# Patient Record
Sex: MALE | Race: WHITE | URBAN - METROPOLITAN AREA
[De-identification: names, ages, dates, MRNs, and addresses within clinical notes are randomized per-mention and may not be internally consistent; named-entity substitution may affect disease eponyms.]

---

## 2020-12-29 ENCOUNTER — EMERGENCY (EMERGENCY)
Facility: HOSPITAL | Age: 80
LOS: 0 days | Discharge: HOME | End: 2020-12-29
Attending: EMERGENCY MEDICINE
Payer: MEDICARE

## 2020-12-29 VITALS — HEIGHT: 71 IN | WEIGHT: 154.98 LBS

## 2020-12-29 VITALS
HEART RATE: 81 BPM | TEMPERATURE: 100 F | DIASTOLIC BLOOD PRESSURE: 66 MMHG | SYSTOLIC BLOOD PRESSURE: 162 MMHG | OXYGEN SATURATION: 100 % | RESPIRATION RATE: 17 BRPM

## 2020-12-29 DIAGNOSIS — Z88.0 ALLERGY STATUS TO PENICILLIN: ICD-10-CM

## 2020-12-29 DIAGNOSIS — Z85.118 PERSONAL HISTORY OF OTHER MALIGNANT NEOPLASM OF BRONCHUS AND LUNG: ICD-10-CM

## 2020-12-29 DIAGNOSIS — J44.9 CHRONIC OBSTRUCTIVE PULMONARY DISEASE, UNSPECIFIED: ICD-10-CM

## 2020-12-29 DIAGNOSIS — R53.1 WEAKNESS: ICD-10-CM

## 2020-12-29 DIAGNOSIS — U07.1 COVID-19: ICD-10-CM

## 2020-12-29 LAB
ALBUMIN SERPL ELPH-MCNC: 2 G/DL — LOW (ref 3.3–5)
ALP SERPL-CCNC: 115 U/L — SIGNIFICANT CHANGE UP (ref 40–120)
ALT FLD-CCNC: 23 U/L — SIGNIFICANT CHANGE UP (ref 12–78)
ANION GAP SERPL CALC-SCNC: 5 MMOL/L — SIGNIFICANT CHANGE UP (ref 5–17)
AST SERPL-CCNC: 39 U/L — HIGH (ref 15–37)
BASOPHILS # BLD AUTO: 0.03 K/UL — SIGNIFICANT CHANGE UP (ref 0–0.2)
BASOPHILS NFR BLD AUTO: 0.4 % — SIGNIFICANT CHANGE UP (ref 0–2)
BILIRUB SERPL-MCNC: 0.3 MG/DL — SIGNIFICANT CHANGE UP (ref 0.2–1.2)
BUN SERPL-MCNC: 25 MG/DL — HIGH (ref 7–23)
CALCIUM SERPL-MCNC: 9.1 MG/DL — SIGNIFICANT CHANGE UP (ref 8.5–10.1)
CHLORIDE SERPL-SCNC: 101 MMOL/L — SIGNIFICANT CHANGE UP (ref 96–108)
CO2 SERPL-SCNC: 28 MMOL/L — SIGNIFICANT CHANGE UP (ref 22–31)
CREAT SERPL-MCNC: 1.36 MG/DL — HIGH (ref 0.5–1.3)
EOSINOPHIL # BLD AUTO: 0.04 K/UL — SIGNIFICANT CHANGE UP (ref 0–0.5)
EOSINOPHIL NFR BLD AUTO: 0.5 % — SIGNIFICANT CHANGE UP (ref 0–6)
GLUCOSE SERPL-MCNC: 102 MG/DL — HIGH (ref 70–99)
HCT VFR BLD CALC: 28 % — LOW (ref 39–50)
HGB BLD-MCNC: 8.7 G/DL — LOW (ref 13–17)
IMM GRANULOCYTES NFR BLD AUTO: 1.5 % — SIGNIFICANT CHANGE UP (ref 0–1.5)
LYMPHOCYTES # BLD AUTO: 0.44 K/UL — LOW (ref 1–3.3)
LYMPHOCYTES # BLD AUTO: 5.9 % — LOW (ref 13–44)
MAGNESIUM SERPL-MCNC: 2 MG/DL — SIGNIFICANT CHANGE UP (ref 1.6–2.6)
MCHC RBC-ENTMCNC: 26.4 PG — LOW (ref 27–34)
MCHC RBC-ENTMCNC: 31.1 GM/DL — LOW (ref 32–36)
MCV RBC AUTO: 85.1 FL — SIGNIFICANT CHANGE UP (ref 80–100)
MONOCYTES # BLD AUTO: 0.99 K/UL — HIGH (ref 0–0.9)
MONOCYTES NFR BLD AUTO: 13.2 % — SIGNIFICANT CHANGE UP (ref 2–14)
NEUTROPHILS # BLD AUTO: 5.9 K/UL — SIGNIFICANT CHANGE UP (ref 1.8–7.4)
NEUTROPHILS NFR BLD AUTO: 78.5 % — HIGH (ref 43–77)
PLATELET # BLD AUTO: 273 K/UL — SIGNIFICANT CHANGE UP (ref 150–400)
POTASSIUM SERPL-MCNC: 4 MMOL/L — SIGNIFICANT CHANGE UP (ref 3.5–5.3)
POTASSIUM SERPL-SCNC: 4 MMOL/L — SIGNIFICANT CHANGE UP (ref 3.5–5.3)
PROT SERPL-MCNC: 7.9 GM/DL — SIGNIFICANT CHANGE UP (ref 6–8.3)
RBC # BLD: 3.29 M/UL — LOW (ref 4.2–5.8)
RBC # FLD: 18.6 % — HIGH (ref 10.3–14.5)
SARS-COV-2 RNA SPEC QL NAA+PROBE: DETECTED
SODIUM SERPL-SCNC: 134 MMOL/L — LOW (ref 135–145)
TROPONIN I SERPL-MCNC: <0.015 NG/ML — SIGNIFICANT CHANGE UP (ref 0.01–0.04)
WBC # BLD: 7.51 K/UL — SIGNIFICANT CHANGE UP (ref 3.8–10.5)
WBC # FLD AUTO: 7.51 K/UL — SIGNIFICANT CHANGE UP (ref 3.8–10.5)

## 2020-12-29 PROCEDURE — 93010 ELECTROCARDIOGRAM REPORT: CPT

## 2020-12-29 PROCEDURE — 36415 COLL VENOUS BLD VENIPUNCTURE: CPT

## 2020-12-29 PROCEDURE — 80053 COMPREHEN METABOLIC PANEL: CPT

## 2020-12-29 PROCEDURE — U0003: CPT

## 2020-12-29 PROCEDURE — 71045 X-RAY EXAM CHEST 1 VIEW: CPT | Mod: 26

## 2020-12-29 PROCEDURE — 99283 EMERGENCY DEPT VISIT LOW MDM: CPT | Mod: 25

## 2020-12-29 PROCEDURE — 99284 EMERGENCY DEPT VISIT MOD MDM: CPT | Mod: CS

## 2020-12-29 PROCEDURE — 83735 ASSAY OF MAGNESIUM: CPT

## 2020-12-29 PROCEDURE — 71045 X-RAY EXAM CHEST 1 VIEW: CPT

## 2020-12-29 PROCEDURE — 84484 ASSAY OF TROPONIN QUANT: CPT

## 2020-12-29 PROCEDURE — 93005 ELECTROCARDIOGRAM TRACING: CPT

## 2020-12-29 PROCEDURE — 85025 COMPLETE CBC W/AUTO DIFF WBC: CPT

## 2020-12-29 RX ORDER — SODIUM CHLORIDE 9 MG/ML
500 INJECTION INTRAMUSCULAR; INTRAVENOUS; SUBCUTANEOUS ONCE
Refills: 0 | Status: COMPLETED | OUTPATIENT
Start: 2020-12-29 | End: 2020-12-29

## 2020-12-29 RX ADMIN — SODIUM CHLORIDE 1000 MILLILITER(S): 9 INJECTION INTRAMUSCULAR; INTRAVENOUS; SUBCUTANEOUS at 15:59

## 2020-12-29 NOTE — ED ADULT NURSE NOTE - OBJECTIVE STATEMENT
pt presents to ED from MD [facility] office for eval of + COVID this AM. pt denies symptoms, states his breathing is no more labored than usual. hx of lobectomy per pt for pmhx of lung CA. upon assessment pt is mildly labored, maintaining SPO2 @ 100 on RA. 20g PIV placed to L AC. EKG completed. pt a&ox3.

## 2020-12-29 NOTE — ED PROVIDER NOTE - PATIENT PORTAL LINK FT
You can access the FollowMyHealth Patient Portal offered by [facility] by registering at the following website: [email or web address]. By joining [employer]’s FollowMyHealth portal, you will also be able to view your health information using other applications (apps) compatible with our system.

## 2020-12-29 NOTE — ED PROVIDER NOTE - OBJECTIVE STATEMENT
79 y/o male with a PMHx of COPD, lung cancer s/p lobectomy presents to the ED sent by Dr. Guzman. Pt c/o "weak legs over the last few days." Pt tested COVID + this morning. Denies SOB. No other complaints at this time.

## 2020-12-29 NOTE — ED PROVIDER NOTE - NSFOLLOWUPINSTRUCTIONS_ED_ALL_ED_FT
Follow up with Dr Rainey or with your doctors at [facility] this week.  You have new findings on your chest xray that are not related to COVID  your PCR COVID test is pending  plenty of rest, plenty of fluids

## 2020-12-29 NOTE — ED PROVIDER NOTE - CLINICAL SUMMARY MEDICAL DECISION MAKING FREE TEXT BOX
malaise/fatigue with low grade temps x a few days rapid COVID positive today as an outpt sent to ER for further eval.  labs and VSS in ER.  ok for DC home with followup this week with Dr Rainey for new findings on CXR

## 2020-12-29 NOTE — ED ADULT NURSE NOTE - NSIMPLEMENTINTERV_GEN_ALL_ED
Implemented All Fall Risk Interventions:  [place] to call system. Call bell, personal items and telephone within reach. Instruct patient to call for assistance. Room bathroom lighting operational. Non-slip footwear when patient is off stretcher. Physically safe environment: no spills, clutter or unnecessary equipment. Stretcher in lowest position, wheels locked, appropriate side rails in place. Provide visual cue, wrist band, yellow gown, etc. Monitor gait and stability. Monitor for mental status changes and reorient to person, place, and time. Review medications for side effects contributing to fall risk. Reinforce activity limits and safety measures with patient and family.

## 2020-12-29 NOTE — ED PROVIDER NOTE - PROGRESS NOTE DETAILS
Lakshmi Flores for attending Dr. Marroquin: Discussed case with Dr. Rainey, states right upper lobe finding on chest XR today is new, recommends CT chest. I Brett Flores attest that this documentation has been prepared under the direction and in the presence of Doctor Marroquin. Dr Guzman--H/H and Cr are at baseline for patient. not new.  can dc patient home, follow up with Dr Rainey tomorrow.

## 2020-12-29 NOTE — ED ADULT TRIAGE NOTE - CHIEF COMPLAINT QUOTE
pt sent in by MD for cough , body aches, fever, mild sob, hx of copd, lung ca, oxygen 88% ra, +tachypnea, pt tested positive for covid at dr. gonzalez's office.

## 2020-12-30 NOTE — ED POST DISCHARGE NOTE - DETAILS
Discussed with patients wife, they were made aware of lung mass, Ct was not done, will call Dr. cancino today. Return to ED immediately for any new or concerning symptoms or worsening symptoms-Lyle Marie PA-C

## 2022-05-13 PROBLEM — J44.9 CHRONIC OBSTRUCTIVE PULMONARY DISEASE, UNSPECIFIED: Chronic | Status: ACTIVE | Noted: 2020-12-29

## 2022-05-13 PROBLEM — C34.90 MALIGNANT NEOPLASM OF UNSPECIFIED PART OF UNSPECIFIED BRONCHUS OR LUNG: Chronic | Status: ACTIVE | Noted: 2020-12-29

## 2022-06-17 PROBLEM — Z00.00 ENCOUNTER FOR PREVENTIVE HEALTH EXAMINATION: Status: ACTIVE | Noted: 2022-06-17

## 2022-06-21 ENCOUNTER — APPOINTMENT (OUTPATIENT)
Dept: UROLOGY | Facility: CLINIC | Age: 82
End: 2022-06-21
Payer: MEDICARE

## 2022-06-21 VITALS
SYSTOLIC BLOOD PRESSURE: 127 MMHG | HEIGHT: 75 IN | HEART RATE: 77 BPM | DIASTOLIC BLOOD PRESSURE: 66 MMHG | BODY MASS INDEX: 19.02 KG/M2 | OXYGEN SATURATION: 96 % | WEIGHT: 153 LBS

## 2022-06-21 DIAGNOSIS — R89.6 ABNORMAL CYTOLOGICAL FINDINGS IN SPECIMENS FROM OTHER ORGANS, SYSTEMS AND TISSUES: ICD-10-CM

## 2022-06-21 PROCEDURE — 99213 OFFICE O/P EST LOW 20 MIN: CPT

## 2022-06-21 NOTE — ASSESSMENT
[FreeTextEntry1] : 80 yo M with abnormal urine cytology. WIll repeat. If negative, will observe if abnormal cells again will repeat again in 6 months. If positive, will perform cysto and CTU. \par \par With regards to renal calculi, we discussed several treatment options. We discussed medical expulsive therapy with alpha blocker to aid passage with pain medication and antiemetics for comfort. We also discussed the risks and benefits of ESWL. Benefits include noninvasive treatment and moderate anesthesia requirement, but risks include ineffective lithotripsy, requirement for subsequent procedures, hematoma, and steinstrasse. Finally, we discussed ureteroscopy with benefits including higher clearance rate of stones, direct visualization, concurrent stent placement, and possible stone extraction allowing for stone analysis. Risks include trauma to urethra, bladder, ureter, or kidney, hematoma, infection, ureteral stricture. \par Pt chooses to observe. Will obtain RBUS and KUB in 6 months.

## 2022-06-21 NOTE — PHYSICAL EXAM
[General Appearance - Well Developed] : well developed [General Appearance - Well Nourished] : well nourished [Normal Appearance] : normal appearance [Well Groomed] : well groomed [General Appearance - In No Acute Distress] : no acute distress [Edema] : no peripheral edema [Respiration, Rhythm And Depth] : normal respiratory rhythm and effort [Exaggerated Use Of Accessory Muscles For Inspiration] : no accessory muscle use [Abdomen Soft] : soft [Abdomen Tenderness] : non-tender [Costovertebral Angle Tenderness] : no ~M costovertebral angle tenderness [Urethral Meatus] : meatus normal [Urinary Bladder Findings] : the bladder was normal on palpation [Scrotum] : the scrotum was normal [Testes Mass (___cm)] : there were no testicular masses [Prostate Enlargement] : the prostate was not enlarged [Prostate Tenderness] : the prostate was not tender [No Prostate Nodules] : no prostate nodules [Normal Station and Gait] : the gait and station were normal for the patient's age [] : no rash [No Focal Deficits] : no focal deficits [Oriented To Time, Place, And Person] : oriented to person, place, and time [Affect] : the affect was normal [Mood] : the mood was normal [Not Anxious] : not anxious [No Palpable Adenopathy] : no palpable adenopathy

## 2022-06-21 NOTE — HISTORY OF PRESENT ILLNESS
[FreeTextEntry1] : 81 year old man seen 06/21/2022 with complaint of atypical cells on urine cytology and kidney stones. He was found to have proteinuria on routine screening UA and work up revealed findings above. No hematuria. No LUTS. RBUS showed 5 mm LEFT and 8 mm RIGHT renal stones. No hydronpephrosis or evidence of obstruction.  No hematuria, no dysuria, no frequency, no urgency, no hesitancy, no straining. No incontinence. \par No fevers, no chills, no nausea, no vomiting, no flank pain.

## 2022-06-24 LAB — URINE CYTOLOGY: NORMAL

## 2022-12-22 ENCOUNTER — RESULT REVIEW (OUTPATIENT)
Age: 82
End: 2022-12-22

## 2022-12-22 ENCOUNTER — APPOINTMENT (OUTPATIENT)
Dept: ULTRASOUND IMAGING | Facility: CLINIC | Age: 82
End: 2022-12-22

## 2022-12-22 ENCOUNTER — OUTPATIENT (OUTPATIENT)
Dept: OUTPATIENT SERVICES | Facility: HOSPITAL | Age: 82
LOS: 1 days | End: 2022-12-22
Payer: MEDICARE

## 2022-12-22 ENCOUNTER — APPOINTMENT (OUTPATIENT)
Dept: RADIOLOGY | Facility: CLINIC | Age: 82
End: 2022-12-22

## 2022-12-22 DIAGNOSIS — R89.6 ABNORMAL CYTOLOGICAL FINDINGS IN SPECIMENS FROM OTHER ORGANS, SYSTEMS AND TISSUES: ICD-10-CM

## 2022-12-22 PROCEDURE — 76770 US EXAM ABDO BACK WALL COMP: CPT | Mod: 26

## 2022-12-22 PROCEDURE — 76770 US EXAM ABDO BACK WALL COMP: CPT

## 2022-12-22 PROCEDURE — 74018 RADEX ABDOMEN 1 VIEW: CPT

## 2022-12-22 PROCEDURE — 74018 RADEX ABDOMEN 1 VIEW: CPT | Mod: 26

## 2023-01-05 ENCOUNTER — APPOINTMENT (OUTPATIENT)
Dept: UROLOGY | Facility: CLINIC | Age: 83
End: 2023-01-05
Payer: MEDICARE

## 2023-01-05 DIAGNOSIS — N20.0 CALCULUS OF KIDNEY: ICD-10-CM

## 2023-01-05 PROCEDURE — 99213 OFFICE O/P EST LOW 20 MIN: CPT

## 2023-01-05 NOTE — HISTORY OF PRESENT ILLNESS
[FreeTextEntry1] : 81 year old man seen 06/21/2022 with complaint of atypical cells on urine cytology and kidney stones. He was found to have proteinuria on routine screening UA and work up revealed findings above. No hematuria. No LUTS. RBUS showed 5 mm LEFT and 8 mm RIGHT renal stones. No hydronpephrosis or evidence of obstruction.  No hematuria, no dysuria, no frequency, no urgency, no hesitancy, no straining. No incontinence. \par No fevers, no chills, no nausea, no vomiting, no flank pain. \par \par 01/05/2023: Patient presents for follow up. RBUS and KUB showed no further stones. He denies new or acute LUTS. no new complaints.

## 2023-01-05 NOTE — ASSESSMENT
[FreeTextEntry1] : 82 yo M with abnormal urine cytology. Repeat WNL. \par For renal stones, appears to have passed them. Plan to RTO in 1 year. If he has imaging at that time for his lung cancer hopefully that will be sufficient for surveillance of stones. If not, will consider RBUS at that time.

## 2023-02-14 ENCOUNTER — APPOINTMENT (OUTPATIENT)
Dept: VASCULAR SURGERY | Facility: CLINIC | Age: 83
End: 2023-02-14
Payer: MEDICARE

## 2023-02-14 VITALS
DIASTOLIC BLOOD PRESSURE: 74 MMHG | SYSTOLIC BLOOD PRESSURE: 139 MMHG | WEIGHT: 153 LBS | HEART RATE: 68 BPM | BODY MASS INDEX: 19.02 KG/M2 | HEIGHT: 75 IN

## 2023-02-14 DIAGNOSIS — I72.4 ANEURYSM OF ARTERY OF LOWER EXTREMITY: ICD-10-CM

## 2023-02-14 PROCEDURE — 99212 OFFICE O/P EST SF 10 MIN: CPT

## 2023-02-14 NOTE — ASSESSMENT
[FreeTextEntry1] : 82-year-old status post open repair of an abdominal aortic aneurysm with no evidence of any significant problem related to the repair.  I discussed this with the patient and his wife and will obtain confirmatory aortic duplex imaging and lower extremity arterial duplex imaging as well.\par \par All questions were answered.

## 2023-02-14 NOTE — PHYSICAL EXAM
[FreeTextEntry1] : 2-2+ dorsalis pedis pulses bilaterally and both feet pink, warm, well-perfused without ulcers, cyanosis, or gangrenous changes [de-identified] : Well-healed midline xiphoid to pubis vertical abdominal incision with 1-2 areas of dehiscence but no tenderness or incarcerated intra-abdominal contents

## 2023-02-14 NOTE — HISTORY OF PRESENT ILLNESS
[FreeTextEntry1] : Patient presents with his wife for routine reevaluation.  They note that in October 2022 he was diagnosed with another lung cancer and has been treated with radiation and immunologic therapy.  He feels well and has not lost weight.

## 2023-03-01 ENCOUNTER — APPOINTMENT (OUTPATIENT)
Dept: VASCULAR SURGERY | Facility: CLINIC | Age: 83
End: 2023-03-01

## 2023-03-15 ENCOUNTER — APPOINTMENT (OUTPATIENT)
Dept: VASCULAR SURGERY | Facility: CLINIC | Age: 83
End: 2023-03-15
Payer: MEDICARE

## 2023-03-15 PROCEDURE — 93925 LOWER EXTREMITY STUDY: CPT

## 2023-03-15 PROCEDURE — 93978 VASCULAR STUDY: CPT

## 2023-03-27 ENCOUNTER — APPOINTMENT (OUTPATIENT)
Dept: VASCULAR SURGERY | Facility: CLINIC | Age: 83
End: 2023-03-27
Payer: MEDICARE

## 2023-03-27 VITALS — SYSTOLIC BLOOD PRESSURE: 162 MMHG | HEART RATE: 75 BPM | DIASTOLIC BLOOD PRESSURE: 72 MMHG

## 2023-03-27 DIAGNOSIS — I71.40 ABDOMINAL AORTIC ANEURYSM, WITHOUT RUPTURE, UNSPECIFIED: ICD-10-CM

## 2023-03-27 PROCEDURE — 99212 OFFICE O/P EST SF 10 MIN: CPT

## 2023-03-27 NOTE — HISTORY OF PRESENT ILLNESS
[FreeTextEntry1] : Patient and wife present for routine reevaluation.  Undergone aortic duplex imaging procedure as well as lower extremity arterial duplex study both on March 15, 2023.  The aortic duplex study demonstrated no evidence of an endoleak and a 2.5 mm diameter aortic sac.  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  There is no evidence of popliteal artery aneurysmal disease bilaterally.\par \par The wife notes her  will undergo radiation therapy tonight of his lung cancer.

## 2023-03-27 NOTE — ASSESSMENT
[FreeTextEntry1] : 82-year-old male status post endovascular aneurysm repair and no evidence of endoleak and small sac size.  I reviewed the results of the aortic duplex with the patient and his wife as well as the lower extremity arterial duplex and suggested continued surveillance in 1 year.\par \par All questions were answered.

## 2023-11-02 ENCOUNTER — INPATIENT (INPATIENT)
Facility: HOSPITAL | Age: 83
LOS: 27 days | Discharge: HOSPICE | DRG: 853 | End: 2023-11-30
Attending: FAMILY MEDICINE | Admitting: HOSPITALIST
Payer: MEDICARE

## 2023-11-02 VITALS — WEIGHT: 156.97 LBS | HEIGHT: 75 IN

## 2023-11-02 DIAGNOSIS — J44.1 CHRONIC OBSTRUCTIVE PULMONARY DISEASE WITH (ACUTE) EXACERBATION: ICD-10-CM
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
## 2023-11-02 LAB
ALBUMIN SERPL ELPH-MCNC: 2 G/DL — LOW (ref 3.3–5)
ALBUMIN SERPL ELPH-MCNC: 2 G/DL — LOW (ref 3.3–5)
ALP SERPL-CCNC: 130 U/L — HIGH (ref 40–120)
ALP SERPL-CCNC: 130 U/L — HIGH (ref 40–120)
ALT FLD-CCNC: 50 U/L — SIGNIFICANT CHANGE UP (ref 12–78)
ALT FLD-CCNC: 50 U/L — SIGNIFICANT CHANGE UP (ref 12–78)
ANION GAP SERPL CALC-SCNC: 7 MMOL/L — SIGNIFICANT CHANGE UP (ref 5–17)
ANION GAP SERPL CALC-SCNC: 7 MMOL/L — SIGNIFICANT CHANGE UP (ref 5–17)
ANISOCYTOSIS BLD QL: SLIGHT — SIGNIFICANT CHANGE UP
ANISOCYTOSIS BLD QL: SLIGHT — SIGNIFICANT CHANGE UP
APPEARANCE UR: CLEAR — SIGNIFICANT CHANGE UP
APPEARANCE UR: CLEAR — SIGNIFICANT CHANGE UP
APTT BLD: 28.7 SEC — SIGNIFICANT CHANGE UP (ref 24.5–35.6)
APTT BLD: 28.7 SEC — SIGNIFICANT CHANGE UP (ref 24.5–35.6)
AST SERPL-CCNC: 38 U/L — HIGH (ref 15–37)
AST SERPL-CCNC: 38 U/L — HIGH (ref 15–37)
BACTERIA # UR AUTO: NEGATIVE /HPF — SIGNIFICANT CHANGE UP
BACTERIA # UR AUTO: NEGATIVE /HPF — SIGNIFICANT CHANGE UP
BASE EXCESS BLDV CALC-SCNC: 0 MMOL/L — SIGNIFICANT CHANGE UP (ref -2–3)
BASE EXCESS BLDV CALC-SCNC: 0 MMOL/L — SIGNIFICANT CHANGE UP (ref -2–3)
BASOPHILS # BLD AUTO: 0 K/UL — SIGNIFICANT CHANGE UP (ref 0–0.2)
BASOPHILS # BLD AUTO: 0 K/UL — SIGNIFICANT CHANGE UP (ref 0–0.2)
BASOPHILS NFR BLD AUTO: 0 % — SIGNIFICANT CHANGE UP (ref 0–2)
BASOPHILS NFR BLD AUTO: 0 % — SIGNIFICANT CHANGE UP (ref 0–2)
BILIRUB SERPL-MCNC: 0.5 MG/DL — SIGNIFICANT CHANGE UP (ref 0.2–1.2)
BILIRUB SERPL-MCNC: 0.5 MG/DL — SIGNIFICANT CHANGE UP (ref 0.2–1.2)
BILIRUB UR-MCNC: NEGATIVE — SIGNIFICANT CHANGE UP
BILIRUB UR-MCNC: NEGATIVE — SIGNIFICANT CHANGE UP
BUN SERPL-MCNC: 33 MG/DL — HIGH (ref 7–23)
BUN SERPL-MCNC: 33 MG/DL — HIGH (ref 7–23)
CALCIUM SERPL-MCNC: 8.6 MG/DL — SIGNIFICANT CHANGE UP (ref 8.5–10.1)
CALCIUM SERPL-MCNC: 8.6 MG/DL — SIGNIFICANT CHANGE UP (ref 8.5–10.1)
CAST: 2 /LPF — SIGNIFICANT CHANGE UP (ref 0–4)
CAST: 2 /LPF — SIGNIFICANT CHANGE UP (ref 0–4)
CHLORIDE SERPL-SCNC: 99 MMOL/L — SIGNIFICANT CHANGE UP (ref 96–108)
CHLORIDE SERPL-SCNC: 99 MMOL/L — SIGNIFICANT CHANGE UP (ref 96–108)
CO2 SERPL-SCNC: 26 MMOL/L — SIGNIFICANT CHANGE UP (ref 22–31)
CO2 SERPL-SCNC: 26 MMOL/L — SIGNIFICANT CHANGE UP (ref 22–31)
COLOR SPEC: YELLOW — SIGNIFICANT CHANGE UP
COLOR SPEC: YELLOW — SIGNIFICANT CHANGE UP
CREAT SERPL-MCNC: 1.38 MG/DL — HIGH (ref 0.5–1.3)
CREAT SERPL-MCNC: 1.38 MG/DL — HIGH (ref 0.5–1.3)
DIFF PNL FLD: NEGATIVE — SIGNIFICANT CHANGE UP
DIFF PNL FLD: NEGATIVE — SIGNIFICANT CHANGE UP
EGFR: 51 ML/MIN/1.73M2 — LOW
EGFR: 51 ML/MIN/1.73M2 — LOW
EOSINOPHIL # BLD AUTO: 0 K/UL — SIGNIFICANT CHANGE UP (ref 0–0.5)
EOSINOPHIL # BLD AUTO: 0 K/UL — SIGNIFICANT CHANGE UP (ref 0–0.5)
EOSINOPHIL NFR BLD AUTO: 0 % — SIGNIFICANT CHANGE UP (ref 0–6)
EOSINOPHIL NFR BLD AUTO: 0 % — SIGNIFICANT CHANGE UP (ref 0–6)
GLUCOSE SERPL-MCNC: 167 MG/DL — HIGH (ref 70–99)
GLUCOSE SERPL-MCNC: 167 MG/DL — HIGH (ref 70–99)
GLUCOSE UR QL: NEGATIVE MG/DL — SIGNIFICANT CHANGE UP
GLUCOSE UR QL: NEGATIVE MG/DL — SIGNIFICANT CHANGE UP
GRAN CASTS # UR COMP ASSIST: PRESENT
GRAN CASTS # UR COMP ASSIST: PRESENT
HCO3 BLDV-SCNC: 26 MMOL/L — SIGNIFICANT CHANGE UP (ref 22–29)
HCO3 BLDV-SCNC: 26 MMOL/L — SIGNIFICANT CHANGE UP (ref 22–29)
HCT VFR BLD CALC: 27.8 % — LOW (ref 39–50)
HCT VFR BLD CALC: 27.8 % — LOW (ref 39–50)
HGB BLD-MCNC: 9.2 G/DL — LOW (ref 13–17)
HGB BLD-MCNC: 9.2 G/DL — LOW (ref 13–17)
HYPOCHROMIA BLD QL: SLIGHT — SIGNIFICANT CHANGE UP
HYPOCHROMIA BLD QL: SLIGHT — SIGNIFICANT CHANGE UP
INR BLD: 1.19 RATIO — HIGH (ref 0.85–1.18)
INR BLD: 1.19 RATIO — HIGH (ref 0.85–1.18)
KETONES UR-MCNC: NEGATIVE MG/DL — SIGNIFICANT CHANGE UP
KETONES UR-MCNC: NEGATIVE MG/DL — SIGNIFICANT CHANGE UP
LACTATE SERPL-SCNC: 1.5 MMOL/L — SIGNIFICANT CHANGE UP (ref 0.7–2)
LACTATE SERPL-SCNC: 1.5 MMOL/L — SIGNIFICANT CHANGE UP (ref 0.7–2)
LEUKOCYTE ESTERASE UR-ACNC: NEGATIVE — SIGNIFICANT CHANGE UP
LEUKOCYTE ESTERASE UR-ACNC: NEGATIVE — SIGNIFICANT CHANGE UP
LYMPHOCYTES # BLD AUTO: 0.15 K/UL — LOW (ref 1–3.3)
LYMPHOCYTES # BLD AUTO: 0.15 K/UL — LOW (ref 1–3.3)
LYMPHOCYTES # BLD AUTO: 1 % — LOW (ref 13–44)
LYMPHOCYTES # BLD AUTO: 1 % — LOW (ref 13–44)
MANUAL SMEAR VERIFICATION: SIGNIFICANT CHANGE UP
MANUAL SMEAR VERIFICATION: SIGNIFICANT CHANGE UP
MCHC RBC-ENTMCNC: 31.7 PG — SIGNIFICANT CHANGE UP (ref 27–34)
MCHC RBC-ENTMCNC: 31.7 PG — SIGNIFICANT CHANGE UP (ref 27–34)
MCHC RBC-ENTMCNC: 33.1 GM/DL — SIGNIFICANT CHANGE UP (ref 32–36)
MCHC RBC-ENTMCNC: 33.1 GM/DL — SIGNIFICANT CHANGE UP (ref 32–36)
MCV RBC AUTO: 95.9 FL — SIGNIFICANT CHANGE UP (ref 80–100)
MCV RBC AUTO: 95.9 FL — SIGNIFICANT CHANGE UP (ref 80–100)
METAMYELOCYTES # FLD: 1 % — HIGH (ref 0–0)
METAMYELOCYTES # FLD: 1 % — HIGH (ref 0–0)
MONOCYTES # BLD AUTO: 0.6 K/UL — SIGNIFICANT CHANGE UP (ref 0–0.9)
MONOCYTES # BLD AUTO: 0.6 K/UL — SIGNIFICANT CHANGE UP (ref 0–0.9)
MONOCYTES NFR BLD AUTO: 4 % — SIGNIFICANT CHANGE UP (ref 2–14)
MONOCYTES NFR BLD AUTO: 4 % — SIGNIFICANT CHANGE UP (ref 2–14)
NEUTROPHILS # BLD AUTO: 13.88 K/UL — HIGH (ref 1.8–7.4)
NEUTROPHILS # BLD AUTO: 13.88 K/UL — HIGH (ref 1.8–7.4)
NEUTROPHILS NFR BLD AUTO: 91 % — HIGH (ref 43–77)
NEUTROPHILS NFR BLD AUTO: 91 % — HIGH (ref 43–77)
NEUTS BAND # BLD: 2 % — SIGNIFICANT CHANGE UP (ref 0–8)
NEUTS BAND # BLD: 2 % — SIGNIFICANT CHANGE UP (ref 0–8)
NITRITE UR-MCNC: NEGATIVE — SIGNIFICANT CHANGE UP
NITRITE UR-MCNC: NEGATIVE — SIGNIFICANT CHANGE UP
NRBC # BLD: 0 /100 — SIGNIFICANT CHANGE UP (ref 0–0)
NRBC # BLD: 0 /100 — SIGNIFICANT CHANGE UP (ref 0–0)
NRBC # BLD: SIGNIFICANT CHANGE UP /100 WBCS (ref 0–0)
NRBC # BLD: SIGNIFICANT CHANGE UP /100 WBCS (ref 0–0)
NT-PROBNP SERPL-SCNC: 3027 PG/ML — HIGH (ref 0–450)
NT-PROBNP SERPL-SCNC: 3027 PG/ML — HIGH (ref 0–450)
OVALOCYTES BLD QL SMEAR: SLIGHT — SIGNIFICANT CHANGE UP
OVALOCYTES BLD QL SMEAR: SLIGHT — SIGNIFICANT CHANGE UP
PCO2 BLDV: 49 MMHG — SIGNIFICANT CHANGE UP (ref 42–55)
PCO2 BLDV: 49 MMHG — SIGNIFICANT CHANGE UP (ref 42–55)
PH BLDV: 7.34 — SIGNIFICANT CHANGE UP (ref 7.32–7.43)
PH BLDV: 7.34 — SIGNIFICANT CHANGE UP (ref 7.32–7.43)
PH UR: 5.5 — SIGNIFICANT CHANGE UP (ref 5–8)
PH UR: 5.5 — SIGNIFICANT CHANGE UP (ref 5–8)
PLAT MORPH BLD: NORMAL — SIGNIFICANT CHANGE UP
PLAT MORPH BLD: NORMAL — SIGNIFICANT CHANGE UP
PLATELET # BLD AUTO: 289 K/UL — SIGNIFICANT CHANGE UP (ref 150–400)
PLATELET # BLD AUTO: 289 K/UL — SIGNIFICANT CHANGE UP (ref 150–400)
PO2 BLDV: 50 MMHG — HIGH (ref 25–45)
PO2 BLDV: 50 MMHG — HIGH (ref 25–45)
POIKILOCYTOSIS BLD QL AUTO: SLIGHT — SIGNIFICANT CHANGE UP
POIKILOCYTOSIS BLD QL AUTO: SLIGHT — SIGNIFICANT CHANGE UP
POTASSIUM SERPL-MCNC: 4.2 MMOL/L — SIGNIFICANT CHANGE UP (ref 3.5–5.3)
POTASSIUM SERPL-MCNC: 4.2 MMOL/L — SIGNIFICANT CHANGE UP (ref 3.5–5.3)
POTASSIUM SERPL-SCNC: 4.2 MMOL/L — SIGNIFICANT CHANGE UP (ref 3.5–5.3)
POTASSIUM SERPL-SCNC: 4.2 MMOL/L — SIGNIFICANT CHANGE UP (ref 3.5–5.3)
PROMYELOCYTES # FLD: 1 % — HIGH (ref 0–0)
PROMYELOCYTES # FLD: 1 % — HIGH (ref 0–0)
PROT SERPL-MCNC: 6.9 GM/DL — SIGNIFICANT CHANGE UP (ref 6–8.3)
PROT SERPL-MCNC: 6.9 GM/DL — SIGNIFICANT CHANGE UP (ref 6–8.3)
PROT UR-MCNC: 100 MG/DL
PROT UR-MCNC: 100 MG/DL
PROTHROM AB SERPL-ACNC: 13.4 SEC — HIGH (ref 9.5–13)
PROTHROM AB SERPL-ACNC: 13.4 SEC — HIGH (ref 9.5–13)
RAPID RVP RESULT: SIGNIFICANT CHANGE UP
RAPID RVP RESULT: SIGNIFICANT CHANGE UP
RBC # BLD: 2.9 M/UL — LOW (ref 4.2–5.8)
RBC # BLD: 2.9 M/UL — LOW (ref 4.2–5.8)
RBC # FLD: 15.2 % — HIGH (ref 10.3–14.5)
RBC # FLD: 15.2 % — HIGH (ref 10.3–14.5)
RBC BLD AUTO: ABNORMAL
RBC BLD AUTO: ABNORMAL
RBC CASTS # UR COMP ASSIST: 0 /HPF — SIGNIFICANT CHANGE UP (ref 0–4)
RBC CASTS # UR COMP ASSIST: 0 /HPF — SIGNIFICANT CHANGE UP (ref 0–4)
SAO2 % BLDV: 83 % — SIGNIFICANT CHANGE UP (ref 67–88)
SAO2 % BLDV: 83 % — SIGNIFICANT CHANGE UP (ref 67–88)
SARS-COV-2 RNA SPEC QL NAA+PROBE: SIGNIFICANT CHANGE UP
SARS-COV-2 RNA SPEC QL NAA+PROBE: SIGNIFICANT CHANGE UP
SCHISTOCYTES BLD QL AUTO: SLIGHT — SIGNIFICANT CHANGE UP
SCHISTOCYTES BLD QL AUTO: SLIGHT — SIGNIFICANT CHANGE UP
SODIUM SERPL-SCNC: 132 MMOL/L — LOW (ref 135–145)
SODIUM SERPL-SCNC: 132 MMOL/L — LOW (ref 135–145)
SP GR SPEC: 1.02 — SIGNIFICANT CHANGE UP (ref 1–1.03)
SP GR SPEC: 1.02 — SIGNIFICANT CHANGE UP (ref 1–1.03)
SQUAMOUS # UR AUTO: 0 /HPF — SIGNIFICANT CHANGE UP (ref 0–5)
SQUAMOUS # UR AUTO: 0 /HPF — SIGNIFICANT CHANGE UP (ref 0–5)
TARGETS BLD QL SMEAR: SLIGHT — SIGNIFICANT CHANGE UP
TARGETS BLD QL SMEAR: SLIGHT — SIGNIFICANT CHANGE UP
TROPONIN I, HIGH SENSITIVITY RESULT: 15.2 NG/L — SIGNIFICANT CHANGE UP
TROPONIN I, HIGH SENSITIVITY RESULT: 15.2 NG/L — SIGNIFICANT CHANGE UP
UROBILINOGEN FLD QL: 1 MG/DL — SIGNIFICANT CHANGE UP (ref 0.2–1)
UROBILINOGEN FLD QL: 1 MG/DL — SIGNIFICANT CHANGE UP (ref 0.2–1)
WBC # BLD: 14.92 K/UL — HIGH (ref 3.8–10.5)
WBC # BLD: 14.92 K/UL — HIGH (ref 3.8–10.5)
WBC # FLD AUTO: 14.92 K/UL — HIGH (ref 3.8–10.5)
WBC # FLD AUTO: 14.92 K/UL — HIGH (ref 3.8–10.5)
WBC UR QL: 0 /HPF — SIGNIFICANT CHANGE UP (ref 0–5)
WBC UR QL: 0 /HPF — SIGNIFICANT CHANGE UP (ref 0–5)

## 2023-11-02 PROCEDURE — 85610 PROTHROMBIN TIME: CPT

## 2023-11-02 PROCEDURE — 85027 COMPLETE CBC AUTOMATED: CPT

## 2023-11-02 PROCEDURE — 80048 BASIC METABOLIC PNL TOTAL CA: CPT

## 2023-11-02 PROCEDURE — 97530 THERAPEUTIC ACTIVITIES: CPT | Mod: GP

## 2023-11-02 PROCEDURE — 99285 EMERGENCY DEPT VISIT HI MDM: CPT

## 2023-11-02 PROCEDURE — 94640 AIRWAY INHALATION TREATMENT: CPT

## 2023-11-02 PROCEDURE — 83880 ASSAY OF NATRIURETIC PEPTIDE: CPT

## 2023-11-02 PROCEDURE — 82570 ASSAY OF URINE CREATININE: CPT

## 2023-11-02 PROCEDURE — 82803 BLOOD GASES ANY COMBINATION: CPT

## 2023-11-02 PROCEDURE — 87070 CULTURE OTHR SPECIMN AEROBIC: CPT

## 2023-11-02 PROCEDURE — 87015 SPECIMEN INFECT AGNT CONCNTJ: CPT

## 2023-11-02 PROCEDURE — 71045 X-RAY EXAM CHEST 1 VIEW: CPT | Mod: 26

## 2023-11-02 PROCEDURE — 82140 ASSAY OF AMMONIA: CPT

## 2023-11-02 PROCEDURE — 71250 CT THORAX DX C-: CPT

## 2023-11-02 PROCEDURE — 87102 FUNGUS ISOLATION CULTURE: CPT

## 2023-11-02 PROCEDURE — 93306 TTE W/DOPPLER COMPLETE: CPT

## 2023-11-02 PROCEDURE — 83540 ASSAY OF IRON: CPT

## 2023-11-02 PROCEDURE — 84481 FREE ASSAY (FT-3): CPT

## 2023-11-02 PROCEDURE — 93010 ELECTROCARDIOGRAM REPORT: CPT

## 2023-11-02 PROCEDURE — 94667 MNPJ CHEST WALL 1ST: CPT

## 2023-11-02 PROCEDURE — 86923 COMPATIBILITY TEST ELECTRIC: CPT

## 2023-11-02 PROCEDURE — 82787 IGG 1 2 3 OR 4 EACH: CPT

## 2023-11-02 PROCEDURE — 88108 CYTOPATH CONCENTRATE TECH: CPT

## 2023-11-02 PROCEDURE — 88305 TISSUE EXAM BY PATHOLOGIST: CPT

## 2023-11-02 PROCEDURE — 74176 CT ABD & PELVIS W/O CONTRAST: CPT

## 2023-11-02 PROCEDURE — 87449 NOS EACH ORGANISM AG IA: CPT

## 2023-11-02 PROCEDURE — 82728 ASSAY OF FERRITIN: CPT

## 2023-11-02 PROCEDURE — 94668 MNPJ CHEST WALL SBSQ: CPT

## 2023-11-02 PROCEDURE — 84145 PROCALCITONIN (PCT): CPT

## 2023-11-02 PROCEDURE — 85018 HEMOGLOBIN: CPT

## 2023-11-02 PROCEDURE — 93970 EXTREMITY STUDY: CPT

## 2023-11-02 PROCEDURE — 84439 ASSAY OF FREE THYROXINE: CPT

## 2023-11-02 PROCEDURE — 86900 BLOOD TYPING SEROLOGIC ABO: CPT

## 2023-11-02 PROCEDURE — C1751: CPT

## 2023-11-02 PROCEDURE — 71045 X-RAY EXAM CHEST 1 VIEW: CPT

## 2023-11-02 PROCEDURE — 84484 ASSAY OF TROPONIN QUANT: CPT

## 2023-11-02 PROCEDURE — 94660 CPAP INITIATION&MGMT: CPT

## 2023-11-02 PROCEDURE — 97163 PT EVAL HIGH COMPLEX 45 MIN: CPT | Mod: GP

## 2023-11-02 PROCEDURE — 83036 HEMOGLOBIN GLYCOSYLATED A1C: CPT

## 2023-11-02 PROCEDURE — 82306 VITAMIN D 25 HYDROXY: CPT

## 2023-11-02 PROCEDURE — 80053 COMPREHEN METABOLIC PANEL: CPT

## 2023-11-02 PROCEDURE — 84300 ASSAY OF URINE SODIUM: CPT

## 2023-11-02 PROCEDURE — G1004: CPT

## 2023-11-02 PROCEDURE — 82550 ASSAY OF CK (CPK): CPT

## 2023-11-02 PROCEDURE — 84100 ASSAY OF PHOSPHORUS: CPT

## 2023-11-02 PROCEDURE — 83605 ASSAY OF LACTIC ACID: CPT

## 2023-11-02 PROCEDURE — P9040: CPT

## 2023-11-02 PROCEDURE — 87206 SMEAR FLUORESCENT/ACID STAI: CPT

## 2023-11-02 PROCEDURE — 82784 ASSAY IGA/IGD/IGG/IGM EACH: CPT

## 2023-11-02 PROCEDURE — 82533 TOTAL CORTISOL: CPT

## 2023-11-02 PROCEDURE — 86140 C-REACTIVE PROTEIN: CPT

## 2023-11-02 PROCEDURE — 88312 SPECIAL STAINS GROUP 1: CPT

## 2023-11-02 PROCEDURE — 86403 PARTICLE AGGLUT ANTBDY SCRN: CPT

## 2023-11-02 PROCEDURE — P9047: CPT

## 2023-11-02 PROCEDURE — 87040 BLOOD CULTURE FOR BACTERIA: CPT

## 2023-11-02 PROCEDURE — 84156 ASSAY OF PROTEIN URINE: CPT

## 2023-11-02 PROCEDURE — 82746 ASSAY OF FOLIC ACID SERUM: CPT

## 2023-11-02 PROCEDURE — 80061 LIPID PANEL: CPT

## 2023-11-02 PROCEDURE — 82962 GLUCOSE BLOOD TEST: CPT

## 2023-11-02 PROCEDURE — 36600 WITHDRAWAL OF ARTERIAL BLOOD: CPT

## 2023-11-02 PROCEDURE — 85730 THROMBOPLASTIN TIME PARTIAL: CPT

## 2023-11-02 PROCEDURE — 86901 BLOOD TYPING SEROLOGIC RH(D): CPT

## 2023-11-02 PROCEDURE — 36430 TRANSFUSION BLD/BLD COMPNT: CPT

## 2023-11-02 PROCEDURE — 85014 HEMATOCRIT: CPT

## 2023-11-02 PROCEDURE — 36415 COLL VENOUS BLD VENIPUNCTURE: CPT

## 2023-11-02 PROCEDURE — C9113: CPT

## 2023-11-02 PROCEDURE — 76770 US EXAM ABDO BACK WALL COMP: CPT

## 2023-11-02 PROCEDURE — 85025 COMPLETE CBC W/AUTO DIFF WBC: CPT

## 2023-11-02 PROCEDURE — 86850 RBC ANTIBODY SCREEN: CPT

## 2023-11-02 PROCEDURE — 82272 OCCULT BLD FECES 1-3 TESTS: CPT

## 2023-11-02 PROCEDURE — 87116 MYCOBACTERIA CULTURE: CPT

## 2023-11-02 PROCEDURE — 92610 EVALUATE SWALLOWING FUNCTION: CPT | Mod: GN

## 2023-11-02 PROCEDURE — 87899 AGENT NOS ASSAY W/OPTIC: CPT

## 2023-11-02 PROCEDURE — P9016: CPT

## 2023-11-02 PROCEDURE — 87305 ASPERGILLUS AG IA: CPT

## 2023-11-02 PROCEDURE — 71250 CT THORAX DX C-: CPT | Mod: 26,MG

## 2023-11-02 PROCEDURE — 84443 ASSAY THYROID STIM HORMONE: CPT

## 2023-11-02 PROCEDURE — 80202 ASSAY OF VANCOMYCIN: CPT

## 2023-11-02 PROCEDURE — 82607 VITAMIN B-12: CPT

## 2023-11-02 PROCEDURE — 83550 IRON BINDING TEST: CPT

## 2023-11-02 PROCEDURE — 80076 HEPATIC FUNCTION PANEL: CPT

## 2023-11-02 PROCEDURE — 83735 ASSAY OF MAGNESIUM: CPT

## 2023-11-02 PROCEDURE — 99223 1ST HOSP IP/OBS HIGH 75: CPT

## 2023-11-02 PROCEDURE — 97116 GAIT TRAINING THERAPY: CPT | Mod: GP

## 2023-11-02 RX ORDER — ACETAMINOPHEN 500 MG
650 TABLET ORAL EVERY 6 HOURS
Refills: 0 | Status: DISCONTINUED | OUTPATIENT
Start: 2023-11-02 | End: 2023-11-30

## 2023-11-02 RX ORDER — ACETAMINOPHEN 500 MG
650 TABLET ORAL ONCE
Refills: 0 | Status: COMPLETED | OUTPATIENT
Start: 2023-11-02 | End: 2023-11-03

## 2023-11-02 RX ORDER — FLUTICASONE FUROATE, UMECLIDINIUM BROMIDE AND VILANTEROL TRIFENATATE 200; 62.5; 25 UG/1; UG/1; UG/1
1 POWDER RESPIRATORY (INHALATION)
Refills: 0 | DISCHARGE

## 2023-11-02 RX ORDER — CEFTRIAXONE 500 MG/1
1000 INJECTION, POWDER, FOR SOLUTION INTRAMUSCULAR; INTRAVENOUS ONCE
Refills: 0 | Status: COMPLETED | OUTPATIENT
Start: 2023-11-02 | End: 2023-11-02

## 2023-11-02 RX ORDER — ATORVASTATIN CALCIUM 80 MG/1
1 TABLET, FILM COATED ORAL
Refills: 0 | DISCHARGE

## 2023-11-02 RX ORDER — ONDANSETRON 8 MG/1
4 TABLET, FILM COATED ORAL EVERY 8 HOURS
Refills: 0 | Status: DISCONTINUED | OUTPATIENT
Start: 2023-11-02 | End: 2023-11-30

## 2023-11-02 RX ORDER — LOSARTAN POTASSIUM 100 MG/1
50 TABLET, FILM COATED ORAL ONCE
Refills: 0 | Status: COMPLETED | OUTPATIENT
Start: 2023-11-02 | End: 2023-11-02

## 2023-11-02 RX ORDER — HEPARIN SODIUM 5000 [USP'U]/ML
5000 INJECTION INTRAVENOUS; SUBCUTANEOUS EVERY 8 HOURS
Refills: 0 | Status: DISCONTINUED | OUTPATIENT
Start: 2023-11-02 | End: 2023-11-04

## 2023-11-02 RX ORDER — TIOTROPIUM BROMIDE 18 UG/1
2 CAPSULE ORAL; RESPIRATORY (INHALATION) ONCE
Refills: 0 | Status: COMPLETED | OUTPATIENT
Start: 2023-11-02 | End: 2023-11-02

## 2023-11-02 RX ORDER — ALBUTEROL 90 UG/1
2 AEROSOL, METERED ORAL ONCE
Refills: 0 | Status: COMPLETED | OUTPATIENT
Start: 2023-11-02 | End: 2023-11-02

## 2023-11-02 RX ORDER — ALBUTEROL 90 UG/1
2 AEROSOL, METERED ORAL ONCE
Refills: 0 | Status: COMPLETED | OUTPATIENT
Start: 2023-11-02 | End: 2023-11-04

## 2023-11-02 RX ORDER — LANOLIN ALCOHOL/MO/W.PET/CERES
3 CREAM (GRAM) TOPICAL AT BEDTIME
Refills: 0 | Status: DISCONTINUED | OUTPATIENT
Start: 2023-11-02 | End: 2023-11-30

## 2023-11-02 RX ORDER — SODIUM CHLORIDE 9 MG/ML
1000 INJECTION INTRAMUSCULAR; INTRAVENOUS; SUBCUTANEOUS
Refills: 0 | Status: DISCONTINUED | OUTPATIENT
Start: 2023-11-02 | End: 2023-11-03

## 2023-11-02 RX ADMIN — Medication 125 MILLIGRAM(S): at 16:30

## 2023-11-02 RX ADMIN — ALBUTEROL 2 PUFF(S): 90 AEROSOL, METERED ORAL at 16:31

## 2023-11-02 RX ADMIN — LOSARTAN POTASSIUM 50 MILLIGRAM(S): 100 TABLET, FILM COATED ORAL at 23:40

## 2023-11-02 RX ADMIN — SODIUM CHLORIDE 100 MILLILITER(S): 9 INJECTION INTRAMUSCULAR; INTRAVENOUS; SUBCUTANEOUS at 23:55

## 2023-11-02 RX ADMIN — CEFTRIAXONE 1000 MILLIGRAM(S): 500 INJECTION, POWDER, FOR SOLUTION INTRAMUSCULAR; INTRAVENOUS at 19:00

## 2023-11-02 NOTE — H&P ADULT - NSHPLABSRESULTS_GEN_ALL_CORE
LABS:                        9.2    14.92 )-----------( 289      ( 02 Nov 2023 14:47 )             27.8     11-02    132<L>  |  99  |  33<H>  ----------------------------<  167<H>  4.2   |  26  |  1.38<H>    Ca    8.6      02 Nov 2023 14:47    TPro  6.9  /  Alb  2.0<L>  /  TBili  0.5  /  DBili  x   /  AST  38<H>  /  ALT  50  /  AlkPhos  130<H>  11-02    PT/INR - ( 02 Nov 2023 14:47 )   PT: 13.4 sec;   INR: 1.19 ratio         PTT - ( 02 Nov 2023 14:47 )  PTT:28.7 sec      < from: CT Chest No Cont (11.02.23 @ 16:04) >    IMPRESSION:    Right upper lobectomy.    Larger right lower lobe thick-walled cavity which may be infectious   and/or residua of reported treated tumor. 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    Consolidation within the right lower and middle lobe compatible with   radiation change.    Multiple indistinct nodules in the right lung which may be infectious or   radiation pneumonitis.    --- End of Report ---    < end of copied text >        < from: Xray Chest 1 View- PORTABLE-Urgent (11.02.23 @ 15:46) >    IMPRESSION:  1. The lobulated right upper lobe mass is not presently seen however   there is a thin-walled cavitary lesion in the right upper lobe and apex   with adjacent fibrosis and scarring.  2. Distortion of the right hilum with extensive increased attenuation,   not present previously with apparent surgical sutures embedded within   this region. This could reflect radiation therapy if applicable   clinically. There is also fibrotic stranding in the right lower lobe with   minimal right pleural thickening, as a change from the previous exam.  3. In light of the above, CT is recommended for further evaluation.    --- End of Report ---    < end of copied text >

## 2023-11-02 NOTE — H&P ADULT - NSICDXPASTMEDICALHX_GEN_ALL_CORE_FT
PAST MEDICAL HISTORY:  COPD (chronic obstructive pulmonary disease)     Lung cancer       AAA (abdominal aortic aneurysm)     HLD (hyperlipidemia)     HTN (hypertension)     Thrombocytopenia      PAST MEDICAL HISTORY:  Anemia of chronic disease     CKD (chronic kidney disease), stage III     COPD (chronic obstructive pulmonary disease)     Hypothyroidism     Lung cancer       AAA (abdominal aortic aneurysm)     HLD (hyperlipidemia)     HTN (hypertension)     Thrombocytopenia

## 2023-11-02 NOTE — ED ADULT NURSE NOTE - NSFALLRISKFACTORS_ED_ALL_ED
Bone Condition: Including osteoporosis, prolonged steroid use or metastatic bone disease/cancer/Other

## 2023-11-02 NOTE — ED PROVIDER NOTE - NEUROLOGICAL, MLM
A&Ox3. CN intact except + [place]. Normal speech, no motor nor sensory deficits. A&Ox3. CNs intact except + [place]. Normal speech, no motor nor sensory deficits.

## 2023-11-02 NOTE — ED PROVIDER NOTE - NSICDXPASTMEDICALHX_GEN_ALL_CORE_FT
PAST MEDICAL HISTORY:  COPD (chronic obstructive pulmonary disease)     Lung cancer       AAA (abdominal aortic aneurysm)     HLD (hyperlipidemia)     HTN (hypertension)     Thrombocytopenia

## 2023-11-02 NOTE — H&P ADULT - HISTORY OF PRESENT ILLNESS
Chief Complaint: shortness of breath.    The patient is a 83y Male with significant PMH of HTN, HPL, Chronic anemia, COPD, lung CA follows at [facility] (last chemotherapy and RT 05/2023, not currently receiving chemo treatment), s/p right upper lobectomy, AAA, Hypothyroidism, CKD stage IIIa and others has been in ED by his pulmonologist with c/o shortness of breath, dyspnea on minimal exertion, and cough. Reportedly, he had CT of the chest performed 10/3 which demonstrated consolidation, was started on augmentin/prednisone/azithro 10/6 x 1 week. Pt completed course of medications however wife noted worsening cough and pt with fever Tmax of 103.9 on 10/26. Wife then took pt to pulmonologist again who started pt on second round of the same medications, instructed wife to stop giving pt tylenol wife notes fevers self-resolved. Since then pt decreased energy with episode of being unsteady on feet yesterday. Today is 6th day of taking medications, had f/u scheduled with pulmonologist today who sent pt to ED for eval.  He feels tired and weak. Denies chest pain, palpitation, N/V, abdominal pain, diarrhea or dysuria.  His Pulmonologist: Dr. Olivera, and Oncology at [facility]: Dr. Pinto/Dr. Mayes.  Vitals stable,  Sig labs: WBC 14.92 and N 91%, Lactate 1.5, Hb 9.2 at baseline, BUN 33, Cr 1.38, . LFTs wnl.  Troponin 15.2, pro-BNP 3027,   UA negative.  RVP panel negative.    CT chest: Right upper lobectomy.  Larger right lower lobe thick-walled cavity which may be infectious and/or residua of reported treated tumor. Spongelike material within the cavity can be seen with aspergilloma (prior to fungus ball formation).   Consolidation within the right lower and middle lobe compatible with radiation change. Multiple indistinct nodules in the right lung which may be infectious or radiation pneumonitis.    Rocephin 1gm IV, Solumedrol 125 mg IV and Duoneb given in ED.

## 2023-11-02 NOTE — ED ADULT NURSE NOTE - NSFALLHARMRISKINTERV_ED_ALL_ED

## 2023-11-02 NOTE — ED ADULT NURSE NOTE - CCCP TRG CHIEF CMPLNT
Letter by Cora Ruby FNP at      Author: Cora Ruby FNP Service: -- Author Type: --    Filed:  Date of Service:  Status: (Other)         2020     Patient: Julianna Dickey   YOB: 1977   Date of Visit: 10/2/2020       To Whom it May Concern:    Julianna Dickey was seen in my clinic on 10/2/2020. She may return to school on 10/2/2020. Student recovering from concussions often exhibit cognitive symptoms that make attending school and learning difficult. They may not be able to attend school or only partial days. Some symptoms that could affect performance in the classroom include: sensitivity to light and noise, headache, trouble focusing, concentrating, or remembering, and difficulty looking at a screen. The accommodations below often help reduce the symptoms and allow them to return to school quicker. Compliance with these accommodations allows the brain to recover more quickly even if it appears the student is symptom free.     Attendance Restrictions  Full days as tolerated.  Academic Testin. Please allow extra time to complete tests  2. Administer test(s) in a quiet non-distracting environment alone if necessary.  Other Accommodations:  1. Allow pre-printed notes on colored paper  2. Limit exposure to computer screens  3. Allow breaks if symptoms worsen (refer to nurse or ATC). If symptoms continue or worsen please allow patient to return home.  4. Have assignment due dates and test dates staggered.   5. Allow student to wear sunglasses and hat to help patient's sensitivity to lights    6. Have access to lecture notes prior to class.   7. Take a 5- to 10-minute  break every hour due to headaches, light sensitivity.    If you have any questions or concerns, please don't hesitate to call.    Sincerely,         Electronically signed by LUPILLO Senior        shortness of breath

## 2023-11-02 NOTE — PATIENT PROFILE ADULT - FALL HARM RISK - HARM RISK INTERVENTIONS
Communicate Risk of Fall with Harm to all staff/Reinforce activity limits and safety measures with patient and family/Tailored Fall Risk Interventions/Visual Cue: Yellow wristband and red socks/Bed in lowest position, wheels locked, appropriate side rails in place/Call bell, personal items and telephone in reach/Instruct patient to call for assistance before getting out of bed or chair/Non-slip footwear when patient is out of bed/[place] to call system/Physically safe environment - no spills, clutter or unnecessary equipment/Purposeful Proactive Rounding/Room/bathroom lighting operational, light cord in reach Communicate Risk of Fall with Harm to all staff/Monitor for mental status changes/Reinforce activity limits and safety measures with patient and family/Tailored Fall Risk Interventions/Use of alarms - bed, chair and/or voice tab/Visual Cue: Yellow wristband and red socks/Bed in lowest position, wheels locked, appropriate side rails in place/Call bell, personal items and telephone in reach/Instruct patient to call for assistance before getting out of bed or chair/Non-slip footwear when patient is out of bed/[place] to call system/Physically safe environment - no spills, clutter or unnecessary equipment/Purposeful Proactive Rounding/Room/bathroom lighting operational, light cord in reach Assistance with ambulation/Assistance OOB with selected safe patient handling equipment/Communicate Risk of Fall with Harm to all staff/Reinforce activity limits and safety measures with patient and family/Tailored Fall Risk Interventions/Visual Cue: Yellow wristband and red socks/Bed in lowest position, wheels locked, appropriate side rails in place/Call bell, personal items and telephone in reach/Instruct patient to call for assistance before getting out of bed or chair/Non-slip footwear when patient is out of bed/[place] to call system/Physically safe environment - no spills, clutter or unnecessary equipment/Purposeful Proactive Rounding/Room/bathroom lighting operational, light cord in reach

## 2023-11-02 NOTE — ED ADULT TRIAGE NOTE - CHIEF COMPLAINT QUOTE
The right coronary artery was selectively engaged, injected and visualized. Pt presented to the ER with c/o SOB. Pt stated that he was sent here by his pulmonologist  Dr. Olivera. Pt was being treated for PNA outpatient but pt was been worsening. Pt was sent here to be evaluated.

## 2023-11-02 NOTE — H&P ADULT - ASSESSMENT
The patient is a 83y Male with significant PMH of HTN, HPL, Chronic anemia, COPD, lung CA [place] at [facility] [place] (last chemotherapy and RT 05/2023, not currently receiving chemo treatment), s/p right upper lobectomy, Hypothyroidism , AAA, CKD stage IIIa and others has been in ED by his pulmonologist with c/o shortness of breath, dyspnea on minimal exertion, and cough. Reportedly, he had CT of the chest performed 10/3 which demonstrated consolidation, was started on Augmentin / prednisone / Azithro 10/6 x 1 week. Pt completed course of medications however wife noted worsening cough and pt with fever Tmax of 103.9 on 10/26. Wife then took pt to pulmonologist again who started pt on second round of the same medications, instructed wife to stop giving pt tylenol wife notes fevers self-resolved. Since then pt decreased energy with episode of being unsteady on feet yesterday. Today is 6th day of taking medications, had f/u scheduled with pulmonologist today who sent pt to ED for eval.  He feels tired and weak. Denies chest pain, palpitation, N/V, abdominal pain, diarrhea or dysuria.  His Pulmonologist: Dr. Olivera, and Oncology at [facility] [place]: Dr. Pinto/Dr. Mayes.      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  CT chest: Larger right lower lobe thick-walled cavity which may be infectious and/or residua of reported treated tumor. Spongelike material within the cavity can be seen with aspergilloma (prior to fungus ball formation).   Consolidation within the right lower and middle lobe compatible with radiation change.  Has leukocytosis of 14.50k with left shift and fever.  Admit to medical floor.  Follow blood, urine and sputum cultures, Urinary strep and legionella antigen.  Levaquin (to cover atypical and pseudomonas; allergic to PCN) IV for now.  Continue his Trelegy Ellipta.  May need bronchoscopy.  May need Voriconazole, but I will defer decision to ID/Pulm.  Consult ID and Pulm in am.    # Acute hyperglycemia, possibly steroid-induced.  .  Will get A1c level to r/o new onset DM-2.    # Hypertension and HPL.  Continue his Losartan and Atorvastatin.    # Hypothyroidism.  On Levothyroxine.    # H/o Lung cancer s/p RU  lobectomy / chemo /radiation.  He follows in [facility].    # Chronic anemia likely due to chemo.  Hb 9.2, at baseline.    # CKD stage IIIa  Cr 1.38 (was 1.36 in 12/2020).  Continue to monitor.    # DVT prophylaxis: Heparin sq.

## 2023-11-02 NOTE — H&P ADULT - NSHPPHYSICALEXAM_GEN_ALL_CORE
PHYSICAL EXAM:  GENERAL: NAD, lying in bed comfortably  HEAD:  Atraumatic, Normocephalic  EYES: EOMI, PERRLA, conjunctiva and sclera clear  ENT: Moist mucous membranes  NECK: Supple, No JVD  CHEST/LUNG: Decreased breath sound right lung. Unlabored respirations  HEART: Regular rate and rhythm; No murmurs, rubs, or gallops  ABDOMEN: Bowel sounds present; Soft, Nontender, Nondistended. No hepatomegaly  EXTREMITIES:  2+ Peripheral Pulses, brisk capillary refill. No clubbing, cyanosis, or edema  NERVOUS SYSTEM:  Alert & Oriented X3, speech clear. No deficits   MSK: FROM all 4 extremities, full and equal strength  SKIN: No rashes or lesions

## 2023-11-02 NOTE — ED PROVIDER NOTE - CLINICAL SUMMARY MEDICAL DECISION MAKING FREE TEXT BOX
84 y/o male with PMHx of COPD, [place] [facility] [place] not currently receiving treatment), AAA, HTN, HLD, and thrombocytopenia BIB private car via wife from pulmonary office regarding recent increased cough/SOB/BLANCAS despite PO antibiotics/steroids. + Fever 6 days ago but not thereafter. Pt sent for r/o PNA, COPD exacerbation versus CHF. Pt coughing not hypoxic, mildly ill-appearing. Plan EKG, CXR, CT chest, labs including pan-culture, lactate, BNP, coags, RVP/COVID swab, serial troponin, VBG, IV steroid, albuterol/spiriva MDI, monitor, observe, reassess. 84 y/o male with PMHx of COPD, [place] [facility] [place], not currently receiving treatment), AAA, HTN, HLD, and thrombocytopenia BIB private car via wife from pulmonary office regarding recent increased cough/SOB/BLANCAS despite PO antibiotics/steroids. + Fever 6 days ago but not thereafter. Pt sent for r/o PNA, COPD exacerbation versus CHF. Pt coughing not hypoxic, mildly ill-appearing.   Plan: EKG, CXR, CT chest, labs including pan-culture, lactate, BNP, coags, RVP/COVID swab, serial troponin, VBG, IV steroid, albuterol/spiriva MDI, monitor, observe, reassess.    19:20, JOSEPH Inman MD:  labs notable for eelv. WBC of 15 w/ l shift, elev. BNP 3000 with normal Troponin & CXR/CT no obvious CHF.  CXR/CT chest suspected R lung infilt, chr. post-radiation changes.  IV Ceftriaxone administered for PNA, pt already took Zithromax po today.  Dr. Whittington aware of Med admission. 82 y/o male with PMHx of COPD, lung CA [place] [facility] [place] and RT 05/2023, not currently receiving treatment), AAA, HTN, HLD, and thrombocytopenia BIB private car via wife from pulmonary office regarding recent increased cough/SOB/BLANCAS despite PO antibiotics/steroids. + Fever 6 days ago but not thereafter. Pt sent for r/o PNA, COPD exacerbation versus CHF. Pt coughing, not hypoxic, mildly ill-appearing.   Plan: EKG, CXR, CT chest, labs including pan-culture, lactate, BNP, coags, RVP/COVID swab, serial troponin, VBG, IV steroid, albuterol/spiriva MDI, monitor, observe, reassess.    19:20, JOSEPH Inman MD:  labs notable for elev. WBC of 15 w/ L shift, elev. BNP 3000 with normal Troponin & CXR/CT no obvious CHF.  CXR/CT chest suspected R lung infilt, chr. post-radiation changes.  IV Ceftriaxone administered for PNA, pt already took Zithromax po today.  Dr. Whittington aware of Med admission.  + Outpt treatment failure for PNA, + lung CA.

## 2023-11-02 NOTE — ED PROVIDER NOTE - OBJECTIVE STATEMENT
82 y/o male with PMHx of COPD, lung CA [place] [facility] [place] (last chemotherapy and RT 05/2023, not currently receiving treatment), AAA, HTN, HLD, and thrombocytopenia SIB pulmonologist to the ED c/o shortness of breath, dyspnea on minimal exertion, and cough. Wife at bedside reports pt had CT of the chest performed 10/3 which demonstrated consolidation, was started on augmentin/prednisone/azithro 10/6 x 1 week. Pt completed course of medications however wife noted worsening cough and pt with fever Tmax of 103.9 on 10/26. Wife then took pt to pulmonologist again who started pt on second round of the same medications, instructed wife to stop giving pt tylenol wife notes fevers self-resolved. Since then pt decreased energy with episode of being unsteady on feet yesterday. Today is 6th day of taking medications, had f/u scheduled with pulmonologist today who sent pt to ED for eval. Former allergy to penicillin, passed test recently no longer allergic. Pt reports he feels "off," has been eating and drinking normally. Notes recent weight gain of about 12 pounds over the last 2 weeks. Denies chest pain.  Pulmonologist: Dr. Olivera  [facility] [place]: Dr. Pinto/Dr. Mayes 84 y/o male with PMHx of COPD, lung CA [place] [facility] [place]last chemotherapy and RT 05/2023, not currently receiving treatment), AAA, HTN, HLD, and thrombocytopenia BIB pvt car via wife to ED, SIB pulmonologist c/o shortness of breath, dyspnea on minimal exertion, and cough. Wife at bedside reports pt had outpt [facility] [place] chest performed 10/3 which demonstrated +consolidation, was started on augmentin/prednisone/azithro 10/6 x 1 week. Pt completed course of medications however wife noted worsening cough and pt with fever Tmax of 103.9 on 10/26. Wife then took pt to pulmonologist again who started pt on second round of the same medications, instructed wife to stop giving pt tylenol, wife notes fevers self-resolved. Since then pt decreased energy with episode of being unsteady on feet yesterday. Today is 6th day of taking 2nd round of medications, + pulmonologist f/u eval today: sent pt to ED for eval & management. Former allergy to penicillin, passed tolerance test recently, no longer allergic. Pt reports he feels "off," has been eating and drinking normally. Notes recent weight gain of about 12 pounds over the last 2 weeks. Denies chest pain.  Pulmonologist: Dr. Olivera  [facility] [place]: Dr. Pinto/Dr. Mayes

## 2023-11-02 NOTE — ED ADULT NURSE NOTE - OBJECTIVE STATEMENT
Alert, denies pain, present with productive cough, no phelgm noted  On abx 2/2 pna, hx of lung ca, follows with [facility] Alert, denies pain, presents with productive cough, no phlegm noted, BLANCAS. Referred to ED from plum.  On abx and prednisone 2/2 pna, hx of lung ca, follows with [facility] Alert, denies pain, presents with productive cough, no phlegm noted, BLANCAS. Abx course completed, prednisone in progress. Referred to ED from plum.  On abx and prednisone 2/2 pna, hx of lung ca, follows with AGAT[facility]

## 2023-11-03 DIAGNOSIS — Z90.2 ACQUIRED ABSENCE OF LUNG [PART OF]: Chronic | ICD-10-CM

## 2023-11-03 LAB
A1C WITH ESTIMATED AVERAGE GLUCOSE RESULT: 6.3 % — HIGH (ref 4–5.6)
A1C WITH ESTIMATED AVERAGE GLUCOSE RESULT: 6.3 % — HIGH (ref 4–5.6)
ANION GAP SERPL CALC-SCNC: 9 MMOL/L — SIGNIFICANT CHANGE UP (ref 5–17)
ANION GAP SERPL CALC-SCNC: 9 MMOL/L — SIGNIFICANT CHANGE UP (ref 5–17)
BUN SERPL-MCNC: 35 MG/DL — HIGH (ref 7–23)
BUN SERPL-MCNC: 35 MG/DL — HIGH (ref 7–23)
CALCIUM SERPL-MCNC: 8.3 MG/DL — LOW (ref 8.5–10.1)
CALCIUM SERPL-MCNC: 8.3 MG/DL — LOW (ref 8.5–10.1)
CHLORIDE SERPL-SCNC: 102 MMOL/L — SIGNIFICANT CHANGE UP (ref 96–108)
CHLORIDE SERPL-SCNC: 102 MMOL/L — SIGNIFICANT CHANGE UP (ref 96–108)
CO2 SERPL-SCNC: 22 MMOL/L — SIGNIFICANT CHANGE UP (ref 22–31)
CO2 SERPL-SCNC: 22 MMOL/L — SIGNIFICANT CHANGE UP (ref 22–31)
CREAT SERPL-MCNC: 1.2 MG/DL — SIGNIFICANT CHANGE UP (ref 0.5–1.3)
CREAT SERPL-MCNC: 1.2 MG/DL — SIGNIFICANT CHANGE UP (ref 0.5–1.3)
CULTURE RESULTS: NO GROWTH — SIGNIFICANT CHANGE UP
CULTURE RESULTS: NO GROWTH — SIGNIFICANT CHANGE UP
EGFR: 60 ML/MIN/1.73M2 — SIGNIFICANT CHANGE UP
EGFR: 60 ML/MIN/1.73M2 — SIGNIFICANT CHANGE UP
ESTIMATED AVERAGE GLUCOSE: 134 MG/DL — HIGH (ref 68–114)
ESTIMATED AVERAGE GLUCOSE: 134 MG/DL — HIGH (ref 68–114)
GLUCOSE SERPL-MCNC: 205 MG/DL — HIGH (ref 70–99)
GLUCOSE SERPL-MCNC: 205 MG/DL — HIGH (ref 70–99)
GRAM STN FLD: ABNORMAL
GRAM STN FLD: ABNORMAL
HCT VFR BLD CALC: 25.2 % — LOW (ref 39–50)
HCT VFR BLD CALC: 25.2 % — LOW (ref 39–50)
HGB BLD-MCNC: 8.2 G/DL — LOW (ref 13–17)
HGB BLD-MCNC: 8.2 G/DL — LOW (ref 13–17)
MCHC RBC-ENTMCNC: 30.9 PG — SIGNIFICANT CHANGE UP (ref 27–34)
MCHC RBC-ENTMCNC: 30.9 PG — SIGNIFICANT CHANGE UP (ref 27–34)
MCHC RBC-ENTMCNC: 32.5 GM/DL — SIGNIFICANT CHANGE UP (ref 32–36)
MCHC RBC-ENTMCNC: 32.5 GM/DL — SIGNIFICANT CHANGE UP (ref 32–36)
MCV RBC AUTO: 95.1 FL — SIGNIFICANT CHANGE UP (ref 80–100)
MCV RBC AUTO: 95.1 FL — SIGNIFICANT CHANGE UP (ref 80–100)
PLATELET # BLD AUTO: 259 K/UL — SIGNIFICANT CHANGE UP (ref 150–400)
PLATELET # BLD AUTO: 259 K/UL — SIGNIFICANT CHANGE UP (ref 150–400)
POTASSIUM SERPL-MCNC: 4.4 MMOL/L — SIGNIFICANT CHANGE UP (ref 3.5–5.3)
POTASSIUM SERPL-MCNC: 4.4 MMOL/L — SIGNIFICANT CHANGE UP (ref 3.5–5.3)
POTASSIUM SERPL-SCNC: 4.4 MMOL/L — SIGNIFICANT CHANGE UP (ref 3.5–5.3)
POTASSIUM SERPL-SCNC: 4.4 MMOL/L — SIGNIFICANT CHANGE UP (ref 3.5–5.3)
RBC # BLD: 2.65 M/UL — LOW (ref 4.2–5.8)
RBC # BLD: 2.65 M/UL — LOW (ref 4.2–5.8)
RBC # FLD: 15.1 % — HIGH (ref 10.3–14.5)
RBC # FLD: 15.1 % — HIGH (ref 10.3–14.5)
SODIUM SERPL-SCNC: 133 MMOL/L — LOW (ref 135–145)
SODIUM SERPL-SCNC: 133 MMOL/L — LOW (ref 135–145)
SPECIMEN SOURCE: SIGNIFICANT CHANGE UP
WBC # BLD: 11.74 K/UL — HIGH (ref 3.8–10.5)
WBC # BLD: 11.74 K/UL — HIGH (ref 3.8–10.5)
WBC # FLD AUTO: 11.74 K/UL — HIGH (ref 3.8–10.5)
WBC # FLD AUTO: 11.74 K/UL — HIGH (ref 3.8–10.5)

## 2023-11-03 PROCEDURE — 99232 SBSQ HOSP IP/OBS MODERATE 35: CPT

## 2023-11-03 RX ORDER — FOLIC ACID 0.8 MG
1 TABLET ORAL DAILY
Refills: 0 | Status: DISCONTINUED | OUTPATIENT
Start: 2023-11-03 | End: 2023-11-30

## 2023-11-03 RX ORDER — ATORVASTATIN CALCIUM 80 MG/1
40 TABLET, FILM COATED ORAL AT BEDTIME
Refills: 0 | Status: DISCONTINUED | OUTPATIENT
Start: 2023-11-03 | End: 2023-11-30

## 2023-11-03 RX ORDER — LEVOTHYROXINE SODIUM 125 MCG
25 TABLET ORAL DAILY
Refills: 0 | Status: DISCONTINUED | OUTPATIENT
Start: 2023-11-03 | End: 2023-11-09

## 2023-11-03 RX ORDER — FLUTICASONE FUROATE, UMECLIDINIUM BROMIDE AND VILANTEROL TRIFENATATE 200; 62.5; 25 UG/1; UG/1; UG/1
1 POWDER RESPIRATORY (INHALATION) DAILY
Refills: 0 | Status: DISCONTINUED | OUTPATIENT
Start: 2023-11-03 | End: 2023-11-30

## 2023-11-03 RX ORDER — LOSARTAN POTASSIUM 100 MG/1
50 TABLET, FILM COATED ORAL DAILY
Refills: 0 | Status: DISCONTINUED | OUTPATIENT
Start: 2023-11-03 | End: 2023-11-07

## 2023-11-03 RX ADMIN — HEPARIN SODIUM 5000 UNIT(S): 5000 INJECTION INTRAVENOUS; SUBCUTANEOUS at 06:21

## 2023-11-03 RX ADMIN — Medication 100 MILLIGRAM(S): at 22:57

## 2023-11-03 RX ADMIN — HEPARIN SODIUM 5000 UNIT(S): 5000 INJECTION INTRAVENOUS; SUBCUTANEOUS at 22:30

## 2023-11-03 RX ADMIN — SODIUM CHLORIDE 75 MILLILITER(S): 9 INJECTION INTRAMUSCULAR; INTRAVENOUS; SUBCUTANEOUS at 10:47

## 2023-11-03 RX ADMIN — Medication 650 MILLIGRAM(S): at 22:57

## 2023-11-03 RX ADMIN — Medication 25 MICROGRAM(S): at 06:21

## 2023-11-03 RX ADMIN — FLUTICASONE FUROATE, UMECLIDINIUM BROMIDE AND VILANTEROL TRIFENATATE 1 PUFF(S): 200; 62.5; 25 POWDER RESPIRATORY (INHALATION) at 09:47

## 2023-11-03 RX ADMIN — LOSARTAN POTASSIUM 50 MILLIGRAM(S): 100 TABLET, FILM COATED ORAL at 09:46

## 2023-11-03 RX ADMIN — ATORVASTATIN CALCIUM 40 MILLIGRAM(S): 80 TABLET, FILM COATED ORAL at 22:30

## 2023-11-03 RX ADMIN — HEPARIN SODIUM 5000 UNIT(S): 5000 INJECTION INTRAVENOUS; SUBCUTANEOUS at 14:06

## 2023-11-03 RX ADMIN — Medication 1 MILLIGRAM(S): at 09:47

## 2023-11-03 RX ADMIN — Medication 1 TABLET(S): at 09:46

## 2023-11-03 NOTE — CONSULT NOTE ADULT - ASSESSMENT
83y old Male with PMH HTN, HPL, Chronic anemia, COPD, lung [place] [facility] [place], not currently receiving chemo treatment), s/p right upper lobectomy, AAA, Hypothyroidism, CKD stage IIIa referred to me to ed for continued sob, cough despite abx treatment found to have large lower lobe thick walled cavity  1. 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 discussed with wife. no treatment at this time  -suggest making sure that it is aspergilloma, obtain sputum cultures  -obtain prior ct scans from [place] to compare as there is no mention of this even dating back to last month  2. pneumonia: continue levaquin  -supplemental o2  3. copd: continue trelegy 83y old Male with PMH HTN, HPL, Chronic anemia, COPD, lung CA [place] [facility] [place] and RT 05/2023, not currently receiving chemo treatment), s/p right upper lobectomy, AAA, Hypothyroidism, CKD stage IIIa referred to me to ed for continued sob, cough despite abx treatment found to have large lower lobe thick walled cavity  1. 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 discussed with wife. no treatment at this time  -suggest making sure that it is aspergilloma, obtain sputum cultures  -obtain prior ct scans from [place] to compare as there is no mention of this even dating back to last month  2. pneumonia: continue levaquin  -supplemental o2  3. copd: continue trelegy  4. ? radiation induced pneumonitis: continue methylprednisolone

## 2023-11-03 NOTE — CONSULT NOTE ADULT - ASSESSMENT
82 y/o [place] with h/o lung CA [place] [facility] [place], s/p right upper lobectomy, HTN, HPL, Chronic anemia, COPD, AAA, Hypothyroidism, CKD stage IIIa was admitted on 11/2 for increased shortness of breath, dyspnea on minimal exertion, and cough. Reportedly, he had CT of the chest performed 10/3 which demonstrated consolidation, was started on augmentin/prednisone/azithro 10/6 x 1 week. Pt completed course of medications however wife noted worsening cough and pt with fever Tmax of 103.9 on 10/26. Wife then took pt to pulmonologist again who started pt on second round of the same medications, instructed wife to stop giving pt tylenol wife notes fevers self-resolved. Since then pt decreased energy with episode of being unsteady on feet. Today is 6th day of taking medications, had f/u scheduled with pulmonologist who sent pt to ED for eval.  He feels tired and weak. In ER he received ceftriaxone.    1. Large RLL pulmonary cavity. 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 Possible postobstructive pneumonia. Lung Ca s/p right upper lobectomy. Radiation pneumonitis. Allergy to PCN.    -leukocytosis  -obtain sputum c/s, sputum for fungal c/s  -obtain fungitel  -agree with levofloxacin 500 mg IV qd   -reason for abx use and side effects reviewed with patient; monitor BMP   -pulmonary evaluation ?diagnostic bronchoscopy   -if this is a fungus ball, antifungal therapy will not be effective, he may need surgery to remove the fungal ball area  -old chart reviewed to assess prior cultures  -monitor temps  -f/u CBC  -supportive care  2. Other issues:   -care per medicine    d/w Dr. Olivera

## 2023-11-03 NOTE — CONSULT NOTE ADULT - SUBJECTIVE AND OBJECTIVE BOX
Patient is a 83y old  Male who presents with a chief complaint of Right lung pneumonia with thick-walled cavity.     HPI:  84 y/o Male with h/o lung CA [place] [facility] [place] and RT 05/[place]23, s/p right upper lobectomy, HTN, HPL, Chronic anemia, COPD, AAA, Hypothyroidism, CKD stage IIIa was admitted on 11/2 for increased shortness of breath, dyspnea on minimal exertion, and cough. Reportedly, he had CT of the chest performed 10/3 which demonstrated consolidation, was started on augmentin/prednisone/azithro 10/6 x 1 week. Pt completed course of medications however wife noted worsening cough and pt with fever Tmax of 103.9 on 10/26. Wife then took pt to pulmonologist again who started pt on second round of the same medications, instructed wife to stop giving pt tylenol wife notes fevers self-resolved. Since then pt decreased energy with episode of being unsteady on feet. Today is 6th day of taking medications, had f/u scheduled with pulmonologist who sent pt to ED for eval.  He feels tired and weak. In ER he received ceftriaxone.     His Pulmonologist: Dr. Olivera, and [place] [facility] [place]. Pinto/Dr. Mayes.    PMH: as above  PSH: as above  Meds: per reconciliation sheet, noted below  MEDICATIONS  (STANDING):  atorvastatin 40 milliGRAM(s) Oral at bedtime  fluticasone furoate/umeclidinium/vilanterol 100-62.5-25 MICROgram(s) Inhaler 1 Puff(s) Inhalation daily  folic acid 1 milliGRAM(s) Oral daily  heparin   Injectable 5000 Unit(s) SubCutaneous every 8 hours  levoFLOXacin IVPB      levoFLOXacin IVPB 500 milliGRAM(s) IV Intermittent every 24 hours  levothyroxine 25 MICROGram(s) Oral daily  losartan 50 milliGRAM(s) Oral daily  multivitamin 1 Tablet(s) Oral daily  sodium chloride 0.9%. 1000 milliLiter(s) (75 mL/Hr) IV Continuous <Continuous>    MEDICATIONS  (PRN):  acetaminophen     Tablet .. 650 milliGRAM(s) Oral every 6 hours PRN Temp greater or equal to 38C (100.4F), Mild Pain (1 - 3)  acetaminophen     Tablet .. 650 milliGRAM(s) Oral once PRN Temp greater or equal to 38C (100.4F), Mild Pain (1 - 3)  albuterol    90 MICROgram(s) HFA Inhaler 2 Puff(s) Inhalation once PRN Shortness of Breath and/or Wheezing  aluminum hydroxide/magnesium hydroxide/simethicone Suspension 30 milliLiter(s) Oral every 4 hours PRN Dyspepsia  melatonin 3 milliGRAM(s) Oral at bedtime PRN Insomnia  ondansetron Injectable 4 milliGRAM(s) IV Push every 8 hours PRN Nausea and/or Vomiting    Allergies    penicillin (Unknown)    Intolerances      Social: no smoking, no alcohol, no illegal drugs; no recent travel, no exposure to TB  FAMILY HISTORY:    no history of premature cardiovascular disease in first degree relatives    ROS: the patient denies fever, no chills, no HA, no seizures, no dizziness, no sore throat, no nasal congestion, no blurry vision, no CP, no palpitations, has SOB, has cough, no abdominal pain, no diarrhea, no N/V, no dysuria, no leg pain, no claudication, no rash, no joint aches, no rectal pain or bleeding, no night sweats  All other systems reviewed and are negative    Vital Signs Last 24 Hrs  T(C): 36.7 (03 Nov 2023 08:02), Max: 37.2 (02 Nov 2023 15:12)  T(F): 98.1 (03 Nov 2023 08:02), Max: 98.9 (02 Nov 2023 15:12)  HR: 81 (03 Nov 2023 10:12) (81 - 103)  BP: 165/69 (03 Nov 2023 08:02) (143/65 - 170/76)  BP(mean): 96 (02 Nov 2023 22:36) (87 - 103)  RR: 18 (03 Nov 2023 08:02) (18 - 28)  SpO2: 95% (03 Nov 2023 10:12) (95% - 99%)    Parameters below as of 03 Nov 2023 08:02  Patient On (Oxygen Delivery Method): room air      Daily Height in cm: 190.5 (02 Nov 2023 14:08)    Daily     PE:    Constitutional:  No acute distress  HEENT: NC/AT, EOMI, PERRLA, conjunctivae clear; ears and nose atraumatic; pharynx benign  Neck: supple; thyroid not palpable  Back: no tenderness  Respiratory: respiratory effort normal; crackles at bases  Cardiovascular: S1S2 regular, no murmurs  Abdomen: soft, not tender, not distended, positive BS; no liver or spleen organomegaly  Genitourinary: no suprapubic tenderness  Lymphatic: no LN palpable  Musculoskeletal: no muscle tenderness, no joint swelling or tenderness  Extremities: no pedal edema  Neurological/ Psychiatric: AxOx3, judgement and insight normal; moving all extremities  Skin: no rashes; no palpable lesions    Labs: all available labs reviewed                        8.2    11.74 )-----------( 259      ( 03 Nov 2023 07:15 )             25.2     11-03    133<L>  |  102  |  35<H>  ----------------------------<  205<H>  4.4   |  22  |  1.20    Ca    8.3<L>      03 Nov 2023 07:15    TPro  6.9  /  Alb  2.0<L>  /  TBili  0.5  /  DBili  x   /  AST  38<H>  /  ALT  50  /  AlkPhos  130<H>  11-02     LIVER FUNCTIONS - ( 02 Nov 2023 14:47 )  Alb: 2.0 g/dL / Pro: 6.9 gm/dL / ALK PHOS: 130 U/L / ALT: 50 U/L / AST: 38 U/L / GGT: x           Urinalysis (11-02 @ 16:40)  Urine Appearance: Clear  Protein, Urine: 100 mg/dL  Urine Microscopic-Add On (NC) (11-02 @ 16:40)  White Blood Cell - Urine: 0 /HPF  Red Blood Cell - Urine: 0 /HPF    (11-02 @ 14:47)  [facility]    Radiology: all available radiological tests reviewed    < from: CT Chest No Cont (11.02.23 @ 16:04) >  Right upper lobectomy.  Larger right lower lobe thick-walled cavity which may be infectious   and/or residua of reported treated tumor. 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  Consolidation within the right lower and middle lobe compatible with radiation change.  Multiple indistinct nodules in the right lungwhich may be infectious or radiation pneumonitis.  < end of copied text >    Advanced directives addressed: full resuscitation

## 2023-11-03 NOTE — PROGRESS NOTE ADULT - ATTENDING COMMENTS
Constitutional: NAD, awake and alert, well-developed, [place]   HEENT: PERR, EOMI, Normal Hearing, MMM  Neck: Soft and supple, No LAD, No JVD  Respiratory: DESIRE breath sounds clear, RUL breath sounds diminished but clear; BL lower lobes congested. No wheezing, rales, rhonchi.   Cardiovascular: S1 and S2, regular rate and rhythm, no Murmurs, gallops or rubs  Gastrointestinal: Bowel Sounds present, soft, nontender, nondistended, no guarding, no rebound  Extremities: No peripheral edema  Vascular: 2+ peripheral pulses  Neurological: A/O x 3, no focal deficits  Musculoskeletal: 5/5 strength b/l upper and lower extremities  Skin: Abrasion to R elbow; dressing in place. No rashes    83-year-old M with PMHx HTN, HLD, chronic anemia, COPD, lung CA s/p RUL [place] [facility] [place], not currently on treatment), AAA, hypothyroidism, CKD stage IIIa sent in to ED by pulmonologist MD Gill with c/o SOB, dyspnea on minimal exertion, and cough. 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    # RLL PNA with possible aspergilloma 2/2 immunocompromised status  - failed out pt abx.   - pt s/p right upper lobectomy, chemo and radiation therapy.   - CT scan with Larger right lower lobe thick-walled cavity which may be infectious and/or residua of reported treated tumor. Spongelike material within the cavity can be seen with aspergilloma (prior to fungus ball formation).   - sputum culture pending   - pulm and ID consult pending.    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 wife at bedside and updated on plan. pending cultures, ultimately may just do surveillance monitoring of the area as pt is high risk for interventions and removal of aspergilloma may worsen outcomes.

## 2023-11-03 NOTE — CONSULT NOTE ADULT - SUBJECTIVE AND OBJECTIVE BOX
HPI: JOHN FRIEDEL is a 83y old Male with PMH HTN, HPL, Chronic anemia, COPD, lung CA follows at [facility] [place] (last chemotherapy and RT 05/2023, not currently receiving chemo treatment), s/p right upper lobectomy, AAA, Hypothyroidism, CKD stage IIIa and others has been in ED by his pulmonologist with c/o shortness of breath, dyspnea on minimal exertion, and cough. Reportedly, he had CT of the chest performed 10/3 which demonstrated consolidation, was started on augmentin/prednisone/azithro 10/6 x 1 week. Pt completed course of medications however wife noted worsening cough and pt with fever Tmax of 103.9 on 10/26. Wife then took pt to pulmonologist again who started pt on second round of the same medications, instructed wife to stop giving pt tylenol wife notes fevers self-resolved. Since then pt decreased energy with episode of being unsteady on feet yesterday. Today is 6th day of taking medications, had f/u scheduled with pulmonologist today who sent pt to ED for eval.  He feels tired and weak. Denies chest pain, palpitation, N/V, abdominal pain, diarrhea or dysuria.    Past Medical History:   COPD (chronic obstructive pulmonary disease)    Lung cancer    Anemia of chronic disease    CKD (chronic kidney disease), stage III    Hypothyroidism      Past Surgical History:   S/P lobectomy of lung      Family History:       Social History:     Review of Systems:  All 10 systems reviewed in detailed and found to be negative with the exception of what has already been described above    Allergies:  penicillin (Unknown)    Meds  MEDICATIONS  (STANDING):  atorvastatin 40 milliGRAM(s) Oral at bedtime  fluticasone furoate/umeclidinium/vilanterol 100-62.5-25 MICROgram(s) Inhaler 1 Puff(s) Inhalation daily  folic acid 1 milliGRAM(s) Oral daily  heparin   Injectable 5000 Unit(s) SubCutaneous every 8 hours  levoFLOXacin IVPB      levoFLOXacin IVPB 500 milliGRAM(s) IV Intermittent every 24 hours  levothyroxine 25 MICROGram(s) Oral daily  losartan 50 milliGRAM(s) Oral daily  multivitamin 1 Tablet(s) Oral daily    MEDICATIONS  (PRN):  acetaminophen     Tablet .. 650 milliGRAM(s) Oral once PRN Temp greater or equal to 38C (100.4F), Mild Pain (1 - 3)  acetaminophen     Tablet .. 650 milliGRAM(s) Oral every 6 hours PRN Temp greater or equal to 38C (100.4F), Mild Pain (1 - 3)  albuterol    90 MICROgram(s) HFA Inhaler 2 Puff(s) Inhalation once PRN Shortness of Breath and/or Wheezing  aluminum hydroxide/magnesium hydroxide/simethicone Suspension 30 milliLiter(s) Oral every 4 hours PRN Dyspepsia  benzonatate 100 milliGRAM(s) Oral three times a day PRN Cough  melatonin 3 milliGRAM(s) Oral at bedtime PRN Insomnia  ondansetron Injectable 4 milliGRAM(s) IV Push every 8 hours PRN Nausea and/or Vomiting    Physical Exam  T(C): 36.7 (11-03-23 @ 08:02), Max: 36.7 (11-03-23 @ 08:02)  HR: 81 (11-03-23 @ 10:12) (81 - 103)  BP: 165/69 (11-03-23 @ 08:02) (153/74 - 170/76)  RR: 18 (11-03-23 @ 08:02) (18 - 28)  SpO2: 95% (11-03-23 @ 10:12) (95% - 99%)  Gen: Alert, oriented, no distress  HEENT: Anicteric sclera, moist mucous membranes, no JVD, no lymphadenopathy, good dentition  Cardio: Regular rhythm and rate, normal S1S2, no murmurs  Resp: Clear to auscultation bilaterally, no wheezing or rhonchi  GI: Nontender, nondistended, normoactive bowel sounds  Ext: No cyanosis, clubbing or edema  Neuro: Nonfocal    Labs:                        8.2    11.74 )-----------( 259      ( 03 Nov 2023 07:15 )             25.2     11-03    133<L>  |  102  |  35<H>  ----------------------------<  205<H>  4.4   |  22  |  1.20    Ca    8.3<L>      03 Nov 2023 07:15    TPro  6.9  /  Alb  2.0<L>  /  TBili  0.5  /  DBili  x   /  AST  38<H>  /  ALT  50  /  AlkPhos  130<H>  11-02    PT/INR - ( 02 Nov 2023 14:47 )   PT: 13.4 sec;   INR: 1.19 ratio         PTT - ( 02 Nov 2023 14:47 )  PTT:28.7 sec  Urinalysis Basic - ( 03 Nov 2023 07:15 )    Color: x / Appearance: x / SG: x / pH: x  Gluc: 205 mg/dL / Ketone: x  / Bili: x / Urobili: x   Blood: x / Protein: x / Nitrite: x   Leuk Esterase: x / RBC: x / WBC x   Sq Epi: x / Non Sq Epi: x / Bacteria: x    < from: CT Chest No Cont (11.02.23 @ 16:04) >  ACC: [id number] EXAM:  CT CHEST   ORDERED BY: SARAH YOO     PROCEDURE DATE:  11/02/2023          INTERPRETATION:  INDICATION: Shortness of breath, cough, lung cancer   history, last chemotherapy and radiation treatment in May 2023    TECHNIQUE: Helical acquisition images of the chest without intravenous   contrast. Maximum intensity projection images were generated.    COMPARISON: 12/29/2020 chest x-ray.    FINDINGS:    LUNGS/AIRWAYS/PLEURA: Right upper lobectomy. Right lower lobe   multiloculated thick-walled 11 cm cavity (best seen in its entirety on   601-59) containing fluid and spongelike material. Consolidation within   the middle lobe and inferior aspect of the right lower lobe with angular   borders suggesting prior radiation. Multiple indistinct nodules of   varying density in the right lower and middle lobes, mostly adjacent to   the cavity. Small branching opacities in the peripheral left upper and   lower lobes, compatible with distal airway impaction. Trace left pleural   effusion. Mild right apical pleural thickening.    LYMPH NODES/MEDIASTINUM: No lymphadenopathy.    HEART/VASCULATURE: Normal heart size. Unremarkable pericardium. Coronary   artery calcifications. Normal caliber aorta.    UPPER ABDOMEN: Unremarkable.    BONES/SOFT TISSUES: Old sternal fracture. Mild loss of height of the L1   vertebral body.      IMPRESSION:    Right upper lobectomy.    Larger right lower lobe thick-walled cavity which may be infectious   and/or residua of reported treated tumor. 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    Consolidation within the right lower and middle lobe compatible with   radiation change.    Multiple indistinct nodules in the right lungwhich may be infectious or   radiation pneumonitis.    < end of copied text >   HPI: JOHN FRIEDEL is a 83y old Male with PMH HTN, HPL, Chronic anemia, COPD, [place] [facility] [place]23, not currently receiving chemo treatment), s/p right upper lobectomy, AAA, Hypothyroidism, CKD stage IIIa referred by me to ED shortness of breath, dyspnea on minimal exertion, and cough. Reportedly, he had CT of the chest performed 10/3 which demonstrated consolidation, was started on augmentin/prednisone/azithro 10/6 x 1 week. Pt completed course of medications however wife noted worsening cough and pt with fever Tmax of 103.9 on 10/26. Wife then took pt to pulmonologist again who started pt on second round of the same medications, instructed wife to stop giving pt tylenol wife notes fevers self-resolved. Since then pt decreased energy with episode of being unsteady on feet yesterday. Today is 6th day of taking medications, had f/u scheduled with me, and I had recommended he come for eval.  He feels tired and weak. Denies chest pain, palpitation, N/V, abdominal pain, diarrhea or dysuria.  today wife is at bedside. we reviewed his ct scan results    Past Medical History:   COPD (chronic obstructive pulmonary disease)    Lung cancer    Anemia of chronic disease    CKD (chronic kidney disease), stage III    Hypothyroidism      Past Surgical History:   S/P lobectomy of lung      Family History:       Social History: lives at home    Review of Systems:  All 10 systems reviewed in detailed and found to be negative with the exception of what has already been described above    Allergies:  penicillin (Unknown)    Meds  MEDICATIONS  (STANDING):  atorvastatin 40 milliGRAM(s) Oral at bedtime  fluticasone furoate/umeclidinium/vilanterol 100-62.5-25 MICROgram(s) Inhaler 1 Puff(s) Inhalation daily  folic acid 1 milliGRAM(s) Oral daily  heparin   Injectable 5000 Unit(s) SubCutaneous every 8 hours  levoFLOXacin IVPB      levoFLOXacin IVPB 500 milliGRAM(s) IV Intermittent every 24 hours  levothyroxine 25 MICROGram(s) Oral daily  losartan 50 milliGRAM(s) Oral daily  multivitamin 1 Tablet(s) Oral daily    MEDICATIONS  (PRN):  acetaminophen     Tablet .. 650 milliGRAM(s) Oral once PRN Temp greater or equal to 38C (100.4F), Mild Pain (1 - 3)  acetaminophen     Tablet .. 650 milliGRAM(s) Oral every 6 hours PRN Temp greater or equal to 38C (100.4F), Mild Pain (1 - 3)  albuterol    90 MICROgram(s) HFA Inhaler 2 Puff(s) Inhalation once PRN Shortness of Breath and/or Wheezing  aluminum hydroxide/magnesium hydroxide/simethicone Suspension 30 milliLiter(s) Oral every 4 hours PRN Dyspepsia  benzonatate 100 milliGRAM(s) Oral three times a day PRN Cough  melatonin 3 milliGRAM(s) Oral at bedtime PRN Insomnia  ondansetron Injectable 4 milliGRAM(s) IV Push every 8 hours PRN Nausea and/or Vomiting    Physical Exam  T(C): 36.7 (11-03-23 @ 08:02), Max: 36.7 (11-03-23 @ 08:02)  HR: 81 (11-03-23 @ 10:12) (81 - 103)  BP: 165/69 (11-03-23 @ 08:02) (153/74 - 170/76)  RR: 18 (11-03-23 @ 08:02) (18 - 28)  SpO2: 95% (11-03-23 @ 10:12) (95% - 99%)  Gen: Alert, oriented, no distress  HEENT: Anicteric sclera, moist mucous membranes  Cardio: Regular rhythm and rate, normal S1S2, no murmurs  Resp: Crackles, congested  GI: Nontender, nondistended, normoactive bowel sounds  Ext: No cyanosis, clubbing or edema  Neuro: Nonfocal    Labs:                        8.2    11.74 )-----------( 259      ( 03 Nov 2023 07:15 )             25.2     11-03    133<L>  |  102  |  35<H>  ----------------------------<  205<H>  4.4   |  22  |  1.20    Ca    8.3<L>      03 Nov 2023 07:15    TPro  6.9  /  Alb  2.0<L>  /  TBili  0.5  /  DBili  x   /  AST  38<H>  /  ALT  50  /  AlkPhos  130<H>  11-02    PT/INR - ( 02 Nov 2023 14:47 )   PT: 13.4 sec;   INR: 1.19 ratio         PTT - ( 02 Nov 2023 14:47 )  PTT:28.7 sec  Urinalysis Basic - ( 03 Nov 2023 07:15 )    Color: x / Appearance: x / SG: x / pH: x  Gluc: 205 mg/dL / Ketone: x  / Bili: x / Urobili: x   Blood: x / Protein: x / Nitrite: x   Leuk Esterase: x / RBC: x / WBC x   Sq Epi: x / Non Sq Epi: x / Bacteria: x    < from: CT Chest No Cont (11.02.23 @ 16:04) >  ACC: [id number] EXAM:  CT CHEST   ORDERED BY: SARAH YOO     PROCEDURE DATE:  11/02/2023          INTERPRETATION:  INDICATION: Shortness of breath, cough, lung cancer   history, last chemotherapy and radiation treatment in May 2023    TECHNIQUE: Helical acquisition images of the chest without intravenous   contrast. Maximum intensity projection images were generated.    COMPARISON: 12/29/2020 chest x-ray.    FINDINGS:    LUNGS/AIRWAYS/PLEURA: Right upper lobectomy. Right lower lobe   multiloculated thick-walled 11 cm cavity (best seen in its entirety on   601-59) containing fluid and spongelike material. Consolidation within   the middle lobe and inferior aspect of the right lower lobe with angular   borders suggesting prior radiation. Multiple indistinct nodules of   varying density in the right lower and middle lobes, mostly adjacent to   the cavity. Small branching opacities in the peripheral left upper and   lower lobes, compatible with distal airway impaction. Trace left pleural   effusion. Mild right apical pleural thickening.    LYMPH NODES/MEDIASTINUM: No lymphadenopathy.    HEART/VASCULATURE: Normal heart size. Unremarkable pericardium. Coronary   artery calcifications. Normal caliber aorta.    UPPER ABDOMEN: Unremarkable.    BONES/SOFT TISSUES: Old sternal fracture. Mild loss of height of the L1   vertebral body.      IMPRESSION:    Right upper lobectomy.    Larger right lower lobe thick-walled cavity which may be infectious   and/or residua of reported treated tumor. 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    Consolidation within the right lower and middle lobe compatible with   radiation change.    Multiple indistinct nodules in the right lungwhich may be infectious or   radiation pneumonitis.    < end of copied text >

## 2023-11-03 NOTE — PROGRESS NOTE ADULT - ASSESSMENT
83-year-old M with PMHx HTN, HLD, chronic anemia, COPD, lung CA s/p RUL lobectomy (follows at [facility], last chemotherapy/RT 5/2023, not currently on treatment), AAA, hypothyroidism, CKD stage IIIa sent in to ED by pulmonologist MD Olivera with c/o SOB, dyspnea on minimal exertion, and cough. 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 Spongelike material within the cavity can be seen with aspergilloma (prior to fungus ball formation).   Consolidation within the right lower and middle lobe compatible with radiation change.  - leukocytosis improving (WBC 11.74 from 14.92)   - blood cultures pending result, sputum culture pending collection   - ID/pulm consult pending for poss bronchoscopy/voriconazole vs. conservative tx   - c/w trelegy ellipta, c/w levaquin to cover atypical/psuedomonas (allergy to PCN)     # Acute hyperglycemia, possible steroid-induced   -  on AM labs  - A1c results pending to r/o new onset DM II     # Chronic anemia, likely 2/2 chemotherapy  - decreased on AM labs from admission (Hgb 9.2-->8.2, Hct 27.8--> 25.2)   - no s/s bleeding, asymptomatic, continue to monitor/trend    # CKD stage IIIa  - Cr improving, 1.38--> 1.2   - continue to monitor/trend    # HTN, HLD, hypothyroidism  - c/w home losartan, atorvastatin, levothyroxine    # Hx lung CA s/p RUL lobectomy/chemo/RT  - f/u with [facility]     # DVT Ppx  - heparin SQ

## 2023-11-03 NOTE — PROGRESS NOTE ADULT - SUBJECTIVE AND OBJECTIVE BOX
Patient is a 83y old Male who presents with a chief complaint of Right lung pneumonia with thick-walled cavity. (02 Nov 2023 22:52)    SUBJECTIVE:   Chief Complaint: shortness of breath.  HPI: This is an 83-year-old M with PMHx HTN, HLD, chronic anemia, COPD, lung CA s/p RUL lobectomy ([place] [facility] [place], last chemotherapy/RT 5/2023, not currently on treatment), AAA, hypothyroidism, CKD stage IIIa sent in to ED by pulmonologist MD Olivera with c/o SOB, dyspnea on minimal exertion, and cough. Pt had outpatient CT of chest performed 10/3 which demonstrated consolidation- was started on Augmentin/Prednisone/Azithromycin 10/6 x 1 week. Pt completed full course of medication however wife noted worsening cough and pt with fever TMax 103.9 on 10/26. Wife then took pt to pulm office again who began 2nd round of same medications. Fevers self resolved. Since 2nd round pt with decreased energy and episode of being unsteady on feet yesterday 11/1. Went to pulm office 11/2 for f/u and was sent to ED for eval. Pt endorses lethargy and weakness on arrival. Denies chest pain, palpitations, N/V, abdominal pain, diarrhea, dysuria.     Pulm MD: Joselin  Oncology MD [place] [place] Ang/Sugey STAHLS in ED; labs significant for WBC 14.92, neutrophils 91%, lactate 1.5, hgb 9.2 (at baseline), BUN 33, Cr 1.38, . LFTs wnl. Troponin 15.2 wnl. pro-BNP 3027. UA and RVP negative. CT chest findings: RUL lobectomy, larger R lower lobe thick-walled cavity which may be infectious and/or residua of reported treated tumor. Spongelike material within the cavity can be seen with aspergilloma (prior to fungus ball formation). Consolidation within the right lower and middle lobe compatible with radiation change. Multiple indistinct nodules in the right lung which may be infectious or radiation pneumonitis.   - Ceftriaxone 1g IV, solu-medrol 125 mg IV, and duo neb given in ED     11/3: pt examined sitting up in bed. in no acute distress, infrequent non-productive cough appreciated during examination. denies complaints otherwise.       REVIEW OF SYSTEMS:    CONSTITUTIONAL: No weakness, fevers or chills  EYES/ENT: No visual changes;  No vertigo or throat pain   NECK: No pain or stiffness  RESPIRATORY: Endorses non-productive cough. No wheezing, hemoptysis; No shortness of breath  CARDIOVASCULAR: No chest pain or palpitations  GASTROINTESTINAL: No abdominal or epigastric pain. No nausea, vomiting, or hematemesis; No diarrhea or constipation. No melena or hematochezia.  GENITOURINARY: No dysuria, frequency or hematuria  NEUROLOGICAL: No numbness or weakness  SKIN: No itching, burning, rashes, or lesions   All other review of systems is negative unless indicated above    Height (cm): 190.5 (11-02 @ 14:08)  Weight (kg): 69.3 (11-02 @ 23:30)  BMI (kg/m2): 19.1 (11-02 @ 23:30)  BSA (m2): 1.96 (11-02 @ 23:30)    Vital Signs Last 24 Hrs  T(C): 36.7 (03 Nov 2023 08:02), Max: 37.2 (02 Nov 2023 15:12)  T(F): 98.1 (03 Nov 2023 08:02), Max: 98.9 (02 Nov 2023 15:12)  HR: 81 (03 Nov 2023 10:12) (81 - 103)  BP: 165/69 (03 Nov 2023 08:02) (143/65 - 170/76)  BP(mean): 96 (02 Nov 2023 22:36) (87 - 103)  RR: 18 (03 Nov 2023 08:02) (18 - 28)  SpO2: 95% (03 Nov 2023 10:12) (95% - 99%)    Parameters below as of 03 Nov 2023 08:02  Patient On (Oxygen Delivery Method): room air        I&O's Summary    02 Nov 2023 07:01  -  03 Nov 2023 07:00  --------------------------------------------------------  IN: 0 mL / OUT: 225 mL / NET: -225 mL        CAPILLARY BLOOD GLUCOSE      PHYSICAL EXAM:    Constitutional: NAD, awake and alert, well-developed, [place]   HEENT: PERR, EOMI, Normal Hearing, MMM  Neck: Soft and supple, No LAD, No JVD  Respiratory: DESIRE breath sounds clear, RUL breath sounds diminished but clear; BL lower lobes congested. No wheezing, rales, rhonchi.   Cardiovascular: S1 and S2, regular rate and rhythm, no Murmurs, gallops or rubs  Gastrointestinal: Bowel Sounds present, soft, nontender, nondistended, no guarding, no rebound  Extremities: No peripheral edema  Vascular: 2+ peripheral pulses  Neurological: A/O x 3, no focal deficits  Musculoskeletal: 5/5 strength b/l upper and lower extremities  Skin: Abrasion to R elbow; dressing in place. No rashes    MEDICATIONS:  MEDICATIONS  (STANDING):  atorvastatin 40 milliGRAM(s) Oral at bedtime  fluticasone furoate/umeclidinium/vilanterol 100-62.5-25 MICROgram(s) Inhaler 1 Puff(s) Inhalation daily  folic acid 1 milliGRAM(s) Oral daily  heparin   Injectable 5000 Unit(s) SubCutaneous every 8 hours  levoFLOXacin IVPB      levoFLOXacin IVPB 500 milliGRAM(s) IV Intermittent every 24 hours  levothyroxine 25 MICROGram(s) Oral daily  losartan 50 milliGRAM(s) Oral daily  multivitamin 1 Tablet(s) Oral daily  sodium chloride 0.9%. 1000 milliLiter(s) (75 mL/Hr) IV Continuous <Continuous>      LABS: All Labs Reviewed:                        8.2    11.74 )-----------( 259      ( 03 Nov 2023 07:15 )             25.2     11-03    133<L>  |  102  |  35<H>  ----------------------------<  205<H>  4.4   |  22  |  1.20    Ca    8.3<L>      03 Nov 2023 07:15    TPro  6.9  /  Alb  2.0<L>  /  TBili  0.5  /  DBili  x   /  AST  38<H>  /  ALT  50  /  AlkPhos  130<H>  11-02    PT/INR - ( 02 Nov 2023 14:47 )   PT: 13.4 sec;   INR: 1.19 ratio         PTT - ( 02 Nov 2023 14:47 )  PTT:28.7 sec          Blood Culture: pending     RADIOLOGY/EKG:  < from: CT Chest No Cont (11.02.23 @ 16:04) >  IMPRESSION:    Right upper lobectomy.    Larger right lower lobe thick-walled cavity which may be infectious   and/or residua of reported treated tumor. 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    Consolidation within the right lower and middle lobe compatible with   radiation change.    Multiple indistinct nodules in the right lungwhich may be infectious or   radiation pneumonitis.    --- End of Report ---            ALEAH BANEGAS M.D., ATTENDING RADIOLOGIST  This document has been electronically signed. Nov 2 2023  4:20PM    < end of copied text >

## 2023-11-04 LAB
ALBUMIN SERPL ELPH-MCNC: 1.8 G/DL — LOW (ref 3.3–5)
ALBUMIN SERPL ELPH-MCNC: 1.8 G/DL — LOW (ref 3.3–5)
ALP SERPL-CCNC: 135 U/L — HIGH (ref 40–120)
ALP SERPL-CCNC: 135 U/L — HIGH (ref 40–120)
ALT FLD-CCNC: 62 U/L — SIGNIFICANT CHANGE UP (ref 12–78)
ALT FLD-CCNC: 62 U/L — SIGNIFICANT CHANGE UP (ref 12–78)
ANION GAP SERPL CALC-SCNC: 7 MMOL/L — SIGNIFICANT CHANGE UP (ref 5–17)
ANION GAP SERPL CALC-SCNC: 7 MMOL/L — SIGNIFICANT CHANGE UP (ref 5–17)
ANION GAP SERPL CALC-SCNC: 9 MMOL/L — SIGNIFICANT CHANGE UP (ref 5–17)
ANION GAP SERPL CALC-SCNC: 9 MMOL/L — SIGNIFICANT CHANGE UP (ref 5–17)
AST SERPL-CCNC: 55 U/L — HIGH (ref 15–37)
AST SERPL-CCNC: 55 U/L — HIGH (ref 15–37)
BASE EXCESS BLDA CALC-SCNC: -1.9 MMOL/L — SIGNIFICANT CHANGE UP (ref -2–3)
BASE EXCESS BLDA CALC-SCNC: -1.9 MMOL/L — SIGNIFICANT CHANGE UP (ref -2–3)
BASOPHILS # BLD AUTO: 0.07 K/UL — SIGNIFICANT CHANGE UP (ref 0–0.2)
BASOPHILS # BLD AUTO: 0.07 K/UL — SIGNIFICANT CHANGE UP (ref 0–0.2)
BASOPHILS NFR BLD AUTO: 0.3 % — SIGNIFICANT CHANGE UP (ref 0–2)
BASOPHILS NFR BLD AUTO: 0.3 % — SIGNIFICANT CHANGE UP (ref 0–2)
BILIRUB SERPL-MCNC: 0.4 MG/DL — SIGNIFICANT CHANGE UP (ref 0.2–1.2)
BILIRUB SERPL-MCNC: 0.4 MG/DL — SIGNIFICANT CHANGE UP (ref 0.2–1.2)
BLOOD GAS COMMENTS ARTERIAL: SIGNIFICANT CHANGE UP
BLOOD GAS COMMENTS ARTERIAL: SIGNIFICANT CHANGE UP
BUN SERPL-MCNC: 44 MG/DL — HIGH (ref 7–23)
BUN SERPL-MCNC: 44 MG/DL — HIGH (ref 7–23)
BUN SERPL-MCNC: 47 MG/DL — HIGH (ref 7–23)
BUN SERPL-MCNC: 47 MG/DL — HIGH (ref 7–23)
CALCIUM SERPL-MCNC: 8.3 MG/DL — LOW (ref 8.5–10.1)
CALCIUM SERPL-MCNC: 8.3 MG/DL — LOW (ref 8.5–10.1)
CALCIUM SERPL-MCNC: 8.5 MG/DL — SIGNIFICANT CHANGE UP (ref 8.5–10.1)
CALCIUM SERPL-MCNC: 8.5 MG/DL — SIGNIFICANT CHANGE UP (ref 8.5–10.1)
CHLORIDE SERPL-SCNC: 101 MMOL/L — SIGNIFICANT CHANGE UP (ref 96–108)
CO2 SERPL-SCNC: 22 MMOL/L — SIGNIFICANT CHANGE UP (ref 22–31)
CREAT SERPL-MCNC: 1.44 MG/DL — HIGH (ref 0.5–1.3)
CRYPTOC AG FLD QL: NEGATIVE — SIGNIFICANT CHANGE UP
CRYPTOC AG FLD QL: NEGATIVE — SIGNIFICANT CHANGE UP
EGFR: 48 ML/MIN/1.73M2 — LOW
EOSINOPHIL # BLD AUTO: 0 K/UL — SIGNIFICANT CHANGE UP (ref 0–0.5)
EOSINOPHIL # BLD AUTO: 0 K/UL — SIGNIFICANT CHANGE UP (ref 0–0.5)
EOSINOPHIL NFR BLD AUTO: 0 % — SIGNIFICANT CHANGE UP (ref 0–6)
EOSINOPHIL NFR BLD AUTO: 0 % — SIGNIFICANT CHANGE UP (ref 0–6)
GLUCOSE BLDC GLUCOMTR-MCNC: 154 MG/DL — HIGH (ref 70–99)
GLUCOSE BLDC GLUCOMTR-MCNC: 154 MG/DL — HIGH (ref 70–99)
GLUCOSE BLDC GLUCOMTR-MCNC: 169 MG/DL — HIGH (ref 70–99)
GLUCOSE BLDC GLUCOMTR-MCNC: 169 MG/DL — HIGH (ref 70–99)
GLUCOSE BLDC GLUCOMTR-MCNC: 177 MG/DL — HIGH (ref 70–99)
GLUCOSE BLDC GLUCOMTR-MCNC: 177 MG/DL — HIGH (ref 70–99)
GLUCOSE BLDC GLUCOMTR-MCNC: 372 MG/DL — HIGH (ref 70–99)
GLUCOSE BLDC GLUCOMTR-MCNC: 372 MG/DL — HIGH (ref 70–99)
GLUCOSE BLDC GLUCOMTR-MCNC: 399 MG/DL — HIGH (ref 70–99)
GLUCOSE BLDC GLUCOMTR-MCNC: 399 MG/DL — HIGH (ref 70–99)
GLUCOSE BLDC GLUCOMTR-MCNC: 439 MG/DL — HIGH (ref 70–99)
GLUCOSE BLDC GLUCOMTR-MCNC: 439 MG/DL — HIGH (ref 70–99)
GLUCOSE SERPL-MCNC: 182 MG/DL — HIGH (ref 70–99)
GLUCOSE SERPL-MCNC: 182 MG/DL — HIGH (ref 70–99)
GLUCOSE SERPL-MCNC: 190 MG/DL — HIGH (ref 70–99)
GLUCOSE SERPL-MCNC: 190 MG/DL — HIGH (ref 70–99)
HCO3 BLDA-SCNC: 22 MMOL/L — SIGNIFICANT CHANGE UP (ref 21–28)
HCO3 BLDA-SCNC: 22 MMOL/L — SIGNIFICANT CHANGE UP (ref 21–28)
HCT VFR BLD CALC: 27.3 % — LOW (ref 39–50)
HCT VFR BLD CALC: 27.3 % — LOW (ref 39–50)
HCT VFR BLD CALC: 27.5 % — LOW (ref 39–50)
HCT VFR BLD CALC: 27.5 % — LOW (ref 39–50)
HGB BLD-MCNC: 9.1 G/DL — LOW (ref 13–17)
HGB BLD-MCNC: 9.1 G/DL — LOW (ref 13–17)
HGB BLD-MCNC: 9.2 G/DL — LOW (ref 13–17)
HGB BLD-MCNC: 9.2 G/DL — LOW (ref 13–17)
IMM GRANULOCYTES NFR BLD AUTO: 1.7 % — HIGH (ref 0–0.9)
IMM GRANULOCYTES NFR BLD AUTO: 1.7 % — HIGH (ref 0–0.9)
LACTATE SERPL-SCNC: 1.1 MMOL/L — SIGNIFICANT CHANGE UP (ref 0.7–2)
LACTATE SERPL-SCNC: 1.1 MMOL/L — SIGNIFICANT CHANGE UP (ref 0.7–2)
LEGIONELLA AG UR QL: NEGATIVE — SIGNIFICANT CHANGE UP
LEGIONELLA AG UR QL: NEGATIVE — SIGNIFICANT CHANGE UP
LYMPHOCYTES # BLD AUTO: 0.2 K/UL — LOW (ref 1–3.3)
LYMPHOCYTES # BLD AUTO: 0.2 K/UL — LOW (ref 1–3.3)
LYMPHOCYTES # BLD AUTO: 1 % — LOW (ref 13–44)
LYMPHOCYTES # BLD AUTO: 1 % — LOW (ref 13–44)
MAGNESIUM SERPL-MCNC: 1.9 MG/DL — SIGNIFICANT CHANGE UP (ref 1.6–2.6)
MAGNESIUM SERPL-MCNC: 1.9 MG/DL — SIGNIFICANT CHANGE UP (ref 1.6–2.6)
MCHC RBC-ENTMCNC: 31.5 PG — SIGNIFICANT CHANGE UP (ref 27–34)
MCHC RBC-ENTMCNC: 33.1 GM/DL — SIGNIFICANT CHANGE UP (ref 32–36)
MCHC RBC-ENTMCNC: 33.1 GM/DL — SIGNIFICANT CHANGE UP (ref 32–36)
MCHC RBC-ENTMCNC: 33.7 GM/DL — SIGNIFICANT CHANGE UP (ref 32–36)
MCHC RBC-ENTMCNC: 33.7 GM/DL — SIGNIFICANT CHANGE UP (ref 32–36)
MCV RBC AUTO: 93.5 FL — SIGNIFICANT CHANGE UP (ref 80–100)
MCV RBC AUTO: 93.5 FL — SIGNIFICANT CHANGE UP (ref 80–100)
MCV RBC AUTO: 95.2 FL — SIGNIFICANT CHANGE UP (ref 80–100)
MCV RBC AUTO: 95.2 FL — SIGNIFICANT CHANGE UP (ref 80–100)
MONOCYTES # BLD AUTO: 1.01 K/UL — HIGH (ref 0–0.9)
MONOCYTES # BLD AUTO: 1.01 K/UL — HIGH (ref 0–0.9)
MONOCYTES NFR BLD AUTO: 4.9 % — SIGNIFICANT CHANGE UP (ref 2–14)
MONOCYTES NFR BLD AUTO: 4.9 % — SIGNIFICANT CHANGE UP (ref 2–14)
NEUTROPHILS # BLD AUTO: 19.08 K/UL — HIGH (ref 1.8–7.4)
NEUTROPHILS # BLD AUTO: 19.08 K/UL — HIGH (ref 1.8–7.4)
NEUTROPHILS NFR BLD AUTO: 92.1 % — HIGH (ref 43–77)
NEUTROPHILS NFR BLD AUTO: 92.1 % — HIGH (ref 43–77)
NT-PROBNP SERPL-SCNC: 2625 PG/ML — HIGH (ref 0–450)
NT-PROBNP SERPL-SCNC: 2625 PG/ML — HIGH (ref 0–450)
PCO2 BLDA: 35 MMHG — SIGNIFICANT CHANGE UP (ref 35–48)
PCO2 BLDA: 35 MMHG — SIGNIFICANT CHANGE UP (ref 35–48)
PH BLDA: 7.41 — SIGNIFICANT CHANGE UP (ref 7.35–7.45)
PH BLDA: 7.41 — SIGNIFICANT CHANGE UP (ref 7.35–7.45)
PHOSPHATE SERPL-MCNC: 4.2 MG/DL — SIGNIFICANT CHANGE UP (ref 2.5–4.5)
PHOSPHATE SERPL-MCNC: 4.2 MG/DL — SIGNIFICANT CHANGE UP (ref 2.5–4.5)
PLATELET # BLD AUTO: 280 K/UL — SIGNIFICANT CHANGE UP (ref 150–400)
PLATELET # BLD AUTO: 280 K/UL — SIGNIFICANT CHANGE UP (ref 150–400)
PLATELET # BLD AUTO: 309 K/UL — SIGNIFICANT CHANGE UP (ref 150–400)
PLATELET # BLD AUTO: 309 K/UL — SIGNIFICANT CHANGE UP (ref 150–400)
PO2 BLDA: 194 MMHG — HIGH (ref 83–108)
PO2 BLDA: 194 MMHG — HIGH (ref 83–108)
POTASSIUM SERPL-MCNC: 4.6 MMOL/L — SIGNIFICANT CHANGE UP (ref 3.5–5.3)
POTASSIUM SERPL-SCNC: 4.6 MMOL/L — SIGNIFICANT CHANGE UP (ref 3.5–5.3)
PROT SERPL-MCNC: 6.5 GM/DL — SIGNIFICANT CHANGE UP (ref 6–8.3)
PROT SERPL-MCNC: 6.5 GM/DL — SIGNIFICANT CHANGE UP (ref 6–8.3)
RBC # BLD: 2.89 M/UL — LOW (ref 4.2–5.8)
RBC # BLD: 2.89 M/UL — LOW (ref 4.2–5.8)
RBC # BLD: 2.92 M/UL — LOW (ref 4.2–5.8)
RBC # BLD: 2.92 M/UL — LOW (ref 4.2–5.8)
RBC # FLD: 14.9 % — HIGH (ref 10.3–14.5)
RBC # FLD: 14.9 % — HIGH (ref 10.3–14.5)
RBC # FLD: 15 % — HIGH (ref 10.3–14.5)
RBC # FLD: 15 % — HIGH (ref 10.3–14.5)
S PNEUM AG UR QL: NEGATIVE — SIGNIFICANT CHANGE UP
S PNEUM AG UR QL: NEGATIVE — SIGNIFICANT CHANGE UP
SAO2 % BLDA: 99 % — HIGH (ref 94–98)
SAO2 % BLDA: 99 % — HIGH (ref 94–98)
SODIUM SERPL-SCNC: 130 MMOL/L — LOW (ref 135–145)
SODIUM SERPL-SCNC: 130 MMOL/L — LOW (ref 135–145)
SODIUM SERPL-SCNC: 132 MMOL/L — LOW (ref 135–145)
SODIUM SERPL-SCNC: 132 MMOL/L — LOW (ref 135–145)
TROPONIN I, HIGH SENSITIVITY RESULT: 62.62 NG/L — SIGNIFICANT CHANGE UP
TROPONIN I, HIGH SENSITIVITY RESULT: 62.62 NG/L — SIGNIFICANT CHANGE UP
WBC # BLD: 17.73 K/UL — HIGH (ref 3.8–10.5)
WBC # BLD: 17.73 K/UL — HIGH (ref 3.8–10.5)
WBC # BLD: 20.71 K/UL — HIGH (ref 3.8–10.5)
WBC # BLD: 20.71 K/UL — HIGH (ref 3.8–10.5)
WBC # FLD AUTO: 17.73 K/UL — HIGH (ref 3.8–10.5)
WBC # FLD AUTO: 17.73 K/UL — HIGH (ref 3.8–10.5)
WBC # FLD AUTO: 20.71 K/UL — HIGH (ref 3.8–10.5)
WBC # FLD AUTO: 20.71 K/UL — HIGH (ref 3.8–10.5)

## 2023-11-04 PROCEDURE — 99233 SBSQ HOSP IP/OBS HIGH 50: CPT

## 2023-11-04 PROCEDURE — 99232 SBSQ HOSP IP/OBS MODERATE 35: CPT

## 2023-11-04 PROCEDURE — 71045 X-RAY EXAM CHEST 1 VIEW: CPT | Mod: 26

## 2023-11-04 RX ORDER — GLUCAGON INJECTION, SOLUTION 0.5 MG/.1ML
1 INJECTION, SOLUTION SUBCUTANEOUS ONCE
Refills: 0 | Status: DISCONTINUED | OUTPATIENT
Start: 2023-11-04 | End: 2023-11-04

## 2023-11-04 RX ORDER — INSULIN LISPRO 100/ML
VIAL (ML) SUBCUTANEOUS
Refills: 0 | Status: DISCONTINUED | OUTPATIENT
Start: 2023-11-04 | End: 2023-11-10

## 2023-11-04 RX ORDER — DEXTROSE 50 % IN WATER 50 %
15 SYRINGE (ML) INTRAVENOUS ONCE
Refills: 0 | Status: DISCONTINUED | OUTPATIENT
Start: 2023-11-04 | End: 2023-11-28

## 2023-11-04 RX ORDER — FUROSEMIDE 40 MG
40 TABLET ORAL ONCE
Refills: 0 | Status: COMPLETED | OUTPATIENT
Start: 2023-11-04 | End: 2023-11-04

## 2023-11-04 RX ORDER — HEPARIN SODIUM 5000 [USP'U]/ML
5000 INJECTION INTRAVENOUS; SUBCUTANEOUS EVERY 8 HOURS
Refills: 0 | Status: DISCONTINUED | OUTPATIENT
Start: 2023-11-04 | End: 2023-11-08

## 2023-11-04 RX ORDER — SODIUM CHLORIDE 9 MG/ML
1000 INJECTION, SOLUTION INTRAVENOUS
Refills: 0 | Status: DISCONTINUED | OUTPATIENT
Start: 2023-11-04 | End: 2023-11-28

## 2023-11-04 RX ORDER — DEXTROSE 50 % IN WATER 50 %
25 SYRINGE (ML) INTRAVENOUS ONCE
Refills: 0 | Status: DISCONTINUED | OUTPATIENT
Start: 2023-11-04 | End: 2023-11-28

## 2023-11-04 RX ORDER — IPRATROPIUM/ALBUTEROL SULFATE 18-103MCG
3 AEROSOL WITH ADAPTER (GRAM) INHALATION EVERY 6 HOURS
Refills: 0 | Status: DISCONTINUED | OUTPATIENT
Start: 2023-11-04 | End: 2023-11-30

## 2023-11-04 RX ORDER — DEXTROSE 50 % IN WATER 50 %
12.5 SYRINGE (ML) INTRAVENOUS ONCE
Refills: 0 | Status: DISCONTINUED | OUTPATIENT
Start: 2023-11-04 | End: 2023-11-28

## 2023-11-04 RX ORDER — PANTOPRAZOLE SODIUM 20 MG/1
40 TABLET, DELAYED RELEASE ORAL ONCE
Refills: 0 | Status: COMPLETED | OUTPATIENT
Start: 2023-11-04 | End: 2023-11-04

## 2023-11-04 RX ORDER — GLUCAGON INJECTION, SOLUTION 0.5 MG/.1ML
1 INJECTION, SOLUTION SUBCUTANEOUS ONCE
Refills: 0 | Status: DISCONTINUED | OUTPATIENT
Start: 2023-11-04 | End: 2023-11-28

## 2023-11-04 RX ORDER — INSULIN LISPRO 100/ML
VIAL (ML) SUBCUTANEOUS EVERY 6 HOURS
Refills: 0 | Status: DISCONTINUED | OUTPATIENT
Start: 2023-11-04 | End: 2023-11-04

## 2023-11-04 RX ORDER — DEXTROSE 50 % IN WATER 50 %
25 SYRINGE (ML) INTRAVENOUS ONCE
Refills: 0 | Status: DISCONTINUED | OUTPATIENT
Start: 2023-11-04 | End: 2023-11-04

## 2023-11-04 RX ORDER — LABETALOL HCL 100 MG
10 TABLET ORAL ONCE
Refills: 0 | Status: COMPLETED | OUTPATIENT
Start: 2023-11-04 | End: 2023-11-04

## 2023-11-04 RX ORDER — DEXTROSE 50 % IN WATER 50 %
15 SYRINGE (ML) INTRAVENOUS ONCE
Refills: 0 | Status: DISCONTINUED | OUTPATIENT
Start: 2023-11-04 | End: 2023-11-04

## 2023-11-04 RX ORDER — VORICONAZOLE 10 MG/ML
200 INJECTION, POWDER, LYOPHILIZED, FOR SOLUTION INTRAVENOUS EVERY 12 HOURS
Refills: 0 | Status: DISCONTINUED | OUTPATIENT
Start: 2023-11-04 | End: 2023-11-13

## 2023-11-04 RX ORDER — DEXTROSE 50 % IN WATER 50 %
12.5 SYRINGE (ML) INTRAVENOUS ONCE
Refills: 0 | Status: DISCONTINUED | OUTPATIENT
Start: 2023-11-04 | End: 2023-11-04

## 2023-11-04 RX ADMIN — Medication 10 MILLIGRAM(S): at 01:52

## 2023-11-04 RX ADMIN — Medication 40 MILLIGRAM(S): at 01:58

## 2023-11-04 RX ADMIN — Medication 1 TABLET(S): at 11:05

## 2023-11-04 RX ADMIN — ALBUTEROL 2 PUFF(S): 90 AEROSOL, METERED ORAL at 00:53

## 2023-11-04 RX ADMIN — Medication 1 MILLIGRAM(S): at 11:06

## 2023-11-04 RX ADMIN — Medication 40 MILLIGRAM(S): at 21:51

## 2023-11-04 RX ADMIN — ATORVASTATIN CALCIUM 40 MILLIGRAM(S): 80 TABLET, FILM COATED ORAL at 21:52

## 2023-11-04 RX ADMIN — Medication 25 MICROGRAM(S): at 05:29

## 2023-11-04 RX ADMIN — Medication 100 MILLIGRAM(S): at 22:18

## 2023-11-04 RX ADMIN — Medication 40 MILLIGRAM(S): at 01:52

## 2023-11-04 RX ADMIN — PANTOPRAZOLE SODIUM 40 MILLIGRAM(S): 20 TABLET, DELAYED RELEASE ORAL at 03:32

## 2023-11-04 RX ADMIN — Medication 3 MILLILITER(S): at 01:50

## 2023-11-04 RX ADMIN — HEPARIN SODIUM 5000 UNIT(S): 5000 INJECTION INTRAVENOUS; SUBCUTANEOUS at 21:52

## 2023-11-04 RX ADMIN — VORICONAZOLE 200 MILLIGRAM(S): 10 INJECTION, POWDER, LYOPHILIZED, FOR SOLUTION INTRAVENOUS at 05:28

## 2023-11-04 RX ADMIN — Medication 3 MILLILITER(S): at 20:48

## 2023-11-04 RX ADMIN — VORICONAZOLE 200 MILLIGRAM(S): 10 INJECTION, POWDER, LYOPHILIZED, FOR SOLUTION INTRAVENOUS at 17:34

## 2023-11-04 RX ADMIN — Medication 40 MILLIGRAM(S): at 13:07

## 2023-11-04 RX ADMIN — FLUTICASONE FUROATE, UMECLIDINIUM BROMIDE AND VILANTEROL TRIFENATATE 1 PUFF(S): 200; 62.5; 25 POWDER RESPIRATORY (INHALATION) at 10:38

## 2023-11-04 RX ADMIN — Medication 10: at 21:52

## 2023-11-04 RX ADMIN — Medication 2: at 05:57

## 2023-11-04 RX ADMIN — LOSARTAN POTASSIUM 50 MILLIGRAM(S): 100 TABLET, FILM COATED ORAL at 11:06

## 2023-11-04 RX ADMIN — Medication 3 MILLILITER(S): at 10:45

## 2023-11-04 NOTE — DIETITIAN INITIAL EVALUATION ADULT - ENTER FROM (CAL/KG)
Problem: Falls - Risk of  Goal: *Absence of Falls  Description: Document Jose Luis Fall Risk and appropriate interventions in the flowsheet.   Outcome: Progressing Towards Goal  Note: Fall Risk Interventions:            Medication Interventions: Bed/chair exit alarm,Utilize gait belt for transfers/ambulation    Elimination Interventions: Bed/chair exit alarm,Call light in reach              Problem: Patient Education: Go to Patient Education Activity  Goal: Patient/Family Education  Outcome: Progressing Towards Goal     Problem: General Medical Care Plan  Goal: *Vital signs within specified parameters  Outcome: Progressing Towards Goal  Goal: *Labs within defined limits  Outcome: Progressing Towards Goal  Goal: *Absence of infection signs and symptoms  Outcome: Progressing Towards Goal  Goal: *Optimal pain control at patient's stated goal  Outcome: Progressing Towards Goal  Goal: *Skin integrity maintained  Outcome: Progressing Towards Goal  Goal: *Fluid volume balance  Outcome: Progressing Towards Goal  Goal: *Optimize nutritional status  Outcome: Progressing Towards Goal  Goal: *Anxiety reduced or absent  Outcome: Progressing Towards Goal  Goal: *Progressive mobility and function (eg: ADL's)  Outcome: Progressing Towards Goal     Problem: Patient Education: Go to Patient Education Activity  Goal: Patient/Family Education  Outcome: Progressing Towards Goal     Problem: Patient Education: Go to Patient Education Activity  Goal: Patient/Family Education  Outcome: Progressing Towards Goal     Problem: Small Bowel Obstruction: Day 1  Goal: Off Pathway (Use only if patient is Off Pathway)  Outcome: Progressing Towards Goal  Goal: Activity/Safety  Outcome: Progressing Towards Goal  Goal: Consults, if ordered  Outcome: Progressing Towards Goal  Goal: Diagnostic Test/Procedures  Outcome: Progressing Towards Goal  Goal: Nutrition/Diet  Outcome: Progressing Towards Goal  Goal: Medications  Outcome: Progressing Towards Goal  Goal: Respiratory  Outcome: Progressing Towards Goal  Goal: Treatments/Interventions/Procedures  Outcome: Progressing Towards Goal  Goal: Psychosocial  Outcome: Progressing Towards Goal  Goal: *Optimal pain control at patient's stated goal  Outcome: Progressing Towards Goal  Goal: *Adequate urinary output (equal to or greater than 30 milliliters/hour)  Outcome: Progressing Towards Goal  Goal: *Hemodynamically stable  Outcome: Progressing Towards Goal  Goal: *Demonstrates progressive activity  Outcome: Progressing Towards Goal  Goal: *Absence of nausea/vomiting  Outcome: Progressing Towards Goal     Problem: Small Bowel Obstruction: Day 2  Goal: Off Pathway (Use only if patient is Off Pathway)  Outcome: Progressing Towards Goal  Goal: Activity/Safety  Outcome: Progressing Towards Goal  Goal: Consults, if ordered  Outcome: Progressing Towards Goal  Goal: Diagnostic Test/Procedures  Outcome: Progressing Towards Goal  Goal: Nutrition/Diet  Outcome: Progressing Towards Goal  Goal: Discharge Planning  Outcome: Progressing Towards Goal  Goal: Medications  Outcome: Progressing Towards Goal  Goal: Respiratory  Outcome: Progressing Towards Goal  Goal: Treatments/Interventions/Procedures  Outcome: Progressing Towards Goal  Goal: Psychosocial  Outcome: Progressing Towards Goal  Goal: *Optimal pain control at patient's stated goal  Outcome: Progressing Towards Goal  Goal: *Adequate urinary output (equal to or greater than 30 milliliters/hour)  Outcome: Progressing Towards Goal  Goal: *Hemodynamically stable  Outcome: Progressing Towards Goal  Goal: *Demonstrates progressive activity  Outcome: Progressing Towards Goal  Goal: *Absence of nausea/vomiting  Outcome: Progressing Towards Goal  Goal: *Return of normal bowel function  Outcome: Progressing Towards Goal     Problem: Small Bowel Obstruction: Day 3  Goal: Off Pathway (Use only if patient is Off Pathway)  Outcome: Progressing Towards Goal  Goal: Activity/Safety  Outcome: Progressing Towards Goal  Goal: Consults, if ordered  Outcome: Progressing Towards Goal  Goal: Diagnostic Test/Procedures  Outcome: Progressing Towards Goal  Goal: Nutrition/Diet  Outcome: Progressing Towards Goal  Goal: Discharge Planning  Outcome: Progressing Towards Goal  Goal: Medications  Outcome: Progressing Towards Goal  Goal: Respiratory  Outcome: Progressing Towards Goal  Goal: Treatments/Interventions/Procedures  Outcome: Progressing Towards Goal  Goal: Psychosocial  Outcome: Progressing Towards Goal  Goal: *Optimal pain control at patient's stated goal  Outcome: Progressing Towards Goal  Goal: *Adequate urinary output (equal to or greater than 30 milliliters/hour)  Outcome: Progressing Towards Goal  Goal: *Hemodynamically stable  Outcome: Progressing Towards Goal  Goal: *Adequate nutrition  Outcome: Progressing Towards Goal  Goal: *Demonstrates progressive activity  Outcome: Progressing Towards Goal  Goal: *Participates in discharge planning  Outcome: Progressing Towards Goal     Problem: Small Bowel Obstruction: Day 4 to Discharge  Goal: Off Pathway (Use only if patient is Off Pathway)  Outcome: Progressing Towards Goal  Goal: Activity/Safety  Outcome: Progressing Towards Goal  Goal: Nutrition/Diet  Outcome: Progressing Towards Goal  Goal: Discharge Planning  Outcome: Progressing Towards Goal  Goal: Medications  Outcome: Progressing Towards Goal  Goal: Respiratory  Outcome: Progressing Towards Goal  Goal: Treatments/Interventions/Procedures  Outcome: Progressing Towards Goal  Goal: Psychosocial  Outcome: Progressing Towards Goal     Problem: Small Bowel Obstruction - Non Surgical: Discharge Outcomes  Goal: *Hemodynamically stable  Outcome: Progressing Towards Goal  Goal: *Demonstrates independent activity or return to baseline  Outcome: Progressing Towards Goal  Goal: *Optimal pain control at patient's stated goal  Outcome: Progressing Towards Goal  Goal: *Verbalizes understanding and describes prescribed diet  Outcome: Progressing Towards Goal  Goal: *Tolerating diet  Outcome: Progressing Towards Goal  Goal: *Verbalizes name, dosage, time, side effects, and number of days to continue medications  Outcome: Progressing Towards Goal  Goal: *Anxiety reduced or absent  Outcome: Progressing Towards Goal  Goal: *Understands and describes signs and symptoms to report to providers(Stroke Metric)  Outcome: Progressing Towards Goal  Goal: *Describes follow-up/return visits to physicians  Outcome: Progressing Towards Goal  Goal: *Describes available resources and support systems  Outcome: Progressing Towards Goal  Goal: *Active bowel function  Outcome: Progressing Towards Goal 30

## 2023-11-04 NOTE — DIETITIAN INITIAL EVALUATION ADULT - NSICDXPASTMEDICALHX_GEN_ALL_CORE_FT
PAST MEDICAL HISTORY:  Anemia of chronic disease     CKD (chronic kidney disease), stage III     COPD (chronic obstructive pulmonary disease)     Hypothyroidism     Lung cancer       AAA (abdominal aortic aneurysm)     HLD (hyperlipidemia)     HTN (hypertension)     Thrombocytopenia

## 2023-11-04 NOTE — PROVIDER CONTACT NOTE (EICU) - SITUATION
84 YO M w/pmhx of mild dementia, HTN, Chronic anemia, COPD, [place] [facility] [place], not currently receiving chemo treatment), s/p right upper lobectomy, AAA, Hypothyroidism, CKD stage IIIa, recently was being treated outpatient for pneumonia w/Augmentin, azithromycin, and Prednisone. Patient had CT chest done at that time. Patient was sent to ED by pulmonologist, Dr. Olivera. 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 Patient was placed on Levaquin and admitted to the hospital. Patient tonight was an RRT for AHRF requiring 100% NRB (previously on RA), cough, SOB, diaphoresis. Patient was upgraded to SICU for NIPPV.  Case discussed with the ICU PA.

## 2023-11-04 NOTE — DIETITIAN INITIAL EVALUATION ADULT - ADD RECOMMEND
1) Liberalize diet to regular to maximize caloric and nutrient intake, 2) Add ensure plus high protein BID to optimize PO intake (provides 350 kcal, 20g protein/ shake), 3) Encourage protein-rich foods, maximize food preferences, 4) maintain POCT between 140- 180 mg/dL - consider DM education when acuteness of illness declines, 5) MVI w/ minerals daily to ensure 100% RDA met, 6) Consider adding thiamine 100 mg daily 2/2 poor PO intake/ malnutrition, 7) Confirm goals of care regarding nutrition support - Nutrition support is not recommended due to overall declining medical status which evidenced based studies indicate EN is not effective in prolonging survival and improving quality of life. It can also increase risk of aspiration pneumonia as well as other related issues (infection, GI complications, and worsening/ non-healing PI's). However, will provide nutrition/ hydration within GOC. RD will continue to monitor PO intake, labs, hydration, and wt prn.

## 2023-11-04 NOTE — DIETITIAN INITIAL EVALUATION ADULT - PERTINENT MEDS FT
MEDICATIONS  (STANDING):  albuterol/ipratropium for Nebulization 3 milliLiter(s) Nebulizer every 6 hours  atorvastatin 40 milliGRAM(s) Oral at bedtime  dextrose 50% Injectable 25 Gram(s) IV Push once  dextrose 50% Injectable 12.5 Gram(s) IV Push once  dextrose 50% Injectable 25 Gram(s) IV Push once  dextrose Oral Gel 15 Gram(s) Oral once  fluticasone furoate/umeclidinium/vilanterol 100-62.5-25 MICROgram(s) Inhaler 1 Puff(s) Inhalation daily  folic acid 1 milliGRAM(s) Oral daily  glucagon  Injectable 1 milliGRAM(s) IntraMuscular once  insulin lispro (ADMELOG) corrective regimen sliding scale   SubCutaneous every 6 hours  levoFLOXacin IVPB      levoFLOXacin IVPB 500 milliGRAM(s) IV Intermittent every 24 hours  levothyroxine 25 MICROGram(s) Oral daily  losartan 50 milliGRAM(s) Oral daily  multivitamin 1 Tablet(s) Oral daily  voriconazole 200 milliGRAM(s) Oral every 12 hours    MEDICATIONS  (PRN):  acetaminophen     Tablet .. 650 milliGRAM(s) Oral every 6 hours PRN Temp greater or equal to 38C (100.4F), Mild Pain (1 - 3)  aluminum hydroxide/magnesium hydroxide/simethicone Suspension 30 milliLiter(s) Oral every 4 hours PRN Dyspepsia  benzonatate 100 milliGRAM(s) Oral three times a day PRN Cough  melatonin 3 milliGRAM(s) Oral at bedtime PRN Insomnia  ondansetron Injectable 4 milliGRAM(s) IV Push every 8 hours PRN Nausea and/or Vomiting

## 2023-11-04 NOTE — DIETITIAN INITIAL EVALUATION ADULT - ORAL INTAKE PTA/DIET HISTORY
wife at bedside to report diet/ wt hx - pt w/ dementia, poor historian, and Kaw  reports pt typically eats well but does drink ensure shakes occasionally especially after "being in the hospital" no previous RD notes to review in EMR.   Pt lives w/ wife who cooks/ shops for pt

## 2023-11-04 NOTE — PROGRESS NOTE ADULT - SUBJECTIVE AND OBJECTIVE BOX
Patient is a 83y old  Male who presents with a chief complaint of Right lung pneumonia with thick-walled cavity. (04 Nov 2023 14:21)    24 hour events:     Allergies    penicillin (Unknown)    Intolerances      REVIEW OF SYSTEMS: SEE BELOW       ICU Vital Signs Last 24 Hrs  T(C): 36.7 (04 Nov 2023 16:15), Max: 38 (03 Nov 2023 22:45)  T(F): 98 (04 Nov 2023 16:15), Max: 100.4 (03 Nov 2023 22:45)  HR: 108 (04 Nov 2023 16:00) (94 - 126)  BP: 135/63 (04 Nov 2023 16:00) (128/66 - 218/76)  BP(mean): 84 (04 Nov 2023 16:00) (84 - 117)  ABP: --  ABP(mean): --  RR: 32 (04 Nov 2023 16:00) (17 - 32)  SpO2: 97% (04 Nov 2023 16:00) (76% - 100%)    O2 Parameters below as of 04 Nov 2023 10:00  Patient On (Oxygen Delivery Method): nasal cannula, high flow  O2 Flow (L/min): 30  O2 Concentration (%): 30        CAPILLARY BLOOD GLUCOSE      POCT Blood Glucose.: 372 mg/dL (04 Nov 2023 16:54)  POCT Blood Glucose.: 169 mg/dL (04 Nov 2023 12:18)  POCT Blood Glucose.: 177 mg/dL (04 Nov 2023 05:49)  POCT Blood Glucose.: 154 mg/dL (04 Nov 2023 00:57)      I&O's Summary    03 Nov 2023 07:01  -  04 Nov 2023 07:00  --------------------------------------------------------  IN: 0 mL / OUT: 850 mL / NET: -850 mL    04 Nov 2023 08:01  -  04 Nov 2023 17:41  --------------------------------------------------------  IN: 0 mL / OUT: 1300 mL / NET: -1300 mL            MEDICATIONS  (STANDING):  albuterol/ipratropium for Nebulization 3 milliLiter(s) Nebulizer every 6 hours  atorvastatin 40 milliGRAM(s) Oral at bedtime  dextrose 5%. 1000 milliLiter(s) (50 mL/Hr) IV Continuous <Continuous>  dextrose 5%. 1000 milliLiter(s) (100 mL/Hr) IV Continuous <Continuous>  dextrose 50% Injectable 25 Gram(s) IV Push once  dextrose 50% Injectable 12.5 Gram(s) IV Push once  dextrose 50% Injectable 25 Gram(s) IV Push once  fluticasone furoate/umeclidinium/vilanterol 100-62.5-25 MICROgram(s) Inhaler 1 Puff(s) Inhalation daily  folic acid 1 milliGRAM(s) Oral daily  glucagon  Injectable 1 milliGRAM(s) IntraMuscular once  insulin lispro (ADMELOG) corrective regimen sliding scale   SubCutaneous Before meals and at bedtime  levoFLOXacin IVPB      levoFLOXacin IVPB 500 milliGRAM(s) IV Intermittent every 24 hours  levothyroxine 25 MICROGram(s) Oral daily  losartan 50 milliGRAM(s) Oral daily  methylPREDNISolone sodium succinate Injectable 40 milliGRAM(s) IV Push every 12 hours  multivitamin 1 Tablet(s) Oral daily  voriconazole 200 milliGRAM(s) Oral every 12 hours      MEDICATIONS  (PRN):  acetaminophen     Tablet .. 650 milliGRAM(s) Oral every 6 hours PRN Temp greater or equal to 38C (100.4F), Mild Pain (1 - 3)  aluminum hydroxide/magnesium hydroxide/simethicone Suspension 30 milliLiter(s) Oral every 4 hours PRN Dyspepsia  benzonatate 100 milliGRAM(s) Oral three times a day PRN Cough  dextrose Oral Gel 15 Gram(s) Oral once PRN Blood Glucose LESS THAN 70 milliGRAM(s)/deciliter  melatonin 3 milliGRAM(s) Oral at bedtime PRN Insomnia  ondansetron Injectable 4 milliGRAM(s) IV Push every 8 hours PRN Nausea and/or Vomiting      PHYSICAL EXAM: SEE BELOW                          9.1    17.73 )-----------( 280      ( 04 Nov 2023 04:21 )             27.5       11-04    130<L>  |  101  |  47<H>  ----------------------------<  182<H>  4.6   |  22  |  1.44<H>    Ca    8.5      04 Nov 2023 04:21  Phos  4.2     11-04  Mg     1.9     11-04    TPro  6.5  /  Alb  1.8<L>  /  TBili  0.4  /  DBili  x   /  AST  55<H>  /  ALT  62  /  AlkPhos  135<H>  11-04    Lactate 1.1           11-04 @ 02:36            Urinalysis Basic - ( 04 Nov 2023 04:21 )    Color: x / Appearance: x / SG: x / pH: x  Gluc: 182 mg/dL / Ketone: x  / Bili: x / Urobili: x   Blood: x / Protein: x / Nitrite: x   Leuk Esterase: x / RBC: x / WBC x   Sq Epi: x / Non Sq Epi: x / Bacteria: x      .Sputum Sputum   Normal Respiratory Marguerite present   Rare Squamous epithelial cells per low power field  No polymorphonuclear cells seen per low power field  Rare Gram positive cocci in pairs per oil power field  Rare Gram Negative Rods per oil power field 11-03 @ 14:16  Clean Catch Clean Catch (Midstream)   No growth -- 11-02 @ 16:40  .Blood Blood-Peripheral   No growth at 24 hours -- 11-02 @ 14:47      Rapid RVP Result: NotDetec (11-02 @ 14:47)

## 2023-11-04 NOTE — PROGRESS NOTE ADULT - SUBJECTIVE AND OBJECTIVE BOX
Subjective:    Review of Systems:  All 10 systems reviewed in detailed and found to be negative with the exception of what has already been described above    Allergies:  penicillin (Unknown)    Meds  MEDICATIONS  (STANDING):  albuterol/ipratropium for Nebulization 3 milliLiter(s) Nebulizer every 6 hours  atorvastatin 40 milliGRAM(s) Oral at bedtime  dextrose 50% Injectable 25 Gram(s) IV Push once  dextrose 50% Injectable 12.5 Gram(s) IV Push once  dextrose 50% Injectable 25 Gram(s) IV Push once  dextrose Oral Gel 15 Gram(s) Oral once  fluticasone furoate/umeclidinium/vilanterol 100-62.5-25 MICROgram(s) Inhaler 1 Puff(s) Inhalation daily  folic acid 1 milliGRAM(s) Oral daily  glucagon  Injectable 1 milliGRAM(s) IntraMuscular once  insulin lispro (ADMELOG) corrective regimen sliding scale   SubCutaneous every 6 hours  levoFLOXacin IVPB      levoFLOXacin IVPB 500 milliGRAM(s) IV Intermittent every 24 hours  levothyroxine 25 MICROGram(s) Oral daily  losartan 50 milliGRAM(s) Oral daily  multivitamin 1 Tablet(s) Oral daily  voriconazole 200 milliGRAM(s) Oral every 12 hours    MEDICATIONS  (PRN):  acetaminophen     Tablet .. 650 milliGRAM(s) Oral every 6 hours PRN Temp greater or equal to 38C (100.4F), Mild Pain (1 - 3)  aluminum hydroxide/magnesium hydroxide/simethicone Suspension 30 milliLiter(s) Oral every 4 hours PRN Dyspepsia  benzonatate 100 milliGRAM(s) Oral three times a day PRN Cough  melatonin 3 milliGRAM(s) Oral at bedtime PRN Insomnia  ondansetron Injectable 4 milliGRAM(s) IV Push every 8 hours PRN Nausea and/or Vomiting    Physical Exam  T(C): 36.6 (11-04-23 @ 06:00), Max: 38 (11-03-23 @ 22:45)  HR: 97 (11-04-23 @ 06:00) (81 - 126)  BP: 138/68 (11-04-23 @ 06:00) (138/68 - 218/76)  RR: 18 (11-04-23 @ 06:00) (17 - 27)  SpO2: 93% (11-04-23 @ 06:00) (76% - 100%)  Gen: Alert, oriented, no distress  HEENT: Anicteric sclera, moist mucous membranes  Cardio: Regular rhythm and rate, normal S1S2, no murmurs  Resp: Crackles, congested  GI: Nontender, nondistended, normoactive bowel sounds  Ext: No cyanosis, clubbing or edema  Neuro: Nonfocal    Labs:                        9.1    17.73 )-----------( 280      ( 04 Nov 2023 04:21 )             27.5     11-04    130<L>  |  101  |  47<H>  ----------------------------<  182<H>  4.6   |  22  |  1.44<H>    Ca    8.5      04 Nov 2023 04:21  Phos  4.2     11-04  Mg     1.9     11-04    TPro  6.5  /  Alb  1.8<L>  /  TBili  0.4  /  DBili  x   /  AST  55<H>  /  ALT  62  /  AlkPhos  135<H>  11-04    PT/INR - ( 02 Nov 2023 14:47 )   PT: 13.4 sec;   INR: 1.19 ratio         PTT - ( 02 Nov 2023 14:47 )  PTT:28.7 sec  Urinalysis Basic - ( 04 Nov 2023 04:21 )    Color: x / Appearance: x / SG: x / pH: x  Gluc: 182 mg/dL / Ketone: x  / Bili: x / Urobili: x   Blood: x / Protein: x / Nitrite: x   Leuk Esterase: x / RBC: x / WBC x   Sq Epi: x / Non Sq Epi: x / Bacteria: x    < from: CT Chest No Cont (11.02.23 @ 16:04) >  ACC: [id number] EXAM:  CT CHEST   ORDERED BY: SARAH YOO     PROCEDURE DATE:  11/02/2023          INTERPRETATION:  INDICATION: Shortness of breath, cough, lung cancer   history, last chemotherapy and radiation treatment in May 2023    TECHNIQUE: Helical acquisition images of the chest without intravenous   contrast. Maximum intensity projection images were generated.    COMPARISON: 12/29/2020 chest x-ray.    FINDINGS:    LUNGS/AIRWAYS/PLEURA: Right upper lobectomy. Right lower lobe   multiloculated thick-walled 11 cm cavity (best seen in its entirety on   601-59) containing fluid and spongelike material. Consolidation within   the middle lobe and inferior aspect of the right lower lobe with angular   borders suggesting prior radiation. Multiple indistinct nodules of   varying density in the right lower and middle lobes, mostly adjacent to   the cavity. Small branching opacities in the peripheral left upper and   lower lobes, compatible with distal airway impaction. Trace left pleural   effusion. Mild right apical pleural thickening.    LYMPH NODES/MEDIASTINUM: No lymphadenopathy.    HEART/VASCULATURE: Normal heart size. Unremarkable pericardium. Coronary   artery calcifications. Normal caliber aorta.    UPPER ABDOMEN: Unremarkable.    BONES/SOFT TISSUES: Old sternal fracture. Mild loss of height of the L1   vertebral body.      IMPRESSION:    Right upper lobectomy.    Larger right lower lobe thick-walled cavity which may be infectious   and/or residua of reported treated tumor. 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    Consolidation within the right lower and middle lobe compatible with   radiation change.    Multiple indistinct nodules in the right lungwhich may be infectious or   radiation pneumonitis.   Subjective:  acute episode of sob and hypoxia last night  transferred to sicu  this morning is on hf nc 30%, 40 L  wife at bedside  had fever  started on voriconazole    Review of Systems:  All 10 systems reviewed in detailed and found to be negative with the exception of what has already been described above    Allergies:  penicillin (Unknown)    Meds  MEDICATIONS  (STANDING):  albuterol/ipratropium for Nebulization 3 milliLiter(s) Nebulizer every 6 hours  atorvastatin 40 milliGRAM(s) Oral at bedtime  dextrose 50% Injectable 25 Gram(s) IV Push once  dextrose 50% Injectable 12.5 Gram(s) IV Push once  dextrose 50% Injectable 25 Gram(s) IV Push once  dextrose Oral Gel 15 Gram(s) Oral once  fluticasone furoate/umeclidinium/vilanterol 100-62.5-25 MICROgram(s) Inhaler 1 Puff(s) Inhalation daily  folic acid 1 milliGRAM(s) Oral daily  glucagon  Injectable 1 milliGRAM(s) IntraMuscular once  insulin lispro (ADMELOG) corrective regimen sliding scale   SubCutaneous every 6 hours  levoFLOXacin IVPB      levoFLOXacin IVPB 500 milliGRAM(s) IV Intermittent every 24 hours  levothyroxine 25 MICROGram(s) Oral daily  losartan 50 milliGRAM(s) Oral daily  multivitamin 1 Tablet(s) Oral daily  voriconazole 200 milliGRAM(s) Oral every 12 hours    MEDICATIONS  (PRN):  acetaminophen     Tablet .. 650 milliGRAM(s) Oral every 6 hours PRN Temp greater or equal to 38C (100.4F), Mild Pain (1 - 3)  aluminum hydroxide/magnesium hydroxide/simethicone Suspension 30 milliLiter(s) Oral every 4 hours PRN Dyspepsia  benzonatate 100 milliGRAM(s) Oral three times a day PRN Cough  melatonin 3 milliGRAM(s) Oral at bedtime PRN Insomnia  ondansetron Injectable 4 milliGRAM(s) IV Push every 8 hours PRN Nausea and/or Vomiting    Physical Exam  T(C): 36.6 (11-04-23 @ 06:00), Max: 38 (11-03-23 @ 22:45)  HR: 97 (11-04-23 @ 06:00) (81 - 126)  BP: 138/68 (11-04-23 @ 06:00) (138/68 - 218/76)  RR: 18 (11-04-23 @ 06:00) (17 - 27)  SpO2: 93% (11-04-23 @ 06:00) (76% - 100%)  Gen: Alert, oriented, no distress, on hf nc  HEENT: Anicteric sclera, moist mucous membranes  Cardio: Regular rhythm and rate, normal S1S2, no murmurs  Resp: Crackles, congested  GI: Nontender, nondistended, normoactive bowel sounds  Ext: No cyanosis, clubbing or edema  Neuro: Nonfocal    Labs:                        9.1    17.73 )-----------( 280      ( 04 Nov 2023 04:21 )             27.5     11-04    130<L>  |  101  |  47<H>  ----------------------------<  182<H>  4.6   |  22  |  1.44<H>    Ca    8.5      04 Nov 2023 04:21  Phos  4.2     11-04  Mg     1.9     11-04    TPro  6.5  /  Alb  1.8<L>  /  TBili  0.4  /  DBili  x   /  AST  55<H>  /  ALT  62  /  AlkPhos  135<H>  11-04    PT/INR - ( 02 Nov 2023 14:47 )   PT: 13.4 sec;   INR: 1.19 ratio         PTT - ( 02 Nov 2023 14:47 )  PTT:28.7 sec  Urinalysis Basic - ( 04 Nov 2023 04:21 )    Color: x / Appearance: x / SG: x / pH: x  Gluc: 182 mg/dL / Ketone: x  / Bili: x / Urobili: x   Blood: x / Protein: x / Nitrite: x   Leuk Esterase: x / RBC: x / WBC x   Sq Epi: x / Non Sq Epi: x / Bacteria: x    < from: CT Chest No Cont (11.02.23 @ 16:04) >  ACC: [id number] EXAM:  CT CHEST   ORDERED BY: SARAH YOO     PROCEDURE DATE:  11/02/2023          INTERPRETATION:  INDICATION: Shortness of breath, cough, lung cancer   history, last chemotherapy and radiation treatment in May 2023    TECHNIQUE: Helical acquisition images of the chest without intravenous   contrast. Maximum intensity projection images were generated.    COMPARISON: 12/29/2020 chest x-ray.    FINDINGS:    LUNGS/AIRWAYS/PLEURA: Right upper lobectomy. Right lower lobe   multiloculated thick-walled 11 cm cavity (best seen in its entirety on   601-59) containing fluid and spongelike material. Consolidation within   the middle lobe and inferior aspect of the right lower lobe with angular   borders suggesting prior radiation. Multiple indistinct nodules of   varying density in the right lower and middle lobes, mostly adjacent to   the cavity. Small branching opacities in the peripheral left upper and   lower lobes, compatible with distal airway impaction. Trace left pleural   effusion. Mild right apical pleural thickening.    LYMPH NODES/MEDIASTINUM: No lymphadenopathy.    HEART/VASCULATURE: Normal heart size. Unremarkable pericardium. Coronary   artery calcifications. Normal caliber aorta.    UPPER ABDOMEN: Unremarkable.    BONES/SOFT TISSUES: Old sternal fracture. Mild loss of height of the L1   vertebral body.      IMPRESSION:    Right upper lobectomy.    Larger right lower lobe thick-walled cavity which may be infectious   and/or residua of reported treated tumor. 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    Consolidation within the right lower and middle lobe compatible with   radiation change.    Multiple indistinct nodules in the right lungwhich may be infectious or   radiation pneumonitis.

## 2023-11-04 NOTE — DIETITIAN INITIAL EVALUATION ADULT - NSFNSGIIOFT_GEN_A_CORE
I&O's Detail    03 Nov 2023 07:01  -  04 Nov 2023 07:00  --------------------------------------------------------  IN:  Total IN: 0 mL    OUT:    Voided (mL): 850 mL  Total OUT: 850 mL    Total NET: -850 mL      04 Nov 2023 08:01  -  04 Nov 2023 12:25  --------------------------------------------------------  IN:  Total IN: 0 mL    OUT:    Voided (mL): 450 mL  Total OUT: 450 mL    Total NET: -450 mL

## 2023-11-04 NOTE — DIETITIAN INITIAL EVALUATION ADULT - OTHER INFO
83y Male with significant PMH of HTN, HPL, Chronic anemia, COPD, lung CA [place] [facility] [place]last chemotherapy and RT 05/2023, not currently receiving chemo treatment), s/p right upper lobectomy, AAA, Hypothyroidism, CKD stage IIIa and others has been in ED by his pulmonologist with c/o shortness of breath, dyspnea on minimal exertion, and cough. Reportedly, he had CT of the chest performed 10/3 which demonstrated consolidation, was started on augmentin/prednisone/azithro 10/6 x 1 week. Pt completed course of medications however wife noted worsening cough and pt with fever Tmax of 103.9 on 10/26. Wife then took pt to pulmonologist again who started pt on second round of the same medications, instructed wife to stop giving pt tylenol wife notes fevers self-resolved. Since then pt decreased energy with episode of being unsteady on feet yesterday. Today is 6th day of taking medications, had f/u scheduled with pulmonologist today who sent pt to ED for eval.  He feels tired and weak. 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 Acute hyperglycemia, on steroids, A1C 6.3%. Pt upgraded to SICU yesterday for NIPPV. Pt now DNR/ DNI    Pt w/ dementia, poor historian. Wife at bed side to provide diet/ wt hx. RD consulted for DM ed - however, inappropriate at this time 2/2 acuteness of illness and pt clearly meets criteria for PCM which should take priority at this time. POCT maintained between 140- 180 mg/dL. Can consider DM education with wife when acuteness of illness reduces. However, pt is 83 years old and A1C of 6.3% is OK for advanced age. Pt appears very thin, NFPE reveals severe muscle/ fat wasting. Very tall. Wife states he did not eat breakfast this AM, only drank coffee and juice. Asking for ensure shakes, will provide BID. RD encouraged protein-rich foods. Pt and wife receptive. Wife reports UBW ~153#, states pt was losing wt weighing 142# on 10/17 but then weighed on Thursday at 157# at pulmonologist office - concern for fluid retention but no noted edema in B/L LE. RD took current bed scale wt on 11/4 c/w wife's hx at 158#. Strongly rec'd to Confirm goals of care regarding nutrition support - Nutrition support is not recommended due to overall declining medical status/ advanced age which evidenced based studies indicate EN is not effective in prolonging survival and improving quality of life. It can also increase risk of aspiration pneumonia as well as other related issues (infection, GI complications, and worsening/ non-healing PI's). However, will provide nutrition/ hydration within GOC.  Will optimize nutrition via PO for now. See additional recs below.

## 2023-11-04 NOTE — PROGRESS NOTE ADULT - ASSESSMENT
84 y/o male PMH lung ca s/p RU lobectomy, chemo, radiation, COPD, CKD3, recently treated for pna as outpatient, admitted with R lung cavitary pneumonia with sepsis (POA)     Upgraded overnight to SICU for acute hypoxic resp failure requiring HFNC       Plan:   On HFNC 30L, 30% - wean to regular NC if tolerates   On levaquin per ID   On voriconazole, fungitell, Aspergillus pending   May need bronch   BP is stable   Trend WBC, temp curve   Add steroids, sliding scale insulin   DVT ppx with sc heparin       Wife updated at bedside    84 y/o male PMH lung ca s/p RU lobectomy, chemo, radiation, COPD, CKD3, dementia, recently treated for pna as outpatient, admitted with R lung cavitary pneumonia with sepsis (POA)     Upgraded overnight to SICU for acute hypoxic resp failure requiring HFNC       Plan:   On HFNC 30L, 30% - wean to regular NC if tolerates   On levaquin per ID   On voriconazole, fungitell, Aspergillus pending   May need bronch   BP is stable   Trend WBC, temp curve   Add steroids, sliding scale insulin   DVT ppx with sc heparin       Prognosis poor, patient DNR, DNI       Wife updated at bedside

## 2023-11-04 NOTE — PROGRESS NOTE ADULT - SUBJECTIVE AND OBJECTIVE BOX
Date of service: 11-04-23 @ 14:21    Lying in bed in NAD  Weak looking  Events noted - episode of acute SOB overnight  Has low grade fever  Has dry cough    ROS: denies dizziness, no HA, no abdominal pain, no diarrhea or constipation; no dysuria, no legs pain, no rashes    MEDICATIONS  (STANDING):  albuterol/ipratropium for Nebulization 3 milliLiter(s) Nebulizer every 6 hours  atorvastatin 40 milliGRAM(s) Oral at bedtime  fluticasone furoate/umeclidinium/vilanterol 100-62.5-25 MICROgram(s) Inhaler 1 Puff(s) Inhalation daily  folic acid 1 milliGRAM(s) Oral daily  levoFLOXacin IVPB      levoFLOXacin IVPB 500 milliGRAM(s) IV Intermittent every 24 hours  levothyroxine 25 MICROGram(s) Oral daily  losartan 50 milliGRAM(s) Oral daily  methylPREDNISolone sodium succinate Injectable 40 milliGRAM(s) IV Push every 12 hours  multivitamin 1 Tablet(s) Oral daily  voriconazole 200 milliGRAM(s) Oral every 12 hours    Vital Signs Last 24 Hrs  T(C): 36.7 (04 Nov 2023 09:33), Max: 38 (03 Nov 2023 22:45)  T(F): 98.1 (04 Nov 2023 09:33), Max: 100.4 (03 Nov 2023 22:45)  HR: 102 (04 Nov 2023 10:00) (92 - 126)  BP: 156/86 (04 Nov 2023 10:00) (135/91 - 218/76)  BP(mean): 102 (04 Nov 2023 10:00) (89 - 117)  RR: 25 (04 Nov 2023 10:00) (17 - 31)  SpO2: 96% (04 Nov 2023 10:00) (76% - 100%)    Parameters below as of 04 Nov 2023 10:00  Patient On (Oxygen Delivery Method): nasal cannula, high flow  O2 Flow (L/min): 30  O2 Concentration (%): 30     Physical exam:    Constitutional:  No acute distress  HEENT: NC/AT, EOMI, PERRLA, conjunctivae clear; ears and nose atraumatic  Neck: supple; thyroid not palpable  Back: no tenderness  Respiratory: respiratory effort normal; crackles at bases  Cardiovascular: S1S2 regular, no murmurs  Abdomen: soft, not tender, not distended, positive BS  Genitourinary: no suprapubic tenderness  Lymphatic: no LN palpable  Musculoskeletal: no muscle tenderness, no joint swelling or tenderness  Extremities: no pedal edema  Neurological/ Psychiatric: AxOx3, moving all extremities  Skin: no rashes; no palpable lesions    Labs: reviewed                        9.1    17.73 )-----------( 280      ( 04 Nov 2023 04:21 )             27.5     11-04    130<L>  |  101  |  47<H>  ----------------------------<  182<H>  4.6   |  22  |  1.44<H>    Ca    8.5      04 Nov 2023 04:21  Phos  4.2     11-04  Mg     1.9     11-04    TPro  6.5  /  Alb  1.8<L>  /  TBili  0.4  /  DBili  x   /  AST  55<H>  /  ALT  62  /  AlkPhos  135<H>  11-04                        8.2    11.74 )-----------( 259      ( 03 Nov 2023 07:15 )             25.2     11-03    133<L>  |  102  |  35<H>  ----------------------------<  205<H>  4.4   |  22  |  1.20    Ca    8.3<L>      03 Nov 2023 07:15    TPro  6.9  /  Alb  2.0<L>  /  TBili  0.5  /  DBili  x   /  AST  38<H>  /  ALT  50  /  AlkPhos  130<H>  11-02     LIVER FUNCTIONS - ( 02 Nov 2023 14:47 )  Alb: 2.0 g/dL / Pro: 6.9 gm/dL / ALK PHOS: 130 U/L / ALT: 50 U/L / AST: 38 U/L / GGT: x           Urinalysis (11-02 @ 16:40)  Urine Appearance: Clear  Protein, Urine: 100 mg/dL  Urine Microscopic-Add On (NC) (11-02 @ 16:40)  White Blood Cell - Urine: 0 /HPF  Red Blood Cell - Urine: 0 /HPF    (11-02 @ 14:47)  NotDetec    Culture - Sputum (collected 03 Nov 2023 14:16)  Source: .Sputum Sputum  Gram Stain (03 Nov 2023 23:52):    Rare Squamous epithelial cells per low power field    No polymorphonuclear cells seen per low power field    Rare Gram positive cocci in pairs per oil power field    Rare Gram Negative Rods per oil power field  Preliminary Report (04 Nov 2023 10:45):    Normal Respiratory Marguerite present    Culture - Urine (collected 02 Nov 2023 16:40)  Source: Clean Catch Clean Catch (Midstream)  Final Report (03 Nov 2023 20:33):    No growth    Culture - Blood (collected 02 Nov 2023 14:47)  Source: .Blood Blood-Peripheral  Preliminary Report (03 Nov 2023 22:02):    No growth at 24 hours    Culture - Blood (collected 02 Nov 2023 14:47)  Source: .Blood Blood-Peripheral  Preliminary Report (03 Nov 2023 22:02):    No growth at 24 hours    Radiology: all available radiological tests reviewed    < from: CT Chest No Cont (11.02.23 @ 16:04) >  Right upper lobectomy.  Larger right lower lobe thick-walled cavity which may be infectious   and/or residua of reported treated tumor. 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  Consolidation within the right lower and middle lobe compatible with radiation change.  Multiple indistinct nodules in the right lungwhich may be infectious or radiation pneumonitis.  < end of copied text >    Advanced directives addressed: full resuscitation

## 2023-11-04 NOTE — PROGRESS NOTE ADULT - ASSESSMENT
83y old Male with PMH HTN, HPL, Chronic anemia, COPD, lung CA [place] [facility] [place] chemotherapy and RT 05/2023, not currently receiving chemo treatment), s/p right upper lobectomy, AAA, Hypothyroidism, CKD stage IIIa referred to me to ed for continued sob, cough despite abx treatment found to have large lower lobe thick walled cavity  1. 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 discussed with wife. no treatment at this time  -suggest making sure that it is aspergilloma, obtain sputum cultures  -obtain prior ct scans from [place] to compare as there is no mention of this even dating back to last month  2. pneumonia: continue levaquin  -supplemental o2  3. copd: continue trelegy  4. ? radiation induced pneumonitis: continue methylprednisolone 83y old Male with PMH HTN, HPL, Chronic anemia, COPD, lung CA [place] at [facility] [place] (last chemotherapy and RT 05/2023, not currently receiving chemo treatment), s/p right upper lobectomy, AAA, Hypothyroidism, CKD stage IIIa referred to me to ed for continued sob, cough despite abx treatment found to have large lower lobe thick walled cavity  1. 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 discussed with wife.  -started on voriconazole. surgery is more defitive therapy however he is high surgical risk  -review imaging from [place] supplied by wife  2. pneumonia: continue levaquin  -supplemental o2  3. copd: continue trelegy  4. ? radiation induced pneumonitis: continue methylprednisolone    spoke to dr petit  over 35 min spent in care of patient

## 2023-11-04 NOTE — CONSULT NOTE ADULT - SUBJECTIVE AND OBJECTIVE BOX
Patient is a 83y old  Male who presents with a chief complaint of Right lung pneumonia with thick-walled cavity. (03 Nov 2023 16:19)    BRIEF HOSPITAL COURSE:   Mr. Friedel is a 84 YO M w/pmhx of mild dementia, HTN, Chronic anemia, COPD, [place] [facility] [place], not currently receiving chemo treatment), s/p right upper lobectomy, AAA, Hypothyroidism, CKD stage IIIa, recently was being treated outpatient for pneumonia w/Augmentin, azithromycin, and Prednisone. Patient had CT chest done at that time. Patient was sent to ED by pulmonologist, Dr. Olivera. 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 Patient was placed on Levaquin and admitted to the hospital. Patient tonight was an RRT for AHRF requiring 100% NRB (previously on RA), cough, SOB, diaphoresis. Patient was upgraded to SICU for NIPPV. Patient's chest xray on R side seems a little bit worse. Unsure how long this possible aspergilloma has been around given we do not have access to imaging      Events last 24 hours:   - Solu-medrol  - NIPPV  - Duonebs  - Labetalol for elevated BP   - Bloody secretions on suction      Review of Systems:  Negative besides mentioned in HPI     PAST MEDICAL & SURGICAL HISTORY:  COPD (chronic obstructive pulmonary disease)  Lung cancer  Anemia of chronic disease  CKD (chronic kidney disease), stage III  Hypothyroidism  S/P lobectomy of lung    Medications:  levoFLOXacin IVPB      levoFLOXacin IVPB 500 milliGRAM(s) IV Intermittent every 24 hours  losartan 50 milliGRAM(s) Oral daily  albuterol/ipratropium for Nebulization 3 milliLiter(s) Nebulizer every 6 hours  benzonatate 100 milliGRAM(s) Oral three times a day PRN  fluticasone furoate/umeclidinium/vilanterol 100-62.5-25 MICROgram(s) Inhaler 1 Puff(s) Inhalation daily  acetaminophen     Tablet .. 650 milliGRAM(s) Oral every 6 hours PRN  melatonin 3 milliGRAM(s) Oral at bedtime PRN  ondansetron Injectable 4 milliGRAM(s) IV Push every 8 hours PRN  heparin   Injectable 5000 Unit(s) SubCutaneous every 8 hours  aluminum hydroxide/magnesium hydroxide/simethicone Suspension 30 milliLiter(s) Oral every 4 hours PRN  atorvastatin 40 milliGRAM(s) Oral at bedtime  levothyroxine 25 MICROGram(s) Oral daily  folic acid 1 milliGRAM(s) Oral daily  multivitamin 1 Tablet(s) Oral daily    ICU Vital Signs Last 24 Hrs  T(C): 38 (03 Nov 2023 22:45), Max: 38 (03 Nov 2023 22:45)  T(F): 100.4 (03 Nov 2023 22:45), Max: 100.4 (03 Nov 2023 22:45)  HR: 108 (04 Nov 2023 02:01) (81 - 126)  BP: 160/85 (04 Nov 2023 02:01) (152/67 - 218/76)  BP(mean): 107 (04 Nov 2023 02:01) (107 - 117)  RR: 27 (04 Nov 2023 02:01) (17 - 27)  SpO2: 100% (04 Nov 2023 02:01) (76% - 100%)  O2 Parameters below as of 04 Nov 2023 02:01  Patient On (Oxygen Delivery Method): BiPAP/CPAP  O2 Concentration (%): 60    I&O's Detail    02 Nov 2023 07:01  -  03 Nov 2023 07:00  --------------------------------------------------------  IN:  Total IN: 0 mL    OUT:    Voided (mL): 225 mL  Total OUT: 225 mL    Total NET: -225 mL    03 Nov 2023 07:01  -  04 Nov 2023 02:25  --------------------------------------------------------  IN:  Total IN: 0 mL    OUT:    Voided (mL): 300 mL  Total OUT: 300 mL    Total NET: -300 mL    LABS:                        8.2    11.74 )-----------( 259      ( 03 Nov 2023 07:15 )             25.2     11-03    133<L>  |  102  |  35<H>  ----------------------------<  205<H>  4.4   |  22  |  1.20    Ca    8.3<L>      03 Nov 2023 07:15    TPro  6.9  /  Alb  2.0<L>  /  TBili  0.5  /  DBili  x   /  AST  38<H>  /  ALT  50  /  AlkPhos  130<H>  11-02    POCT Blood Glucose.: 154 mg/dL (04 Nov 2023 00:57)    PT/INR - ( 02 Nov 2023 14:47 )   PT: 13.4 sec;   INR: 1.19 ratio    PTT - ( 02 Nov 2023 14:47 )  PTT:28.7 sec    Urinalysis Basic - ( 03 Nov 2023 07:15 )  Color: x / Appearance: x / SG: x / pH: x  Gluc: 205 mg/dL / Ketone: x  / Bili: x / Urobili: x   Blood: x / Protein: x / Nitrite: x   Leuk Esterase: x / RBC: x / WBC x   Sq Epi: x / Non Sq Epi: x / Bacteria: x    CULTURES:  Culture Results:   No growth (11-02 @ 16:40)  Culture Results:   No growth at 24 hours (11-02 @ 14:47)  Culture Results:   No growth at 24 hours (11-02 @ 14:47)  Rapid RVP Result: NotDetec (11-02 @ 14:47)    Physical Examination:  General: Moderate respiratory distress, diaphoretic    HEENT: Pupils equal, reactive to light.  Symmetric.  PULM: Coarse breath sounds, R side   NECK: Supple, no lymphadenopathy, trachea midline  CVS: Regular rate and rhythm, no murmurs, rubs, or gallops  ABD: Soft, nondistended, nontender, normoactive bowel sounds, no masses  SKIN: Warm and well perfused  NEURO: Alert, oriented  RADIOLOGY:  < from: CT Chest No Cont (11.02.23 @ 16:04) >    ACC: [id number] EXAM:  CT CHEST   ORDERED BY: SARAH YOO     PROCEDURE DATE:  11/02/2023      INTERPRETATION:  INDICATION: Shortness of breath, cough, lung cancer   history, last chemotherapy and radiation treatment in May 2023    TECHNIQUE: Helical acquisition images of the chest without intravenous   contrast. Maximum intensity projection images were generated.    COMPARISON: 12/29/2020 chest x-ray.    FINDINGS:    LUNGS/AIRWAYS/PLEURA: Right upper lobectomy. Right lower lobe   multiloculated thick-walled 11 cm cavity (best seen in its entirety on   601-59) containing fluid and spongelike material. Consolidation within   the middle lobe and inferior aspect of the right lower lobe with angular   borders suggesting prior radiation. Multiple indistinct nodules of   varying density in the right lower and middle lobes, mostly adjacent to   the cavity. Small branching opacities in the peripheral left upper and   lower lobes, compatible with distal airway impaction. Trace left pleural   effusion. Mild right apical pleural thickening.    LYMPH NODES/MEDIASTINUM: No lymphadenopathy.    HEART/VASCULATURE: Normal heart size. Unremarkable pericardium. Coronary   artery calcifications. Normal caliber aorta.    UPPER ABDOMEN: Unremarkable.    BONES/SOFT TISSUES: Old sternal fracture. Mild loss of height of the L1   vertebral body.    IMPRESSION:    Right upper lobectomy.    Larger right lower lobe thick-walled cavity which may be infectious   and/or residua of reported treated tumor. 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    Consolidation within the right lower and middle lobe compatible with   radiation change.    Multiple indistinct nodules in the right lungwhich may be infectious or   radiation pneumonitis.    --- End of Report ---    ALEAH BANEGAS M.D., ATTENDING RADIOLOGIST  This document has been electronically signed. Nov 2 2023  4:20PM    < end of copied text >

## 2023-11-04 NOTE — DIETITIAN INITIAL EVALUATION ADULT - PERTINENT LABORATORY DATA
11-04    130<L>  |  101  |  47<H>  ----------------------------<  182<H>  4.6   |  22  |  1.44<H>    Ca    8.5      04 Nov 2023 04:21  Phos  4.2     11-04  Mg     1.9     11-04    TPro  6.5  /  Alb  1.8<L>  /  TBili  0.4  /  DBili  x   /  AST  55<H>  /  ALT  62  /  AlkPhos  135<H>  11-04  POCT Blood Glucose.: 177 mg/dL (11-04-23 @ 05:49)  A1C with Estimated Average Glucose Result: 6.3 % (11-03-23 @ 07:15)

## 2023-11-04 NOTE — PROVIDER CONTACT NOTE (EICU) - BACKGROUND
labs reviewed  < from: CT Chest No Cont (11.02.23 @ 16:04) >    Larger right lower lobe thick-walled cavity which may be infectious   and/or residua of reported treated tumor. 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    < end of copied text >

## 2023-11-04 NOTE — PROGRESS NOTE ADULT - ASSESSMENT
84 y/o [place] with h/o lung CA [place] [facility] [place], s/p right upper lobectomy, HTN, HPL, Chronic anemia, COPD, AAA, Hypothyroidism, CKD stage IIIa was admitted on 11/2 for increased shortness of breath, dyspnea on minimal exertion, and cough. Reportedly, he had CT of the chest performed 10/3 which demonstrated consolidation, was started on augmentin/prednisone/azithro 10/6 x 1 week. Pt completed course of medications however wife noted worsening cough and pt with fever Tmax of 103.9 on 10/26. Wife then took pt to pulmonologist again who started pt on second round of the same medications, instructed wife to stop giving pt tylenol wife notes fevers self-resolved. Since then pt decreased energy with episode of being unsteady on feet. Today is 6th day of taking medications, had f/u scheduled with pulmonologist who sent pt to ED for eval.  He feels tired and weak. In ER he received ceftriaxone.    1. Large RLL pulmonary cavity. 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 Possible postobstructive pneumonia. Lung Ca s/p right upper lobectomy. Radiation pneumonitis. Allergy to PCN.    -episode of acute SOB ?mucus plugging; resolved now  -leukocytosis  -sputum c/s noted  -f/u sputum for fungal c/s  -obtain fungitel  -on levofloxacin 500 mg IV qd # 2  -tolerating abx well so far; no side effects noted  -pulmonary evaluation ?diagnostic bronchoscopy   -if this is a fungus ball, antifungal therapy will not be effective, he may need surgery to remove the fungal ball area  -continue abx coverage   -monitor temps  -f/u CBC  -supportive care  2. Other issues:   -care per medicine    d/w Dr. Olivera

## 2023-11-04 NOTE — CONSULT NOTE ADULT - ASSESSMENT
Assessment:  Mr. Friedel is a 82 YO M w/pmhx of mild dementia, HTN, Chronic anemia, COPD, lung CA [place] [facility] [place] (last chemotherapy and RT 05/2023, not currently receiving chemo treatment), s/p right upper lobectomy, AAA, Hypothyroidism, CKD stage IIIa, recently was being treated outpatient for pneumonia w/Augmentin, azithromycin, and Prednisone. Patient had CT chest done at that time. Patient was sent to ED by pulmonologist, Dr. Olivera. 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 Patient was placed on Levaquin and admitted to the hospital. Patient tonight was an RRT for AHRF requiring 100% NRB (previously on RA), cough, SOB, diaphoresis. Patient was upgraded to SICU for NIPPV. Patient's chest xray on R side seems a little bit worse. Unsure how long this possible aspergilloma has been around given we do not have access to imaging      Problem List:  1. Acute hypoxic respiratory failure 2/2  2. Pneumonia vs  3. Aspergilloma (simple vs CCPA vs THIERRY??)   4. CKD  5. Hypothyroidism   6. Dementia   7. HTN  8. Anemia  9. DM  10. Hx of lung cancer s/p RUL lobectomy, chemotherapy, and radiation therapy     Plan:  Neuro:  Patient has history of mild dementia according to wife and is quite forgetful at times, especially in hospital and at night. Monitor mental status, avoid deliriogenic medications. Pain & fever control as needed    CV:  Hypertensive given sympathetic drive from AHRF, improved w/NIPPV and Labetalol. Monitor HR and BP. Is on Losartan    Pulm:  AHRF requiring NIPPV, 100% FiO2 Bi-level 14/6. Patient's WOB improved for now. Patient is at high risk for respiratory failure requiring endotracheal intubation and mechanical ventilation. Patient was being treated for pneumonia outpatient and then sent to ED for further w/u given persistency of symptoms. There is concern on CT lung of possible aspergilloma. There can be differential of mycobacterial disease and cavitary lung cancer. Unsure how to appropriately classify this if it is aspergilloma, we do not have access to imaging from prior CT chests. Patient has not had bronchoscopy. This can be a simple aspergilloma vs CCPA vs THIERRY (given fevers, but can be 2/2 superimposed bacterial pneumonia). Patient did NOT complain of hemoptysis, but noted to have secretions, so w/suction, patient noted to have bloody secretions. Patient was given Solu-medrol in ED, patient was on steroids at home, will give dose of Solu-medrol 40mg now. Debra. Trelegy. There may be component of radiation pneumonitis as well       Renal:   Renal function improved, has CKD. Strict I/Os, goal UOP >0.5cc/kg/hr. Trend renal function and electrolytes, replete as needed. Avoid nephrotoxic agents    GI:  NPO on NIPPV. PPI     ID:  Levaquin for abx. Start voriconazole for now, will defer to ID in AM for further management. Ordered Aspergillus serum studies, may be positive if THIERRY (rare)? Ordered funigtell and sputum cultures. F/u blood cultures. Trend WBC/fevers/procalcitonin     Heme:  D/C chemical DVT prophylaxis given bloody secretions, use SCDs    Endo:  Goal blood glucose <180, start ISS. Levothyroxine     CRITICAL CARE TIME SPENT: 50 minutes assessing presenting problems of acute illness, which pose high probability of life threatening deterioration or end organ damage/dysfunction, as well as medical decision making including initiating plan of care, reviewing data, reviewing radiologic exams, discussing with multidisciplinary team,  discussing goals of care with patient/family, and writing this note.  Non-inclusive of procedures performed   Assessment:  Mr. Friedel is a 82 YO M w/pmhx of mild dementia, HTN, Chronic anemia, COPD, lung CA [place] at [facility] [place] (last chemotherapy and RT 05/2023, not currently receiving chemo treatment), s/p right upper lobectomy, AAA, Hypothyroidism, CKD stage IIIa, recently was being treated outpatient for pneumonia w/Augmentin, azithromycin, and Prednisone. Patient had CT chest done at that time. Patient was sent to ED by pulmonologist, Dr. Olivera. 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 Patient was placed on Levaquin and admitted to the hospital. Patient tonight was an RRT for AHRF requiring 100% NRB (previously on RA), cough, SOB, diaphoresis. Patient was upgraded to SICU for NIPPV. Patient's chest xray on R side seems a little bit worse. Unsure how long this possible aspergilloma has been around given we do not have access to imaging      Problem List:  1. Acute hypoxic respiratory failure 2/2  2. Pneumonia vs  3. Aspergilloma (simple vs CCPA vs THIERRY??)   4. CKD  5. Hypothyroidism   6. Dementia   7. HTN  8. Anemia  9. DM  10. Hx of lung cancer s/p RUL lobectomy, chemotherapy, and radiation therapy     Plan:  Neuro:  Patient has history of mild dementia according to wife and is quite forgetful at times, especially in hospital and at night. Monitor mental status, avoid deliriogenic medications. Pain & fever control as needed    CV:  Hypertensive given sympathetic drive from AHRF, improved w/NIPPV and Labetalol. Monitor HR and BP. Is on Losartan    Pulm:  AHRF requiring NIPPV, 100% FiO2 Bi-level 14/6. Patient's WOB improved for now. Patient is at high risk for respiratory failure requiring endotracheal intubation and mechanical ventilation. Patient was being treated for pneumonia outpatient and then sent to ED for further w/u given persistency of symptoms. There is concern on CT lung of possible aspergilloma. There can be differential of mycobacterial disease and cavitary lung cancer. Unsure how to appropriately classify this if it is aspergilloma, we do not have access to imaging from prior CT chests. Patient has not had bronchoscopy. This can be a simple aspergilloma vs CCPA vs THIERRY (given fevers, but can be 2/2 superimposed bacterial pneumonia). Patient did NOT complain of hemoptysis, but noted to have secretions, so w/suction, patient noted to have bloody secretions. Patient was given Solu-medrol in ED, patient was on steroids at home, will give dose of Solu-medrol 40mg now. Debra. Trelegy. There may be component of radiation pneumonitis as well       Renal:   Renal function improved, has CKD. Strict I/Os, goal UOP >0.5cc/kg/hr. Trend renal function and electrolytes, replete as needed. Avoid nephrotoxic agents    GI:  NPO on NIPPV. PPI     ID:  Levaquin for abx. Start voriconazole for now, will defer to ID in AM for further management. Ordered Aspergillus serum studies, may be positive if THIERRY (rare)? Ordered funigtell and sputum cultures. F/u blood cultures. Trend WBC/fevers/procalcitonin     Heme:  D/C chemical DVT prophylaxis given bloody secretions, use SCDs    Endo:  Goal blood glucose <180, start ISS. Levothyroxine    Dispo:  SICU  GOC discussion w/wife and daughter. Explained how patient told me that he would not want to be intubated or have CPR but given that he cannot tell me the date, he does not have full capacity and it would be up the spouse to make the decision. I explained patient's condition and how we do not have definitive diagnosis currently. How he needs bronchoscopy. And if it is an aspergilloma, she asked how it is treated, and i explained it may require surgery which she said that is something the patient would NOT want. Family wants everything to be done, besides intubation and CPR at this time. [facility]         CRITICAL CARE TIME SPENT: 50 minutes assessing presenting problems of acute illness, which pose high probability of life threatening deterioration or end organ damage/dysfunction, as well as medical decision making including initiating plan of care, reviewing data, reviewing radiologic exams, discussing with multidisciplinary team,  discussing goals of care with patient/family, and writing this note.  Non-inclusive of procedures performed   Assessment:  Mr. Friedel is a 82 YO M w/pmhx of mild dementia, HTN, Chronic anemia, COPD, lung CA [place] at [facility] [place] (last chemotherapy and RT 05/2023, not currently receiving chemo treatment), s/p right upper lobectomy, AAA, Hypothyroidism, CKD stage IIIa, recently was being treated outpatient for pneumonia w/Augmentin, azithromycin, and Prednisone. Patient had CT chest done at that time. Patient was sent to ED by pulmonologist, Dr. Olivera. 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 Patient was placed on Levaquin and admitted to the hospital. Patient tonight was an RRT for AHRF requiring 100% NRB (previously on RA), cough, SOB, diaphoresis. Patient was upgraded to SICU for NIPPV. Patient's chest xray on R side seems a little bit worse. Unsure how long this possible aspergilloma has been around given we do not have access to imaging      Problem List:  1. Acute hypoxic respiratory failure 2/2  2. Pneumonia vs  3. Aspergilloma (simple vs CCPA vs THIERRY??)   4. CKD  5. Hypothyroidism   6. Dementia   7. HTN  8. Anemia  9. DM  10. Hx of lung cancer s/p RUL lobectomy, chemotherapy, and radiation therapy     Plan:  Neuro:  Patient has history of mild dementia according to wife and is quite forgetful at times, especially in hospital and at night. Monitor mental status, avoid deliriogenic medications. Pain & fever control as needed    CV:  Hypertensive given sympathetic drive from AHRF, improved w/NIPPV and Labetalol. Monitor HR and BP. Is on Losartan    Pulm:  AHRF requiring NIPPV, 100% FiO2 Bi-level 14/6. Patient's WOB improved for now. Patient is at high risk for respiratory failure requiring endotracheal intubation and mechanical ventilation. Patient was being treated for pneumonia outpatient and then sent to ED for further w/u given persistency of symptoms. There is concern on CT lung of possible aspergilloma. There can be differential of mycobacterial disease and cavitary lung cancer. Unsure how to appropriately classify this if it is aspergilloma, we do not have access to imaging from prior CT chests. Patient has not had bronchoscopy. This can be a simple aspergilloma vs CCPA vs THIERRY (given fevers, but can be 2/2 superimposed bacterial pneumonia). Patient did NOT complain of hemoptysis, but noted to have secretions, so w/suction, patient noted to have bloody secretions. Patient was given Solu-medrol in ED, patient was on steroids at home, will give dose of Solu-medrol 40mg now. Debra. Trelegy. There may be component of radiation pneumonitis as well       Renal:   Renal function improved, has CKD. Strict I/Os, goal UOP >0.5cc/kg/hr. Trend renal function and electrolytes, replete as needed. Avoid nephrotoxic agents    GI:  NPO on NIPPV. PPI     ID:  Levaquin for abx. Start voriconazole for now, will defer to ID in AM for further management. Ordered Aspergillus serum studies, may be positive if THIERRY (rare)? Ordered funigtell and sputum cultures. F/u blood cultures. Trend WBC/fevers/procalcitonin     Heme:  D/C chemical DVT prophylaxis given bloody secretions, use SCDs    Endo:  Goal blood glucose <180, start ISS. Levothyroxine    Dispo:  SICU  GOC discussion w/wife and daughter. Explained how patient told me that he would not want to be intubated or have CPR but given that he cannot tell me the date, he does not have full capacity and it would be up the spouse to make the decision. I explained patient's condition and how we do not have definitive diagnosis currently. How he needs bronchoscopy. And if it is an aspergilloma, she asked how it is treated, and i explained it may require surgery which she said that is something the patient would NOT want. Family wants everything to be done, besides intubation and CPR at this time. Update [place]       Cell phone for wife is , home number is listed in chart   CRITICAL CARE TIME SPENT: 50 minutes assessing presenting problems of acute illness, which pose high probability of life threatening deterioration or end organ damage/dysfunction, as well as medical decision making including initiating plan of care, reviewing data, reviewing radiologic exams, discussing with multidisciplinary team,  discussing goals of care with patient/family, and writing this note.  Non-inclusive of procedures performed

## 2023-11-04 NOTE — DIETITIAN INITIAL EVALUATION ADULT - LAB (SPECIFY)
Consider obtaining vitamin D 25OH level to assess nutriture  consider checking B6, B12, thiamine, folate, carnitine, and copper levels as malnutrition in cause these to be deficient

## 2023-11-05 LAB
ANION GAP SERPL CALC-SCNC: 5 MMOL/L — SIGNIFICANT CHANGE UP (ref 5–17)
ANION GAP SERPL CALC-SCNC: 5 MMOL/L — SIGNIFICANT CHANGE UP (ref 5–17)
BUN SERPL-MCNC: 49 MG/DL — HIGH (ref 7–23)
BUN SERPL-MCNC: 49 MG/DL — HIGH (ref 7–23)
CALCIUM SERPL-MCNC: 8.6 MG/DL — SIGNIFICANT CHANGE UP (ref 8.5–10.1)
CALCIUM SERPL-MCNC: 8.6 MG/DL — SIGNIFICANT CHANGE UP (ref 8.5–10.1)
CHLORIDE SERPL-SCNC: 100 MMOL/L — SIGNIFICANT CHANGE UP (ref 96–108)
CHLORIDE SERPL-SCNC: 100 MMOL/L — SIGNIFICANT CHANGE UP (ref 96–108)
CO2 SERPL-SCNC: 26 MMOL/L — SIGNIFICANT CHANGE UP (ref 22–31)
CO2 SERPL-SCNC: 26 MMOL/L — SIGNIFICANT CHANGE UP (ref 22–31)
CREAT SERPL-MCNC: 1.55 MG/DL — HIGH (ref 0.5–1.3)
CREAT SERPL-MCNC: 1.55 MG/DL — HIGH (ref 0.5–1.3)
CULTURE RESULTS: ABNORMAL
CULTURE RESULTS: ABNORMAL
EGFR: 44 ML/MIN/1.73M2 — LOW
EGFR: 44 ML/MIN/1.73M2 — LOW
GLUCOSE BLDC GLUCOMTR-MCNC: 223 MG/DL — HIGH (ref 70–99)
GLUCOSE BLDC GLUCOMTR-MCNC: 223 MG/DL — HIGH (ref 70–99)
GLUCOSE BLDC GLUCOMTR-MCNC: 262 MG/DL — HIGH (ref 70–99)
GLUCOSE BLDC GLUCOMTR-MCNC: 262 MG/DL — HIGH (ref 70–99)
GLUCOSE BLDC GLUCOMTR-MCNC: 315 MG/DL — HIGH (ref 70–99)
GLUCOSE BLDC GLUCOMTR-MCNC: 315 MG/DL — HIGH (ref 70–99)
GLUCOSE BLDC GLUCOMTR-MCNC: 347 MG/DL — HIGH (ref 70–99)
GLUCOSE BLDC GLUCOMTR-MCNC: 347 MG/DL — HIGH (ref 70–99)
GLUCOSE SERPL-MCNC: 219 MG/DL — HIGH (ref 70–99)
GLUCOSE SERPL-MCNC: 219 MG/DL — HIGH (ref 70–99)
HCT VFR BLD CALC: 26.2 % — LOW (ref 39–50)
HCT VFR BLD CALC: 26.2 % — LOW (ref 39–50)
HGB BLD-MCNC: 8.8 G/DL — LOW (ref 13–17)
HGB BLD-MCNC: 8.8 G/DL — LOW (ref 13–17)
MAGNESIUM SERPL-MCNC: 2.3 MG/DL — SIGNIFICANT CHANGE UP (ref 1.6–2.6)
MAGNESIUM SERPL-MCNC: 2.3 MG/DL — SIGNIFICANT CHANGE UP (ref 1.6–2.6)
MCHC RBC-ENTMCNC: 31.3 PG — SIGNIFICANT CHANGE UP (ref 27–34)
MCHC RBC-ENTMCNC: 31.3 PG — SIGNIFICANT CHANGE UP (ref 27–34)
MCHC RBC-ENTMCNC: 33.6 GM/DL — SIGNIFICANT CHANGE UP (ref 32–36)
MCHC RBC-ENTMCNC: 33.6 GM/DL — SIGNIFICANT CHANGE UP (ref 32–36)
MCV RBC AUTO: 93.2 FL — SIGNIFICANT CHANGE UP (ref 80–100)
MCV RBC AUTO: 93.2 FL — SIGNIFICANT CHANGE UP (ref 80–100)
PHOSPHATE SERPL-MCNC: 3.4 MG/DL — SIGNIFICANT CHANGE UP (ref 2.5–4.5)
PHOSPHATE SERPL-MCNC: 3.4 MG/DL — SIGNIFICANT CHANGE UP (ref 2.5–4.5)
PLATELET # BLD AUTO: 274 K/UL — SIGNIFICANT CHANGE UP (ref 150–400)
PLATELET # BLD AUTO: 274 K/UL — SIGNIFICANT CHANGE UP (ref 150–400)
POTASSIUM SERPL-MCNC: 4.4 MMOL/L — SIGNIFICANT CHANGE UP (ref 3.5–5.3)
POTASSIUM SERPL-MCNC: 4.4 MMOL/L — SIGNIFICANT CHANGE UP (ref 3.5–5.3)
POTASSIUM SERPL-SCNC: 4.4 MMOL/L — SIGNIFICANT CHANGE UP (ref 3.5–5.3)
POTASSIUM SERPL-SCNC: 4.4 MMOL/L — SIGNIFICANT CHANGE UP (ref 3.5–5.3)
RBC # BLD: 2.81 M/UL — LOW (ref 4.2–5.8)
RBC # BLD: 2.81 M/UL — LOW (ref 4.2–5.8)
RBC # FLD: 15 % — HIGH (ref 10.3–14.5)
RBC # FLD: 15 % — HIGH (ref 10.3–14.5)
SODIUM SERPL-SCNC: 131 MMOL/L — LOW (ref 135–145)
SODIUM SERPL-SCNC: 131 MMOL/L — LOW (ref 135–145)
SPECIMEN SOURCE: SIGNIFICANT CHANGE UP
SPECIMEN SOURCE: SIGNIFICANT CHANGE UP
WBC # BLD: 17.35 K/UL — HIGH (ref 3.8–10.5)
WBC # BLD: 17.35 K/UL — HIGH (ref 3.8–10.5)
WBC # FLD AUTO: 17.35 K/UL — HIGH (ref 3.8–10.5)
WBC # FLD AUTO: 17.35 K/UL — HIGH (ref 3.8–10.5)

## 2023-11-05 PROCEDURE — 99232 SBSQ HOSP IP/OBS MODERATE 35: CPT

## 2023-11-05 PROCEDURE — 99233 SBSQ HOSP IP/OBS HIGH 50: CPT

## 2023-11-05 RX ADMIN — Medication 8: at 17:16

## 2023-11-05 RX ADMIN — Medication 25 MICROGRAM(S): at 05:20

## 2023-11-05 RX ADMIN — Medication 3 MILLILITER(S): at 20:31

## 2023-11-05 RX ADMIN — Medication 3 MILLILITER(S): at 08:50

## 2023-11-05 RX ADMIN — HEPARIN SODIUM 5000 UNIT(S): 5000 INJECTION INTRAVENOUS; SUBCUTANEOUS at 13:37

## 2023-11-05 RX ADMIN — Medication 4: at 07:47

## 2023-11-05 RX ADMIN — FLUTICASONE FUROATE, UMECLIDINIUM BROMIDE AND VILANTEROL TRIFENATATE 1 PUFF(S): 200; 62.5; 25 POWDER RESPIRATORY (INHALATION) at 08:50

## 2023-11-05 RX ADMIN — Medication 8: at 22:19

## 2023-11-05 RX ADMIN — Medication 1 MILLIGRAM(S): at 09:45

## 2023-11-05 RX ADMIN — VORICONAZOLE 200 MILLIGRAM(S): 10 INJECTION, POWDER, LYOPHILIZED, FOR SOLUTION INTRAVENOUS at 05:20

## 2023-11-05 RX ADMIN — Medication 3 MILLILITER(S): at 03:12

## 2023-11-05 RX ADMIN — VORICONAZOLE 200 MILLIGRAM(S): 10 INJECTION, POWDER, LYOPHILIZED, FOR SOLUTION INTRAVENOUS at 17:38

## 2023-11-05 RX ADMIN — HEPARIN SODIUM 5000 UNIT(S): 5000 INJECTION INTRAVENOUS; SUBCUTANEOUS at 05:20

## 2023-11-05 RX ADMIN — HEPARIN SODIUM 5000 UNIT(S): 5000 INJECTION INTRAVENOUS; SUBCUTANEOUS at 21:57

## 2023-11-05 RX ADMIN — Medication 40 MILLIGRAM(S): at 09:45

## 2023-11-05 RX ADMIN — Medication 20 MILLIGRAM(S): at 21:57

## 2023-11-05 RX ADMIN — Medication 6: at 11:53

## 2023-11-05 RX ADMIN — LOSARTAN POTASSIUM 50 MILLIGRAM(S): 100 TABLET, FILM COATED ORAL at 09:45

## 2023-11-05 RX ADMIN — Medication 1 TABLET(S): at 09:45

## 2023-11-05 RX ADMIN — ATORVASTATIN CALCIUM 40 MILLIGRAM(S): 80 TABLET, FILM COATED ORAL at 21:57

## 2023-11-05 RX ADMIN — Medication 100 MILLIGRAM(S): at 15:14

## 2023-11-05 NOTE — PROGRESS NOTE ADULT - ASSESSMENT
82 y/o male PMH lung ca s/p RU lobectomy, chemo, radiation, COPD, CKD3, dementia, recently treated for pna as outpatient, admitted with R lung cavitary pneumonia with sepsis (POA)     Upgraded 11/3 to SICU for acute hypoxic resp failure requiring HFNC       Plan:   Off HFNC since 11/4, now on RA, sats 91-92, appears comfortable   Improving cough, no further hemoptysis   Decrease steroid dose   Continue levaquin, vori as d/w ID   Sputum cx shows normal jaime, BCx neg, Legionella neg   Consult thoracic sx for bronch per pulm   Continue other meds   Sliding scale insulin  DVT ppx with sc heparin       Prognosis poor, patient DNR, DNI       Stable for floor, sign out given to Dr Kitchen

## 2023-11-05 NOTE — PROGRESS NOTE ADULT - RESPIRATORY
no wheezes/no respiratory distress/no use of accessory muscles/respirations non-labored/rales
no wheezes/no respiratory distress/no use of accessory muscles/respirations non-labored/rales

## 2023-11-05 NOTE — PROGRESS NOTE ADULT - ASSESSMENT
82 y/o [place] with h/o lung CA [place] [facility] [place], s/p right upper lobectomy, HTN, HPL, Chronic anemia, COPD, AAA, Hypothyroidism, CKD stage IIIa was admitted on 11/2 for increased shortness of breath, dyspnea on minimal exertion, and cough. Reportedly, he had CT of the chest performed 10/3 which demonstrated consolidation, was started on augmentin/prednisone/azithro 10/6 x 1 week. Pt completed course of medications however wife noted worsening cough and pt with fever Tmax of 103.9 on 10/26. Wife then took pt to pulmonologist again who started pt on second round of the same medications, instructed wife to stop giving pt tylenol wife notes fevers self-resolved. Since then pt decreased energy with episode of being unsteady on feet. Today is 6th day of taking medications, had f/u scheduled with pulmonologist who sent pt to ED for eval.  He feels tired and weak. In ER he received ceftriaxone.    1. Large RLL pulmonary cavity. 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 Possible postobstructive pneumonia. Lung Ca s/p right upper lobectomy. Radiation pneumonitis. Allergy to PCN.    -episode of acute SOB ?mucus plugging; resolved now  -leukocytosis  -sputum c/s noted  -f/u sputum for fungal c/s  -obtain fungitel  -on levofloxacin 500 mg IV qd # 3and voriconazole 200 mg PO q12h # 1  -tolerating abx well so far; no side effects noted  -pulmonary evaluation ?diagnostic bronchoscopy   -if this is a fungus ball, antifungal therapy will not be effective, he may need surgery to remove the fungal ball area  -continue abx coverage   -monitor temps  -f/u CBC  -supportive care  2. Other issues:   -care per medicine    d/w medicine team

## 2023-11-05 NOTE — PROGRESS NOTE ADULT - SUBJECTIVE AND OBJECTIVE BOX
Subjective:  transitioned to nasal cannula   this morning off oxygen  feels better    Review of Systems:  All 10 systems reviewed in detailed and found to be negative with the exception of what has already been described above    Allergies:  penicillin (Unknown)    Meds  MEDICATIONS  (STANDING):  albuterol/ipratropium for Nebulization 3 milliLiter(s) Nebulizer every 6 hours  atorvastatin 40 milliGRAM(s) Oral at bedtime  dextrose 5%. 1000 milliLiter(s) (50 mL/Hr) IV Continuous <Continuous>  dextrose 5%. 1000 milliLiter(s) (100 mL/Hr) IV Continuous <Continuous>  dextrose 50% Injectable 25 Gram(s) IV Push once  dextrose 50% Injectable 12.5 Gram(s) IV Push once  dextrose 50% Injectable 25 Gram(s) IV Push once  fluticasone furoate/umeclidinium/vilanterol 100-62.5-25 MICROgram(s) Inhaler 1 Puff(s) Inhalation daily  folic acid 1 milliGRAM(s) Oral daily  glucagon  Injectable 1 milliGRAM(s) IntraMuscular once  heparin   Injectable 5000 Unit(s) SubCutaneous every 8 hours  insulin lispro (ADMELOG) corrective regimen sliding scale   SubCutaneous Before meals and at bedtime  levoFLOXacin IVPB      levoFLOXacin IVPB 500 milliGRAM(s) IV Intermittent every 24 hours  levothyroxine 25 MICROGram(s) Oral daily  losartan 50 milliGRAM(s) Oral daily  methylPREDNISolone sodium succinate Injectable 40 milliGRAM(s) IV Push every 12 hours  multivitamin 1 Tablet(s) Oral daily  voriconazole 200 milliGRAM(s) Oral every 12 hours    MEDICATIONS  (PRN):  acetaminophen     Tablet .. 650 milliGRAM(s) Oral every 6 hours PRN Temp greater or equal to 38C (100.4F), Mild Pain (1 - 3)  aluminum hydroxide/magnesium hydroxide/simethicone Suspension 30 milliLiter(s) Oral every 4 hours PRN Dyspepsia  benzonatate 100 milliGRAM(s) Oral three times a day PRN Cough  dextrose Oral Gel 15 Gram(s) Oral once PRN Blood Glucose LESS THAN 70 milliGRAM(s)/deciliter  melatonin 3 milliGRAM(s) Oral at bedtime PRN Insomnia  ondansetron Injectable 4 milliGRAM(s) IV Push every 8 hours PRN Nausea and/or Vomiting    Physical Exam  T(C): 36.9 (11-05-23 @ 09:02), Max: 36.9 (11-05-23 @ 09:02)  HR: 76 (11-05-23 @ 09:26) (76 - 116)  BP: 139/60 (11-05-23 @ 08:09) (120/62 - 163/83)  RR: 22 (11-05-23 @ 08:09) (18 - 32)  SpO2: 95% (11-05-23 @ 08:09) (93% - 99%)  Gen: Alert, oriented, no distress, on hf nc  HEENT: Anicteric sclera, moist mucous membranes  Cardio: Regular rhythm and rate, normal S1S2, no murmurs  Resp: Crackles, congested  GI: Nontender, nondistended, normoactive bowel sounds  Ext: No cyanosis, clubbing or edema  Neuro: Nonfocal    Labs:                        8.8    17.35 )-----------( 274      ( 05 Nov 2023 05:52 )             26.2     11-05    131<L>  |  100  |  49<H>  ----------------------------<  219<H>  4.4   |  26  |  1.55<H>    Ca    8.6      05 Nov 2023 05:52  Phos  3.4     11-05  Mg     2.3     11-05    TPro  6.5  /  Alb  1.8<L>  /  TBili  0.4  /  DBili  x   /  AST  55<H>  /  ALT  62  /  AlkPhos  135<H>  11-04      Urinalysis Basic - ( 05 Nov 2023 05:52 )    Color: x / Appearance: x / SG: x / pH: x  Gluc: 219 mg/dL / Ketone: x  / Bili: x / Urobili: x   Blood: x / Protein: x / Nitrite: x   Leuk Esterase: x / RBC: x / WBC x   Sq Epi: x / Non Sq Epi: x / Bacteria: x    < from: CT Chest No Cont (11.02.23 @ 16:04) >  ACC: [id number] EXAM:  CT CHEST   ORDERED BY: SARAH YOO     PROCEDURE DATE:  11/02/2023          INTERPRETATION:  INDICATION: Shortness of breath, cough, lung cancer   history, last chemotherapy and radiation treatment in May 2023    TECHNIQUE: Helical acquisition images of the chest without intravenous   contrast. Maximum intensity projection images were generated.    COMPARISON: 12/29/2020 chest x-ray.    FINDINGS:    LUNGS/AIRWAYS/PLEURA: Right upper lobectomy. Right lower lobe   multiloculated thick-walled 11 cm cavity (best seen in its entirety on   601-59) containing fluid and spongelike material. Consolidation within   the middle lobe and inferior aspect of the right lower lobe with angular   borders suggesting prior radiation. Multiple indistinct nodules of   varying density in the right lower and middle lobes, mostly adjacent to   the cavity. Small branching opacities in the peripheral left upper and   lower lobes, compatible with distal airway impaction. Trace left pleural   effusion. Mild right apical pleural thickening.    LYMPH NODES/MEDIASTINUM: No lymphadenopathy.    HEART/VASCULATURE: Normal heart size. Unremarkable pericardium. Coronary   artery calcifications. Normal caliber aorta.    UPPER ABDOMEN: Unremarkable.    BONES/SOFT TISSUES: Old sternal fracture. Mild loss of height of the L1   vertebral body.      IMPRESSION:    Right upper lobectomy.    Larger right lower lobe thick-walled cavity which may be infectious   and/or residua of reported treated tumor. 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    Consolidation within the right lower and middle lobe compatible with   radiation change.    Multiple indistinct nodules in the right lungwhich may be infectious or   radiation pneumonitis.

## 2023-11-05 NOTE — PROGRESS NOTE ADULT - ASSESSMENT
83y old Male with PMH HTN, HPL, Chronic anemia, COPD, lung CA [place] [facility] [place] chemotherapy and RT 05/2023, not currently receiving chemo treatment), s/p right upper lobectomy, AAA, Hypothyroidism, CKD stage IIIa referred to me to ed for continued sob, cough despite abx treatment found to have large lower lobe thick walled cavity  1. 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 discussed with wife.  -started on voriconazole. surgery is more defitive therapy however he is high surgical risk  -review imaging from [facility] supplied by wife  -discussed bronch with wife. consult thoracic  2. pneumonia: continue levaquin  -supplemental o2  3. copd: continue trelegy  4. ? radiation induced pneumonitis: continue methylprednisolone    spoke to dr petit and patients wife  over 35 min spent in care of patient

## 2023-11-05 NOTE — CONSULT NOTE ADULT - SUBJECTIVE AND OBJECTIVE BOX
HPI:  Chief Complaint: shortness of breath.    The patient is a 83y Male with significant PMH of HTN, HPL, Chronic anemia, COPD, lung [place] [facility] [place], not currently receiving chemo treatment), s/p right upper lobectomy, AAA, Hypothyroidism, CKD stage IIIa and others has been in ED by his pulmonologist with c/o shortness of breath, dyspnea on minimal exertion, and cough. Reportedly, he had CT of the chest performed 10/3 which demonstrated consolidation, was started on augmentin/prednisone/azithro 10/6 x 1 week. Pt completed course of medications however wife noted worsening cough and pt with fever Tmax of 103.9 on 10/26. Wife then took pt to pulmonologist again who started pt on second round of the same medications, instructed wife to stop giving pt tylenol wife notes fevers self-resolved. Since then pt decreased energy with episode of being unsteady on feet yesterday. Today is 6th day of taking medications, had f/u scheduled with pulmonologist today who sent pt to ED for eval.  He feels tired and weak. Denies chest pain, palpitation, N/V, abdominal pain, diarrhea or dysuria.  His Pulmonologist: Dr. Olivera, and [place] [facility] [place] Pinto/Dr. Mayes.  Vitals stable,  Sig labs: WBC 14.92 and N 91%, Lactate 1.5, Hb 9.2 at baseline, BUN 33, Cr 1.38, . LFTs wnl.  Troponin 15.2, pro-BNP 3027,   UA negative.  RVP panel negative.    CT chest: Right upper lobectomy.  Larger right lower lobe thick-walled cavity which may be infectious and/or residua of reported treated tumor. Spongelike material within the cavity can be seen with aspergilloma (prior to fungus ball formation).   Consolidation within the right lower and middle lobe compatible with radiation change. Multiple indistinct nodules in the right lung which may be infectious or radiation pneumonitis.    Rocephin 1gm IV, Solumedrol 125 mg IV and Duoneb given in ED.   (02 Nov 2023 22:52)      PAST MEDICAL & SURGICAL HISTORY:  COPD (chronic obstructive pulmonary disease)      Lung cancer      Anemia of chronic disease      CKD (chronic kidney disease), stage III      Hypothyroidism      AAA (abdominal aortic aneurysm)      HTN (hypertension)      HLD (hyperlipidemia)      Thrombocytopenia      S/P lobectomy of lung          REVIEW OF SYSTEMS      General:No Weight change/ Fatigue/ HA/Dizzy	    Skin/Breast: No Rashes/ Lesions/ Masses  	  Ophthalmologic: No Blurry vision/ Glaucoma/ Blindness  	  ENMT: No Hearing loss/ Drainage/ Lesions	    Respiratory and Thorax: No Cough/ Wheezing/ SOB/ Hemoptysis/ Sputum production  	  Cardiovascular: No Chest pain/ Palpitations/ Diaphoresis	    Gastrointestinal: No Nausea/ Vomiting/ Constipation/ Appetite Change	    Genitourinary: No Heamturia/ Dysuria/ Frequency change/ Impotence	    Musculoskeletal: No Pain/ Weakness/ Claudication	    Neurological: No Seizures/ TIA/CVA/ Parastesias	    Psychiatric: No Dementia/ Depression/ SI/HI	    Hematology/Lymphatics: No hx of bleeding/ Edema	    Endocrine:	No Hyperglycemia/ Hypoglycemia    Allergic/Immunologic:	 No Anaphylaxis/ Intolerance/ Recent illnesses    MEDICATIONS  (STANDING):  albuterol/ipratropium for Nebulization 3 milliLiter(s) Nebulizer every 6 hours  atorvastatin 40 milliGRAM(s) Oral at bedtime  dextrose 5%. 1000 milliLiter(s) (50 mL/Hr) IV Continuous <Continuous>  dextrose 5%. 1000 milliLiter(s) (100 mL/Hr) IV Continuous <Continuous>  dextrose 50% Injectable 25 Gram(s) IV Push once  dextrose 50% Injectable 12.5 Gram(s) IV Push once  dextrose 50% Injectable 25 Gram(s) IV Push once  fluticasone furoate/umeclidinium/vilanterol 100-62.5-25 MICROgram(s) Inhaler 1 Puff(s) Inhalation daily  folic acid 1 milliGRAM(s) Oral daily  glucagon  Injectable 1 milliGRAM(s) IntraMuscular once  heparin   Injectable 5000 Unit(s) SubCutaneous every 8 hours  insulin lispro (ADMELOG) corrective regimen sliding scale   SubCutaneous Before meals and at bedtime  levoFLOXacin IVPB      levoFLOXacin IVPB 500 milliGRAM(s) IV Intermittent every 24 hours  levothyroxine 25 MICROGram(s) Oral daily  losartan 50 milliGRAM(s) Oral daily  methylPREDNISolone sodium succinate Injectable 20 milliGRAM(s) IV Push every 12 hours  multivitamin 1 Tablet(s) Oral daily  voriconazole 200 milliGRAM(s) Oral every 12 hours    MEDICATIONS  (PRN):  acetaminophen     Tablet .. 650 milliGRAM(s) Oral every 6 hours PRN Temp greater or equal to 38C (100.4F), Mild Pain (1 - 3)  aluminum hydroxide/magnesium hydroxide/simethicone Suspension 30 milliLiter(s) Oral every 4 hours PRN Dyspepsia  benzonatate 100 milliGRAM(s) Oral three times a day PRN Cough  dextrose Oral Gel 15 Gram(s) Oral once PRN Blood Glucose LESS THAN 70 milliGRAM(s)/deciliter  melatonin 3 milliGRAM(s) Oral at bedtime PRN Insomnia  ondansetron Injectable 4 milliGRAM(s) IV Push every 8 hours PRN Nausea and/or Vomiting      Allergies    penicillin (Unknown)    Intolerances        SOCIAL HISTORY:    FAMILY HISTORY:      Vital Signs Last 24 Hrs  T(C): 36.9 (05 Nov 2023 09:02), Max: 36.9 (05 Nov 2023 09:02)  T(F): 98.5 (05 Nov 2023 09:02), Max: 98.5 (05 Nov 2023 09:02)  HR: 113 (05 Nov 2023 13:15) (76 - 116)  BP: 142/68 (05 Nov 2023 13:15) (120/62 - 155/72)  BP(mean): 88 (05 Nov 2023 13:15) (78 - 107)  RR: 29 (05 Nov 2023 13:15) (22 - 32)  SpO2: 94% (05 Nov 2023 13:15) (92% - 97%)    Parameters below as of 05 Nov 2023 13:15  Patient On (Oxygen Delivery Method): room air        General:  General: Awake and alert, comfortably breathing   HEENT: Pupils equal, reactive to light.  Symmetric.  PULM: Coarse breath sounds, R side   NECK: Supple, no lymphadenopathy, trachea midline  CVS: Regular rate and rhythm, no murmurs, rubs, or gallops  ABD: Soft, nondistended, nontender, normoactive bowel sounds, no masses  SKIN: Warm and well perfused  NEURO: Alert, oriented  Tubes:    LABS:                        8.8    17.35 )-----------( 274      ( 05 Nov 2023 05:52 )             26.2     11-05    131<L>  |  100  |  49<H>  ----------------------------<  219<H>  4.4   |  26  |  1.55<H>    Ca    8.6      05 Nov 2023 05:52  Phos  3.4     11-05  Mg     2.3     11-05    TPro  6.5  /  Alb  1.8<L>  /  TBili  0.4  /  DBili  x   /  AST  55<H>  /  ALT  62  /  AlkPhos  135<H>  11-04      Urinalysis Basic - ( 05 Nov 2023 05:52 )    Color: x / Appearance: x / SG: x / pH: x  Gluc: 219 mg/dL / Ketone: x  / Bili: x / Urobili: x   Blood: x / Protein: x / Nitrite: x   Leuk Esterase: x / RBC: x / WBC x   Sq Epi: x / Non Sq Epi: x / Bacteria: x        RADIOLOGY & ADDITIONAL STUDIES:    ASSESSMENT:   83yMale  PLAN:

## 2023-11-05 NOTE — CONSULT NOTE ADULT - ASSESSMENT
Mr. Friedel is a 82 YO M w/pmhx of mild dementia, HTN, Chronic anemia, COPD, lung CA [place] at [facility] [place] (last chemotherapy and RT 05/2023, not currently receiving chemo treatment), s/p right upper lobectomy, AAA, Hypothyroidism, CKD stage IIIa, recently was being treated outpatient for pneumonia w/Augmentin, azithromycin, and Prednisone. Patient had CT chest done at that time. Patient was sent to ED by pulmonologist, Dr. Olivera. 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 Patient was placed on Levaquin and admitted to the hospital. Patient tonight was an RRT for AHRF requiring 100% NRB (previously on RA), cough, SOB, diaphoresis. Patient was upgraded to SICU for NIPPV. Patient's chest xray on R side seems a little bit worse. Unsure how long this possible aspergilloma has been around given we do not have access to imaging      Imp/Plan:  1) Right sided Lung CA, Treated at [facility] [place]. Now S/P Lobectomy, Chemo and RT. Presents with Hypoxic Resp. failure, related to radiation pneumonitis and Thick cavitary lesion noted on CT scan. Concern for Aspergilloma. Started on Vorconazole and remains on steroids. Asked to evaluate with bronchoscopy and possible surgical intervention.     KARL Engle

## 2023-11-05 NOTE — PROGRESS NOTE ADULT - SUBJECTIVE AND OBJECTIVE BOX
Date of service: 11-05-23 @ 12:10    Sitting in bed in NAD  Started on PO voriconazole by ICU team  No SOB at present  Has dry cough    ROS: no fever or chills; denies dizziness, no HA, no abdominal pain, no diarrhea or constipation; no dysuria, no legs pain, no rashes    MEDICATIONS  (STANDING):  albuterol/ipratropium for Nebulization 3 milliLiter(s) Nebulizer every 6 hours  atorvastatin 40 milliGRAM(s) Oral at bedtime  dextrose 5%. 1000 milliLiter(s) (50 mL/Hr) IV Continuous <Continuous>  dextrose 5%. 1000 milliLiter(s) (100 mL/Hr) IV Continuous <Continuous>  dextrose 50% Injectable 25 Gram(s) IV Push once  dextrose 50% Injectable 12.5 Gram(s) IV Push once  dextrose 50% Injectable 25 Gram(s) IV Push once  fluticasone furoate/umeclidinium/vilanterol 100-62.5-25 MICROgram(s) Inhaler 1 Puff(s) Inhalation daily  folic acid 1 milliGRAM(s) Oral daily  glucagon  Injectable 1 milliGRAM(s) IntraMuscular once  heparin   Injectable 5000 Unit(s) SubCutaneous every 8 hours  insulin lispro (ADMELOG) corrective regimen sliding scale   SubCutaneous Before meals and at bedtime  levoFLOXacin IVPB      levoFLOXacin IVPB 500 milliGRAM(s) IV Intermittent every 24 hours  levothyroxine 25 MICROGram(s) Oral daily  losartan 50 milliGRAM(s) Oral daily  methylPREDNISolone sodium succinate Injectable 40 milliGRAM(s) IV Push every 12 hours  multivitamin 1 Tablet(s) Oral daily  voriconazole 200 milliGRAM(s) Oral every 12 hours    Vital Signs Last 24 Hrs  T(C): 36.9 (05 Nov 2023 09:02), Max: 36.9 (05 Nov 2023 09:02)  T(F): 98.5 (05 Nov 2023 09:02), Max: 98.5 (05 Nov 2023 09:02)  HR: 102 (05 Nov 2023 10:00) (76 - 116)  BP: 120/65 (05 Nov 2023 10:00) (120/62 - 155/72)  BP(mean): 81 (05 Nov 2023 10:00) (78 - 107)  RR: 27 (05 Nov 2023 10:00) (22 - 32)  SpO2: 92% (05 Nov 2023 10:00) (92% - 99%)    Parameters below as of 05 Nov 2023 10:00  Patient On (Oxygen Delivery Method): room air  O2 Flow (L/min): 80       Physical exam:    Constitutional:  No acute distress  HEENT: NC/AT, EOMI, PERRLA, conjunctivae clear; ears and nose atraumatic  Neck: supple; thyroid not palpable  Back: no tenderness  Respiratory: respiratory effort normal; crackles at bases  Cardiovascular: S1S2 regular, no murmurs  Abdomen: soft, not tender, not distended, positive BS  Genitourinary: no suprapubic tenderness  Lymphatic: no LN palpable  Musculoskeletal: no muscle tenderness, no joint swelling or tenderness  Extremities: no pedal edema  Neurological/ Psychiatric: AxOx3, moving all extremities  Skin: no rashes; no palpable lesions    Labs: reviewed                        8.8    17.35 )-----------( 274      ( 05 Nov 2023 05:52 )             26.2     11-05    131<L>  |  100  |  49<H>  ----------------------------<  219<H>  4.4   |  26  |  1.55<H>    Ca    8.6      05 Nov 2023 05:52  Phos  3.4     11-05  Mg     2.3     11-05    TPro  6.5  /  Alb  1.8<L>  /  TBili  0.4  /  DBili  x   /  AST  55<H>  /  ALT  62  /  AlkPhos  135<H>  11-04                        9.1    17.73 )-----------( 280      ( 04 Nov 2023 04:21 )             27.5     11-04    130<L>  |  101  |  47<H>  ----------------------------<  182<H>  4.6   |  22  |  1.44<H>    Ca    8.5      04 Nov 2023 04:21  Phos  4.2     11-04  Mg     1.9     11-04    TPro  6.5  /  Alb  1.8<L>  /  TBili  0.4  /  DBili  x   /  AST  55<H>  /  ALT  62  /  AlkPhos  135<H>  11-04                        8.2    11.74 )-----------( 259      ( 03 Nov 2023 07:15 )             25.2     11-03    133<L>  |  102  |  35<H>  ----------------------------<  205<H>  4.4   |  22  |  1.20    Ca    8.3<L>      03 Nov 2023 07:15    TPro  6.9  /  Alb  2.0<L>  /  TBili  0.5  /  DBili  x   /  AST  38<H>  /  ALT  50  /  AlkPhos  130<H>  11-02     LIVER FUNCTIONS - ( 02 Nov 2023 14:47 )  Alb: 2.0 g/dL / Pro: 6.9 gm/dL / ALK PHOS: 130 U/L / ALT: 50 U/L / AST: 38 U/L / GGT: x           Urinalysis (11-02 @ 16:40)  Urine Appearance: Clear  Protein, Urine: 100 mg/dL  Urine Microscopic-Add On (NC) (11-02 @ 16:40)  White Blood Cell - Urine: 0 /HPF  Red Blood Cell - Urine: 0 /HPF    (11-02 @ 14:47)  NotDetec    Culture - Sputum (collected 03 Nov 2023 14:16)  Source: .Sputum Sputum  Gram Stain (03 Nov 2023 23:52):    Rare Squamous epithelial cells per low power field    No polymorphonuclear cells seen per low power field    Rare Gram positive cocci in pairs per oil power field    Rare Gram Negative Rods per oil power field  Preliminary Report (04 Nov 2023 10:45):    Normal Respiratory Marguerite present    Culture - Urine (collected 02 Nov 2023 16:40)  Source: Clean Catch Clean Catch (Midstream)  Final Report (03 Nov 2023 20:33):    No growth    Culture - Blood (collected 02 Nov 2023 14:47)  Source: .Blood Blood-Peripheral  Preliminary Report (03 Nov 2023 22:02):    No growth at 24 hours    Culture - Blood (collected 02 Nov 2023 14:47)  Source: .Blood Blood-Peripheral  Preliminary Report (03 Nov 2023 22:02):    No growth at 24 hours    Radiology: all available radiological tests reviewed    < from: CT Chest No Cont (11.02.23 @ 16:04) >  Right upper lobectomy.  Larger right lower lobe thick-walled cavity which may be infectious   and/or residua of reported treated tumor. 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  Consolidation within the right lower and middle lobe compatible with radiation change.  Multiple indistinct nodules in the right lungwhich may be infectious or radiation pneumonitis.  < end of copied text >    Advanced directives addressed: full resuscitation

## 2023-11-05 NOTE — PROGRESS NOTE ADULT - SUBJECTIVE AND OBJECTIVE BOX
Patient is a 83y old  Male who presents with a chief complaint of Right lung pneumonia with thick-walled cavity. (05 Nov 2023 12:06)    24 hour events:     Allergies    penicillin (Unknown)    Intolerances      REVIEW OF SYSTEMS: SEE BELOW       ICU Vital Signs Last 24 Hrs  T(C): 36.9 (05 Nov 2023 09:02), Max: 36.9 (05 Nov 2023 09:02)  T(F): 98.5 (05 Nov 2023 09:02), Max: 98.5 (05 Nov 2023 09:02)  HR: 113 (05 Nov 2023 13:15) (76 - 116)  BP: 142/68 (05 Nov 2023 13:15) (120/62 - 155/72)  BP(mean): 88 (05 Nov 2023 13:15) (78 - 107)  ABP: --  ABP(mean): --  RR: 29 (05 Nov 2023 13:15) (22 - 32)  SpO2: 94% (05 Nov 2023 13:15) (92% - 99%)    O2 Parameters below as of 05 Nov 2023 13:15  Patient On (Oxygen Delivery Method): room air            CAPILLARY BLOOD GLUCOSE      POCT Blood Glucose.: 262 mg/dL (05 Nov 2023 11:49)  POCT Blood Glucose.: 223 mg/dL (05 Nov 2023 07:45)  POCT Blood Glucose.: 399 mg/dL (04 Nov 2023 21:28)  POCT Blood Glucose.: 439 mg/dL (04 Nov 2023 21:26)  POCT Blood Glucose.: 372 mg/dL (04 Nov 2023 16:54)      I&O's Summary    04 Nov 2023 08:01  -  05 Nov 2023 07:00  --------------------------------------------------------  IN: 103 mL / OUT: 2025 mL / NET: -1922 mL    05 Nov 2023 07:01  -  05 Nov 2023 13:23  --------------------------------------------------------  IN: 0 mL / OUT: 220 mL / NET: -220 mL            MEDICATIONS  (STANDING):  albuterol/ipratropium for Nebulization 3 milliLiter(s) Nebulizer every 6 hours  atorvastatin 40 milliGRAM(s) Oral at bedtime  dextrose 5%. 1000 milliLiter(s) (50 mL/Hr) IV Continuous <Continuous>  dextrose 5%. 1000 milliLiter(s) (100 mL/Hr) IV Continuous <Continuous>  dextrose 50% Injectable 25 Gram(s) IV Push once  dextrose 50% Injectable 12.5 Gram(s) IV Push once  dextrose 50% Injectable 25 Gram(s) IV Push once  fluticasone furoate/umeclidinium/vilanterol 100-62.5-25 MICROgram(s) Inhaler 1 Puff(s) Inhalation daily  folic acid 1 milliGRAM(s) Oral daily  glucagon  Injectable 1 milliGRAM(s) IntraMuscular once  heparin   Injectable 5000 Unit(s) SubCutaneous every 8 hours  insulin lispro (ADMELOG) corrective regimen sliding scale   SubCutaneous Before meals and at bedtime  levoFLOXacin IVPB      levoFLOXacin IVPB 500 milliGRAM(s) IV Intermittent every 24 hours  levothyroxine 25 MICROGram(s) Oral daily  losartan 50 milliGRAM(s) Oral daily  methylPREDNISolone sodium succinate Injectable 40 milliGRAM(s) IV Push every 12 hours  multivitamin 1 Tablet(s) Oral daily  voriconazole 200 milliGRAM(s) Oral every 12 hours      MEDICATIONS  (PRN):  acetaminophen     Tablet .. 650 milliGRAM(s) Oral every 6 hours PRN Temp greater or equal to 38C (100.4F), Mild Pain (1 - 3)  aluminum hydroxide/magnesium hydroxide/simethicone Suspension 30 milliLiter(s) Oral every 4 hours PRN Dyspepsia  benzonatate 100 milliGRAM(s) Oral three times a day PRN Cough  dextrose Oral Gel 15 Gram(s) Oral once PRN Blood Glucose LESS THAN 70 milliGRAM(s)/deciliter  melatonin 3 milliGRAM(s) Oral at bedtime PRN Insomnia  ondansetron Injectable 4 milliGRAM(s) IV Push every 8 hours PRN Nausea and/or Vomiting      PHYSICAL EXAM: SEE BELOW                          8.8    17.35 )-----------( 274      ( 05 Nov 2023 05:52 )             26.2       11-05    131<L>  |  100  |  49<H>  ----------------------------<  219<H>  4.4   |  26  |  1.55<H>    Ca    8.6      05 Nov 2023 05:52  Phos  3.4     11-05  Mg     2.3     11-05    TPro  6.5  /  Alb  1.8<L>  /  TBili  0.4  /  DBili  x   /  AST  55<H>  /  ALT  62  /  AlkPhos  135<H>  11-04            Urinalysis Basic - ( 05 Nov 2023 05:52 )    Color: x / Appearance: x / SG: x / pH: x  Gluc: 219 mg/dL / Ketone: x  / Bili: x / Urobili: x   Blood: x / Protein: x / Nitrite: x   Leuk Esterase: x / RBC: x / WBC x   Sq Epi: x / Non Sq Epi: x / Bacteria: x      .Blood None   No growth at 24 hours -- 11-04 @ 04:21  .Sputum Sputum   Normal Respiratory Marguerite present   Rare Squamous epithelial cells per low power field  No polymorphonuclear cells seen per low power field  Rare Gram positive cocci in pairs per oil power field  Rare Gram Negative Rods per oil power field 11-03 @ 14:16  Clean Catch Clean Catch (Midstream)   No growth -- 11-02 @ 16:40  .Blood Blood-Peripheral   No growth at 48 Hours -- 11-02 @ 14:47      Rapid RVP Result: NotDetec (11-02 @ 14:47)

## 2023-11-06 LAB
1,3 BETA GLUCAN SER QL: NEGATIVE — SIGNIFICANT CHANGE UP
1,3 BETA GLUCAN SER QL: NEGATIVE — SIGNIFICANT CHANGE UP
ANION GAP SERPL CALC-SCNC: 7 MMOL/L — SIGNIFICANT CHANGE UP (ref 5–17)
ANION GAP SERPL CALC-SCNC: 7 MMOL/L — SIGNIFICANT CHANGE UP (ref 5–17)
BUN SERPL-MCNC: 71 MG/DL — HIGH (ref 7–23)
BUN SERPL-MCNC: 71 MG/DL — HIGH (ref 7–23)
CALCIUM SERPL-MCNC: 8.5 MG/DL — SIGNIFICANT CHANGE UP (ref 8.5–10.1)
CALCIUM SERPL-MCNC: 8.5 MG/DL — SIGNIFICANT CHANGE UP (ref 8.5–10.1)
CHLORIDE SERPL-SCNC: 102 MMOL/L — SIGNIFICANT CHANGE UP (ref 96–108)
CHLORIDE SERPL-SCNC: 102 MMOL/L — SIGNIFICANT CHANGE UP (ref 96–108)
CO2 SERPL-SCNC: 25 MMOL/L — SIGNIFICANT CHANGE UP (ref 22–31)
CO2 SERPL-SCNC: 25 MMOL/L — SIGNIFICANT CHANGE UP (ref 22–31)
CREAT SERPL-MCNC: 1.75 MG/DL — HIGH (ref 0.5–1.3)
CREAT SERPL-MCNC: 1.75 MG/DL — HIGH (ref 0.5–1.3)
EGFR: 38 ML/MIN/1.73M2 — LOW
EGFR: 38 ML/MIN/1.73M2 — LOW
FUNGITELL: <31 PG/ML — SIGNIFICANT CHANGE UP
FUNGITELL: <31 PG/ML — SIGNIFICANT CHANGE UP
GLUCOSE BLDC GLUCOMTR-MCNC: 237 MG/DL — HIGH (ref 70–99)
GLUCOSE BLDC GLUCOMTR-MCNC: 237 MG/DL — HIGH (ref 70–99)
GLUCOSE BLDC GLUCOMTR-MCNC: 264 MG/DL — HIGH (ref 70–99)
GLUCOSE BLDC GLUCOMTR-MCNC: 264 MG/DL — HIGH (ref 70–99)
GLUCOSE BLDC GLUCOMTR-MCNC: 292 MG/DL — HIGH (ref 70–99)
GLUCOSE BLDC GLUCOMTR-MCNC: 292 MG/DL — HIGH (ref 70–99)
GLUCOSE BLDC GLUCOMTR-MCNC: 386 MG/DL — HIGH (ref 70–99)
GLUCOSE BLDC GLUCOMTR-MCNC: 386 MG/DL — HIGH (ref 70–99)
GLUCOSE SERPL-MCNC: 217 MG/DL — HIGH (ref 70–99)
GLUCOSE SERPL-MCNC: 217 MG/DL — HIGH (ref 70–99)
HCT VFR BLD CALC: 25.2 % — LOW (ref 39–50)
HCT VFR BLD CALC: 25.2 % — LOW (ref 39–50)
HGB BLD-MCNC: 8.4 G/DL — LOW (ref 13–17)
HGB BLD-MCNC: 8.4 G/DL — LOW (ref 13–17)
MAGNESIUM SERPL-MCNC: 2.5 MG/DL — SIGNIFICANT CHANGE UP (ref 1.6–2.6)
MAGNESIUM SERPL-MCNC: 2.5 MG/DL — SIGNIFICANT CHANGE UP (ref 1.6–2.6)
MCHC RBC-ENTMCNC: 31.2 PG — SIGNIFICANT CHANGE UP (ref 27–34)
MCHC RBC-ENTMCNC: 31.2 PG — SIGNIFICANT CHANGE UP (ref 27–34)
MCHC RBC-ENTMCNC: 33.3 GM/DL — SIGNIFICANT CHANGE UP (ref 32–36)
MCHC RBC-ENTMCNC: 33.3 GM/DL — SIGNIFICANT CHANGE UP (ref 32–36)
MCV RBC AUTO: 93.7 FL — SIGNIFICANT CHANGE UP (ref 80–100)
MCV RBC AUTO: 93.7 FL — SIGNIFICANT CHANGE UP (ref 80–100)
PHOSPHATE SERPL-MCNC: 3.5 MG/DL — SIGNIFICANT CHANGE UP (ref 2.5–4.5)
PHOSPHATE SERPL-MCNC: 3.5 MG/DL — SIGNIFICANT CHANGE UP (ref 2.5–4.5)
PLATELET # BLD AUTO: 247 K/UL — SIGNIFICANT CHANGE UP (ref 150–400)
PLATELET # BLD AUTO: 247 K/UL — SIGNIFICANT CHANGE UP (ref 150–400)
POTASSIUM SERPL-MCNC: 4.6 MMOL/L — SIGNIFICANT CHANGE UP (ref 3.5–5.3)
POTASSIUM SERPL-MCNC: 4.6 MMOL/L — SIGNIFICANT CHANGE UP (ref 3.5–5.3)
POTASSIUM SERPL-SCNC: 4.6 MMOL/L — SIGNIFICANT CHANGE UP (ref 3.5–5.3)
POTASSIUM SERPL-SCNC: 4.6 MMOL/L — SIGNIFICANT CHANGE UP (ref 3.5–5.3)
RBC # BLD: 2.69 M/UL — LOW (ref 4.2–5.8)
RBC # BLD: 2.69 M/UL — LOW (ref 4.2–5.8)
RBC # FLD: 15.7 % — HIGH (ref 10.3–14.5)
RBC # FLD: 15.7 % — HIGH (ref 10.3–14.5)
SODIUM SERPL-SCNC: 134 MMOL/L — LOW (ref 135–145)
SODIUM SERPL-SCNC: 134 MMOL/L — LOW (ref 135–145)
WBC # BLD: 17.7 K/UL — HIGH (ref 3.8–10.5)
WBC # BLD: 17.7 K/UL — HIGH (ref 3.8–10.5)
WBC # FLD AUTO: 17.7 K/UL — HIGH (ref 3.8–10.5)
WBC # FLD AUTO: 17.7 K/UL — HIGH (ref 3.8–10.5)

## 2023-11-06 PROCEDURE — 99221 1ST HOSP IP/OBS SF/LOW 40: CPT

## 2023-11-06 PROCEDURE — 99233 SBSQ HOSP IP/OBS HIGH 50: CPT

## 2023-11-06 RX ORDER — INSULIN GLARGINE 100 [IU]/ML
8 INJECTION, SOLUTION SUBCUTANEOUS AT BEDTIME
Refills: 0 | Status: DISCONTINUED | OUTPATIENT
Start: 2023-11-06 | End: 2023-11-10

## 2023-11-06 RX ADMIN — Medication 6: at 12:07

## 2023-11-06 RX ADMIN — Medication 3 MILLILITER(S): at 09:53

## 2023-11-06 RX ADMIN — Medication 20 MILLIGRAM(S): at 10:14

## 2023-11-06 RX ADMIN — Medication 4: at 08:32

## 2023-11-06 RX ADMIN — VORICONAZOLE 200 MILLIGRAM(S): 10 INJECTION, POWDER, LYOPHILIZED, FOR SOLUTION INTRAVENOUS at 10:14

## 2023-11-06 RX ADMIN — Medication 25 MICROGRAM(S): at 05:11

## 2023-11-06 RX ADMIN — Medication 3 MILLILITER(S): at 15:40

## 2023-11-06 RX ADMIN — Medication 10: at 17:10

## 2023-11-06 RX ADMIN — VORICONAZOLE 200 MILLIGRAM(S): 10 INJECTION, POWDER, LYOPHILIZED, FOR SOLUTION INTRAVENOUS at 21:06

## 2023-11-06 RX ADMIN — Medication 6: at 21:18

## 2023-11-06 RX ADMIN — Medication 1 MILLIGRAM(S): at 10:15

## 2023-11-06 RX ADMIN — ATORVASTATIN CALCIUM 40 MILLIGRAM(S): 80 TABLET, FILM COATED ORAL at 21:06

## 2023-11-06 RX ADMIN — HEPARIN SODIUM 5000 UNIT(S): 5000 INJECTION INTRAVENOUS; SUBCUTANEOUS at 21:07

## 2023-11-06 RX ADMIN — HEPARIN SODIUM 5000 UNIT(S): 5000 INJECTION INTRAVENOUS; SUBCUTANEOUS at 15:00

## 2023-11-06 RX ADMIN — Medication 1 TABLET(S): at 10:14

## 2023-11-06 RX ADMIN — LOSARTAN POTASSIUM 50 MILLIGRAM(S): 100 TABLET, FILM COATED ORAL at 10:14

## 2023-11-06 RX ADMIN — HEPARIN SODIUM 5000 UNIT(S): 5000 INJECTION INTRAVENOUS; SUBCUTANEOUS at 05:11

## 2023-11-06 RX ADMIN — Medication 650 MILLIGRAM(S): at 02:15

## 2023-11-06 RX ADMIN — FLUTICASONE FUROATE, UMECLIDINIUM BROMIDE AND VILANTEROL TRIFENATATE 1 PUFF(S): 200; 62.5; 25 POWDER RESPIRATORY (INHALATION) at 09:52

## 2023-11-06 RX ADMIN — Medication 20 MILLIGRAM(S): at 21:07

## 2023-11-06 RX ADMIN — Medication 650 MILLIGRAM(S): at 01:17

## 2023-11-06 RX ADMIN — INSULIN GLARGINE 8 UNIT(S): 100 INJECTION, SOLUTION SUBCUTANEOUS at 21:17

## 2023-11-06 RX ADMIN — Medication 3 MILLILITER(S): at 20:14

## 2023-11-06 NOTE — PROGRESS NOTE ADULT - ASSESSMENT
83y old Male with PMH HTN, HPL, Chronic anemia, COPD, [place] [facility] [place], not currently receiving chemo treatment), s/p right upper lobectomy, AAA, Hypothyroidism, CKD stage IIIa referred to me to ed for continued sob, cough despite abx treatment found to have large lower lobe thick walled cavity  1. 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 discussed with wife.  -started on voriconazole. surgery is more defitive therapy however he is high surgical risk  -reviewed imaging from [place] supplied by wife - it looks like he has had blebs in the past and these are what are infected - they look half filled with fluid with thicker borders  -discussed bronch with wife. consulted thoracic for bronch. will see my availability as well  2. pneumonia: continue levaquin  -supplemental o2  3. copd: continue trelegy  4. ? radiation induced pneumonitis: continue methylprednisolone

## 2023-11-06 NOTE — PHYSICAL THERAPY INITIAL EVALUATION ADULT - NSTOILETINGEQUIP_GEN_A_PT
Problem: Patient Care Overview  Goal: Plan of Care Review  Outcome: Ongoing (interventions implemented as appropriate)  HD treatment complete. Duration of treatment 3.5 hours and 1 L removed. Treatment was tolerated well and no complications with access to right chest wall catheter. Catheter flushed with NS and locked with heparin. Capped and taped.        raised toilet seat

## 2023-11-06 NOTE — PHYSICAL THERAPY INITIAL EVALUATION ADULT - LEVEL OF INDEPENDENCE: GAIT, REHAB EVAL
transfer/gait/ADL training/bed mob 15'/minimum assist (75% patients effort)/moderate assist (50% patients effort)

## 2023-11-06 NOTE — PROGRESS NOTE ADULT - SUBJECTIVE AND OBJECTIVE BOX
Subjective: Pt in bed NAD no issues. Wife at bedside     T(C): 36.7 (11-06-23 @ 09:11), Max: 36.9 (11-05-23 @ 20:15)  HR: 105 (11-06-23 @ 09:11) (103 - 117)  BP: 135/74 (11-06-23 @ 09:11) (115/50 - 164/56)  ABP: --  ABP(mean): --  RR: 17 (11-06-23 @ 09:11) (17 - 18)  SpO2: 93% (11-06-23 @ 09:11) (93% - 94%)  Wt(kg): --  CVP(mm Hg): --  CO: --  CI: --  PA: --                                              Tele: SR               11-06    134<L>  |  102  |  71<H>  ----------------------------<  217<H>  4.6   |  25  |  1.75<H>    Ca    8.5      06 Nov 2023 06:45  Phos  3.5     11-06  Mg     2.5     11-06                                 8.4    17.70 )-----------( 247      ( 06 Nov 2023 06:45 )             25.2                 CAPILLARY BLOOD GLUCOSE      POCT Blood Glucose.: 264 mg/dL (06 Nov 2023 12:02)  POCT Blood Glucose.: 237 mg/dL (06 Nov 2023 08:27)  POCT Blood Glucose.: 315 mg/dL (05 Nov 2023 22:12)  POCT Blood Glucose.: 347 mg/dL (05 Nov 2023 17:12)         < from: CT Chest No Cont (11.02.23 @ 16:04) >  IMPRESSION:    Right upper lobectomy.    Larger right lower lobe thick-walled cavity which may be infectious   and/or residua of reported treated tumor. 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    Consolidation within the right lower and middle lobe compatible with   radiation change.    Multiple indistinct nodules in the right lungwhich may be infectious or   radiation pneumonitis.    < end of copied text >            Exam   Neuro: Alert Awake, mild dementia   Pulm:  decreased b/l   CV: RRR   Abd:  Soft   Extremities:  warm         Assessment:  83yMale    with PAST MEDICAL & SURGICAL HISTORY:  COPD (chronic obstructive pulmonary disease)      Lung cancer      Anemia of chronic disease      CKD (chronic kidney disease), stage III      Hypothyroidism      AAA (abdominal aortic aneurysm)      HTN (hypertension)      HLD (hyperlipidemia)      Thrombocytopenia      S/P lobectomy of lung            Plan:

## 2023-11-06 NOTE — PROGRESS NOTE ADULT - SUBJECTIVE AND OBJECTIVE BOX
Subjective:  moved to 1n  breathing ok  discussed bronch  I brought msk disc to be uploaded by radiology    Review of Systems:  All 10 systems reviewed in detailed and found to be negative with the exception of what has already been described above    Allergies:  penicillin (Unknown)    Meds  MEDICATIONS  (STANDING):  albuterol/ipratropium for Nebulization 3 milliLiter(s) Nebulizer every 6 hours  atorvastatin 40 milliGRAM(s) Oral at bedtime  dextrose 5%. 1000 milliLiter(s) (100 mL/Hr) IV Continuous <Continuous>  dextrose 5%. 1000 milliLiter(s) (50 mL/Hr) IV Continuous <Continuous>  dextrose 50% Injectable 25 Gram(s) IV Push once  dextrose 50% Injectable 12.5 Gram(s) IV Push once  dextrose 50% Injectable 25 Gram(s) IV Push once  fluticasone furoate/umeclidinium/vilanterol 100-62.5-25 MICROgram(s) Inhaler 1 Puff(s) Inhalation daily  folic acid 1 milliGRAM(s) Oral daily  glucagon  Injectable 1 milliGRAM(s) IntraMuscular once  heparin   Injectable 5000 Unit(s) SubCutaneous every 8 hours  insulin glargine Injectable (LANTUS) 8 Unit(s) SubCutaneous at bedtime  insulin lispro (ADMELOG) corrective regimen sliding scale   SubCutaneous Before meals and at bedtime  levoFLOXacin IVPB      levoFLOXacin IVPB 500 milliGRAM(s) IV Intermittent every 24 hours  levothyroxine 25 MICROGram(s) Oral daily  losartan 50 milliGRAM(s) Oral daily  methylPREDNISolone sodium succinate Injectable 20 milliGRAM(s) IV Push every 12 hours  multivitamin 1 Tablet(s) Oral daily  voriconazole 200 milliGRAM(s) Oral every 12 hours    MEDICATIONS  (PRN):  acetaminophen     Tablet .. 650 milliGRAM(s) Oral every 6 hours PRN Temp greater or equal to 38C (100.4F), Mild Pain (1 - 3)  aluminum hydroxide/magnesium hydroxide/simethicone Suspension 30 milliLiter(s) Oral every 4 hours PRN Dyspepsia  benzonatate 100 milliGRAM(s) Oral three times a day PRN Cough  dextrose Oral Gel 15 Gram(s) Oral once PRN Blood Glucose LESS THAN 70 milliGRAM(s)/deciliter  melatonin 3 milliGRAM(s) Oral at bedtime PRN Insomnia  ondansetron Injectable 4 milliGRAM(s) IV Push every 8 hours PRN Nausea and/or Vomiting    Physical Exam  T(C): 37.2 (11-06-23 @ 16:05), Max: 37.2 (11-06-23 @ 16:05)  HR: 110 (11-06-23 @ 16:05) (105 - 110)  BP: 146/64 (11-06-23 @ 16:05) (115/50 - 146/64)  RR: 18 (11-06-23 @ 16:05) (17 - 18)  SpO2: 94% (11-06-23 @ 16:05) (93% - 94%)  Gen: Alert, oriented, no distress, on hf nc  HEENT: Anicteric sclera, moist mucous membranes  Cardio: Regular rhythm and rate, normal S1S2, no murmurs  Resp: Crackles, congested  GI: Nontender, nondistended, normoactive bowel sounds  Ext: No cyanosis, clubbing or edema  Neuro: Nonfocal    Labs:                        8.4    17.70 )-----------( 247      ( 06 Nov 2023 06:45 )             25.2     11-06    134<L>  |  102  |  71<H>  ----------------------------<  217<H>  4.6   |  25  |  1.75<H>    Ca    8.5      06 Nov 2023 06:45  Phos  3.5     11-06  Mg     2.5     11-06        Urinalysis Basic - ( 06 Nov 2023 06:45 )    Color: x / Appearance: x / SG: x / pH: x  Gluc: 217 mg/dL / Ketone: x  / Bili: x / Urobili: x   Blood: x / Protein: x / Nitrite: x   Leuk Esterase: x / RBC: x / WBC x   Sq Epi: x / Non Sq Epi: x / Bacteria: x    < from: CT Chest No Cont (11.02.23 @ 16:04) >  ACC: [id number] EXAM:  CT CHEST   ORDERED BY: SARAH YOO     PROCEDURE DATE:  11/02/2023          INTERPRETATION:  INDICATION: Shortness of breath, cough, lung cancer   history, last chemotherapy and radiation treatment in May 2023    TECHNIQUE: Helical acquisition images of the chest without intravenous   contrast. Maximum intensity projection images were generated.    COMPARISON: 12/29/2020 chest x-ray.    FINDINGS:    LUNGS/AIRWAYS/PLEURA: Right upper lobectomy. Right lower lobe   multiloculated thick-walled 11 cm cavity (best seen in its entirety on   601-59) containing fluid and spongelike material. Consolidation within   the middle lobe and inferior aspect of the right lower lobe with angular   borders suggesting prior radiation. Multiple indistinct nodules of   varying density in the right lower and middle lobes, mostly adjacent to   the cavity. Small branching opacities in the peripheral left upper and   lower lobes, compatible with distal airway impaction. Trace left pleural   effusion. Mild right apical pleural thickening.    LYMPH NODES/MEDIASTINUM: No lymphadenopathy.    HEART/VASCULATURE: Normal heart size. Unremarkable pericardium. Coronary   artery calcifications. Normal caliber aorta.    UPPER ABDOMEN: Unremarkable.    BONES/SOFT TISSUES: Old sternal fracture. Mild loss of height of the L1   vertebral body.      IMPRESSION:    Right upper lobectomy.    Larger right lower lobe thick-walled cavity which may be infectious   and/or residua of reported treated tumor. 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    Consolidation within the right lower and middle lobe compatible with   radiation change.    Multiple indistinct nodules in the right lungwhich may be infectious or   radiation pneumonitis.    ct naveed 10/3 uploaded and reviewed

## 2023-11-06 NOTE — PROGRESS NOTE ADULT - ASSESSMENT
Mr. Friedel is a 84 YO M w/pmhx of mild dementia, HTN, Chronic anemia, COPD, lung CA follows at [facility] [place] (last chemotherapy and RT 05/2023, not currently receiving chemo treatment), s/p right upper lobectomy, AAA, Hypothyroidism, CKD stage IIIa, recently was being treated outpatient for pneumonia w/Augmentin, azithromycin, and Prednisone. Patient had CT chest done at that time. Patient was sent to ED by pulmonologist, Dr. Olivera. 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 Patient was placed on Levaquin and admitted to the hospital. Patient tonight was an RRT for AHRF requiring 100% NRB (previously on RA), cough, SOB, diaphoresis. Patient was upgraded to SICU for NIPPV. Patient's chest xray on R side seems a little bit worse. Unsure how long this possible aspergilloma has been around given we do not have access to imaging      Plan  Awaiting imaging to be uploaded from [facility] [place] ( Dr Sheldon had)  Spoke to wife in length- would like to have Bronch done   Thoracic surgery not available til Thursday   Reached out to Dr Sheldon to see if he would be avail sooner   made NPO incase Dr Deleon can come in afternoon to preform Bronch tomorrow  Cont care as per primary team   Cont Vfend and Levaquin as ordered   [facility] Thoracic team in am rounds

## 2023-11-06 NOTE — PROGRESS NOTE ADULT - SUBJECTIVE AND OBJECTIVE BOX
83-year-old M with PMHx HTN, HLD, chronic anemia, COPD, lung CA s/p RUL lobectomy (follows at [facility], last chemotherapy/RT 5/2023, not currently on treatment), AAA, hypothyroidism, CKD stage IIIa sent in to ED by pulmonologist MD Olivera with c/o SOB, dyspnea on minimal exertion, and cough. Pt had outpatient CT of chest performed 10/3 which demonstrated consolidation- was started on Augmentin/Prednisone/Azithromycin 10/6 x 1 week. Pt completed full course of medication however wife noted worsening cough and pt with fever TMax 103.9 on 10/26. Wife then took pt to pulm office again who began 2nd round of same medications. Fevers self resolved. Since 2nd round pt with decreased energy and episode of being unsteady on feet yesterday 11/1. Went to pulm office 11/2 for f/u and was sent to ED for eval. Pt endorses lethargy and weakness on arrival. Denies chest pain, palpitations, N/V, abdominal pain, diarrhea, dysuria.     Upgraded 11/3 to SICU for acute hypoxic resp failure requiring HFNC.  Off HFNC since 11/4 11/6/23     83-year-old M with PMHx HTN, HLD, chronic anemia, COPD, lung CA s/p RUL lobectomy (follows at [facility] [place], last chemotherapy/RT 5/2023, not currently on treatment), AAA, hypothyroidism, CKD stage IIIa sent in to ED by pulmonologist MD Olivera with c/o SOB, dyspnea on minimal exertion, and cough. Pt had outpatient CT of chest performed 10/3 which demonstrated consolidation- was started on Augmentin/Prednisone/Azithromycin 10/6 x 1 week. Pt completed full course of medication however wife noted worsening cough and pt with fever TMax 103.9 on 10/26. Wife then took pt to pulm office again who began 2nd round of same medications. Fevers self resolved. Since 2nd round pt with decreased energy and episode of being unsteady on feet yesterday 11/1. Went to pulm office 11/2 for f/u and was sent to ED for eval. Pt endorses lethargy and weakness on arrival. Denies chest pain, palpitations, N/V, abdominal pain, diarrhea, dysuria.     Upgraded 11/3 to SICU for acute hypoxic resp failure requiring HFNC.  Off HFNC since 11/4 11/6/23  Patient resting in bed comfortably with wife at bedside. States he still feels short of breath with small amount of movement. No cp, no n/v.     T(C): 37.2 (11-06-23 @ 16:05), Max: 37.2 (11-06-23 @ 16:05)  HR: 110 (11-06-23 @ 16:05) (105 - 110)  BP: 146/64 (11-06-23 @ 16:05) (115/50 - 146/64)  RR: 18 (11-06-23 @ 16:05) (17 - 18)  SpO2: 94% (11-06-23 @ 16:05) (93% - 94%)    CONSTITUTIONAL: Well groomed, no apparent distress  EYES: No conjunctival or scleral injection, non-icteric  ENMT: Oral mucosa with moist membranes. Normal dentition; no pharyngeal injection or exudates             NECK: Supple, symmetric and without tracheal deviation   RESP: No respiratory distress, no use of accessory muscles; CTA b/l, no WRR  CV: RRR, +S1S2, no MRG; no JVD; no peripheral edema  GI: Soft, NT, ND, no rebound  LYMPH: No cervical LAD   PSYCH: Appropriate insight/judgment; A+O x 3, mood and affect appropriate, recent/remote memory intact                          8.4    17.70 )-----------( 247      ( 06 Nov 2023 06:45 )             25.2   11-06    134<L>  |  102  |  71<H>  ----------------------------<  217<H>  4.6   |  25  |  1.75<H>    Ca    8.5      06 Nov 2023 06:45  Phos  3.5     11-06  Mg     2.5     11-06    Culture - Blood (11.04.23 @ 04:21)   Specimen Source: .Blood None  Culture Results:   No growth at 48 Hours  Specimen Source: .Blood None (11.04.23 @ 04:21)   Culture Results:   No growth at 48 Hours (11.04.23 @ 04:21)   Specimen Source: .Blood None (11.04.23 @ 04:21)   Culture Results:   No growth at 48 Hours (11.04.23 @ 04:21)   Gram Stain:   Rare Squamous epithelial cells per low power field   No polymorphonuclear cells seen per low power field   Rare Gram positive cocci in pairs per oil power field   Rare Gram Negative Rods per oil power field (11.03.23 @ 14:16)   Specimen Source: .Sputum Sputum (11.03.23 @ 14:16)   Culture Results:   Normal Respiratory Marguerite present (11.03.23 @ 14:16)   Specimen Source: Clean Catch Clean Catch (Midstream) (11.02.23 @ 16:40)   Culture Results:   No growth (11.02.23 @ 16:40)   Specimen Source: .Blood Blood-Peripheral (11.02.23 @ 14:47)   Culture Results:   No growth at 72 Hours (11.02.23 @ 14:47)   Specimen Source: .Blood Blood-Peripheral (11.02.23 @ 14:47)   Culture Results:   No growth at 72 Hours (11.02.23 @ 14:47)

## 2023-11-06 NOTE — PROGRESS NOTE ADULT - ASSESSMENT
83-year-old M with PMHx HTN, HLD, chronic anemia, COPD, lung CA s/p RUL lobectomy (follows at [facility], last chemotherapy/RT 5/2023, not currently on treatment), AAA, hypothyroidism, CKD stage IIIa sent in to ED by pulmonologist MD Olivera with c/o SOB, dyspnea on minimal exertion, and cough. 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 Spongelike material within the cavity can be seen with aspergilloma (prior to fungus ball formation).   Consolidation within the right lower and middle lobe compatible with radiation change.  - leukocytosis improving (WBC 11.74 from 14.92)   - blood cultures pending result, sputum culture pending collection   - ID/pulm consult pending for poss bronchoscopy/voriconazole vs. conservative tx   - c/w trelegy ellipta, c/w levaquin to cover atypical/psuedomonas (allergy to PCN)     # Acute hyperglycemia, possible steroid-induced   -  on AM labs  - A1c results pending to r/o new onset DM II     # Chronic anemia, likely 2/2 chemotherapy  - decreased on AM labs from admission (Hgb 9.2-->8.2, Hct 27.8--> 25.2)   - no s/s bleeding, asymptomatic, continue to monitor/trend    # CKD stage IIIa  - Cr improving, 1.38--> 1.2   - continue to monitor/trend    # HTN, HLD, hypothyroidism  - c/w home losartan, atorvastatin, levothyroxine    # Hx lung CA s/p RUL lobectomy/chemo/RT  - f/u with [facility]     # DVT Ppx  - heparin SQ     83-year-old M with PMHx HTN, HLD, chronic anemia, COPD, lung CA s/p RUL lobectomy (follows at [facility], last chemotherapy/RT 5/2023, not currently on treatment), AAA, hypothyroidism, CKD stage IIIa sent in to ED by pulmonologist MD Olivera with c/o SOB, dyspnea on minimal exertion, and cough. 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 Spongelike material within the cavity can be seen with aspergilloma (prior to fungus ball formation).   Consolidation within the right lower and middle lobe compatible with radiation change.  - leukocytosis stable (WBC 17.70 from 17.35)   - blood cultures -no growth, sputum culture - normal respiratory  - ID/pulm consult pending for poss bronchoscopy/voriconazole vs. conservative tx   - c/w trelegy ellipta, c/w levaquin to cover atypical/psuedomonas (allergy to PCN)     # Acute hyperglycemia, possible steroid-induced   -  on AM labs  - A1c results 6.3 - consistent with Impaired Fasting Glucose, new onset DM II ruled out    # Chronic anemia, likely 2/2 chemotherapy  - decreased on AM labs from admission (Hgb 9.2-->8.2, Hct 27.8--> 25.2)   - no s/s bleeding, asymptomatic, continue to monitor/trend    # CKD stage IIIa  - Cr improving, 1.38--> 1.2 and through out hospital course worsened to 1.55 and then to 1.75  - continue to monitor/trend    # HTN, HLD, hypothyroidism  - c/w home losartan, atorvastatin, levothyroxine    # Hx lung CA s/p RUL lobectomy/chemo/RT  - f/u with [facility] [place]     # DVT Ppx  - heparin SQ

## 2023-11-06 NOTE — PHYSICAL THERAPY INITIAL EVALUATION ADULT - MODALITIES TREATMENT COMMENTS
pt left in bed supine post Eval @ pt request; bed alarm on; callbell in reach; pt instructed not to get up alone; call nursing for assist; supriya fair; denied pain

## 2023-11-07 LAB
ADD ON TEST-SPECIMEN IN LAB: SIGNIFICANT CHANGE UP
ALBUMIN SERPL ELPH-MCNC: 1.7 G/DL — LOW (ref 3.3–5)
ALBUMIN SERPL ELPH-MCNC: 1.7 G/DL — LOW (ref 3.3–5)
ALP SERPL-CCNC: 134 U/L — HIGH (ref 40–120)
ALP SERPL-CCNC: 134 U/L — HIGH (ref 40–120)
ALT FLD-CCNC: 39 U/L — SIGNIFICANT CHANGE UP (ref 12–78)
ALT FLD-CCNC: 39 U/L — SIGNIFICANT CHANGE UP (ref 12–78)
ANION GAP SERPL CALC-SCNC: 7 MMOL/L — SIGNIFICANT CHANGE UP (ref 5–17)
ANION GAP SERPL CALC-SCNC: 7 MMOL/L — SIGNIFICANT CHANGE UP (ref 5–17)
AST SERPL-CCNC: 43 U/L — HIGH (ref 15–37)
AST SERPL-CCNC: 43 U/L — HIGH (ref 15–37)
BILIRUB DIRECT SERPL-MCNC: 0.2 MG/DL — SIGNIFICANT CHANGE UP (ref 0–0.3)
BILIRUB DIRECT SERPL-MCNC: 0.2 MG/DL — SIGNIFICANT CHANGE UP (ref 0–0.3)
BILIRUB INDIRECT FLD-MCNC: 0.2 MG/DL — SIGNIFICANT CHANGE UP (ref 0.2–1)
BILIRUB INDIRECT FLD-MCNC: 0.2 MG/DL — SIGNIFICANT CHANGE UP (ref 0.2–1)
BILIRUB SERPL-MCNC: 0.4 MG/DL — SIGNIFICANT CHANGE UP (ref 0.2–1.2)
BILIRUB SERPL-MCNC: 0.4 MG/DL — SIGNIFICANT CHANGE UP (ref 0.2–1.2)
BUN SERPL-MCNC: 64 MG/DL — HIGH (ref 7–23)
BUN SERPL-MCNC: 64 MG/DL — HIGH (ref 7–23)
CALCIUM SERPL-MCNC: 9 MG/DL — SIGNIFICANT CHANGE UP (ref 8.5–10.1)
CALCIUM SERPL-MCNC: 9 MG/DL — SIGNIFICANT CHANGE UP (ref 8.5–10.1)
CHLORIDE SERPL-SCNC: 103 MMOL/L — SIGNIFICANT CHANGE UP (ref 96–108)
CHLORIDE SERPL-SCNC: 103 MMOL/L — SIGNIFICANT CHANGE UP (ref 96–108)
CK SERPL-CCNC: 182 U/L — SIGNIFICANT CHANGE UP (ref 26–308)
CK SERPL-CCNC: 182 U/L — SIGNIFICANT CHANGE UP (ref 26–308)
CO2 SERPL-SCNC: 26 MMOL/L — SIGNIFICANT CHANGE UP (ref 22–31)
CO2 SERPL-SCNC: 26 MMOL/L — SIGNIFICANT CHANGE UP (ref 22–31)
CREAT SERPL-MCNC: 1.79 MG/DL — HIGH (ref 0.5–1.3)
CREAT SERPL-MCNC: 1.79 MG/DL — HIGH (ref 0.5–1.3)
CRP SERPL-MCNC: 141 MG/L — HIGH
CRP SERPL-MCNC: 141 MG/L — HIGH
CULTURE RESULTS: SIGNIFICANT CHANGE UP
EGFR: 37 ML/MIN/1.73M2 — LOW
EGFR: 37 ML/MIN/1.73M2 — LOW
GLUCOSE BLDC GLUCOMTR-MCNC: 167 MG/DL — HIGH (ref 70–99)
GLUCOSE BLDC GLUCOMTR-MCNC: 167 MG/DL — HIGH (ref 70–99)
GLUCOSE BLDC GLUCOMTR-MCNC: 275 MG/DL — HIGH (ref 70–99)
GLUCOSE BLDC GLUCOMTR-MCNC: 275 MG/DL — HIGH (ref 70–99)
GLUCOSE BLDC GLUCOMTR-MCNC: 306 MG/DL — HIGH (ref 70–99)
GLUCOSE BLDC GLUCOMTR-MCNC: 306 MG/DL — HIGH (ref 70–99)
GLUCOSE BLDC GLUCOMTR-MCNC: 358 MG/DL — HIGH (ref 70–99)
GLUCOSE BLDC GLUCOMTR-MCNC: 358 MG/DL — HIGH (ref 70–99)
GLUCOSE SERPL-MCNC: 153 MG/DL — HIGH (ref 70–99)
GLUCOSE SERPL-MCNC: 153 MG/DL — HIGH (ref 70–99)
HCT VFR BLD CALC: 27.5 % — LOW (ref 39–50)
HCT VFR BLD CALC: 27.5 % — LOW (ref 39–50)
HGB BLD-MCNC: 9.1 G/DL — LOW (ref 13–17)
HGB BLD-MCNC: 9.1 G/DL — LOW (ref 13–17)
MCHC RBC-ENTMCNC: 31 PG — SIGNIFICANT CHANGE UP (ref 27–34)
MCHC RBC-ENTMCNC: 31 PG — SIGNIFICANT CHANGE UP (ref 27–34)
MCHC RBC-ENTMCNC: 33.1 GM/DL — SIGNIFICANT CHANGE UP (ref 32–36)
MCHC RBC-ENTMCNC: 33.1 GM/DL — SIGNIFICANT CHANGE UP (ref 32–36)
MCV RBC AUTO: 93.5 FL — SIGNIFICANT CHANGE UP (ref 80–100)
MCV RBC AUTO: 93.5 FL — SIGNIFICANT CHANGE UP (ref 80–100)
NT-PROBNP SERPL-SCNC: 4992 PG/ML — HIGH (ref 0–450)
NT-PROBNP SERPL-SCNC: 4992 PG/ML — HIGH (ref 0–450)
PLATELET # BLD AUTO: 284 K/UL — SIGNIFICANT CHANGE UP (ref 150–400)
PLATELET # BLD AUTO: 284 K/UL — SIGNIFICANT CHANGE UP (ref 150–400)
POTASSIUM SERPL-MCNC: 4.7 MMOL/L — SIGNIFICANT CHANGE UP (ref 3.5–5.3)
POTASSIUM SERPL-MCNC: 4.7 MMOL/L — SIGNIFICANT CHANGE UP (ref 3.5–5.3)
POTASSIUM SERPL-SCNC: 4.7 MMOL/L — SIGNIFICANT CHANGE UP (ref 3.5–5.3)
POTASSIUM SERPL-SCNC: 4.7 MMOL/L — SIGNIFICANT CHANGE UP (ref 3.5–5.3)
PROT SERPL-MCNC: 6.5 GM/DL — SIGNIFICANT CHANGE UP (ref 6–8.3)
PROT SERPL-MCNC: 6.5 GM/DL — SIGNIFICANT CHANGE UP (ref 6–8.3)
RBC # BLD: 2.94 M/UL — LOW (ref 4.2–5.8)
RBC # BLD: 2.94 M/UL — LOW (ref 4.2–5.8)
RBC # FLD: 15.6 % — HIGH (ref 10.3–14.5)
RBC # FLD: 15.6 % — HIGH (ref 10.3–14.5)
SODIUM SERPL-SCNC: 136 MMOL/L — SIGNIFICANT CHANGE UP (ref 135–145)
SODIUM SERPL-SCNC: 136 MMOL/L — SIGNIFICANT CHANGE UP (ref 135–145)
SPECIMEN SOURCE: SIGNIFICANT CHANGE UP
WBC # BLD: 17.76 K/UL — HIGH (ref 3.8–10.5)
WBC # BLD: 17.76 K/UL — HIGH (ref 3.8–10.5)
WBC # FLD AUTO: 17.76 K/UL — HIGH (ref 3.8–10.5)
WBC # FLD AUTO: 17.76 K/UL — HIGH (ref 3.8–10.5)

## 2023-11-07 PROCEDURE — 99231 SBSQ HOSP IP/OBS SF/LOW 25: CPT

## 2023-11-07 PROCEDURE — 99232 SBSQ HOSP IP/OBS MODERATE 35: CPT

## 2023-11-07 RX ORDER — INSULIN LISPRO 100/ML
VIAL (ML) SUBCUTANEOUS AT BEDTIME
Refills: 0 | Status: DISCONTINUED | OUTPATIENT
Start: 2023-11-07 | End: 2023-11-28

## 2023-11-07 RX ORDER — AMLODIPINE BESYLATE 2.5 MG/1
2.5 TABLET ORAL DAILY
Refills: 0 | Status: DISCONTINUED | OUTPATIENT
Start: 2023-11-07 | End: 2023-11-09

## 2023-11-07 RX ORDER — IPRATROPIUM/ALBUTEROL SULFATE 18-103MCG
3 AEROSOL WITH ADAPTER (GRAM) INHALATION ONCE
Refills: 0 | Status: COMPLETED | OUTPATIENT
Start: 2023-11-07 | End: 2023-11-07

## 2023-11-07 RX ORDER — INSULIN LISPRO 100/ML
2 VIAL (ML) SUBCUTANEOUS
Refills: 0 | Status: DISCONTINUED | OUTPATIENT
Start: 2023-11-07 | End: 2023-11-08

## 2023-11-07 RX ADMIN — Medication 20 MILLIGRAM(S): at 09:08

## 2023-11-07 RX ADMIN — FLUTICASONE FUROATE, UMECLIDINIUM BROMIDE AND VILANTEROL TRIFENATATE 1 PUFF(S): 200; 62.5; 25 POWDER RESPIRATORY (INHALATION) at 10:51

## 2023-11-07 RX ADMIN — HEPARIN SODIUM 5000 UNIT(S): 5000 INJECTION INTRAVENOUS; SUBCUTANEOUS at 21:48

## 2023-11-07 RX ADMIN — Medication 10: at 18:01

## 2023-11-07 RX ADMIN — AMLODIPINE BESYLATE 2.5 MILLIGRAM(S): 2.5 TABLET ORAL at 12:42

## 2023-11-07 RX ADMIN — Medication 3 MILLILITER(S): at 23:48

## 2023-11-07 RX ADMIN — INSULIN GLARGINE 8 UNIT(S): 100 INJECTION, SOLUTION SUBCUTANEOUS at 21:46

## 2023-11-07 RX ADMIN — Medication 1 MILLIGRAM(S): at 09:08

## 2023-11-07 RX ADMIN — Medication 2: at 21:57

## 2023-11-07 RX ADMIN — ATORVASTATIN CALCIUM 40 MILLIGRAM(S): 80 TABLET, FILM COATED ORAL at 21:46

## 2023-11-07 RX ADMIN — Medication 6: at 12:42

## 2023-11-07 RX ADMIN — Medication 3 MILLILITER(S): at 19:55

## 2023-11-07 RX ADMIN — VORICONAZOLE 200 MILLIGRAM(S): 10 INJECTION, POWDER, LYOPHILIZED, FOR SOLUTION INTRAVENOUS at 09:08

## 2023-11-07 RX ADMIN — HEPARIN SODIUM 5000 UNIT(S): 5000 INJECTION INTRAVENOUS; SUBCUTANEOUS at 14:37

## 2023-11-07 RX ADMIN — Medication 2: at 06:11

## 2023-11-07 RX ADMIN — Medication 20 MILLIGRAM(S): at 21:48

## 2023-11-07 RX ADMIN — Medication 1 TABLET(S): at 09:08

## 2023-11-07 RX ADMIN — VORICONAZOLE 200 MILLIGRAM(S): 10 INJECTION, POWDER, LYOPHILIZED, FOR SOLUTION INTRAVENOUS at 21:46

## 2023-11-07 RX ADMIN — HEPARIN SODIUM 5000 UNIT(S): 5000 INJECTION INTRAVENOUS; SUBCUTANEOUS at 06:06

## 2023-11-07 NOTE — PROGRESS NOTE ADULT - ASSESSMENT
83y old Male with PMH HTN, HPL, Chronic anemia, COPD, lung CA [place] [facility] [place] and [place] 05/2023, not currently receiving chemo treatment), s/p right upper lobectomy, AAA, Hypothyroidism, CKD stage IIIa referred to me to ed for continued sob, cough despite abx treatment found to have large lower lobe thick walled cavity  1. 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 discussed with wife.  -started on voriconazole. surgery is more defitive therapy however he is high surgical risk  -reviewed imaging from [place] supplied by wife - it looks like he has had blebs in the past and these are what are infected - they look half filled with fluid with thicker borders, which would argue against fungal infection/mrsa as there is no necrosis involved in its pathogenesis  -discussed bronch with wife. consulted thoracic for bronch. unfortunately I cannot perform the procedure tomorrow  2. pneumonia: continue levaquin  -supplemental o2  3. copd: continue trelegy  4. ? radiation induced pneumonitis: continue methylprednisolone

## 2023-11-07 NOTE — PROGRESS NOTE ADULT - SUBJECTIVE AND OBJECTIVE BOX
Subjective:  Pt seen, on RA, Pt unclear on history of cancer, unable to answer questions regarding treatment.    Vital Signs:  Vital Signs Last 24 Hrs  T(C): 36.4 (11-07-23 @ 09:07), Max: 37.2 (11-06-23 @ 16:05)  T(F): 97.5 (11-07-23 @ 09:07), Max: 99 (11-06-23 @ 16:05)  HR: 110 (11-07-23 @ 09:07) (104 - 110)  BP: 157/70 (11-07-23 @ 09:07) (129/59 - 157/70)  RR: 18 (11-07-23 @ 09:07) (18 - 18)  SpO2: 92% (11-07-23 @ 09:07) (92% - 94%) on (O2)    Telemetry/Alarms:    Relevant labs, radiology and Medications reviewed                        9.1    17.76 )-----------( 284      ( 07 Nov 2023 07:11 )             27.5     11-07    136  |  103  |  64<H>  ----------------------------<  153<H>  4.7   |  26  |  1.79<H>    Ca    9.0      07 Nov 2023 07:11  Phos  3.5     11-06  Mg     2.5     11-06    TPro  6.5  /  Alb  1.7<L>  /  TBili  0.4  /  DBili  0.2  /  AST  43<H>  /  ALT  39  /  AlkPhos  134<H>  11-07      MEDICATIONS  (STANDING):  albuterol/ipratropium for Nebulization 3 milliLiter(s) Nebulizer every 6 hours  amLODIPine   Tablet 2.5 milliGRAM(s) Oral daily  atorvastatin 40 milliGRAM(s) Oral at bedtime  dextrose 5%. 1000 milliLiter(s) (50 mL/Hr) IV Continuous <Continuous>  dextrose 5%. 1000 milliLiter(s) (100 mL/Hr) IV Continuous <Continuous>  dextrose 50% Injectable 25 Gram(s) IV Push once  dextrose 50% Injectable 12.5 Gram(s) IV Push once  dextrose 50% Injectable 25 Gram(s) IV Push once  fluticasone furoate/umeclidinium/vilanterol 100-62.5-25 MICROgram(s) Inhaler 1 Puff(s) Inhalation daily  folic acid 1 milliGRAM(s) Oral daily  glucagon  Injectable 1 milliGRAM(s) IntraMuscular once  heparin   Injectable 5000 Unit(s) SubCutaneous every 8 hours  insulin glargine Injectable (LANTUS) 8 Unit(s) SubCutaneous at bedtime  insulin lispro (ADMELOG) corrective regimen sliding scale   SubCutaneous Before meals and at bedtime  levoFLOXacin IVPB      levoFLOXacin IVPB 500 milliGRAM(s) IV Intermittent every 24 hours  levothyroxine 25 MICROGram(s) Oral daily  methylPREDNISolone sodium succinate Injectable 20 milliGRAM(s) IV Push every 12 hours  multivitamin 1 Tablet(s) Oral daily  voriconazole 200 milliGRAM(s) Oral every 12 hours    MEDICATIONS  (PRN):  acetaminophen     Tablet .. 650 milliGRAM(s) Oral every 6 hours PRN Temp greater or equal to 38C (100.4F), Mild Pain (1 - 3)  aluminum hydroxide/magnesium hydroxide/simethicone Suspension 30 milliLiter(s) Oral every 4 hours PRN Dyspepsia  benzonatate 100 milliGRAM(s) Oral three times a day PRN Cough  dextrose Oral Gel 15 Gram(s) Oral once PRN Blood Glucose LESS THAN 70 milliGRAM(s)/deciliter  melatonin 3 milliGRAM(s) Oral at bedtime PRN Insomnia  ondansetron Injectable 4 milliGRAM(s) IV Push every 8 hours PRN Nausea and/or Vomiting      Physical exam  Neuro: Alert Awake,   Pulm:  decreased b/l   CV: RRR   Abd:  Soft   Extremities:  warm     I&O's Summary    06 Nov 2023 07:01  -  07 Nov 2023 07:00  --------------------------------------------------------  IN: 240 mL / OUT: 475 mL / NET: -235 mL        Assessment  83y Male  w/ PAST MEDICAL & SURGICAL HISTORY:  COPD (chronic obstructive pulmonary disease)      Lung cancer      Anemia of chronic disease      CKD (chronic kidney disease), stage III      Hypothyroidism      AAA (abdominal aortic aneurysm)      HTN (hypertension)      HLD (hyperlipidemia)      Thrombocytopenia      S/P lobectomy of lung      admitted with complaints of Patient is a 83y old  Male who presents with a chief complaint of Right lung pneumonia with thick-walled cavity. (07 Nov 2023 07:44)  .   84 YO M w/pmhx of mild dementia, HTN, Chronic anemia, COPD, lung CA follows at [facility] [place] (last chemotherapy and RT 05/2023, not currently receiving chemo treatment), s/p right upper lobectomy, AAA, Hypothyroidism, CKD stage IIIa, recently was being treated outpatient for pneumonia w/Augmentin, azithromycin, and Prednisone. Patient had CT chest done at that time 10/3/23, now uploaded. Patient was sent to ED 11/2/23 by pulmonologist, Dr. Olivera. Patient had CT chest done here, which is concerning for possible aspergilloma and radiation pneumonitis, recurrent CA?. Called for bronch    Plan   imaging  uploaded from [facility] [place], Dr. Deleon to review   Thoracic surgery not available til Thursday, Dr. Olivera might be able to do bronch tomorrow, f/u Dr. Olivera  no bronch today, NPO cancelled  cont ABX as per ID  Cont care as per primary team         Discussed with Cardiothoracic Team at AM rounds.

## 2023-11-07 NOTE — PROGRESS NOTE ADULT - SUBJECTIVE AND OBJECTIVE BOX
Subjective:  no distress this morning  breathing well    Review of Systems:  All 10 systems reviewed in detailed and found to be negative with the exception of what has already been described above    Allergies:  penicillin (Unknown)    Meds  MEDICATIONS  (STANDING):  albuterol/ipratropium for Nebulization 3 milliLiter(s) Nebulizer every 6 hours  amLODIPine   Tablet 2.5 milliGRAM(s) Oral daily  atorvastatin 40 milliGRAM(s) Oral at bedtime  dextrose 5%. 1000 milliLiter(s) (50 mL/Hr) IV Continuous <Continuous>  dextrose 5%. 1000 milliLiter(s) (100 mL/Hr) IV Continuous <Continuous>  dextrose 50% Injectable 25 Gram(s) IV Push once  dextrose 50% Injectable 12.5 Gram(s) IV Push once  dextrose 50% Injectable 25 Gram(s) IV Push once  fluticasone furoate/umeclidinium/vilanterol 100-62.5-25 MICROgram(s) Inhaler 1 Puff(s) Inhalation daily  folic acid 1 milliGRAM(s) Oral daily  glucagon  Injectable 1 milliGRAM(s) IntraMuscular once  heparin   Injectable 5000 Unit(s) SubCutaneous every 8 hours  insulin glargine Injectable (LANTUS) 8 Unit(s) SubCutaneous at bedtime  insulin lispro (ADMELOG) corrective regimen sliding scale   SubCutaneous Before meals and at bedtime  levoFLOXacin IVPB      levoFLOXacin IVPB 500 milliGRAM(s) IV Intermittent every 24 hours  levothyroxine 25 MICROGram(s) Oral daily  methylPREDNISolone sodium succinate Injectable 20 milliGRAM(s) IV Push every 12 hours  multivitamin 1 Tablet(s) Oral daily  voriconazole 200 milliGRAM(s) Oral every 12 hours    MEDICATIONS  (PRN):  acetaminophen     Tablet .. 650 milliGRAM(s) Oral every 6 hours PRN Temp greater or equal to 38C (100.4F), Mild Pain (1 - 3)  aluminum hydroxide/magnesium hydroxide/simethicone Suspension 30 milliLiter(s) Oral every 4 hours PRN Dyspepsia  benzonatate 100 milliGRAM(s) Oral three times a day PRN Cough  dextrose Oral Gel 15 Gram(s) Oral once PRN Blood Glucose LESS THAN 70 milliGRAM(s)/deciliter  melatonin 3 milliGRAM(s) Oral at bedtime PRN Insomnia  ondansetron Injectable 4 milliGRAM(s) IV Push every 8 hours PRN Nausea and/or Vomiting    Physical Exam  T(C): 36.4 (11-07-23 @ 09:07), Max: 37.2 (11-06-23 @ 16:05)  HR: 110 (11-07-23 @ 09:07) (104 - 110)  BP: 157/70 (11-07-23 @ 09:07) (129/59 - 157/70)  RR: 18 (11-07-23 @ 09:07) (18 - 18)  SpO2: 92% (11-07-23 @ 09:07) (92% - 94%)  Gen: Alert, oriented, no distress, on hf nc  HEENT: Anicteric sclera, moist mucous membranes  Cardio: Regular rhythm and rate, normal S1S2, no murmurs  Resp: Crackles, congested  GI: Nontender, nondistended, normoactive bowel sounds  Ext: No cyanosis, clubbing or edema  Neuro: Nonfocal    Labs:                        9.1    17.76 )-----------( 284      ( 07 Nov 2023 07:11 )             27.5     11-07    136  |  103  |  64<H>  ----------------------------<  153<H>  4.7   |  26  |  1.79<H>    Ca    9.0      07 Nov 2023 07:11  Phos  3.5     11-06  Mg     2.5     11-06    TPro  6.5  /  Alb  1.7<L>  /  TBili  0.4  /  DBili  0.2  /  AST  43<H>  /  ALT  39  /  AlkPhos  134<H>  11-07      Urinalysis Basic - ( 07 Nov 2023 07:11 )    Color: x / Appearance: x / SG: x / pH: x  Gluc: 153 mg/dL / Ketone: x  / Bili: x / Urobili: x   Blood: x / Protein: x / Nitrite: x   Leuk Esterase: x / RBC: x / WBC x   Sq Epi: x / Non Sq Epi: x / Bacteria: x    < from: CT Chest No Cont (11.02.23 @ 16:04) >  ACC: [id number] EXAM:  CT CHEST   ORDERED BY: SARAH YOO     PROCEDURE DATE:  11/02/2023          INTERPRETATION:  INDICATION: Shortness of breath, cough, lung cancer   history, last chemotherapy and radiation treatment in May 2023    TECHNIQUE: Helical acquisition images of the chest without intravenous   contrast. Maximum intensity projection images were generated.    COMPARISON: 12/29/2020 chest x-ray.    FINDINGS:    LUNGS/AIRWAYS/PLEURA: Right upper lobectomy. Right lower lobe   multiloculated thick-walled 11 cm cavity (best seen in its entirety on   601-59) containing fluid and spongelike material. Consolidation within   the middle lobe and inferior aspect of the right lower lobe with angular   borders suggesting prior radiation. Multiple indistinct nodules of   varying density in the right lower and middle lobes, mostly adjacent to   the cavity. Small branching opacities in the peripheral left upper and   lower lobes, compatible with distal airway impaction. Trace left pleural   effusion. Mild right apical pleural thickening.    LYMPH NODES/MEDIASTINUM: No lymphadenopathy.    HEART/VASCULATURE: Normal heart size. Unremarkable pericardium. Coronary   artery calcifications. Normal caliber aorta.    UPPER ABDOMEN: Unremarkable.    BONES/SOFT TISSUES: Old sternal fracture. Mild loss of height of the L1   vertebral body.      IMPRESSION:    Right upper lobectomy.    Larger right lower lobe thick-walled cavity which may be infectious   and/or residua of reported treated tumor. 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    Consolidation within the right lower and middle lobe compatible with   radiation change.    Multiple indistinct nodules in the right lungwhich may be infectious or   radiation pneumonitis.    ct naveed 10/3 uploaded and reviewed

## 2023-11-07 NOTE — PROGRESS NOTE ADULT - ASSESSMENT
83-year-old M with PMHx HTN, HLD, chronic anemia, COPD, lung CA s/p RUL lobectomy (follows at [facility], last chemotherapy/RT 5/2023, not currently on treatment), AAA, hypothyroidism, CKD stage IIIa sent in to ED on 11/2/23  by pulmonologist MD Olivera with c/o SOB, dyspnea on minimal exertion, and cough. 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     #RLL PNA with possible radiation pneumonitis and /or aspergilloma 2/2 immunocompromised status,   h/o Lung cancer s/p RUL lobectomy   - CT chest: Larger right lower lobe thick-walled cavity which may be infectious and/or residua of reported treated tumor. Spongelike material within the cavity can be seen with aspergilloma (prior to fungus ball formation).   Consolidation within the right lower and middle lobe compatible with radiation change.  - leukocytosis stable (WBC 17.70 from 17.35)   - blood cultures -no growth, sputum culture - normal respiratory  - ID/pulm consult pending for poss bronchoscopy  - c/w voriconazole  - c/w trelegy ellipta, c/w levaquin to cover atypical/psuedomonas (allergy to PCN)   - repeat CXR in am    # Acute hyperglycemia, possible steroid-induced   -  on AM labs  - A1c results 6.3 - consistent with Impaired Fasting Glucose, new onset DM II ruled out    # Chronic anemia, likely 2/2 chemotherapy  - decreased on AM labs from admission (Hgb 9.2-->8.2, Hct 27.8--> 25.2)   - no s/s bleeding, asymptomatic, continue to monitor/trend    # CHRISTINA on CKD stage IIIa, worsening   - Cr improving, 1.38--> 1.2 and through out hospital course worsened to 1.55 and then to 1.75  - continue to monitor/trend  - Creatinine Trend: 1.79<--, 1.75<--, 1.55<--, 1.44<--, 1.44<--, 1.20  - stop losartan     # HTN  - stop losartan   - start amlodipine 2.5    #  HLD, hypothyroidism  - c/w  atorvastatin, levothyroxine    # Hx lung CA s/p RUL lobectomy/chemo/RT  - f/u with [facility]     # DVT Ppx  - heparin SQ    [facility] [phone] left the message

## 2023-11-07 NOTE — PROGRESS NOTE ADULT - ASSESSMENT
82 y/o Male with h/o lung CA [place] [facility] [place] and RT 05/2023, s/p right upper lobectomy, HTN, HPL, Chronic anemia, COPD, AAA, Hypothyroidism, CKD stage IIIa was admitted on 11/2 for increased shortness of breath, dyspnea on minimal exertion, and cough. Reportedly, he had CT of the chest performed 10/3 which demonstrated consolidation, was started on augmentin/prednisone/azithro 10/6 x 1 week. Pt completed course of medications however wife noted worsening cough and pt with fever Tmax of 103.9 on 10/26. Wife then took pt to pulmonologist again who started pt on second round of the same medications, instructed wife to stop giving pt tylenol wife notes fevers self-resolved. Since then pt decreased energy with episode of being unsteady on feet. Today is 6th day of taking medications, had f/u scheduled with pulmonologist who sent pt to ED for eval.  He feels tired and weak. In ER he received ceftriaxone.    1. Large RLL pulmonary cavity. 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 Possible postobstructive pneumonia. Lung Ca s/p right upper lobectomy. Radiation pneumonitis. Allergy to PCN.    -respiratory improved  -leukocytosis  -sputum c/s noted  -on levofloxacin 500 mg IV qd # 35 and voriconazole 200 mg PO q12h # 3  -tolerating abx well so far; no side effects noted  -pulmonary evaluation ?diagnostic bronchoscopy   -if this is a fungus ball, antifungal therapy will not be effective, he may need surgery to remove the fungal ball area  -thoracic evaluation appreciated  -continue abx coverage   -monitor temps  -f/u CBC  -supportive care  2. Other issues:   -care per medicine    d/w medicine team

## 2023-11-07 NOTE — PROGRESS NOTE ADULT - SUBJECTIVE AND OBJECTIVE BOX
Date of service: 11-07-23 @ 07:45    Lying in bed in NAD  Has dry cough  No SOB at rest  Has low grade fever    ROS: denies dizziness, no HA, no abdominal pain, no diarrhea or constipation; no dysuria, no legs pain, no rashes    MEDICATIONS  (STANDING):  albuterol/ipratropium for Nebulization 3 milliLiter(s) Nebulizer every 6 hours  atorvastatin 40 milliGRAM(s) Oral at bedtime  dextrose 5%. 1000 milliLiter(s) (50 mL/Hr) IV Continuous <Continuous>  dextrose 5%. 1000 milliLiter(s) (100 mL/Hr) IV Continuous <Continuous>  dextrose 50% Injectable 25 Gram(s) IV Push once  dextrose 50% Injectable 12.5 Gram(s) IV Push once  dextrose 50% Injectable 25 Gram(s) IV Push once  fluticasone furoate/umeclidinium/vilanterol 100-62.5-25 MICROgram(s) Inhaler 1 Puff(s) Inhalation daily  folic acid 1 milliGRAM(s) Oral daily  glucagon  Injectable 1 milliGRAM(s) IntraMuscular once  heparin   Injectable 5000 Unit(s) SubCutaneous every 8 hours  insulin glargine Injectable (LANTUS) 8 Unit(s) SubCutaneous at bedtime  insulin lispro (ADMELOG) corrective regimen sliding scale   SubCutaneous Before meals and at bedtime  levoFLOXacin IVPB      levoFLOXacin IVPB 500 milliGRAM(s) IV Intermittent every 24 hours  levothyroxine 25 MICROGram(s) Oral daily  losartan 50 milliGRAM(s) Oral daily  methylPREDNISolone sodium succinate Injectable 20 milliGRAM(s) IV Push every 12 hours  multivitamin 1 Tablet(s) Oral daily  voriconazole 200 milliGRAM(s) Oral every 12 hours    Vital Signs Last 24 Hrs  T(C): 37 (06 Nov 2023 21:37), Max: 37.2 (06 Nov 2023 16:05)  T(F): 98.6 (06 Nov 2023 21:37), Max: 99 (06 Nov 2023 16:05)  HR: 104 (06 Nov 2023 21:37) (104 - 110)  BP: 129/59 (06 Nov 2023 21:37) (129/59 - 146/64)  BP(mean): 81 (06 Nov 2023 16:05) (81 - 81)  RR: 18 (06 Nov 2023 21:37) (17 - 18)  SpO2: 93% (06 Nov 2023 21:37) (93% - 94%)    Parameters below as of 06 Nov 2023 21:37  Patient On (Oxygen Delivery Method): room air     Physical exam:    Constitutional:  No acute distress  HEENT: NC/AT, EOMI, PERRLA, conjunctivae clear; ears and nose atraumatic  Neck: supple; thyroid not palpable  Back: no tenderness  Respiratory: respiratory effort normal; crackles at bases  Cardiovascular: S1S2 regular, no murmurs  Abdomen: soft, not tender, not distended, positive BS  Genitourinary: no suprapubic tenderness  Lymphatic: no LN palpable  Musculoskeletal: no muscle tenderness, no joint swelling or tenderness  Extremities: no pedal edema  Neurological/ Psychiatric: AxOx3, moving all extremities  Skin: no rashes; no palpable lesions    Labs: reviewed                        8.4    17.70 )-----------( 247      ( 06 Nov 2023 06:45 )             25.2     11-06    134<L>  |  102  |  71<H>  ----------------------------<  217<H>  4.6   |  25  |  1.75<H>    Ca    8.5      06 Nov 2023 06:45  Phos  3.5     11-06  Mg     2.5     11-06                        8.8    17.35 )-----------( 274      ( 05 Nov 2023 05:52 )             26.2     11-05    131<L>  |  100  |  49<H>  ----------------------------<  219<H>  4.4   |  26  |  1.55<H>    Ca    8.6      05 Nov 2023 05:52  Phos  3.4     11-05  Mg     2.3     11-05    TPro  6.5  /  Alb  1.8<L>  /  TBili  0.4  /  DBili  x   /  AST  55<H>  /  ALT  62  /  AlkPhos  135<H>  11-04                        8.2    11.74 )-----------( 259      ( 03 Nov 2023 07:15 )             25.2     11-03    133<L>  |  102  |  35<H>  ----------------------------<  205<H>  4.4   |  22  |  1.20    Ca    8.3<L>      03 Nov 2023 07:15    TPro  6.9  /  Alb  2.0<L>  /  TBili  0.5  /  DBili  x   /  AST  38<H>  /  ALT  50  /  AlkPhos  130<H>  11-02     LIVER FUNCTIONS - ( 02 Nov 2023 14:47 )  Alb: 2.0 g/dL / Pro: 6.9 gm/dL / ALK PHOS: 130 U/L / ALT: 50 U/L / AST: 38 U/L / GGT: x           Urinalysis (11-02 @ 16:40)  Urine Appearance: Clear  Protein, Urine: 100 mg/dL  Urine Microscopic-Add On (NC) (11-02 @ 16:40)  White Blood Cell - Urine: 0 /HPF  Red Blood Cell - Urine: 0 /HPF    (11-02 @ 14:47)  NotDetec    Culture - Blood (collected 04 Nov 2023 04:21)  Source: .Blood None  Preliminary Report (06 Nov 2023 09:01):    No growth at 48 Hours    Culture - Blood (collected 04 Nov 2023 04:21)  Source: .Blood None  Preliminary Report (06 Nov 2023 09:01):    No growth at 48 Hours    Culture - Sputum (collected 03 Nov 2023 14:16)  Source: .Sputum Sputum  Gram Stain (03 Nov 2023 23:52):    Rare Squamous epithelial cells per low power field    No polymorphonuclear cells seen per low power field    Rare Gram positive cocci in pairs per oil power field    Rare Gram Negative Rods per oil power field  Final Report (05 Nov 2023 12:06):    Normal Respiratory Marguerite present    Culture - Urine (collected 02 Nov 2023 16:40)  Source: Clean Catch Clean Catch (Midstream)  Final Report (03 Nov 2023 20:33):    No growth    Culture - Blood (collected 02 Nov 2023 14:47)  Source: .Blood Blood-Peripheral  Preliminary Report (06 Nov 2023 22:01):    No growth at 4 days    Culture - Blood (collected 02 Nov 2023 14:47)  Source: .Blood Blood-Peripheral  Preliminary Report (06 Nov 2023 22:01):    No growth at 4 days    Radiology: all available radiological tests reviewed    < from: CT Chest No Cont (11.02.23 @ 16:04) >  Right upper lobectomy.  Larger right lower lobe thick-walled cavity which may be infectious   and/or residua of reported treated tumor. 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  Consolidation within the right lower and middle lobe compatible with radiation change.  Multiple indistinct nodules in the right lungwhich may be infectious or radiation pneumonitis.  < end of copied text >    Advanced directives addressed: full resuscitation

## 2023-11-07 NOTE — PROGRESS NOTE ADULT - SUBJECTIVE AND OBJECTIVE BOX
cc - cough, weakness        83-year-old M with PMHx HTN, HLD, chronic anemia, COPD, lung CA s/p RUL lobectomy (follows at [facility] [place], last chemotherapy/RT 5/2023, not currently on treatment), AAA, hypothyroidism, CKD stage IIIa sent in to ED on 11/2/23  by pulmonologist MD Olivera with c/o SOB, dyspnea on minimal exertion, and cough. Pt had outpatient CT of chest performed 10/3 which demonstrated consolidation- was started on Augmentin/Prednisone/Azithromycin 10/6 x 1 week. Pt completed full course of medication however wife noted worsening cough and pt with fever TMax 103.9 on 10/26. Wife then took pt to pulm office again who began 2nd round of same medications. Fevers self resolved. Since 2nd round pt with decreased energy and episode of being unsteady on feet yesterday 11/1. Went to pulm office 11/2 for f/u and was sent to ED for eval. Pt endorses lethargy and weakness on arrival. Denies chest pain, palpitations, N/V, abdominal pain, diarrhea, dysuria.     Upgraded 11/3 to SICU for acute hypoxic resp failure requiring HFNC.  Off HFNC since 11/4 11/6/23 Patient resting in bed comfortably with wife at bedside. States he still feels short of breath with small amount of movement. No cp, no n/v.   11/7 - no events, afebrile, + productive cough, denies cp, + dyspnea, + gen weakness, no abdominal pain, tolerating po intake    Vital sings reviewed for last 24 h  T(C): 37.2 (11-07-23 @ 15:37), Max: 37.2 (11-06-23 @ 16:05)  T(F): 98.9 (11-07-23 @ 15:37), Max: 99 (11-06-23 @ 16:05)  HR: 59 (11-07-23 @ 15:37) (59 - 110)  BP: 153/69 (11-07-23 @ 15:37) (129/59 - 157/70)  RR: 18 (11-07-23 @ 15:37) (18 - 18)  SpO2: 93% (11-07-23 @ 15:37) (92% - 94%)  CAPILLARY BLOOD GLUCOSE  POCT Blood Glucose.: 275 mg/dL (07 Nov 2023 12:35)  POCT Blood Glucose.: 167 mg/dL (07 Nov 2023 06:07)  POCT Blood Glucose.: 292 mg/dL (06 Nov 2023 21:12)  POCT Blood Glucose.: 386 mg/dL (06 Nov 2023 17:06)    PHYSICAL EXAM:    Constitutional: NAD, awake and alert   HEENT: PERR, EOMI, Normal Hearing, MMM  Neck: Soft and supple, No LAD, No JVD  Respiratory: Breath sounds decreased over right lung, normal on left side ,  No wheezing, rales, + rhonchi  Cardiovascular: S1 and S2, regular rate and rhythm, no Murmurs, gallops or rubs  Gastrointestinal: Bowel Sounds present, soft, nontender, nondistended, no guarding, no rebound  Extremities: trace LE  peripheral edema  Vascular: 2+ peripheral pulses  Neurological: A/O x 3, no focal deficits  Musculoskeletal: 5/5 strength b/l upper and lower extremities  Skin: No rashes  rectal : deferred, not indicated      All labs radiology and other studies reviewed and interpreted                         9.1    17.76 )-----------( 284      ( 07 Nov 2023 07:11 )             27.5     11-07    136  |  103  |  64<H>  ----------------------------<  153<H>  4.7   |  26  |  1.79<H>    Ca    9.0      07 Nov 2023 07:11  Phos  3.5     11-06  Mg     2.5     11-06    TPro  6.5  /  Alb  1.7<L>  /  TBili  0.4  /  DBili  0.2  /  AST  43<H>  /  ALT  39  /  AlkPhos  134<H>  11-07    CARDIAC MARKERS ( 07 Nov 2023 07:11 )  x     / x     / 182 U/L / x     / x          LIVER FUNCTIONS - ( 07 Nov 2023 07:11 )  Alb: 1.7 g/dL / Pro: 6.5 gm/dL / ALK PHOS: 134 U/L / ALT: 39 U/L / AST: 43 U/L / GGT: x             Culture - Blood (11.04.23 @ 04:21) Culture - Sputum (11.03.23 @ 14:16)    Gram Stain:   Rare Squamous epithelial cells per low power field  No polymorphonuclear cells seen per low power field  Rare Gram positive cocci in pairs per oil power field  Rare Gram Negative Rods per oil power field   Specimen Source: .Sputum Sputum   Culture Results:   Normal Respiratory Marguerite present    Culture - Blood (11.04.23 @ 04:21)    Specimen Source: .Blood None   Culture Results:   No growth at 72 Hours    Culture - Urine (11.02.23 @ 16:40)    Specimen Source: Clean Catch Clean Catch (Midstream)   Culture Results:   No growth     Xray Chest 1 View- PORTABLE-Urgent (Xray Chest 1 View- PORTABLE-Urgent .) (11.04.23 @ 01:35) >  Stable right midlung and upper lung opacities.Left lung remains clear.   No significant change from November 2    IMPRESSION: No significant change       CT Chest No Cont (11.02.23 @ 16:04) >    IMPRESSION:    Right upper lobectomy.    Larger right lower lobe thick-walled cavity which may be infectious   and/or residua of reported treated tumor. 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    Consolidation within the right lower and middle lobe compatible with   radiation change.    Multiple indistinct nodules in the right lungwhich may be infectious or   radiation pneumonitis.      MEDICATIONS  (STANDING):  albuterol/ipratropium for Nebulization 3 milliLiter(s) Nebulizer every 6 hours  amLODIPine   Tablet 2.5 milliGRAM(s) Oral daily  atorvastatin 40 milliGRAM(s) Oral at bedtime  dextrose 5%. 1000 milliLiter(s) (50 mL/Hr) IV Continuous <Continuous>  dextrose 5%. 1000 milliLiter(s) (100 mL/Hr) IV Continuous <Continuous>  dextrose 50% Injectable 25 Gram(s) IV Push once  dextrose 50% Injectable 12.5 Gram(s) IV Push once  dextrose 50% Injectable 25 Gram(s) IV Push once  fluticasone furoate/umeclidinium/vilanterol 100-62.5-25 MICROgram(s) Inhaler 1 Puff(s) Inhalation daily  folic acid 1 milliGRAM(s) Oral daily  glucagon  Injectable 1 milliGRAM(s) IntraMuscular once  heparin   Injectable 5000 Unit(s) SubCutaneous every 8 hours  insulin glargine Injectable (LANTUS) 8 Unit(s) SubCutaneous at bedtime  insulin lispro (ADMELOG) corrective regimen sliding scale   SubCutaneous Before meals and at bedtime  levoFLOXacin IVPB      levoFLOXacin IVPB 500 milliGRAM(s) IV Intermittent every 24 hours  levothyroxine 25 MICROGram(s) Oral daily  methylPREDNISolone sodium succinate Injectable 20 milliGRAM(s) IV Push every 12 hours  multivitamin 1 Tablet(s) Oral daily  voriconazole 200 milliGRAM(s) Oral every 12 hours    MEDICATIONS  (PRN):  acetaminophen     Tablet .. 650 milliGRAM(s) Oral every 6 hours PRN Temp greater or equal to 38C (100.4F), Mild Pain (1 - 3)  aluminum hydroxide/magnesium hydroxide/simethicone Suspension 30 milliLiter(s) Oral every 4 hours PRN Dyspepsia  benzonatate 100 milliGRAM(s) Oral three times a day PRN Cough  dextrose Oral Gel 15 Gram(s) Oral once PRN Blood Glucose LESS THAN 70 milliGRAM(s)/deciliter  melatonin 3 milliGRAM(s) Oral at bedtime PRN Insomnia  ondansetron Injectable 4 milliGRAM(s) IV Push every 8 hours PRN Nausea and/or Vomiting

## 2023-11-08 PROBLEM — I71.40 ABDOMINAL AORTIC ANEURYSM, WITHOUT RUPTURE, UNSPECIFIED: Chronic | Status: ACTIVE | Noted: 2023-11-04

## 2023-11-08 PROBLEM — N18.30 CHRONIC KIDNEY DISEASE, STAGE 3 UNSPECIFIED: Chronic | Status: ACTIVE | Noted: 2023-11-03

## 2023-11-08 PROBLEM — D69.6 THROMBOCYTOPENIA, UNSPECIFIED: Chronic | Status: ACTIVE | Noted: 2023-11-04

## 2023-11-08 PROBLEM — D63.8 ANEMIA IN OTHER CHRONIC DISEASES CLASSIFIED ELSEWHERE: Chronic | Status: ACTIVE | Noted: 2023-11-03

## 2023-11-08 PROBLEM — E78.5 HYPERLIPIDEMIA, UNSPECIFIED: Chronic | Status: ACTIVE | Noted: 2023-11-04

## 2023-11-08 PROBLEM — I10 ESSENTIAL (PRIMARY) HYPERTENSION: Chronic | Status: ACTIVE | Noted: 2023-11-04

## 2023-11-08 PROBLEM — E03.9 HYPOTHYROIDISM, UNSPECIFIED: Chronic | Status: ACTIVE | Noted: 2023-11-03

## 2023-11-08 LAB
24R-OH-CALCIDIOL SERPL-MCNC: 36.4 NG/ML — SIGNIFICANT CHANGE UP (ref 30–80)
24R-OH-CALCIDIOL SERPL-MCNC: 36.4 NG/ML — SIGNIFICANT CHANGE UP (ref 30–80)
ADD ON TEST-SPECIMEN IN LAB: SIGNIFICANT CHANGE UP
ADD ON TEST-SPECIMEN IN LAB: SIGNIFICANT CHANGE UP
ALBUMIN SERPL ELPH-MCNC: 1.7 G/DL — LOW (ref 3.3–5)
ALBUMIN SERPL ELPH-MCNC: 1.7 G/DL — LOW (ref 3.3–5)
ALP SERPL-CCNC: 131 U/L — HIGH (ref 40–120)
ALP SERPL-CCNC: 131 U/L — HIGH (ref 40–120)
ALT FLD-CCNC: 33 U/L — SIGNIFICANT CHANGE UP (ref 12–78)
ALT FLD-CCNC: 33 U/L — SIGNIFICANT CHANGE UP (ref 12–78)
ANION GAP SERPL CALC-SCNC: 6 MMOL/L — SIGNIFICANT CHANGE UP (ref 5–17)
ANION GAP SERPL CALC-SCNC: 6 MMOL/L — SIGNIFICANT CHANGE UP (ref 5–17)
APTT BLD: 25.4 SEC — SIGNIFICANT CHANGE UP (ref 24.5–35.6)
APTT BLD: 25.4 SEC — SIGNIFICANT CHANGE UP (ref 24.5–35.6)
AST SERPL-CCNC: 39 U/L — HIGH (ref 15–37)
AST SERPL-CCNC: 39 U/L — HIGH (ref 15–37)
BASOPHILS # BLD AUTO: 0.04 K/UL — SIGNIFICANT CHANGE UP (ref 0–0.2)
BASOPHILS # BLD AUTO: 0.04 K/UL — SIGNIFICANT CHANGE UP (ref 0–0.2)
BASOPHILS NFR BLD AUTO: 0.2 % — SIGNIFICANT CHANGE UP (ref 0–2)
BASOPHILS NFR BLD AUTO: 0.2 % — SIGNIFICANT CHANGE UP (ref 0–2)
BILIRUB SERPL-MCNC: 0.4 MG/DL — SIGNIFICANT CHANGE UP (ref 0.2–1.2)
BILIRUB SERPL-MCNC: 0.4 MG/DL — SIGNIFICANT CHANGE UP (ref 0.2–1.2)
BLD GP AB SCN SERPL QL: SIGNIFICANT CHANGE UP
BLD GP AB SCN SERPL QL: SIGNIFICANT CHANGE UP
BUN SERPL-MCNC: 81 MG/DL — HIGH (ref 7–23)
BUN SERPL-MCNC: 81 MG/DL — HIGH (ref 7–23)
CALCIUM SERPL-MCNC: 8.7 MG/DL — SIGNIFICANT CHANGE UP (ref 8.5–10.1)
CALCIUM SERPL-MCNC: 8.7 MG/DL — SIGNIFICANT CHANGE UP (ref 8.5–10.1)
CHLORIDE SERPL-SCNC: 106 MMOL/L — SIGNIFICANT CHANGE UP (ref 96–108)
CHLORIDE SERPL-SCNC: 106 MMOL/L — SIGNIFICANT CHANGE UP (ref 96–108)
CHOLEST SERPL-MCNC: 109 MG/DL — SIGNIFICANT CHANGE UP
CHOLEST SERPL-MCNC: 109 MG/DL — SIGNIFICANT CHANGE UP
CO2 SERPL-SCNC: 27 MMOL/L — SIGNIFICANT CHANGE UP (ref 22–31)
CO2 SERPL-SCNC: 27 MMOL/L — SIGNIFICANT CHANGE UP (ref 22–31)
CREAT SERPL-MCNC: 2.08 MG/DL — HIGH (ref 0.5–1.3)
CREAT SERPL-MCNC: 2.08 MG/DL — HIGH (ref 0.5–1.3)
CRP SERPL-MCNC: 163 MG/L — HIGH
CRP SERPL-MCNC: 163 MG/L — HIGH
EGFR: 31 ML/MIN/1.73M2 — LOW
EGFR: 31 ML/MIN/1.73M2 — LOW
EOSINOPHIL # BLD AUTO: 0.03 K/UL — SIGNIFICANT CHANGE UP (ref 0–0.5)
EOSINOPHIL # BLD AUTO: 0.03 K/UL — SIGNIFICANT CHANGE UP (ref 0–0.5)
EOSINOPHIL NFR BLD AUTO: 0.2 % — SIGNIFICANT CHANGE UP (ref 0–6)
EOSINOPHIL NFR BLD AUTO: 0.2 % — SIGNIFICANT CHANGE UP (ref 0–6)
FERRITIN SERPL-MCNC: 1082 NG/ML — HIGH (ref 30–400)
FERRITIN SERPL-MCNC: 1082 NG/ML — HIGH (ref 30–400)
FOLATE SERPL-MCNC: >20 NG/ML — SIGNIFICANT CHANGE UP
FOLATE SERPL-MCNC: >20 NG/ML — SIGNIFICANT CHANGE UP
GALACTOMANNAN AG SERPL-ACNC: 0.1 INDEX — SIGNIFICANT CHANGE UP (ref 0–0.49)
GALACTOMANNAN AG SERPL-ACNC: 0.1 INDEX — SIGNIFICANT CHANGE UP (ref 0–0.49)
GLUCOSE BLDC GLUCOMTR-MCNC: 208 MG/DL — HIGH (ref 70–99)
GLUCOSE BLDC GLUCOMTR-MCNC: 208 MG/DL — HIGH (ref 70–99)
GLUCOSE BLDC GLUCOMTR-MCNC: 273 MG/DL — HIGH (ref 70–99)
GLUCOSE BLDC GLUCOMTR-MCNC: 273 MG/DL — HIGH (ref 70–99)
GLUCOSE BLDC GLUCOMTR-MCNC: 289 MG/DL — HIGH (ref 70–99)
GLUCOSE BLDC GLUCOMTR-MCNC: 289 MG/DL — HIGH (ref 70–99)
GLUCOSE BLDC GLUCOMTR-MCNC: 384 MG/DL — HIGH (ref 70–99)
GLUCOSE BLDC GLUCOMTR-MCNC: 384 MG/DL — HIGH (ref 70–99)
GLUCOSE SERPL-MCNC: 222 MG/DL — HIGH (ref 70–99)
GLUCOSE SERPL-MCNC: 222 MG/DL — HIGH (ref 70–99)
HCT VFR BLD CALC: 22.7 % — LOW (ref 39–50)
HCT VFR BLD CALC: 22.7 % — LOW (ref 39–50)
HDLC SERPL-MCNC: 47 MG/DL — SIGNIFICANT CHANGE UP
HDLC SERPL-MCNC: 47 MG/DL — SIGNIFICANT CHANGE UP
HGB BLD-MCNC: 7.4 G/DL — LOW (ref 13–17)
HGB BLD-MCNC: 7.4 G/DL — LOW (ref 13–17)
IMM GRANULOCYTES NFR BLD AUTO: 1.3 % — HIGH (ref 0–0.9)
IMM GRANULOCYTES NFR BLD AUTO: 1.3 % — HIGH (ref 0–0.9)
INR BLD: 1.13 RATIO — SIGNIFICANT CHANGE UP (ref 0.85–1.18)
INR BLD: 1.13 RATIO — SIGNIFICANT CHANGE UP (ref 0.85–1.18)
IRON SATN MFR SERPL: 12 % — LOW (ref 16–55)
IRON SATN MFR SERPL: 12 % — LOW (ref 16–55)
IRON SATN MFR SERPL: 18 UG/DL — LOW (ref 45–165)
IRON SATN MFR SERPL: 18 UG/DL — LOW (ref 45–165)
LIPID PNL WITH DIRECT LDL SERPL: 51 MG/DL — SIGNIFICANT CHANGE UP
LIPID PNL WITH DIRECT LDL SERPL: 51 MG/DL — SIGNIFICANT CHANGE UP
LYMPHOCYTES # BLD AUTO: 0.16 K/UL — LOW (ref 1–3.3)
LYMPHOCYTES # BLD AUTO: 0.16 K/UL — LOW (ref 1–3.3)
LYMPHOCYTES # BLD AUTO: 1 % — LOW (ref 13–44)
LYMPHOCYTES # BLD AUTO: 1 % — LOW (ref 13–44)
MAGNESIUM SERPL-MCNC: 2.6 MG/DL — SIGNIFICANT CHANGE UP (ref 1.6–2.6)
MAGNESIUM SERPL-MCNC: 2.6 MG/DL — SIGNIFICANT CHANGE UP (ref 1.6–2.6)
MCHC RBC-ENTMCNC: 30.6 PG — SIGNIFICANT CHANGE UP (ref 27–34)
MCHC RBC-ENTMCNC: 30.6 PG — SIGNIFICANT CHANGE UP (ref 27–34)
MCHC RBC-ENTMCNC: 32.6 GM/DL — SIGNIFICANT CHANGE UP (ref 32–36)
MCHC RBC-ENTMCNC: 32.6 GM/DL — SIGNIFICANT CHANGE UP (ref 32–36)
MCV RBC AUTO: 93.8 FL — SIGNIFICANT CHANGE UP (ref 80–100)
MCV RBC AUTO: 93.8 FL — SIGNIFICANT CHANGE UP (ref 80–100)
MONOCYTES # BLD AUTO: 0.49 K/UL — SIGNIFICANT CHANGE UP (ref 0–0.9)
MONOCYTES # BLD AUTO: 0.49 K/UL — SIGNIFICANT CHANGE UP (ref 0–0.9)
MONOCYTES NFR BLD AUTO: 3 % — SIGNIFICANT CHANGE UP (ref 2–14)
MONOCYTES NFR BLD AUTO: 3 % — SIGNIFICANT CHANGE UP (ref 2–14)
NEUTROPHILS # BLD AUTO: 15.52 K/UL — HIGH (ref 1.8–7.4)
NEUTROPHILS # BLD AUTO: 15.52 K/UL — HIGH (ref 1.8–7.4)
NEUTROPHILS NFR BLD AUTO: 94.3 % — HIGH (ref 43–77)
NEUTROPHILS NFR BLD AUTO: 94.3 % — HIGH (ref 43–77)
NON HDL CHOLESTEROL: 62 MG/DL — SIGNIFICANT CHANGE UP
NON HDL CHOLESTEROL: 62 MG/DL — SIGNIFICANT CHANGE UP
NT-PROBNP SERPL-SCNC: 5501 PG/ML — HIGH (ref 0–450)
NT-PROBNP SERPL-SCNC: 5501 PG/ML — HIGH (ref 0–450)
PHOSPHATE SERPL-MCNC: 3.3 MG/DL — SIGNIFICANT CHANGE UP (ref 2.5–4.5)
PHOSPHATE SERPL-MCNC: 3.3 MG/DL — SIGNIFICANT CHANGE UP (ref 2.5–4.5)
PLATELET # BLD AUTO: 220 K/UL — SIGNIFICANT CHANGE UP (ref 150–400)
PLATELET # BLD AUTO: 220 K/UL — SIGNIFICANT CHANGE UP (ref 150–400)
POTASSIUM SERPL-MCNC: 5.3 MMOL/L — SIGNIFICANT CHANGE UP (ref 3.5–5.3)
POTASSIUM SERPL-MCNC: 5.3 MMOL/L — SIGNIFICANT CHANGE UP (ref 3.5–5.3)
POTASSIUM SERPL-SCNC: 5.3 MMOL/L — SIGNIFICANT CHANGE UP (ref 3.5–5.3)
POTASSIUM SERPL-SCNC: 5.3 MMOL/L — SIGNIFICANT CHANGE UP (ref 3.5–5.3)
PROCALCITONIN SERPL-MCNC: 3.23 NG/ML — HIGH (ref 0.02–0.1)
PROCALCITONIN SERPL-MCNC: 3.23 NG/ML — HIGH (ref 0.02–0.1)
PROT SERPL-MCNC: 6.2 GM/DL — SIGNIFICANT CHANGE UP (ref 6–8.3)
PROT SERPL-MCNC: 6.2 GM/DL — SIGNIFICANT CHANGE UP (ref 6–8.3)
PROTHROM AB SERPL-ACNC: 12.7 SEC — SIGNIFICANT CHANGE UP (ref 9.5–13)
PROTHROM AB SERPL-ACNC: 12.7 SEC — SIGNIFICANT CHANGE UP (ref 9.5–13)
RBC # BLD: 2.42 M/UL — LOW (ref 4.2–5.8)
RBC # BLD: 2.42 M/UL — LOW (ref 4.2–5.8)
RBC # FLD: 15.9 % — HIGH (ref 10.3–14.5)
RBC # FLD: 15.9 % — HIGH (ref 10.3–14.5)
SODIUM SERPL-SCNC: 139 MMOL/L — SIGNIFICANT CHANGE UP (ref 135–145)
SODIUM SERPL-SCNC: 139 MMOL/L — SIGNIFICANT CHANGE UP (ref 135–145)
TIBC SERPL-MCNC: 158 UG/DL — LOW (ref 220–430)
TIBC SERPL-MCNC: 158 UG/DL — LOW (ref 220–430)
TRIGL SERPL-MCNC: 43 MG/DL — SIGNIFICANT CHANGE UP
TRIGL SERPL-MCNC: 43 MG/DL — SIGNIFICANT CHANGE UP
TSH SERPL-MCNC: 0.83 UU/ML — SIGNIFICANT CHANGE UP (ref 0.34–4.82)
TSH SERPL-MCNC: 0.83 UU/ML — SIGNIFICANT CHANGE UP (ref 0.34–4.82)
UIBC SERPL-MCNC: 139 UG/DL — SIGNIFICANT CHANGE UP (ref 110–370)
UIBC SERPL-MCNC: 139 UG/DL — SIGNIFICANT CHANGE UP (ref 110–370)
VIT B12 SERPL-MCNC: 1662 PG/ML — HIGH (ref 232–1245)
VIT B12 SERPL-MCNC: 1662 PG/ML — HIGH (ref 232–1245)
WBC # BLD: 16.45 K/UL — HIGH (ref 3.8–10.5)
WBC # BLD: 16.45 K/UL — HIGH (ref 3.8–10.5)
WBC # FLD AUTO: 16.45 K/UL — HIGH (ref 3.8–10.5)
WBC # FLD AUTO: 16.45 K/UL — HIGH (ref 3.8–10.5)

## 2023-11-08 PROCEDURE — 99231 SBSQ HOSP IP/OBS SF/LOW 25: CPT

## 2023-11-08 PROCEDURE — 99232 SBSQ HOSP IP/OBS MODERATE 35: CPT

## 2023-11-08 PROCEDURE — 74176 CT ABD & PELVIS W/O CONTRAST: CPT | Mod: 26

## 2023-11-08 PROCEDURE — 71045 X-RAY EXAM CHEST 1 VIEW: CPT | Mod: 26

## 2023-11-08 PROCEDURE — 76770 US EXAM ABDO BACK WALL COMP: CPT | Mod: 26

## 2023-11-08 RX ORDER — INSULIN LISPRO 100/ML
4 VIAL (ML) SUBCUTANEOUS
Refills: 0 | Status: DISCONTINUED | OUTPATIENT
Start: 2023-11-08 | End: 2023-11-21

## 2023-11-08 RX ADMIN — Medication 1 TABLET(S): at 10:40

## 2023-11-08 RX ADMIN — HEPARIN SODIUM 5000 UNIT(S): 5000 INJECTION INTRAVENOUS; SUBCUTANEOUS at 06:05

## 2023-11-08 RX ADMIN — Medication 3 MILLILITER(S): at 20:26

## 2023-11-08 RX ADMIN — Medication 2 UNIT(S): at 13:05

## 2023-11-08 RX ADMIN — Medication 3 MILLIGRAM(S): at 23:34

## 2023-11-08 RX ADMIN — VORICONAZOLE 200 MILLIGRAM(S): 10 INJECTION, POWDER, LYOPHILIZED, FOR SOLUTION INTRAVENOUS at 21:22

## 2023-11-08 RX ADMIN — FLUTICASONE FUROATE, UMECLIDINIUM BROMIDE AND VILANTEROL TRIFENATATE 1 PUFF(S): 200; 62.5; 25 POWDER RESPIRATORY (INHALATION) at 12:00

## 2023-11-08 RX ADMIN — VORICONAZOLE 200 MILLIGRAM(S): 10 INJECTION, POWDER, LYOPHILIZED, FOR SOLUTION INTRAVENOUS at 10:39

## 2023-11-08 RX ADMIN — Medication 3 MILLILITER(S): at 15:45

## 2023-11-08 RX ADMIN — Medication 3 MILLILITER(S): at 08:23

## 2023-11-08 RX ADMIN — Medication 25 MICROGRAM(S): at 06:05

## 2023-11-08 RX ADMIN — Medication 20 MILLIGRAM(S): at 10:40

## 2023-11-08 RX ADMIN — Medication 1 MILLIGRAM(S): at 10:39

## 2023-11-08 RX ADMIN — AMLODIPINE BESYLATE 2.5 MILLIGRAM(S): 2.5 TABLET ORAL at 10:39

## 2023-11-08 RX ADMIN — INSULIN GLARGINE 8 UNIT(S): 100 INJECTION, SOLUTION SUBCUTANEOUS at 21:30

## 2023-11-08 RX ADMIN — Medication 100 MILLIGRAM(S): at 23:33

## 2023-11-08 RX ADMIN — ATORVASTATIN CALCIUM 40 MILLIGRAM(S): 80 TABLET, FILM COATED ORAL at 21:22

## 2023-11-08 RX ADMIN — Medication 6: at 17:53

## 2023-11-08 RX ADMIN — Medication 10: at 13:05

## 2023-11-08 RX ADMIN — Medication 1: at 21:30

## 2023-11-08 RX ADMIN — Medication 3 MILLILITER(S): at 01:50

## 2023-11-08 RX ADMIN — Medication 20 MILLIGRAM(S): at 21:22

## 2023-11-08 NOTE — PROGRESS NOTE ADULT - ASSESSMENT
83y old Male with PMH HTN, HPL, Chronic anemia, COPD, lung CA follows at [facility] [place] (last chemotherapy and RT 05/2023, not currently receiving chemo treatment), s/p right upper lobectomy, AAA, Hypothyroidism, CKD stage IIIa referred to me to ed for continued sob, cough despite abx treatment found to have large lower lobe thick walled cavity  1. 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 discussed with wife.  -started on voriconazole. surgery is more defitive therapy however he is high surgical risk  -reviewed imaging from [place] supplied by wife - it looks like he has had blebs in the past and these are what are infected - they look half filled with fluid with thicker borders, which would argue against fungal infection/mrsa as there is no necrosis involved in its pathogenesis  -discussed bronch with wife. consulted thoracic for bronch, plan for tomorrow  2. pneumonia: continue levaquin  -supplemental o2  3. copd: continue trelegy  4. ? radiation induced pneumonitis: continue methylprednisolone  5. anemia: receiving 1 unit prbc

## 2023-11-08 NOTE — PROGRESS NOTE ADULT - SUBJECTIVE AND OBJECTIVE BOX
Subjective:  Pt seen, no complaints. States coughed up blood but not currently. Noted H and H dropped this AM and rising BUN/creatinine    Vital Signs:  Vital Signs Last 24 Hrs  T(C): 36.7 (11-08-23 @ 09:25), Max: 37.2 (11-07-23 @ 15:37)  T(F): 98.1 (11-08-23 @ 09:25), Max: 98.9 (11-07-23 @ 15:37)  HR: 85 (11-08-23 @ 09:25) (59 - 120)  BP: 144/54 (11-08-23 @ 09:25) (131/68 - 153/69)  RR: 18 (11-08-23 @ 09:25) (18 - 18)  SpO2: 94% (11-08-23 @ 09:25) (88% - 98%) on (O2)    Telemetry/Alarms:    Relevant labs, radiology and Medications reviewed                        7.4    16.45 )-----------( 220      ( 08 Nov 2023 06:01 )             22.7     11-08    139  |  106  |  81<H>  ----------------------------<  222<H>  5.3   |  27  |  2.08<H>    Ca    8.7      08 Nov 2023 06:01  Phos  3.3     11-08  Mg     2.6     11-08    TPro  6.2  /  Alb  1.7<L>  /  TBili  0.4  /  DBili  x   /  AST  39<H>  /  ALT  33  /  AlkPhos  131<H>  11-08    PT/INR - ( 08 Nov 2023 06:01 )   PT: 12.7 sec;   INR: 1.13 ratio         PTT - ( 08 Nov 2023 06:01 )  PTT:25.4 sec  MEDICATIONS  (STANDING):  albuterol/ipratropium for Nebulization 3 milliLiter(s) Nebulizer every 6 hours  amLODIPine   Tablet 2.5 milliGRAM(s) Oral daily  atorvastatin 40 milliGRAM(s) Oral at bedtime  dextrose 5%. 1000 milliLiter(s) (100 mL/Hr) IV Continuous <Continuous>  dextrose 5%. 1000 milliLiter(s) (50 mL/Hr) IV Continuous <Continuous>  dextrose 50% Injectable 12.5 Gram(s) IV Push once  dextrose 50% Injectable 25 Gram(s) IV Push once  dextrose 50% Injectable 25 Gram(s) IV Push once  fluticasone furoate/umeclidinium/vilanterol 100-62.5-25 MICROgram(s) Inhaler 1 Puff(s) Inhalation daily  folic acid 1 milliGRAM(s) Oral daily  glucagon  Injectable 1 milliGRAM(s) IntraMuscular once  insulin glargine Injectable (LANTUS) 8 Unit(s) SubCutaneous at bedtime  insulin lispro (ADMELOG) corrective regimen sliding scale   SubCutaneous at bedtime  insulin lispro (ADMELOG) corrective regimen sliding scale   SubCutaneous Before meals and at bedtime  insulin lispro Injectable (ADMELOG) 2 Unit(s) SubCutaneous three times a day before meals  levoFLOXacin IVPB 500 milliGRAM(s) IV Intermittent every 24 hours  levoFLOXacin IVPB      levothyroxine 25 MICROGram(s) Oral daily  methylPREDNISolone sodium succinate Injectable 20 milliGRAM(s) IV Push every 12 hours  multivitamin 1 Tablet(s) Oral daily  voriconazole 200 milliGRAM(s) Oral every 12 hours    MEDICATIONS  (PRN):  acetaminophen     Tablet .. 650 milliGRAM(s) Oral every 6 hours PRN Temp greater or equal to 38C (100.4F), Mild Pain (1 - 3)  aluminum hydroxide/magnesium hydroxide/simethicone Suspension 30 milliLiter(s) Oral every 4 hours PRN Dyspepsia  benzonatate 100 milliGRAM(s) Oral three times a day PRN Cough  dextrose Oral Gel 15 Gram(s) Oral once PRN Blood Glucose LESS THAN 70 milliGRAM(s)/deciliter  melatonin 3 milliGRAM(s) Oral at bedtime PRN Insomnia  ondansetron Injectable 4 milliGRAM(s) IV Push every 8 hours PRN Nausea and/or Vomiting      Physical exam  Neuro: Alert Awake,   Pulm:  decreased b/l , cough  CV: RRR   Abd:  Soft   Extremities:  warm     I&O's Summary    07 Nov 2023 07:01  -  08 Nov 2023 07:00  --------------------------------------------------------  IN: 0 mL / OUT: 450 mL / NET: -450 mL    08 Nov 2023 07:01  -  08 Nov 2023 10:47  --------------------------------------------------------  IN: 0 mL / OUT: 100 mL / NET: -100 mL        Assessment  83y Male  w/ PAST MEDICAL & SURGICAL HISTORY:  COPD (chronic obstructive pulmonary disease)      Lung cancer      Anemia of chronic disease      CKD (chronic kidney disease), stage III      Hypothyroidism      AAA (abdominal aortic aneurysm)      HTN (hypertension)      HLD (hyperlipidemia)      Thrombocytopenia      S/P lobectomy of lung      admitted with complaints of Patient is a 83y old  Male who presents with a chief complaint of Right lung pneumonia with thick-walled cavity. (08 Nov 2023 09:02)  .  82 YO M w/pmhx of mild dementia, HTN, Chronic anemia, COPD, lung CA follows at [facility] [place] (last chemotherapy and RT 05/2023, not currently receiving chemo treatment), s/p right upper lobectomy, AAA, Hypothyroidism, CKD stage IIIa, recently was being treated outpatient for pneumonia w/Augmentin, azithromycin, and Prednisone. Patient had CT chest done at that time 10/3/23, now uploaded. Patient was sent to ED 11/2/23 by pulmonologist, Dr. Olievra. Patient had CT chest done here, which is concerning for possible aspergilloma and radiation pneumonitis, recurrent CA?. Called for bronch    Plan  bronch w BAL/possible brush, possible biopsy, plan to do Awake bronch (no general anesthesia)  moniter hemoptysis  1u PRBC as per medical team  medical optimization for OR bronch tomorrow  consent to be obtained from wife  cont ABX as per ID    Discussed with Cardiothoracic Team at AM rounds.

## 2023-11-08 NOTE — PROGRESS NOTE ADULT - SUBJECTIVE AND OBJECTIVE BOX
Subjective: no new events  awaiting bronchoscopy  family at bedside  receiving 1 unit prbc due to anemia    Review of Systems:  All 10 systems reviewed in detailed and found to be negative with the exception of what has already been described above    Allergies:  penicillin (Unknown)    Meds  MEDICATIONS  (STANDING):  albuterol/ipratropium for Nebulization 3 milliLiter(s) Nebulizer every 6 hours  amLODIPine   Tablet 2.5 milliGRAM(s) Oral daily  atorvastatin 40 milliGRAM(s) Oral at bedtime  dextrose 5%. 1000 milliLiter(s) (50 mL/Hr) IV Continuous <Continuous>  dextrose 5%. 1000 milliLiter(s) (100 mL/Hr) IV Continuous <Continuous>  dextrose 50% Injectable 12.5 Gram(s) IV Push once  dextrose 50% Injectable 25 Gram(s) IV Push once  dextrose 50% Injectable 25 Gram(s) IV Push once  fluticasone furoate/umeclidinium/vilanterol 100-62.5-25 MICROgram(s) Inhaler 1 Puff(s) Inhalation daily  folic acid 1 milliGRAM(s) Oral daily  glucagon  Injectable 1 milliGRAM(s) IntraMuscular once  insulin glargine Injectable (LANTUS) 8 Unit(s) SubCutaneous at bedtime  insulin lispro (ADMELOG) corrective regimen sliding scale   SubCutaneous at bedtime  insulin lispro (ADMELOG) corrective regimen sliding scale   SubCutaneous Before meals and at bedtime  insulin lispro Injectable (ADMELOG) 4 Unit(s) SubCutaneous three times a day before meals  levoFLOXacin IVPB      levoFLOXacin IVPB 500 milliGRAM(s) IV Intermittent every 24 hours  levothyroxine 25 MICROGram(s) Oral daily  methylPREDNISolone sodium succinate Injectable 20 milliGRAM(s) IV Push every 12 hours  multivitamin 1 Tablet(s) Oral daily  voriconazole 200 milliGRAM(s) Oral every 12 hours    MEDICATIONS  (PRN):  acetaminophen     Tablet .. 650 milliGRAM(s) Oral every 6 hours PRN Temp greater or equal to 38C (100.4F), Mild Pain (1 - 3)  aluminum hydroxide/magnesium hydroxide/simethicone Suspension 30 milliLiter(s) Oral every 4 hours PRN Dyspepsia  benzonatate 100 milliGRAM(s) Oral three times a day PRN Cough  dextrose Oral Gel 15 Gram(s) Oral once PRN Blood Glucose LESS THAN 70 milliGRAM(s)/deciliter  melatonin 3 milliGRAM(s) Oral at bedtime PRN Insomnia  ondansetron Injectable 4 milliGRAM(s) IV Push every 8 hours PRN Nausea and/or Vomiting    Physical Exam  T(C): 36.6 (11-08-23 @ 21:16), Max: 37 (11-07-23 @ 23:46)  HR: 120 (11-08-23 @ 21:16) (77 - 120)  BP: 127/70 (11-08-23 @ 21:16) (91/74 - 144/54)  RR: 30 (11-08-23 @ 21:16) (18 - 30)  SpO2: 94% (11-08-23 @ 21:16) (88% - 98%)  Gen: Alert, oriented, no distress, on hf nc  HEENT: Anicteric sclera, moist mucous membranes  Cardio: Regular rhythm and rate, normal S1S2, no murmurs  Resp: Crackles, congested  GI: Nontender, nondistended, normoactive bowel sounds  Ext: No cyanosis, clubbing or edema  Neuro: Nonfocal      Labs:                        7.4    16.45 )-----------( 220      ( 08 Nov 2023 06:01 )             22.7     11-08    139  |  106  |  81<H>  ----------------------------<  222<H>  5.3   |  27  |  2.08<H>    Ca    8.7      08 Nov 2023 06:01  Phos  3.3     11-08  Mg     2.6     11-08    TPro  6.2  /  Alb  1.7<L>  /  TBili  0.4  /  DBili  x   /  AST  39<H>  /  ALT  33  /  AlkPhos  131<H>  11-08    PT/INR - ( 08 Nov 2023 06:01 )   PT: 12.7 sec;   INR: 1.13 ratio         PTT - ( 08 Nov 2023 06:01 )  PTT:25.4 sec  Urinalysis Basic - ( 08 Nov 2023 06:01 )    Color: x / Appearance: x / SG: x / pH: x  Gluc: 222 mg/dL / Ketone: x  / Bili: x / Urobili: x   Blood: x / Protein: x / Nitrite: x   Leuk Esterase: x / RBC: x / WBC x   Sq Epi: x / Non Sq Epi: x / Bacteria: x    < from: CT Chest No Cont (11.02.23 @ 16:04) >  ACC: [id number] EXAM:  CT CHEST   ORDERED BY: SARAH YOO     PROCEDURE DATE:  11/02/2023          INTERPRETATION:  INDICATION: Shortness of breath, cough, lung cancer   history, last chemotherapy and radiation treatment in May 2023    TECHNIQUE: Helical acquisition images of the chest without intravenous   contrast. Maximum intensity projection images were generated.    COMPARISON: 12/29/2020 chest x-ray.    FINDINGS:    LUNGS/AIRWAYS/PLEURA: Right upper lobectomy. Right lower lobe   multiloculated thick-walled 11 cm cavity (best seen in its entirety on   601-59) containing fluid and spongelike material. Consolidation within   the middle lobe and inferior aspect of the right lower lobe with angular   borders suggesting prior radiation. Multiple indistinct nodules of   varying density in the right lower and middle lobes, mostly adjacent to   the cavity. Small branching opacities in the peripheral left upper and   lower lobes, compatible with distal airway impaction. Trace left pleural   effusion. Mild right apical pleural thickening.    LYMPH NODES/MEDIASTINUM: No lymphadenopathy.    HEART/VASCULATURE: Normal heart size. Unremarkable pericardium. Coronary   artery calcifications. Normal caliber aorta.    UPPER ABDOMEN: Unremarkable.    BONES/SOFT TISSUES: Old sternal fracture. Mild loss of height of the L1   vertebral body.      IMPRESSION:    Right upper lobectomy.    Larger right lower lobe thick-walled cavity which may be infectious   and/or residua of reported treated tumor. 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    Consolidation within the right lower and middle lobe compatible with   radiation change.    Multiple indistinct nodules in the right lungwhich may be infectious or   radiation pneumonitis.    ct naveed 10/3 uploaded and reviewed

## 2023-11-08 NOTE — PROGRESS NOTE ADULT - ASSESSMENT
83-year-old M with PMHx HTN, HLD, chronic anemia, COPD, lung CA s/p RUL lobectomy (follows at [facility] [place], last chemotherapy/RT 5/2023, not currently on treatment), AAA, hypothyroidism, CKD stage IIIa sent in to ED on 11/2/23  by pulmonologist MD Olivera with c/o SOB, dyspnea on minimal exertion, and cough. 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     RLL PNA with possible radiation pneumonitis and /or aspergilloma 2/2 immunocompromised status,   h/o Lung cancer s/p RUL lobectomy   Hemoptysis  - CT chest: Larger right lower lobe thick-walled cavity which may be infectious and/or residua of reported treated tumor. Spongelike material within the cavity can be seen with aspergilloma (prior to fungus ball formation).   Consolidation within the right lower and middle lobe compatible with radiation change.  - leukocytosis stable (WBC 17.70 from 17.35)   - blood cultures -no growth, sputum culture - normal respiratory  - ID/pulm consult pending for poss bronchoscopy  - c/w voriconazole  - c/w trelegy ellipta, c/w levaquin to cover atypical/psuedomonas (allergy to PCN)   - repeat CXR 11/8 - worsening of opacities over right lung , official reading pending   - 2 d ech0 11/8 - EF 60% , no significant valvular disease   - hemoptysis persist , stop heparin     # Acute hyperglycemia, possible steroid-induced , Prediabetes with A1C 6.3   -  on AM labs  - A1c results 6.3 - consistent with Impaired Fasting Glucose, new onset DM II ruled out    # Chronic anemia, likely 2/2 chemotherapy, worsening  - decreased on AM labs from admission (Hgb 9.2-->8.2, Hct 27.8--> 25.2)   - no s/s bleeding, asymptomatic, continue to monitor/trend  - 11/8 - 1 unit PRBC due to hemoptysis and worsening of anemia    # CHRISTINA on CKD stage IIIa, worsening   - Cr improving, 1.38--> 1.2 and through out hospital course worsened to 1.55 and then to 1.75  - continue to monitor/trend  - Creatinine Trend:  2.0 <--1.79<--, 1.75<--, 1.55<--, 1.44<--, 1.44<--, 1.20  - stop losartan   - CT abd and pelvis - to r/o acute pathology    # HTN  - stop losartan   - start amlodipine 2.5    #  HLD, hypothyroidism  - c/w  atorvastatin, levothyroxine    # Hx lung CA s/p RUL lobectomy/chemo/RT  - f/u with [facility]     # DVT Ppx  - heparin SQ    wife Anthony [phone] updated 11/8/23    Code status - DNR/DNI , will be revoke for bronchoscopy and reinstated afterwards d/w wife at length    Pre-Surgical Evaluation medical  Clearance for bronchoscopy under local anaesthesia    Clinical cardiac predictor(s):  pulmonary disease, COPD ,  CKD  Surgical risk level:  Intermediate  Functional Status:  POOR  Revised Cardiac Risk Index (RCRI) for Pre-Operative Risk: 1 points, with 6    % 30 days risk of death, MI, or cardiac risk    [email or web address]  Vitals and labs, imaging , EKG  reviewed  Patient may take all  meds on the day of the surgery with a sip of water prior surgery , NPO from midnight  Patient medically optimized for needed procedure  and there is no absolute contraindications for needed surgery

## 2023-11-08 NOTE — PROGRESS NOTE ADULT - ASSESSMENT
82 y/o Male with h/o lung CA follows at [facility] [place] s/p chemotherapy and RT 05/2023, s/p right upper lobectomy, HTN, HPL, Chronic anemia, COPD, AAA, Hypothyroidism, CKD stage IIIa was admitted on 11/2 for increased shortness of breath, dyspnea on minimal exertion, and cough. Reportedly, he had CT of the chest performed 10/3 which demonstrated consolidation, was started on augmentin/prednisone/azithro 10/6 x 1 week. Pt completed course of medications however wife noted worsening cough and pt with fever Tmax of 103.9 on 10/26. Wife then took pt to pulmonologist again who started pt on second round of the same medications, instructed wife to stop giving pt tylenol wife notes fevers self-resolved. Since then pt decreased energy with episode of being unsteady on feet. Today is 6th day of taking medications, had f/u scheduled with pulmonologist who sent pt to ED for eval.  He feels tired and weak. In ER he received ceftriaxone.    1. Large RLL pulmonary cavity. 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 Possible postobstructive pneumonia. Lung Ca s/p right upper lobectomy. Radiation pneumonitis. Allergy to PCN.    -respiratory improved  -leukocytosis  -sputum c/s noted  -on levofloxacin 500 mg IV qd # 5 and voriconazole 200 mg PO q12h # 3  -tolerating abx well so far; no side effects noted  -pulmonary evaluation ?diagnostic bronchoscopy   -if this is a fungus ball, antifungal therapy will not be effective, he may need surgery to remove the fungal ball area  -thoracic evaluation appreciated  -continue abx coverage   -monitor temps  -f/u CBC  -supportive care  2. Other issues:   -care per medicine    d/w medicine team     84 y/o Male with h/o lung CA follows at [facility] [place] s/p chemotherapy and RT 05/2023, s/p right upper lobectomy, HTN, HPL, Chronic anemia, COPD, AAA, Hypothyroidism, CKD stage IIIa was admitted on 11/2 for increased shortness of breath, dyspnea on minimal exertion, and cough. Reportedly, he had CT of the chest performed 10/3 which demonstrated consolidation, was started on augmentin/prednisone/azithro 10/6 x 1 week. Pt completed course of medications however wife noted worsening cough and pt with fever Tmax of 103.9 on 10/26. Wife then took pt to pulmonologist again who started pt on second round of the same medications, instructed wife to stop giving pt tylenol wife notes fevers self-resolved. Since then pt decreased energy with episode of being unsteady on feet. Today is 6th day of taking medications, had f/u scheduled with pulmonologist who sent pt to ED for eval.  He feels tired and weak. In ER he received ceftriaxone.    1. Large RLL pulmonary cavity. 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 Possible postobstructive pneumonia. Lung Ca s/p right upper lobectomy. Radiation pneumonitis. Allergy to PCN.    -respiratory improved  -leukocytosis  -sputum c/s noted  -on levofloxacin 500 mg IV qd # 7 and voriconazole 200 mg PO q12h # 4  -tolerating abx well so far; no side effects noted  -pulmonary evaluation ?diagnostic bronchoscopy   -if this is a fungus ball, antifungal therapy will not be effective, he may need surgery to remove the fungal ball area  -thoracic evaluation appreciated  -complete abx therapy with levofloxacin  -continue antifungal therapy   -monitor temps  -f/u CBC  -supportive care  2. Other issues:   -care per medicine    d/w medicine team

## 2023-11-08 NOTE — PROGRESS NOTE ADULT - SUBJECTIVE AND OBJECTIVE BOX
cc - cough, weakness        83-year-old M with PMHx HTN, HLD, chronic anemia, COPD, lung CA s/p RUL [place] ([place] [facility] [place]/2023, not currently on treatment), AAA, hypothyroidism, CKD stage IIIa sent in to ED on 11/2/23  by pulmonologist MD Olivera with c/o SOB, dyspnea on minimal exertion, and cough. Pt had outpatient CT of chest performed 10/3 which demonstrated consolidation- was started on Augmentin/Prednisone/Azithromycin 10/6 x 1 week. Pt completed full course of medication however wife noted worsening cough and pt with fever TMax 103.9 on 10/26. Wife then took pt to pulm office again who began 2nd round of same medications. Fevers self resolved. Since 2nd round pt with decreased energy and episode of being unsteady on feet yesterday 11/1. Went to pulm office 11/2 for f/u and was sent to ED for eval. Pt endorses lethargy and weakness on arrival. Denies chest pain, palpitations, N/V, abdominal pain, diarrhea, dysuria.     Upgraded 11/3 to SICU for acute hypoxic resp failure requiring HFNC.  Off HFNC since 11/4 11/6/23 Patient resting in bed comfortably with wife at bedside. States he still feels short of breath with small amount of movement. No cp, no n/v.   11/7 - no events, afebrile, + productive cough, denies cp, + dyspnea, + gen weakness, no abdominal pain, tolerating po intake  11/8 - afebrile, dyspnea overnight, denies cp, + cough with hemoptysis , off BIPAP in am, tolerating po intake    Review of system- Rest of the review of system are negative except mentioned in HPI, poor historian      Vital sings reviewed for last 24 h  T(C): 36.7 (11-08-23 @ 16:30), Max: 37 (11-07-23 @ 23:46)  T(F): 98.1 (11-08-23 @ 16:30), Max: 98.6 (11-07-23 @ 23:46)  HR: 114 (11-08-23 @ 16:30) (85 - 120)  BP: 122/80 (11-08-23 @ 16:30) (91/74 - 144/54)  RR: 18 (11-08-23 @ 16:30) (18 - 18)  SpO2: 94% (11-08-23 @ 16:30) (88% - 98%)  Wt(kg): --  Daily     Daily   CAPILLARY BLOOD GLUCOSE      POCT Blood Glucose.: 384 mg/dL (08 Nov 2023 13:02)  POCT Blood Glucose.: 208 mg/dL (08 Nov 2023 08:03)  POCT Blood Glucose.: 306 mg/dL (07 Nov 2023 21:44)  POCT Blood Glucose.: 358 mg/dL (07 Nov 2023 17:16)      PHYSICAL EXAM:    Constitutional: NAD, awake and alert   HEENT: PERR, EOMI, Normal Hearing, MMM  Neck: Soft and supple, No LAD, No JVD  Respiratory: Breath sounds decreased over right lung, normal on left side ,  No wheezing, rales, + rhonchi  Cardiovascular: S1 and S2, regular rate and rhythm, no Murmurs, gallops or rubs  Gastrointestinal: Bowel Sounds present, soft, nontender, nondistended, no guarding, no rebound  Extremities: trace LE  peripheral edema  Vascular: 2+ peripheral pulses  Neurological: A/O x 3, no focal deficits  Musculoskeletal: 5/5 strength b/l upper and lower extremities  Skin: No rashes  rectal : deferred, not indicated      All labs radiology and other studies reviewed and interpreted                         7.4    16.45 )-----------( 220      ( 08 Nov 2023 06:01 )             22.7     11-08    139  |  106  |  81<H>  ----------------------------<  222<H>  5.3   |  27  |  2.08<H>    Ca    8.7      08 Nov 2023 06:01  Phos  3.3     11-08  Mg     2.6     11-08    TPro  6.2  /  Alb  1.7<L>  /  TBili  0.4  /  DBili  x   /  AST  39<H>  /  ALT  33  /  AlkPhos  131<H>  11-08    CARDIAC MARKERS ( 07 Nov 2023 07:11 )  x     / x     / 182 U/L / x     / x          LIVER FUNCTIONS - ( 08 Nov 2023 06:01 )  Alb: 1.7 g/dL / Pro: 6.2 gm/dL / ALK PHOS: 131 U/L / ALT: 33 U/L / AST: 39 U/L / GGT: x               Culture - Blood (11.04.23 @ 04:21) Culture - Sputum (11.03.23 @ 14:16)    Gram Stain:   Rare Squamous epithelial cells per low power field  No polymorphonuclear cells seen per low power field  Rare Gram positive cocci in pairs per oil power field  Rare Gram Negative Rods per oil power field   Specimen Source: .Sputum Sputum   Culture Results:   Normal Respiratory Marguerite present    Culture - Blood (11.04.23 @ 04:21)    Specimen Source: .Blood None   Culture Results:   No growth at 72 Hours    Culture - Urine (11.02.23 @ 16:40)    Specimen Source: Clean Catch Clean Catch (Midstream)   Culture Results:   No growth     Xray Chest 1 View- PORTABLE-Urgent (Xray Chest 1 View- PORTABLE-Urgent .) (11.04.23 @ 01:35) >  Stable right midlung and upper lung opacities.Left lung remains clear.   No significant change from November 2    IMPRESSION: No significant change       CT Chest No Cont (11.02.23 @ 16:04) >    IMPRESSION:    Right upper lobectomy.    Larger right lower lobe thick-walled cavity which may be infectious   and/or residua of reported treated tumor. 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    Consolidation within the right lower and middle lobe compatible with   radiation change.    Multiple indistinct nodules in the right lungwhich may be infectious or   radiation pneumonitis.      MEDICATIONS  (STANDING):  albuterol/ipratropium for Nebulization 3 milliLiter(s) Nebulizer every 6 hours  amLODIPine   Tablet 2.5 milliGRAM(s) Oral daily  atorvastatin 40 milliGRAM(s) Oral at bedtime  dextrose 5%. 1000 milliLiter(s) (50 mL/Hr) IV Continuous <Continuous>  dextrose 5%. 1000 milliLiter(s) (100 mL/Hr) IV Continuous <Continuous>  dextrose 50% Injectable 25 Gram(s) IV Push once  dextrose 50% Injectable 12.5 Gram(s) IV Push once  dextrose 50% Injectable 25 Gram(s) IV Push once  fluticasone furoate/umeclidinium/vilanterol 100-62.5-25 MICROgram(s) Inhaler 1 Puff(s) Inhalation daily  folic acid 1 milliGRAM(s) Oral daily  glucagon  Injectable 1 milliGRAM(s) IntraMuscular once  heparin   Injectable 5000 Unit(s) SubCutaneous every 8 hours  insulin glargine Injectable (LANTUS) 8 Unit(s) SubCutaneous at bedtime  insulin lispro (ADMELOG) corrective regimen sliding scale   SubCutaneous Before meals and at bedtime  levoFLOXacin IVPB      levoFLOXacin IVPB 500 milliGRAM(s) IV Intermittent every 24 hours  levothyroxine 25 MICROGram(s) Oral daily  methylPREDNISolone sodium succinate Injectable 20 milliGRAM(s) IV Push every 12 hours  multivitamin 1 Tablet(s) Oral daily  voriconazole 200 milliGRAM(s) Oral every 12 hours    MEDICATIONS  (PRN):  acetaminophen     Tablet .. 650 milliGRAM(s) Oral every 6 hours PRN Temp greater or equal to 38C (100.4F), Mild Pain (1 - 3)  aluminum hydroxide/magnesium hydroxide/simethicone Suspension 30 milliLiter(s) Oral every 4 hours PRN Dyspepsia  benzonatate 100 milliGRAM(s) Oral three times a day PRN Cough  dextrose Oral Gel 15 Gram(s) Oral once PRN Blood Glucose LESS THAN 70 milliGRAM(s)/deciliter  melatonin 3 milliGRAM(s) Oral at bedtime PRN Insomnia  ondansetron Injectable 4 milliGRAM(s) IV Push every 8 hours PRN Nausea and/or Vomiting

## 2023-11-08 NOTE — PROGRESS NOTE ADULT - SUBJECTIVE AND OBJECTIVE BOX
Date of service: 11-08-23 @ 09:02    Lying in bed in NAD  Has dry cough  No SOB at rest  No fever    ROS: no fever or chills; denies dizziness, no HA, no abdominal pain, no diarrhea or constipation; no dysuria, no legs pain, no rashes    MEDICATIONS  (STANDING):  albuterol/ipratropium for Nebulization 3 milliLiter(s) Nebulizer every 6 hours  amLODIPine   Tablet 2.5 milliGRAM(s) Oral daily  atorvastatin 40 milliGRAM(s) Oral at bedtime  dextrose 5%. 1000 milliLiter(s) (100 mL/Hr) IV Continuous <Continuous>  dextrose 5%. 1000 milliLiter(s) (50 mL/Hr) IV Continuous <Continuous>  dextrose 50% Injectable 12.5 Gram(s) IV Push once  dextrose 50% Injectable 25 Gram(s) IV Push once  dextrose 50% Injectable 25 Gram(s) IV Push once  fluticasone furoate/umeclidinium/vilanterol 100-62.5-25 MICROgram(s) Inhaler 1 Puff(s) Inhalation daily  folic acid 1 milliGRAM(s) Oral daily  glucagon  Injectable 1 milliGRAM(s) IntraMuscular once  heparin   Injectable 5000 Unit(s) SubCutaneous every 8 hours  insulin glargine Injectable (LANTUS) 8 Unit(s) SubCutaneous at bedtime  insulin lispro (ADMELOG) corrective regimen sliding scale   SubCutaneous at bedtime  insulin lispro (ADMELOG) corrective regimen sliding scale   SubCutaneous Before meals and at bedtime  insulin lispro Injectable (ADMELOG) 2 Unit(s) SubCutaneous three times a day before meals  levoFLOXacin IVPB      levoFLOXacin IVPB 500 milliGRAM(s) IV Intermittent every 24 hours  levothyroxine 25 MICROGram(s) Oral daily  methylPREDNISolone sodium succinate Injectable 20 milliGRAM(s) IV Push every 12 hours  multivitamin 1 Tablet(s) Oral daily  voriconazole 200 milliGRAM(s) Oral every 12 hours    Vital Signs Last 24 Hrs  T(C): 37 (07 Nov 2023 23:46), Max: 37.2 (07 Nov 2023 15:37)  T(F): 98.6 (07 Nov 2023 23:46), Max: 98.9 (07 Nov 2023 15:37)  HR: 103 (08 Nov 2023 06:08) (59 - 120)  BP: 138/63 (07 Nov 2023 23:46) (131/68 - 157/70)  BP(mean): --  RR: 18 (08 Nov 2023 00:00) (18 - 18)  SpO2: 97% (08 Nov 2023 06:08) (88% - 98%)    Parameters below as of 08 Nov 2023 02:00  Patient On (Oxygen Delivery Method): mask, Venturi, 50%     Physical exam:    Constitutional:  No acute distress  HEENT: NC/AT, EOMI, PERRLA, conjunctivae clear; ears and nose atraumatic  Neck: supple; thyroid not palpable  Back: no tenderness  Respiratory: respiratory effort normal; crackles at bases  Cardiovascular: S1S2 regular, no murmurs  Abdomen: soft, not tender, not distended, positive BS  Genitourinary: no suprapubic tenderness  Lymphatic: no LN palpable  Musculoskeletal: no muscle tenderness, no joint swelling or tenderness  Extremities: no pedal edema  Neurological/ Psychiatric: AxOx3, moving all extremities  Skin: no rashes; no palpable lesions    Labs: reviewed                        7.4    16.45 )-----------( 220      ( 08 Nov 2023 06:01 )             22.7     11-08    139  |  106  |  81<H>  ----------------------------<  222<H>  5.3   |  27  |  2.08<H>    Ca    8.7      08 Nov 2023 06:01  Phos  3.3     11-08  Mg     2.6     11-08    TPro  6.2  /  Alb  1.7<L>  /  TBili  0.4  /  DBili  x   /  AST  39<H>  /  ALT  33  /  AlkPhos  131<H>  11-08    C-Reactive Protein, Serum: 141 mg/L (11-07-23 @ 07:11)                        8.4    17.70 )-----------( 247      ( 06 Nov 2023 06:45 )             25.2     11-06    134<L>  |  102  |  71<H>  ----------------------------<  217<H>  4.6   |  25  |  1.75<H>    Ca    8.5      06 Nov 2023 06:45  Phos  3.5     11-06  Mg     2.5     11-06                        8.8    17.35 )-----------( 274      ( 05 Nov 2023 05:52 )             26.2     11-05    131<L>  |  100  |  49<H>  ----------------------------<  219<H>  4.4   |  26  |  1.55<H>    Ca    8.6      05 Nov 2023 05:52  Phos  3.4     11-05  Mg     2.3     11-05    TPro  6.5  /  Alb  1.8<L>  /  TBili  0.4  /  DBili  x   /  AST  55<H>  /  ALT  62  /  AlkPhos  135<H>  11-04                        8.2    11.74 )-----------( 259      ( 03 Nov 2023 07:15 )             25.2     11-03    133<L>  |  102  |  35<H>  ----------------------------<  205<H>  4.4   |  22  |  1.20    Ca    8.3<L>      03 Nov 2023 07:15    TPro  6.9  /  Alb  2.0<L>  /  TBili  0.5  /  DBili  x   /  AST  38<H>  /  ALT  50  /  AlkPhos  130<H>  11-02     LIVER FUNCTIONS - ( 02 Nov 2023 14:47 )  Alb: 2.0 g/dL / Pro: 6.9 gm/dL / ALK PHOS: 130 U/L / ALT: 50 U/L / AST: 38 U/L / GGT: x           Urinalysis (11-02 @ 16:40)  Urine Appearance: Clear  Protein, Urine: 100 mg/dL  Urine Microscopic-Add On (NC) (11-02 @ 16:40)  White Blood Cell - Urine: 0 /HPF  Red Blood Cell - Urine: 0 /HPF    (11-02 @ 14:47)  NotDete    Culture - Blood (collected 04 Nov 2023 04:21)  Source: .Blood None  Preliminary Report (06 Nov 2023 09:01):    No growth at 48 Hours    Culture - Blood (collected 04 Nov 2023 04:21)  Source: .Blood None  Preliminary Report (06 Nov 2023 09:01):    No growth at 48 Hours    Culture - Sputum (collected 03 Nov 2023 14:16)  Source: .Sputum Sputum  Gram Stain (03 Nov 2023 23:52):    Rare Squamous epithelial cells per low power field    No polymorphonuclear cells seen per low power field    Rare Gram positive cocci in pairs per oil power field    Rare Gram Negative Rods per oil power field  Final Report (05 Nov 2023 12:06):    Normal Respiratory Marguerite present    Culture - Urine (collected 02 Nov 2023 16:40)  Source: Clean Catch Clean Catch (Midstream)  Final Report (03 Nov 2023 20:33):    No growth    Culture - Blood (collected 02 Nov 2023 14:47)  Source: .Blood Blood-Peripheral  Preliminary Report (06 Nov 2023 22:01):    No growth at 4 days    Culture - Blood (collected 02 Nov 2023 14:47)  Source: .Blood Blood-Peripheral  Preliminary Report (06 Nov 2023 22:01):    No growth at 4 days    Radiology: all available radiological tests reviewed    < from: CT Chest No Cont (11.02.23 @ 16:04) >  Right upper lobectomy.  Larger right lower lobe thick-walled cavity which may be infectious   and/or residua of reported treated tumor. 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  Consolidation within the right lower and middle lobe compatible with radiation change.  Multiple indistinct nodules in the right lungwhich may be infectious or radiation pneumonitis.  < end of copied text >    Advanced directives addressed: full resuscitation

## 2023-11-09 ENCOUNTER — RESULT REVIEW (OUTPATIENT)
Age: 83
End: 2023-11-09

## 2023-11-09 ENCOUNTER — APPOINTMENT (OUTPATIENT)
Dept: THORACIC SURGERY | Facility: HOSPITAL | Age: 83
End: 2023-11-09

## 2023-11-09 DIAGNOSIS — J96.21 ACUTE AND CHRONIC RESPIRATORY FAILURE WITH HYPOXIA: ICD-10-CM

## 2023-11-09 LAB
ADD ON TEST-SPECIMEN IN LAB: SIGNIFICANT CHANGE UP
ADD ON TEST-SPECIMEN IN LAB: SIGNIFICANT CHANGE UP
ALBUMIN SERPL ELPH-MCNC: 1.7 G/DL — LOW (ref 3.3–5)
ALBUMIN SERPL ELPH-MCNC: 1.7 G/DL — LOW (ref 3.3–5)
ALP SERPL-CCNC: 151 U/L — HIGH (ref 40–120)
ALP SERPL-CCNC: 151 U/L — HIGH (ref 40–120)
ALT FLD-CCNC: 35 U/L — SIGNIFICANT CHANGE UP (ref 12–78)
ALT FLD-CCNC: 35 U/L — SIGNIFICANT CHANGE UP (ref 12–78)
ANION GAP SERPL CALC-SCNC: 8 MMOL/L — SIGNIFICANT CHANGE UP (ref 5–17)
ANION GAP SERPL CALC-SCNC: 8 MMOL/L — SIGNIFICANT CHANGE UP (ref 5–17)
AST SERPL-CCNC: 46 U/L — HIGH (ref 15–37)
AST SERPL-CCNC: 46 U/L — HIGH (ref 15–37)
BASOPHILS # BLD AUTO: 0.03 K/UL — SIGNIFICANT CHANGE UP (ref 0–0.2)
BASOPHILS # BLD AUTO: 0.03 K/UL — SIGNIFICANT CHANGE UP (ref 0–0.2)
BASOPHILS NFR BLD AUTO: 0.3 % — SIGNIFICANT CHANGE UP (ref 0–2)
BASOPHILS NFR BLD AUTO: 0.3 % — SIGNIFICANT CHANGE UP (ref 0–2)
BILIRUB SERPL-MCNC: 0.3 MG/DL — SIGNIFICANT CHANGE UP (ref 0.2–1.2)
BILIRUB SERPL-MCNC: 0.3 MG/DL — SIGNIFICANT CHANGE UP (ref 0.2–1.2)
BUN SERPL-MCNC: 80 MG/DL — HIGH (ref 7–23)
BUN SERPL-MCNC: 80 MG/DL — HIGH (ref 7–23)
CALCIUM SERPL-MCNC: 8.6 MG/DL — SIGNIFICANT CHANGE UP (ref 8.5–10.1)
CALCIUM SERPL-MCNC: 8.6 MG/DL — SIGNIFICANT CHANGE UP (ref 8.5–10.1)
CHLORIDE SERPL-SCNC: 104 MMOL/L — SIGNIFICANT CHANGE UP (ref 96–108)
CHLORIDE SERPL-SCNC: 104 MMOL/L — SIGNIFICANT CHANGE UP (ref 96–108)
CO2 SERPL-SCNC: 25 MMOL/L — SIGNIFICANT CHANGE UP (ref 22–31)
CO2 SERPL-SCNC: 25 MMOL/L — SIGNIFICANT CHANGE UP (ref 22–31)
CREAT ?TM UR-MCNC: 68 MG/DL — SIGNIFICANT CHANGE UP
CREAT ?TM UR-MCNC: 68 MG/DL — SIGNIFICANT CHANGE UP
CREAT SERPL-MCNC: 1.99 MG/DL — HIGH (ref 0.5–1.3)
CREAT SERPL-MCNC: 1.99 MG/DL — HIGH (ref 0.5–1.3)
CULTURE RESULTS: SIGNIFICANT CHANGE UP
EGFR: 33 ML/MIN/1.73M2 — LOW
EGFR: 33 ML/MIN/1.73M2 — LOW
EOSINOPHIL # BLD AUTO: 0 K/UL — SIGNIFICANT CHANGE UP (ref 0–0.5)
EOSINOPHIL # BLD AUTO: 0 K/UL — SIGNIFICANT CHANGE UP (ref 0–0.5)
EOSINOPHIL NFR BLD AUTO: 0 % — SIGNIFICANT CHANGE UP (ref 0–6)
EOSINOPHIL NFR BLD AUTO: 0 % — SIGNIFICANT CHANGE UP (ref 0–6)
GLUCOSE BLDC GLUCOMTR-MCNC: 147 MG/DL — HIGH (ref 70–99)
GLUCOSE BLDC GLUCOMTR-MCNC: 147 MG/DL — HIGH (ref 70–99)
GLUCOSE BLDC GLUCOMTR-MCNC: 275 MG/DL — HIGH (ref 70–99)
GLUCOSE BLDC GLUCOMTR-MCNC: 275 MG/DL — HIGH (ref 70–99)
GLUCOSE BLDC GLUCOMTR-MCNC: 388 MG/DL — HIGH (ref 70–99)
GLUCOSE BLDC GLUCOMTR-MCNC: 388 MG/DL — HIGH (ref 70–99)
GLUCOSE SERPL-MCNC: 294 MG/DL — HIGH (ref 70–99)
GLUCOSE SERPL-MCNC: 294 MG/DL — HIGH (ref 70–99)
GRAM STN FLD: ABNORMAL
GRAM STN FLD: ABNORMAL
HCT VFR BLD CALC: 24.3 % — LOW (ref 39–50)
HCT VFR BLD CALC: 24.3 % — LOW (ref 39–50)
HGB BLD-MCNC: 8.2 G/DL — LOW (ref 13–17)
HGB BLD-MCNC: 8.2 G/DL — LOW (ref 13–17)
IMM GRANULOCYTES NFR BLD AUTO: 1.5 % — HIGH (ref 0–0.9)
IMM GRANULOCYTES NFR BLD AUTO: 1.5 % — HIGH (ref 0–0.9)
INR BLD: 1.07 RATIO — SIGNIFICANT CHANGE UP (ref 0.85–1.18)
INR BLD: 1.07 RATIO — SIGNIFICANT CHANGE UP (ref 0.85–1.18)
LYMPHOCYTES # BLD AUTO: 0.13 K/UL — LOW (ref 1–3.3)
LYMPHOCYTES # BLD AUTO: 0.13 K/UL — LOW (ref 1–3.3)
LYMPHOCYTES # BLD AUTO: 1.1 % — LOW (ref 13–44)
LYMPHOCYTES # BLD AUTO: 1.1 % — LOW (ref 13–44)
MAGNESIUM SERPL-MCNC: 2.7 MG/DL — HIGH (ref 1.6–2.6)
MAGNESIUM SERPL-MCNC: 2.7 MG/DL — HIGH (ref 1.6–2.6)
MCHC RBC-ENTMCNC: 31.1 PG — SIGNIFICANT CHANGE UP (ref 27–34)
MCHC RBC-ENTMCNC: 31.1 PG — SIGNIFICANT CHANGE UP (ref 27–34)
MCHC RBC-ENTMCNC: 33.7 GM/DL — SIGNIFICANT CHANGE UP (ref 32–36)
MCHC RBC-ENTMCNC: 33.7 GM/DL — SIGNIFICANT CHANGE UP (ref 32–36)
MCV RBC AUTO: 92 FL — SIGNIFICANT CHANGE UP (ref 80–100)
MCV RBC AUTO: 92 FL — SIGNIFICANT CHANGE UP (ref 80–100)
MONOCYTES # BLD AUTO: 0.26 K/UL — SIGNIFICANT CHANGE UP (ref 0–0.9)
MONOCYTES # BLD AUTO: 0.26 K/UL — SIGNIFICANT CHANGE UP (ref 0–0.9)
MONOCYTES NFR BLD AUTO: 2.2 % — SIGNIFICANT CHANGE UP (ref 2–14)
MONOCYTES NFR BLD AUTO: 2.2 % — SIGNIFICANT CHANGE UP (ref 2–14)
NEUTROPHILS # BLD AUTO: 11.14 K/UL — HIGH (ref 1.8–7.4)
NEUTROPHILS # BLD AUTO: 11.14 K/UL — HIGH (ref 1.8–7.4)
NEUTROPHILS NFR BLD AUTO: 94.9 % — HIGH (ref 43–77)
NEUTROPHILS NFR BLD AUTO: 94.9 % — HIGH (ref 43–77)
PHOSPHATE SERPL-MCNC: 4.1 MG/DL — SIGNIFICANT CHANGE UP (ref 2.5–4.5)
PHOSPHATE SERPL-MCNC: 4.1 MG/DL — SIGNIFICANT CHANGE UP (ref 2.5–4.5)
PLATELET # BLD AUTO: 175 K/UL — SIGNIFICANT CHANGE UP (ref 150–400)
PLATELET # BLD AUTO: 175 K/UL — SIGNIFICANT CHANGE UP (ref 150–400)
POTASSIUM SERPL-MCNC: 5.1 MMOL/L — SIGNIFICANT CHANGE UP (ref 3.5–5.3)
POTASSIUM SERPL-MCNC: 5.1 MMOL/L — SIGNIFICANT CHANGE UP (ref 3.5–5.3)
POTASSIUM SERPL-SCNC: 5.1 MMOL/L — SIGNIFICANT CHANGE UP (ref 3.5–5.3)
POTASSIUM SERPL-SCNC: 5.1 MMOL/L — SIGNIFICANT CHANGE UP (ref 3.5–5.3)
PROT ?TM UR-MCNC: 87 MG/DL — HIGH (ref 0–12)
PROT ?TM UR-MCNC: 87 MG/DL — HIGH (ref 0–12)
PROT SERPL-MCNC: 6 GM/DL — SIGNIFICANT CHANGE UP (ref 6–8.3)
PROT SERPL-MCNC: 6 GM/DL — SIGNIFICANT CHANGE UP (ref 6–8.3)
PROT/CREAT UR-RTO: 1.3 RATIO — HIGH (ref 0–0.2)
PROT/CREAT UR-RTO: 1.3 RATIO — HIGH (ref 0–0.2)
PROTHROM AB SERPL-ACNC: 12.1 SEC — SIGNIFICANT CHANGE UP (ref 9.5–13)
PROTHROM AB SERPL-ACNC: 12.1 SEC — SIGNIFICANT CHANGE UP (ref 9.5–13)
RBC # BLD: 2.64 M/UL — LOW (ref 4.2–5.8)
RBC # BLD: 2.64 M/UL — LOW (ref 4.2–5.8)
RBC # FLD: 16.1 % — HIGH (ref 10.3–14.5)
RBC # FLD: 16.1 % — HIGH (ref 10.3–14.5)
SODIUM SERPL-SCNC: 137 MMOL/L — SIGNIFICANT CHANGE UP (ref 135–145)
SODIUM SERPL-SCNC: 137 MMOL/L — SIGNIFICANT CHANGE UP (ref 135–145)
SPECIMEN SOURCE: SIGNIFICANT CHANGE UP
T4 FREE SERPL-MCNC: 0.99 NG/DL — SIGNIFICANT CHANGE UP (ref 0.76–1.46)
T4 FREE SERPL-MCNC: 0.99 NG/DL — SIGNIFICANT CHANGE UP (ref 0.76–1.46)
WBC # BLD: 11.74 K/UL — HIGH (ref 3.8–10.5)
WBC # BLD: 11.74 K/UL — HIGH (ref 3.8–10.5)
WBC # FLD AUTO: 11.74 K/UL — HIGH (ref 3.8–10.5)
WBC # FLD AUTO: 11.74 K/UL — HIGH (ref 3.8–10.5)

## 2023-11-09 PROCEDURE — 31624 DX BRONCHOSCOPE/LAVAGE: CPT

## 2023-11-09 PROCEDURE — 31625 BRONCHOSCOPY W/BIOPSY(S): CPT

## 2023-11-09 PROCEDURE — 88312 SPECIAL STAINS GROUP 1: CPT | Mod: 26

## 2023-11-09 PROCEDURE — 88305 TISSUE EXAM BY PATHOLOGIST: CPT | Mod: 26

## 2023-11-09 PROCEDURE — 31623 DX BRONCHOSCOPE/BRUSH: CPT

## 2023-11-09 PROCEDURE — 88108 CYTOPATH CONCENTRATE TECH: CPT | Mod: 26

## 2023-11-09 PROCEDURE — 99223 1ST HOSP IP/OBS HIGH 75: CPT

## 2023-11-09 PROCEDURE — 99232 SBSQ HOSP IP/OBS MODERATE 35: CPT

## 2023-11-09 RX ORDER — SODIUM CHLORIDE 9 MG/ML
1000 INJECTION INTRAMUSCULAR; INTRAVENOUS; SUBCUTANEOUS
Refills: 0 | Status: DISCONTINUED | OUTPATIENT
Start: 2023-11-09 | End: 2023-11-10

## 2023-11-09 RX ORDER — ONDANSETRON 8 MG/1
4 TABLET, FILM COATED ORAL ONCE
Refills: 0 | Status: DISCONTINUED | OUTPATIENT
Start: 2023-11-09 | End: 2023-11-09

## 2023-11-09 RX ORDER — OXYCODONE HYDROCHLORIDE 5 MG/1
5 TABLET ORAL ONCE
Refills: 0 | Status: DISCONTINUED | OUTPATIENT
Start: 2023-11-09 | End: 2023-11-09

## 2023-11-09 RX ORDER — FENTANYL CITRATE 50 UG/ML
50 INJECTION INTRAVENOUS
Refills: 0 | Status: DISCONTINUED | OUTPATIENT
Start: 2023-11-09 | End: 2023-11-09

## 2023-11-09 RX ORDER — AMLODIPINE BESYLATE 2.5 MG/1
2.5 TABLET ORAL DAILY
Refills: 0 | Status: DISCONTINUED | OUTPATIENT
Start: 2023-11-09 | End: 2023-11-12

## 2023-11-09 RX ORDER — FERROUS SULFATE 325(65) MG
325 TABLET ORAL DAILY
Refills: 0 | Status: DISCONTINUED | OUTPATIENT
Start: 2023-11-09 | End: 2023-11-30

## 2023-11-09 RX ORDER — IPRATROPIUM/ALBUTEROL SULFATE 18-103MCG
3 AEROSOL WITH ADAPTER (GRAM) INHALATION ONCE
Refills: 0 | Status: COMPLETED | OUTPATIENT
Start: 2023-11-09 | End: 2023-11-10

## 2023-11-09 RX ORDER — PETROLATUM,WHITE
1 JELLY (GRAM) TOPICAL
Refills: 0 | Status: DISCONTINUED | OUTPATIENT
Start: 2023-11-09 | End: 2023-11-30

## 2023-11-09 RX ORDER — ALBUMIN HUMAN 25 %
100 VIAL (ML) INTRAVENOUS EVERY 12 HOURS
Refills: 0 | Status: COMPLETED | OUTPATIENT
Start: 2023-11-09 | End: 2023-11-11

## 2023-11-09 RX ADMIN — VORICONAZOLE 200 MILLIGRAM(S): 10 INJECTION, POWDER, LYOPHILIZED, FOR SOLUTION INTRAVENOUS at 10:02

## 2023-11-09 RX ADMIN — Medication 20 MILLIGRAM(S): at 21:46

## 2023-11-09 RX ADMIN — ATORVASTATIN CALCIUM 40 MILLIGRAM(S): 80 TABLET, FILM COATED ORAL at 21:45

## 2023-11-09 RX ADMIN — Medication 325 MILLIGRAM(S): at 17:09

## 2023-11-09 RX ADMIN — INSULIN GLARGINE 8 UNIT(S): 100 INJECTION, SOLUTION SUBCUTANEOUS at 21:45

## 2023-11-09 RX ADMIN — SODIUM CHLORIDE 50 MILLILITER(S): 9 INJECTION INTRAMUSCULAR; INTRAVENOUS; SUBCUTANEOUS at 17:15

## 2023-11-09 RX ADMIN — Medication 20 MILLIGRAM(S): at 10:02

## 2023-11-09 RX ADMIN — Medication 6: at 09:12

## 2023-11-09 RX ADMIN — FLUTICASONE FUROATE, UMECLIDINIUM BROMIDE AND VILANTEROL TRIFENATATE 1 PUFF(S): 200; 62.5; 25 POWDER RESPIRATORY (INHALATION) at 08:58

## 2023-11-09 RX ADMIN — AMLODIPINE BESYLATE 2.5 MILLIGRAM(S): 2.5 TABLET ORAL at 17:09

## 2023-11-09 RX ADMIN — Medication 3 MILLIGRAM(S): at 21:47

## 2023-11-09 RX ADMIN — Medication 3 MILLILITER(S): at 19:58

## 2023-11-09 RX ADMIN — Medication 3: at 21:55

## 2023-11-09 RX ADMIN — Medication 4 UNIT(S): at 15:56

## 2023-11-09 RX ADMIN — Medication 50 MILLILITER(S): at 23:15

## 2023-11-09 RX ADMIN — VORICONAZOLE 200 MILLIGRAM(S): 10 INJECTION, POWDER, LYOPHILIZED, FOR SOLUTION INTRAVENOUS at 21:46

## 2023-11-09 RX ADMIN — Medication 3 MILLILITER(S): at 14:27

## 2023-11-09 RX ADMIN — Medication 50 MILLILITER(S): at 11:24

## 2023-11-09 RX ADMIN — Medication 3 MILLILITER(S): at 08:57

## 2023-11-09 RX ADMIN — Medication 3 MILLILITER(S): at 01:43

## 2023-11-09 RX ADMIN — Medication 100 MILLIGRAM(S): at 21:49

## 2023-11-09 NOTE — CONSULT NOTE ADULT - SUBJECTIVE AND OBJECTIVE BOX
84 yo M with PMH of HTN, HLD with PMHx HTN, HLD, chronic anemia, COPD, lung CA s/p RUL lobectomy (follows at [facility], last chemotherapy/RT 2023, not currently on treatment), AAA, hypothyroidism, CKD stage IIIa sent in to ED on 23 by pulmonologist MD underwent bronchoscopy and had left lower leg laceration post op seen in PACU by RN. Surgery consulted for laceration repair. Pt was seen at bedside, Awake not alert. Wife at bedside. NO other acute complaints.        PAST MEDICAL & SURGICAL HISTORY:  COPD (chronic obstructive pulmonary disease)      Lung cancer      Anemia of chronic disease      CKD (chronic kidney disease), stage III      Hypothyroidism      AAA (abdominal aortic aneurysm)      HTN (hypertension)      HLD (hyperlipidemia)      Thrombocytopenia      S/P lobectomy of lung        Allergies:  penicillin (Unknown)    Home Medications:  amoxicillin-clavulanate 875 mg-125 mg oral tablet: 1 tab(s) orally 2 times a day  atorvastatin 40 mg oral tablet: 1 tab(s) orally once a day  folic acid 1 mg oral tablet: 1 tab(s) orally  iron 37 mg  once a day:   levothyroxine 25 mcg (0.025 mg) oral tablet: 1 tab(s) orally once a day  losartan 50 mg oral tablet: 1 tab(s) orally once a day  Multiple Vitamins oral tablet: 1 tab(s) orally  predniSONE 10 mg oral tablet: orally *** Take 4 tablets once daily for 2 days then 3 tablets daily for 2 days then 2 tablets daily for 2 days then 1 tablet daily for 2 days***  Trelegy Ellipta 100 mcg-62.5 mcg-25 mcg/inh inhalation powder: 1 puff(s) inhaled once a day      Family History:  FAMILY HISTORY:      ROS:  Constitutional: Denies fever, fatigue or weight loss.  Skin: Denies rash.  Eyes: Denies recent vision problems or eye pain.  ENT: Denies congestion, ear pain, or sore throat.  Endocrine: Denies thyroid problems.  Cardiovascular: Denies chest pain or palpation.  Respiratory: Denies cough, shortness of breath, congestion, or wheezing.  Gastrointestinal: Denies abdominal pain, nausea, vomiting, or diarrhea.  Genitourinary: Denies dysuria.  Musculoskeletal: Denies joint swelling.  Neurologic: Denies headache.      Physical Examination:  AO x3, NAD  GENERAL: No acute distress, well-developed  HEAD:  Atraumatic, Normocephalic  CHEST/LUNG: CTAB; No wheezes, rales, or rhonchi  HEART: Regular rate and rhythm; No murmurs, rubs, or gallops  left le cm laceration down to muscle, no bleeding. DP/PT pulse intact  NEUROLOGY: responding appropriately, no focal deficits    Data:                        8.2    11.74 )-----------( 175      ( 2023 06:25 )             24.3         137  |  104  |  80<H>  ----------------------------<  294<H>  5.1   |  25  |  1.99<H>    Ca    8.6      2023 06:25  Phos  4.1       Mg     2.7         TPro  6.0  /  Alb  1.7<L>  /  TBili  0.3  /  DBili  x   /  AST  46<H>  /  ALT  35  /  AlkPhos  151<H>        LIVER FUNCTIONS - ( 2023 06:25 )  Alb: 1.7 g/dL / Pro: 6.0 gm/dL / ALK PHOS: 151 U/L / ALT: 35 U/L / AST: 46 U/L / GGT: x           Urinalysis Basic - ( 2023 06:25 )    Color: x / Appearance: x / SG: x / pH: x  Gluc: 294 mg/dL / Ketone: x  / Bili: x / Urobili: x   Blood: x / Protein: x / Nitrite: x   Leuk Esterase: x / RBC: x / WBC x   Sq Epi: x / Non Sq Epi: x / Bacteria: x

## 2023-11-09 NOTE — PROGRESS NOTE ADULT - SUBJECTIVE AND OBJECTIVE BOX
Subjective:  denies sob  comfortable  anxious about todays procedure    Review of Systems:  All 10 systems reviewed in detailed and found to be negative with the exception of what has already been described above    Allergies:  penicillin (Unknown)    Meds  MEDICATIONS  (STANDING):  albumin human 25% IVPB 100 milliLiter(s) IV Intermittent every 12 hours  albuterol/ipratropium for Nebulization 3 milliLiter(s) Nebulizer every 6 hours  atorvastatin 40 milliGRAM(s) Oral at bedtime  dextrose 5%. 1000 milliLiter(s) (100 mL/Hr) IV Continuous <Continuous>  dextrose 5%. 1000 milliLiter(s) (50 mL/Hr) IV Continuous <Continuous>  dextrose 50% Injectable 12.5 Gram(s) IV Push once  dextrose 50% Injectable 25 Gram(s) IV Push once  dextrose 50% Injectable 25 Gram(s) IV Push once  fluticasone furoate/umeclidinium/vilanterol 100-62.5-25 MICROgram(s) Inhaler 1 Puff(s) Inhalation daily  folic acid 1 milliGRAM(s) Oral daily  glucagon  Injectable 1 milliGRAM(s) IntraMuscular once  insulin glargine Injectable (LANTUS) 8 Unit(s) SubCutaneous at bedtime  insulin lispro (ADMELOG) corrective regimen sliding scale   SubCutaneous Before meals and at bedtime  insulin lispro (ADMELOG) corrective regimen sliding scale   SubCutaneous at bedtime  insulin lispro Injectable (ADMELOG) 4 Unit(s) SubCutaneous three times a day before meals  methylPREDNISolone sodium succinate Injectable 20 milliGRAM(s) IV Push every 12 hours  multivitamin 1 Tablet(s) Oral daily  voriconazole 200 milliGRAM(s) Oral every 12 hours    MEDICATIONS  (PRN):  acetaminophen     Tablet .. 650 milliGRAM(s) Oral every 6 hours PRN Temp greater or equal to 38C (100.4F), Mild Pain (1 - 3)  aluminum hydroxide/magnesium hydroxide/simethicone Suspension 30 milliLiter(s) Oral every 4 hours PRN Dyspepsia  benzonatate 100 milliGRAM(s) Oral three times a day PRN Cough  dextrose Oral Gel 15 Gram(s) Oral once PRN Blood Glucose LESS THAN 70 milliGRAM(s)/deciliter  melatonin 3 milliGRAM(s) Oral at bedtime PRN Insomnia  ondansetron Injectable 4 milliGRAM(s) IV Push every 8 hours PRN Nausea and/or Vomiting    Physical Exam  T(C): 36.6 (11-08-23 @ 21:16), Max: 37 (11-08-23 @ 15:22)  HR: 100 (11-09-23 @ 09:00) (77 - 120)  BP: 127/70 (11-08-23 @ 21:16) (91/74 - 144/54)  RR: 30 (11-08-23 @ 21:16) (18 - 30)  SpO2: 97% (11-09-23 @ 09:00) (93% - 97%)  Gen: Alert, oriented, no distress, on hf nc  HEENT: Anicteric sclera, moist mucous membranes  Cardio: Regular rhythm and rate, normal S1S2, no murmurs  Resp: Crackles, congested  GI: Nontender, nondistended, normoactive bowel sounds  Ext: No cyanosis, clubbing or edema  Neuro: Nonfocal    Labs:                        8.2    11.74 )-----------( 175      ( 09 Nov 2023 06:25 )             24.3     11-09    137  |  104  |  80<H>  ----------------------------<  294<H>  5.1   |  25  |  1.99<H>    Ca    8.6      09 Nov 2023 06:25  Phos  4.1     11-09  Mg     2.7     11-09    TPro  6.0  /  Alb  1.7<L>  /  TBili  0.3  /  DBili  x   /  AST  46<H>  /  ALT  35  /  AlkPhos  151<H>  11-09    PT/INR - ( 09 Nov 2023 06:25 )   PT: 12.1 sec;   INR: 1.07 ratio         PTT - ( 08 Nov 2023 06:01 )  PTT:25.4 sec  Urinalysis Basic - ( 09 Nov 2023 06:25 )    Color: x / Appearance: x / SG: x / pH: x  Gluc: 294 mg/dL / Ketone: x  / Bili: x / Urobili: x   Blood: x / Protein: x / Nitrite: x   Leuk Esterase: x / RBC: x / WBC x   Sq Epi: x / Non Sq Epi: x / Bacteria: x    < from: CT Chest No Cont (11.02.23 @ 16:04) >  ACC: [id number] EXAM:  CT CHEST   ORDERED BY: SARAH YOO     PROCEDURE DATE:  11/02/2023          INTERPRETATION:  INDICATION: Shortness of breath, cough, lung cancer   history, last chemotherapy and radiation treatment in May 2023    TECHNIQUE: Helical acquisition images of the chest without intravenous   contrast. Maximum intensity projection images were generated.    COMPARISON: 12/29/2020 chest x-ray.    FINDINGS:    LUNGS/AIRWAYS/PLEURA: Right upper lobectomy. Right lower lobe   multiloculated thick-walled 11 cm cavity (best seen in its entirety on   601-59) containing fluid and spongelike material. Consolidation within   the middle lobe and inferior aspect of the right lower lobe with angular   borders suggesting prior radiation. Multiple indistinct nodules of   varying density in the right lower and middle lobes, mostly adjacent to   the cavity. Small branching opacities in the peripheral left upper and   lower lobes, compatible with distal airway impaction. Trace left pleural   effusion. Mild right apical pleural thickening.    LYMPH NODES/MEDIASTINUM: No lymphadenopathy.    HEART/VASCULATURE: Normal heart size. Unremarkable pericardium. Coronary   artery calcifications. Normal caliber aorta.    UPPER ABDOMEN: Unremarkable.    BONES/SOFT TISSUES: Old sternal fracture. Mild loss of height of the L1   vertebral body.      IMPRESSION:    Right upper lobectomy.    Larger right lower lobe thick-walled cavity which may be infectious   and/or residua of reported treated tumor. 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    Consolidation within the right lower and middle lobe compatible with   radiation change.    Multiple indistinct nodules in the right lungwhich may be infectious or   radiation pneumonitis.    ct naveed 10/3 uploaded and reviewed   Subjective:  denies sob  comfortable  anxious about todays procedure    Review of Systems:  All 10 systems reviewed in detailed and found to be negative with the exception of what has already been described above    Allergies:  penicillin (Unknown)    Meds  MEDICATIONS  (STANDING):  albumin human 25% IVPB 100 milliLiter(s) IV Intermittent every 12 hours  albuterol/ipratropium for Nebulization 3 milliLiter(s) Nebulizer every 6 hours  atorvastatin 40 milliGRAM(s) Oral at bedtime  dextrose 5%. 1000 milliLiter(s) (100 mL/Hr) IV Continuous <Continuous>  dextrose 5%. 1000 milliLiter(s) (50 mL/Hr) IV Continuous <Continuous>  dextrose 50% Injectable 12.5 Gram(s) IV Push once  dextrose 50% Injectable 25 Gram(s) IV Push once  dextrose 50% Injectable 25 Gram(s) IV Push once  fluticasone furoate/umeclidinium/vilanterol 100-62.5-25 MICROgram(s) Inhaler 1 Puff(s) Inhalation daily  folic acid 1 milliGRAM(s) Oral daily  glucagon  Injectable 1 milliGRAM(s) IntraMuscular once  insulin glargine Injectable (LANTUS) 8 Unit(s) SubCutaneous at bedtime  insulin lispro (ADMELOG) corrective regimen sliding scale   SubCutaneous Before meals and at bedtime  insulin lispro (ADMELOG) corrective regimen sliding scale   SubCutaneous at bedtime  insulin lispro Injectable (ADMELOG) 4 Unit(s) SubCutaneous three times a day before meals  methylPREDNISolone sodium succinate Injectable 20 milliGRAM(s) IV Push every 12 hours  multivitamin 1 Tablet(s) Oral daily  voriconazole 200 milliGRAM(s) Oral every 12 hours    MEDICATIONS  (PRN):  acetaminophen     Tablet .. 650 milliGRAM(s) Oral every 6 hours PRN Temp greater or equal to 38C (100.4F), Mild Pain (1 - 3)  aluminum hydroxide/magnesium hydroxide/simethicone Suspension 30 milliLiter(s) Oral every 4 hours PRN Dyspepsia  benzonatate 100 milliGRAM(s) Oral three times a day PRN Cough  dextrose Oral Gel 15 Gram(s) Oral once PRN Blood Glucose LESS THAN 70 milliGRAM(s)/deciliter  melatonin 3 milliGRAM(s) Oral at bedtime PRN Insomnia  ondansetron Injectable 4 milliGRAM(s) IV Push every 8 hours PRN Nausea and/or Vomiting    Physical Exam  T(C): 36.6 (11-08-23 @ 21:16), Max: 37 (11-08-23 @ 15:22)  HR: 100 (11-09-23 @ 09:00) (77 - 120)  BP: 127/70 (11-08-23 @ 21:16) (91/74 - 144/54)  RR: 30 (11-08-23 @ 21:16) (18 - 30)  SpO2: 97% (11-09-23 @ 09:00) (93% - 97%)  Gen: Alert, oriented, no distress, on hf nc  HEENT: Anicteric sclera, moist mucous membranes  Cardio: Regular rhythm and rate, normal S1S2, no murmurs  Resp: Crackles, congested  GI: Nontender, nondistended, normoactive bowel sounds  Ext: No cyanosis, clubbing or edema  skin: + ecchymosis  Neuro: Nonfocal    Labs:                        8.2    11.74 )-----------( 175      ( 09 Nov 2023 06:25 )             24.3     11-09    137  |  104  |  80<H>  ----------------------------<  294<H>  5.1   |  25  |  1.99<H>    Ca    8.6      09 Nov 2023 06:25  Phos  4.1     11-09  Mg     2.7     11-09    TPro  6.0  /  Alb  1.7<L>  /  TBili  0.3  /  DBili  x   /  AST  46<H>  /  ALT  35  /  AlkPhos  151<H>  11-09    PT/INR - ( 09 Nov 2023 06:25 )   PT: 12.1 sec;   INR: 1.07 ratio         PTT - ( 08 Nov 2023 06:01 )  PTT:25.4 sec  Urinalysis Basic - ( 09 Nov 2023 06:25 )    Color: x / Appearance: x / SG: x / pH: x  Gluc: 294 mg/dL / Ketone: x  / Bili: x / Urobili: x   Blood: x / Protein: x / Nitrite: x   Leuk Esterase: x / RBC: x / WBC x   Sq Epi: x / Non Sq Epi: x / Bacteria: x    < from: CT Chest No Cont (11.02.23 @ 16:04) >  ACC: [id number] EXAM:  CT CHEST   ORDERED BY: SARAH YOO     PROCEDURE DATE:  11/02/2023          INTERPRETATION:  INDICATION: Shortness of breath, cough, lung cancer   history, last chemotherapy and radiation treatment in May 2023    TECHNIQUE: Helical acquisition images of the chest without intravenous   contrast. Maximum intensity projection images were generated.    COMPARISON: 12/29/2020 chest x-ray.    FINDINGS:    LUNGS/AIRWAYS/PLEURA: Right upper lobectomy. Right lower lobe   multiloculated thick-walled 11 cm cavity (best seen in its entirety on   601-59) containing fluid and spongelike material. Consolidation within   the middle lobe and inferior aspect of the right lower lobe with angular   borders suggesting prior radiation. Multiple indistinct nodules of   varying density in the right lower and middle lobes, mostly adjacent to   the cavity. Small branching opacities in the peripheral left upper and   lower lobes, compatible with distal airway impaction. Trace left pleural   effusion. Mild right apical pleural thickening.    LYMPH NODES/MEDIASTINUM: No lymphadenopathy.    HEART/VASCULATURE: Normal heart size. Unremarkable pericardium. Coronary   artery calcifications. Normal caliber aorta.    UPPER ABDOMEN: Unremarkable.    BONES/SOFT TISSUES: Old sternal fracture. Mild loss of height of the L1   vertebral body.      IMPRESSION:    Right upper lobectomy.    Larger right lower lobe thick-walled cavity which may be infectious   and/or residua of reported treated tumor. 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    Consolidation within the right lower and middle lobe compatible with   radiation change.    Multiple indistinct nodules in the right lungwhich may be infectious or   radiation pneumonitis.    ct naveed 10/3 uploaded and reviewed

## 2023-11-09 NOTE — CONSULT NOTE ADULT - ASSESSMENT
A/P:  82 yo M with left leg laceration post bronchoscopy    P:  laceration repaired with proline suture  daily dressing change  f/u Wound care or surgery clinic for suture removal    Plan d/w Dr. Shay

## 2023-11-09 NOTE — CONSULT NOTE ADULT - ASSESSMENT
HPI: Pt is a 83y old Male with a PMH of HTN, HPL, Chronic anemia, COPD, lung CA follows at [facility] [place] (last chemotherapy and RT 05/2023, not currently receiving chemo treatment), s/p right upper lobectomy, AAA, Hypothyroidism, CKD stage IIIa and others has been in ED by his pulmonologist with c/o shortness of breath, dyspnea on minimal exertion, and cough. Reportedly, he had CT of the chest performed 10/3 which demonstrated consolidation, was started on augmentin/prednisone/azithro 10/6 x 1 week with worsening symptoms. Hospital course includes RLL PNA with possible radiation pneumonitis and /or aspergilloma 2/2 immunocompromised status. Pt is to undergo a bronchoscopy today. Palliative medicine Consult to further establish GOC  11/9/23 Seen and examined at bedside with no family present. Pt denies any complaints. Is able to provide a limited hx however does state he is scheduled for bronchoscopy today.     Assessment and Plan:    1) Resp failure  -CT chest=  Right upper lobectomy.  Larger right lower lobe thick-walled cavity which may be infectious   and/or residual of reported treated tumor. 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  Consolidation within the right lower and middle lobe compatible with   radiation change.  Multiple indistinct nodules in the right lungwhich may be infectious or   radiation pneumonitis.  -Bronch today  -Pulm eval noted  -H/O Lung cancer  -S/P lobectomy/chemo  -Cont abx/nebs as per medicine    2) Debility  -Weakness  -Cancer  -S/P recent  chemo  -Poor PO intake  -Severe protein geo malnutrition  -Nutrition eval  -PT eval    3) CKD  -Creat>1.9  -Hydration as tolerated  -Supportive care    4) Advanced Directives  -Pt without capacity  -Wife Anthony named HCP  -MOLST DNR/DNI/Trial non invasive  -Message left for Klarissa  -Will follow    Time Spent: 75 minutes including the care, coordination and counseling of this patient, 50% of which was spent coordinating and counseling.

## 2023-11-09 NOTE — PROGRESS NOTE ADULT - SUBJECTIVE AND OBJECTIVE BOX
cc - cough, weakness        83-year-old M with PMHx HTN, HLD, chronic anemia, COPD, lung CA s/p [place] [facility] [place], not currently on treatment), AAA, hypothyroidism, CKD stage IIIa sent in to ED on 11/2/23  by pulmonologist MD Olivera with c/o SOB, dyspnea on minimal exertion, and cough. Pt had outpatient CT of chest performed 10/3 which demonstrated consolidation- was started on Augmentin/Prednisone/Azithromycin 10/6 x 1 week. Pt completed full course of medication however wife noted worsening cough and pt with fever TMax 103.9 on 10/26. Wife then took pt to pulm office again who began 2nd round of same medications. Fevers self resolved. Since 2nd round pt with decreased energy and episode of being unsteady on feet yesterday 11/1. Went to pulm office 11/2 for f/u and was sent to ED for eval. Pt endorses lethargy and weakness on arrival. Denies chest pain, palpitations, N/V, abdominal pain, diarrhea, dysuria.     Upgraded 11/3 to SICU for acute hypoxic resp failure requiring HFNC.  Off HFNC since 11/4 11/6/23 Patient resting in bed comfortably with wife at bedside. States he still feels short of breath with small amount of movement. No cp, no n/v.   11/7 - no events, afebrile, + productive cough, denies cp, + dyspnea, + gen weakness, no abdominal pain, tolerating po intake  11/8 - afebrile, dyspnea overnight, denies cp, + cough with hemoptysis , off BIPAP in am, tolerating po intake  11/9 - dyspnea, cough, no hemoptysis, afebrile, tolerating some po intake, plan discussed with wife at bedside    Review of system- Rest of the review of system are negative except mentioned in HPI, poor historian    Vital sings reviewed for last 24 h  T(C): 36.2 (11-09-23 @ 14:45), Max: 36.7 (11-08-23 @ 16:30)  T(F): 97.1 (11-09-23 @ 14:45), Max: 98.1 (11-08-23 @ 16:30)  HR: 113 (11-09-23 @ 14:45) (77 - 120)  BP: 141/- (11-09-23 @ 14:45) (95/54 - 176/150)  RR: 24 (11-09-23 @ 14:45) (18 - 30)  SpO2: 99% (11-09-23 @ 14:45) (89% - 100%)  Wt(kg): --  Daily     Daily   CAPILLARY BLOOD GLUCOSE      POCT Blood Glucose.: 147 mg/dL (09 Nov 2023 15:38)  POCT Blood Glucose.: 275 mg/dL (09 Nov 2023 09:07)  POCT Blood Glucose.: 273 mg/dL (08 Nov 2023 21:27)  POCT Blood Glucose.: 289 mg/dL (08 Nov 2023 17:49)      PHYSICAL EXAM:    Constitutional: NAD, awake and alert   HEENT: PERR, EOMI, Normal Hearing, MMM  Neck: Soft and supple, No LAD, No JVD  Respiratory: Breath sounds decreased over right lung, normal on left side ,  No wheezing, rales, + rhonchi  Cardiovascular: S1 and S2, regular rate and rhythm, no Murmurs, gallops or rubs  Gastrointestinal: Bowel Sounds present, soft, nontender, nondistended, no guarding, no rebound  Extremities: trace LE  peripheral edema  Vascular: 2+ peripheral pulses  Neurological: A/O x 3, no focal deficits  Musculoskeletal: 5/5 strength b/l upper and lower extremities  Skin: No rashes, + dry skin, bruising + large about 20 cm left calf skin laceration   rectal : deferred, not indicated      All labs radiology and other studies reviewed and interpreted                         8.2    11.74 )-----------( 175      ( 09 Nov 2023 06:25 )             24.3     11-09    137  |  104  |  80<H>  ----------------------------<  294<H>  5.1   |  25  |  1.99<H>    Ca    8.6      09 Nov 2023 06:25  Phos  4.1     11-09  Mg     2.7     11-09    TPro  6.0  /  Alb  1.7<L>  /  TBili  0.3  /  DBili  x   /  AST  46<H>  /  ALT  35  /  AlkPhos  151<H>  11-09        LIVER FUNCTIONS - ( 09 Nov 2023 06:25 )  Alb: 1.7 g/dL / Pro: 6.0 gm/dL / ALK PHOS: 151 U/L / ALT: 35 U/L / AST: 46 U/L / GGT: x             < from: CT Abdomen and Pelvis No Cont (11.08.23 @ 19:01) >  IMPRESSION:  No hydronephrosis.    New patchy opacities in the right lung base. Small right pleural effusion.    < end of copied text >    < from: TTE Echo Complete w/ Contrast w/ Doppler (11.08.23 @ 15:09) >   Left ventricle size and structure are within normal limitations. Mild   concentric left ventricular hypertrophy. Estimated left ventricular   ejection fraction is 60-65 %.   Mild diastolic dysfunction (stage I).   Mild tricuspid valve regurgitation.    < end of copied text >                      Culture - Blood (11.04.23 @ 04:21) Culture - Sputum (11.03.23 @ 14:16)    Gram Stain:   Rare Squamous epithelial cells per low power field  No polymorphonuclear cells seen per low power field  Rare Gram positive cocci in pairs per oil power field  Rare Gram Negative Rods per oil power field   Specimen Source: .Sputum Sputum   Culture Results:   Normal Respiratory Marguerite present    Culture - Blood (11.04.23 @ 04:21)    Specimen Source: .Blood None   Culture Results:   No growth at 72 Hours    Culture - Urine (11.02.23 @ 16:40)    Specimen Source: Clean Catch Clean Catch (Midstream)   Culture Results:   No growth     Xray Chest 1 View- PORTABLE-Urgent (Xray Chest 1 View- PORTABLE-Urgent .) (11.04.23 @ 01:35) >  Stable right midlung and upper lung opacities.Left lung remains clear.   No significant change from November 2    IMPRESSION: No significant change       CT Chest No Cont (11.02.23 @ 16:04) >    IMPRESSION:    Right upper lobectomy.    Larger right lower lobe thick-walled cavity which may be infectious   and/or residua of reported treated tumor. 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    Consolidation within the right lower and middle lobe compatible with   radiation change.    Multiple indistinct nodules in the right lungwhich may be infectious or   radiation pneumonitis.      MEDICATIONS  (STANDING):  albuterol/ipratropium for Nebulization 3 milliLiter(s) Nebulizer every 6 hours  amLODIPine   Tablet 2.5 milliGRAM(s) Oral daily  atorvastatin 40 milliGRAM(s) Oral at bedtime  dextrose 5%. 1000 milliLiter(s) (50 mL/Hr) IV Continuous <Continuous>  dextrose 5%. 1000 milliLiter(s) (100 mL/Hr) IV Continuous <Continuous>  dextrose 50% Injectable 25 Gram(s) IV Push once  dextrose 50% Injectable 12.5 Gram(s) IV Push once  dextrose 50% Injectable 25 Gram(s) IV Push once  fluticasone furoate/umeclidinium/vilanterol 100-62.5-25 MICROgram(s) Inhaler 1 Puff(s) Inhalation daily  folic acid 1 milliGRAM(s) Oral daily  glucagon  Injectable 1 milliGRAM(s) IntraMuscular once  heparin   Injectable 5000 Unit(s) SubCutaneous every 8 hours  insulin glargine Injectable (LANTUS) 8 Unit(s) SubCutaneous at bedtime  insulin lispro (ADMELOG) corrective regimen sliding scale   SubCutaneous Before meals and at bedtime  levoFLOXacin IVPB      levoFLOXacin IVPB 500 milliGRAM(s) IV Intermittent every 24 hours  levothyroxine 25 MICROGram(s) Oral daily  methylPREDNISolone sodium succinate Injectable 20 milliGRAM(s) IV Push every 12 hours  multivitamin 1 Tablet(s) Oral daily  voriconazole 200 milliGRAM(s) Oral every 12 hours    MEDICATIONS  (PRN):  acetaminophen     Tablet .. 650 milliGRAM(s) Oral every 6 hours PRN Temp greater or equal to 38C (100.4F), Mild Pain (1 - 3)  aluminum hydroxide/magnesium hydroxide/simethicone Suspension 30 milliLiter(s) Oral every 4 hours PRN Dyspepsia  benzonatate 100 milliGRAM(s) Oral three times a day PRN Cough  dextrose Oral Gel 15 Gram(s) Oral once PRN Blood Glucose LESS THAN 70 milliGRAM(s)/deciliter  melatonin 3 milliGRAM(s) Oral at bedtime PRN Insomnia  ondansetron Injectable 4 milliGRAM(s) IV Push every 8 hours PRN Nausea and/or Vomiting

## 2023-11-09 NOTE — PROGRESS NOTE ADULT - ASSESSMENT
83y old Male with PMH HTN, HPL, Chronic anemia, COPD, lung CA follows at [facility] [place] (last chemotherapy and RT 05/2023, not currently receiving chemo treatment), s/p right upper lobectomy, AAA, Hypothyroidism, CKD stage IIIa referred by me to ed for continued sob, cough despite abx treatment found to have large lower lobe thick walled cavity  1. 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 discussed with wife.  -started on voriconazole. surgery is more defitive therapy however he is high surgical risk  -reviewed [facility] from [facility] supplied by wife - it looks like he has had blebs in the past and these are what are infected - they look half filled with fluid with thicker borders, which would argue against fungal infection/mrsa as there is no necrosis involved in its pathogenesis  -discussed bronch with wife. consulted thoracic for bronch, plan for later today  2. pneumonia: continue levaquin  -supplemental o2  3. copd: continue trelegy  4. ? radiation induced pneumonitis: continue methylprednisolone  5. anemia: received 1 unit prbc, hgb 8.2 today

## 2023-11-09 NOTE — CONSULT NOTE ADULT - SUBJECTIVE AND OBJECTIVE BOX
HPI: Pt is a 83y old Male with a PMH of HTN, HPL, Chronic anemia, COPD, lung CA follows at [facility] [place] (last chemotherapy and RT 05/2023, not currently receiving chemo treatment), s/p right upper lobectomy, AAA, Hypothyroidism, CKD stage IIIa and others has been in ED by his pulmonologist with c/o shortness of breath, dyspnea on minimal exertion, and cough. Reportedly, he had CT of the chest performed 10/3 which demonstrated consolidation, was started on augmentin/prednisone/azithro 10/6 x 1 week with worsening symptoms. Hospital course includes RLL PNA with possible radiation pneumonitis and /or aspergilloma 2/2 immunocompromised status. Pt is to undergo a bronchoscopy today. Palliative medicine Consult to further establish GOC  11/9/23 Seen and examined at bedside with no family present. Pt denies any complaints. Is able to provide a limited hx however does state he is scheduled for bronchoscopy today.     PAIN: ( )Yes   (X )No    DYSPNEA: (X ) Yes  ( ) No  Increased on exertion  PAST MEDICAL & SURGICAL HISTORY:  COPD (chronic obstructive pulmonary disease)  Lung cancer  Anemia of chronic disease  CKD (chronic kidney disease), stage III  Hypothyroidism  AAA (abdominal aortic aneurysm)  HTN (hypertension)  HLD (hyperlipidemia)  Thrombocytopenia  S/P lobectomy of lung    SOCIAL HX:  Lives with wife  Hx opiate tolerance ( )YES  ( X)NO    Baseline ADLs  (Prior to Admission)  (X ) Independent   ( )Dependent    FAMILY HISTORY:  Unable to provide    Review of Systems:  Physical Discomfort-denies  Dyspnea-on exertion  Anorexia-mod  Secretions-increased  Fatigue-mod-severe  Weakness-severe    All other systems reviewed and negative    PHYSICAL EXAM:    Vital Signs Last 24 Hrs  T(C): 36.4 (09 Nov 2023 09:11), Max: 37 (08 Nov 2023 15:22)  T(F): 97.5 (09 Nov 2023 09:11), Max: 98.6 (08 Nov 2023 15:22)  HR: 108 (09 Nov 2023 09:11) (77 - 120)  BP: 153/80 (09 Nov 2023 09:11) (91/74 - 153/80)  BP(mean): 81 (08 Nov 2023 21:16) (74 - 90)  RR: 18 (09 Nov 2023 09:11) (18 - 30)  SpO2: 95% (09 Nov 2023 09:11) (93% - 97%)  Parameters below as of 09 Nov 2023 09:11  Patient On (Oxygen Delivery Method): nasal cannula  O2 Flow (L/min): 3  PPSV2:  30 %    General: Frail elderly male in bed in NAD  Mental Status: awake and alert X3/lethargic  HEENT: oral mucosa dry  Lungs: clear rere diminished at bases  Cardiac: S1S2+  GI: abd soft NT ND + BS  : voids  Ext: moves on bed  Neuro: no focal def      LABS:                        8.2    11.74 )-----------( 175      ( 09 Nov 2023 06:25 )             24.3     11-09    137  |  104  |  80<H>  ----------------------------<  294<H>  5.1   |  25  |  1.99<H>    Ca    8.6      09 Nov 2023 06:25  Phos  4.1     11-09  Mg     2.7     11-09    TPro  6.0  /  Alb  1.7<L>  /  TBili  0.3  /  DBili  x   /  AST  46<H>  /  ALT  35  /  AlkPhos  151<H>  11-09    PT/INR - ( 09 Nov 2023 06:25 )   PT: 12.1 sec;   INR: 1.07 ratio       Allergies    penicillin (Unknown)    Intolerances      MEDICATIONS  (STANDING):  albumin human 25% IVPB 100 milliLiter(s) IV Intermittent every 12 hours  albuterol/ipratropium for Nebulization 3 milliLiter(s) Nebulizer every 6 hours  atorvastatin 40 milliGRAM(s) Oral at bedtime  dextrose 5%. 1000 milliLiter(s) (100 mL/Hr) IV Continuous <Continuous>  dextrose 5%. 1000 milliLiter(s) (50 mL/Hr) IV Continuous <Continuous>  dextrose 50% Injectable 12.5 Gram(s) IV Push once  dextrose 50% Injectable 25 Gram(s) IV Push once  dextrose 50% Injectable 25 Gram(s) IV Push once  fluticasone furoate/umeclidinium/vilanterol 100-62.5-25 MICROgram(s) Inhaler 1 Puff(s) Inhalation daily  folic acid 1 milliGRAM(s) Oral daily  glucagon  Injectable 1 milliGRAM(s) IntraMuscular once  insulin glargine Injectable (LANTUS) 8 Unit(s) SubCutaneous at bedtime  insulin lispro (ADMELOG) corrective regimen sliding scale   SubCutaneous Before meals and at bedtime  insulin lispro (ADMELOG) corrective regimen sliding scale   SubCutaneous at bedtime  insulin lispro Injectable (ADMELOG) 4 Unit(s) SubCutaneous three times a day before meals  methylPREDNISolone sodium succinate Injectable 20 milliGRAM(s) IV Push every 12 hours  multivitamin 1 Tablet(s) Oral daily  voriconazole 200 milliGRAM(s) Oral every 12 hours    MEDICATIONS  (PRN):  acetaminophen     Tablet .. 650 milliGRAM(s) Oral every 6 hours PRN Temp greater or equal to 38C (100.4F), Mild Pain (1 - 3)  aluminum hydroxide/magnesium hydroxide/simethicone Suspension 30 milliLiter(s) Oral every 4 hours PRN Dyspepsia  benzonatate 100 milliGRAM(s) Oral three times a day PRN Cough  dextrose Oral Gel 15 Gram(s) Oral once PRN Blood Glucose LESS THAN 70 milliGRAM(s)/deciliter  melatonin 3 milliGRAM(s) Oral at bedtime PRN Insomnia  ondansetron Injectable 4 milliGRAM(s) IV Push every 8 hours PRN Nausea and/or Vomiting      RADIOLOGY/ADDITIONAL STUDIES:    < from: CT Abdomen and Pelvis No Cont (11.08.23 @ 19:01) >    ACC: [id number] EXAM:  CT ABDOMEN AND PELVIS   ORDERED BY: RADHA UNDERWOOD     PROCEDURE DATE:  11/08/2023          INTERPRETATION:  CLINICAL INFORMATION: Acute kidney injury. Evaluate   obstruction.    COMPARISON: CT chest 11/20/2023.    CONTRAST/COMPLICATIONS:  IV Contrast: NONE  Oral Contrast: NONE  Complications: None reported at time of study completion    PROCEDURE:  CT of the Abdomen and Pelvis was performed.  Sagittal and coronal reformats were performed.    FINDINGS:  LOWER CHEST: Small right pleural effusion. New patchy opacities in the   right lung base.    LIVER: Within normal limits.  BILE DUCTS: Normal caliber.  GALLBLADDER: Within normal limits.  SPLEEN: Within normal limits.  PANCREAS: Within normal limits.  ADRENALS: Within normal limits.  KIDNEYS/URETERS: No hydronephrosis. Vascular calcifications.    BLADDER: Within normal limits.  REPRODUCTIVE ORGANS: Prostate within normal limits.    BOWEL: No bowel obstruction. Appendix is normal. Moderate to large stool.  PERITONEUM: No ascites.  VESSELS: Atherosclerotic changes.  RETROPERITONEUM/LYMPH NODES: No lymphadenopathy.  ABDOMINAL WALL: Within normal limits.  BONES: Degenerative changes. Redemonstration of L1 compression deformity.   Right femoral ORIF.    IMPRESSION:  No hydronephrosis.    New patchy opacities in the right lung base. Small right pleural effusion.      < end of copied text >  < from: US Kidney and Bladder (11.08.23 @ 16:59) >    ACC: [id number] EXAM:  US KIDNEYS AND BLADDER   ORDERED BY: RADHA UNDERWOOD     PROCEDURE DATE:  11/08/2023          INTERPRETATION:  CLINICAL INFORMATION: Acute kidney injury    COMPARISON: 12/22/2022    TECHNIQUE: Sonography of the kidneys and bladder.    FINDINGS:  Right kidney: 10.1 cm. No renal mass, or hydronephrosis. Nonobstructive 6   mm calculus in the right kidney.    Left kidney: 10.1 cm. No hydronephrosis or calculi. New 0.9 x 1.3 x 1.1   cm complex cyst with a few thin internal septations in the midpole of the   left kidney.    Urinary bladder: Underdistended.    Prostate: 21 cc which is within normal limits in size.    IMPRESSION:  No hydronephrosis.    6 mm nonobstructive calculus in the right kidney.    New 0.9 x 1.3 x 1.1cm complex cyst with a few thin internal septations   in the midpole of the left kidney.    < end of copied text >  < from: Xray Chest 1 View- PORTABLE-Urgent (Xray Chest 1 View- PORTABLE-Urgent .) (11.04.23 @ 01:35) >    ACC: [id number] EXAM:  XR CHEST PORTABLE URGENT 1V   ORDERED BY: FARHAD THOMAS     PROCEDURE DATE:  11/04/2023          INTERPRETATION:  Exam:XR CHEST URGENT    clinical history:Shortness of breath    Stable right midlung and upper lung opacities.Left lung remains clear.   No significant change from November 2    IMPRESSION: No significant change    < end of copied text >  < from: CT Chest No Cont (11.02.23 @ 16:04) >    ACC: [id number] EXAM:  CT CHEST   ORDERED BY: SARAH YOO     PROCEDURE DATE:  11/02/2023          INTERPRETATION:  INDICATION: Shortness of breath, cough, lung cancer   history, last chemotherapy and radiation treatment in May 2023    TECHNIQUE: Helical acquisition images of the chest without intravenous   contrast. Maximum intensity projection images were generated.    COMPARISON: 12/29/2020 chest x-ray.    FINDINGS:    LUNGS/AIRWAYS/PLEURA: Right upper lobectomy. Right lower lobe   multiloculated thick-walled 11 cm cavity (best seen in its entirety on   601-59) containing fluid and spongelike material. Consolidation within   the middle lobe and inferior aspect of the right lower lobe with angular   borders suggesting prior radiation. Multiple indistinct nodules of   varying density in the right lower and middle lobes, mostly adjacent to   the cavity. Small branching opacities in the peripheral left upper and   lower lobes, compatible with distal airway impaction. Trace left pleural   effusion. Mild right apical pleural thickening.    LYMPH NODES/MEDIASTINUM: No lymphadenopathy.    HEART/VASCULATURE: Normal heart size. Unremarkable pericardium. Coronary   artery calcifications. Normal caliber aorta.    UPPER ABDOMEN: Unremarkable.    BONES/SOFT TISSUES: Old sternal fracture. Mild loss of height of the L1   vertebral body.      IMPRESSION:    Right upper lobectomy.    Larger right lower lobe thick-walled cavity which may be infectious   and/or residua of reported treated tumor. 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    Consolidation within the right lower and middle lobe compatible with   radiation change.    Multiple indistinct nodules in the right lungwhich may be infectious or   radiation pneumonitis.      < end of copied text >

## 2023-11-09 NOTE — CONSULT NOTE ADULT - SUBJECTIVE AND OBJECTIVE BOX
83-year-old M with PMHx HTN, HLD, chronic anemia, COPD, lung CA s/p RUL lobectomy (follows at [facility], last chemotherapy/RT 5/2023, not currently on treatment), AAA, hypothyroidism, CKD stage IIIa Cr 1.4 w 500 mg proteinuria on Losartan   followed by Dr Garcia outpatient    sent in to ED on 11/2/23  by pulmonologist MD Olivera with c/o SOB, dyspnea on minimal exertion, and cough. Pt had outpatient CT of chest performed 10/3 which demonstrated consolidation- was started on Augmentin/Prednisone/Azithromycin 10/6 x 1 week. Pt completed full course of medication however wife noted worsening cough and pt with fever TMax 103.9 on 10/26. Wife then took pt to pulm office again who began 2nd round of same medications. Fevers self resolved. Since 2nd round pt with decreased energy and episode of being unsteady on feet yesterday 11/1. Went to pulm office 11/2 for f/u and was sent to ED for eval. Pt endorses lethargy and weakness on arrival. Denies chest pain, palpitations, N/V, abdominal pain, diarrhea, dysuria.  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 1000 milliLiter(s) (50 mL/Hr) IV Continuous <Continuous>  dextrose 5%. 1000 milliLiter(s) (100 mL/Hr) IV Continuous <Continuous>  dextrose 50% Injectable 25 Gram(s) IV Push once  dextrose 50% Injectable 25 Gram(s) IV Push once  dextrose 50% Injectable 12.5 Gram(s) IV Push once  ferrous    sulfate 325 milliGRAM(s) Oral daily  fluticasone furoate/umeclidinium/vilanterol 100-62.5-25 MICROgram(s) Inhaler 1 Puff(s) Inhalation daily  folic acid 1 milliGRAM(s) Oral daily  glucagon  Injectable 1 milliGRAM(s) IntraMuscular once  insulin glargine Injectable (LANTUS) 8 Unit(s) SubCutaneous at bedtime  insulin lispro (ADMELOG) corrective regimen sliding scale   SubCutaneous at bedtime  insulin lispro (ADMELOG) corrective regimen sliding scale   SubCutaneous Before meals and at bedtime  insulin lispro Injectable (ADMELOG) 4 Unit(s) SubCutaneous three times a day before meals  methylPREDNISolone sodium succinate Injectable 20 milliGRAM(s) IV Push every 12 hours  multivitamin 1 Tablet(s) Oral daily  sodium chloride 0.9%. 1000 milliLiter(s) (50 mL/Hr) IV Continuous <Continuous>  voriconazole 200 milliGRAM(s) Oral every 12 hours      Allergies    penicillin (Unknown)    Intolerances        SOCIAL HISTORY:  Denies ETOh,Smoking,     FAMILY HISTORY:      REVIEW OF SYSTEMS:    limited due to [facility]        VITAL:  T(C): 36.8 (11-09-23), Max: 36.8 (11-09-23)  T(F): 98.2 (11-09-23), Max: 98.2 (11-09-23)  HR: 109 (11-09-23) (55 - 113)  BP: 123/64 (11-09-23) (95/54 - 176/150)  RR: 18 (11-09-23)  SpO2: 94% (11-09-23) (89% - 100%)      I&O's Detail    08 Nov 2023 07:01  -  09 Nov 2023 07:00  --------------------------------------------------------  IN:    PRBCs (Packed Red Blood Cells): 280 mL  Total IN: 280 mL    OUT:    Voided (mL): 790 mL  Total OUT: 790 mL    Total NET: -510 mL      09 Nov 2023 07:01  -  09 Nov 2023 22:15  --------------------------------------------------------  IN:    Other (mL): 500 mL  Total IN: 500 mL    OUT:    Voided (mL): 450 mL  Total OUT: 450 mL    Total NET: 50 m          PHYSICAL EXAM:    Constitutional: frail  HEENT:  EOMI,  MM   Neck: No LAD, No JVD  Respiratory: CTAB  Cardiovascular: S1 and S2  Gastrointestinal: BS+, soft, NT/ND  Extremities: No peripheral edema  Neurological: A/O x 3, no focal deficits  : No Parham  Skin: No rashes  Access: Not applicable    LABS:                          8.2    11.74 )-----------( 175      ( 09 Nov 2023 06:25 )             24.3                         7.4    16.45 )-----------( 220      ( 08 Nov 2023 06:01 )             22.7       137    |  104    |  80     ----------------------------<  294       09 Nov 2023 06:25  5.1     |  25     |  1.99     139    |  106    |  81     ----------------------------<  222       08 Nov 2023 06:01  5.3     |  27     |  2.08     136    |  103    |  64     ----------------------------<  153       07 Nov 2023 07:11  4.7     |  26     |  1.79     Ca    8.6        09 Nov 2023 06:25  Ca    8.7        08 Nov 2023 06:01    Phos  4.1       09 Nov 2023 06:25  Phos  3.3       08 Nov 2023 06:01    Mg     2.7       09 Nov 2023 06:25  Mg     2.6       08 Nov 2023 06:01    TPro  6.0    /  Alb  1.7    /  TBili  0.3    /        09 Nov 2023 06:25  DBili  x      /  AST  46     /  ALT  35     /  AlkPhos  151      TPro  6.2    /  Alb  1.7    /  TBili  0.4    /        08 Nov 2023 06:01  DBili  x      /  AST  39     /  ALT  33     /  AlkPhos  131            Urine Studies:  Urinalysis Basic - ( 09 Nov 2023 06:25 )    Color: x / Appearance: x / SG: x / pH: x  Gluc: 294 mg/dL / Ketone: x  / Bili: x / Urobili: x   Blood: x / Protein: x / Nitrite: x   Leuk Esterase: x / RBC: x / WBC x   Sq Epi: x / Non Sq Epi: x / Bacteria: x      Creatinine, Random Urine: 68 mg/dL (11-09 @ 20:43)  Protein/Creatinine Ratio Calculation: 1.3 Ratio (11-09 @ 20:43)        RADIOLOGY & ADDITIONAL STUDIES:      ACC: [id number] EXAM:  US KIDNEYS AND BLADDER   ORDERED BY: RADHA UNDERWOOD     PROCEDURE DATE:  11/08/2023          INTERPRETATION:  CLINICAL INFORMATION: Acute kidney injury    COMPARISON: 12/22/2022    TECHNIQUE: Sonography of the kidneys and bladder.    FINDINGS:  Right kidney: 10.1 cm. No renal mass, or hydronephrosis. Nonobstructive 6   mm calculus in the right kidney.    Left kidney: 10.1 cm. No hydronephrosis or calculi. New 0.9 x 1.3 x 1.1   cm complex cyst with a few thin internal septations in the midpole of the   left kidney.    Urinary bladder: Underdistended.    Prostate: 21 cc which is within normal limits in size.    IMPRESSION:  No hydronephrosis.    6 mm nonobstructive calculus in the right kidney.    New 0.9 x 1.3 x 1.1cm complex cyst with a few thin internal septations   in the midpole of the left kidney.    --- End of Report ---

## 2023-11-09 NOTE — BRIEF OPERATIVE NOTE - NSICDXBRIEFPROCEDURE_GEN_ALL_CORE_FT
PROCEDURES:  Bronchoscopy, with BAL 09-Nov-2023 13:05:19  Michael Sands  Bronchoscopy, with bronchial biopsy 09-Nov-2023 13:07:20  Michael Sands

## 2023-11-09 NOTE — CONSULT NOTE ADULT - ASSESSMENT
84 yo male with hx of Lung cancer s/p lobectomy., chemotherapy  now presenting with sob and recurrent fevers treated with abx of PNA , now possible concern for fungal ( aspergillosis)    await CT chest finding    CHRISTINA on CKD   Cr rising - now improving   Prerenal    ATN ? sec to hypotension and ARB use   stop all ACE/ARB    no IV contrast   urine lytes   continue IVF    d/w pt family at length at bedside    * pt seen earlier, note now     Thank you for the courtesy of this consult. We will follow this patient with you.   Management is subject to change if new information becomes available or patient condition changes.

## 2023-11-09 NOTE — PROGRESS NOTE ADULT - ASSESSMENT
83-year-old M with PMHx HTN, HLD, chronic anemia, COPD, lung CA s/p RUL lobectomy (follows at [facility], last chemotherapy/RT 5/2023, not currently on treatment), AAA, hypothyroidism, CKD stage IIIa sent in to ED on 11/2/23  by pulmonologist MD Olivera with c/o SOB, dyspnea on minimal exertion, and cough. 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     RLL PNA with possible radiation pneumonitis and /or aspergilloma 2/2 immunocompromised status,   h/o Lung cancer s/p RUL lobectomy   Hemoptysis  - CT chest: Larger right lower lobe thick-walled cavity which may be infectious and/or residua of reported treated tumor. Spongelike material within the cavity can be seen with aspergilloma (prior to fungus ball formation).   Consolidation within the right lower and middle lobe compatible with radiation change.  - leukocytosis stable (WBC 17.70 from 17.35)   - blood cultures -no growth, sputum culture - normal respiratory  - ID/pulm consult noted plan for  bronchoscopy  - c/w voriconazole  - c/w trelegy ellipta  - s/p 7 days of levaquin to cover atypical/psuedomonas (allergy to PCN)   - repeat CXR 11/8 - worsening of opacities over right lung , official reading pending   - 2 d ech0 11/8 - EF 60% , no significant valvular disease   - hemoptysis persist , stop heparin   - 11/9 - s/p bronchoscopy - studies pending    11/9/23 Left calf laceration from veno dines while in PACU for bronchoscopy   - surgery consult - will need suturing   - wound care     Acute hyperglycemia, possible steroid-induced , Prediabetes with A1C 6.3   -  on AM labs  - A1c results 6.3 - consistent with Impaired Fasting Glucose, new onset DM II ruled out     Chronic anemia, likely due to  chemotherapy, worsening and iron deficiency   - decreased on AM labs from admission (Hgb 9.2-->8.2, Hct 27.8--> 25.2)   - no s/s bleeding, asymptomatic, continue to monitor/trend  - 11/8 - 1 unit PRBC due to hemoptysis and worsening of anemia  - Hb improved 8.2   - restart daily iron    # CHRISTINA on CKD stage IIIa, worsening   - Cr improving, 1.38--> 1.2 and through out hospital course worsened to 1.55 and then to 1.75  - continue to monitor/trend  - Creatinine Trend: 1.9 <-- 2.0 <--1.79<--, 1.75<--, 1.55<--, 1.44<--, 1.44<--, 1.20  - stop losartan   - CT abd and pelvis - new right lung opacitiies  - 11/9 - start low dose IV fluids , poor po liquids intake    # HTN - stop losartan due to CHRISTINA,  c/w amlodipine 2.5    #  HLD  - c/w  atorvastatin    # Hypothyroidism - TSH low , free T4 wnl, hold  levothyroxine 25 ( minimal dose ) due to tachycardia    # Hx lung CA s/p RUL lobectomy/chemo/RT - f/u with [facility]     # DVT Ppx d/c heparin SQ due to anemia and hemoptysis    wife Anthony [phone] updated 11/8/23, 11/9/23    Code status - DNR/DNI , will be revoke for bronchoscopy and reinstated afterwards d/w wife at length

## 2023-11-10 LAB
ALBUMIN SERPL ELPH-MCNC: 2 G/DL — LOW (ref 3.3–5)
ALBUMIN SERPL ELPH-MCNC: 2 G/DL — LOW (ref 3.3–5)
ALP SERPL-CCNC: 111 U/L — SIGNIFICANT CHANGE UP (ref 40–120)
ALP SERPL-CCNC: 111 U/L — SIGNIFICANT CHANGE UP (ref 40–120)
ALT FLD-CCNC: 30 U/L — SIGNIFICANT CHANGE UP (ref 12–78)
ALT FLD-CCNC: 30 U/L — SIGNIFICANT CHANGE UP (ref 12–78)
ANION GAP SERPL CALC-SCNC: 3 MMOL/L — LOW (ref 5–17)
ANION GAP SERPL CALC-SCNC: 3 MMOL/L — LOW (ref 5–17)
AST SERPL-CCNC: 39 U/L — HIGH (ref 15–37)
AST SERPL-CCNC: 39 U/L — HIGH (ref 15–37)
BASOPHILS # BLD AUTO: 0.03 K/UL — SIGNIFICANT CHANGE UP (ref 0–0.2)
BASOPHILS # BLD AUTO: 0.03 K/UL — SIGNIFICANT CHANGE UP (ref 0–0.2)
BASOPHILS NFR BLD AUTO: 0.3 % — SIGNIFICANT CHANGE UP (ref 0–2)
BASOPHILS NFR BLD AUTO: 0.3 % — SIGNIFICANT CHANGE UP (ref 0–2)
BILIRUB SERPL-MCNC: 0.3 MG/DL — SIGNIFICANT CHANGE UP (ref 0.2–1.2)
BILIRUB SERPL-MCNC: 0.3 MG/DL — SIGNIFICANT CHANGE UP (ref 0.2–1.2)
BUN SERPL-MCNC: 76 MG/DL — HIGH (ref 7–23)
BUN SERPL-MCNC: 76 MG/DL — HIGH (ref 7–23)
CALCIUM SERPL-MCNC: 8.4 MG/DL — LOW (ref 8.5–10.1)
CALCIUM SERPL-MCNC: 8.4 MG/DL — LOW (ref 8.5–10.1)
CHLORIDE SERPL-SCNC: 108 MMOL/L — SIGNIFICANT CHANGE UP (ref 96–108)
CHLORIDE SERPL-SCNC: 108 MMOL/L — SIGNIFICANT CHANGE UP (ref 96–108)
CO2 SERPL-SCNC: 29 MMOL/L — SIGNIFICANT CHANGE UP (ref 22–31)
CO2 SERPL-SCNC: 29 MMOL/L — SIGNIFICANT CHANGE UP (ref 22–31)
CREAT SERPL-MCNC: 1.71 MG/DL — HIGH (ref 0.5–1.3)
CREAT SERPL-MCNC: 1.71 MG/DL — HIGH (ref 0.5–1.3)
CRP SERPL-MCNC: 106 MG/L — HIGH
CRP SERPL-MCNC: 106 MG/L — HIGH
EGFR: 39 ML/MIN/1.73M2 — LOW
EGFR: 39 ML/MIN/1.73M2 — LOW
EOSINOPHIL # BLD AUTO: 0 K/UL — SIGNIFICANT CHANGE UP (ref 0–0.5)
EOSINOPHIL # BLD AUTO: 0 K/UL — SIGNIFICANT CHANGE UP (ref 0–0.5)
EOSINOPHIL NFR BLD AUTO: 0 % — SIGNIFICANT CHANGE UP (ref 0–6)
EOSINOPHIL NFR BLD AUTO: 0 % — SIGNIFICANT CHANGE UP (ref 0–6)
GLUCOSE BLDC GLUCOMTR-MCNC: 193 MG/DL — HIGH (ref 70–99)
GLUCOSE BLDC GLUCOMTR-MCNC: 193 MG/DL — HIGH (ref 70–99)
GLUCOSE BLDC GLUCOMTR-MCNC: 211 MG/DL — HIGH (ref 70–99)
GLUCOSE BLDC GLUCOMTR-MCNC: 211 MG/DL — HIGH (ref 70–99)
GLUCOSE BLDC GLUCOMTR-MCNC: 215 MG/DL — HIGH (ref 70–99)
GLUCOSE BLDC GLUCOMTR-MCNC: 215 MG/DL — HIGH (ref 70–99)
GLUCOSE BLDC GLUCOMTR-MCNC: 229 MG/DL — HIGH (ref 70–99)
GLUCOSE BLDC GLUCOMTR-MCNC: 229 MG/DL — HIGH (ref 70–99)
GLUCOSE SERPL-MCNC: 229 MG/DL — HIGH (ref 70–99)
GLUCOSE SERPL-MCNC: 229 MG/DL — HIGH (ref 70–99)
HCT VFR BLD CALC: 23.7 % — LOW (ref 39–50)
HCT VFR BLD CALC: 23.7 % — LOW (ref 39–50)
HGB BLD-MCNC: 7.8 G/DL — LOW (ref 13–17)
HGB BLD-MCNC: 7.8 G/DL — LOW (ref 13–17)
IMM GRANULOCYTES NFR BLD AUTO: 1.1 % — HIGH (ref 0–0.9)
IMM GRANULOCYTES NFR BLD AUTO: 1.1 % — HIGH (ref 0–0.9)
LYMPHOCYTES # BLD AUTO: 0.1 K/UL — LOW (ref 1–3.3)
LYMPHOCYTES # BLD AUTO: 0.1 K/UL — LOW (ref 1–3.3)
LYMPHOCYTES # BLD AUTO: 1.1 % — LOW (ref 13–44)
LYMPHOCYTES # BLD AUTO: 1.1 % — LOW (ref 13–44)
MAGNESIUM SERPL-MCNC: 2.7 MG/DL — HIGH (ref 1.6–2.6)
MAGNESIUM SERPL-MCNC: 2.7 MG/DL — HIGH (ref 1.6–2.6)
MCHC RBC-ENTMCNC: 31.2 PG — SIGNIFICANT CHANGE UP (ref 27–34)
MCHC RBC-ENTMCNC: 31.2 PG — SIGNIFICANT CHANGE UP (ref 27–34)
MCHC RBC-ENTMCNC: 32.9 GM/DL — SIGNIFICANT CHANGE UP (ref 32–36)
MCHC RBC-ENTMCNC: 32.9 GM/DL — SIGNIFICANT CHANGE UP (ref 32–36)
MCV RBC AUTO: 94.8 FL — SIGNIFICANT CHANGE UP (ref 80–100)
MCV RBC AUTO: 94.8 FL — SIGNIFICANT CHANGE UP (ref 80–100)
MONOCYTES # BLD AUTO: 0.24 K/UL — SIGNIFICANT CHANGE UP (ref 0–0.9)
MONOCYTES # BLD AUTO: 0.24 K/UL — SIGNIFICANT CHANGE UP (ref 0–0.9)
MONOCYTES NFR BLD AUTO: 2.7 % — SIGNIFICANT CHANGE UP (ref 2–14)
MONOCYTES NFR BLD AUTO: 2.7 % — SIGNIFICANT CHANGE UP (ref 2–14)
NEUTROPHILS # BLD AUTO: 8.47 K/UL — HIGH (ref 1.8–7.4)
NEUTROPHILS # BLD AUTO: 8.47 K/UL — HIGH (ref 1.8–7.4)
NEUTROPHILS NFR BLD AUTO: 94.8 % — HIGH (ref 43–77)
NEUTROPHILS NFR BLD AUTO: 94.8 % — HIGH (ref 43–77)
NIGHT BLUE STAIN TISS: SIGNIFICANT CHANGE UP
NIGHT BLUE STAIN TISS: SIGNIFICANT CHANGE UP
PHOSPHATE SERPL-MCNC: 4.4 MG/DL — SIGNIFICANT CHANGE UP (ref 2.5–4.5)
PHOSPHATE SERPL-MCNC: 4.4 MG/DL — SIGNIFICANT CHANGE UP (ref 2.5–4.5)
PLATELET # BLD AUTO: 154 K/UL — SIGNIFICANT CHANGE UP (ref 150–400)
PLATELET # BLD AUTO: 154 K/UL — SIGNIFICANT CHANGE UP (ref 150–400)
POTASSIUM SERPL-MCNC: 5.6 MMOL/L — HIGH (ref 3.5–5.3)
POTASSIUM SERPL-MCNC: 5.6 MMOL/L — HIGH (ref 3.5–5.3)
POTASSIUM SERPL-SCNC: 5.6 MMOL/L — HIGH (ref 3.5–5.3)
POTASSIUM SERPL-SCNC: 5.6 MMOL/L — HIGH (ref 3.5–5.3)
PROT SERPL-MCNC: 5.9 GM/DL — LOW (ref 6–8.3)
PROT SERPL-MCNC: 5.9 GM/DL — LOW (ref 6–8.3)
RBC # BLD: 2.5 M/UL — LOW (ref 4.2–5.8)
RBC # BLD: 2.5 M/UL — LOW (ref 4.2–5.8)
RBC # FLD: 16.2 % — HIGH (ref 10.3–14.5)
RBC # FLD: 16.2 % — HIGH (ref 10.3–14.5)
SODIUM SERPL-SCNC: 140 MMOL/L — SIGNIFICANT CHANGE UP (ref 135–145)
SODIUM SERPL-SCNC: 140 MMOL/L — SIGNIFICANT CHANGE UP (ref 135–145)
SODIUM UR-SCNC: 33 MMOL/L — SIGNIFICANT CHANGE UP
SODIUM UR-SCNC: 33 MMOL/L — SIGNIFICANT CHANGE UP
SPECIMEN SOURCE: SIGNIFICANT CHANGE UP
SPECIMEN SOURCE: SIGNIFICANT CHANGE UP
T3FREE SERPL-MCNC: 1.05 PG/ML — LOW (ref 2–4.4)
T3FREE SERPL-MCNC: 1.05 PG/ML — LOW (ref 2–4.4)
TSH SERPL-MCNC: 0.74 UU/ML — SIGNIFICANT CHANGE UP (ref 0.34–4.82)
TSH SERPL-MCNC: 0.74 UU/ML — SIGNIFICANT CHANGE UP (ref 0.34–4.82)
WBC # BLD: 8.94 K/UL — SIGNIFICANT CHANGE UP (ref 3.8–10.5)
WBC # BLD: 8.94 K/UL — SIGNIFICANT CHANGE UP (ref 3.8–10.5)
WBC # FLD AUTO: 8.94 K/UL — SIGNIFICANT CHANGE UP (ref 3.8–10.5)
WBC # FLD AUTO: 8.94 K/UL — SIGNIFICANT CHANGE UP (ref 3.8–10.5)

## 2023-11-10 PROCEDURE — 99231 SBSQ HOSP IP/OBS SF/LOW 25: CPT

## 2023-11-10 PROCEDURE — 99232 SBSQ HOSP IP/OBS MODERATE 35: CPT

## 2023-11-10 RX ORDER — INSULIN GLARGINE 100 [IU]/ML
10 INJECTION, SOLUTION SUBCUTANEOUS AT BEDTIME
Refills: 0 | Status: ACTIVE | OUTPATIENT
Start: 2023-11-10 | End: 2024-10-08

## 2023-11-10 RX ORDER — LEVOTHYROXINE SODIUM 125 MCG
25 TABLET ORAL DAILY
Refills: 0 | Status: DISCONTINUED | OUTPATIENT
Start: 2023-11-11 | End: 2023-11-12

## 2023-11-10 RX ORDER — SODIUM ZIRCONIUM CYCLOSILICATE 10 G/10G
5 POWDER, FOR SUSPENSION ORAL ONCE
Refills: 0 | Status: COMPLETED | OUTPATIENT
Start: 2023-11-10 | End: 2023-11-10

## 2023-11-10 RX ORDER — SODIUM ZIRCONIUM CYCLOSILICATE 10 G/10G
10 POWDER, FOR SUSPENSION ORAL ONCE
Refills: 0 | Status: DISCONTINUED | OUTPATIENT
Start: 2023-11-10 | End: 2023-11-10

## 2023-11-10 RX ORDER — HEPARIN SODIUM 5000 [USP'U]/ML
5000 INJECTION INTRAVENOUS; SUBCUTANEOUS EVERY 8 HOURS
Refills: 0 | Status: DISCONTINUED | OUTPATIENT
Start: 2023-11-10 | End: 2023-11-10

## 2023-11-10 RX ORDER — SODIUM CHLORIDE 9 MG/ML
1000 INJECTION INTRAMUSCULAR; INTRAVENOUS; SUBCUTANEOUS
Refills: 0 | Status: DISCONTINUED | OUTPATIENT
Start: 2023-11-10 | End: 2023-11-11

## 2023-11-10 RX ADMIN — INSULIN GLARGINE 10 UNIT(S): 100 INJECTION, SOLUTION SUBCUTANEOUS at 21:30

## 2023-11-10 RX ADMIN — Medication 325 MILLIGRAM(S): at 12:25

## 2023-11-10 RX ADMIN — Medication 3 MILLILITER(S): at 20:45

## 2023-11-10 RX ADMIN — Medication 50 MILLILITER(S): at 21:08

## 2023-11-10 RX ADMIN — Medication 1 APPLICATION(S): at 21:10

## 2023-11-10 RX ADMIN — FLUTICASONE FUROATE, UMECLIDINIUM BROMIDE AND VILANTEROL TRIFENATATE 1 PUFF(S): 200; 62.5; 25 POWDER RESPIRATORY (INHALATION) at 08:56

## 2023-11-10 RX ADMIN — Medication 3 MILLILITER(S): at 02:02

## 2023-11-10 RX ADMIN — Medication 3 MILLILITER(S): at 08:54

## 2023-11-10 RX ADMIN — VORICONAZOLE 200 MILLIGRAM(S): 10 INJECTION, POWDER, LYOPHILIZED, FOR SOLUTION INTRAVENOUS at 12:28

## 2023-11-10 RX ADMIN — Medication 4: at 17:50

## 2023-11-10 RX ADMIN — SODIUM CHLORIDE 50 MILLILITER(S): 9 INJECTION INTRAMUSCULAR; INTRAVENOUS; SUBCUTANEOUS at 12:27

## 2023-11-10 RX ADMIN — Medication 3 MILLILITER(S): at 13:51

## 2023-11-10 RX ADMIN — Medication 50 MILLILITER(S): at 12:26

## 2023-11-10 RX ADMIN — Medication 4 UNIT(S): at 14:03

## 2023-11-10 RX ADMIN — ATORVASTATIN CALCIUM 40 MILLIGRAM(S): 80 TABLET, FILM COATED ORAL at 21:10

## 2023-11-10 RX ADMIN — SODIUM ZIRCONIUM CYCLOSILICATE 5 GRAM(S): 10 POWDER, FOR SUSPENSION ORAL at 09:56

## 2023-11-10 RX ADMIN — Medication 3 MILLILITER(S): at 09:08

## 2023-11-10 RX ADMIN — Medication 1 APPLICATION(S): at 12:44

## 2023-11-10 RX ADMIN — Medication 20 MILLIGRAM(S): at 21:10

## 2023-11-10 RX ADMIN — Medication 20 MILLIGRAM(S): at 12:25

## 2023-11-10 RX ADMIN — Medication 2: at 14:03

## 2023-11-10 RX ADMIN — Medication 4 UNIT(S): at 17:50

## 2023-11-10 RX ADMIN — AMLODIPINE BESYLATE 2.5 MILLIGRAM(S): 2.5 TABLET ORAL at 14:02

## 2023-11-10 RX ADMIN — Medication 1 MILLIGRAM(S): at 12:25

## 2023-11-10 RX ADMIN — VORICONAZOLE 200 MILLIGRAM(S): 10 INJECTION, POWDER, LYOPHILIZED, FOR SOLUTION INTRAVENOUS at 21:17

## 2023-11-10 RX ADMIN — Medication 4: at 09:07

## 2023-11-10 RX ADMIN — Medication 4 UNIT(S): at 09:07

## 2023-11-10 NOTE — PROGRESS NOTE ADULT - ASSESSMENT
83y old Male with PMH HTN, HPL, Chronic anemia, COPD, [place] [facility] [place] and [place] not currently receiving chemo treatment), s/p right upper lobectomy, AAA, Hypothyroidism, CKD stage IIIa referred by me to ed for continued sob, cough despite abx treatment found to have large lower lobe thick walled cavity  1. 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 discussed with wife.  -started on voriconazole. surgery is more defitive therapy however he is high surgical risk  -reviewed imaging from [facility] supplied by wife - it looks like he has had blebs in the past and these are what are infected - they look half filled with fluid with thicker borders, which would argue against fungal infection/mrsa as there is no necrosis involved in its pathogenesis  -discussed bronch with wife.   -s/p bronch. follow up results  2. pneumonia: levaquin stopped due to 7 day treatment, but now found to have aspiration pneumonia  -spoke to id, restart levaquin  -supplemental o2  3. copd: continue trelegy  4. ? radiation induced pneumonitis: continue methylprednisolone  5. anemia: received 1 unit prbc  6. ? aspiration pneumonia: restarted levaquin. f/u speech swallow  -aspiration precautions    total time > 35 min  spoke to dr george and dr bryant 83y old Male with PMH HTN, HPL, Chronic anemia, COPD, [place] CA [place] [facility] [place] and [place]23, not currently receiving chemo treatment), s/p right upper lobectomy, AAA, Hypothyroidism, CKD stage IIIa referred by me to ed for continued sob, cough despite abx treatment found to have large lower lobe thick walled cavity  1. 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 discussed with wife.  -started on voriconazole. surgery is more defitive therapy however he is high surgical risk  -reviewed imaging from [place] supplied by wife - it looks like he has had blebs in the past and these are what are infected - they look half filled with fluid with thicker borders, which would argue against fungal infection/mrsa as there is no necrosis involved in its pathogenesis  -discussed bronch with wife.   -s/p bronch. follow up results  2. pneumonia: levaquin stopped due to 7 day treatment, but now found to have aspiration pneumonia  -spoke to id, restart levaquin  -supplemental o2  3. copd: continue trelegy  4. ? radiation induced pneumonitis: continue methylprednisolone  5. anemia: received 1 unit prbc  6. ? aspiration pneumonia: restarted levaquin. f/u speech swallow  -aspiration precautions  7. left leg laceration: sutured and bandaged. continue wound care    total time > 35 min  spoke to dr george and dr bryant

## 2023-11-10 NOTE — PROGRESS NOTE ADULT - SUBJECTIVE AND OBJECTIVE BOX
cc - cough, weakness        83-year-old M with PMHx HTN, HLD, chronic anemia, COPD, lung CA s/p RUL lobectomy (follows at [facility] [place], last chemotherapy/RT 5/2023, not currently on treatment), AAA, hypothyroidism, CKD stage IIIa sent in to ED on 11/2/23  by pulmonologist MD Olivera with c/o SOB, dyspnea on minimal exertion, and cough. Pt had outpatient CT of chest performed 10/3 which demonstrated consolidation- was started on Augmentin/Prednisone/Azithromycin 10/6 x 1 week. Pt completed full course of medication however wife noted worsening cough and pt with fever TMax 103.9 on 10/26. Wife then took pt to pulm office again who began 2nd round of same medications. Fevers self resolved. Since 2nd round pt with decreased energy and episode of being unsteady on feet yesterday 11/1. Went to pulm office 11/2 for f/u and was sent to ED for eval. Pt endorses lethargy and weakness on arrival. Denies chest pain, palpitations, N/V, abdominal pain, diarrhea, dysuria.     Upgraded 11/3 to SICU for acute hypoxic resp failure requiring HFNC.  Off HFNC since 11/4 11/6/23 Patient resting in bed comfortably with wife at bedside. States he still feels short of breath with small amount of movement. No cp, no n/v.   11/7 - no events, afebrile, + productive cough, denies cp, + dyspnea, + gen weakness, no abdominal pain, tolerating po intake  11/8 - afebrile, dyspnea overnight, denies cp, + cough with hemoptysis , off BIPAP in am, tolerating po intake  11/9 - dyspnea, cough, no hemoptysis, afebrile, tolerating some po intake, plan discussed with wife at bedside  11/10 - more awake and alert in am, feels better, denies pain, + dyspnea, + cough persist, no hemoptysis, denies abdominal pain, plan discussed in length    Review of system- Rest of the review of system are negative except mentioned in HPI, poor historian    Vital sings reviewed for last 24 h  T(C): 36.6 (11-10-23 @ 09:29), Max: 36.8 (11-09-23 @ 22:12)  T(F): 97.9 (11-10-23 @ 09:29), Max: 98.2 (11-09-23 @ 22:12)  HR: 108 (11-10-23 @ 14:00) (55 - 110)  BP: 135/85 (11-10-23 @ 12:23) (123/64 - 137/64)  RR: 16 (11-10-23 @ 09:29) (16 - 18)  SpO2: 93% (11-10-23 @ 14:00) (93% - 98%)  POCT Blood Glucose.: 193 mg/dL (10 Nov 2023 14:00)  POCT Blood Glucose.: 215 mg/dL (10 Nov 2023 08:59)  POCT Blood Glucose.: 388 mg/dL (09 Nov 2023 21:40)  POCT Blood Glucose.: 147 mg/dL (09 Nov 2023 15:38)      PHYSICAL EXAM:    Constitutional: NAD, awake and alert   HEENT: PERR, EOMI, Normal Hearing, MMM  Neck: Soft and supple, No LAD, No JVD  Respiratory: Breath sounds decreased over right lung, normal on left side ,  No wheezing, rales, + rhonchi  Cardiovascular: S1 and S2, regular rate and rhythm, no Murmurs, gallops or rubs  Gastrointestinal: Bowel Sounds present, soft, nontender, nondistended, no guarding, no rebound  Extremities: trace LE  peripheral edema  Vascular: 2+ peripheral pulses  Neurological: A/O x 3, no focal deficits  Musculoskeletal: 5/5 strength b/l upper and lower extremities  Skin: No rashes, + dry skin, bruising + large about 20 cm left calf skin laceration   rectal : deferred, not indicated      All labs radiology and other studies reviewed and interpreted :                         7.8    8.94  )-----------( 154      ( 10 Nov 2023 06:19 )             23.7     11-10    140  |  108  |  76<H>  ----------------------------<  229<H>  5.6<H>   |  29  |  1.71<H>    Ca    8.4<L>      10 Nov 2023 06:19  Phos  4.4     11-10  Mg     2.7     11-10    TPro  5.9<L>  /  Alb  2.0<L>  /  TBili  0.3  /  DBili  x   /  AST  39<H>  /  ALT  30  /  AlkPhos  111  11-10        LIVER FUNCTIONS - ( 10 Nov 2023 06:19 )  Alb: 2.0 g/dL / Pro: 5.9 gm/dL / ALK PHOS: 111 U/L / ALT: 30 U/L / AST: 39 U/L / GGT: x           C-Reactive Protein, Serum: 106 mg/L (11-10-23 @ 06:19)  C-Reactive Protein, Serum: 163 mg/L (11-08-23 @ 06:01)  C-Reactive Protein, Serum: 141 mg/L (11-07-23 @ 07:11)         CT Abdomen and Pelvis No Cont (11.08.23 @ 19:01) >  IMPRESSION:  No hydronephrosis.    New patchy opacities in the right lung base. Small right pleural effusion.     TTE Echo Complete w/ Contrast w/ Doppler (11.08.23 @ 15:09) >   Left ventricle size and structure are within normal limitations. Mild   concentric left ventricular hypertrophy. Estimated left ventricular   ejection fraction is 60-65 %.   Mild diastolic dysfunction (stage I).   Mild tricuspid valve regurgitation.    Culture - Bronchial (11.09.23 @ 12:30)    Gram Stain:   Rare polymorphonuclear leukocytes per low power field  Few Squamous epithelial cells per low power field  Few Gram positive cocci in pairs per oil power field   Specimen Source: .Bronchial RIGHT LOWER LOBE BAL   Culture Results:   Normal Respiratory Magruerite present                          Culture - Blood (11.04.23 @ 04:21) Culture - Sputum (11.03.23 @ 14:16)    Gram Stain:   Rare Squamous epithelial cells per low power field  No polymorphonuclear cells seen per low power field  Rare Gram positive cocci in pairs per oil power field  Rare Gram Negative Rods per oil power field   Specimen Source: .Sputum Sputum   Culture Results:   Normal Respiratory Marguerite present    Culture - Blood (11.04.23 @ 04:21)    Specimen Source: .Blood None   Culture Results:   No growth at 72 Hours    Culture - Urine (11.02.23 @ 16:40)    Specimen Source: Clean Catch Clean Catch (Midstream)   Culture Results:   No growth     Xray Chest 1 View- PORTABLE-Urgent (Xray Chest 1 View- PORTABLE-Urgent .) (11.04.23 @ 01:35) >  Stable right midlung and upper lung opacities.Left lung remains clear.   No significant change from November 2    IMPRESSION: No significant change       CT Chest No Cont (11.02.23 @ 16:04) >    IMPRESSION:    Right upper lobectomy.    Larger right lower lobe thick-walled cavity which may be infectious   and/or residua of reported treated tumor. 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    Consolidation within the right lower and middle lobe compatible with   radiation change.    Multiple indistinct nodules in the right lungwhich may be infectious or   radiation pneumonitis.      MEDICATIONS  (STANDING):  albuterol/ipratropium for Nebulization 3 milliLiter(s) Nebulizer every 6 hours  amLODIPine   Tablet 2.5 milliGRAM(s) Oral daily  atorvastatin 40 milliGRAM(s) Oral at bedtime  dextrose 5%. 1000 milliLiter(s) (50 mL/Hr) IV Continuous <Continuous>  dextrose 5%. 1000 milliLiter(s) (100 mL/Hr) IV Continuous <Continuous>  dextrose 50% Injectable 25 Gram(s) IV Push once  dextrose 50% Injectable 12.5 Gram(s) IV Push once  dextrose 50% Injectable 25 Gram(s) IV Push once  fluticasone furoate/umeclidinium/vilanterol 100-62.5-25 MICROgram(s) Inhaler 1 Puff(s) Inhalation daily  folic acid 1 milliGRAM(s) Oral daily  glucagon  Injectable 1 milliGRAM(s) IntraMuscular once  heparin   Injectable 5000 Unit(s) SubCutaneous every 8 hours  insulin glargine Injectable (LANTUS) 8 Unit(s) SubCutaneous at bedtime  insulin lispro (ADMELOG) corrective regimen sliding scale   SubCutaneous Before meals and at bedtime  levoFLOXacin IVPB      levoFLOXacin IVPB 500 milliGRAM(s) IV Intermittent every 24 hours  levothyroxine 25 MICROGram(s) Oral daily  methylPREDNISolone sodium succinate Injectable 20 milliGRAM(s) IV Push every 12 hours  multivitamin 1 Tablet(s) Oral daily  voriconazole 200 milliGRAM(s) Oral every 12 hours    MEDICATIONS  (PRN):  acetaminophen     Tablet .. 650 milliGRAM(s) Oral every 6 hours PRN Temp greater or equal to 38C (100.4F), Mild Pain (1 - 3)  aluminum hydroxide/magnesium hydroxide/simethicone Suspension 30 milliLiter(s) Oral every 4 hours PRN Dyspepsia  benzonatate 100 milliGRAM(s) Oral three times a day PRN Cough  dextrose Oral Gel 15 Gram(s) Oral once PRN Blood Glucose LESS THAN 70 milliGRAM(s)/deciliter  melatonin 3 milliGRAM(s) Oral at bedtime PRN Insomnia  ondansetron Injectable 4 milliGRAM(s) IV Push every 8 hours PRN Nausea and/or Vomiting

## 2023-11-10 NOTE — PROGRESS NOTE ADULT - SUBJECTIVE AND OBJECTIVE BOX
Subjective: Pt in bed NAD no issues overnight     T(C): 36.6 (11-10-23 @ 09:29), Max: 36.8 (11-09-23 @ 22:12)  HR: 57 (11-10-23 @ 09:29) (55 - 113)  BP: 137/64 (11-10-23 @ 09:47) (95/54 - 176/150)  ABP: --  ABP(mean): --  RR: 16 (11-10-23 @ 09:29) (16 - 24)  SpO2: 93% (11-10-23 @ 09:29) (89% - 100%) 3 L NC   Wt(kg): --  CVP(mm Hg): --  CO: --  CI: --  PA: --                                              Tele: SR       11-10    140  |  108  |  76<H>  ----------------------------<  229<H>  5.6<H>   |  29  |  1.71<H>    Ca    8.4<L>      10 Nov 2023 06:19  Phos  4.4     11-10  Mg     2.7     11-10    TPro  5.9<L>  /  Alb  2.0<L>  /  TBili  0.3  /  DBili  x   /  AST  39<H>  /  ALT  30  /  AlkPhos  111  11-10                               7.8    8.94  )-----------( 154      ( 10 Nov 2023 06:19 )             23.7        PT/INR - ( 09 Nov 2023 06:25 )   PT: 12.1 sec;   INR: 1.07 ratio                  CAPILLARY BLOOD GLUCOSE      POCT Blood Glucose.: 215 mg/dL (10 Nov 2023 08:59)  POCT Blood Glucose.: 388 mg/dL (09 Nov 2023 21:40)  POCT Blood Glucose.: 147 mg/dL (09 Nov 2023 15:38)           CXR:  < from: Xray Chest 1 View- PORTABLE-Urgent (Xray Chest 1 View- PORTABLE-Urgent .) (11.04.23 @ 01:35) >    Stable right midlung and upper lung opacities.Left lung remains clear.   No significant change from November 2    IMPRESSION: No significant change    < end of copied text >          Exam   Neuro:  Alert Awake mild confusion cachetic   Pulm: decreased at bases   CV: RRR   Abd: soft   Extremities: warm          Assessment:  83yMale    with PAST MEDICAL & SURGICAL HISTORY:  COPD (chronic obstructive pulmonary disease)      Lung cancer      Anemia of chronic disease      CKD (chronic kidney disease), stage III      Hypothyroidism      AAA (abdominal aortic aneurysm)      HTN (hypertension)      HLD (hyperlipidemia)      Thrombocytopenia      S/P lobectomy of lung            Plan:

## 2023-11-10 NOTE — PROGRESS NOTE ADULT - ASSESSMENT
83-year-old M with PMHx HTN, HLD, chronic anemia, COPD, lung CA s/p RUL lobectomy (follows at [facility], last chemotherapy/RT 5/2023, not currently on treatment), AAA, hypothyroidism, CKD stage IIIa sent in to ED on 11/2/23  by pulmonologist MD Olivera with c/o SOB, dyspnea on minimal exertion, and cough. 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     RLL PNA with possible radiation pneumonitis and /or aspergilloma 2/2 immunocompromised status,   h/o Lung cancer s/p RUL lobectomy   Hemoptysis  - CT chest: Larger right lower lobe thick-walled cavity which may be infectious and/or residua of reported treated tumor. Spongelike material within the cavity can be seen with aspergilloma (prior to fungus ball formation). Consolidation within the right lower and middle lobe compatible with radiation change.  - leukocytosis stable (WBC 17.70 from 17.35) ---> trend down to WBC  8   - blood cultures -no growth, sputum culture - normal respiratory  - ID/pulm consult noted   - c/w voriconazole  - c/w trelegy ellipta  - s/p 7 days of levaquin to cover atypical/psuedomonas (allergy to PCN)   - repeat CXR 11/8 - worsening of opacities over right lung   - 2 d ech0 11/8 - EF 60% , no significant valvular disease   - hemoptysis persist , stop heparin   - 11/9 - s/p bronchoscopy - studies pending  - 11/10 - c/w Levaquin as per ID day 8   - speech and swallow evaluation to r/o aspiration    11/9/23 Left calf laceration from veno dines while in PACU for bronchoscopy   - surgery consult - s/p suturing  on 11/09/23 , plan to remove sutures in 7-10 days  - wound care     Acute hyperglycemia, possible steroid-induced , Prediabetes with A1C 6.3   -  on AM labs  - A1c results 6.3 - consistent with Impaired Fasting Glucose, new onset DM II ruled out     Chronic anemia, likely due to  chemotherapy, worsening and iron deficiency   - decreased on AM labs from admission (Hgb 9.2-->8.2, Hct 27.8--> 25.2)   - no s/s bleeding, asymptomatic, continue to monitor/trend  - 11/8 - 1 unit PRBC due to hemoptysis and worsening of anemia  - Hb improved 8.2 --> 7.8   - restart daily iron    CHRISTINA on CKD stage IIIa, improving  - Cr improving, 1.38--> 1.2 and through out hospital course worsened to 1.55 and then to 1.75  - continue to monitor/trend  - Creatinine Trend: 1.7 <--1.9 <-- 2.0 <--1.79<--, 1.75<--, 1.55<--, 1.44<--, 1.44<--, 1.20  - stop losartan   - CT abd and pelvis - new right lung opacitiies  - 11/9 - start low dose IV fluids , poor po liquids intake    # HTN - stop losartan due to CHRISTINA,  c/w amlodipine 2.5    #  HLD  - c/w  atorvastatin    # Hypothyroidism - TSH low , free T4 wnl, low free T3 , restart  levothyroxine 25 ( minimal dose )     # Hx lung CA s/p RUL lobectomy/chemo/RT - f/u with [facility]     # DVT Ppx d/c heparin SQ due to anemia and hemoptysis    wife Anthony [phone] updated 11/8/23, 11/9/23, 11/10     Code status - DNR/DNI , will be revoke for bronchoscopy and reinstated afterwards d/w wife at length     83-year-old M with PMHx HTN, HLD, chronic anemia, COPD, lung CA s/p RUL lobectomy (follows at [facility], last chemotherapy/RT 5/2023, not currently on treatment), AAA, hypothyroidism, CKD stage IIIa sent in to ED on 11/2/23  by pulmonologist MD Olivera with c/o SOB, dyspnea on minimal exertion, and cough. 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     RLL PNA with possible radiation pneumonitis and /or aspergilloma 2/2 immunocompromised status,   h/o Lung cancer s/p RUL lobectomy   Hemoptysis  - CT chest: Larger right lower lobe thick-walled cavity which may be infectious and/or residua of reported treated tumor. Spongelike material within the cavity can be seen with aspergilloma (prior to fungus ball formation). Consolidation within the right lower and middle lobe compatible with radiation change.  - leukocytosis stable (WBC 17.70 from 17.35) ---> trend down to WBC  8   - blood cultures -no growth, sputum culture - normal respiratory  - ID/pulm consult noted   - c/w voriconazole  - c/w trelegy ellipta  - s/p 7 days of levaquin to cover atypical/psuedomonas (allergy to PCN)   - repeat CXR 11/8 - worsening of opacities over right lung   - 2 d ech0 11/8 - EF 60% , no significant valvular disease   - hemoptysis persist , stop heparin   - 11/9 - s/p bronchoscopy - studies pending  - 11/10 - c/w Levaquin as per ID day 8   - speech and swallow evaluation to r/o aspiration    11/9/23 Left calf laceration from veno dines while in PACU for bronchoscopy   - surgery consult - s/p suturing  on 11/09/23 , plan to remove sutures in 7-10 days  - wound care     Acute hyperglycemia, possible steroid-induced , Prediabetes with A1C 6.3   -  on AM labs  - A1c results 6.3 - consistent with Impaired Fasting Glucose, new onset DM II ruled out     Chronic anemia, likely due to  chemotherapy, worsening and iron deficiency   - decreased on AM labs from admission (Hgb 9.2-->8.2, Hct 27.8--> 25.2)   - no s/s bleeding, asymptomatic, continue to monitor/trend  - 11/8 - 1 unit PRBC due to hemoptysis and worsening of anemia  - Hb improved 8.2 --> 7.8   - restart daily iron    CHRISTINA on CKD stage IIIa, improving  Hyperkalemia 11/10  - Cr improving, 1.38--> 1.2 and through out hospital course worsened to 1.55 and then to 1.75  - continue to monitor/trend  - Creatinine Trend: 1.7 <--1.9 <-- 2.0 <--1.79<--, 1.75<--, 1.55<--, 1.44<--, 1.44<--, 1.20  - stop losartan   - CT abd and pelvis - new right lung opacitiies  - 11/9 - start low dose IV fluids , poor po liquids intake  - 11/10 c/w IV fluids, lokelma 5    # HTN - stop losartan due to CHRISTINA,  c/w amlodipine 2.5    #  HLD  - c/w  atorvastatin    # Hypothyroidism - TSH low , free T4 wnl, low free T3 , restart  levothyroxine 25 ( minimal dose )     # Hx lung CA s/p RUL lobectomy/chemo/RT - f/u with [facility]     # DVT Ppx d/c heparin SQ due to anemia and hemoptysis    wife Anthony [phone] updated 11/8/23, 11/9/23, 11/10     Code status - DNR/DNI , will be revoke for bronchoscopy and reinstated afterwards d/w wife at length

## 2023-11-10 NOTE — PROGRESS NOTE ADULT - ASSESSMENT
82 y/o [place] with h/o lung CA [place] [facility] [place], s/p right upper lobectomy, HTN, HPL, Chronic anemia, COPD, AAA, Hypothyroidism, CKD stage IIIa was admitted on 11/2 for increased shortness of breath, dyspnea on minimal exertion, and cough. Reportedly, he had CT of the chest performed 10/3 which demonstrated consolidation, was started on augmentin/prednisone/azithro 10/6 x 1 week. Pt completed course of medications however wife noted worsening cough and pt with fever Tmax of 103.9 on 10/26. Wife then took pt to pulmonologist again who started pt on second round of the same medications, instructed wife to stop giving pt tylenol wife notes fevers self-resolved. Since then pt decreased energy with episode of being unsteady on feet. Today is 6th day of taking medications, had f/u scheduled with pulmonologist who sent pt to ED for eval.  He feels tired and weak. In ER he received ceftriaxone.    1. Large RLL pulmonary cavity. 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 Possible postobstructive pneumonia. Lung Ca s/p right upper lobectomy. Radiation pneumonitis. Allergy to PCN.    -respiratory improved s/p bronchoscopy  -leukocytosis  -sputum c/s noted  -s/p levofloxacin 500 mg IV qd # 7   -on voriconazole 200 mg PO q12h # 5  -tolerating abx well so far; no side effects noted  -pulmonary evaluation ?diagnostic bronchoscopy   -if this is a fungus ball, antifungal therapy will not be effective, he may need surgery to remove the fungal ball area  -thoracic evaluation appreciated  -f/u bronchoscopy results  -continue antifungal therapy   -monitor temps  -f/u CBC  -supportive care  2. Other issues:   -care per medicine    d/w medicine team

## 2023-11-10 NOTE — PROGRESS NOTE ADULT - SUBJECTIVE AND OBJECTIVE BOX
Date of service: 11-10-23 @ 07:41    Lying in bed in NAD  Events noted  s/p bronchoscopy  Has a left lower leg laceration s/p surgical repair  Has dry cough    ROS: no fever or chills; no HA, no abdominal pain, no diarrhea or constipation; no dysuria, no legs pain, no rashes    MEDICATIONS  (STANDING):  albumin human 25% IVPB 100 milliLiter(s) IV Intermittent every 12 hours  albuterol/ipratropium for Nebulization 3 milliLiter(s) Nebulizer every 6 hours  amLODIPine   Tablet 2.5 milliGRAM(s) Oral daily  AQUAPHOR (petrolatum Ointment) 1 Application(s) Topical two times a day  atorvastatin 40 milliGRAM(s) Oral at bedtime  dextrose 5%. 1000 milliLiter(s) (100 mL/Hr) IV Continuous <Continuous>  dextrose 5%. 1000 milliLiter(s) (50 mL/Hr) IV Continuous <Continuous>  dextrose 50% Injectable 12.5 Gram(s) IV Push once  dextrose 50% Injectable 25 Gram(s) IV Push once  dextrose 50% Injectable 25 Gram(s) IV Push once  ferrous    sulfate 325 milliGRAM(s) Oral daily  fluticasone furoate/umeclidinium/vilanterol 100-62.5-25 MICROgram(s) Inhaler 1 Puff(s) Inhalation daily  folic acid 1 milliGRAM(s) Oral daily  glucagon  Injectable 1 milliGRAM(s) IntraMuscular once  insulin glargine Injectable (LANTUS) 8 Unit(s) SubCutaneous at bedtime  insulin lispro (ADMELOG) corrective regimen sliding scale   SubCutaneous at bedtime  insulin lispro (ADMELOG) corrective regimen sliding scale   SubCutaneous Before meals and at bedtime  insulin lispro Injectable (ADMELOG) 4 Unit(s) SubCutaneous three times a day before meals  methylPREDNISolone sodium succinate Injectable 20 milliGRAM(s) IV Push every 12 hours  multivitamin 1 Tablet(s) Oral daily  sodium chloride 0.9%. 1000 milliLiter(s) (50 mL/Hr) IV Continuous <Continuous>  voriconazole 200 milliGRAM(s) Oral every 12 hours    Vital Signs Last 24 Hrs  T(C): 36.8 (09 Nov 2023 22:12), Max: 36.8 (09 Nov 2023 22:12)  T(F): 98.2 (09 Nov 2023 22:12), Max: 98.2 (09 Nov 2023 22:12)  HR: 71 (10 Nov 2023 02:27) (55 - 113)  BP: 123/64 (09 Nov 2023 22:12) (95/54 - 176/150)  BP(mean): 97 (09 Nov 2023 14:45) (97 - 97)  RR: 18 (09 Nov 2023 22:12) (18 - 24)  SpO2: 98% (10 Nov 2023 02:27) (89% - 100%)    Parameters below as of 10 Nov 2023 02:26  Patient On (Oxygen Delivery Method): BiPAP/CPAP         Physical exam:    Constitutional:  No acute distress  HEENT: NC/AT, EOMI, PERRLA, conjunctivae clear; ears and nose atraumatic  Neck: supple; thyroid not palpable  Back: no tenderness  Respiratory: respiratory effort normal; few crackles at bases  Cardiovascular: S1S2 regular, no murmurs  Abdomen: soft, not tender, not distended, positive BS  Genitourinary: no suprapubic tenderness  Lymphatic: no LN palpable  Musculoskeletal: no muscle tenderness, no joint swelling or tenderness  Extremities: no pedal edema  Left lower leg laceration dressed  Neurological/ Psychiatric: AxOx3, moving all extremities  Skin: no rashes; no palpable lesions    Labs: reviewed                        7.8    8.94  )-----------( 154      ( 10 Nov 2023 06:19 )             23.7     11-10    140  |  108  |  76<H>  ----------------------------<  229<H>  5.6<H>   |  29  |  1.71<H>    Ca    8.4<L>      10 Nov 2023 06:19  Phos  4.4     11-10  Mg     2.7     11-10    TPro  5.9<L>  /  Alb  2.0<L>  /  TBili  0.3  /  DBili  x   /  AST  39<H>  /  ALT  30  /  AlkPhos  111  11-10    C-Reactive Protein, Serum: 163 mg/L (11-08-23 @ 06:01)  Ferritin: 1082 ng/mL (11-08-23 @ 06:01)  C-Reactive Protein, Serum: 141 mg/L (11-07-23 @ 07:11)                        7.4    16.45 )-----------( 220      ( 08 Nov 2023 06:01 )             22.7     11-08    139  |  106  |  81<H>  ----------------------------<  222<H>  5.3   |  27  |  2.08<H>    Ca    8.7      08 Nov 2023 06:01  Phos  3.3     11-08  Mg     2.6     11-08    TPro  6.2  /  Alb  1.7<L>  /  TBili  0.4  /  DBili  x   /  AST  39<H>  /  ALT  33  /  AlkPhos  131<H>  11-08    C-Reactive Protein, Serum: 141 mg/L (11-07-23 @ 07:11)                        8.4    17.70 )-----------( 247      ( 06 Nov 2023 06:45 )             25.2     11-06    134<L>  |  102  |  71<H>  ----------------------------<  217<H>  4.6   |  25  |  1.75<H>    Ca    8.5      06 Nov 2023 06:45  Phos  3.5     11-06  Mg     2.5     11-06                        8.2    11.74 )-----------( 259      ( 03 Nov 2023 07:15 )             25.2     11-03    133<L>  |  102  |  35<H>  ----------------------------<  205<H>  4.4   |  22  |  1.20    Ca    8.3<L>      03 Nov 2023 07:15    TPro  6.9  /  Alb  2.0<L>  /  TBili  0.5  /  DBili  x   /  AST  38<H>  /  ALT  50  /  AlkPhos  130<H>  11-02     LIVER FUNCTIONS - ( 02 Nov 2023 14:47 )  Alb: 2.0 g/dL / Pro: 6.9 gm/dL / ALK PHOS: 130 U/L / ALT: 50 U/L / AST: 38 U/L / GGT: x           Urinalysis (11-02 @ 16:40)  Urine Appearance: Clear  Protein, Urine: 100 mg/dL  Urine Microscopic-Add On (NC) (11-02 @ 16:40)  White Blood Cell - Urine: 0 /HPF  Red Blood Cell - Urine: 0 /HPF    (11-02 @ 14:47)  NotDetec    Culture - Blood (collected 04 Nov 2023 04:21)  Source: .Blood None  Preliminary Report (06 Nov 2023 09:01):    No growth at 48 Hours    Culture - Blood (collected 04 Nov 2023 04:21)  Source: .Blood None  Preliminary Report (06 Nov 2023 09:01):    No growth at 48 Hours    Culture - Sputum (collected 03 Nov 2023 14:16)  Source: .Sputum Sputum  Gram Stain (03 Nov 2023 23:52):    Rare Squamous epithelial cells per low power field    No polymorphonuclear cells seen per low power field    Rare Gram positive cocci in pairs per oil power field    Rare Gram Negative Rods per oil power field  Final Report (05 Nov 2023 12:06):    Normal Respiratory Marguerite present    Culture - Urine (collected 02 Nov 2023 16:40)  Source: Clean Catch Clean Catch (Midstream)  Final Report (03 Nov 2023 20:33):    No growth    Culture - Blood (collected 02 Nov 2023 14:47)  Source: .Blood Blood-Peripheral  Preliminary Report (06 Nov 2023 22:01):    No growth at 4 days    Culture - Blood (collected 02 Nov 2023 14:47)  Source: .Blood Blood-Peripheral  Preliminary Report (06 Nov 2023 22:01):    No growth at 4 days    Culture - Bronchial (collected 09 Nov 2023 12:30)  Source: .Bronchial RIGHT LOWER LOBE BAL  Gram Stain (09 Nov 2023 23:14):    Rare polymorphonuclear leukocytes per low power field    Few Squamous epithelial cells per low power field    Few Gram positive cocci in pairs per oil power field    Radiology: all available radiological tests reviewed    < from: CT Chest No Cont (11.02.23 @ 16:04) >  Right upper lobectomy.  Larger right lower lobe thick-walled cavity which may be infectious   and/or residua of reported treated tumor. 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  Consolidation within the right lower and middle lobe compatible with radiation change.  Multiple indistinct nodules in the right lungwhich may be infectious or radiation pneumonitis.  < end of copied text >    Advanced directives addressed: full resuscitation

## 2023-11-10 NOTE — PROGRESS NOTE ADULT - SUBJECTIVE AND OBJECTIVE BOX
Subjective:  awaiting bronch results  wife at bedside, had numerous questions regarding results  reviewed ct scan    Review of Systems:  All 10 systems reviewed in detailed and found to be negative with the exception of what has already been described above    Allergies:  penicillin (Unknown)    Meds  MEDICATIONS  (STANDING):  albumin human 25% IVPB 100 milliLiter(s) IV Intermittent every 12 hours  albuterol/ipratropium for Nebulization 3 milliLiter(s) Nebulizer every 6 hours  amLODIPine   Tablet 2.5 milliGRAM(s) Oral daily  AQUAPHOR (petrolatum Ointment) 1 Application(s) Topical two times a day  atorvastatin 40 milliGRAM(s) Oral at bedtime  dextrose 5%. 1000 milliLiter(s) (50 mL/Hr) IV Continuous <Continuous>  dextrose 5%. 1000 milliLiter(s) (100 mL/Hr) IV Continuous <Continuous>  dextrose 50% Injectable 25 Gram(s) IV Push once  dextrose 50% Injectable 12.5 Gram(s) IV Push once  dextrose 50% Injectable 25 Gram(s) IV Push once  ferrous    sulfate 325 milliGRAM(s) Oral daily  fluticasone furoate/umeclidinium/vilanterol 100-62.5-25 MICROgram(s) Inhaler 1 Puff(s) Inhalation daily  folic acid 1 milliGRAM(s) Oral daily  glucagon  Injectable 1 milliGRAM(s) IntraMuscular once  insulin glargine Injectable (LANTUS) 8 Unit(s) SubCutaneous at bedtime  insulin lispro (ADMELOG) corrective regimen sliding scale   SubCutaneous at bedtime  insulin lispro (ADMELOG) corrective regimen sliding scale   SubCutaneous Before meals and at bedtime  insulin lispro Injectable (ADMELOG) 4 Unit(s) SubCutaneous three times a day before meals  levoFLOXacin IVPB 750 milliGRAM(s) IV Intermittent every 48 hours  methylPREDNISolone sodium succinate Injectable 20 milliGRAM(s) IV Push every 12 hours  sodium chloride 0.9%. 1000 milliLiter(s) (50 mL/Hr) IV Continuous <Continuous>  voriconazole 200 milliGRAM(s) Oral every 12 hours    MEDICATIONS  (PRN):  acetaminophen     Tablet .. 650 milliGRAM(s) Oral every 6 hours PRN Temp greater or equal to 38C (100.4F), Mild Pain (1 - 3)  aluminum hydroxide/magnesium hydroxide/simethicone Suspension 30 milliLiter(s) Oral every 4 hours PRN Dyspepsia  benzonatate 100 milliGRAM(s) Oral three times a day PRN Cough  dextrose Oral Gel 15 Gram(s) Oral once PRN Blood Glucose LESS THAN 70 milliGRAM(s)/deciliter  melatonin 3 milliGRAM(s) Oral at bedtime PRN Insomnia  ondansetron Injectable 4 milliGRAM(s) IV Push every 8 hours PRN Nausea and/or Vomiting    Physical Exam  T(C): 36.3 (11-10-23 @ 15:32), Max: 36.8 (11-09-23 @ 22:12)  HR: 98 (11-10-23 @ 15:32) (55 - 110)  BP: 131/65 (11-10-23 @ 15:32) (123/64 - 137/64)  RR: 18 (11-10-23 @ 15:32) (16 - 18)  SpO2: 95% (11-10-23 @ 15:32) (93% - 98%)  Gen: Alert, oriented, no distress  HEENT: Anicteric sclera, moist mucous membranes  Cardio: Regular rhythm and rate, normal S1S2, no murmurs  Resp: Crackles, congested  GI: Nontender, nondistended, normoactive bowel sounds  Ext: No cyanosis, clubbing or edema  skin: + ecchymosis  Neuro: Nonfocal    Labs:                        7.8    8.94  )-----------( 154      ( 10 Nov 2023 06:19 )             23.7     11-10    140  |  108  |  76<H>  ----------------------------<  229<H>  5.6<H>   |  29  |  1.71<H>    Ca    8.4<L>      10 Nov 2023 06:19  Phos  4.4     11-10  Mg     2.7     11-10    TPro  5.9<L>  /  Alb  2.0<L>  /  TBili  0.3  /  DBili  x   /  AST  39<H>  /  ALT  30  /  AlkPhos  111  11-10    PT/INR - ( 09 Nov 2023 06:25 )   PT: 12.1 sec;   INR: 1.07 ratio           Urinalysis Basic - ( 10 Nov 2023 06:19 )    Color: x / Appearance: x / SG: x / pH: x  Gluc: 229 mg/dL / Ketone: x  / Bili: x / Urobili: x   Blood: x / Protein: x / Nitrite: x   Leuk Esterase: x / RBC: x / WBC x   Sq Epi: x / Non Sq Epi: x / Bacteria: x      < from: CT Chest No Cont (11.02.23 @ 16:04) >  ACC: [id number] EXAM:  CT CHEST   ORDERED BY: SARAH YOO     PROCEDURE DATE:  11/02/2023          INTERPRETATION:  INDICATION: Shortness of breath, cough, lung cancer   history, last chemotherapy and radiation treatment in May 2023    TECHNIQUE: Helical acquisition images of the chest without intravenous   contrast. Maximum intensity projection images were generated.    COMPARISON: 12/29/2020 chest x-ray.    FINDINGS:    LUNGS/AIRWAYS/PLEURA: Right upper lobectomy. Right lower lobe   multiloculated thick-walled 11 cm cavity (best seen in its entirety on   601-59) containing fluid and spongelike material. Consolidation within   the middle lobe and inferior aspect of the right lower lobe with angular   borders suggesting prior radiation. Multiple indistinct nodules of   varying density in the right lower and middle lobes, mostly adjacent to   the cavity. Small branching opacities in the peripheral left upper and   lower lobes, compatible with distal airway impaction. Trace left pleural   effusion. Mild right apical pleural thickening.    LYMPH NODES/MEDIASTINUM: No lymphadenopathy.    HEART/VASCULATURE: Normal heart size. Unremarkable pericardium. Coronary   artery calcifications. Normal caliber aorta.    UPPER ABDOMEN: Unremarkable.    BONES/SOFT TISSUES: Old sternal fracture. Mild loss of height of the L1   vertebral body.      IMPRESSION:    Right upper lobectomy.    Larger right lower lobe thick-walled cavity which may be infectious   and/or residua of reported treated tumor. 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    Consolidation within the right lower and middle lobe compatible with   radiation change.    Multiple indistinct nodules in the right lungwhich may be infectious or   radiation pneumonitis.    ct lucio 10/3 uploaded and reviewed    < from: CT Abdomen and Pelvis No Cont (11.08.23 @ 19:01) >  PROCEDURE DATE:  11/08/2023          INTERPRETATION:  CLINICAL INFORMATION: Acute kidney injury. Evaluate   obstruction.    COMPARISON: CT chest 11/20/2023.    CONTRAST/COMPLICATIONS:  IV Contrast: NONE  Oral Contrast: NONE  Complications: None reported at time of study completion    PROCEDURE:  CT of the Abdomen and Pelvis was performed.  Sagittal and coronal reformats were performed.    FINDINGS:  LOWER CHEST: Small right pleural effusion. New patchy opacities in the   right lung base.    LIVER: Within normal limits.  BILE DUCTS: Normal caliber.  GALLBLADDER: Within normal limits.  SPLEEN: Within normal limits.  PANCREAS: Within normal limits.  ADRENALS: Within normal limits.  KIDNEYS/URETERS: No hydronephrosis. Vascular calcifications.    BLADDER: Within normal limits.  REPRODUCTIVE ORGANS: Prostate within normal limits.    BOWEL: No bowel obstruction. Appendix is normal. Moderate to large stool.  PERITONEUM: No ascites.  VESSELS: Atherosclerotic changes.  RETROPERITONEUM/LYMPH NODES: No lymphadenopathy.  ABDOMINAL WALL: Within normal limits.  BONES: Degenerative changes. Redemonstration of L1 compression deformity.   Right femoral ORIF.    IMPRESSION:  No hydronephrosis.    New patchy opacities in the right lung base. Small right pleural effusion.    < end of copied text >   Subjective:  awaiting bronch results  wife at bedside, had numerous questions regarding results  reviewed ct scan    Review of Systems:  All 10 systems reviewed in detailed and found to be negative with the exception of what has already been described above    Allergies:  penicillin (Unknown)    Meds  MEDICATIONS  (STANDING):  albumin human 25% IVPB 100 milliLiter(s) IV Intermittent every 12 hours  albuterol/ipratropium for Nebulization 3 milliLiter(s) Nebulizer every 6 hours  amLODIPine   Tablet 2.5 milliGRAM(s) Oral daily  AQUAPHOR (petrolatum Ointment) 1 Application(s) Topical two times a day  atorvastatin 40 milliGRAM(s) Oral at bedtime  dextrose 5%. 1000 milliLiter(s) (50 mL/Hr) IV Continuous <Continuous>  dextrose 5%. 1000 milliLiter(s) (100 mL/Hr) IV Continuous <Continuous>  dextrose 50% Injectable 25 Gram(s) IV Push once  dextrose 50% Injectable 12.5 Gram(s) IV Push once  dextrose 50% Injectable 25 Gram(s) IV Push once  ferrous    sulfate 325 milliGRAM(s) Oral daily  fluticasone furoate/umeclidinium/vilanterol 100-62.5-25 MICROgram(s) Inhaler 1 Puff(s) Inhalation daily  folic acid 1 milliGRAM(s) Oral daily  glucagon  Injectable 1 milliGRAM(s) IntraMuscular once  insulin glargine Injectable (LANTUS) 8 Unit(s) SubCutaneous at bedtime  insulin lispro (ADMELOG) corrective regimen sliding scale   SubCutaneous at bedtime  insulin lispro (ADMELOG) corrective regimen sliding scale   SubCutaneous Before meals and at bedtime  insulin lispro Injectable (ADMELOG) 4 Unit(s) SubCutaneous three times a day before meals  levoFLOXacin IVPB 750 milliGRAM(s) IV Intermittent every 48 hours  methylPREDNISolone sodium succinate Injectable 20 milliGRAM(s) IV Push every 12 hours  sodium chloride 0.9%. 1000 milliLiter(s) (50 mL/Hr) IV Continuous <Continuous>  voriconazole 200 milliGRAM(s) Oral every 12 hours    MEDICATIONS  (PRN):  acetaminophen     Tablet .. 650 milliGRAM(s) Oral every 6 hours PRN Temp greater or equal to 38C (100.4F), Mild Pain (1 - 3)  aluminum hydroxide/magnesium hydroxide/simethicone Suspension 30 milliLiter(s) Oral every 4 hours PRN Dyspepsia  benzonatate 100 milliGRAM(s) Oral three times a day PRN Cough  dextrose Oral Gel 15 Gram(s) Oral once PRN Blood Glucose LESS THAN 70 milliGRAM(s)/deciliter  melatonin 3 milliGRAM(s) Oral at bedtime PRN Insomnia  ondansetron Injectable 4 milliGRAM(s) IV Push every 8 hours PRN Nausea and/or Vomiting    Physical Exam  T(C): 36.3 (11-10-23 @ 15:32), Max: 36.8 (11-09-23 @ 22:12)  HR: 98 (11-10-23 @ 15:32) (55 - 110)  BP: 131/65 (11-10-23 @ 15:32) (123/64 - 137/64)  RR: 18 (11-10-23 @ 15:32) (16 - 18)  SpO2: 95% (11-10-23 @ 15:32) (93% - 98%)  Gen: Alert, oriented, no distress  HEENT: Anicteric sclera, moist mucous membranes  Cardio: Regular rhythm and rate, normal S1S2, no murmurs  Resp: Crackles, congested  GI: Nontender, nondistended, normoactive bowel sounds  Ext: No cyanosis, clubbing or edema  skin: + ecchymosis, left leg laceration sutured and bandaged  Neuro: Nonfocal    Labs:                        7.8    8.94  )-----------( 154      ( 10 Nov 2023 06:19 )             23.7     11-10    140  |  108  |  76<H>  ----------------------------<  229<H>  5.6<H>   |  29  |  1.71<H>    Ca    8.4<L>      10 Nov 2023 06:19  Phos  4.4     11-10  Mg     2.7     11-10    TPro  5.9<L>  /  Alb  2.0<L>  /  TBili  0.3  /  DBili  x   /  AST  39<H>  /  ALT  30  /  AlkPhos  111  11-10    PT/INR - ( 09 Nov 2023 06:25 )   PT: 12.1 sec;   INR: 1.07 ratio           Urinalysis Basic - ( 10 Nov 2023 06:19 )    Color: x / Appearance: x / SG: x / pH: x  Gluc: 229 mg/dL / Ketone: x  / Bili: x / Urobili: x   Blood: x / Protein: x / Nitrite: x   Leuk Esterase: x / RBC: x / WBC x   Sq Epi: x / Non Sq Epi: x / Bacteria: x      < from: CT Chest No Cont (11.02.23 @ 16:04) >  ACC: [id number] EXAM:  CT CHEST   ORDERED BY: SARAH YOO     PROCEDURE DATE:  11/02/2023          INTERPRETATION:  INDICATION: Shortness of breath, cough, lung cancer   history, last chemotherapy and radiation treatment in May 2023    TECHNIQUE: Helical acquisition images of the chest without intravenous   contrast. Maximum intensity projection images were generated.    COMPARISON: 12/29/2020 chest x-ray.    FINDINGS:    LUNGS/AIRWAYS/PLEURA: Right upper lobectomy. Right lower lobe   multiloculated thick-walled 11 cm cavity (best seen in its entirety on   601-59) containing fluid and spongelike material. Consolidation within   the middle lobe and inferior aspect of the right lower lobe with angular   borders suggesting prior radiation. Multiple indistinct nodules of   varying density in the right lower and middle lobes, mostly adjacent to   the cavity. Small branching opacities in the peripheral left upper and   lower lobes, compatible with distal airway impaction. Trace left pleural   effusion. Mild right apical pleural thickening.    LYMPH NODES/MEDIASTINUM: No lymphadenopathy.    HEART/VASCULATURE: Normal heart size. Unremarkable pericardium. Coronary   artery calcifications. Normal caliber aorta.    UPPER ABDOMEN: Unremarkable.    BONES/SOFT TISSUES: Old sternal fracture. Mild loss of height of the L1   vertebral body.      IMPRESSION:    Right upper lobectomy.    Larger right lower lobe thick-walled cavity which may be infectious   and/or residua of reported treated tumor. 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    Consolidation within the right lower and middle lobe compatible with   radiation change.    Multiple indistinct nodules in the right lungwhich may be infectious or   radiation pneumonitis.    ct lucio 10/3 uploaded and reviewed    < from: CT Abdomen and Pelvis No Cont (11.08.23 @ 19:01) >  PROCEDURE DATE:  11/08/2023          INTERPRETATION:  CLINICAL INFORMATION: Acute kidney injury. Evaluate   obstruction.    COMPARISON: CT chest 11/20/2023.    CONTRAST/COMPLICATIONS:  IV Contrast: NONE  Oral Contrast: NONE  Complications: None reported at time of study completion    PROCEDURE:  CT of the Abdomen and Pelvis was performed.  Sagittal and coronal reformats were performed.    FINDINGS:  LOWER CHEST: Small right pleural effusion. New patchy opacities in the   right lung base.    LIVER: Within normal limits.  BILE DUCTS: Normal caliber.  GALLBLADDER: Within normal limits.  SPLEEN: Within normal limits.  PANCREAS: Within normal limits.  ADRENALS: Within normal limits.  KIDNEYS/URETERS: No hydronephrosis. Vascular calcifications.    BLADDER: Within normal limits.  REPRODUCTIVE ORGANS: Prostate within normal limits.    BOWEL: No bowel obstruction. Appendix is normal. Moderate to large stool.  PERITONEUM: No ascites.  VESSELS: Atherosclerotic changes.  RETROPERITONEUM/LYMPH NODES: No lymphadenopathy.  ABDOMINAL WALL: Within normal limits.  BONES: Degenerative changes. Redemonstration of L1 compression deformity.   Right femoral ORIF.    IMPRESSION:  No hydronephrosis.    New patchy opacities in the right lung base. Small right pleural effusion.    < end of copied text >

## 2023-11-10 NOTE — PROGRESS NOTE ADULT - ASSESSMENT
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 sec to hypotension and ARB use   stop all ACE/ARB    no IV contrast   continue IVF     Hyperkalemia  - lokelma     * pt seen earlier, note now     Thank you for the courtesy of this consult. We will follow this patient with you.   Management is subject to change if new information becomes available or patient condition changes.

## 2023-11-10 NOTE — PROGRESS NOTE ADULT - ASSESSMENT
84 YO M w/pmhx of mild dementia, HTN, Chronic anemia, COPD, lung CA follows at [facility] (last chemotherapy and RT 05/2023, not currently receiving chemo treatment), s/p right upper lobectomy, AAA, Hypothyroidism, CKD stage IIIa, recently was being treated outpatient for pneumonia w/Augmentin, azithromycin, and Prednisone. Patient had CT chest done at that time 10/3/23, now uploaded. Patient was sent to ED 11/2/23 by pulmonologist, Dr. Olivera. Patient had CT chest done here, which is concerning for possible aspergilloma and radiation pneumonitis, recurrent CA?. Called for bronch 11/9 s/p FB BAL bronchial brushing     Plan  OR path P   cont Vfend/ ABX as per ID  cont care as per primary team   Pt deconditioned   Pt  can follow up with Pulmonologist as op  Will sign off - Please reconsult if needed   Discussed with Cardiothoracic Team at AM rounds.

## 2023-11-10 NOTE — PROGRESS NOTE ADULT - SUBJECTIVE AND OBJECTIVE BOX
83-year-old M with PMHx HTN, HLD, chronic anemia, COPD, lung CA s/p RUL lobectomy (follows [facility], last chemotherapy/RT 5/2023, not currently on treatment), AAA, hypothyroidism, CKD stage IIIa Cr 1.4 w 500 mg proteinuria on Losartan   followed by Dr Garcia outpatient    sent in to ED on 11/2/23  by pulmonologist MD Olivera with c/o SOB, dyspnea on minimal exertion, and cough. Pt had outpatient CT of chest performed 10/3 which demonstrated consolidation- was started on Augmentin/Prednisone/Azithromycin 10/6 x 1 week. Pt completed full course of medication however wife noted worsening cough and pt with fever TMax 103.9 on 10/26. Wife then took pt to pulm office again who began 2nd round of same medications. Fevers self resolved. Since 2nd round pt with decreased energy and episode of being unsteady on feet yesterday 11/1. Went to pulm office 11/2 for f/u and was sent to ED for eval. Pt endorses lethargy and weakness on arrival. Denies chest pain, palpitations, N/V, abdominal pain, diarrhea, dysuria.  Now concern for aspergillosis - s/p bronchosopy  today and placed on Voriconazole     Renal eval for rising Cr while in hospital   Cr 2 from 1.79 from 1.75 from 1.55 from 1.44 from 1.2 ( 11/3)    today    confused      MEDICATIONS  (STANDING):  albumin human 25% IVPB 100 milliLiter(s) IV Intermittent every 12 hours  albuterol/ipratropium for Nebulization 3 milliLiter(s) Nebulizer every 6 hours  amLODIPine   Tablet 2.5 milliGRAM(s) Oral daily  AQUAPHOR (petrolatum Ointment) 1 Application(s) Topical two times a day  atorvastatin 40 milliGRAM(s) Oral at bedtime  dextrose 5%. 1000 milliLiter(s) (50 mL/Hr) IV Continuous <Continuous>  dextrose 5%. 1000 milliLiter(s) (100 mL/Hr) IV Continuous <Continuous>  dextrose 50% Injectable 25 Gram(s) IV Push once  dextrose 50% Injectable 25 Gram(s) IV Push once  dextrose 50% Injectable 12.5 Gram(s) IV Push once  ferrous    sulfate 325 milliGRAM(s) Oral daily  fluticasone furoate/umeclidinium/vilanterol 100-62.5-25 MICROgram(s) Inhaler 1 Puff(s) Inhalation daily  folic acid 1 milliGRAM(s) Oral daily  glucagon  Injectable 1 milliGRAM(s) IntraMuscular once  insulin glargine Injectable (LANTUS) 8 Unit(s) SubCutaneous at bedtime  insulin lispro (ADMELOG) corrective regimen sliding scale   SubCutaneous at bedtime  insulin lispro (ADMELOG) corrective regimen sliding scale   SubCutaneous Before meals and at bedtime  insulin lispro Injectable (ADMELOG) 4 Unit(s) SubCutaneous three times a day before meals  methylPREDNISolone sodium succinate Injectable 20 milliGRAM(s) IV Push every 12 hours  multivitamin 1 Tablet(s) Oral daily  sodium chloride 0.9%. 1000 milliLiter(s) (50 mL/Hr) IV Continuous <Continuous>  voriconazole 200 milliGRAM(s) Oral every 12 hours      Allergies    penicillin (Unknown)    Intolerances        SOCIAL HISTORY:  Denies ETOh,Smoking,     FAMILY HISTORY:      REVIEW OF SYSTEMS:    limited due to [facility]        VITAL:  T(C): 36.8 (11-09-23), Max: 36.8 (11-09-23)  T(F): 98.2 (11-09-23), Max: 98.2 (11-09-23)  HR: 109 (11-09-23) (55 - 113)  BP: 123/64 (11-09-23) (95/54 - 176/150)  RR: 18 (11-09-23)  SpO2: 94% (11-09-23) (89% - 100%)      I&O's Detail    08 Nov 2023 07:01  -  09 Nov 2023 07:00  --------------------------------------------------------  IN:    PRBCs (Packed Red Blood Cells): 280 mL  Total IN: 280 mL    OUT:    Voided (mL): 790 mL  Total OUT: 790 mL    Total NET: -510 mL      09 Nov 2023 07:01  -  09 Nov 2023 22:15  --------------------------------------------------------  IN:    Other (mL): 500 mL  Total IN: 500 mL    OUT:    Voided (mL): 450 mL  Total OUT: 450 mL    Total NET: 50 m          PHYSICAL EXAM:    Constitutional: frail  HEENT:  EOMI,  MM   Neck: No LAD, No JVD  Respiratory: CTAB  Cardiovascular: S1 and S2  Gastrointestinal: BS+, soft, NT/ND  Extremities: No peripheral edema  Neurological: A/O x 3, no focal deficits  : No Parham  Skin: No rashes  Access: Not applicable    LABS:                            7.8    8.94  )-----------( 154      ( 10 Nov 2023 06:19 )             23.7       140    |  108    |  76     ----------------------------<  229       10 Nov 2023 06:19  5.6     |  29     |  1.71     137    |  104    |  80     ----------------------------<  294       09 Nov 2023 06:25  5.1     |  25     |  1.99     Ca    8.4        10 Nov 2023 06:19    Phos  4.4       10 Nov 2023 06:19    Mg     2.7       10 Nov 2023 06:19    TPro  5.9    /  Alb  2.0    /  TBili  0.3    /        10 Nov 2023 06:19  DBili  x      /  AST  39     /  ALT  30     /  AlkPhos  111      TPro  6.0    /  Alb  1.7    /  TBili  0.3    /        09 Nov 2023 06:25  DBili  x      /  AST  46     /  ALT  35     /  AlkPhos  151              Urine Studies:  Urinalysis Basic - ( 09 Nov 2023 06:25 )    Color: x / Appearance: x / SG: x / pH: x  Gluc: 294 mg/dL / Ketone: x  / Bili: x / Urobili: x   Blood: x / Protein: x / Nitrite: x   Leuk Esterase: x / RBC: x / WBC x   Sq Epi: x / Non Sq Epi: x / Bacteria: x      Creatinine, Random Urine: 68 mg/dL (11-09 @ 20:43)  Protein/Creatinine Ratio Calculation: 1.3 Ratio (11-09 @ 20:43)        RADIOLOGY & ADDITIONAL STUDIES:      ACC: [id number] EXAM:  US KIDNEYS AND BLADDER   ORDERED BY: RADHA UNDERWOOD     PROCEDURE DATE:  11/08/2023          INTERPRETATION:  CLINICAL INFORMATION: Acute kidney injury    COMPARISON: 12/22/2022    TECHNIQUE: Sonography of the kidneys and bladder.    FINDINGS:  Right kidney: 10.1 cm. No renal mass, or hydronephrosis. Nonobstructive 6   mm calculus in the right kidney.    Left kidney: 10.1 cm. No hydronephrosis or calculi. New 0.9 x 1.3 x 1.1   cm complex cyst with a few thin internal septations in the midpole of the   left kidney.    Urinary bladder: Underdistended.    Prostate: 21 cc which is within normal limits in size.    IMPRESSION:  No hydronephrosis.    6 mm nonobstructive calculus in the right kidney.    New 0.9 x 1.3 x 1.1cm complex cyst with a few thin internal septations   in the midpole of the left kidney.    --- End of Report ---

## 2023-11-11 LAB
ANION GAP SERPL CALC-SCNC: 4 MMOL/L — LOW (ref 5–17)
ANION GAP SERPL CALC-SCNC: 4 MMOL/L — LOW (ref 5–17)
BUN SERPL-MCNC: 73 MG/DL — HIGH (ref 7–23)
BUN SERPL-MCNC: 73 MG/DL — HIGH (ref 7–23)
CALCIUM SERPL-MCNC: 9.1 MG/DL — SIGNIFICANT CHANGE UP (ref 8.5–10.1)
CALCIUM SERPL-MCNC: 9.1 MG/DL — SIGNIFICANT CHANGE UP (ref 8.5–10.1)
CHLORIDE SERPL-SCNC: 106 MMOL/L — SIGNIFICANT CHANGE UP (ref 96–108)
CHLORIDE SERPL-SCNC: 106 MMOL/L — SIGNIFICANT CHANGE UP (ref 96–108)
CO2 SERPL-SCNC: 29 MMOL/L — SIGNIFICANT CHANGE UP (ref 22–31)
CO2 SERPL-SCNC: 29 MMOL/L — SIGNIFICANT CHANGE UP (ref 22–31)
CREAT SERPL-MCNC: 1.48 MG/DL — HIGH (ref 0.5–1.3)
CREAT SERPL-MCNC: 1.48 MG/DL — HIGH (ref 0.5–1.3)
CRP SERPL-MCNC: 70 MG/L — HIGH
CRP SERPL-MCNC: 70 MG/L — HIGH
CULTURE RESULTS: ABNORMAL
CULTURE RESULTS: ABNORMAL
EGFR: 47 ML/MIN/1.73M2 — LOW
EGFR: 47 ML/MIN/1.73M2 — LOW
GLUCOSE BLDC GLUCOMTR-MCNC: 199 MG/DL — HIGH (ref 70–99)
GLUCOSE BLDC GLUCOMTR-MCNC: 206 MG/DL — HIGH (ref 70–99)
GLUCOSE BLDC GLUCOMTR-MCNC: 206 MG/DL — HIGH (ref 70–99)
GLUCOSE BLDC GLUCOMTR-MCNC: 211 MG/DL — HIGH (ref 70–99)
GLUCOSE BLDC GLUCOMTR-MCNC: 211 MG/DL — HIGH (ref 70–99)
GLUCOSE SERPL-MCNC: 211 MG/DL — HIGH (ref 70–99)
GLUCOSE SERPL-MCNC: 211 MG/DL — HIGH (ref 70–99)
HCT VFR BLD CALC: 24.4 % — LOW (ref 39–50)
HCT VFR BLD CALC: 24.4 % — LOW (ref 39–50)
HGB BLD-MCNC: 7.8 G/DL — LOW (ref 13–17)
HGB BLD-MCNC: 7.8 G/DL — LOW (ref 13–17)
MAGNESIUM SERPL-MCNC: 2.7 MG/DL — HIGH (ref 1.6–2.6)
MAGNESIUM SERPL-MCNC: 2.7 MG/DL — HIGH (ref 1.6–2.6)
MCHC RBC-ENTMCNC: 30.1 PG — SIGNIFICANT CHANGE UP (ref 27–34)
MCHC RBC-ENTMCNC: 30.1 PG — SIGNIFICANT CHANGE UP (ref 27–34)
MCHC RBC-ENTMCNC: 32 GM/DL — SIGNIFICANT CHANGE UP (ref 32–36)
MCHC RBC-ENTMCNC: 32 GM/DL — SIGNIFICANT CHANGE UP (ref 32–36)
MCV RBC AUTO: 94.2 FL — SIGNIFICANT CHANGE UP (ref 80–100)
MCV RBC AUTO: 94.2 FL — SIGNIFICANT CHANGE UP (ref 80–100)
PHOSPHATE SERPL-MCNC: 2.9 MG/DL — SIGNIFICANT CHANGE UP (ref 2.5–4.5)
PHOSPHATE SERPL-MCNC: 2.9 MG/DL — SIGNIFICANT CHANGE UP (ref 2.5–4.5)
PLATELET # BLD AUTO: 161 K/UL — SIGNIFICANT CHANGE UP (ref 150–400)
PLATELET # BLD AUTO: 161 K/UL — SIGNIFICANT CHANGE UP (ref 150–400)
POTASSIUM SERPL-MCNC: 5.1 MMOL/L — SIGNIFICANT CHANGE UP (ref 3.5–5.3)
POTASSIUM SERPL-MCNC: 5.1 MMOL/L — SIGNIFICANT CHANGE UP (ref 3.5–5.3)
POTASSIUM SERPL-SCNC: 5.1 MMOL/L — SIGNIFICANT CHANGE UP (ref 3.5–5.3)
POTASSIUM SERPL-SCNC: 5.1 MMOL/L — SIGNIFICANT CHANGE UP (ref 3.5–5.3)
RBC # BLD: 2.59 M/UL — LOW (ref 4.2–5.8)
RBC # BLD: 2.59 M/UL — LOW (ref 4.2–5.8)
RBC # FLD: 16.3 % — HIGH (ref 10.3–14.5)
RBC # FLD: 16.3 % — HIGH (ref 10.3–14.5)
SODIUM SERPL-SCNC: 139 MMOL/L — SIGNIFICANT CHANGE UP (ref 135–145)
SODIUM SERPL-SCNC: 139 MMOL/L — SIGNIFICANT CHANGE UP (ref 135–145)
SPECIMEN SOURCE: SIGNIFICANT CHANGE UP
SPECIMEN SOURCE: SIGNIFICANT CHANGE UP
WBC # BLD: 10.7 K/UL — HIGH (ref 3.8–10.5)
WBC # BLD: 10.7 K/UL — HIGH (ref 3.8–10.5)
WBC # FLD AUTO: 10.7 K/UL — HIGH (ref 3.8–10.5)
WBC # FLD AUTO: 10.7 K/UL — HIGH (ref 3.8–10.5)

## 2023-11-11 PROCEDURE — 71045 X-RAY EXAM CHEST 1 VIEW: CPT | Mod: 26

## 2023-11-11 PROCEDURE — 99232 SBSQ HOSP IP/OBS MODERATE 35: CPT

## 2023-11-11 RX ORDER — INSULIN LISPRO 100/ML
VIAL (ML) SUBCUTANEOUS
Refills: 0 | Status: DISCONTINUED | OUTPATIENT
Start: 2023-11-11 | End: 2023-11-28

## 2023-11-11 RX ADMIN — Medication 20 MILLIGRAM(S): at 21:27

## 2023-11-11 RX ADMIN — Medication 4 UNIT(S): at 13:00

## 2023-11-11 RX ADMIN — Medication 4: at 16:27

## 2023-11-11 RX ADMIN — Medication 50 MILLILITER(S): at 14:24

## 2023-11-11 RX ADMIN — Medication 1 MILLIGRAM(S): at 09:26

## 2023-11-11 RX ADMIN — FLUTICASONE FUROATE, UMECLIDINIUM BROMIDE AND VILANTEROL TRIFENATATE 1 PUFF(S): 200; 62.5; 25 POWDER RESPIRATORY (INHALATION) at 08:29

## 2023-11-11 RX ADMIN — Medication 25 MICROGRAM(S): at 05:14

## 2023-11-11 RX ADMIN — Medication 1 APPLICATION(S): at 09:28

## 2023-11-11 RX ADMIN — Medication 3 MILLILITER(S): at 08:29

## 2023-11-11 RX ADMIN — INSULIN GLARGINE 10 UNIT(S): 100 INJECTION, SOLUTION SUBCUTANEOUS at 21:42

## 2023-11-11 RX ADMIN — Medication 100 MILLIGRAM(S): at 14:24

## 2023-11-11 RX ADMIN — Medication 50 MILLILITER(S): at 21:26

## 2023-11-11 RX ADMIN — Medication 4 UNIT(S): at 08:47

## 2023-11-11 RX ADMIN — Medication 3 MILLILITER(S): at 02:15

## 2023-11-11 RX ADMIN — ATORVASTATIN CALCIUM 40 MILLIGRAM(S): 80 TABLET, FILM COATED ORAL at 21:28

## 2023-11-11 RX ADMIN — VORICONAZOLE 200 MILLIGRAM(S): 10 INJECTION, POWDER, LYOPHILIZED, FOR SOLUTION INTRAVENOUS at 21:28

## 2023-11-11 RX ADMIN — AMLODIPINE BESYLATE 2.5 MILLIGRAM(S): 2.5 TABLET ORAL at 09:25

## 2023-11-11 RX ADMIN — Medication 3 MILLILITER(S): at 14:12

## 2023-11-11 RX ADMIN — Medication 4 UNIT(S): at 16:27

## 2023-11-11 RX ADMIN — Medication 20 MILLIGRAM(S): at 09:27

## 2023-11-11 RX ADMIN — VORICONAZOLE 200 MILLIGRAM(S): 10 INJECTION, POWDER, LYOPHILIZED, FOR SOLUTION INTRAVENOUS at 09:27

## 2023-11-11 RX ADMIN — Medication 1 APPLICATION(S): at 21:51

## 2023-11-11 RX ADMIN — Medication 325 MILLIGRAM(S): at 09:28

## 2023-11-11 NOTE — SWALLOW BEDSIDE ASSESSMENT ADULT - SWALLOW EVAL: RECOMMENDED FEEDING/EATING TECHNIQUES
care with straws: do not rush eating; one bite at a time./allow for swallow between intakes/alternate food with liquid/check mouth frequently for oral residue/pocketing/hard swallow w/ each bite or sip/maintain upright posture during/after eating for 30 mins/position upright (90 degrees)/small sips/bites/tuck chin

## 2023-11-11 NOTE — PROGRESS NOTE ADULT - SUBJECTIVE AND OBJECTIVE BOX
Date of service: 11-11-23 @ 07:56    s/p recent bronchoscopy  Reported with GPC on gram stain and right basilar infiltrate  Concern for persistent pneumonia was raised  Restarted on levofloxacin yesterday  Has dry cough    ROS: no fever or chills; denies dizziness, no HA, no abdominal pain, no diarrhea or constipation; no dysuria, no legs pain, no rashes    MEDICATIONS  (STANDING):  albumin human 25% IVPB 100 milliLiter(s) IV Intermittent every 12 hours  albuterol/ipratropium for Nebulization 3 milliLiter(s) Nebulizer every 6 hours  amLODIPine   Tablet 2.5 milliGRAM(s) Oral daily  AQUAPHOR (petrolatum Ointment) 1 Application(s) Topical two times a day  atorvastatin 40 milliGRAM(s) Oral at bedtime  dextrose 5%. 1000 milliLiter(s) (50 mL/Hr) IV Continuous <Continuous>  dextrose 5%. 1000 milliLiter(s) (100 mL/Hr) IV Continuous <Continuous>  dextrose 50% Injectable 12.5 Gram(s) IV Push once  dextrose 50% Injectable 25 Gram(s) IV Push once  dextrose 50% Injectable 25 Gram(s) IV Push once  ferrous    sulfate 325 milliGRAM(s) Oral daily  fluticasone furoate/umeclidinium/vilanterol 100-62.5-25 MICROgram(s) Inhaler 1 Puff(s) Inhalation daily  folic acid 1 milliGRAM(s) Oral daily  glucagon  Injectable 1 milliGRAM(s) IntraMuscular once  insulin glargine Injectable (LANTUS) 10 Unit(s) SubCutaneous at bedtime  insulin lispro (ADMELOG) corrective regimen sliding scale   SubCutaneous at bedtime  insulin lispro Injectable (ADMELOG) 4 Unit(s) SubCutaneous three times a day before meals  levoFLOXacin IVPB 750 milliGRAM(s) IV Intermittent every 48 hours  levothyroxine 25 MICROGram(s) Oral daily  methylPREDNISolone sodium succinate Injectable 20 milliGRAM(s) IV Push every 12 hours  sodium chloride 0.9%. 1000 milliLiter(s) (50 mL/Hr) IV Continuous <Continuous>  voriconazole 200 milliGRAM(s) Oral every 12 hours    Vital Signs Last 24 Hrs  T(C): 36.5 (11 Nov 2023 05:00), Max: 36.6 (10 Nov 2023 09:29)  T(F): 97.7 (11 Nov 2023 05:00), Max: 97.9 (10 Nov 2023 09:29)  HR: 101 (11 Nov 2023 05:00) (57 - 110)  BP: 153/71 (11 Nov 2023 05:00) (131/65 - 153/71)  BP(mean): --  RR: 18 (11 Nov 2023 05:00) (16 - 18)  SpO2: 99% (11 Nov 2023 05:00) (93% - 99%)    Parameters below as of 11 Nov 2023 05:00  Patient On (Oxygen Delivery Method): BiPAP/CPAP     Physical exam:    Constitutional:  No acute distress  HEENT: NC/AT, EOMI, PERRLA, conjunctivae clear; ears and nose atraumatic  Neck: supple; thyroid not palpable  Back: no tenderness  Respiratory: respiratory effort normal; few crackles at bases  Cardiovascular: S1S2 regular, no murmurs  Abdomen: soft, not tender, not distended, positive BS  Genitourinary: no suprapubic tenderness  Lymphatic: no LN palpable  Musculoskeletal: no muscle tenderness, no joint swelling or tenderness  Extremities: no pedal edema  Left lower leg laceration dressed  Neurological/ Psychiatric: AxOx3, moving all extremities  Skin: no rashes; no palpable lesions    Labs: reviewed                        7.8    10.70 )-----------( 161      ( 11 Nov 2023 06:31 )             24.4     11-11    139  |  106  |  73<H>  ----------------------------<  211<H>  5.1   |  29  |  1.48<H>    Ca    9.1      11 Nov 2023 06:31  Phos  2.9     11-11  Mg     2.7     11-11    TPro  5.9<L>  /  Alb  2.0<L>  /  TBili  0.3  /  DBili  x   /  AST  39<H>  /  ALT  30  /  AlkPhos  111  11-10    C-Reactive Protein, Serum: 106 mg/L (11-10-23 @ 06:19)  C-Reactive Protein, Serum: 163 mg/L (11-08-23 @ 06:01)  Ferritin: 1082 ng/mL (11-08-23 @ 06:01)  C-Reactive Protein, Serum: 141 mg/L (11-07-23 @ 07:11)                        7.8    8.94  )-----------( 154      ( 10 Nov 2023 06:19 )             23.7     11-10    140  |  108  |  76<H>  ----------------------------<  229<H>  5.6<H>   |  29  |  1.71<H>    Ca    8.4<L>      10 Nov 2023 06:19  Phos  4.4     11-10  Mg     2.7     11-10    TPro  5.9<L>  /  Alb  2.0<L>  /  TBili  0.3  /  DBili  x   /  AST  39<H>  /  ALT  30  /  AlkPhos  111  11-10    C-Reactive Protein, Serum: 163 mg/L (11-08-23 @ 06:01)  Ferritin: 1082 ng/mL (11-08-23 @ 06:01)  C-Reactive Protein, Serum: 141 mg/L (11-07-23 @ 07:11)                        8.2    11.74 )-----------( 259      ( 03 Nov 2023 07:15 )             25.2     11-03    133<L>  |  102  |  35<H>  ----------------------------<  205<H>  4.4   |  22  |  1.20    Ca    8.3<L>      03 Nov 2023 07:15    TPro  6.9  /  Alb  2.0<L>  /  TBili  0.5  /  DBili  x   /  AST  38<H>  /  ALT  50  /  AlkPhos  130<H>  11-02     LIVER FUNCTIONS - ( 02 Nov 2023 14:47 )  Alb: 2.0 g/dL / Pro: 6.9 gm/dL / ALK PHOS: 130 U/L / ALT: 50 U/L / AST: 38 U/L / GGT: x           Urinalysis (11-02 @ 16:40)  Urine Appearance: Clear  Protein, Urine: 100 mg/dL  Urine Microscopic-Add On (NC) (11-02 @ 16:40)  White Blood Cell - Urine: 0 /HPF  Red Blood Cell - Urine: 0 /HPF    (11-02 @ 14:47)  NotDetec    Culture - Blood (collected 04 Nov 2023 04:21)  Source: .Blood None  Preliminary Report (06 Nov 2023 09:01):    No growth at 48 Hours    Culture - Blood (collected 04 Nov 2023 04:21)  Source: .Blood None  Preliminary Report (06 Nov 2023 09:01):    No growth at 48 Hours    Culture - Sputum (collected 03 Nov 2023 14:16)  Source: .Sputum Sputum  Gram Stain (03 Nov 2023 23:52):    Rare Squamous epithelial cells per low power field    No polymorphonuclear cells seen per low power field    Rare Gram positive cocci in pairs per oil power field    Rare Gram Negative Rods per oil power field  Final Report (05 Nov 2023 12:06):    Normal Respiratory Marguerite present    Culture - Urine (collected 02 Nov 2023 16:40)  Source: Clean Catch Clean Catch (Midstream)  Final Report (03 Nov 2023 20:33):    No growth    Culture - Blood (collected 02 Nov 2023 14:47)  Source: .Blood Blood-Peripheral  Preliminary Report (06 Nov 2023 22:01):    No growth at 4 days    Culture - Blood (collected 02 Nov 2023 14:47)  Source: .Blood Blood-Peripheral  Preliminary Report (06 Nov 2023 22:01):    No growth at 4 days    Culture - Acid Fast - Bronchial w/Smear (collected 09 Nov 2023 12:30)  Source: .Bronchial RIGHT LOWER LOBE BAL    Culture - Fungal, Bronchial (collected 09 Nov 2023 12:30)  Source: .Bronchial RIGHT LOWER LOBE BAL  Preliminary Report (10 Nov 2023 08:01):    Testing in progress    Culture - Bronchial (collected 09 Nov 2023 12:30)  Source: .Bronchial RIGHT LOWER LOBE BAL  Gram Stain (09 Nov 2023 23:14):    Rare polymorphonuclear leukocytes per low power field    Few Squamous epithelial cells per low power field    Few Gram positive cocci in pairs per oil power field  Preliminary Report (10 Nov 2023 15:14):    Normal Respiratory Marguerite present    Radiology: all available radiological tests reviewed    < from: CT Chest No Cont (11.02.23 @ 16:04) >  Right upper lobectomy.  Larger right lower lobe thick-walled cavity which may be infectious   and/or residua of reported treated tumor. 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  Consolidation within the right lower and middle lobe compatible with radiation change.  Multiple indistinct nodules in the right lungwhich may be infectious or radiation pneumonitis.  < end of copied text >    < from: CT Abdomen and Pelvis No Cont (11.08.23 @ 19:01) >  No hydronephrosis.  New patchy opacities in the right lung base. Small right pleural effusion.  < end of copied text >      Advanced directives addressed: full resuscitation

## 2023-11-11 NOTE — PROGRESS NOTE ADULT - ASSESSMENT
82 yo male with hx of Lung cancer s/p lobectomy., chemotherapy  now presenting with sob and recurrent fevers treated with abx of PNA , now possible concern for fungal ( aspergillosis)    await CT chest finding    CHRISTINA on CKD   Cr  improving   Prerenal - now off IVF - monitor w po intake    component of ATN ? sec to hypotension and ARB use     avoid all ACE/ARB for now - resume as outpatient if Cr stable    no IV contrast   stop IVF    Hyperkalemia  - lokelma PRN       * pt seen earlier, note now     d./w pt wife

## 2023-11-11 NOTE — SWALLOW BEDSIDE ASSESSMENT ADULT - NS SPL SWALLOW CLINIC TRIAL FT
Micro aspiration and silent aspiration cannot be ruled out. As above, pt does have increased risk for aspiration 2/2 lung CA and  lobectomy as well as COPD and present pna (which in itself may be aspiration related). However, pt presents at bedside evaluation with no contraindication for maintaining REGULAR texture and thin liquids.  Strategies for reducing risk were demonstrated and discussed with the Pt and the family who are very supportive of the Pt.  Disc with NSg.  Will follow peripherally.

## 2023-11-11 NOTE — SWALLOW BEDSIDE ASSESSMENT ADULT - COMMENTS
83y Male with significant PMH of HTN, HPL, Chronic anemia, COPD, lung CA follows at [facility] (last chemotherapy and RT 05/2023, not currently receiving chemo treatment), s/p right upper lobectomy, AAA, Hypothyroidism, CKD stage IIIa and others has been in ED by his pulmonologist with c/o shortness of breath, dyspnea on minimal exertion, and cough. Reportedly, he had CT of the chest performed 10/3 which demonstrated consolidation, was started on augmentin/prednisone/azithro 10/6 x 1 week. Pt completed course of medications however wife noted worsening cough and pt with fever Tmax of 103.9 on 10/26. Wife then took pt to pulmonologist again who started pt on second round of the same medications, instructed wife to stop giving pt tylenol wife notes fevers self-resolved. Since then pt decreased energy with episode of being unsteady on feet yesterday. Today is 6th day of taking medications, had f/u scheduled with pulmonologist today who sent pt to ED for eval.  He feels tired and weak. Denies chest pain, palpitation, N/V, abdominal pain, diarrhea or dysuria.   His Pulmonologist: Dr. Olivera, and Oncology at [facility]: Dr. Pinto/Dr. Mayes.    There appears to be concern that pt is aspirating.  Service is consulted for po intake, and to determine the texture of diet.  Note pt has underlying COPD: lung Ca and a right upper lobectomy.  He is therefore at increased risk than normal for aspiration or SOB on exertion (which includes eating), and also pna.  .  As per family, he does cough occasionally, but in general eats well without difficulty. Wife and dtrs present at evaluation

## 2023-11-11 NOTE — PROGRESS NOTE ADULT - ASSESSMENT
83-year-old M with PMHx HTN, HLD, chronic anemia, COPD, lung CA s/p RUL lobectomy (follows at [facility], last chemotherapy/RT 5/2023, not currently on treatment), AAA, hypothyroidism, CKD stage IIIa sent in to ED on 11/2/23  by pulmonologist MD Olivera with c/o SOB, dyspnea on minimal exertion, and cough. 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     RLL PNA with possible radiation pneumonitis and /or aspergilloma 2/2 immunocompromised status,   h/o Lung cancer s/p RUL lobectomy   Hemoptysis  - CT chest: Larger right lower lobe thick-walled cavity which may be infectious and/or residua of reported treated tumor. Spongelike material within the cavity can be seen with aspergilloma (prior to fungus ball formation). Consolidation within the right lower and middle lobe compatible with radiation change.  - leukocytosis stable (WBC 17.70 from 17.35) ---> trend down to WBC  8   - blood cultures -no growth, sputum culture - normal respiratory  - ID/pulm consult noted   - c/w voriconazole  - c/w trelegy ellipta  - s/p 7 days of levaquin to cover atypical/psuedomonas (allergy to PCN)   - repeat CXR 11/8 - worsening of opacities over right lung   - 2 d ech0 11/8 - EF 60% , no significant valvular disease   - hemoptysis persist , stop heparin   - 11/9 - s/p bronchoscopy - studies pending  - 11/10 - c/w Levaquin as per ID day 8   - speech and swallow evaluation  - no aspirations  - CXR 11/11 - stable consolidations    11/9/23 Left calf laceration from veno dines while in PACU for bronchoscopy   - surgery consult - s/p suturing  on 11/09/23 , plan to remove sutures in 7-10 days  - wound care     Acute hyperglycemia, possible steroid-induced , Prediabetes with A1C 6.3   -  on AM labs  - A1c results 6.3 - consistent with Impaired Fasting Glucose, new onset DM II ruled out     Chronic anemia, likely due to  chemotherapy, worsening and iron deficiency   - decreased on AM labs from admission (Hgb 9.2-->8.2, Hct 27.8--> 25.2)   - no s/s bleeding, asymptomatic, continue to monitor/trend  - 11/8 - 1 unit PRBC due to hemoptysis and worsening of anemia  - Hb improved 8.2 --> 7.8   - restart daily iron    CHRISTINA on CKD stage IIIa, improving  Hyperkalemia 11/10  - Cr improving, 1.38--> 1.2 and through out hospital course worsened to 1.55 and then to 1.75  - continue to monitor/trend  - Creatinine Trend: 1.4 <--1.7 <--1.9 <-- 2.0 <--1.79<--, 1.75<--, 1.55<--, 1.44<--, 1.44<--, 1.20  - stop losartan   - CT abd and pelvis - new right lung opacitiies  - 11/9 - start low dose IV fluids , poor po liquids intake  - 11/10 c/w IV fluids, lokelma 5  - 11/11 stop IV fluids, monitor    # HTN - stop losartan due to CHRISTINA,  c/w amlodipine 2.5    #  HLD  - c/w  atorvastatin    # Hypothyroidism - TSH low , free T4 wnl, low free T3 , restart  levothyroxine 25 ( minimal dose )     # Hx lung CA s/p RUL lobectomy/chemo/RT - f/u with [facility]     # DVT Ppx d/c heparin SQ due to anemia and hemoptysis    wife Anthony [phone] updated 11/8/23, 11/9/23, 11/10 , 11/11    Code status - DNR/DNI , will be revoke for bronchoscopy and reinstated afterwards d/w wife at length

## 2023-11-11 NOTE — PROGRESS NOTE ADULT - SUBJECTIVE AND OBJECTIVE BOX
cc - cough, weakness        83-year-old M with PMHx HTN, HLD, chronic anemia, COPD, lung CA s/p RUL lobectomy (follows at [facility], last chemotherapy/RT 5/2023, not currently on treatment), AAA, hypothyroidism, CKD stage IIIa sent in to ED on 11/2/23  by pulmonologist MD Olivera with c/o SOB, dyspnea on minimal exertion, and cough. Pt had outpatient CT of chest performed 10/3 which demonstrated consolidation- was started on Augmentin/Prednisone/Azithromycin 10/6 x 1 week. Pt completed full course of medication however wife noted worsening cough and pt with fever TMax 103.9 on 10/26. Wife then took pt to pulm office again who began 2nd round of same medications. Fevers self resolved. Since 2nd round pt with decreased energy and episode of being unsteady on feet yesterday 11/1. Went to pulm office 11/2 for f/u and was sent to ED for eval. Pt endorses lethargy and weakness on arrival. Denies chest pain, palpitations, N/V, abdominal pain, diarrhea, dysuria.     Upgraded 11/3 to SICU for acute hypoxic resp failure requiring HFNC.  Off HFNC since 11/4 11/6/23 Patient resting in bed comfortably with wife at bedside. States he still feels short of breath with small amount of movement. No cp, no n/v.   11/7 - no events, afebrile, + productive cough, denies cp, + dyspnea, + gen weakness, no abdominal pain, tolerating po intake  11/8 - afebrile, dyspnea overnight, denies cp, + cough with hemoptysis , off BIPAP in am, tolerating po intake  11/9 - dyspnea, cough, no hemoptysis, afebrile, tolerating some po intake, plan discussed with wife at bedside  11/10 - more awake and alert in am, feels better, denies pain, + dyspnea, + cough persist, no hemoptysis, denies abdominal pain, plan discussed in length  11/11 - cough and dyspnea persist, denies cp, abdominal pain, leg pain, tolerating po intake, OOB in the chair, plan discussed with the family    Review of system- Rest of the review of system are negative except mentioned in HPI, poor historian    Vital sings reviewed for last 24 h  T(C): 36.2 (11-11-23 @ 15:37), Max: 37.1 (11-11-23 @ 09:10)  T(F): 97.2 (11-11-23 @ 15:37), Max: 98.7 (11-11-23 @ 09:10)  HR: 109 (11-11-23 @ 15:37) (89 - 112)  BP: 144/62 (11-11-23 @ 15:37) (144/62 - 158/73)  RR: 18 (11-11-23 @ 15:37) (18 - 18)  SpO2: 95% (11-11-23 @ 15:37) (93% - 99%)  Wt(kg): --  Daily     Daily   CAPILLARY BLOOD GLUCOSE      POCT Blood Glucose.: 206 mg/dL (11 Nov 2023 16:24)  POCT Blood Glucose.: 199 mg/dL (11 Nov 2023 12:42)  POCT Blood Glucose.: 211 mg/dL (11 Nov 2023 08:15)  POCT Blood Glucose.: 211 mg/dL (10 Nov 2023 20:54)        PHYSICAL EXAM:    Constitutional: NAD, awake and alert   HEENT: PERR, EOMI, Normal Hearing, MMM  Neck: Soft and supple, No LAD, No JVD  Respiratory: Breath sounds decreased over right lung, normal on left side ,  No wheezing, rales, + rhonchi  Cardiovascular: S1 and S2, regular rate and rhythm, no Murmurs, gallops or rubs  Gastrointestinal: Bowel Sounds present, soft, nontender, nondistended, no guarding, no rebound  Extremities: trace LE  peripheral edema  Vascular: 2+ peripheral pulses  Neurological: A/O x 3, no focal deficits  Musculoskeletal: 5/5 strength b/l upper and lower extremities  Skin: No rashes, + dry skin, bruising + large about 20 cm left calf skin laceration   rectal : deferred, not indicated      All labs radiology and other studies reviewed and interpreted :                         7.8    10.70 )-----------( 161      ( 11 Nov 2023 06:31 )             24.4     11-11    139  |  106  |  73<H>  ----------------------------<  211<H>  5.1   |  29  |  1.48<H>    Ca    9.1      11 Nov 2023 06:31  Phos  2.9     11-11  Mg     2.7     11-11    TPro  5.9<L>  /  Alb  2.0<L>  /  TBili  0.3  /  DBili  x   /  AST  39<H>  /  ALT  30  /  AlkPhos  111  11-10        LIVER FUNCTIONS - ( 10 Nov 2023 06:19 )  Alb: 2.0 g/dL / Pro: 5.9 gm/dL / ALK PHOS: 111 U/L / ALT: 30 U/L / AST: 39 U/L / GGT: x             C-Reactive Protein, Serum: 70 mg/L (11.11.23 @ 06:31)  C-Reactive Protein, Serum: 106 mg/L (11-10-23 @ 06:19)  C-Reactive Protein, Serum: 163 mg/L (11-08-23 @ 06:01)  C-Reactive Protein, Serum: 141 mg/L (11-07-23 @ 07:11)         CT Abdomen and Pelvis No Cont (11.08.23 @ 19:01) >  IMPRESSION:  No hydronephrosis.    New patchy opacities in the right lung base. Small right pleural effusion.     TTE Echo Complete w/ Contrast w/ Doppler (11.08.23 @ 15:09) >   Left ventricle size and structure are within normal limitations. Mild   concentric left ventricular hypertrophy. Estimated left ventricular   ejection fraction is 60-65 %.   Mild diastolic dysfunction (stage I).   Mild tricuspid valve regurgitation.    Culture - Bronchial (11.09.23 @ 12:30)    Gram Stain:   Rare polymorphonuclear leukocytes per low power field  Few Squamous epithelial cells per low power field  Few Gram positive cocci in pairs per oil power field   Specimen Source: .Bronchial RIGHT LOWER LOBE BAL   Culture Results:   Normal Respiratory Marguerite present                   Culture - Blood (11.04.23 @ 04:21) Culture - Sputum (11.03.23 @ 14:16)    Gram Stain:   Rare Squamous epithelial cells per low power field  No polymorphonuclear cells seen per low power field  Rare Gram positive cocci in pairs per oil power field  Rare Gram Negative Rods per oil power field   Specimen Source: .Sputum Sputum   Culture Results:   Normal Respiratory Marguerite present    Culture - Blood (11.04.23 @ 04:21)    Specimen Source: .Blood None   Culture Results:   No growth at 72 Hours    Culture - Urine (11.02.23 @ 16:40)    Specimen Source: Clean Catch Clean Catch (Midstream)   Culture Results:   No growth      < from: Xray Chest 1 View-PORTABLE IMMEDIATE (Xray Chest 1 View-PORTABLE IMMEDIATE .) (11.11.23 @ 10:00) >  HEART: normal in size.  LUNGS: Patient is S/P RUL lobectomy with loss of volume in upper right   lung.  Stable upper right lung mass/cavity - which was described on chests CT   scan.  Stable consolidation in mid and and lower right lung.  No large pleural effusion. No pneumothorax . Overall no change.  BONES: degenerative changes    < end of copied text >     Xray Chest 1 View- PORTABLE-Urgent (Xray Chest 1 View- PORTABLE-Urgent .) (11.04.23 @ 01:35) >  Stable right midlung and upper lung opacities.Left lung remains clear.   No significant change from November 2    IMPRESSION: No significant change       CT Chest No Cont (11.02.23 @ 16:04) >    IMPRESSION:    Right upper lobectomy.    Larger right lower lobe thick-walled cavity which may be infectious   and/or residua of reported treated tumor. 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    Consolidation within the right lower and middle lobe compatible with   radiation change.    Multiple indistinct nodules in the right lungwhich may be infectious or   radiation pneumonitis.      MEDICATIONS  (STANDING):  albuterol/ipratropium for Nebulization 3 milliLiter(s) Nebulizer every 6 hours  amLODIPine   Tablet 2.5 milliGRAM(s) Oral daily  atorvastatin 40 milliGRAM(s) Oral at bedtime  dextrose 5%. 1000 milliLiter(s) (50 mL/Hr) IV Continuous <Continuous>  dextrose 5%. 1000 milliLiter(s) (100 mL/Hr) IV Continuous <Continuous>  dextrose 50% Injectable 25 Gram(s) IV Push once  dextrose 50% Injectable 12.5 Gram(s) IV Push once  dextrose 50% Injectable 25 Gram(s) IV Push once  fluticasone furoate/umeclidinium/vilanterol 100-62.5-25 MICROgram(s) Inhaler 1 Puff(s) Inhalation daily  folic acid 1 milliGRAM(s) Oral daily  glucagon  Injectable 1 milliGRAM(s) IntraMuscular once  heparin   Injectable 5000 Unit(s) SubCutaneous every 8 hours  insulin glargine Injectable (LANTUS) 8 Unit(s) SubCutaneous at bedtime  insulin lispro (ADMELOG) corrective regimen sliding scale   SubCutaneous Before meals and at bedtime  levoFLOXacin IVPB      levoFLOXacin IVPB 500 milliGRAM(s) IV Intermittent every 24 hours  levothyroxine 25 MICROGram(s) Oral daily  methylPREDNISolone sodium succinate Injectable 20 milliGRAM(s) IV Push every 12 hours  multivitamin 1 Tablet(s) Oral daily  voriconazole 200 milliGRAM(s) Oral every 12 hours    MEDICATIONS  (PRN):  acetaminophen     Tablet .. 650 milliGRAM(s) Oral every 6 hours PRN Temp greater or equal to 38C (100.4F), Mild Pain (1 - 3)  aluminum hydroxide/magnesium hydroxide/simethicone Suspension 30 milliLiter(s) Oral every 4 hours PRN Dyspepsia  benzonatate 100 milliGRAM(s) Oral three times a day PRN Cough  dextrose Oral Gel 15 Gram(s) Oral once PRN Blood Glucose LESS THAN 70 milliGRAM(s)/deciliter  melatonin 3 milliGRAM(s) Oral at bedtime PRN Insomnia  ondansetron Injectable 4 milliGRAM(s) IV Push every 8 hours PRN Nausea and/or Vomiting

## 2023-11-11 NOTE — SWALLOW BEDSIDE ASSESSMENT ADULT - ORAL PREPARATORY PHASE
orientation eating routines: able to feed himself with focus: as per family, pt eats rapidly: did not do so at bedside evaluation Lip seal on utensil : oral containment../Within functional limits

## 2023-11-11 NOTE — SWALLOW BEDSIDE ASSESSMENT ADULT - PHARYNGEAL PHASE
Onset of pharyngeal swallow is with age-appropriate time limits. No overt s/s aspiration at bedside./Within functional limits

## 2023-11-11 NOTE — PROGRESS NOTE ADULT - ASSESSMENT
83y old Male with PMH HTN, HPL, Chronic anemia, COPD, lung CA follows at [facility] [place] (last chemotherapy and RT 05/2023, not currently receiving chemo treatment), s/p right upper lobectomy, AAA, Hypothyroidism, CKD stage IIIa referred by me to ed for continued sob, cough despite abx treatment found to have large lower lobe thick walled cavity  1. 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 discussed with wife.  -started on voriconazole. surgery is more defitive therapy however he is high surgical risk  -reviewed imaging from [place] supplied by wife - it looks like he has had blebs in the past and these are what are infected - they look half filled with fluid with thicker borders, which would argue against fungal infection/mrsa as there is no necrosis involved in its pathogenesis  -discussed bronch with wife.   -s/p bronch. follow up results  2. pneumonia: levaquin stopped due to 7 day treatment, but now found to have aspiration pneumonia  -spoke to id, restart levaquin  -supplemental o2  3. copd: continue trelegy  4. ? radiation induced pneumonitis: continue methylprednisolone  5. anemia: received 1 unit prbc  6. ? aspiration pneumonia: restarted levaquin. f/u speech swallow  -aspiration precautions 83y old Male with PMH HTN, HPL, Chronic anemia, COPD, lung CA follows at [facility] [place] (last chemotherapy and RT 05/2023, not currently receiving chemo treatment), s/p right upper lobectomy, AAA, Hypothyroidism, CKD stage IIIa referred by me to ed for continued sob, cough despite abx treatment found to have large lower lobe thick walled cavity  1. 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 discussed with wife.  -started on voriconazole. surgery is more defitive therapy however he is high surgical risk  -reviewed imaging from [place] supplied by wife - it looks like he has had blebs in the past and these are what are infected - they look half filled with fluid with thicker borders, which would argue against fungal infection/mrsa as there is no necrosis involved in its pathogenesis  -s/p bronch. follow up results  2. pneumonia: levaquin stopped due to 7 day treatment, but now found to have aspiration pneumonia  -spoke to id, restart levaquin  -supplemental o2  -speech/swallow eval  3. copd: continue trelegy  4. ? radiation induced pneumonitis: continue methylprednisolone  5. anemia: received 1 unit prbc  6. ? aspiration pneumonia: restarted levaquin. f/u speech swallow  -aspiration precautions  7. left leg laceration: s/p suture, now bandaged. continue wound care

## 2023-11-11 NOTE — PROGRESS NOTE ADULT - SUBJECTIVE AND OBJECTIVE BOX
83-year-old M with PMHx HTN, HLD, chronic anemia, COPD, lung CA s/p RUL lobectomy (follows at [facility], last chemotherapy/RT 5/2023, not currently on treatment), AAA, hypothyroidism, CKD stage IIIa Cr 1.4 w 500 mg proteinuria on Losartan   followed by Dr Garcia outpatient    sent in to ED on 11/2/23  by pulmonologist MD Olivera with c/o SOB, dyspnea on minimal exertion, and cough. Pt had outpatient CT of chest performed 10/3 which demonstrated consolidation- was started on Augmentin/Prednisone/Azithromycin 10/6 x 1 week. Pt completed full course of medication however wife noted worsening cough and pt with fever TMax 103.9 on 10/26. Wife then took pt to pulm office again who began 2nd round of same medications. Fevers self resolved. Since 2nd round pt with decreased energy and episode of being unsteady on feet yesterday 11/1. Went to pulm office 11/2 for f/u and was sent to ED for eval. Pt endorses lethargy and weakness on arrival. Denies chest pain, palpitations, N/V, abdominal pain, diarrhea, dysuria.  Now concern for aspergillosis - s/p bronchosopy  today and placed on Voriconazole     Renal eval for rising Cr while in hospital   Cr 2 from 1.79 from 1.75 from 1.55 from 1.44 from 1.2 ( 11/3)    today    alert and awake    wife at bedside      eating and drinking   good UOP +     MEDICATIONS  (STANDING):  albuterol/ipratropium for Nebulization 3 milliLiter(s) Nebulizer every 6 hours  amLODIPine   Tablet 2.5 milliGRAM(s) Oral daily  AQUAPHOR (petrolatum Ointment) 1 Application(s) Topical two times a day  atorvastatin 40 milliGRAM(s) Oral at bedtime  dextrose 5%. 1000 milliLiter(s) (100 mL/Hr) IV Continuous <Continuous>  dextrose 5%. 1000 milliLiter(s) (50 mL/Hr) IV Continuous <Continuous>  dextrose 50% Injectable 12.5 Gram(s) IV Push once  dextrose 50% Injectable 25 Gram(s) IV Push once  dextrose 50% Injectable 25 Gram(s) IV Push once  ferrous    sulfate 325 milliGRAM(s) Oral daily  fluticasone furoate/umeclidinium/vilanterol 100-62.5-25 MICROgram(s) Inhaler 1 Puff(s) Inhalation daily  folic acid 1 milliGRAM(s) Oral daily  glucagon  Injectable 1 milliGRAM(s) IntraMuscular once  insulin glargine Injectable (LANTUS) 10 Unit(s) SubCutaneous at bedtime  insulin lispro (ADMELOG) corrective regimen sliding scale   SubCutaneous at bedtime  insulin lispro (ADMELOG) corrective regimen sliding scale   SubCutaneous three times a day before meals  insulin lispro Injectable (ADMELOG) 4 Unit(s) SubCutaneous three times a day before meals  levoFLOXacin IVPB 750 milliGRAM(s) IV Intermittent every 48 hours  levothyroxine 25 MICROGram(s) Oral daily  methylPREDNISolone sodium succinate Injectable 20 milliGRAM(s) IV Push every 12 hours  voriconazole 200 milliGRAM(s) Oral every 12 hours        Allergies    penicillin (Unknown)    Intolerances        REVIEW OF SYSTEMS:  limited due to [place]        Vital Signs Last 24 Hrs  T(C): 37.1 (11 Nov 2023 21:07), Max: 37.1 (11 Nov 2023 09:10)  T(F): 98.7 (11 Nov 2023 21:07), Max: 98.7 (11 Nov 2023 09:10)  HR: 114 (11 Nov 2023 21:07) (89 - 114)  BP: 157/71 (11 Nov 2023 21:07) (144/62 - 158/73)  BP(mean): 84 (11 Nov 2023 15:37) (84 - 84)  RR: 18 (11 Nov 2023 21:07) (18 - 18)  SpO2: 92% (11 Nov 2023 21:07) (92% - 99%)    Parameters below as of 11 Nov 2023 21:07  Patient On (Oxygen Delivery Method): nasal cannula  O2 Flow (L/min): 3        I&O's Detail    10 Nov 2023 07:01  -  11 Nov 2023 07:00  --------------------------------------------------------  IN:  Total IN: 0 mL    OUT:    Voided (mL): 1200 mL  Total OUT: 1200 mL    Total NET: -1200 mL      11 Nov 2023 07:01  -  11 Nov 2023 23:23  --------------------------------------------------------  IN:  Total IN: 0 mL    OUT:    Voided (mL): 1100 mL  Total OUT: 1100 mL    Total NET: -1100 mL        PHYSICAL EXAM:    Constitutional: frail  HEENT:  EOMI,  MM   Neck: No LAD, No JVD  Respiratory: CTAB  Cardiovascular: S1 and S2  Gastrointestinal: BS+, soft, NT/ND  Extremities: No peripheral edema  Neurological: A/O x 3, no focal deficits  : No [place]  Skin: No rashes  Access: Not applicable    LABS:                                       7.8    10.70 )-----------( 161      ( 11 Nov 2023 06:31 )             24.4                         7.8    8.94  )-----------( 154      ( 10 Nov 2023 06:19 )             23.7       139    |  106    |  73     ----------------------------<  211       11 Nov 2023 06:31  5.1     |  29     |  1.48     140    |  108    |  76     ----------------------------<  229       10 Nov 2023 06:19  5.6     |  29     |  1.71     137    |  104    |  80     ----------------------------<  294       09 Nov 2023 06:25  5.1     |  25     |  1.99     Ca    9.1        11 Nov 2023 06:31  Ca    8.4        10 Nov 2023 06:19    Phos  2.9       11 Nov 2023 06:31  Phos  4.4       10 Nov 2023 06:19    Mg     2.7       11 Nov 2023 06:31  Mg     2.7       10 Nov 2023 06:19    TPro  5.9    /  Alb  2.0    /  TBili  0.3    /        10 Nov 2023 06:19  DBili  x      /  AST  39     /  ALT  30     /  AlkPhos  111      TPro  6.0    /  Alb  1.7    /  TBili  0.3    /        09 Nov 2023 06:25  DBili  x      /  AST  46     /  ALT  35     /  AlkPhos  151              Urine Studies:  Urinalysis Basic - ( 09 Nov 2023 06:25 )    Color: x / Appearance: x / SG: x / pH: x  Gluc: 294 mg/dL / Ketone: x  / Bili: x / Urobili: x   Blood: x / Protein: x / Nitrite: x   Leuk Esterase: x / RBC: x / WBC x   Sq Epi: x / Non Sq Epi: x / Bacteria: x      Creatinine, Random Urine: 68 mg/dL (11-09 @ 20:43)  Protein/Creatinine Ratio Calculation: 1.3 Ratio (11-09 @ 20:43)        RADIOLOGY & ADDITIONAL STUDIES:      ACC: [id number] EXAM:  US KIDNEYS AND BLADDER   ORDERED BY: RADHA UNDERWOOD     PROCEDURE DATE:  11/08/2023          INTERPRETATION:  CLINICAL INFORMATION: Acute kidney injury    COMPARISON: 12/22/2022    TECHNIQUE: Sonography of the kidneys and bladder.    FINDINGS:  Right kidney: 10.1 cm. No renal mass, or hydronephrosis. Nonobstructive 6   mm calculus in the right kidney.    Left kidney: 10.1 cm. No hydronephrosis or calculi. New 0.9 x 1.3 x 1.1   cm complex cyst with a few thin internal septations in the midpole of the   left kidney.    Urinary bladder: Underdistended.    Prostate: 21 cc which is within normal limits in size.    IMPRESSION:  No hydronephrosis.    6 mm nonobstructive calculus in the right kidney.    New 0.9 x 1.3 x 1.1cm complex cyst with a few thin internal septations   in the midpole of the left kidney.    --- End of Report ---

## 2023-11-11 NOTE — SWALLOW BEDSIDE ASSESSMENT ADULT - SWALLOW EVAL: DIAGNOSIS
Underlying pulmonary issues put pt at increased risk for aspiration, but he shows at bedside evaluation no overt s/s aspiration and no indication that an adjustment of diet texture is necessary, an adjustment downward would not necessarily decrease aspiration risk . He does not need thickened liquids.

## 2023-11-11 NOTE — SWALLOW BEDSIDE ASSESSMENT ADULT - ORAL PHASE
immediate bolus formation: Ap transfer for liquid WFL. Mastication normally rotary-lateral with lingual sweep for collection and clearance.  l/Within functional limits

## 2023-11-11 NOTE — PROGRESS NOTE ADULT - ASSESSMENT
82 y/o Male with h/o lung CA follows at [facility] s/p chemotherapy and RT 05/2023, s/p right upper lobectomy, HTN, HPL, Chronic anemia, COPD, AAA, Hypothyroidism, CKD stage IIIa was admitted on 11/2 for increased shortness of breath, dyspnea on minimal exertion, and cough. Reportedly, he had CT of the chest performed 10/3 which demonstrated consolidation, was started on augmentin/prednisone/azithro 10/6 x 1 week. Pt completed course of medications however wife noted worsening cough and pt with fever Tmax of 103.9 on 10/26. Wife then took pt to pulmonologist again who started pt on second round of the same medications, instructed wife to stop giving pt tylenol wife notes fevers self-resolved. Since then pt decreased energy with episode of being unsteady on feet. Today is 6th day of taking medications, had f/u scheduled with pulmonologist who sent pt to ED for eval.  He feels tired and weak. In ER he received ceftriaxone.    1. Large RLL pulmonary cavity. 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 Possible postobstructive pneumonia. Lung Ca s/p right upper lobectomy. Radiation pneumonitis. Allergy to PCN.    -respiratory improved s/p bronchoscopy  -leukocytosis  -bronch c/s noted with normal respiratory jaime  -s/p levofloxacin 500 mg IV qd # 7   -on voriconazole 200 mg PO q12h # 6  -on levofloxacin 750 mg IV q48h # 1  -tolerating abx well so far; no side effects noted  -pulmonary evaluation ?diagnostic bronchoscopy   -if this is a fungus ball, antifungal therapy will not be effective, he may need surgery to remove the fungal ball area  -thoracic evaluation appreciated  -f/u bronchoscopy results  -continue antimicrobial therapy   -monitor temps  -f/u CBC  -supportive care  2. Other issues:   -care per medicine    d/w medicine team

## 2023-11-11 NOTE — SWALLOW BEDSIDE ASSESSMENT ADULT - SLP GENERAL OBSERVATIONS
pt seated in bed; alert and verbally interactive; [place], but able to participate in procedures fully.  Family in attendance and invested in his care.

## 2023-11-12 LAB
ALBUMIN SERPL ELPH-MCNC: 3 G/DL — LOW (ref 3.3–5)
ALBUMIN SERPL ELPH-MCNC: 3 G/DL — LOW (ref 3.3–5)
ALP SERPL-CCNC: 115 U/L — SIGNIFICANT CHANGE UP (ref 40–120)
ALP SERPL-CCNC: 115 U/L — SIGNIFICANT CHANGE UP (ref 40–120)
ALT FLD-CCNC: 26 U/L — SIGNIFICANT CHANGE UP (ref 12–78)
ALT FLD-CCNC: 26 U/L — SIGNIFICANT CHANGE UP (ref 12–78)
ANION GAP SERPL CALC-SCNC: 5 MMOL/L — SIGNIFICANT CHANGE UP (ref 5–17)
ANION GAP SERPL CALC-SCNC: 5 MMOL/L — SIGNIFICANT CHANGE UP (ref 5–17)
AST SERPL-CCNC: 40 U/L — HIGH (ref 15–37)
AST SERPL-CCNC: 40 U/L — HIGH (ref 15–37)
BASOPHILS # BLD AUTO: 0.02 K/UL — SIGNIFICANT CHANGE UP (ref 0–0.2)
BASOPHILS # BLD AUTO: 0.02 K/UL — SIGNIFICANT CHANGE UP (ref 0–0.2)
BASOPHILS NFR BLD AUTO: 0.1 % — SIGNIFICANT CHANGE UP (ref 0–2)
BASOPHILS NFR BLD AUTO: 0.1 % — SIGNIFICANT CHANGE UP (ref 0–2)
BILIRUB SERPL-MCNC: 0.7 MG/DL — SIGNIFICANT CHANGE UP (ref 0.2–1.2)
BILIRUB SERPL-MCNC: 0.7 MG/DL — SIGNIFICANT CHANGE UP (ref 0.2–1.2)
BUN SERPL-MCNC: 59 MG/DL — HIGH (ref 7–23)
BUN SERPL-MCNC: 59 MG/DL — HIGH (ref 7–23)
CALCIUM SERPL-MCNC: 9.2 MG/DL — SIGNIFICANT CHANGE UP (ref 8.5–10.1)
CALCIUM SERPL-MCNC: 9.2 MG/DL — SIGNIFICANT CHANGE UP (ref 8.5–10.1)
CHLORIDE SERPL-SCNC: 106 MMOL/L — SIGNIFICANT CHANGE UP (ref 96–108)
CHLORIDE SERPL-SCNC: 106 MMOL/L — SIGNIFICANT CHANGE UP (ref 96–108)
CO2 SERPL-SCNC: 29 MMOL/L — SIGNIFICANT CHANGE UP (ref 22–31)
CO2 SERPL-SCNC: 29 MMOL/L — SIGNIFICANT CHANGE UP (ref 22–31)
CREAT SERPL-MCNC: 1.41 MG/DL — HIGH (ref 0.5–1.3)
CREAT SERPL-MCNC: 1.41 MG/DL — HIGH (ref 0.5–1.3)
CRP SERPL-MCNC: 46 MG/L — HIGH
CRP SERPL-MCNC: 46 MG/L — HIGH
EGFR: 49 ML/MIN/1.73M2 — LOW
EGFR: 49 ML/MIN/1.73M2 — LOW
EOSINOPHIL # BLD AUTO: 0 K/UL — SIGNIFICANT CHANGE UP (ref 0–0.5)
EOSINOPHIL # BLD AUTO: 0 K/UL — SIGNIFICANT CHANGE UP (ref 0–0.5)
EOSINOPHIL NFR BLD AUTO: 0 % — SIGNIFICANT CHANGE UP (ref 0–6)
EOSINOPHIL NFR BLD AUTO: 0 % — SIGNIFICANT CHANGE UP (ref 0–6)
GLUCOSE BLDC GLUCOMTR-MCNC: 150 MG/DL — HIGH (ref 70–99)
GLUCOSE BLDC GLUCOMTR-MCNC: 150 MG/DL — HIGH (ref 70–99)
GLUCOSE BLDC GLUCOMTR-MCNC: 185 MG/DL — HIGH (ref 70–99)
GLUCOSE BLDC GLUCOMTR-MCNC: 185 MG/DL — HIGH (ref 70–99)
GLUCOSE BLDC GLUCOMTR-MCNC: 211 MG/DL — HIGH (ref 70–99)
GLUCOSE BLDC GLUCOMTR-MCNC: 211 MG/DL — HIGH (ref 70–99)
GLUCOSE BLDC GLUCOMTR-MCNC: 231 MG/DL — HIGH (ref 70–99)
GLUCOSE BLDC GLUCOMTR-MCNC: 231 MG/DL — HIGH (ref 70–99)
GLUCOSE SERPL-MCNC: 156 MG/DL — HIGH (ref 70–99)
GLUCOSE SERPL-MCNC: 156 MG/DL — HIGH (ref 70–99)
HCT VFR BLD CALC: 25.3 % — LOW (ref 39–50)
HCT VFR BLD CALC: 25.3 % — LOW (ref 39–50)
HGB BLD-MCNC: 8.4 G/DL — LOW (ref 13–17)
HGB BLD-MCNC: 8.4 G/DL — LOW (ref 13–17)
IMM GRANULOCYTES NFR BLD AUTO: 1.3 % — HIGH (ref 0–0.9)
IMM GRANULOCYTES NFR BLD AUTO: 1.3 % — HIGH (ref 0–0.9)
LYMPHOCYTES # BLD AUTO: 0.23 K/UL — LOW (ref 1–3.3)
LYMPHOCYTES # BLD AUTO: 0.23 K/UL — LOW (ref 1–3.3)
LYMPHOCYTES # BLD AUTO: 1.7 % — LOW (ref 13–44)
LYMPHOCYTES # BLD AUTO: 1.7 % — LOW (ref 13–44)
MAGNESIUM SERPL-MCNC: 2.6 MG/DL — SIGNIFICANT CHANGE UP (ref 1.6–2.6)
MAGNESIUM SERPL-MCNC: 2.6 MG/DL — SIGNIFICANT CHANGE UP (ref 1.6–2.6)
MCHC RBC-ENTMCNC: 31.1 PG — SIGNIFICANT CHANGE UP (ref 27–34)
MCHC RBC-ENTMCNC: 31.1 PG — SIGNIFICANT CHANGE UP (ref 27–34)
MCHC RBC-ENTMCNC: 33.2 GM/DL — SIGNIFICANT CHANGE UP (ref 32–36)
MCHC RBC-ENTMCNC: 33.2 GM/DL — SIGNIFICANT CHANGE UP (ref 32–36)
MCV RBC AUTO: 93.7 FL — SIGNIFICANT CHANGE UP (ref 80–100)
MCV RBC AUTO: 93.7 FL — SIGNIFICANT CHANGE UP (ref 80–100)
MONOCYTES # BLD AUTO: 0.34 K/UL — SIGNIFICANT CHANGE UP (ref 0–0.9)
MONOCYTES # BLD AUTO: 0.34 K/UL — SIGNIFICANT CHANGE UP (ref 0–0.9)
MONOCYTES NFR BLD AUTO: 2.5 % — SIGNIFICANT CHANGE UP (ref 2–14)
MONOCYTES NFR BLD AUTO: 2.5 % — SIGNIFICANT CHANGE UP (ref 2–14)
NEUTROPHILS # BLD AUTO: 12.76 K/UL — HIGH (ref 1.8–7.4)
NEUTROPHILS # BLD AUTO: 12.76 K/UL — HIGH (ref 1.8–7.4)
NEUTROPHILS NFR BLD AUTO: 94.4 % — HIGH (ref 43–77)
NEUTROPHILS NFR BLD AUTO: 94.4 % — HIGH (ref 43–77)
NT-PROBNP SERPL-SCNC: HIGH PG/ML (ref 0–450)
NT-PROBNP SERPL-SCNC: HIGH PG/ML (ref 0–450)
PHOSPHATE SERPL-MCNC: 2.7 MG/DL — SIGNIFICANT CHANGE UP (ref 2.5–4.5)
PHOSPHATE SERPL-MCNC: 2.7 MG/DL — SIGNIFICANT CHANGE UP (ref 2.5–4.5)
PLATELET # BLD AUTO: 189 K/UL — SIGNIFICANT CHANGE UP (ref 150–400)
PLATELET # BLD AUTO: 189 K/UL — SIGNIFICANT CHANGE UP (ref 150–400)
POTASSIUM SERPL-MCNC: 5.4 MMOL/L — HIGH (ref 3.5–5.3)
POTASSIUM SERPL-MCNC: 5.4 MMOL/L — HIGH (ref 3.5–5.3)
POTASSIUM SERPL-SCNC: 5.4 MMOL/L — HIGH (ref 3.5–5.3)
POTASSIUM SERPL-SCNC: 5.4 MMOL/L — HIGH (ref 3.5–5.3)
PROT SERPL-MCNC: 6.7 GM/DL — SIGNIFICANT CHANGE UP (ref 6–8.3)
PROT SERPL-MCNC: 6.7 GM/DL — SIGNIFICANT CHANGE UP (ref 6–8.3)
RBC # BLD: 2.7 M/UL — LOW (ref 4.2–5.8)
RBC # BLD: 2.7 M/UL — LOW (ref 4.2–5.8)
RBC # FLD: 15.9 % — HIGH (ref 10.3–14.5)
RBC # FLD: 15.9 % — HIGH (ref 10.3–14.5)
SODIUM SERPL-SCNC: 140 MMOL/L — SIGNIFICANT CHANGE UP (ref 135–145)
SODIUM SERPL-SCNC: 140 MMOL/L — SIGNIFICANT CHANGE UP (ref 135–145)
WBC # BLD: 13.53 K/UL — HIGH (ref 3.8–10.5)
WBC # BLD: 13.53 K/UL — HIGH (ref 3.8–10.5)
WBC # FLD AUTO: 13.53 K/UL — HIGH (ref 3.8–10.5)
WBC # FLD AUTO: 13.53 K/UL — HIGH (ref 3.8–10.5)

## 2023-11-12 PROCEDURE — 99232 SBSQ HOSP IP/OBS MODERATE 35: CPT

## 2023-11-12 RX ORDER — LEVOTHYROXINE SODIUM 125 MCG
12.5 TABLET ORAL DAILY
Refills: 0 | Status: DISCONTINUED | OUTPATIENT
Start: 2023-11-13 | End: 2023-11-30

## 2023-11-12 RX ORDER — SODIUM ZIRCONIUM CYCLOSILICATE 10 G/10G
5 POWDER, FOR SUSPENSION ORAL ONCE
Refills: 0 | Status: COMPLETED | OUTPATIENT
Start: 2023-11-12 | End: 2023-11-12

## 2023-11-12 RX ORDER — HYDRALAZINE HCL 50 MG
10 TABLET ORAL
Refills: 0 | Status: DISCONTINUED | OUTPATIENT
Start: 2023-11-12 | End: 2023-11-26

## 2023-11-12 RX ORDER — FUROSEMIDE 40 MG
10 TABLET ORAL ONCE
Refills: 0 | Status: COMPLETED | OUTPATIENT
Start: 2023-11-12 | End: 2023-11-12

## 2023-11-12 RX ADMIN — Medication 10 MILLIGRAM(S): at 11:43

## 2023-11-12 RX ADMIN — SODIUM ZIRCONIUM CYCLOSILICATE 5 GRAM(S): 10 POWDER, FOR SUSPENSION ORAL at 11:43

## 2023-11-12 RX ADMIN — Medication 1 APPLICATION(S): at 21:52

## 2023-11-12 RX ADMIN — VORICONAZOLE 200 MILLIGRAM(S): 10 INJECTION, POWDER, LYOPHILIZED, FOR SOLUTION INTRAVENOUS at 09:30

## 2023-11-12 RX ADMIN — Medication 20 MILLIGRAM(S): at 09:31

## 2023-11-12 RX ADMIN — Medication 4 UNIT(S): at 09:20

## 2023-11-12 RX ADMIN — Medication 1 MILLIGRAM(S): at 09:31

## 2023-11-12 RX ADMIN — Medication 3 MILLIGRAM(S): at 21:47

## 2023-11-12 RX ADMIN — Medication 25 MICROGRAM(S): at 05:47

## 2023-11-12 RX ADMIN — Medication 10 MILLIGRAM(S): at 21:48

## 2023-11-12 RX ADMIN — Medication 3 MILLILITER(S): at 01:14

## 2023-11-12 RX ADMIN — VORICONAZOLE 200 MILLIGRAM(S): 10 INJECTION, POWDER, LYOPHILIZED, FOR SOLUTION INTRAVENOUS at 21:44

## 2023-11-12 RX ADMIN — Medication 4 UNIT(S): at 17:30

## 2023-11-12 RX ADMIN — Medication 3 MILLILITER(S): at 08:51

## 2023-11-12 RX ADMIN — Medication 325 MILLIGRAM(S): at 09:30

## 2023-11-12 RX ADMIN — Medication 3 MILLILITER(S): at 14:30

## 2023-11-12 RX ADMIN — Medication 4: at 11:44

## 2023-11-12 RX ADMIN — Medication 1 APPLICATION(S): at 09:35

## 2023-11-12 RX ADMIN — Medication 4: at 17:30

## 2023-11-12 RX ADMIN — INSULIN GLARGINE 10 UNIT(S): 100 INJECTION, SOLUTION SUBCUTANEOUS at 21:43

## 2023-11-12 RX ADMIN — Medication 4 UNIT(S): at 11:44

## 2023-11-12 RX ADMIN — ATORVASTATIN CALCIUM 40 MILLIGRAM(S): 80 TABLET, FILM COATED ORAL at 21:47

## 2023-11-12 RX ADMIN — FLUTICASONE FUROATE, UMECLIDINIUM BROMIDE AND VILANTEROL TRIFENATATE 1 PUFF(S): 200; 62.5; 25 POWDER RESPIRATORY (INHALATION) at 08:52

## 2023-11-12 RX ADMIN — Medication 20 MILLIGRAM(S): at 21:48

## 2023-11-12 RX ADMIN — AMLODIPINE BESYLATE 2.5 MILLIGRAM(S): 2.5 TABLET ORAL at 09:30

## 2023-11-12 RX ADMIN — Medication 3 MILLILITER(S): at 20:39

## 2023-11-12 NOTE — PROGRESS NOTE ADULT - ASSESSMENT
84 y/o Male with h/o lung CA follows at [facility] [place] s/p chemotherapy and RT 05/2023, s/p right upper lobectomy, HTN, HPL, Chronic anemia, COPD, AAA, Hypothyroidism, CKD stage IIIa was admitted on 11/2 for increased shortness of breath, dyspnea on minimal exertion, and cough. Reportedly, he had CT of the chest performed 10/3 which demonstrated consolidation, was started on augmentin/prednisone/azithro 10/6 x 1 week. Pt completed course of medications however wife noted worsening cough and pt with fever Tmax of 103.9 on 10/26. Wife then took pt to pulmonologist again who started pt on second round of the same medications, instructed wife to stop giving pt tylenol wife notes fevers self-resolved. Since then pt decreased energy with episode of being unsteady on feet. Today is 6th day of taking medications, had f/u scheduled with pulmonologist who sent pt to ED for eval.  He feels tired and weak. In ER he received ceftriaxone.    1. Large RLL pulmonary cavity. 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 Possible postobstructive pneumonia. Lung Ca s/p right upper lobectomy. Radiation pneumonitis. Allergy to PCN.    -respiratory improved s/p bronchoscopy  -leukocytosis  -bronch c/s noted with normal respiratory jaime  -Aspergillus galactomanan and fungitel are negative   -fungal bronch c/s is pending and will take several weeks to finalize  -s/p levofloxacin 500 mg IV qd # 7   -on voriconazole 200 mg PO q12h # 7  -on levofloxacin 750 mg IV q48h # 2  -tolerating abx well so far; no side effects noted  -pulmonary evaluation appreciated   -there is no culture or Ag evidence at this time that the CT chest changes represent aspergillosis  -I would hold off further voriconazole therapy at this time and reconsider treatment if fungal bronch cultures will show aspergillus   -will d/w with pulmonary  -thoracic evaluation appreciated  -f/u bronchoscopy results  -may complete antibiotic therapy in 1-2 days  -monitor temps  -f/u CBC  -supportive care  2. Other issues:   -care per medicine    d/w medicine team

## 2023-11-12 NOTE — PROGRESS NOTE ADULT - ASSESSMENT
83y old Male with PMH HTN, HPL, Chronic anemia, COPD, lung [place] [facility] [place], not currently receiving chemo treatment), s/p right upper lobectomy, AAA, Hypothyroidism, CKD stage IIIa referred by me to ed for continued sob, cough despite abx treatment found to have large lower lobe thick walled cavity  1. 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 discussed with wife.  -started on voriconazole. surgery is more defitive therapy however he is high surgical risk  -reviewed imaging from [facility] supplied by wife - it looks like he has had blebs in the past and these are what are infected - they look half filled with fluid with thicker borders, which would argue against fungal infection/mrsa as there is no necrosis involved in its pathogenesis  -s/p bronch. follow up results  -repeat ct wo contrast  2. pneumonia: levaquin stopped due to 7 day treatment, but now found to have aspiration pneumonia  -spoke to id, restart levaquin  -supplemental o2  -speech/swallow eval noted  3. copd: continue trelegy  4. ? radiation induced pneumonitis: continue methylprednisolone  5. anemia: received 1 unit prbc  6. ? aspiration pneumonia: restarted levaquin. f/u speech swallow  -aspiration precautions  7. left leg laceration: s/p suture, now bandaged. continue wound care

## 2023-11-12 NOTE — PROGRESS NOTE ADULT - SUBJECTIVE AND OBJECTIVE BOX
cc - cough, weakness        83-year-old M with PMHx HTN, HLD, chronic anemia, COPD, lung CA s/p [place]ectomy [place] [facility] [place]23, not currently on treatment), AAA, hypothyroidism, CKD stage IIIa sent in to ED on 11/2/23  by pulmonologist MD Olivera with c/o SOB, dyspnea on minimal exertion, and cough. Pt had outpatient CT of chest performed 10/3 which demonstrated consolidation- was started on Augmentin/Prednisone/Azithromycin 10/6 x 1 week. Pt completed full course of medication however wife noted worsening cough and pt with fever TMax 103.9 on 10/26. Wife then took pt to pulm office again who began 2nd round of same medications. Fevers self resolved. Since 2nd round pt with decreased energy and episode of being unsteady on feet yesterday 11/1. Went to pulm office 11/2 for f/u and was sent to ED for eval. Pt endorses lethargy and weakness on arrival. Denies chest pain, palpitations, N/V, abdominal pain, diarrhea, dysuria.     Upgraded 11/3 to SICU for acute hypoxic resp failure requiring HFNC.  Off HFNC since 11/4 11/6/23 Patient resting in bed comfortably with wife at bedside. States he still feels short of breath with small amount of movement. No cp, no n/v.   11/7 - no events, afebrile, + productive cough, denies cp, + dyspnea, + gen weakness, no abdominal pain, tolerating po intake  11/8 - afebrile, dyspnea overnight, denies cp, + cough with hemoptysis , off BIPAP in am, tolerating po intake  11/9 - dyspnea, cough, no hemoptysis, afebrile, tolerating some po intake, plan discussed with wife at bedside  11/10 - more awake and alert in am, feels better, denies pain, + dyspnea, + cough persist, no hemoptysis, denies abdominal pain, plan discussed in length  11/11 - cough and dyspnea persist, denies cp, abdominal pain, leg pain, tolerating po intake, OOB in the chair, plan discussed with the family  11/12 - pt seen and examined , family at bedside, feels better, + cough and dyspnea persist, + gen weakness, afebrile, plan discussed    Review of system- Rest of the review of system are negative except mentioned in HPI, poor historian    Vital sings reviewed for last 24 h  T(C): 36.7 (11-12-23 @ 15:24), Max: 37.1 (11-11-23 @ 21:07)  T(F): 98.1 (11-12-23 @ 15:24), Max: 98.7 (11-11-23 @ 21:07)  HR: 104 (11-12-23 @ 15:24) (100 - 114)  BP: 150/62 (11-12-23 @ 15:24) (150/62 - 167/96)  RR: 18 (11-12-23 @ 15:24) (18 - 18)  SpO2: 97% (11-12-23 @ 15:24) (92% - 100%)  Wt(kg): --  Daily     Daily   CAPILLARY BLOOD GLUCOSE      POCT Blood Glucose.: 231 mg/dL (12 Nov 2023 17:24)  POCT Blood Glucose.: 211 mg/dL (12 Nov 2023 11:37)  POCT Blood Glucose.: 150 mg/dL (12 Nov 2023 08:20)  POCT Blood Glucose.: 199 mg/dL (11 Nov 2023 21:41)        PHYSICAL EXAM:    Constitutional: NAD, awake and alert   HEENT: PERR, EOMI, Normal Hearing, MMM  Neck: Soft and supple, No LAD, No JVD  Respiratory: Breath sounds decreased over right lung, normal on left side ,  No wheezing, rales, + rhonchi  Cardiovascular: S1 and S2, regular rate and rhythm, no Murmurs, gallops or rubs  Gastrointestinal: Bowel Sounds present, soft, nontender, nondistended, no guarding, no rebound  Extremities: trace LE  peripheral edema  Vascular: 2+ peripheral pulses  Neurological: A/O x 3, no focal deficits  Musculoskeletal: 5/5 strength b/l upper and lower extremities  Skin: No rashes, + dry skin, bruising + large about 20 cm left calf skin laceration   rectal : deferred, not indicated      All labs radiology and other studies reviewed and interpreted :                         8.4    13.53 )-----------( 189      ( 12 Nov 2023 06:55 )             25.3     11-12    140  |  106  |  59<H>  ----------------------------<  156<H>  5.4<H>   |  29  |  1.41<H>    Ca    9.2      12 Nov 2023 06:55  Phos  2.7     11-12  Mg     2.6     11-12    TPro  6.7  /  Alb  3.0<L>  /  TBili  0.7  /  DBili  x   /  AST  40<H>  /  ALT  26  /  AlkPhos  115  11-12        LIVER FUNCTIONS - ( 12 Nov 2023 06:55 )  Alb: 3.0 g/dL / Pro: 6.7 gm/dL / ALK PHOS: 115 U/L / ALT: 26 U/L / AST: 40 U/L / GGT: x                                           7.8    10.70 )-----------( 161      ( 11 Nov 2023 06:31 )             24.4     11-11    139  |  106  |  73<H>  ----------------------------<  211<H>  5.1   |  29  |  1.48<H>    Ca    9.1      11 Nov 2023 06:31  Phos  2.9     11-11  Mg     2.7     11-11    TPro  5.9<L>  /  Alb  2.0<L>  /  TBili  0.3  /  DBili  x   /  AST  39<H>  /  ALT  30  /  AlkPhos  111  11-10        LIVER FUNCTIONS - ( 10 Nov 2023 06:19 )  Alb: 2.0 g/dL / Pro: 5.9 gm/dL / ALK PHOS: 111 U/L / ALT: 30 U/L / AST: 39 U/L / GGT: x           C-Reactive Protein, Serum: 46 mg/L (11.12.23 @ 06:55)  C-Reactive Protein, Serum: 70 mg/L (11.11.23 @ 06:31)  C-Reactive Protein, Serum: 106 mg/L (11-10-23 @ 06:19)  C-Reactive Protein, Serum: 163 mg/L (11-08-23 @ 06:01)  C-Reactive Protein, Serum: 141 mg/L (11-07-23 @ 07:11)         CT Abdomen and Pelvis No Cont (11.08.23 @ 19:01) >  IMPRESSION:  No hydronephrosis.    New patchy opacities in the right lung base. Small right pleural effusion.     TTE Echo Complete w/ Contrast w/ Doppler (11.08.23 @ 15:09) >   Left ventricle size and structure are within normal limitations. Mild   concentric left ventricular hypertrophy. Estimated left ventricular   ejection fraction is 60-65 %.   Mild diastolic dysfunction (stage I).   Mild tricuspid valve regurgitation.    Culture - Bronchial (11.09.23 @ 12:30)    Gram Stain:   Rare polymorphonuclear leukocytes per low power field  Few Squamous epithelial cells per low power field  Few Gram positive cocci in pairs per oil power field   Specimen Source: .Bronchial RIGHT LOWER LOBE BAL   Culture Results:   Normal Respiratory Marguerite present                   Culture - Blood (11.04.23 @ 04:21) Culture - Sputum (11.03.23 @ 14:16)    Gram Stain:   Rare Squamous epithelial cells per low power field  No polymorphonuclear cells seen per low power field  Rare Gram positive cocci in pairs per oil power field  Rare Gram Negative Rods per oil power field   Specimen Source: .Sputum Sputum   Culture Results:   Normal Respiratory Marguerite present    Culture - Blood (11.04.23 @ 04:21)    Specimen Source: .Blood None   Culture Results:   No growth at 72 Hours    Culture - Urine (11.02.23 @ 16:40)    Specimen Source: Clean Catch Clean Catch (Midstream)   Culture Results:   No growth        < from: Xray Chest 1 View-PORTABLE IMMEDIATE (Xray Chest 1 View-PORTABLE IMMEDIATE .) (11.11.23 @ 10:00) >  HEART: normal in size.  LUNGS: Patient is S/P RUL lobectomy with loss of volume in upper right   lung.  Stable upper right lung mass/cavity - which was described on chests CT   scan.  Stable consolidation in mid and and lower right lung.  No large pleural effusion. No pneumothorax . Overall no change.  BONES: degenerative changes    < end of copied text >     Xray Chest 1 View- PORTABLE-Urgent (Xray Chest 1 View- PORTABLE-Urgent .) (11.04.23 @ 01:35) >  Stable right midlung and upper lung opacities.Left lung remains clear.   No significant change from November 2    IMPRESSION: No significant change       CT Chest No Cont (11.02.23 @ 16:04) >    IMPRESSION:    Right upper lobectomy.    Larger right lower lobe thick-walled cavity which may be infectious   and/or residua of reported treated tumor. 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    Consolidation within the right lower and middle lobe compatible with   radiation change.    Multiple indistinct nodules in the right lungwhich may be infectious or   radiation pneumonitis.      MEDICATIONS  (STANDING):  albuterol/ipratropium for Nebulization 3 milliLiter(s) Nebulizer every 6 hours  amLODIPine   Tablet 2.5 milliGRAM(s) Oral daily  atorvastatin 40 milliGRAM(s) Oral at bedtime  dextrose 5%. 1000 milliLiter(s) (50 mL/Hr) IV Continuous <Continuous>  dextrose 5%. 1000 milliLiter(s) (100 mL/Hr) IV Continuous <Continuous>  dextrose 50% Injectable 25 Gram(s) IV Push once  dextrose 50% Injectable 12.5 Gram(s) IV Push once  dextrose 50% Injectable 25 Gram(s) IV Push once  fluticasone furoate/umeclidinium/vilanterol 100-62.5-25 MICROgram(s) Inhaler 1 Puff(s) Inhalation daily  folic acid 1 milliGRAM(s) Oral daily  glucagon  Injectable 1 milliGRAM(s) IntraMuscular once  heparin   Injectable 5000 Unit(s) SubCutaneous every 8 hours  insulin glargine Injectable (LANTUS) 8 Unit(s) SubCutaneous at bedtime  insulin lispro (ADMELOG) corrective regimen sliding scale   SubCutaneous Before meals and at bedtime  levoFLOXacin IVPB      levoFLOXacin IVPB 500 milliGRAM(s) IV Intermittent every 24 hours  levothyroxine 25 MICROGram(s) Oral daily  methylPREDNISolone sodium succinate Injectable 20 milliGRAM(s) IV Push every 12 hours  multivitamin 1 Tablet(s) Oral daily  voriconazole 200 milliGRAM(s) Oral every 12 hours    MEDICATIONS  (PRN):  acetaminophen     Tablet .. 650 milliGRAM(s) Oral every 6 hours PRN Temp greater or equal to 38C (100.4F), Mild Pain (1 - 3)  aluminum hydroxide/magnesium hydroxide/simethicone Suspension 30 milliLiter(s) Oral every 4 hours PRN Dyspepsia  benzonatate 100 milliGRAM(s) Oral three times a day PRN Cough  dextrose Oral Gel 15 Gram(s) Oral once PRN Blood Glucose LESS THAN 70 milliGRAM(s)/deciliter  melatonin 3 milliGRAM(s) Oral at bedtime PRN Insomnia  ondansetron Injectable 4 milliGRAM(s) IV Push every 8 hours PRN Nausea and/or Vomiting

## 2023-11-12 NOTE — PROGRESS NOTE ADULT - SUBJECTIVE AND OBJECTIVE BOX
Subjective:  coughing still  sob and given lasix  brought up brown sputum   discussed ct with wife at bedside    Review of Systems:  All 10 systems reviewed in detailed and found to be negative with the exception of what has already been described above    Allergies:  penicillin (Unknown)    Meds  MEDICATIONS  (STANDING):  albuterol/ipratropium for Nebulization 3 milliLiter(s) Nebulizer every 6 hours  AQUAPHOR (petrolatum Ointment) 1 Application(s) Topical two times a day  atorvastatin 40 milliGRAM(s) Oral at bedtime  dextrose 5%. 1000 milliLiter(s) (50 mL/Hr) IV Continuous <Continuous>  dextrose 5%. 1000 milliLiter(s) (100 mL/Hr) IV Continuous <Continuous>  dextrose 50% Injectable 25 Gram(s) IV Push once  dextrose 50% Injectable 25 Gram(s) IV Push once  dextrose 50% Injectable 12.5 Gram(s) IV Push once  ferrous    sulfate 325 milliGRAM(s) Oral daily  fluticasone furoate/umeclidinium/vilanterol 100-62.5-25 MICROgram(s) Inhaler 1 Puff(s) Inhalation daily  folic acid 1 milliGRAM(s) Oral daily  glucagon  Injectable 1 milliGRAM(s) IntraMuscular once  hydrALAZINE 10 milliGRAM(s) Oral two times a day  insulin glargine Injectable (LANTUS) 10 Unit(s) SubCutaneous at bedtime  insulin lispro (ADMELOG) corrective regimen sliding scale   SubCutaneous at bedtime  insulin lispro (ADMELOG) corrective regimen sliding scale   SubCutaneous three times a day before meals  insulin lispro Injectable (ADMELOG) 4 Unit(s) SubCutaneous three times a day before meals  levoFLOXacin IVPB 750 milliGRAM(s) IV Intermittent every 48 hours  levothyroxine 12.5 MICROGram(s) Oral daily  methylPREDNISolone sodium succinate Injectable 20 milliGRAM(s) IV Push every 12 hours  sodium zirconium cyclosilicate 5 Gram(s) Oral once  voriconazole 200 milliGRAM(s) Oral every 12 hours    MEDICATIONS  (PRN):  acetaminophen     Tablet .. 650 milliGRAM(s) Oral every 6 hours PRN Temp greater or equal to 38C (100.4F), Mild Pain (1 - 3)  aluminum hydroxide/magnesium hydroxide/simethicone Suspension 30 milliLiter(s) Oral every 4 hours PRN Dyspepsia  benzonatate 100 milliGRAM(s) Oral three times a day PRN Cough  dextrose Oral Gel 15 Gram(s) Oral once PRN Blood Glucose LESS THAN 70 milliGRAM(s)/deciliter  melatonin 3 milliGRAM(s) Oral at bedtime PRN Insomnia  ondansetron Injectable 4 milliGRAM(s) IV Push every 8 hours PRN Nausea and/or Vomiting    Physical Exam  T(C): 36.6 (11-13-23 @ 09:22), Max: 36.7 (11-12-23 @ 15:24)  HR: 78 (11-13-23 @ 09:22) (70 - 108)  BP: 137/68 (11-13-23 @ 09:22) (137/68 - 150/62)  RR: 18 (11-13-23 @ 09:22) (18 - 18)  SpO2: 96% (11-13-23 @ 09:22) (96% - 98%)  Gen: Alert, oriented, no distress  HEENT: Anicteric sclera, moist mucous membranes  Cardio: Regular rhythm and rate, normal S1S2, no murmurs  Resp: Crackles, congested  GI: Nontender, nondistended, normoactive bowel sounds  Ext: No cyanosis, clubbing or edema  skin: + ecchymosis, left leg laceration sutured and bandaged  Neuro: Nonfocal    Labs:                        7.7    10.37 )-----------( 150      ( 13 Nov 2023 06:30 )             23.7     11-13    137  |  103  |  63<H>  ----------------------------<  186<H>  5.5<H>   |  32<H>  |  1.36<H>    Ca    8.6      13 Nov 2023 06:30  Phos  3.1     11-13  Mg     2.4     11-13    TPro  6.7  /  Alb  3.0<L>  /  TBili  0.7  /  DBili  x   /  AST  40<H>  /  ALT  26  /  AlkPhos  115  11-12      Urinalysis Basic - ( 13 Nov 2023 06:30 )    Color: x / Appearance: x / SG: x / pH: x  Gluc: 186 mg/dL / Ketone: x  / Bili: x / Urobili: x   Blood: x / Protein: x / Nitrite: x   Leuk Esterase: x / RBC: x / WBC x   Sq Epi: x / Non Sq Epi: x / Bacteria: x    < from: CT Chest No Cont (11.02.23 @ 16:04) >  ACC: [id number] EXAM:  CT CHEST   ORDERED BY: SARAH YOO     PROCEDURE DATE:  11/02/2023          INTERPRETATION:  INDICATION: Shortness of breath, cough, lung cancer   history, last chemotherapy and radiation treatment in May 2023    TECHNIQUE: Helical acquisition images of the chest without intravenous   contrast. Maximum intensity projection images were generated.    COMPARISON: 12/29/2020 chest x-ray.    FINDINGS:    LUNGS/AIRWAYS/PLEURA: Right upper lobectomy. Right lower lobe   multiloculated thick-walled 11 cm cavity (best seen in its entirety on   601-59) containing fluid and spongelike material. Consolidation within   the middle lobe and inferior aspect of the right lower lobe with angular   borders suggesting prior radiation. Multiple indistinct nodules of   varying density in the right lower and middle lobes, mostly adjacent to   the cavity. Small branching opacities in the peripheral left upper and   lower lobes, compatible with distal airway impaction. Trace left pleural   effusion. Mild right apical pleural thickening.    LYMPH NODES/MEDIASTINUM: No lymphadenopathy.    HEART/VASCULATURE: Normal heart size. Unremarkable pericardium. Coronary   artery calcifications. Normal caliber aorta.    UPPER ABDOMEN: Unremarkable.    BONES/SOFT TISSUES: Old sternal fracture. Mild loss of height of the L1   vertebral body.      IMPRESSION:    Right upper lobectomy.    Larger right lower lobe thick-walled cavity which may be infectious   and/or residua of reported treated tumor. 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    Consolidation within the right lower and middle lobe compatible with   radiation change.    Multiple indistinct nodules in the right lungwhich may be infectious or   radiation pneumonitis.    ct lucio 10/3 uploaded and reviewed    < from: CT Abdomen and Pelvis No Cont (11.08.23 @ 19:01) >  PROCEDURE DATE:  11/08/2023          INTERPRETATION:  CLINICAL INFORMATION: Acute kidney injury. Evaluate   obstruction.    COMPARISON: CT chest 11/20/2023.    CONTRAST/COMPLICATIONS:  IV Contrast: NONE  Oral Contrast: NONE  Complications: None reported at time of study completion    PROCEDURE:  CT of the Abdomen and Pelvis was performed.  Sagittal and coronal reformats were performed.    FINDINGS:  LOWER CHEST: Small right pleural effusion. New patchy opacities in the   right lung base.    LIVER: Within normal limits.  BILE DUCTS: Normal caliber.  GALLBLADDER: Within normal limits.  SPLEEN: Within normal limits.  PANCREAS: Within normal limits.  ADRENALS: Within normal limits.  KIDNEYS/URETERS: No hydronephrosis. Vascular calcifications.    BLADDER: Within normal limits.  REPRODUCTIVE ORGANS: Prostate within normal limits.    BOWEL: No bowel obstruction. Appendix is normal. Moderate to large stool.  PERITONEUM: No ascites.  VESSELS: Atherosclerotic changes.  RETROPERITONEUM/LYMPH NODES: No lymphadenopathy.  ABDOMINAL WALL: Within normal limits.  BONES: Degenerative changes. Redemonstration of L1 compression deformity.   Right femoral ORIF.    IMPRESSION:  No hydronephrosis.    New patchy opacities in the right lung base. Small right pleural effusion.    Bedside Swallow: Trial 1  · Consistencies administered	thin liquid; pureed; regular solid  · Mode of Presentation	cup; spoon; self fed  · Positioning	upright (90 degrees)  · Oral Preparatory Phase	Within functional limits; orientation eating routines: able to feed himself with focus: as per family, pt eats rapidly: did not do so at bedside evaluation Lip seal on utensil : oral containment..  · Oral Phase	Within functional limits; immediate bolus formation: Ap transfer for liquid WFL. Mastication normally rotary-lateral with lingual sweep for collection and clearance.  l  · Pharyngeal Phase	Within functional limits; Onset of pharyngeal swallow is with age-appropriate time limits. No overt s/s aspiration at bedside.  · Comments	Micro aspiration and silent aspiration cannot be ruled out. As above, pt does have increased risk for aspiration 2/2 lung CA and  lobectomy as well as COPD and present pna (which in itself may be aspiration related). However, pt presents at bedside evaluation with no contraindication for maintaining REGULAR texture and thin liquids.  Strategies for reducing risk were demonstrated and discussed with the Pt and the family who are very supportive of the Pt.  Disc with NSg.  Will follow peripherally.

## 2023-11-12 NOTE — PROGRESS NOTE ADULT - ASSESSMENT
83-year-old M with PMHx HTN, HLD, chronic anemia, COPD, lung CA s/p RUL lobectomy (follows at [facility], last chemotherapy/RT 5/2023, not currently on treatment), AAA, hypothyroidism, CKD stage IIIa sent in to ED on 11/2/23  by pulmonologist MD Olivera with c/o SOB, dyspnea on minimal exertion, and cough. 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     RLL PNA with possible radiation pneumonitis and /or aspergilloma 2/2 immunocompromised status,   h/o Lung cancer s/p RUL lobectomy   Hemoptysis  - CT chest: Larger right lower lobe thick-walled cavity which may be infectious and/or residua of reported treated tumor. Spongelike material within the cavity can be seen with aspergilloma (prior to fungus ball formation). Consolidation within the right lower and middle lobe compatible with radiation change.  - leukocytosis stable (WBC 17.70 from 17.35) ---> trend down to WBC  8   - blood cultures -no growth, sputum culture - normal respiratory  - ID/pulm consult noted   - c/w voriconazole PO   - c/w trelegy ellipta  - s/p 7 days of levaquin to cover atypical/psuedomonas (allergy to PCN)   - repeat CXR 11/8 - worsening of opacities over right lung   - 2 d ech0 11/8 - EF 60% , no significant valvular disease   - hemoptysis persist , stop heparin   - 11/9 - s/p bronchoscopy - studies pending  - 11/10 - c/w Levaquin IV  as per ID   - speech and swallow evaluation  - no aspirations  - CXR 11/11 - stable consolidations     11/9/23 Left calf laceration from veno dines while in PACU for bronchoscopy   - surgery consult - s/p suturing  on 11/09/23 , plan to remove sutures in 7-10 days  - wound care as per surgery team    Acute hyperglycemia, possible steroid-induced , Prediabetes with A1C 6.3   -  on AM labs  - A1c results 6.3 - consistent with Impaired Fasting Glucose, new onset DM II ruled out     Chronic anemia, likely due to  chemotherapy, worsening and iron deficiency   - decreased on AM labs from admission (Hgb 9.2-->8.2, Hct 27.8--> 25.2)   - no s/s bleeding, asymptomatic, continue to monitor/trend  - 11/8 - 1 unit PRBC due to hemoptysis and worsening of anemia  - Hb improved 8.2 --> 7.8 --> 8.4  - restart daily iron    CHRISTINA on CKD stage IIIa, improving  Hyperkalemia 11/10  - Cr improving, 1.38--> 1.2 and through out hospital course worsened to 1.55 and then to 1.75  - continue to monitor/trend  - Creatinine Trend: 1.4 <--1.7 <--1.9 <-- 2.0 <--1.79<--, 1.75<--, 1.55<--, 1.44<--, 1.44<--, 1.20  - stop losartan   - CT abd and pelvis - new right lung opacitiies  - 11/9 - start low dose IV fluids , poor po liquids intake  - 11/10 c/w IV fluids, lokelma 5  - 11/11 stop IV fluids, monitor s/p lasix x1 dose 10     # HTN - stop losartan due to CHRISTINA,  stop amlodipine 2.5, start hydralazine 10 bid as per nephrology team    #  HLD  - c/w  atorvastatin    # Hypothyroidism - TSH low , free T4 wnl, low free T3 , restart  levothyroxine 25 lower dose --> 12.5 mg due to tachycardia    # Hx lung CA s/p RUL lobectomy/chemo/RT - f/u with [facility]     # DVT Ppx d/c heparin SQ due to anemia and hemoptysis    wife Anthony [phone] updated 11/8/23, 11/9/23, 11/10 , 11/11, 11/12    Code status - DNR/DNI , will be revoke for bronchoscopy and reinstated afterwards d/w wife at length    Dispo - [facility] vs home once stable

## 2023-11-12 NOTE — PROGRESS NOTE ADULT - SUBJECTIVE AND OBJECTIVE BOX
Date of service: 11-12-23 @ 08:06    Lying in bed in NAD  Weak looking  No respiratory complaints    ROS: no fever or chills; denies dizziness, no HA, no abdominal pain, no diarrhea or constipation; no dysuria, no legs pain, no rashes    MEDICATIONS  (STANDING):  albuterol/ipratropium for Nebulization 3 milliLiter(s) Nebulizer every 6 hours  amLODIPine   Tablet 2.5 milliGRAM(s) Oral daily  AQUAPHOR (petrolatum Ointment) 1 Application(s) Topical two times a day  atorvastatin 40 milliGRAM(s) Oral at bedtime  dextrose 5%. 1000 milliLiter(s) (50 mL/Hr) IV Continuous <Continuous>  dextrose 5%. 1000 milliLiter(s) (100 mL/Hr) IV Continuous <Continuous>  dextrose 50% Injectable 25 Gram(s) IV Push once  dextrose 50% Injectable 12.5 Gram(s) IV Push once  dextrose 50% Injectable 25 Gram(s) IV Push once  ferrous    sulfate 325 milliGRAM(s) Oral daily  fluticasone furoate/umeclidinium/vilanterol 100-62.5-25 MICROgram(s) Inhaler 1 Puff(s) Inhalation daily  folic acid 1 milliGRAM(s) Oral daily  glucagon  Injectable 1 milliGRAM(s) IntraMuscular once  insulin glargine Injectable (LANTUS) 10 Unit(s) SubCutaneous at bedtime  insulin lispro (ADMELOG) corrective regimen sliding scale   SubCutaneous at bedtime  insulin lispro (ADMELOG) corrective regimen sliding scale   SubCutaneous three times a day before meals  insulin lispro Injectable (ADMELOG) 4 Unit(s) SubCutaneous three times a day before meals  levoFLOXacin IVPB 750 milliGRAM(s) IV Intermittent every 48 hours  levothyroxine 25 MICROGram(s) Oral daily  methylPREDNISolone sodium succinate Injectable 20 milliGRAM(s) IV Push every 12 hours  voriconazole 200 milliGRAM(s) Oral every 12 hours    Vital Signs Last 24 Hrs  T(C): 37.1 (11 Nov 2023 21:07), Max: 37.1 (11 Nov 2023 09:10)  T(F): 98.7 (11 Nov 2023 21:07), Max: 98.7 (11 Nov 2023 09:10)  HR: 100 (12 Nov 2023 02:09) (100 - 114)  BP: 157/71 (11 Nov 2023 21:07) (144/62 - 158/73)  BP(mean): 84 (11 Nov 2023 15:37) (84 - 84)  RR: 18 (11 Nov 2023 21:07) (18 - 18)  SpO2: 100% (12 Nov 2023 02:11) (92% - 100%)    Parameters below as of 12 Nov 2023 02:09  Patient On (Oxygen Delivery Method): nasal cannula     Physical exam:    Constitutional:  No acute distress  HEENT: NC/AT, EOMI, PERRLA, conjunctivae clear; ears and nose atraumatic  Neck: supple; thyroid not palpable  Back: no tenderness  Respiratory: respiratory effort normal; few crackles at bases  Cardiovascular: S1S2 regular, no murmurs  Abdomen: soft, not tender, not distended, positive BS  Genitourinary: no suprapubic tenderness  Lymphatic: no LN palpable  Musculoskeletal: no muscle tenderness, no joint swelling or tenderness  Extremities: no pedal edema  Left lower leg laceration dressed  Neurological/ Psychiatric: AxOx3, moving all extremities  Skin: no rashes; no palpable lesions    Labs: reviewed                        8.4    13.53 )-----------( 189      ( 12 Nov 2023 06:55 )             25.3     11-12    140  |  106  |  59<H>  ----------------------------<  156<H>  5.4<H>   |  29  |  1.41<H>    Ca    9.2      12 Nov 2023 06:55  Phos  2.7     11-12  Mg     2.6     11-12    TPro  6.7  /  Alb  3.0<L>  /  TBili  0.7  /  DBili  x   /  AST  40<H>  /  ALT  26  /  AlkPhos  115  11-12    C-Reactive Protein, Serum: 70 mg/L (11-11-23 @ 06:31)  C-Reactive Protein, Serum: 106 mg/L (11-10-23 @ 06:19)  C-Reactive Protein, Serum: 163 mg/L (11-08-23 @ 06:01)  Ferritin: 1082 ng/mL (11-08-23 @ 06:01)  C-Reactive Protein, Serum: 141 mg/L (11-07-23 @ 07:11)                        7.8    10.70 )-----------( 161      ( 11 Nov 2023 06:31 )             24.4     11-11    139  |  106  |  73<H>  ----------------------------<  211<H>  5.1   |  29  |  1.48<H>    Ca    9.1      11 Nov 2023 06:31  Phos  2.9     11-11  Mg     2.7     11-11    TPro  5.9<L>  /  Alb  2.0<L>  /  TBili  0.3  /  DBili  x   /  AST  39<H>  /  ALT  30  /  AlkPhos  111  11-10    C-Reactive Protein, Serum: 106 mg/L (11-10-23 @ 06:19)  C-Reactive Protein, Serum: 163 mg/L (11-08-23 @ 06:01)  Ferritin: 1082 ng/mL (11-08-23 @ 06:01)  C-Reactive Protein, Serum: 141 mg/L (11-07-23 @ 07:11)                        7.8    8.94  )-----------( 154      ( 10 Nov 2023 06:19 )             23.7     11-10    140  |  108  |  76<H>  ----------------------------<  229<H>  5.6<H>   |  29  |  1.71<H>    Ca    8.4<L>      10 Nov 2023 06:19  Phos  4.4     11-10  Mg     2.7     11-10    TPro  5.9<L>  /  Alb  2.0<L>  /  TBili  0.3  /  DBili  x   /  AST  39<H>  /  ALT  30  /  AlkPhos  111  11-10    C-Reactive Protein, Serum: 163 mg/L (11-08-23 @ 06:01)  Ferritin: 1082 ng/mL (11-08-23 @ 06:01)  C-Reactive Protein, Serum: 141 mg/L (11-07-23 @ 07:11)                        8.2    11.74 )-----------( 259      ( 03 Nov 2023 07:15 )             25.2     11-03    133<L>  |  102  |  35<H>  ----------------------------<  205<H>  4.4   |  22  |  1.20    Ca    8.3<L>      03 Nov 2023 07:15    TPro  6.9  /  Alb  2.0<L>  /  TBili  0.5  /  DBili  x   /  AST  38<H>  /  ALT  50  /  AlkPhos  130<H>  11-02     LIVER FUNCTIONS - ( 02 Nov 2023 14:47 )  Alb: 2.0 g/dL / Pro: 6.9 gm/dL / ALK PHOS: 130 U/L / ALT: 50 U/L / AST: 38 U/L / GGT: x           Urinalysis (11-02 @ 16:40)  Urine Appearance: Clear  Protein, Urine: 100 mg/dL  Urine Microscopic-Add On (NC) (11-02 @ 16:40)  White Blood Cell - Urine: 0 /HPF  Red Blood Cell - Urine: 0 /HPF    (11-02 @ 14:47)  NotDetec    Culture - Blood (collected 04 Nov 2023 04:21)  Source: .Blood None  Preliminary Report (06 Nov 2023 09:01):    No growth at 48 Hours    Culture - Blood (collected 04 Nov 2023 04:21)  Source: .Blood None  Preliminary Report (06 Nov 2023 09:01):    No growth at 48 Hours    Culture - Sputum (collected 03 Nov 2023 14:16)  Source: .Sputum Sputum  Gram Stain (03 Nov 2023 23:52):    Rare Squamous epithelial cells per low power field    No polymorphonuclear cells seen per low power field    Rare Gram positive cocci in pairs per oil power field    Rare Gram Negative Rods per oil power field  Final Report (05 Nov 2023 12:06):    Normal Respiratory Marguerite present    Culture - Urine (collected 02 Nov 2023 16:40)  Source: Clean Catch Clean Catch (Midstream)  Final Report (03 Nov 2023 20:33):    No growth    Culture - Blood (collected 02 Nov 2023 14:47)  Source: .Blood Blood-Peripheral  Preliminary Report (06 Nov 2023 22:01):    No growth at 4 days    Culture - Blood (collected 02 Nov 2023 14:47)  Source: .Blood Blood-Peripheral  Preliminary Report (06 Nov 2023 22:01):    No growth at 4 days    Culture - Acid Fast - Bronchial w/Smear (collected 09 Nov 2023 12:30)  Source: .Bronchial RIGHT LOWER LOBE BAL    Culture - Fungal, Bronchial (collected 09 Nov 2023 12:30)  Source: .Bronchial RIGHT LOWER LOBE BAL  Preliminary Report (10 Nov 2023 08:01):    Testing in progress    Culture - Bronchial (collected 09 Nov 2023 12:30)  Source: .Bronchial RIGHT LOWER LOBE BAL  Gram Stain (09 Nov 2023 23:14):    Rare polymorphonuclear leukocytes per low power field    Few Squamous epithelial cells per low power field    Few Gram positive cocci in pairs per oil power field  Preliminary Report (10 Nov 2023 15:14):    Normal Respiratory Marguerite present    Radiology: all available radiological tests reviewed    < from: CT Chest No Cont (11.02.23 @ 16:04) >  Right upper lobectomy.  Larger right lower lobe thick-walled cavity which may be infectious   and/or residua of reported treated tumor. 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  Consolidation within the right lower and middle lobe compatible with radiation change.  Multiple indistinct nodules in the right lungwhich may be infectious or radiation pneumonitis.  < end of copied text >    < from: CT Abdomen and Pelvis No Cont (11.08.23 @ 19:01) >  No hydronephrosis.  New patchy opacities in the right lung base. Small right pleural effusion.  < end of copied text >      Advanced directives addressed: full resuscitation

## 2023-11-12 NOTE — PROGRESS NOTE ADULT - ASSESSMENT
84 yo male with hx of Lung cancer s/p lobectomy., chemotherapy  now presenting with sob and recurrent fevers treated with abx of PNA , now possible concern for fungal ( aspergillosis)    await CT chest finding  CKD 3 w proteinuria on ARB - known to Dr Garcia in office     CHRISTINA on CKD Cr 2 --> 1.4    Prerenal +ATN w hypotension while on ARB     Cr  stable      Prerenal - improved w IVF, stoppped     increased Fio2 requirements overnight - required IV lasix x 1     avoid all ACE/ARB for now - resume when stable     no IV contrast     Hyperkalemia  - lokelma PRN     low K diet       HTN w le edema   hx LE edema  in past with CCB as per wife   will DC this and change to hydralazine 10 mg BID for now      d./w  wife and daughter at length    Dr Hughes to resume care Monday          84 yo male with hx of Lung cancer s/p lobectomy., chemotherapy  now presenting with sob and recurrent fevers treated with abx of PNA , now possible concern for fungal ( aspergillosis)    await CT chest finding  CKD 3 w proteinuria on ARB - known to Dr Garcia in office     CHRISTINA on CKD Cr 2 --> 1.4    Prerenal +ATN w hypotension while on ARB     Cr  stable      Prerenal - improved w IVF, stoppped     increased Fio2 requirements overnight - required IV lasix x 1     avoid all ACE/ARB for now - resume when stable     no IV contrast     Hyperkalemia  - lokelma PRN     low K diet     bladder scan was negative prior       HTN w le edema   hx LE edema  in past with CCB as per wife   will DC this and change to hydralazine 10 mg BID for now      d./w  wife and daughter at length    Dr Hughes to resume care Monday

## 2023-11-12 NOTE — PROGRESS NOTE ADULT - SUBJECTIVE AND OBJECTIVE BOX
83-year-old M with PMHx HTN, HLD, chronic anemia, COPD, lung CA s/p RUL lobectomy (follows at [facility], last chemotherapy/RT 5/2023, not currently on treatment), AAA, hypothyroidism, CKD stage IIIa Cr 1.4 w 500 mg proteinuria on Losartan  followed by Dr Garcia outpatient    sent in to ED on 11/2/23  by pulmonologist MD Olivera with c/o SOB, dyspnea on minimal exertion, and cough. Pt had outpatient CT of chest performed 10/3 which demonstrated consolidation- was started on Augmentin/Prednisone/Azithromycin 10/6 x 1 week. Pt completed full course of medication however wife noted worsening cough and pt with fever TMax 103.9 on 10/26. Wife then took pt to pulm office again who began 2nd round of same medications. Fevers self resolved. Since 2nd round pt with decreased energy and episode of being unsteady on feet yesterday 11/1. Went to pulm office 11/2 for f/u and was sent to ED for eval. Pt endorses lethargy and weakness on arrival. Denies chest pain, palpitations, N/V, abdominal pain, diarrhea, dysuria.  Now concern for aspergillosis - s/p bronchosopy  today and placed on Voriconazole     Renal eval for rising Cr while in hospital   Cr 2 from 1.79 from 1.75 from 1.55 from 1.44 from 1.2 ( 11/3)    today     lethargic today    increased FIO2 requirements overnight and recieved IV lasix 10 mg as per Dr Azucena armstrong/ wife at bedside - many concerns about swelling in legs     MEDICATIONS  (STANDING):  albuterol/ipratropium for Nebulization 3 milliLiter(s) Nebulizer every 6 hours  AQUAPHOR (petrolatum Ointment) 1 Application(s) Topical two times a day  atorvastatin 40 milliGRAM(s) Oral at bedtime  dextrose 5%. 1000 milliLiter(s) (50 mL/Hr) IV Continuous <Continuous>  dextrose 5%. 1000 milliLiter(s) (100 mL/Hr) IV Continuous <Continuous>  dextrose 50% Injectable 25 Gram(s) IV Push once  dextrose 50% Injectable 25 Gram(s) IV Push once  dextrose 50% Injectable 12.5 Gram(s) IV Push once  ferrous    sulfate 325 milliGRAM(s) Oral daily  fluticasone furoate/umeclidinium/vilanterol 100-62.5-25 MICROgram(s) Inhaler 1 Puff(s) Inhalation daily  folic acid 1 milliGRAM(s) Oral daily  glucagon  Injectable 1 milliGRAM(s) IntraMuscular once  hydrALAZINE 10 milliGRAM(s) Oral two times a day  insulin glargine Injectable (LANTUS) 10 Unit(s) SubCutaneous at bedtime  insulin lispro (ADMELOG) corrective regimen sliding scale   SubCutaneous at bedtime  insulin lispro (ADMELOG) corrective regimen sliding scale   SubCutaneous three times a day before meals  insulin lispro Injectable (ADMELOG) 4 Unit(s) SubCutaneous three times a day before meals  levoFLOXacin IVPB 750 milliGRAM(s) IV Intermittent every 48 hours  methylPREDNISolone sodium succinate Injectable 20 milliGRAM(s) IV Push every 12 hours  voriconazole 200 milliGRAM(s) Oral every 12 hours      Allergies    penicillin (Unknown)    Intolerances        REVIEW OF SYSTEMS:  limited due to MS    a  O2 Flow (L/min): 3    Vital Signs Last 24 Hrs  T(C): 36.7 (12 Nov 2023 15:24), Max: 37.1 (11 Nov 2023 21:07)  T(F): 98.1 (12 Nov 2023 15:24), Max: 98.7 (11 Nov 2023 21:07)  HR: 104 (12 Nov 2023 15:24) (100 - 114)  BP: 150/62 (12 Nov 2023 15:24) (150/62 - 167/96)  BP(mean): --  RR: 18 (12 Nov 2023 15:24) (18 - 18)  SpO2: 97% (12 Nov 2023 15:24) (92% - 100%)    Parameters below as of 12 Nov 2023 15:24  Patient On (Oxygen Delivery Method): nasal cannula  O2 Flow (L/min): 4      I&O's Detail    11 Nov 2023 07:01  -  12 Nov 2023 07:00  --------------------------------------------------------  IN:  Total IN: 0 mL    OUT:    Voided (mL): 1400 mL  Total OUT: 1400 mL    Total NET: -1400 mL        HYSICAL EXAM:    Constitutional: frail  HEENT:  EOMI,  MM   Neck: No LAD, No JVD  Respiratory: dim  Cardiovascular: S1 and S2  Gastrointestinal: BS+, soft, NT/ND  Extremities: trace peripheral edema  Neurological: lethargics  : No Parham   Skin: No rashes  Access: Not applicable    LABS:                          8.4    13.53 )-----------( 189      ( 12 Nov 2023 06:55 )             25.3                         7.8    10.70 )-----------( 161      ( 11 Nov 2023 06:31 )             24.4         140    |  106    |  59     ----------------------------<  156       12 Nov 2023 06:55  5.4     |  29     |  1.41     139    |  106    |  73     ----------------------------<  211       11 Nov 2023 06:31  5.1     |  29     |  1.48     140    |  108    |  76     ----------------------------<  229       10 Nov 2023 06:19  5.6     |  29     |  1.71     Ca    9.2        12 Nov 2023 06:55  Ca    9.1        11 Nov 2023 06:31    Phos  2.7       12 Nov 2023 06:55  Phos  2.9       11 Nov 2023 06:31    Mg     2.6       12 Nov 2023 06:55  Mg     2.7       11 Nov 2023 06:31    TPro  6.7    /  Alb  3.0    /  TBili  0.7    /        12 Nov 2023 06:55  DBili  x      /  AST  40     /  ALT  26     /  AlkPhos  115      TPro  5.9    /  Alb  2.0    /  TBili  0.3    /        10 Nov 2023 06:19  DBili  x      /  AST  39     /  ALT  30     /  AlkPhos  111            Urine Studies:  Urinalysis Basic - ( 09 Nov 2023 06:25 )    Color: x / Appearance: x / SG: x / pH: x  Gluc: 294 mg/dL / Ketone: x  / Bili: x / Urobili: x   Blood: x / Protein: x / Nitrite: x   Leuk Esterase: x / RBC: x / WBC x   Sq Epi: x / Non Sq Epi: x / Bacteria: x      Creatinine, Random Urine: 68 mg/dL (11-09 @ 20:43)  Protein/Creatinine Ratio Calculation: 1.3 Ratio (11-09 @ 20:43)        RADIOLOGY & ADDITIONAL STUDIES:      ACC: [id number] EXAM:  US KIDNEYS AND BLADDER   ORDERED BY: RADHA UNDERWOOD     PROCEDURE DATE:  11/08/2023          INTERPRETATION:  CLINICAL INFORMATION: Acute kidney injury    COMPARISON: 12/22/2022    TECHNIQUE: Sonography of the kidneys and bladder.    FINDINGS:  Right kidney: 10.1 cm. No renal mass, or hydronephrosis. Nonobstructive 6   mm calculus in the right kidney.    Left kidney: 10.1 cm. No hydronephrosis or calculi. New 0.9 x 1.3 x 1.1   cm complex cyst with a few thin internal septations in the midpole of the   left kidney.    Urinary bladder: Underdistended.    Prostate: 21 cc which is within normal limits in size.    IMPRESSION:  No hydronephrosis.    6 mm nonobstructive calculus in the right kidney.    New 0.9 x 1.3 x 1.1cm complex cyst with a few thin internal septations   in the midpole of the left kidney.    --- End of Report ---

## 2023-11-13 LAB
ANION GAP SERPL CALC-SCNC: 2 MMOL/L — LOW (ref 5–17)
ANION GAP SERPL CALC-SCNC: 2 MMOL/L — LOW (ref 5–17)
BASOPHILS # BLD AUTO: 0.02 K/UL — SIGNIFICANT CHANGE UP (ref 0–0.2)
BASOPHILS # BLD AUTO: 0.02 K/UL — SIGNIFICANT CHANGE UP (ref 0–0.2)
BASOPHILS NFR BLD AUTO: 0.2 % — SIGNIFICANT CHANGE UP (ref 0–2)
BASOPHILS NFR BLD AUTO: 0.2 % — SIGNIFICANT CHANGE UP (ref 0–2)
BUN SERPL-MCNC: 63 MG/DL — HIGH (ref 7–23)
BUN SERPL-MCNC: 63 MG/DL — HIGH (ref 7–23)
CALCIUM SERPL-MCNC: 8.6 MG/DL — SIGNIFICANT CHANGE UP (ref 8.5–10.1)
CALCIUM SERPL-MCNC: 8.6 MG/DL — SIGNIFICANT CHANGE UP (ref 8.5–10.1)
CHLORIDE SERPL-SCNC: 103 MMOL/L — SIGNIFICANT CHANGE UP (ref 96–108)
CHLORIDE SERPL-SCNC: 103 MMOL/L — SIGNIFICANT CHANGE UP (ref 96–108)
CO2 SERPL-SCNC: 32 MMOL/L — HIGH (ref 22–31)
CO2 SERPL-SCNC: 32 MMOL/L — HIGH (ref 22–31)
CREAT SERPL-MCNC: 1.36 MG/DL — HIGH (ref 0.5–1.3)
CREAT SERPL-MCNC: 1.36 MG/DL — HIGH (ref 0.5–1.3)
CRP SERPL-MCNC: 34 MG/L — HIGH
CRP SERPL-MCNC: 34 MG/L — HIGH
EGFR: 52 ML/MIN/1.73M2 — LOW
EGFR: 52 ML/MIN/1.73M2 — LOW
EOSINOPHIL # BLD AUTO: 0 K/UL — SIGNIFICANT CHANGE UP (ref 0–0.5)
EOSINOPHIL # BLD AUTO: 0 K/UL — SIGNIFICANT CHANGE UP (ref 0–0.5)
EOSINOPHIL NFR BLD AUTO: 0 % — SIGNIFICANT CHANGE UP (ref 0–6)
EOSINOPHIL NFR BLD AUTO: 0 % — SIGNIFICANT CHANGE UP (ref 0–6)
GLUCOSE BLDC GLUCOMTR-MCNC: 190 MG/DL — HIGH (ref 70–99)
GLUCOSE BLDC GLUCOMTR-MCNC: 190 MG/DL — HIGH (ref 70–99)
GLUCOSE BLDC GLUCOMTR-MCNC: 197 MG/DL — HIGH (ref 70–99)
GLUCOSE BLDC GLUCOMTR-MCNC: 197 MG/DL — HIGH (ref 70–99)
GLUCOSE BLDC GLUCOMTR-MCNC: 227 MG/DL — HIGH (ref 70–99)
GLUCOSE BLDC GLUCOMTR-MCNC: 227 MG/DL — HIGH (ref 70–99)
GLUCOSE BLDC GLUCOMTR-MCNC: 261 MG/DL — HIGH (ref 70–99)
GLUCOSE BLDC GLUCOMTR-MCNC: 261 MG/DL — HIGH (ref 70–99)
GLUCOSE SERPL-MCNC: 186 MG/DL — HIGH (ref 70–99)
GLUCOSE SERPL-MCNC: 186 MG/DL — HIGH (ref 70–99)
HCT VFR BLD CALC: 23.7 % — LOW (ref 39–50)
HCT VFR BLD CALC: 23.7 % — LOW (ref 39–50)
HGB BLD-MCNC: 7.7 G/DL — LOW (ref 13–17)
HGB BLD-MCNC: 7.7 G/DL — LOW (ref 13–17)
IGA FLD-MCNC: 321 MG/DL — SIGNIFICANT CHANGE UP (ref 84–499)
IGA FLD-MCNC: 321 MG/DL — SIGNIFICANT CHANGE UP (ref 84–499)
IGG FLD-MCNC: 787 MG/DL — SIGNIFICANT CHANGE UP (ref 610–1660)
IGG FLD-MCNC: 787 MG/DL — SIGNIFICANT CHANGE UP (ref 610–1660)
IGM SERPL-MCNC: 162 MG/DL — SIGNIFICANT CHANGE UP (ref 35–242)
IGM SERPL-MCNC: 162 MG/DL — SIGNIFICANT CHANGE UP (ref 35–242)
IMM GRANULOCYTES NFR BLD AUTO: 1.2 % — HIGH (ref 0–0.9)
IMM GRANULOCYTES NFR BLD AUTO: 1.2 % — HIGH (ref 0–0.9)
KAPPA LC SER QL IFE: 6.28 MG/DL — HIGH (ref 0.33–1.94)
KAPPA LC SER QL IFE: 6.28 MG/DL — HIGH (ref 0.33–1.94)
KAPPA/LAMBDA FREE LIGHT CHAIN RATIO, SERUM: 1.54 RATIO — SIGNIFICANT CHANGE UP (ref 0.26–1.65)
KAPPA/LAMBDA FREE LIGHT CHAIN RATIO, SERUM: 1.54 RATIO — SIGNIFICANT CHANGE UP (ref 0.26–1.65)
LAMBDA LC SER QL IFE: 4.07 MG/DL — HIGH (ref 0.57–2.63)
LAMBDA LC SER QL IFE: 4.07 MG/DL — HIGH (ref 0.57–2.63)
LYMPHOCYTES # BLD AUTO: 0.1 K/UL — LOW (ref 1–3.3)
LYMPHOCYTES # BLD AUTO: 0.1 K/UL — LOW (ref 1–3.3)
LYMPHOCYTES # BLD AUTO: 1 % — LOW (ref 13–44)
LYMPHOCYTES # BLD AUTO: 1 % — LOW (ref 13–44)
MAGNESIUM SERPL-MCNC: 2.4 MG/DL — SIGNIFICANT CHANGE UP (ref 1.6–2.6)
MAGNESIUM SERPL-MCNC: 2.4 MG/DL — SIGNIFICANT CHANGE UP (ref 1.6–2.6)
MCHC RBC-ENTMCNC: 30.7 PG — SIGNIFICANT CHANGE UP (ref 27–34)
MCHC RBC-ENTMCNC: 30.7 PG — SIGNIFICANT CHANGE UP (ref 27–34)
MCHC RBC-ENTMCNC: 32.5 GM/DL — SIGNIFICANT CHANGE UP (ref 32–36)
MCHC RBC-ENTMCNC: 32.5 GM/DL — SIGNIFICANT CHANGE UP (ref 32–36)
MCV RBC AUTO: 94.4 FL — SIGNIFICANT CHANGE UP (ref 80–100)
MCV RBC AUTO: 94.4 FL — SIGNIFICANT CHANGE UP (ref 80–100)
MONOCYTES # BLD AUTO: 0.13 K/UL — SIGNIFICANT CHANGE UP (ref 0–0.9)
MONOCYTES # BLD AUTO: 0.13 K/UL — SIGNIFICANT CHANGE UP (ref 0–0.9)
MONOCYTES NFR BLD AUTO: 1.3 % — LOW (ref 2–14)
MONOCYTES NFR BLD AUTO: 1.3 % — LOW (ref 2–14)
NEUTROPHILS # BLD AUTO: 10 K/UL — HIGH (ref 1.8–7.4)
NEUTROPHILS # BLD AUTO: 10 K/UL — HIGH (ref 1.8–7.4)
NEUTROPHILS NFR BLD AUTO: 96.3 % — HIGH (ref 43–77)
NEUTROPHILS NFR BLD AUTO: 96.3 % — HIGH (ref 43–77)
NT-PROBNP SERPL-SCNC: HIGH PG/ML (ref 0–450)
NT-PROBNP SERPL-SCNC: HIGH PG/ML (ref 0–450)
PHOSPHATE SERPL-MCNC: 3.1 MG/DL — SIGNIFICANT CHANGE UP (ref 2.5–4.5)
PHOSPHATE SERPL-MCNC: 3.1 MG/DL — SIGNIFICANT CHANGE UP (ref 2.5–4.5)
PLATELET # BLD AUTO: 150 K/UL — SIGNIFICANT CHANGE UP (ref 150–400)
PLATELET # BLD AUTO: 150 K/UL — SIGNIFICANT CHANGE UP (ref 150–400)
POTASSIUM SERPL-MCNC: 5.5 MMOL/L — HIGH (ref 3.5–5.3)
POTASSIUM SERPL-MCNC: 5.5 MMOL/L — HIGH (ref 3.5–5.3)
POTASSIUM SERPL-SCNC: 5.5 MMOL/L — HIGH (ref 3.5–5.3)
POTASSIUM SERPL-SCNC: 5.5 MMOL/L — HIGH (ref 3.5–5.3)
RBC # BLD: 2.51 M/UL — LOW (ref 4.2–5.8)
RBC # BLD: 2.51 M/UL — LOW (ref 4.2–5.8)
RBC # FLD: 15.9 % — HIGH (ref 10.3–14.5)
RBC # FLD: 15.9 % — HIGH (ref 10.3–14.5)
SODIUM SERPL-SCNC: 137 MMOL/L — SIGNIFICANT CHANGE UP (ref 135–145)
SODIUM SERPL-SCNC: 137 MMOL/L — SIGNIFICANT CHANGE UP (ref 135–145)
WBC # BLD: 10.37 K/UL — SIGNIFICANT CHANGE UP (ref 3.8–10.5)
WBC # BLD: 10.37 K/UL — SIGNIFICANT CHANGE UP (ref 3.8–10.5)
WBC # FLD AUTO: 10.37 K/UL — SIGNIFICANT CHANGE UP (ref 3.8–10.5)
WBC # FLD AUTO: 10.37 K/UL — SIGNIFICANT CHANGE UP (ref 3.8–10.5)

## 2023-11-13 PROCEDURE — 99232 SBSQ HOSP IP/OBS MODERATE 35: CPT

## 2023-11-13 PROCEDURE — 71250 CT THORAX DX C-: CPT | Mod: 26

## 2023-11-13 RX ORDER — FLUCONAZOLE 150 MG/1
100 TABLET ORAL EVERY 24 HOURS
Refills: 0 | Status: DISCONTINUED | OUTPATIENT
Start: 2023-11-13 | End: 2023-11-20

## 2023-11-13 RX ORDER — MEROPENEM 1 G/30ML
1000 INJECTION INTRAVENOUS EVERY 12 HOURS
Refills: 0 | Status: DISCONTINUED | OUTPATIENT
Start: 2023-11-13 | End: 2023-11-13

## 2023-11-13 RX ORDER — SODIUM ZIRCONIUM CYCLOSILICATE 10 G/10G
5 POWDER, FOR SUSPENSION ORAL ONCE
Refills: 0 | Status: COMPLETED | OUTPATIENT
Start: 2023-11-13 | End: 2023-11-13

## 2023-11-13 RX ORDER — MEROPENEM 1 G/30ML
1000 INJECTION INTRAVENOUS EVERY 12 HOURS
Refills: 0 | Status: DISCONTINUED | OUTPATIENT
Start: 2023-11-13 | End: 2023-11-20

## 2023-11-13 RX ADMIN — MEROPENEM 1000 MILLIGRAM(S): 1 INJECTION INTRAVENOUS at 22:23

## 2023-11-13 RX ADMIN — Medication 3 MILLILITER(S): at 20:38

## 2023-11-13 RX ADMIN — Medication 1 APPLICATION(S): at 22:16

## 2023-11-13 RX ADMIN — Medication 3 MILLILITER(S): at 14:26

## 2023-11-13 RX ADMIN — Medication 10 MILLIGRAM(S): at 09:39

## 2023-11-13 RX ADMIN — Medication 10 MILLIGRAM(S): at 22:21

## 2023-11-13 RX ADMIN — Medication 110 MILLIGRAM(S): at 22:22

## 2023-11-13 RX ADMIN — Medication 4: at 12:25

## 2023-11-13 RX ADMIN — INSULIN GLARGINE 10 UNIT(S): 100 INJECTION, SOLUTION SUBCUTANEOUS at 22:23

## 2023-11-13 RX ADMIN — Medication 2: at 08:28

## 2023-11-13 RX ADMIN — Medication 3 MILLILITER(S): at 02:46

## 2023-11-13 RX ADMIN — Medication 325 MILLIGRAM(S): at 09:39

## 2023-11-13 RX ADMIN — Medication 12.5 MICROGRAM(S): at 06:05

## 2023-11-13 RX ADMIN — FLUCONAZOLE 50 MILLIGRAM(S): 150 TABLET ORAL at 18:04

## 2023-11-13 RX ADMIN — Medication 1 APPLICATION(S): at 09:44

## 2023-11-13 RX ADMIN — SODIUM ZIRCONIUM CYCLOSILICATE 5 GRAM(S): 10 POWDER, FOR SUSPENSION ORAL at 09:41

## 2023-11-13 RX ADMIN — Medication 6: at 18:03

## 2023-11-13 RX ADMIN — FLUTICASONE FUROATE, UMECLIDINIUM BROMIDE AND VILANTEROL TRIFENATATE 1 PUFF(S): 200; 62.5; 25 POWDER RESPIRATORY (INHALATION) at 08:33

## 2023-11-13 RX ADMIN — Medication 4 UNIT(S): at 08:27

## 2023-11-13 RX ADMIN — VORICONAZOLE 200 MILLIGRAM(S): 10 INJECTION, POWDER, LYOPHILIZED, FOR SOLUTION INTRAVENOUS at 09:54

## 2023-11-13 RX ADMIN — ATORVASTATIN CALCIUM 40 MILLIGRAM(S): 80 TABLET, FILM COATED ORAL at 22:24

## 2023-11-13 RX ADMIN — Medication 1 MILLIGRAM(S): at 09:39

## 2023-11-13 RX ADMIN — Medication 4 UNIT(S): at 18:03

## 2023-11-13 RX ADMIN — Medication 4 UNIT(S): at 12:24

## 2023-11-13 RX ADMIN — Medication 20 MILLIGRAM(S): at 09:38

## 2023-11-13 RX ADMIN — Medication 3 MILLILITER(S): at 08:32

## 2023-11-13 NOTE — PROGRESS NOTE ADULT - ASSESSMENT
83-year-old M with PMHx HTN, HLD, chronic anemia, COPD, lung CA s/p RUL lobectomy (follows at [facility] [place], last chemotherapy/RT 5/2023, not currently on treatment), AAA, hypothyroidism, CKD stage IIIa sent in to ED on 11/2/23  by pulmonologist MD Olivera with c/o SOB, dyspnea on minimal exertion, and cough. 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     Acute hypoxic respiratory failure, multifactorial   New RLL PNA on CT 11/13 , RLL cavity s/p bronchoscopy 11/9  possible yeast  infection, less likely radiation pneumonitis , r/o  aspergilloma    Underlying  Lung cancer s/p RUL lobectomy , Atelectasis , COPD   Episode of Hemoptysis, resolved   - CT chest: Larger right lower lobe thick-walled cavity which may be infectious and/or residua of reported treated tumor. Spongelike material within the cavity can be seen with aspergilloma (prior to fungus ball formation). Consolidation within the right lower and middle lobe compatible with radiation change.  - leukocytosis stable (WBC 17.70 from 17.35) ---> trend down to WBC  8   - repeat CXR 11/8 - worsening of opacities over right lung   - 2 d echo 11/8 - EF 60% , no significant valvular disease   - immunoglobulin panel - all Ig wnl  - 11/9 - s/p bronchoscopy   - speech and swallow evaluation  - no aspirations  - CXR 11/11 - stable consolidations  - repeat CT chest 11/13 - new right lung consolidation  - blood cultures -no growth, sputum culture - normal respiratory  - c/w trelegy ellipta, chest pt, IS, acapella ,BIPAP as needed  - ID/pulm consult noted   - s/p IV steroids --> po prednisone taper 20 x 3 days than stop , trending down ----> 34  - s/p  voriconazole PO ,  s/p 7 days of levaquin to cover atypical/psuedomonas (allergy to PCN)   - hemoptysis persist , stop heparin   - 11/13 - IV meropenem, doxy and fluconasole started as per ID   - oncology consulted [facility] group       Chronic anemia, likely due to  chemotherapy, worsening and iron deficiency   - decreased on AM labs from admission (Hgb 9.2-->8.2, Hct 27.8--> 25.2)   - no s/s bleeding, asymptomatic, continue to monitor/trend  - 11/8 - 1 unit PRBC due to hemoptysis and worsening of anemia  - Hb improved 8.2 --> 7.8 --> 8.4--> 7.7   - restart daily iron  - hold heparin for now       CHRISTINA on CKD stage IIIa, improving  Hyperkalemia 11/10  - Cr improving, 1.38--> 1.2 and through out hospital course worsened to 1.55 and then to 1.75  - continue to monitor/trend  - Creatinine Trend: 1.3 <--1.4 <--1.7 <--1.9 <-- 2.0 <--1.79<--, 1.75<--, 1.55<--, 1.44<--, 1.44<--, 1.20  - stop losartan   - CT abd and pelvis - new right lung opacitiies  - 11/9 - start low dose IV fluids , poor po liquids intake  - 11/10 c/w IV fluids, lokelma 5  - 11/11 stop IV fluids, monitor s/p lasix x1 dose 10       11/9/23 Left calf laceration from veno dines while in PACU for bronchoscopy   - surgery consult - s/p suturing  on 11/09/23 , plan to remove sutures in 7-10 days  - wound care as per surgery team    Acute hyperglycemia, possible steroid-induced , Prediabetes with A1C 6.3   -  on AM labs,  A1c results 6.3 - consistent with Impaired Fasting Glucose, new onset DM II ruled out    # HTN - stop losartan due to CHRISTINA,  stop amlodipine 2.5, start hydralazine 10 bid as per nephrology team    #  HLD  - c/w  atorvastatin    # Hypothyroidism - TSH low , free T4 wnl, low free T3 , restart  levothyroxine 25 lower dose --> 12.5 mg due to tachycardia    # Hx lung CA s/p RUL lobectomy/chemo/RT - f/u with [facility]     # DVT Ppx d/c heparin SQ due to anemia and hemoptysis    randee Cruz [phone] updated 11/8/23, 11/9/23, 11/10 , 11/11, 11/12, 11/13     Code status - DNR/DNI , will be revoke for bronchoscopy and reinstated afterwards d/w wife at length    Dispo - [facility] vs home once stable

## 2023-11-13 NOTE — PROGRESS NOTE ADULT - SUBJECTIVE AND OBJECTIVE BOX
Subjective:  ct chest reviewed  discussed with patients wife and daughter at bedside  discussed worsening nature of findings  he continues to have cough but does not seem in distress  case reviewed with dr george and dr bryant  discussed switching to meropenem  bal growing yeast. changed to fluconazole  discussed goals of care. family discussed with me they are comfortable for dnr/dni code status  called dr higgins for consult    Review of Systems:  All 10 systems reviewed in detailed and found to be negative with the exception of what has already been described above    Allergies:  penicillin (Unknown)    Meds  MEDICATIONS  (STANDING):  albuterol/ipratropium for Nebulization 3 milliLiter(s) Nebulizer every 6 hours  AQUAPHOR (petrolatum Ointment) 1 Application(s) Topical two times a day  atorvastatin 40 milliGRAM(s) Oral at bedtime  dextrose 5%. 1000 milliLiter(s) (100 mL/Hr) IV Continuous <Continuous>  dextrose 5%. 1000 milliLiter(s) (50 mL/Hr) IV Continuous <Continuous>  dextrose 50% Injectable 12.5 Gram(s) IV Push once  dextrose 50% Injectable 25 Gram(s) IV Push once  dextrose 50% Injectable 25 Gram(s) IV Push once  doxycycline IVPB 100 milliGRAM(s) IV Intermittent every 12 hours  ferrous    sulfate 325 milliGRAM(s) Oral daily  fluconAZOLE IVPB 100 milliGRAM(s) IV Intermittent every 24 hours  fluticasone furoate/umeclidinium/vilanterol 100-62.5-25 MICROgram(s) Inhaler 1 Puff(s) Inhalation daily  folic acid 1 milliGRAM(s) Oral daily  glucagon  Injectable 1 milliGRAM(s) IntraMuscular once  hydrALAZINE 10 milliGRAM(s) Oral two times a day  insulin glargine Injectable (LANTUS) 10 Unit(s) SubCutaneous at bedtime  insulin lispro (ADMELOG) corrective regimen sliding scale   SubCutaneous at bedtime  insulin lispro (ADMELOG) corrective regimen sliding scale   SubCutaneous three times a day before meals  insulin lispro Injectable (ADMELOG) 4 Unit(s) SubCutaneous three times a day before meals  levothyroxine 12.5 MICROGram(s) Oral daily  meropenem Injectable 1000 milliGRAM(s) IV Push every 12 hours  predniSONE   Tablet 20 milliGRAM(s) Oral daily    MEDICATIONS  (PRN):  acetaminophen     Tablet .. 650 milliGRAM(s) Oral every 6 hours PRN Temp greater or equal to 38C (100.4F), Mild Pain (1 - 3)  aluminum hydroxide/magnesium hydroxide/simethicone Suspension 30 milliLiter(s) Oral every 4 hours PRN Dyspepsia  benzonatate 100 milliGRAM(s) Oral three times a day PRN Cough  dextrose Oral Gel 15 Gram(s) Oral once PRN Blood Glucose LESS THAN 70 milliGRAM(s)/deciliter  melatonin 3 milliGRAM(s) Oral at bedtime PRN Insomnia  ondansetron Injectable 4 milliGRAM(s) IV Push every 8 hours PRN Nausea and/or Vomiting    Physical Exam  T(C): 36.2 (11-14-23 @ 09:07), Max: 36.7 (11-13-23 @ 21:18)  HR: 101 (11-14-23 @ 09:07) (79 - 112)  BP: 145/77 (11-14-23 @ 09:07) (116/53 - 145/77)  RR: 18 (11-14-23 @ 09:07) (18 - 20)  SpO2: 98% (11-14-23 @ 09:07) (94% - 99%)  Gen: Alert, oriented, no distress  HEENT: Anicteric sclera, moist mucous membranes  Cardio: Regular rhythm and rate, normal S1S2, no murmurs  Resp: Crackles, congested  GI: Nontender, nondistended, normoactive bowel sounds  Ext: No cyanosis, clubbing or edema  skin: + ecchymosis, left leg laceration sutured and bandaged  Neuro: Nonfocal    Labs:                        8.2    10.25 )-----------( 154      ( 14 Nov 2023 07:03 )             25.7     11-14    139  |  102  |  68<H>  ----------------------------<  93  5.4<H>   |  31  |  1.41<H>    Ca    8.5      14 Nov 2023 07:03  Phos  3.0     11-14  Mg     2.4     11-14    TPro  5.9<L>  /  Alb  2.5<L>  /  TBili  0.6  /  DBili  x   /  AST  37  /  ALT  27  /  AlkPhos  122<H>  11-14      Urinalysis Basic - ( 14 Nov 2023 07:03 )    Color: x / Appearance: x / SG: x / pH: x  Gluc: 93 mg/dL / Ketone: x  / Bili: x / Urobili: x   Blood: x / Protein: x / Nitrite: x   Leuk Esterase: x / RBC: x / WBC x   Sq Epi: x / Non Sq Epi: x / Bacteria: x    < from: CT Chest No Cont (11.02.23 @ 16:04) >  ACC: [id number] EXAM:  CT CHEST   ORDERED BY: SARAH YOO     PROCEDURE DATE:  11/02/2023          INTERPRETATION:  INDICATION: Shortness of breath, cough, lung cancer   history, last chemotherapy and radiation treatment in May 2023    TECHNIQUE: Helical acquisition images of the chest without intravenous   contrast. Maximum intensity projection images were generated.    COMPARISON: 12/29/2020 chest x-ray.    FINDINGS:    LUNGS/AIRWAYS/PLEURA: Right upper lobectomy. Right lower lobe   multiloculated thick-walled 11 cm cavity (best seen in its entirety on   601-59) containing fluid and spongelike material. Consolidation within   the middle lobe and inferior aspect of the right lower lobe with angular   borders suggesting prior radiation. Multiple indistinct nodules of   varying density in the right lower and middle lobes, mostly adjacent to   the cavity. Small branching opacities in the peripheral left upper and   lower lobes, compatible with distal airway impaction. Trace left pleural   effusion. Mild right apical pleural thickening.    LYMPH NODES/MEDIASTINUM: No lymphadenopathy.    HEART/VASCULATURE: Normal heart size. Unremarkable pericardium. Coronary   artery calcifications. Normal caliber aorta.    UPPER ABDOMEN: Unremarkable.    BONES/SOFT TISSUES: Old sternal fracture. Mild loss of height of the L1   vertebral body.      IMPRESSION:    Right upper lobectomy.    Larger right lower lobe thick-walled cavity which may be infectious   and/or residua of reported treated tumor. 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    Consolidation within the right lower and middle lobe compatible with   radiation change.    Multiple indistinct nodules in the right lungwhich may be infectious or   radiation pneumonitis.    ct lucio 10/3 uploaded and reviewed    < from: CT Abdomen and Pelvis No Cont (11.08.23 @ 19:01) >  PROCEDURE DATE:  11/08/2023          INTERPRETATION:  CLINICAL INFORMATION: Acute kidney injury. Evaluate   obstruction.    COMPARISON: CT chest 11/20/2023.    CONTRAST/COMPLICATIONS:  IV Contrast: NONE  Oral Contrast: NONE  Complications: None reported at time of study completion    PROCEDURE:  CT of the Abdomen and Pelvis was performed.  Sagittal and coronal reformats were performed.    FINDINGS:  LOWER CHEST: Small right pleural effusion. New patchy opacities in the   right lung base.    LIVER: Within normal limits.  BILE DUCTS: Normal caliber.  GALLBLADDER: Within normal limits.  SPLEEN: Within normal limits.  PANCREAS: Within normal limits.  ADRENALS: Within normal limits.  KIDNEYS/URETERS: No hydronephrosis. Vascular calcifications.    BLADDER: Within normal limits.  REPRODUCTIVE ORGANS: Prostate within normal limits.    BOWEL: No bowel obstruction. Appendix is normal. Moderate to large stool.  PERITONEUM: No ascites.  VESSELS: Atherosclerotic changes.  RETROPERITONEUM/LYMPH NODES: No lymphadenopathy.  ABDOMINAL WALL: Within normal limits.  BONES: Degenerative changes. Redemonstration of L1 compression deformity.   Right femoral ORIF.    IMPRESSION:  No hydronephrosis.    New patchy opacities in the right lung base. Small right pleural effusion.    Bedside Swallow: Trial 1  · Consistencies administered	thin liquid; pureed; regular solid  · Mode of Presentation	cup; spoon; self fed  · Positioning	upright (90 degrees)  · Oral Preparatory Phase	Within functional limits; orientation eating routines: able to feed himself with focus: as per family, pt eats rapidly: did not do so at bedside evaluation Lip seal on utensil : oral containment..  · Oral Phase	Within functional limits; immediate bolus formation: Ap transfer for liquid WFL. Mastication normally rotary-lateral with lingual sweep for collection and clearance.  l  · Pharyngeal Phase	Within functional limits; Onset of pharyngeal swallow is with age-appropriate time limits. No overt s/s aspiration at bedside.  · Comments	Micro aspiration and silent aspiration cannot be ruled out. As above, pt does have increased risk for aspiration 2/2 lung CA and  lobectomy as well as COPD and present pna (which in itself may be aspiration related). However, pt presents at bedside evaluation with no contraindication for maintaining REGULAR texture and thin liquids.  Strategies for reducing risk were demonstrated and discussed with the Pt and the family who are very supportive of the Pt.  Disc with NSg.  Will follow peripherally.    < from: CT Chest No Cont (11.13.23 @ 10:04) >    PROCEDURE DATE:  11/13/2023          INTERPRETATION:  Clinical indications: Pneumonia.    Axial CT images of the chest are obtained without intravenous   administration of contrast.    Comparison is made with the prior chest CT of November 2, 2023.    No enlarged axillary or mediastinal lymph nodes.    No pericardial effusion. Heart size is normal. Mitral annular   calcifications. Vascular calcifications with involvement of the aorta and   the coronary arteries. Aortic root calcifications.    Evaluation of the upper abdomen demonstrate partially imaged aortic stent   graft. Subcutaneous edema. Minimal perihepatic ascites.    Small left pleural effusion new since the abdominalCT of November 8, 2023. Trace right pleural effusion as on November 8, 2023.    Evaluation of the lungs demonstrate left lung base linear subsegmental   atelectasis. A few ill-defined left lung patchy nonspecific groundglass   opacities largest within the dependent portion of the left upper lobe are   new since November 2, 2023.    Status post right upper lobectomy with postoperative changes. Persistent   right apical large large cyst, part of which measures about 6.6 cm in   transverse dimension without significant interval change in size   containing air and increasing internal opacification as compared with   November 2, 2023, some of which appear slightly dense and may be due to   internal hemorrhagic components.    Extensive right mid to lower lung consolidations and ill-defined   groundglass opacities, with the largest confluent consolidation within   the mid to lower portions of the right lower lobe, majority of which   appear new since November 2, 2023 superimposed on previouslydescribed   linear opacities.    Degenerative changes of the spine. Old healed sternal fracture.    IMPRESSION: Right lung large areas of consolidation new since November 2, 2023 likely pneumonia.    Right apical previously described large cyst unchanged in size since   November 2, 2023 containing internal air and increasing internal   opacification since November 2, 2023 as described above. Continued   follow-up is recommended.    A few left lung ill-defined nonspecific groundglass opacities.   Differential diagnosis include but is not limited to   infectious/inflammatory etiologies and or pulmonary edema.    Small left pleural effusion new since November 8, 2023.      Culture - Acid Fast - Bronchial w/Smear (11.09.23 @ 12:30)   Specimen Source: .Bronchial RIGHT LOWER LOBE BAL  Acid Fast Bacilli Smear:   No acid-fast bacilli seen by fluorochrome stain  Culture Results:   Culture is being performed.  Culture - Fungal, Bronchial (11.09.23 @ 12:30)   Specimen Source: .Bronchial RIGHT LOWER LOBE BAL  Culture Results:   Rare Yeast

## 2023-11-13 NOTE — PROGRESS NOTE ADULT - SUBJECTIVE AND OBJECTIVE BOX
cc - cough, weakness        83-year-old M with PMHx HTN, HLD, chronic anemia, COPD, lung CA s/p [place] [facility] [place], not currently on treatment), AAA, hypothyroidism, CKD stage IIIa sent in to ED on 11/2/23  by pulmonologist MD Olivera with c/o SOB, dyspnea on minimal exertion, and cough. Pt had outpatient CT of chest performed 10/3 which demonstrated consolidation- was started on Augmentin/Prednisone/Azithromycin 10/6 x 1 week. Pt completed full course of medication however wife noted worsening cough and pt with fever TMax 103.9 on 10/26. Wife then took pt to pulm office again who began 2nd round of same medications. Fevers self resolved. Since 2nd round pt with decreased energy and episode of being unsteady on feet yesterday 11/1. Went to pulm office 11/2 for f/u and was sent to ED for eval. Pt endorses lethargy and weakness on arrival. Denies chest pain, palpitations, N/V, abdominal pain, diarrhea, dysuria.     Upgraded 11/3 to SICU for acute hypoxic resp failure requiring HFNC.  Off HFNC since 11/4 11/6/23 Patient resting in bed comfortably with wife at bedside. States he still feels short of breath with small amount of movement. No cp, no n/v.   11/7 - no events, afebrile, + productive cough, denies cp, + dyspnea, + gen weakness, no abdominal pain, tolerating po intake  11/8 - afebrile, dyspnea overnight, denies cp, + cough with hemoptysis , off BIPAP in am, tolerating po intake  11/9 - dyspnea, cough, no hemoptysis, afebrile, tolerating some po intake, plan discussed with wife at bedside  11/10 - more awake and alert in am, feels better, denies pain, + dyspnea, + cough persist, no hemoptysis, denies abdominal pain, plan discussed in length  11/11 - cough and dyspnea persist, denies cp, abdominal pain, leg pain, tolerating po intake, OOB in the chair, plan discussed with the family  11/12 - pt seen and examined , family at bedside, feels better, + cough and dyspnea persist, + gen weakness, afebrile, plan discussed  11/13 - pt seen and examined , family at bedside, cough and dyspnea persist, + gen weakness, tolerating po intake, afebrile, CT chest was done which showed worsening of pneumonia    Review of system- Rest of the review of system are negative except mentioned in HPI, poor historian    Vital sings reviewed for last 24 h  T(C): 36.4 (11-13-23 @ 15:20), Max: 36.6 (11-13-23 @ 09:22)  T(F): 97.5 (11-13-23 @ 15:20), Max: 97.9 (11-13-23 @ 09:22)  HR: 112 (11-13-23 @ 15:20) (70 - 112)  BP: 116/53 (11-13-23 @ 15:20) (116/53 - 142/55)  RR: 19 (11-13-23 @ 15:20) (18 - 19)  SpO2: 96% (11-13-23 @ 15:20) (94% - 98%)  CAPILLARY BLOOD GLUCOSE  POCT Blood Glucose.: 261 mg/dL (13 Nov 2023 17:34)  POCT Blood Glucose.: 227 mg/dL (13 Nov 2023 12:13)  POCT Blood Glucose.: 190 mg/dL (13 Nov 2023 08:15)  POCT Blood Glucose.: 185 mg/dL (12 Nov 2023 21:42)        PHYSICAL EXAM:    Constitutional: NAD, awake and alert   HEENT: PERR, EOMI, Normal Hearing, MMM  Neck: Soft and supple, No LAD, No JVD  Respiratory: Breath sounds decreased over right lung, normal on left side ,  No wheezing, rales, + rhonchi  Cardiovascular: S1 and S2, regular rate and rhythm, no Murmurs, gallops or rubs  Gastrointestinal: Bowel Sounds present, soft, nontender, nondistended, no guarding, no rebound  Extremities: trace LE  peripheral edema  Vascular: 2+ peripheral pulses  Neurological: A/O x 3, no focal deficits  Musculoskeletal: 5/5 strength b/l upper and lower extremities  Skin: No rashes, + dry skin, bruising + large about 20 cm left calf skin laceration repaired with sutures   rectal : deferred, not indicated      All labs radiology and other studies reviewed and interpreted :                         7.7    10.37 )-----------( 150      ( 13 Nov 2023 06:30 )             23.7     11-13    137  |  103  |  63<H>  ----------------------------<  186<H>  5.5<H>   |  32<H>  |  1.36<H>    Ca    8.6      13 Nov 2023 06:30  Phos  3.1     11-13  Mg     2.4     11-13    TPro  6.7  /  Alb  3.0<L>  /  TBili  0.7  /  DBili  x   /  AST  40<H>  /  ALT  26  /  AlkPhos  115  11-12        LIVER FUNCTIONS - ( 12 Nov 2023 06:55 )  Alb: 3.0 g/dL / Pro: 6.7 gm/dL / ALK PHOS: 115 U/L / ALT: 26 U/L / AST: 40 U/L / GGT: x             C-Reactive Protein, Serum: 34 mg/L (11.13.23 @ 06:30)  C-Reactive Protein, Serum: 46 mg/L (11.12.23 @ 06:55)  C-Reactive Protein, Serum: 70 mg/L (11.11.23 @ 06:31)  C-Reactive Protein, Serum: 106 mg/L (11-10-23 @ 06:19)  C-Reactive Protein, Serum: 163 mg/L (11-08-23 @ 06:01)  C-Reactive Protein, Serum: 141 mg/L (11-07-23 @ 07:11)         CT Abdomen and Pelvis No Cont (11.08.23 @ 19:01) >  IMPRESSION:  No hydronephrosis.    New patchy opacities in the right lung base. Small right pleural effusion.     TTE Echo Complete w/ Contrast w/ Doppler (11.08.23 @ 15:09) >   Left ventricle size and structure are within normal limitations. Mild   concentric left ventricular hypertrophy. Estimated left ventricular   ejection fraction is 60-65 %.   Mild diastolic dysfunction (stage I).   Mild tricuspid valve regurgitation.    Culture - Bronchial (11.09.23 @ 12:30)    Gram Stain:   Rare polymorphonuclear leukocytes per low power field  Few Squamous epithelial cells per low power field  Few Gram positive cocci in pairs per oil power field   Specimen Source: .Bronchial RIGHT LOWER LOBE BAL   Culture Results:   Normal Respiratory Marguerite present                   Culture - Blood (11.04.23 @ 04:21) Culture - Sputum (11.03.23 @ 14:16)    Gram Stain:   Rare Squamous epithelial cells per low power field  No polymorphonuclear cells seen per low power field  Rare Gram positive cocci in pairs per oil power field  Rare Gram Negative Rods per oil power field   Specimen Source: .Sputum Sputum   Culture Results:   Normal Respiratory Marguerite present    Culture - Blood (11.04.23 @ 04:21)    Specimen Source: .Blood None   Culture Results:   No growth at 72 Hours    Culture - Urine (11.02.23 @ 16:40)    Specimen Source: Clean Catch Clean Catch (Midstream)   Culture Results:   No growth      CT Chest No Cont (11.13.23 @ 10:04) >  IMPRESSION: Right lung large areas of consolidation new since November 2, 2023 likely pneumonia.    Right apical previously described large cyst unchanged in size since   November 2, 2023 containing internal air and increasing internal   opacification since November 2, 2023 as described above. Continued   follow-up is recommended.    A few left lung ill-defined nonspecific groundglass opacities.   Differential diagnosis include but is not limited to   infectious/inflammatory etiologies and or pulmonary edema.    Small left pleural effusion new since November 8, 2023.          < from: Xray Chest 1 View-PORTABLE IMMEDIATE (Xray Chest 1 View-PORTABLE IMMEDIATE .) (11.11.23 @ 10:00) >  HEART: normal in size.  LUNGS: Patient is S/P RUL lobectomy with loss of volume in upper right   lung.  Stable upper right lung mass/cavity - which was described on chests CT   scan.  Stable consolidation in mid and and lower right lung.  No large pleural effusion. No pneumothorax . Overall no change.  BONES: degenerative changes    < end of copied text >     Xray Chest 1 View- PORTABLE-Urgent (Xray Chest 1 View- PORTABLE-Urgent .) (11.04.23 @ 01:35) >  Stable right midlung and upper lung opacities.Left lung remains clear.   No significant change from November 2    IMPRESSION: No significant change       CT Chest No Cont (11.02.23 @ 16:04) >    IMPRESSION:    Right upper lobectomy.    Larger right lower lobe thick-walled cavity which may be infectious   and/or residua of reported treated tumor. 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    Consolidation within the right lower and middle lobe compatible with   radiation change.    Multiple indistinct nodules in the right lungwhich may be infectious or   radiation pneumonitis.      MEDICATIONS  (STANDING):  albuterol/ipratropium for Nebulization 3 milliLiter(s) Nebulizer every 6 hours  amLODIPine   Tablet 2.5 milliGRAM(s) Oral daily  atorvastatin 40 milliGRAM(s) Oral at bedtime  dextrose 5%. 1000 milliLiter(s) (50 mL/Hr) IV Continuous <Continuous>  dextrose 5%. 1000 milliLiter(s) (100 mL/Hr) IV Continuous <Continuous>  dextrose 50% Injectable 25 Gram(s) IV Push once  dextrose 50% Injectable 12.5 Gram(s) IV Push once  dextrose 50% Injectable 25 Gram(s) IV Push once  fluticasone furoate/umeclidinium/vilanterol 100-62.5-25 MICROgram(s) Inhaler 1 Puff(s) Inhalation daily  folic acid 1 milliGRAM(s) Oral daily  glucagon  Injectable 1 milliGRAM(s) IntraMuscular once  heparin   Injectable 5000 Unit(s) SubCutaneous every 8 hours  insulin glargine Injectable (LANTUS) 8 Unit(s) SubCutaneous at bedtime  insulin lispro (ADMELOG) corrective regimen sliding scale   SubCutaneous Before meals and at bedtime  levoFLOXacin IVPB      levoFLOXacin IVPB 500 milliGRAM(s) IV Intermittent every 24 hours  levothyroxine 25 MICROGram(s) Oral daily  methylPREDNISolone sodium succinate Injectable 20 milliGRAM(s) IV Push every 12 hours  multivitamin 1 Tablet(s) Oral daily  voriconazole 200 milliGRAM(s) Oral every 12 hours    MEDICATIONS  (PRN):  acetaminophen     Tablet .. 650 milliGRAM(s) Oral every 6 hours PRN Temp greater or equal to 38C (100.4F), Mild Pain (1 - 3)  aluminum hydroxide/magnesium hydroxide/simethicone Suspension 30 milliLiter(s) Oral every 4 hours PRN Dyspepsia  benzonatate 100 milliGRAM(s) Oral three times a day PRN Cough  dextrose Oral Gel 15 Gram(s) Oral once PRN Blood Glucose LESS THAN 70 milliGRAM(s)/deciliter  melatonin 3 milliGRAM(s) Oral at bedtime PRN Insomnia  ondansetron Injectable 4 milliGRAM(s) IV Push every 8 hours PRN Nausea and/or Vomiting

## 2023-11-13 NOTE — PROGRESS NOTE ADULT - ASSESSMENT
84 y/o Male with h/o lung CA follows at [facility] [place] s/p chemotherapy and RT 05/2023, s/p right upper lobectomy, HTN, HPL, Chronic anemia, COPD, AAA, Hypothyroidism, CKD stage IIIa was admitted on 11/2 for increased shortness of breath, dyspnea on minimal exertion, and cough. Reportedly, he had CT of the chest performed 10/3 which demonstrated consolidation, was started on augmentin/prednisone/azithro 10/6 x 1 week. Pt completed course of medications however wife noted worsening cough and pt with fever Tmax of 103.9 on 10/26. Wife then took pt to pulmonologist again who started pt on second round of the same medications, instructed wife to stop giving pt tylenol wife notes fevers self-resolved. Since then pt decreased energy with episode of being unsteady on feet. Today is 6th day of taking medications, had f/u scheduled with pulmonologist who sent pt to ED for eval.  He feels tired and weak. In ER he received ceftriaxone.    1. Large RLL pulmonary cavity. New right lung consolidation likely pneumonia. Possible recurrent lung Ca. Lung Ca s/p right upper lobectomy. Radiation pneumonitis. CRF stage 3. Allergy to PCN.    -respiratory improved s/p bronchoscopy  -bronchoscopy cultures show normal respiratory jaime and yeast  -leukocytosis  -bronch c/s noted with normal respiratory jaime  -Aspergillus galactomanan and fungitel are negative   -fungal bronch c/s is pending and will take several weeks to finalize  -s/p levofloxacin 500 mg IV qd # 7   -on voriconazole 200 mg PO q12h # 7  -on levofloxacin 750 mg IV q48h # 3  -tolerating abx well so far; no side effects noted  -pulmonary evaluation appreciated   -case reviewed with pulmonary  -will broaden antimicrobial coverage to meropenem 1 gm IV q12h, doxycycline 100 mg IV q12h and fluconazole 100 mg IV qd  -reason for abx use and side effects reviewed with patient; monitor BMP   -thoracic evaluation appreciated  -f/u bronchoscopy results  -may complete antibiotic therapy in 1-2 days  -monitor temps  -f/u CBC  -supportive care  2. Other issues:   -care per medicine    d/w medicine team

## 2023-11-13 NOTE — PROGRESS NOTE ADULT - ASSESSMENT
83y old Male with PMH HTN, HPL, Chronic anemia, COPD, lung CA follows at [facility] [place] (last chemotherapy and RT 05/2023, not currently receiving chemo treatment), s/p right upper lobectomy, AAA, Hypothyroidism, CKD stage IIIa referred by me to ed for continued sob, cough despite abx treatment found to have large lower lobe thick walled cavity with worsening findings on ct despite treatment with levaquin  1. 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 discussed with wife.  -started on voriconazole. surgery is more defitive therapy however he is high surgical risk  -reviewed imaging from [place] supplied by wife - it looks like he has had blebs in the past and these are what are infected - they look half filled with fluid with thicker borders, which would argue against fungal infection/mrsa as there is no necrosis involved in its pathogenesis  -s/p bronch. follow up results. growing few yeast today  -repeat ct wo contrast reveals worsening consolidations and filling in of bleb. this may suggest inadqueate antibiotics, continued aspiration or rapidly progressive cancer  -spoke to id and hospitalist, switch to doxy, meropenem  -change to fluconazole  2. pneumonia: as above  -supplemental o2  -speech/swallow eval noted  -aspiration precautions  3. copd: continue trelegy  4. ? radiation induced pneumonitis: titrate down methylpredniosoleon  5. anemia: received 1 unit prbc  6. left leg laceration: s/p suture, now bandaged. continue wound care    spoke to dr george, jatinder bryant, dr higgins, patient and daughter at bedside  over 55 min spent in care of patient, 16 min of which were spent discussing goals of care 83y old Male with PMH HTN, HPL, Chronic anemia, COPD, lung CA [place] [facility] [place]23, not currently receiving chemo treatment), s/p right upper lobectomy, AAA, Hypothyroidism, CKD stage IIIa referred by me to ed for continued sob, cough despite abx treatment found to have large lower lobe thick walled cavity with worsening findings on ct despite treatment with levaquin  1. 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 discussed with wife.  -started on voriconazole. surgery is more defitive therapy however he is high surgical risk  -reviewed imaging from [place] supplied by wife - it looks like he has had blebs in the past and these are what are infected - they look half filled with fluid with thicker borders, which would argue against fungal infection/mrsa as there is no necrosis involved in its pathogenesis  -s/p bronch. follow up results. growing few yeast today  -repeat ct wo contrast reveals worsening consolidations and filling in of bleb. this may suggest inadqueate antibiotics, continued aspiration or rapidly progressive cancer  -spoke to id and hospitalist, switch to doxy, meropenem  -change to fluconazole  2. pneumonia: as above  -supplemental o2  -speech/swallow eval noted  -aspiration precautions  3. copd: continue trelegy  4. ? radiation induced pneumonitis: titrate down  steroids as he has worsening infection and progressive nature of findings argues against pneumonitis  5. anemia: received 1 unit prbc  6. left leg laceration: s/p suture, now bandaged. continue wound care    spoke to jatinder velázquez, dr higgins, patient and daughter at bedside  over 55 min spent in care of patient, 16 min of which were spent discussing goals of care

## 2023-11-13 NOTE — PROGRESS NOTE ADULT - SUBJECTIVE AND OBJECTIVE BOX
Date of service: 11-13-23 @ 14:30    Events noted  More SOB  Has dry cough  CT chest reported with increased opacification  Reported with new yeast in bronch c/s    ROS: no fever or chills; denies dizziness, no HA, no abdominal pain, no diarrhea or constipation; no dysuria, no legs pain, no rashes    MEDICATIONS  (STANDING):  albuterol/ipratropium for Nebulization 3 milliLiter(s) Nebulizer every 6 hours  AQUAPHOR (petrolatum Ointment) 1 Application(s) Topical two times a day  atorvastatin 40 milliGRAM(s) Oral at bedtime  dextrose 5%. 1000 milliLiter(s) (100 mL/Hr) IV Continuous <Continuous>  dextrose 5%. 1000 milliLiter(s) (50 mL/Hr) IV Continuous <Continuous>  dextrose 50% Injectable 12.5 Gram(s) IV Push once  dextrose 50% Injectable 25 Gram(s) IV Push once  dextrose 50% Injectable 25 Gram(s) IV Push once  doxycycline IVPB 100 milliGRAM(s) IV Intermittent every 12 hours  ferrous    sulfate 325 milliGRAM(s) Oral daily  fluconAZOLE IVPB 100 milliGRAM(s) IV Intermittent every 24 hours  fluticasone furoate/umeclidinium/vilanterol 100-62.5-25 MICROgram(s) Inhaler 1 Puff(s) Inhalation daily  folic acid 1 milliGRAM(s) Oral daily  glucagon  Injectable 1 milliGRAM(s) IntraMuscular once  hydrALAZINE 10 milliGRAM(s) Oral two times a day  insulin glargine Injectable (LANTUS) 10 Unit(s) SubCutaneous at bedtime  insulin lispro (ADMELOG) corrective regimen sliding scale   SubCutaneous at bedtime  insulin lispro (ADMELOG) corrective regimen sliding scale   SubCutaneous three times a day before meals  insulin lispro Injectable (ADMELOG) 4 Unit(s) SubCutaneous three times a day before meals  levothyroxine 12.5 MICROGram(s) Oral daily  meropenem  IVPB 1000 milliGRAM(s) IV Intermittent every 12 hours  methylPREDNISolone sodium succinate Injectable 20 milliGRAM(s) IV Push every 12 hours    Vital Signs Last 24 Hrs  T(C): 36.6 (13 Nov 2023 09:22), Max: 36.7 (12 Nov 2023 15:24)  T(F): 97.9 (13 Nov 2023 09:22), Max: 98.1 (12 Nov 2023 15:24)  HR: 78 (13 Nov 2023 09:22) (70 - 108)  BP: 137/68 (13 Nov 2023 09:22) (137/68 - 150/62)  BP(mean): --  RR: 18 (13 Nov 2023 09:22) (18 - 18)  SpO2: 96% (13 Nov 2023 09:22) (96% - 98%)    Parameters below as of 13 Nov 2023 09:22  Patient On (Oxygen Delivery Method): nasal cannula  O2 Flow (L/min): 4       Physical exam:    Constitutional:  No acute distress  HEENT: NC/AT, EOMI, PERRLA, conjunctivae clear; ears and nose atraumatic  Neck: supple; thyroid not palpable  Back: no tenderness  Respiratory: respiratory effort normal; few crackles at bases  Cardiovascular: S1S2 regular, no murmurs  Abdomen: soft, not tender, not distended, positive BS  Genitourinary: no suprapubic tenderness  Lymphatic: no LN palpable  Musculoskeletal: no muscle tenderness, no joint swelling or tenderness  Extremities: no pedal edema  Left lower leg laceration dressed  Neurological/ Psychiatric: AxOx3, moving all extremities  Skin: no rashes; no palpable lesions    Labs: reviewed                        7.7    10.37 )-----------( 150      ( 13 Nov 2023 06:30 )             23.7     11-13    137  |  103  |  63<H>  ----------------------------<  186<H>  5.5<H>   |  32<H>  |  1.36<H>    Ca    8.6      13 Nov 2023 06:30  Phos  3.1     11-13  Mg     2.4     11-13    TPro  6.7  /  Alb  3.0<L>  /  TBili  0.7  /  DBili  x   /  AST  40<H>  /  ALT  26  /  AlkPhos  115  11-12    C-Reactive Protein, Serum: 34 mg/L (11-13-23 @ 06:30)  C-Reactive Protein, Serum: 46 mg/L (11-12-23 @ 06:55)  C-Reactive Protein, Serum: 70 mg/L (11-11-23 @ 06:31)  C-Reactive Protein, Serum: 106 mg/L (11-10-23 @ 06:19)  C-Reactive Protein, Serum: 163 mg/L (11-08-23 @ 06:01)  Ferritin: 1082 ng/mL (11-08-23 @ 06:01)  C-Reactive Protein, Serum: 141 mg/L (11-07-23 @ 07:11)                        8.4    13.53 )-----------( 189      ( 12 Nov 2023 06:55 )             25.3     11-12    140  |  106  |  59<H>  ----------------------------<  156<H>  5.4<H>   |  29  |  1.41<H>    Ca    9.2      12 Nov 2023 06:55  Phos  2.7     11-12  Mg     2.6     11-12    TPro  6.7  /  Alb  3.0<L>  /  TBili  0.7  /  DBili  x   /  AST  40<H>  /  ALT  26  /  AlkPhos  115  11-12    C-Reactive Protein, Serum: 70 mg/L (11-11-23 @ 06:31)  C-Reactive Protein, Serum: 106 mg/L (11-10-23 @ 06:19)  C-Reactive Protein, Serum: 163 mg/L (11-08-23 @ 06:01)  Ferritin: 1082 ng/mL (11-08-23 @ 06:01)  C-Reactive Protein, Serum: 141 mg/L (11-07-23 @ 07:11)                        7.8    10.70 )-----------( 161      ( 11 Nov 2023 06:31 )             24.4     11-11    139  |  106  |  73<H>  ----------------------------<  211<H>  5.1   |  29  |  1.48<H>    Ca    9.1      11 Nov 2023 06:31  Phos  2.9     11-11  Mg     2.7     11-11    TPro  5.9<L>  /  Alb  2.0<L>  /  TBili  0.3  /  DBili  x   /  AST  39<H>  /  ALT  30  /  AlkPhos  111  11-10    C-Reactive Protein, Serum: 106 mg/L (11-10-23 @ 06:19)  C-Reactive Protein, Serum: 163 mg/L (11-08-23 @ 06:01)  Ferritin: 1082 ng/mL (11-08-23 @ 06:01)  C-Reactive Protein, Serum: 141 mg/L (11-07-23 @ 07:11)                        8.2    11.74 )-----------( 259      ( 03 Nov 2023 07:15 )             25.2     11-03    133<L>  |  102  |  35<H>  ----------------------------<  205<H>  4.4   |  22  |  1.20    Ca    8.3<L>      03 Nov 2023 07:15    TPro  6.9  /  Alb  2.0<L>  /  TBili  0.5  /  DBili  x   /  AST  38<H>  /  ALT  50  /  AlkPhos  130<H>  11-02     LIVER FUNCTIONS - ( 02 Nov 2023 14:47 )  Alb: 2.0 g/dL / Pro: 6.9 gm/dL / ALK PHOS: 130 U/L / ALT: 50 U/L / AST: 38 U/L / GGT: x           Urinalysis (11-02 @ 16:40)  Urine Appearance: Clear  Protein, Urine: 100 mg/dL  Urine Microscopic-Add On (NC) (11-02 @ 16:40)  White Blood Cell - Urine: 0 /HPF  Red Blood Cell - Urine: 0 /HPF    (11-02 @ 14:47)  NotDete    Culture - Acid Fast - Bronchial w/Smear (collected 09 Nov 2023 12:30)  Source: .Bronchial RIGHT LOWER LOBE BAL  Preliminary Report (11 Nov 2023 15:08):    Culture is being performed.    Culture - Fungal, Bronchial (collected 09 Nov 2023 12:30)  Source: .Bronchial RIGHT LOWER LOBE BAL  Preliminary Report (13 Nov 2023 12:07):    Rare Yeast    Culture - Bronchial (collected 09 Nov 2023 12:30)  Source: .Bronchial RIGHT LOWER LOBE BAL  Gram Stain (09 Nov 2023 23:14):    Rare polymorphonuclear leukocytes per low power field    Few Squamous epithelial cells per low power field    Few Gram positive cocci in pairs per oil power field  Final Report (11 Nov 2023 16:38):    Normal Respiratory Marguerite present    Culture - Blood (collected 04 Nov 2023 04:21)  Source: .Blood None  Final Report (09 Nov 2023 09:00):    No growth at 5 days    Culture - Blood (collected 04 Nov 2023 04:21)  Source: .Blood None  Final Report (09 Nov 2023 09:00):    No growth at 5 days      Radiology: all available radiological tests reviewed    < from: CT Chest No Cont (11.02.23 @ 16:04) >  Right upper lobectomy.  Larger right lower lobe thick-walled cavity which may be infectious   and/or residua of reported treated tumor. 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  Consolidation within the right lower and middle lobe compatible with radiation change.  Multiple indistinct nodules in the right lungwhich may be infectious or radiation pneumonitis.  < end of copied text >    < from: CT Abdomen and Pelvis No Cont (11.08.23 @ 19:01) >  No hydronephrosis.  New patchy opacities in the right lung base. Small right pleural effusion.  < end of copied text >    < from: CT Chest No Cont (11.13.23 @ 10:04) >  IMPRESSION: Right lung large areas of consolidation new since November 2, 2023 likely pneumonia.    Right apical previously described large cyst unchanged in size since   November 2, 2023 containing internal air and increasing internal opacification since November 2, 2023 as described above.     A few left lung ill-defined nonspecific groundglass opacities.   Differential diagnosis include but is not limited to   infectious/inflammatory etiologies and or pulmonary edema.  Small left pleural effusion new since November 8, 2023.  < end of copied text >      Advanced directives addressed: full resuscitation

## 2023-11-14 LAB
ALBUMIN SERPL ELPH-MCNC: 2.5 G/DL — LOW (ref 3.3–5)
ALBUMIN SERPL ELPH-MCNC: 2.5 G/DL — LOW (ref 3.3–5)
ALP SERPL-CCNC: 122 U/L — HIGH (ref 40–120)
ALP SERPL-CCNC: 122 U/L — HIGH (ref 40–120)
ALT FLD-CCNC: 27 U/L — SIGNIFICANT CHANGE UP (ref 12–78)
ALT FLD-CCNC: 27 U/L — SIGNIFICANT CHANGE UP (ref 12–78)
ANION GAP SERPL CALC-SCNC: 6 MMOL/L — SIGNIFICANT CHANGE UP (ref 5–17)
ANION GAP SERPL CALC-SCNC: 6 MMOL/L — SIGNIFICANT CHANGE UP (ref 5–17)
AST SERPL-CCNC: 37 U/L — SIGNIFICANT CHANGE UP (ref 15–37)
AST SERPL-CCNC: 37 U/L — SIGNIFICANT CHANGE UP (ref 15–37)
BASOPHILS # BLD AUTO: 0.02 K/UL — SIGNIFICANT CHANGE UP (ref 0–0.2)
BASOPHILS # BLD AUTO: 0.02 K/UL — SIGNIFICANT CHANGE UP (ref 0–0.2)
BASOPHILS NFR BLD AUTO: 0.2 % — SIGNIFICANT CHANGE UP (ref 0–2)
BASOPHILS NFR BLD AUTO: 0.2 % — SIGNIFICANT CHANGE UP (ref 0–2)
BILIRUB SERPL-MCNC: 0.6 MG/DL — SIGNIFICANT CHANGE UP (ref 0.2–1.2)
BILIRUB SERPL-MCNC: 0.6 MG/DL — SIGNIFICANT CHANGE UP (ref 0.2–1.2)
BUN SERPL-MCNC: 68 MG/DL — HIGH (ref 7–23)
BUN SERPL-MCNC: 68 MG/DL — HIGH (ref 7–23)
CALCIUM SERPL-MCNC: 8.5 MG/DL — SIGNIFICANT CHANGE UP (ref 8.5–10.1)
CALCIUM SERPL-MCNC: 8.5 MG/DL — SIGNIFICANT CHANGE UP (ref 8.5–10.1)
CHLORIDE SERPL-SCNC: 102 MMOL/L — SIGNIFICANT CHANGE UP (ref 96–108)
CHLORIDE SERPL-SCNC: 102 MMOL/L — SIGNIFICANT CHANGE UP (ref 96–108)
CO2 SERPL-SCNC: 31 MMOL/L — SIGNIFICANT CHANGE UP (ref 22–31)
CO2 SERPL-SCNC: 31 MMOL/L — SIGNIFICANT CHANGE UP (ref 22–31)
CREAT SERPL-MCNC: 1.41 MG/DL — HIGH (ref 0.5–1.3)
CREAT SERPL-MCNC: 1.41 MG/DL — HIGH (ref 0.5–1.3)
CRP SERPL-MCNC: 24 MG/L — HIGH
CRP SERPL-MCNC: 24 MG/L — HIGH
EGFR: 49 ML/MIN/1.73M2 — LOW
EGFR: 49 ML/MIN/1.73M2 — LOW
EOSINOPHIL # BLD AUTO: 0 K/UL — SIGNIFICANT CHANGE UP (ref 0–0.5)
EOSINOPHIL # BLD AUTO: 0 K/UL — SIGNIFICANT CHANGE UP (ref 0–0.5)
EOSINOPHIL NFR BLD AUTO: 0 % — SIGNIFICANT CHANGE UP (ref 0–6)
EOSINOPHIL NFR BLD AUTO: 0 % — SIGNIFICANT CHANGE UP (ref 0–6)
GLUCOSE BLDC GLUCOMTR-MCNC: 140 MG/DL — HIGH (ref 70–99)
GLUCOSE BLDC GLUCOMTR-MCNC: 140 MG/DL — HIGH (ref 70–99)
GLUCOSE BLDC GLUCOMTR-MCNC: 200 MG/DL — HIGH (ref 70–99)
GLUCOSE BLDC GLUCOMTR-MCNC: 200 MG/DL — HIGH (ref 70–99)
GLUCOSE BLDC GLUCOMTR-MCNC: 205 MG/DL — HIGH (ref 70–99)
GLUCOSE BLDC GLUCOMTR-MCNC: 205 MG/DL — HIGH (ref 70–99)
GLUCOSE BLDC GLUCOMTR-MCNC: 215 MG/DL — HIGH (ref 70–99)
GLUCOSE BLDC GLUCOMTR-MCNC: 215 MG/DL — HIGH (ref 70–99)
GLUCOSE BLDC GLUCOMTR-MCNC: 232 MG/DL — HIGH (ref 70–99)
GLUCOSE BLDC GLUCOMTR-MCNC: 232 MG/DL — HIGH (ref 70–99)
GLUCOSE SERPL-MCNC: 93 MG/DL — SIGNIFICANT CHANGE UP (ref 70–99)
GLUCOSE SERPL-MCNC: 93 MG/DL — SIGNIFICANT CHANGE UP (ref 70–99)
HCT VFR BLD CALC: 25.7 % — LOW (ref 39–50)
HCT VFR BLD CALC: 25.7 % — LOW (ref 39–50)
HGB BLD-MCNC: 8.2 G/DL — LOW (ref 13–17)
HGB BLD-MCNC: 8.2 G/DL — LOW (ref 13–17)
IMM GRANULOCYTES NFR BLD AUTO: 1.4 % — HIGH (ref 0–0.9)
IMM GRANULOCYTES NFR BLD AUTO: 1.4 % — HIGH (ref 0–0.9)
LYMPHOCYTES # BLD AUTO: 0.26 K/UL — LOW (ref 1–3.3)
LYMPHOCYTES # BLD AUTO: 0.26 K/UL — LOW (ref 1–3.3)
LYMPHOCYTES # BLD AUTO: 2.5 % — LOW (ref 13–44)
LYMPHOCYTES # BLD AUTO: 2.5 % — LOW (ref 13–44)
MAGNESIUM SERPL-MCNC: 2.4 MG/DL — SIGNIFICANT CHANGE UP (ref 1.6–2.6)
MAGNESIUM SERPL-MCNC: 2.4 MG/DL — SIGNIFICANT CHANGE UP (ref 1.6–2.6)
MCHC RBC-ENTMCNC: 30.5 PG — SIGNIFICANT CHANGE UP (ref 27–34)
MCHC RBC-ENTMCNC: 30.5 PG — SIGNIFICANT CHANGE UP (ref 27–34)
MCHC RBC-ENTMCNC: 31.9 GM/DL — LOW (ref 32–36)
MCHC RBC-ENTMCNC: 31.9 GM/DL — LOW (ref 32–36)
MCV RBC AUTO: 95.5 FL — SIGNIFICANT CHANGE UP (ref 80–100)
MCV RBC AUTO: 95.5 FL — SIGNIFICANT CHANGE UP (ref 80–100)
MONOCYTES # BLD AUTO: 0.51 K/UL — SIGNIFICANT CHANGE UP (ref 0–0.9)
MONOCYTES # BLD AUTO: 0.51 K/UL — SIGNIFICANT CHANGE UP (ref 0–0.9)
MONOCYTES NFR BLD AUTO: 5 % — SIGNIFICANT CHANGE UP (ref 2–14)
MONOCYTES NFR BLD AUTO: 5 % — SIGNIFICANT CHANGE UP (ref 2–14)
NEUTROPHILS # BLD AUTO: 9.32 K/UL — HIGH (ref 1.8–7.4)
NEUTROPHILS # BLD AUTO: 9.32 K/UL — HIGH (ref 1.8–7.4)
NEUTROPHILS NFR BLD AUTO: 90.9 % — HIGH (ref 43–77)
NEUTROPHILS NFR BLD AUTO: 90.9 % — HIGH (ref 43–77)
NT-PROBNP SERPL-SCNC: 6733 PG/ML — HIGH (ref 0–450)
NT-PROBNP SERPL-SCNC: 6733 PG/ML — HIGH (ref 0–450)
PHOSPHATE SERPL-MCNC: 3 MG/DL — SIGNIFICANT CHANGE UP (ref 2.5–4.5)
PHOSPHATE SERPL-MCNC: 3 MG/DL — SIGNIFICANT CHANGE UP (ref 2.5–4.5)
PLATELET # BLD AUTO: 154 K/UL — SIGNIFICANT CHANGE UP (ref 150–400)
PLATELET # BLD AUTO: 154 K/UL — SIGNIFICANT CHANGE UP (ref 150–400)
POTASSIUM SERPL-MCNC: 5.4 MMOL/L — HIGH (ref 3.5–5.3)
POTASSIUM SERPL-MCNC: 5.4 MMOL/L — HIGH (ref 3.5–5.3)
POTASSIUM SERPL-SCNC: 5.4 MMOL/L — HIGH (ref 3.5–5.3)
POTASSIUM SERPL-SCNC: 5.4 MMOL/L — HIGH (ref 3.5–5.3)
PROT SERPL-MCNC: 5.9 GM/DL — LOW (ref 6–8.3)
PROT SERPL-MCNC: 5.9 GM/DL — LOW (ref 6–8.3)
RBC # BLD: 2.69 M/UL — LOW (ref 4.2–5.8)
RBC # BLD: 2.69 M/UL — LOW (ref 4.2–5.8)
RBC # FLD: 15.9 % — HIGH (ref 10.3–14.5)
RBC # FLD: 15.9 % — HIGH (ref 10.3–14.5)
SODIUM SERPL-SCNC: 139 MMOL/L — SIGNIFICANT CHANGE UP (ref 135–145)
SODIUM SERPL-SCNC: 139 MMOL/L — SIGNIFICANT CHANGE UP (ref 135–145)
WBC # BLD: 10.25 K/UL — SIGNIFICANT CHANGE UP (ref 3.8–10.5)
WBC # BLD: 10.25 K/UL — SIGNIFICANT CHANGE UP (ref 3.8–10.5)
WBC # FLD AUTO: 10.25 K/UL — SIGNIFICANT CHANGE UP (ref 3.8–10.5)
WBC # FLD AUTO: 10.25 K/UL — SIGNIFICANT CHANGE UP (ref 3.8–10.5)

## 2023-11-14 PROCEDURE — 99232 SBSQ HOSP IP/OBS MODERATE 35: CPT

## 2023-11-14 RX ORDER — MECLIZINE HCL 12.5 MG
12.5 TABLET ORAL EVERY 8 HOURS
Refills: 0 | Status: DISCONTINUED | OUTPATIENT
Start: 2023-11-14 | End: 2023-11-22

## 2023-11-14 RX ADMIN — MEROPENEM 1000 MILLIGRAM(S): 1 INJECTION INTRAVENOUS at 10:02

## 2023-11-14 RX ADMIN — Medication 20 MILLIGRAM(S): at 10:02

## 2023-11-14 RX ADMIN — Medication 4 UNIT(S): at 09:45

## 2023-11-14 RX ADMIN — Medication 4 UNIT(S): at 13:38

## 2023-11-14 RX ADMIN — INSULIN GLARGINE 10 UNIT(S): 100 INJECTION, SOLUTION SUBCUTANEOUS at 22:22

## 2023-11-14 RX ADMIN — Medication 12.5 MICROGRAM(S): at 05:04

## 2023-11-14 RX ADMIN — Medication 10 MILLIGRAM(S): at 10:02

## 2023-11-14 RX ADMIN — Medication 3 MILLILITER(S): at 01:31

## 2023-11-14 RX ADMIN — Medication 10 MILLIGRAM(S): at 22:22

## 2023-11-14 RX ADMIN — Medication 3 MILLILITER(S): at 14:27

## 2023-11-14 RX ADMIN — Medication 3 MILLILITER(S): at 08:29

## 2023-11-14 RX ADMIN — Medication 110 MILLIGRAM(S): at 10:02

## 2023-11-14 RX ADMIN — Medication 1 APPLICATION(S): at 22:21

## 2023-11-14 RX ADMIN — Medication 4: at 18:10

## 2023-11-14 RX ADMIN — FLUTICASONE FUROATE, UMECLIDINIUM BROMIDE AND VILANTEROL TRIFENATATE 1 PUFF(S): 200; 62.5; 25 POWDER RESPIRATORY (INHALATION) at 08:30

## 2023-11-14 RX ADMIN — FLUCONAZOLE 50 MILLIGRAM(S): 150 TABLET ORAL at 18:11

## 2023-11-14 RX ADMIN — Medication 4 UNIT(S): at 18:10

## 2023-11-14 RX ADMIN — Medication 3 MILLILITER(S): at 21:23

## 2023-11-14 RX ADMIN — Medication 4: at 13:39

## 2023-11-14 RX ADMIN — ATORVASTATIN CALCIUM 40 MILLIGRAM(S): 80 TABLET, FILM COATED ORAL at 22:22

## 2023-11-14 RX ADMIN — MEROPENEM 1000 MILLIGRAM(S): 1 INJECTION INTRAVENOUS at 22:22

## 2023-11-14 RX ADMIN — Medication 1 MILLIGRAM(S): at 10:02

## 2023-11-14 RX ADMIN — Medication 110 MILLIGRAM(S): at 23:17

## 2023-11-14 RX ADMIN — Medication 325 MILLIGRAM(S): at 10:02

## 2023-11-14 RX ADMIN — Medication 1 APPLICATION(S): at 10:05

## 2023-11-14 NOTE — CONSULT NOTE ADULT - REASON FOR ADMISSION
Right lung pneumonia with thick-walled cavity.

## 2023-11-14 NOTE — CONSULT NOTE ADULT - PROVIDER SPECIALTY LIST ADULT
Surgery
Heme/Onc
Pulmonology
Critical Care
Nephrology
Infectious Disease
Palliative Care
Thoracic Surgery

## 2023-11-14 NOTE — PROGRESS NOTE ADULT - ASSESSMENT
82 y/o Male with h/o lung CA [place] [facility] and RT [place]23, s/p right upper lobectomy, HTN, HPL, Chronic anemia, COPD, AAA, Hypothyroidism, CKD stage IIIa was admitted on 11/2 for increased shortness of breath, dyspnea on minimal exertion, and cough. Reportedly, he had CT of the chest performed 10/3 which demonstrated consolidation, was started on augmentin/prednisone/azithro 10/6 x 1 week. Pt completed course of medications however wife noted worsening cough and pt with fever Tmax of 103.9 on 10/26. Wife then took pt to pulmonologist again who started pt on second round of the same medications, instructed wife to stop giving pt tylenol wife notes fevers self-resolved. Since then pt decreased energy with episode of being unsteady on feet. Today is 6th day of taking medications, had f/u scheduled with pulmonologist who sent pt to ED for eval.  He feels tired and weak. In ER he received ceftriaxone.    1. Large RLL pulmonary cavity with worsening opacification, likely pneumonia. Possible recurrent lung Ca. Lung Ca s/p right upper lobectomy. Radiation pneumonitis. CRF stage 3. Allergy to PCN.   -worsening large pulmonary cavity opacification   -respiratory improved s/p bronchoscopy  -bronchoscopy cultures show normal respiratory jaime and rare yeast  -leukocytosis  -bronch c/s noted with normal respiratory jaime  -Aspergillus galactomanan and fungitel are negative   -fungal bronch c/s is pending and will take several weeks to finalize  -s/p levofloxacin # 10  -s/p voriconazole 200 mg PO q12h # 7  -pulmonary evaluation appreciated   -on meropenem 1 gm IV q12h, doxycycline 100 mg IV q12h and fluconazole 100 mg IV qd # 2  -tolerating abx well so far; no side effects noted  -thoracic evaluation appreciated  -f/u bronchoscopy results  -continue abx coverage   -monitor temps  -f/u CBC  -supportive care  2. Other issues:   -care per medicine    d/w medicine team

## 2023-11-14 NOTE — CONSULT NOTE ADULT - CONSULT REQUESTED DATE/TIME
09-Nov-2023 10:10
09-Nov-2023 16:53
03-Nov-2023 16:19
05-Nov-2023 14:24
09-Nov-2023 22:08
03-Nov-2023 12:31
04-Nov-2023 02:25
14-Nov-2023 08:28

## 2023-11-14 NOTE — CONSULT NOTE ADULT - ASSESSMENT
82 y/o Male with h/o lung CA [place] [facility] [place] and RT 05/2023, s/p right upper lobectomy, HTN, HPL, Chronic anemia, COPD, AAA, Hypothyroidism, CKD stage IIIa was admitted on 11/2 for increased shortness of breath, dyspnea on minimal exertion, and cough.    # h/o lung cancer   [place] [facility] [place]   - 11/2 CT chest: Right upper lobectomy. Larger right lower lobe thick-walled cavity which may be infectious and/or residua of reported treated tumor. Spongelike material within the cavity can be seen with aspergilloma (prior to fungus ball formation). Consolidation within the right lower and middle lobe compatible with radiation change. Multiple indistinct nodules in the right lung which may be infectious or radiation pneumonitis.  - 11/8 CT a/p No hydronephrosis. New patchy opacities in the right lung base. Small right pleural effusion.  - CT surgery consulted and bronch performed 11/9- pending pathology results  - 11/13 repeat CT chest- : Right lung large areas of consolidation new since November 2, 2023 likely pneumonia. Right apical previously described large cyst unchanged in size since November 2, 2023 containing internal air and increasing internal opacification since November 2, 2023 as described above. A few left lung ill-defined nonspecific groundglass opacities. Differential diagnosis include but is not limited to infectious/inflammatory etiologies and or pulmonary edema. Small left pleural effusion new since November 8, 2023.    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    # h/o lung cancer   [place] [facility] [place] be [place]   - 11/2 CT chest: Right upper lobectomy. Larger right lower lobe thick-walled cavity which may be infectious and/or residua of reported treated tumor. Spongelike material within the cavity can be seen with aspergilloma (prior to fungus ball formation). Consolidation within the right lower and middle lobe compatible with radiation change. Multiple indistinct nodules in the right lung which may be infectious or radiation pneumonitis.  - 11/8 CT a/p No hydronephrosis. New patchy opacities in the right lung base. Small right pleural effusion.  - CT surgery consulted and bronch performed 11/9- pending pathology results  - 11/13 repeat CT chest- : Right lung large areas of consolidation new since November 2, 2023 likely pneumonia. Right apical previously described large cyst unchanged in size since November 2, 2023 containing internal air and increasing internal opacification since November 2, 2023 as described above. A few left lung ill-defined nonspecific groundglass opacities. Differential diagnosis include but is not limited to infectious/inflammatory etiologies and or pulmonary edema. Small left pleural effusion new since November 8, 2023.    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    # h/o lung cancer   - followed at [facility] [place] but states that he hasn't been seen there in a while- requested documents to be sent over   - 11/2 CT chest: Right upper lobectomy. Larger right lower lobe thick-walled cavity which may be infectious and/or residua of reported treated tumor. Spongelike material within the cavity can be seen with aspergilloma (prior to fungus ball formation). Consolidation within the right lower and middle lobe compatible with radiation change. Multiple indistinct nodules in the right lung which may be infectious or radiation pneumonitis.  - 11/8 CT a/p No hydronephrosis. New patchy opacities in the right lung base. Small right pleural effusion.  - CT surgery consulted and bronch performed 11/9- pending pathology results  - 11/13 repeat CT chest- : Right lung large areas of consolidation new since November 2, 2023 likely pneumonia. Right apical previously described large cyst unchanged in size since November 2, 2023 containing internal air and increasing internal opacification since November 2, 2023 as described above. A few left lung ill-defined nonspecific groundglass opacities. Differential diagnosis include but is not limited to infectious/inflammatory etiologies and or pulmonary edema. Small left pleural effusion new since November 8, 2023.    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

## 2023-11-14 NOTE — PROGRESS NOTE ADULT - SUBJECTIVE AND OBJECTIVE BOX
HPI: Pt is a 83y old Male with a PMH of HTN, HPL, Chronic anemia, COPD, lung CA follows at [facility] (last chemotherapy and RT 05/2023, not currently receiving chemo treatment), s/p right upper lobectomy, AAA, Hypothyroidism, CKD stage IIIa and others has been in ED by his pulmonologist with c/o shortness of breath, dyspnea on minimal exertion, and cough. Reportedly, he had CT of the chest performed 10/3 which demonstrated consolidation, was started on augmentin/prednisone/azithro x 1 week with worsening symptoms. Hospital course includes RLL PNA with possible radiation pneumonitis and /or aspergilloma 2/2 immunocompromised status. Pt is to undergo a bronchoscopy today. Palliative medicine Consult to further establish GOC  11/9/23 Seen and examined at bedside with no family present. Pt denies any complaints. Is able to provide a limited hx however does state he is scheduled for bronchoscopy today.   11/13/23 Seen and examined at bedside. OOB/chair with no complaints offered.  PAIN: ( )Yes   (X )No    DYSPNEA: (X ) Yes  ( ) No  Increased on exertion  PAST MEDICAL & SURGICAL HISTORY:  COPD (chronic obstructive pulmonary disease)  Lung cancer  Anemia of chronic disease  CKD (chronic kidney disease), stage III  Hypothyroidism  AAA (abdominal aortic aneurysm)  HTN (hypertension)  HLD (hyperlipidemia)  Thrombocytopenia  S/P lobectomy of lung    SOCIAL HX:  Lives with wife  Hx opiate tolerance ( )YES  ( X)NO    Baseline ADLs  (Prior to Admission)  (X ) Independent   ( )Dependent    FAMILY HISTORY:  Unable to provide    Review of Systems:  Physical Discomfort-denies  Dyspnea-on exertion  Anorexia-mod  Secretions-increased  Fatigue-mod-severe  Weakness-severe    All other systems reviewed and negative    PHYSICAL EXAM:  ICU Vital Signs Last 24 Hrs  T(C): 36.2 (14 Nov 2023 09:07), Max: 36.7 (13 Nov 2023 21:18)  T(F): 97.2 (14 Nov 2023 09:07), Max: 98 (13 Nov 2023 21:18)  HR: 100 (14 Nov 2023 11:16) (79 - 112)  BP: 112/62 (14 Nov 2023 11:16) (112/62 - 145/77)  RR: 18 (14 Nov 2023 11:16) (18 - 20)  SpO2: 100% (14 Nov 2023 11:16) (94% - 100%)    O2 Parameters below as of 14 Nov 2023 11:16  Patient On (Oxygen Delivery Method): nasal cannula  O2 Flow (L/min): 4  PPSV2:  30 %    General: Frail elderly male in bed in NAD  Mental Status: awake and alert X3/lethargic  HEENT: oral mucosa dry  Lungs: clear rere diminished at bases  Cardiac: S1S2+  GI: abd soft NT ND + BS  : voids  Ext: moves on bed  Neuro: no focal def      LABS:                            8.2    10.25 )-----------( 154      ( 14 Nov 2023 07:03 )             25.7               11-14    139  |  102  |  68<H>  ----------------------------<  93  5.4<H>   |  31  |  1.41<H>    Ca    8.5      14 Nov 2023 07:03  Phos  3.0     11-14  Mg     2.4     11-14    TPro  5.9<L>  /  Alb  2.5<L>  /  TBili  0.6  /  DBili  x   /  AST  37  /  ALT  27  /  AlkPhos  122<H>  11-14    Allergies    penicillin (Unknown)    Intolerances  MEDICATIONS  (STANDING):  albuterol/ipratropium for Nebulization 3 milliLiter(s) Nebulizer every 6 hours  AQUAPHOR (petrolatum Ointment) 1 Application(s) Topical two times a day  atorvastatin 40 milliGRAM(s) Oral at bedtime  dextrose 5%. 1000 milliLiter(s) (50 mL/Hr) IV Continuous <Continuous>  dextrose 5%. 1000 milliLiter(s) (100 mL/Hr) IV Continuous <Continuous>  dextrose 50% Injectable 25 Gram(s) IV Push once  dextrose 50% Injectable 12.5 Gram(s) IV Push once  dextrose 50% Injectable 25 Gram(s) IV Push once  doxycycline IVPB 100 milliGRAM(s) IV Intermittent every 12 hours  ferrous    sulfate 325 milliGRAM(s) Oral daily  fluconAZOLE IVPB 100 milliGRAM(s) IV Intermittent every 24 hours  fluticasone furoate/umeclidinium/vilanterol 100-62.5-25 MICROgram(s) Inhaler 1 Puff(s) Inhalation daily  folic acid 1 milliGRAM(s) Oral daily  glucagon  Injectable 1 milliGRAM(s) IntraMuscular once  hydrALAZINE 10 milliGRAM(s) Oral two times a day  insulin glargine Injectable (LANTUS) 10 Unit(s) SubCutaneous at bedtime  insulin lispro (ADMELOG) corrective regimen sliding scale   SubCutaneous at bedtime  insulin lispro (ADMELOG) corrective regimen sliding scale   SubCutaneous three times a day before meals  insulin lispro Injectable (ADMELOG) 4 Unit(s) SubCutaneous three times a day before meals  levothyroxine 12.5 MICROGram(s) Oral daily  meropenem Injectable 1000 milliGRAM(s) IV Push every 12 hours  predniSONE   Tablet 20 milliGRAM(s) Oral daily    MEDICATIONS  (PRN):  acetaminophen     Tablet .. 650 milliGRAM(s) Oral every 6 hours PRN Temp greater or equal to 38C (100.4F), Mild Pain (1 - 3)  aluminum hydroxide/magnesium hydroxide/simethicone Suspension 30 milliLiter(s) Oral every 4 hours PRN Dyspepsia  benzonatate 100 milliGRAM(s) Oral three times a day PRN Cough  dextrose Oral Gel 15 Gram(s) Oral once PRN Blood Glucose LESS THAN 70 milliGRAM(s)/deciliter  melatonin 3 milliGRAM(s) Oral at bedtime PRN Insomnia  ondansetron Injectable 4 milliGRAM(s) IV Push every 8 hours PRN Nausea and/or Vomiting    RADIOLOGY/ADDITIONAL STUDIES:    < from: CT Chest No Cont (11.13.23 @ 10:04) >    ACC: [id number] EXAM:  CT CHEST   ORDERED BY: AHSAN CHAKRABORTY     PROCEDURE DATE:  11/13/2023          INTERPRETATION:  Clinical indications: Pneumonia.    Axial CT images of the chest are obtained without intravenous   administration of contrast.    Comparison is made with the prior chest CT of November 2, 2023.    No enlarged axillary or mediastinal lymph nodes.    No pericardial effusion. Heart size is normal. Mitral annular   calcifications. Vascular calcifications with involvement of the aorta and   the coronary arteries. Aortic root calcifications.    Evaluation of the upper abdomen demonstrate partially imaged aortic stent   graft. Subcutaneous edema. Minimal perihepatic ascites.    Small left pleural effusion new since the abdominalCT of November 8, 2023. Trace right pleural effusion as on November 8, 2023.    Evaluation of the lungs demonstrate left lung base linear subsegmental   atelectasis. A few ill-defined left lung patchy nonspecific groundglass   opacities largest within the dependent portion of the left upper lobe are   new since November 2, 2023.    Status post right upper lobectomy with postoperative changes. Persistent   right apical large large cyst, part of which measures about 6.6 cm in   transverse dimension without significant interval change in size   containing air and increasing internal opacification as compared with   November 2, 2023, some of which appear slightly dense and may be due to   internal hemorrhagic components.    Extensive right mid to lower lung consolidations and ill-defined   groundglass opacities, with the largest confluent consolidation within   the mid to lower portions of the right lower lobe, majority of which   appear new since November 2, 2023 superimposed on previouslydescribed   linear opacities.    Degenerative changes of the spine. Old healed sternal fracture.    IMPRESSION: Right lung large areas of consolidation new since November 2, 2023 likely pneumonia.    Right apical previously described large cyst unchanged in size since   November 2, 2023 containing internal air and increasing internal   opacification since November 2, 2023 as described above. Continued   follow-up is recommended.    A few left lung ill-defined nonspecific groundglass opacities.   Differential diagnosis include but is not limited to   infectious/inflammatory etiologies and or pulmonary edema.    Small left pleural effusion new since November 8, 2023.

## 2023-11-14 NOTE — PROGRESS NOTE ADULT - SUBJECTIVE AND OBJECTIVE BOX
Subjective:    Review of Systems:  All 10 systems reviewed in detailed and found to be negative with the exception of what has already been described above    Allergies:  penicillin (Unknown)    Meds  MEDICATIONS  (STANDING):  albuterol/ipratropium for Nebulization 3 milliLiter(s) Nebulizer every 6 hours  AQUAPHOR (petrolatum Ointment) 1 Application(s) Topical two times a day  atorvastatin 40 milliGRAM(s) Oral at bedtime  dextrose 5%. 1000 milliLiter(s) (100 mL/Hr) IV Continuous <Continuous>  dextrose 5%. 1000 milliLiter(s) (50 mL/Hr) IV Continuous <Continuous>  dextrose 50% Injectable 12.5 Gram(s) IV Push once  dextrose 50% Injectable 25 Gram(s) IV Push once  dextrose 50% Injectable 25 Gram(s) IV Push once  doxycycline IVPB 100 milliGRAM(s) IV Intermittent every 12 hours  ferrous    sulfate 325 milliGRAM(s) Oral daily  fluconAZOLE IVPB 100 milliGRAM(s) IV Intermittent every 24 hours  fluticasone furoate/umeclidinium/vilanterol 100-62.5-25 MICROgram(s) Inhaler 1 Puff(s) Inhalation daily  folic acid 1 milliGRAM(s) Oral daily  glucagon  Injectable 1 milliGRAM(s) IntraMuscular once  hydrALAZINE 10 milliGRAM(s) Oral two times a day  insulin glargine Injectable (LANTUS) 10 Unit(s) SubCutaneous at bedtime  insulin lispro (ADMELOG) corrective regimen sliding scale   SubCutaneous at bedtime  insulin lispro (ADMELOG) corrective regimen sliding scale   SubCutaneous three times a day before meals  insulin lispro Injectable (ADMELOG) 4 Unit(s) SubCutaneous three times a day before meals  levothyroxine 12.5 MICROGram(s) Oral daily  meropenem Injectable 1000 milliGRAM(s) IV Push every 12 hours  predniSONE   Tablet 20 milliGRAM(s) Oral daily    MEDICATIONS  (PRN):  acetaminophen     Tablet .. 650 milliGRAM(s) Oral every 6 hours PRN Temp greater or equal to 38C (100.4F), Mild Pain (1 - 3)  aluminum hydroxide/magnesium hydroxide/simethicone Suspension 30 milliLiter(s) Oral every 4 hours PRN Dyspepsia  benzonatate 100 milliGRAM(s) Oral three times a day PRN Cough  dextrose Oral Gel 15 Gram(s) Oral once PRN Blood Glucose LESS THAN 70 milliGRAM(s)/deciliter  melatonin 3 milliGRAM(s) Oral at bedtime PRN Insomnia  ondansetron Injectable 4 milliGRAM(s) IV Push every 8 hours PRN Nausea and/or Vomiting    Physical Exam  T(C): 36.2 (11-14-23 @ 09:07), Max: 36.7 (11-13-23 @ 21:18)  HR: 101 (11-14-23 @ 09:07) (79 - 112)  BP: 145/77 (11-14-23 @ 09:07) (116/53 - 145/77)  RR: 18 (11-14-23 @ 09:07) (18 - 20)  SpO2: 98% (11-14-23 @ 09:07) (94% - 99%)  Gen: Alert, oriented, no distress  HEENT: Anicteric sclera, moist mucous membranes  Cardio: Regular rhythm and rate, normal S1S2, no murmurs  Resp: Crackles, congested  GI: Nontender, nondistended, normoactive bowel sounds  Ext: No cyanosis, clubbing or edema  skin: + ecchymosis, left leg laceration sutured and bandaged  Neuro: Nonfocal    Labs:                        8.2    10.25 )-----------( 154      ( 14 Nov 2023 07:03 )             25.7     11-14    139  |  102  |  68<H>  ----------------------------<  93  5.4<H>   |  31  |  1.41<H>    Ca    8.5      14 Nov 2023 07:03  Phos  3.0     11-14  Mg     2.4     11-14    TPro  5.9<L>  /  Alb  2.5<L>  /  TBili  0.6  /  DBili  x   /  AST  37  /  ALT  27  /  AlkPhos  122<H>  11-14      Urinalysis Basic - ( 14 Nov 2023 07:03 )    Color: x / Appearance: x / SG: x / pH: x  Gluc: 93 mg/dL / Ketone: x  / Bili: x / Urobili: x   Blood: x / Protein: x / Nitrite: x   Leuk Esterase: x / RBC: x / WBC x   Sq Epi: x / Non Sq Epi: x / Bacteria: x    < from: CT Chest No Cont (11.02.23 @ 16:04) >  ACC: [id number] EXAM:  CT CHEST   ORDERED BY: SARAH YOO     PROCEDURE DATE:  11/02/2023          INTERPRETATION:  INDICATION: Shortness of breath, cough, lung cancer   history, last chemotherapy and radiation treatment in May 2023    TECHNIQUE: Helical acquisition images of the chest without intravenous   contrast. Maximum intensity projection images were generated.    COMPARISON: 12/29/2020 chest x-ray.    FINDINGS:    LUNGS/AIRWAYS/PLEURA: Right upper lobectomy. Right lower lobe   multiloculated thick-walled 11 cm cavity (best seen in its entirety on   601-59) containing fluid and spongelike material. Consolidation within   the middle lobe and inferior aspect of the right lower lobe with angular   borders suggesting prior radiation. Multiple indistinct nodules of   varying density in the right lower and middle lobes, mostly adjacent to   the cavity. Small branching opacities in the peripheral left upper and   lower lobes, compatible with distal airway impaction. Trace left pleural   effusion. Mild right apical pleural thickening.    LYMPH NODES/MEDIASTINUM: No lymphadenopathy.    HEART/VASCULATURE: Normal heart size. Unremarkable pericardium. Coronary   artery calcifications. Normal caliber aorta.    UPPER ABDOMEN: Unremarkable.    BONES/SOFT TISSUES: Old sternal fracture. Mild loss of height of the L1   vertebral body.      IMPRESSION:    Right upper lobectomy.    Larger right lower lobe thick-walled cavity which may be infectious   and/or residua of reported treated tumor. 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    Consolidation within the right lower and middle lobe compatible with   radiation change.    Multiple indistinct nodules in the right lungwhich may be infectious or   radiation pneumonitis.    ct lucio 10/3 uploaded and reviewed    < from: CT Abdomen and Pelvis No Cont (11.08.23 @ 19:01) >  PROCEDURE DATE:  11/08/2023          INTERPRETATION:  CLINICAL INFORMATION: Acute kidney injury. Evaluate   obstruction.    COMPARISON: CT chest 11/20/2023.    CONTRAST/COMPLICATIONS:  IV Contrast: NONE  Oral Contrast: NONE  Complications: None reported at time of study completion    PROCEDURE:  CT of the Abdomen and Pelvis was performed.  Sagittal and coronal reformats were performed.    FINDINGS:  LOWER CHEST: Small right pleural effusion. New patchy opacities in the   right lung base.    LIVER: Within normal limits.  BILE DUCTS: Normal caliber.  GALLBLADDER: Within normal limits.  SPLEEN: Within normal limits.  PANCREAS: Within normal limits.  ADRENALS: Within normal limits.  KIDNEYS/URETERS: No hydronephrosis. Vascular calcifications.    BLADDER: Within normal limits.  REPRODUCTIVE ORGANS: Prostate within normal limits.    BOWEL: No bowel obstruction. Appendix is normal. Moderate to large stool.  PERITONEUM: No ascites.  VESSELS: Atherosclerotic changes.  RETROPERITONEUM/LYMPH NODES: No lymphadenopathy.  ABDOMINAL WALL: Within normal limits.  BONES: Degenerative changes. Redemonstration of L1 compression deformity.   Right femoral ORIF.    IMPRESSION:  No hydronephrosis.    New patchy opacities in the right lung base. Small right pleural effusion.    Bedside Swallow: Trial 1  · Consistencies administered	thin liquid; pureed; regular solid  · Mode of Presentation	cup; spoon; self fed  · Positioning	upright (90 degrees)  · Oral Preparatory Phase	Within functional limits; orientation eating routines: able to feed himself with focus: as per family, pt eats rapidly: did not do so at bedside evaluation Lip seal on utensil : oral containment..  · Oral Phase	Within functional limits; immediate bolus formation: Ap transfer for liquid WFL. Mastication normally rotary-lateral with lingual sweep for collection and clearance.  l  · Pharyngeal Phase	Within functional limits; Onset of pharyngeal swallow is with age-appropriate time limits. No overt s/s aspiration at bedside.  · Comments	Micro aspiration and silent aspiration cannot be ruled out. As above, pt does have increased risk for aspiration 2/2 lung CA and  lobectomy as well as COPD and present pna (which in itself may be aspiration related). However, pt presents at bedside evaluation with no contraindication for maintaining REGULAR texture and thin liquids.  Strategies for reducing risk were demonstrated and discussed with the Pt and the family who are very supportive of the Pt.  Disc with NSg.  Will follow peripherally.    < from: CT Chest No Cont (11.13.23 @ 10:04) >    PROCEDURE DATE:  11/13/2023          INTERPRETATION:  Clinical indications: Pneumonia.    Axial CT images of the chest are obtained without intravenous   administration of contrast.    Comparison is made with the prior chest CT of November 2, 2023.    No enlarged axillary or mediastinal lymph nodes.    No pericardial effusion. Heart size is normal. Mitral annular   calcifications. Vascular calcifications with involvement of the aorta and   the coronary arteries. Aortic root calcifications.    Evaluation of the upper abdomen demonstrate partially imaged aortic stent   graft. Subcutaneous edema. Minimal perihepatic ascites.    Small left pleural effusion new since the abdominalCT of November 8, 2023. Trace right pleural effusion as on November 8, 2023.    Evaluation of the lungs demonstrate left lung base linear subsegmental   atelectasis. A few ill-defined left lung patchy nonspecific groundglass   opacities largest within the dependent portion of the left upper lobe are   new since November 2, 2023.    Status post right upper lobectomy with postoperative changes. Persistent   right apical large large cyst, part of which measures about 6.6 cm in   transverse dimension without significant interval change in size   containing air and increasing internal opacification as compared with   November 2, 2023, some of which appear slightly dense and may be due to   internal hemorrhagic components.    Extensive right mid to lower lung consolidations and ill-defined   groundglass opacities, with the largest confluent consolidation within   the mid to lower portions of the right lower lobe, majority of which   appear new since November 2, 2023 superimposed on previouslydescribed   linear opacities.    Degenerative changes of the spine. Old healed sternal fracture.    IMPRESSION: Right lung large areas of consolidation new since November 2, 2023 likely pneumonia.    Right apical previously described large cyst unchanged in size since   November 2, 2023 containing internal air and increasing internal   opacification since November 2, 2023 as described above. Continued   follow-up is recommended.    A few left lung ill-defined nonspecific groundglass opacities.   Differential diagnosis include but is not limited to   infectious/inflammatory etiologies and or pulmonary edema.    Small left pleural effusion new since November 8, 2023.      Culture - Acid Fast - Bronchial w/Smear (11.09.23 @ 12:30)   Specimen Source: .Bronchial RIGHT LOWER LOBE BAL  Acid Fast Bacilli Smear:   No acid-fast bacilli seen by fluorochrome stain  Culture Results:   Culture is being performed.  Culture - Fungal, Bronchial (11.09.23 @ 12:30)   Specimen Source: .Bronchial RIGHT LOWER LOBE BAL  Culture Results:   Rare Yeast Subjective:  sitting in bed eating  family at bedside    Review of Systems:  All 10 systems reviewed in detailed and found to be negative with the exception of what has already been described above    Allergies:  penicillin (Unknown)    Meds  MEDICATIONS  (STANDING):  albuterol/ipratropium for Nebulization 3 milliLiter(s) Nebulizer every 6 hours  AQUAPHOR (petrolatum Ointment) 1 Application(s) Topical two times a day  atorvastatin 40 milliGRAM(s) Oral at bedtime  dextrose 5%. 1000 milliLiter(s) (100 mL/Hr) IV Continuous <Continuous>  dextrose 5%. 1000 milliLiter(s) (50 mL/Hr) IV Continuous <Continuous>  dextrose 50% Injectable 12.5 Gram(s) IV Push once  dextrose 50% Injectable 25 Gram(s) IV Push once  dextrose 50% Injectable 25 Gram(s) IV Push once  doxycycline IVPB 100 milliGRAM(s) IV Intermittent every 12 hours  ferrous    sulfate 325 milliGRAM(s) Oral daily  fluconAZOLE IVPB 100 milliGRAM(s) IV Intermittent every 24 hours  fluticasone furoate/umeclidinium/vilanterol 100-62.5-25 MICROgram(s) Inhaler 1 Puff(s) Inhalation daily  folic acid 1 milliGRAM(s) Oral daily  glucagon  Injectable 1 milliGRAM(s) IntraMuscular once  hydrALAZINE 10 milliGRAM(s) Oral two times a day  insulin glargine Injectable (LANTUS) 10 Unit(s) SubCutaneous at bedtime  insulin lispro (ADMELOG) corrective regimen sliding scale   SubCutaneous at bedtime  insulin lispro (ADMELOG) corrective regimen sliding scale   SubCutaneous three times a day before meals  insulin lispro Injectable (ADMELOG) 4 Unit(s) SubCutaneous three times a day before meals  levothyroxine 12.5 MICROGram(s) Oral daily  meropenem Injectable 1000 milliGRAM(s) IV Push every 12 hours  predniSONE   Tablet 20 milliGRAM(s) Oral daily    MEDICATIONS  (PRN):  acetaminophen     Tablet .. 650 milliGRAM(s) Oral every 6 hours PRN Temp greater or equal to 38C (100.4F), Mild Pain (1 - 3)  aluminum hydroxide/magnesium hydroxide/simethicone Suspension 30 milliLiter(s) Oral every 4 hours PRN Dyspepsia  benzonatate 100 milliGRAM(s) Oral three times a day PRN Cough  dextrose Oral Gel 15 Gram(s) Oral once PRN Blood Glucose LESS THAN 70 milliGRAM(s)/deciliter  melatonin 3 milliGRAM(s) Oral at bedtime PRN Insomnia  ondansetron Injectable 4 milliGRAM(s) IV Push every 8 hours PRN Nausea and/or Vomiting    Physical Exam  T(C): 36.2 (11-14-23 @ 09:07), Max: 36.7 (11-13-23 @ 21:18)  HR: 101 (11-14-23 @ 09:07) (79 - 112)  BP: 145/77 (11-14-23 @ 09:07) (116/53 - 145/77)  RR: 18 (11-14-23 @ 09:07) (18 - 20)  SpO2: 98% (11-14-23 @ 09:07) (94% - 99%)  Gen: Alert, oriented, no distress  HEENT: Anicteric sclera, moist mucous membranes  Cardio: Regular rhythm and rate, normal S1S2, no murmurs  Resp: Crackles, congested  GI: Nontender, nondistended, normoactive bowel sounds  Ext: No cyanosis, clubbing or edema  skin: + ecchymosis, left leg laceration sutured and bandaged  Neuro: Nonfocal    Labs:                        8.2    10.25 )-----------( 154      ( 14 Nov 2023 07:03 )             25.7     11-14    139  |  102  |  68<H>  ----------------------------<  93  5.4<H>   |  31  |  1.41<H>    Ca    8.5      14 Nov 2023 07:03  Phos  3.0     11-14  Mg     2.4     11-14    TPro  5.9<L>  /  Alb  2.5<L>  /  TBili  0.6  /  DBili  x   /  AST  37  /  ALT  27  /  AlkPhos  122<H>  11-14      Urinalysis Basic - ( 14 Nov 2023 07:03 )    Color: x / Appearance: x / SG: x / pH: x  Gluc: 93 mg/dL / Ketone: x  / Bili: x / Urobili: x   Blood: x / Protein: x / Nitrite: x   Leuk Esterase: x / RBC: x / WBC x   Sq Epi: x / Non Sq Epi: x / Bacteria: x    < from: CT Chest No Cont (11.02.23 @ 16:04) >  ACC: [id number] EXAM:  CT CHEST   ORDERED BY: SARAH YOO     PROCEDURE DATE:  11/02/2023          INTERPRETATION:  INDICATION: Shortness of breath, cough, lung cancer   history, last chemotherapy and radiation treatment in May 2023    TECHNIQUE: Helical acquisition images of the chest without intravenous   contrast. Maximum intensity projection images were generated.    COMPARISON: 12/29/2020 chest x-ray.    FINDINGS:    LUNGS/AIRWAYS/PLEURA: Right upper lobectomy. Right lower lobe   multiloculated thick-walled 11 cm cavity (best seen in its entirety on   601-59) containing fluid and spongelike material. Consolidation within   the middle lobe and inferior aspect of the right lower lobe with angular   borders suggesting prior radiation. Multiple indistinct nodules of   varying density in the right lower and middle lobes, mostly adjacent to   the cavity. Small branching opacities in the peripheral left upper and   lower lobes, compatible with distal airway impaction. Trace left pleural   effusion. Mild right apical pleural thickening.    LYMPH NODES/MEDIASTINUM: No lymphadenopathy.    HEART/VASCULATURE: Normal heart size. Unremarkable pericardium. Coronary   artery calcifications. Normal caliber aorta.    UPPER ABDOMEN: Unremarkable.    BONES/SOFT TISSUES: Old sternal fracture. Mild loss of height of the L1   vertebral body.      IMPRESSION:    Right upper lobectomy.    Larger right lower lobe thick-walled cavity which may be infectious   and/or residua of reported treated tumor. 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    Consolidation within the right lower and middle lobe compatible with   radiation change.    Multiple indistinct nodules in the right lungwhich may be infectious or   radiation pneumonitis.    ct lucio 10/3 uploaded and reviewed    < from: CT Abdomen and Pelvis No Cont (11.08.23 @ 19:01) >  PROCEDURE DATE:  11/08/2023          INTERPRETATION:  CLINICAL INFORMATION: Acute kidney injury. Evaluate   obstruction.    COMPARISON: CT chest 11/20/2023.    CONTRAST/COMPLICATIONS:  IV Contrast: NONE  Oral Contrast: NONE  Complications: None reported at time of study completion    PROCEDURE:  CT of the Abdomen and Pelvis was performed.  Sagittal and coronal reformats were performed.    FINDINGS:  LOWER CHEST: Small right pleural effusion. New patchy opacities in the   right lung base.    LIVER: Within normal limits.  BILE DUCTS: Normal caliber.  GALLBLADDER: Within normal limits.  SPLEEN: Within normal limits.  PANCREAS: Within normal limits.  ADRENALS: Within normal limits.  KIDNEYS/URETERS: No hydronephrosis. Vascular calcifications.    BLADDER: Within normal limits.  REPRODUCTIVE ORGANS: Prostate within normal limits.    BOWEL: No bowel obstruction. Appendix is normal. Moderate to large stool.  PERITONEUM: No ascites.  VESSELS: Atherosclerotic changes.  RETROPERITONEUM/LYMPH NODES: No lymphadenopathy.  ABDOMINAL WALL: Within normal limits.  BONES: Degenerative changes. Redemonstration of L1 compression deformity.   Right femoral ORIF.    IMPRESSION:  No hydronephrosis.    New patchy opacities in the right lung base. Small right pleural effusion.    Bedside Swallow: Trial 1  · Consistencies administered	thin liquid; pureed; regular solid  · Mode of Presentation	cup; spoon; self fed  · Positioning	upright (90 degrees)  · Oral Preparatory Phase	Within functional limits; orientation eating routines: able to feed himself with focus: as per family, pt eats rapidly: did not do so at bedside evaluation Lip seal on utensil : oral containment..  · Oral Phase	Within functional limits; immediate bolus formation: Ap transfer for liquid WFL. Mastication normally rotary-lateral with lingual sweep for collection and clearance.  l  · Pharyngeal Phase	Within functional limits; Onset of pharyngeal swallow is with age-appropriate time limits. No overt s/s aspiration at bedside.  · Comments	Micro aspiration and silent aspiration cannot be ruled out. As above, pt does have increased risk for aspiration 2/2 lung CA and  lobectomy as well as COPD and present pna (which in itself may be aspiration related). However, pt presents at bedside evaluation with no contraindication for maintaining REGULAR texture and thin liquids.  Strategies for reducing risk were demonstrated and discussed with the Pt and the family who are very supportive of the Pt.  Disc with NSg.  Will follow peripherally.    < from: CT Chest No Cont (11.13.23 @ 10:04) >    PROCEDURE DATE:  11/13/2023          INTERPRETATION:  Clinical indications: Pneumonia.    Axial CT images of the chest are obtained without intravenous   administration of contrast.    Comparison is made with the prior chest CT of November 2, 2023.    No enlarged axillary or mediastinal lymph nodes.    No pericardial effusion. Heart size is normal. Mitral annular   calcifications. Vascular calcifications with involvement of the aorta and   the coronary arteries. Aortic root calcifications.    Evaluation of the upper abdomen demonstrate partially imaged aortic stent   graft. Subcutaneous edema. Minimal perihepatic ascites.    Small left pleural effusion new since the abdominalCT of November 8, 2023. Trace right pleural effusion as on November 8, 2023.    Evaluation of the lungs demonstrate left lung base linear subsegmental   atelectasis. A few ill-defined left lung patchy nonspecific groundglass   opacities largest within the dependent portion of the left upper lobe are   new since November 2, 2023.    Status post right upper lobectomy with postoperative changes. Persistent   right apical large large cyst, part of which measures about 6.6 cm in   transverse dimension without significant interval change in size   containing air and increasing internal opacification as compared with   November 2, 2023, some of which appear slightly dense and may be due to   internal hemorrhagic components.    Extensive right mid to lower lung consolidations and ill-defined   groundglass opacities, with the largest confluent consolidation within   the mid to lower portions of the right lower lobe, majority of which   appear new since November 2, 2023 superimposed on previouslydescribed   linear opacities.    Degenerative changes of the spine. Old healed sternal fracture.    IMPRESSION: Right lung large areas of consolidation new since November 2, 2023 likely pneumonia.    Right apical previously described large cyst unchanged in size since   November 2, 2023 containing internal air and increasing internal   opacification since November 2, 2023 as described above. Continued   follow-up is recommended.    A few left lung ill-defined nonspecific groundglass opacities.   Differential diagnosis include but is not limited to   infectious/inflammatory etiologies and or pulmonary edema.    Small left pleural effusion new since November 8, 2023.      Culture - Acid Fast - Bronchial w/Smear (11.09.23 @ 12:30)   Specimen Source: .Bronchial RIGHT LOWER LOBE BAL  Acid Fast Bacilli Smear:   No acid-fast bacilli seen by fluorochrome stain  Culture Results:   Culture is being performed.  Culture - Fungal, Bronchial (11.09.23 @ 12:30)   Specimen Source: .Bronchial RIGHT LOWER LOBE BAL  Culture Results:   Rare Yeast

## 2023-11-14 NOTE — PROGRESS NOTE ADULT - ASSESSMENT
83-year-old M with PMHx HTN, HLD, chronic anemia, COPD, lung CA s/p RUL lobectomy (follows at [facility] [place], last chemotherapy/RT 5/2023, not currently on treatment), AAA, hypothyroidism, CKD stage IIIa sent in to ED on 11/2/23  by pulmonologist MD Olivera with c/o SOB, dyspnea on minimal exertion, and cough. 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     # Acute hypoxic respiratory failure, multifactorial   # New RLL PNA on CT 11/13 , RLL cavity s/p bronchoscopy 11/9  possible yeast  infection, less likely radiation pneumonitis , r/o  aspergilloma    - CT chest: Larger right lower lobe thick-walled cavity which may be infectious and/or residua of reported treated tumor. Spongelike material within the cavity can be seen with aspergilloma (prior to fungus ball formation). Consolidation within the right lower and middle lobe compatible with radiation change.  - leukocytosis stable (WBC 17.70 from 17.35) ---> trend down to WBC  8   - repeat CXR 11/8 - worsening of opacities over right lung   - 2 d echo 11/8 - EF 60% , no significant valvular disease   - immunoglobulin panel - all Ig wnl  - 11/9 - s/p bronchoscopy   - speech and swallow evaluation  - no aspirations  - CXR 11/11 - stable consolidations  - repeat CT chest 11/13 - new right lung consolidation  - blood cultures -no growth, sputum culture - normal respiratory  - c/w trelegy ellipta, chest pt, IS, acapella ,BIPAP as needed  - ID/pulm consult noted   - s/p IV steroids --> po prednisone taper 20 x 3 days than stop , trending down ----> 34  - s/p  voriconazole PO ,  s/p 7 days of levaquin to cover atypical/psuedomonas (allergy to PCN)   - hemoptysis persist , stop heparin   - 11/13 - IV meropenem, doxy and fluconasole started as per ID   - oncology consulted [facility] [place] [facility]      #  Chronic anemia, likely due to  chemotherapy, worsening and iron deficiency   - decreased on AM labs from admission (Hgb 9.2-->8.2, Hct 27.8--> 25.2)   - no s/s bleeding, asymptomatic, continue to monitor/trend  - 11/8 - 1 unit PRBC due to hemoptysis and worsening of anemia  - Hb improved 8.2 --> 7.8 --> 8.4--> 7.7   - restart daily iron  - hold heparin for now     #CHRISTINA on CKD stage IIIa, improving  - Cr improving, 1.38--> 1.2 and through out hospital course worsened to 1.55 and then to 1.75  - continue to monitor/trend  - Creatinine Trend: 1.3 <--1.4 <--1.7 <--1.9 <-- 2.0 <--1.79<--, 1.75<--, 1.55<--, 1.44<--, 1.44<--, 1.20  - stop losartan   - CT abd and pelvis - new right lung opacitiies  - 11/9 - start low dose IV fluids , poor po liquids intake  - 11/10 c/w IV fluids, lokelma 5  - 11/11 stop IV fluids, monitor s/p lasix x1 dose 10       # Left calf laceration from veno dines while in PACU for bronchoscopy   - surgery consult - s/p suturing  on 11/09/23   - plan to remove sutures in 7-10 days  - wound care as per surgery team    # Acute hyperglycemia, possible steroid-induced , Prediabetes with A1C 6.3   -  on AM labs,  A1c results 6.3   - consistent with Impaired Fasting Glucose, new onset DM II ruled out    # HTN   - stop losartan due to CHRISTINA,   - stop amlodipine 2.5  - hydralazine 10 bid as per nephrology team    #  HLD    - c/w  atorvastatin    # Hypothyroidism   - Levo lowered to 12.5 mcg  - Recheck in 4 weeks    # Hx lung CA s/p RUL lobectomy/chemo/RT   - f/u with [facility]     # DVT Ppx   - SCDs    # Code status   - DNR/DNI    Dispo - [facility] vs home once stable

## 2023-11-14 NOTE — PROGRESS NOTE ADULT - SUBJECTIVE AND OBJECTIVE BOX
Patient is a 83y Male who reports no complaints as new       MEDICATIONS  (STANDING):  albuterol/ipratropium for Nebulization 3 milliLiter(s) Nebulizer every 6 hours  AQUAPHOR (petrolatum Ointment) 1 Application(s) Topical two times a day  atorvastatin 40 milliGRAM(s) Oral at bedtime  dextrose 5%. 1000 milliLiter(s) (100 mL/Hr) IV Continuous <Continuous>  dextrose 5%. 1000 milliLiter(s) (50 mL/Hr) IV Continuous <Continuous>  dextrose 50% Injectable 12.5 Gram(s) IV Push once  dextrose 50% Injectable 25 Gram(s) IV Push once  dextrose 50% Injectable 25 Gram(s) IV Push once  doxycycline IVPB 100 milliGRAM(s) IV Intermittent every 12 hours  ferrous    sulfate 325 milliGRAM(s) Oral daily  fluconAZOLE IVPB 100 milliGRAM(s) IV Intermittent every 24 hours  fluticasone furoate/umeclidinium/vilanterol 100-62.5-25 MICROgram(s) Inhaler 1 Puff(s) Inhalation daily  folic acid 1 milliGRAM(s) Oral daily  glucagon  Injectable 1 milliGRAM(s) IntraMuscular once  hydrALAZINE 10 milliGRAM(s) Oral two times a day  insulin glargine Injectable (LANTUS) 10 Unit(s) SubCutaneous at bedtime  insulin lispro (ADMELOG) corrective regimen sliding scale   SubCutaneous at bedtime  insulin lispro (ADMELOG) corrective regimen sliding scale   SubCutaneous three times a day before meals  insulin lispro Injectable (ADMELOG) 4 Unit(s) SubCutaneous three times a day before meals  levothyroxine 12.5 MICROGram(s) Oral daily  meropenem Injectable 1000 milliGRAM(s) IV Push every 12 hours  predniSONE   Tablet 20 milliGRAM(s) Oral daily    MEDICATIONS  (PRN):  acetaminophen     Tablet .. 650 milliGRAM(s) Oral every 6 hours PRN Temp greater or equal to 38C (100.4F), Mild Pain (1 - 3)  aluminum hydroxide/magnesium hydroxide/simethicone Suspension 30 milliLiter(s) Oral every 4 hours PRN Dyspepsia  benzonatate 100 milliGRAM(s) Oral three times a day PRN Cough  dextrose Oral Gel 15 Gram(s) Oral once PRN Blood Glucose LESS THAN 70 milliGRAM(s)/deciliter  melatonin 3 milliGRAM(s) Oral at bedtime PRN Insomnia  ondansetron Injectable 4 milliGRAM(s) IV Push every 8 hours PRN Nausea and/or Vomiting        T(C): , Max: 36.7 (11-13-23 @ 21:18)  T(F): , Max: 98 (11-13-23 @ 21:18)  HR: 101 (11-14-23 @ 09:07)  BP: 145/77 (11-14-23 @ 09:07)  BP(mean): --  RR: 18 (11-14-23 @ 09:07)  SpO2: 98% (11-14-23 @ 09:07)  Wt(kg): --    11-13 @ 07:01  -  11-14 @ 07:00  --------------------------------------------------------  IN: 0 mL / OUT: 1725 mL / NET: -1725 mL    11-14 @ 07:01  -  11-14 @ 10:44  --------------------------------------------------------  IN: 0 mL / OUT: 500 mL / NET: -500 mL          PHYSICAL EXAM:    Constitutional: frail, temp wasting  HEENT:  MM  dist  Cardiovascular: S1 and S2   Extremities: No peripheral edema  Neurological: A/O x 3            LABS:                        8.2    10.25 )-----------( 154      ( 14 Nov 2023 07:03 )             25.7     14 Nov 2023 07:03    139    |  102    |  68     ----------------------------<  93     5.4     |  31     |  1.41   13 Nov 2023 06:30    137    |  103    |  63     ----------------------------<  186    5.5     |  32     |  1.36   12 Nov 2023 06:55    140    |  106    |  59     ----------------------------<  156    5.4     |  29     |  1.41   11 Nov 2023 06:31    139    |  106    |  73     ----------------------------<  211    5.1     |  29     |  1.48     Ca    8.5        14 Nov 2023 07:03  Ca    8.6        13 Nov 2023 06:30  Ca    9.2        12 Nov 2023 06:55  Ca    9.1        11 Nov 2023 06:31  Phos  3.0       14 Nov 2023 07:03  Phos  3.1       13 Nov 2023 06:30  Phos  2.7       12 Nov 2023 06:55  Phos  2.9       11 Nov 2023 06:31  Mg     2.4       14 Nov 2023 07:03  Mg     2.4       13 Nov 2023 06:30  Mg     2.6       12 Nov 2023 06:55  Mg     2.7       11 Nov 2023 06:31    TPro  5.9    /  Alb  2.5    /  TBili  0.6    /  DBili  x      /  AST  37     /  ALT  27     /  AlkPhos  122    14 Nov 2023 07:03  TPro  6.7    /  Alb  3.0    /  TBili  0.7    /  DBili  x      /  AST  40     /  ALT  26     /  AlkPhos  115    12 Nov 2023 06:55          Urine Studies:  Urinalysis Basic - ( 14 Nov 2023 07:03 )    Color: x / Appearance: x / SG: x / pH: x  Gluc: 93 mg/dL / Ketone: x  / Bili: x / Urobili: x   Blood: x / Protein: x / Nitrite: x   Leuk Esterase: x / RBC: x / WBC x   Sq Epi: x / Non Sq Epi: x / Bacteria: x            RADIOLOGY & ADDITIONAL STUDIES:

## 2023-11-14 NOTE — CONSULT NOTE ADULT - SUBJECTIVE AND OBJECTIVE BOX
HPI:  Chief Complaint: shortness of breath.    83y Male with significant PMH of HTN, HPL, Chronic anemia, COPD, lung CA follows at [facility] [place] (last chemotherapy and RT 05/2023, not currently receiving chemo treatment), s/p right upper lobectomy, AAA, Hypothyroidism, CKD stage IIIa and others has been in ED by his pulmonologist with c/o shortness of breath, dyspnea on minimal exertion, and cough.     Reportedly, he had CT of the chest performed 10/3 which demonstrated consolidation, was started on augmentin/prednisone/azithro 10/6 x 1 week. Pt completed course of medications however wife noted worsening cough and pt with fever Tmax of 103.9 on 10/26. Wife then took pt to pulmonologist again who started pt on second round of the same medications, instructed wife to stop giving pt tylenol wife notes fevers self-resolved. Since then pt decreased energy with episode of being unsteady on feet yesterday. Today is 6th day of taking medications, had f/u scheduled with pulmonologist today who sent pt to ED for eval.  He feels tired and weak. Denies chest pain, palpitation, N/V, abdominal pain, diarrhea or dysuria.    His Pulmonologist: Dr. Olivera, and Oncology at [facility] [place]: Dr. Pinto/Dr. Mayes.  Vitals stable,  Sig labs: WBC 14.92 and N 91%, Lactate 1.5, Hb 9.2 at baseline, BUN 33, Cr 1.38, . LFTs wnl.  Troponin 15.2, pro-BNP 3027,   UA negative.  RVP panel negative.    11/2 CT chest: Right upper lobectomy. Larger right lower lobe thick-walled cavity which may be infectious and/or residua of reported treated tumor. Spongelike material within the cavity can be seen with aspergilloma (prior to fungus ball formation). Consolidation within the right lower and middle lobe compatible with radiation change. Multiple indistinct nodules in the right lung which may be infectious or radiation pneumonitis.    Rocephin 1gm IV, Solumedrol 125 mg IV and Duoneb given in ED.    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   surgery is more defitive therapy however he is high surgical risk    11/8 CT a/p No hydronephrosis. New patchy opacities in the right lung base. Small right pleural effusion.    CT surgery consulted and bronch performed 11/9- pending pathology results    11/13 repeat CT chest- : Right lung large areas of consolidation new since November 2, 2023 likely pneumonia. Right apical previously described large cyst unchanged in size since November 2, 2023 containing internal air and increasing internal opacification since November 2, 2023 as described above. A few left lung ill-defined nonspecific groundglass opacities. Differential diagnosis include but is not limited to infectious/inflammatory etiologies and or pulmonary edema. Small left pleural effusion new since November 8, 2023.        PAST MEDICAL & SURGICAL HISTORY:  COPD (chronic obstructive pulmonary disease)      Lung cancer      Anemia of chronic disease      CKD (chronic kidney disease), stage III      Hypothyroidism      AAA (abdominal aortic aneurysm)      HTN (hypertension)      HLD (hyperlipidemia)      Thrombocytopenia      S/P lobectomy of lung          Allergies    penicillin (Unknown)    Intolerances        MEDICATIONS  (STANDING):  albuterol/ipratropium for Nebulization 3 milliLiter(s) Nebulizer every 6 hours  AQUAPHOR (petrolatum Ointment) 1 Application(s) Topical two times a day  atorvastatin 40 milliGRAM(s) Oral at bedtime  dextrose 5%. 1000 milliLiter(s) (100 mL/Hr) IV Continuous <Continuous>  dextrose 5%. 1000 milliLiter(s) (50 mL/Hr) IV Continuous <Continuous>  dextrose 50% Injectable 12.5 Gram(s) IV Push once  dextrose 50% Injectable 25 Gram(s) IV Push once  dextrose 50% Injectable 25 Gram(s) IV Push once  doxycycline IVPB 100 milliGRAM(s) IV Intermittent every 12 hours  ferrous    sulfate 325 milliGRAM(s) Oral daily  fluconAZOLE IVPB 100 milliGRAM(s) IV Intermittent every 24 hours  fluticasone furoate/umeclidinium/vilanterol 100-62.5-25 MICROgram(s) Inhaler 1 Puff(s) Inhalation daily  folic acid 1 milliGRAM(s) Oral daily  glucagon  Injectable 1 milliGRAM(s) IntraMuscular once  hydrALAZINE 10 milliGRAM(s) Oral two times a day  insulin glargine Injectable (LANTUS) 10 Unit(s) SubCutaneous at bedtime  insulin lispro (ADMELOG) corrective regimen sliding scale   SubCutaneous three times a day before meals  insulin lispro (ADMELOG) corrective regimen sliding scale   SubCutaneous at bedtime  insulin lispro Injectable (ADMELOG) 4 Unit(s) SubCutaneous three times a day before meals  levothyroxine 12.5 MICROGram(s) Oral daily  meropenem Injectable 1000 milliGRAM(s) IV Push every 12 hours  predniSONE   Tablet 20 milliGRAM(s) Oral daily    MEDICATIONS  (PRN):  acetaminophen     Tablet .. 650 milliGRAM(s) Oral every 6 hours PRN Temp greater or equal to 38C (100.4F), Mild Pain (1 - 3)  aluminum hydroxide/magnesium hydroxide/simethicone Suspension 30 milliLiter(s) Oral every 4 hours PRN Dyspepsia  benzonatate 100 milliGRAM(s) Oral three times a day PRN Cough  dextrose Oral Gel 15 Gram(s) Oral once PRN Blood Glucose LESS THAN 70 milliGRAM(s)/deciliter  melatonin 3 milliGRAM(s) Oral at bedtime PRN Insomnia  ondansetron Injectable 4 milliGRAM(s) IV Push every 8 hours PRN Nausea and/or Vomiting      FAMILY HISTORY:      SOCIAL HISTORY: No EtOH, no tobacco    REVIEW OF SYSTEMS:    CONSTITUTIONAL: No weakness, fevers or chills  EYES/ENT: No visual changes;  No vertigo or throat pain   NECK: No pain or stiffness  RESPIRATORY: No cough, wheezing, hemoptysis; No shortness of breath  CARDIOVASCULAR: No chest pain or palpitations  GASTROINTESTINAL: No abdominal or epigastric pain. No nausea, vomiting, or hematemesis; No diarrhea or constipation. No melena or hematochezia.  GENITOURINARY: No dysuria, frequency or hematuria  NEUROLOGICAL: No numbness or weakness  SKIN: No itching, burning, rashes, or lesions   All other review of systems is negative unless indicated above.        T(F): 98 (11-13-23 @ 21:18), Max: 98 (11-13-23 @ 21:18)  HR: 86 (11-14-23 @ 01:36)  BP: 134/66 (11-13-23 @ 21:18)  RR: 20 (11-13-23 @ 21:18)  SpO2: 99% (11-14-23 @ 01:36)  Wt(kg): --    GENERAL: NAD, well-developed  HEAD:  Atraumatic, Normocephalic  EYES: EOMI, PERRLA, conjunctiva and sclera clear  NECK: Supple, No JVD  CHEST/LUNG: Clear to auscultation bilaterally; No wheeze  HEART: Regular rate and rhythm; No murmurs, rubs, or gallops  ABDOMEN: Soft, Nontender, Nondistended; Bowel sounds present  EXTREMITIES:  2+ Peripheral Pulses, No clubbing, cyanosis, or edema  NEUROLOGY: non-focal  SKIN: No rashes or lesions                          8.2    10.25 )-----------( 154      ( 14 Nov 2023 07:03 )             25.7       11-14    139  |  102  |  68<H>  ----------------------------<  93  5.4<H>   |  31  |  1.41<H>    Ca    8.5      14 Nov 2023 07:03  Phos  3.0     11-14  Mg     2.4     11-14    TPro  5.9<L>  /  Alb  2.5<L>  /  TBili  0.6  /  DBili  x   /  AST  37  /  ALT  27  /  AlkPhos  122<H>  11-14      Magnesium: 2.4 mg/dL (11-14 @ 07:03)  Phosphorus: 3.0 mg/dL (11-14 @ 07:03)          .Bronchial RIGHT LOWER LOBE BAL  11-09 @ 12:30   Normal Respiratory Marguerite present  --    Rare polymorphonuclear leukocytes per low power field  Few Squamous epithelial cells per low power field  Few Gram positive cocci in pairs per oil power field      .Blood None  11-04 @ 04:21   No growth at 5 days  --  --      .Sputum Sputum  11-03 @ 14:16   Normal Respiratory Marguerite present  --    Rare Squamous epithelial cells per low power field  No polymorphonuclear cells seen per low power field  Rare Gram positive cocci in pairs per oil power field  Rare Gram Negative Rods per oil power field      Clean Catch Clean Catch (Midstream)  11-02 @ 16:40   No growth  --  --      .Blood Blood-Peripheral  11-02 @ 14:47   No growth at 5 days  --  --       HPI:  Chief Complaint: shortness of breath.    83y Male with significant PMH of HTN, HPL, Chronic anemia, COPD, lung CA follows at [facility] [place] (last chemotherapy and RT 05/2023, not currently receiving chemo treatment), s/p right upper lobectomy, AAA, Hypothyroidism, CKD stage IIIa and others has been in ED by his pulmonologist with c/o shortness of breath, dyspnea on minimal exertion, and cough.     Reportedly, he had CT of the chest performed 10/3 which demonstrated consolidation, was started on augmentin/prednisone/azithro 10/6 x 1 week. Pt completed course of medications however wife noted worsening cough and pt with fever Tmax of 103.9 on 10/26. Wife then took pt to pulmonologist again who started pt on second round of the same medications, instructed wife to stop giving pt tylenol wife notes fevers self-resolved. Since then pt decreased energy with episode of being unsteady on feet yesterday. Today is 6th day of taking medications, had f/u scheduled with pulmonologist today who sent pt to ED for eval.  He feels tired and weak. Denies chest pain, palpitation, N/V, abdominal pain, diarrhea or dysuria.    His Pulmonologist: Dr. Olivera, and Oncology at [facility] [place]: Dr. Pinto/Dr. Mayes.  Vitals stable,  Sig labs: WBC 14.92 and N 91%, Lactate 1.5, Hb 9.2 at baseline, BUN 33, Cr 1.38, . LFTs wnl.  Troponin 15.2, pro-BNP 3027,   UA negative.  RVP panel negative.    11/2 CT chest: Right upper lobectomy. Larger right lower lobe thick-walled cavity which may be infectious and/or residua of reported treated tumor. Spongelike material within the cavity can be seen with aspergilloma (prior to fungus ball formation). Consolidation within the right lower and middle lobe compatible with radiation change. Multiple indistinct nodules in the right lung which may be infectious or radiation pneumonitis.    Rocephin 1gm IV, Solumedrol 125 mg IV and Duoneb given in ED.    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   surgery is more defitive therapy however he is high surgical risk    11/8 CT a/p No hydronephrosis. New patchy opacities in the right lung base. Small right pleural effusion.    CT surgery consulted and bronch performed 11/9- pending pathology results    11/13 repeat CT chest- : Right lung large areas of consolidation new since November 2, 2023 likely pneumonia. Right apical previously described large cyst unchanged in size since November 2, 2023 containing internal air and increasing internal opacification since November 2, 2023 as described above. A few left lung ill-defined nonspecific groundglass opacities. Differential diagnosis include but is not limited to infectious/inflammatory etiologies and or pulmonary edema. Small left pleural effusion new since November 8, 2023.        PAST MEDICAL & SURGICAL HISTORY:  COPD (chronic obstructive pulmonary disease)      Lung cancer      Anemia of chronic disease      CKD (chronic kidney disease), stage III      Hypothyroidism      AAA (abdominal aortic aneurysm)      HTN (hypertension)      HLD (hyperlipidemia)      Thrombocytopenia      S/P lobectomy of lung          Allergies    penicillin (Unknown)    Intolerances        MEDICATIONS  (STANDING):  albuterol/ipratropium for Nebulization 3 milliLiter(s) Nebulizer every 6 hours  AQUAPHOR (petrolatum Ointment) 1 Application(s) Topical two times a day  atorvastatin 40 milliGRAM(s) Oral at bedtime  dextrose 5%. 1000 milliLiter(s) (100 mL/Hr) IV Continuous <Continuous>  dextrose 5%. 1000 milliLiter(s) (50 mL/Hr) IV Continuous <Continuous>  dextrose 50% Injectable 12.5 Gram(s) IV Push once  dextrose 50% Injectable 25 Gram(s) IV Push once  dextrose 50% Injectable 25 Gram(s) IV Push once  doxycycline IVPB 100 milliGRAM(s) IV Intermittent every 12 hours  ferrous    sulfate 325 milliGRAM(s) Oral daily  fluconAZOLE IVPB 100 milliGRAM(s) IV Intermittent every 24 hours  fluticasone furoate/umeclidinium/vilanterol 100-62.5-25 MICROgram(s) Inhaler 1 Puff(s) Inhalation daily  folic acid 1 milliGRAM(s) Oral daily  glucagon  Injectable 1 milliGRAM(s) IntraMuscular once  hydrALAZINE 10 milliGRAM(s) Oral two times a day  insulin glargine Injectable (LANTUS) 10 Unit(s) SubCutaneous at bedtime  insulin lispro (ADMELOG) corrective regimen sliding scale   SubCutaneous three times a day before meals  insulin lispro (ADMELOG) corrective regimen sliding scale   SubCutaneous at bedtime  insulin lispro Injectable (ADMELOG) 4 Unit(s) SubCutaneous three times a day before meals  levothyroxine 12.5 MICROGram(s) Oral daily  meropenem Injectable 1000 milliGRAM(s) IV Push every 12 hours  predniSONE   Tablet 20 milliGRAM(s) Oral daily    MEDICATIONS  (PRN):  acetaminophen     Tablet .. 650 milliGRAM(s) Oral every 6 hours PRN Temp greater or equal to 38C (100.4F), Mild Pain (1 - 3)  aluminum hydroxide/magnesium hydroxide/simethicone Suspension 30 milliLiter(s) Oral every 4 hours PRN Dyspepsia  benzonatate 100 milliGRAM(s) Oral three times a day PRN Cough  dextrose Oral Gel 15 Gram(s) Oral once PRN Blood Glucose LESS THAN 70 milliGRAM(s)/deciliter  melatonin 3 milliGRAM(s) Oral at bedtime PRN Insomnia  ondansetron Injectable 4 milliGRAM(s) IV Push every 8 hours PRN Nausea and/or Vomiting      FAMILY HISTORY:      SOCIAL HISTORY: No EtOH, no tobacco    REVIEW OF SYSTEMS:  +dyspnea, sob, on NC  no chest pain, abdominal pain, constipation or diarreha  no pain in joints or mm  +bruising        T(F): 98 (11-13-23 @ 21:18), Max: 98 (11-13-23 @ 21:18)  HR: 86 (11-14-23 @ 01:36)  BP: 134/66 (11-13-23 @ 21:18)  RR: 20 (11-13-23 @ 21:18)  SpO2: 99% (11-14-23 @ 01:36)  Wt(kg): --    GENERAL: NAD,   HEAD:  Atraumatic, Normocephalic  EYES: EOMI,  CHEST/LUNG: +wheezing, dyspnea  HEART: Regular rate and rhythm;   ABDOMEN: Soft, Nontender,  EXTREMITIES: no edema  SKIN: bruising on hands                           8.2    10.25 )-----------( 154      ( 14 Nov 2023 07:03 )             25.7       11-14    139  |  102  |  68<H>  ----------------------------<  93  5.4<H>   |  31  |  1.41<H>    Ca    8.5      14 Nov 2023 07:03  Phos  3.0     11-14  Mg     2.4     11-14    TPro  5.9<L>  /  Alb  2.5<L>  /  TBili  0.6  /  DBili  x   /  AST  37  /  ALT  27  /  AlkPhos  122<H>  11-14      Magnesium: 2.4 mg/dL (11-14 @ 07:03)  Phosphorus: 3.0 mg/dL (11-14 @ 07:03)          .Bronchial RIGHT LOWER LOBE BAL  11-09 @ 12:30   Normal Respiratory Marguerite present  --    Rare polymorphonuclear leukocytes per low power field  Few Squamous epithelial cells per low power field  Few Gram positive cocci in pairs per oil power field      .Blood None  11-04 @ 04:21   No growth at 5 days  --  --      .Sputum Sputum  11-03 @ 14:16   Normal Respiratory Marguerite present  --    Rare Squamous epithelial cells per low power field  No polymorphonuclear cells seen per low power field  Rare Gram positive cocci in pairs per oil power field  Rare Gram Negative Rods per oil power field      Clean Catch Clean Catch (Midstream)  11-02 @ 16:40   No growth  --  --      .Blood Blood-Peripheral  11-02 @ 14:47   No growth at 5 days  --  --

## 2023-11-14 NOTE — PROGRESS NOTE ADULT - ASSESSMENT
HPI: Pt is a 83y old Male with a PMH of HTN, HPL, Chronic anemia, COPD, lung CA follows at [facility] [place] (last chemotherapy and RT 05/2023, not currently receiving chemo treatment), s/p right upper lobectomy, AAA, Hypothyroidism, CKD stage IIIa and others has been in ED by his pulmonologist with c/o shortness of breath, dyspnea on minimal exertion, and cough. Reportedly, he had CT of the chest performed 10/3 which demonstrated consolidation, was started on augmentin/prednisone/azithro 10/6 x 1 week with worsening symptoms. Hospital course includes RLL PNA with possible radiation pneumonitis and /or aspergilloma 2/2 immunocompromised status. Pt is to undergo a bronchoscopy today. Palliative medicine Consult to further establish GOC  11/9/23 Seen and examined at bedside with no family present. Pt denies any complaints. Is able to provide a limited hx however does state he is scheduled for bronchoscopy today.     Assessment and Plan:    1) Resp failure  -CT chest=  Right upper lobectomy.  Larger right lower lobe thick-walled cavity which may be infectious   and/or residual of reported treated tumor. 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  Consolidation within the right lower and middle lobe compatible with   radiation change.  Multiple indistinct nodules in the right lungwhich may be infectious or   radiation pneumonitis.  -Bronch today  -Pulm eval noted  -H/O Lung cancer  -S/P lobectomy/chemo  -Cont abx/nebs as per medicine    2) Debility  -Weakness  -Cancer  -S/P recent  chemo  -Poor PO intake  -Severe protein geo malnutrition  -Nutrition eval  -PT eval    3) CKD  -Creat>1.9  -Hydration as tolerated  -Supportive care    4) Advanced Directives  -Pt without capacity  -Wife Anthony named HCP  -MOLST DNR/DNI/Trial non invasive  -GOC with Klarissa  -Disch home when medically stable  -Will sign off    Time Spent: 35 minutes including the care, coordination and counseling of this patient, 50% of which was spent coordinating and counseling.

## 2023-11-14 NOTE — PROGRESS NOTE ADULT - ASSESSMENT
84 yo male with hx of Lung cancer s/p lobectomy., chemotherapy  now presenting with sob and recurrent fevers treated with abx of PNA w fu of CKD    CHRISTINA on CKD Cr 2 --> 1.4    Prerenal +ATN w hypotension while on ARB     Cr  stable, back to baseline   Keep near even, no further IVF needed    avoid all ACE/ARB for now - resume when stable     no IV contrast     Hyperkalemia  - lokelma PRN     low K diet     ensure good BM       HTN    Oral anti-htn  Avoiding CCB     dc w staff

## 2023-11-14 NOTE — PROGRESS NOTE ADULT - SUBJECTIVE AND OBJECTIVE BOX
Date of service: 11-14-23 @ 07:33    Lying in bed in NAD  Lethargic  Noted with dry cough  No SOB at rest    ROS: no fever or chills; no HA, no abdominal pain, poorly verbal    MEDICATIONS  (STANDING):  albuterol/ipratropium for Nebulization 3 milliLiter(s) Nebulizer every 6 hours  AQUAPHOR (petrolatum Ointment) 1 Application(s) Topical two times a day  atorvastatin 40 milliGRAM(s) Oral at bedtime  dextrose 5%. 1000 milliLiter(s) (50 mL/Hr) IV Continuous <Continuous>  dextrose 5%. 1000 milliLiter(s) (100 mL/Hr) IV Continuous <Continuous>  dextrose 50% Injectable 25 Gram(s) IV Push once  dextrose 50% Injectable 12.5 Gram(s) IV Push once  dextrose 50% Injectable 25 Gram(s) IV Push once  doxycycline IVPB 100 milliGRAM(s) IV Intermittent every 12 hours  ferrous    sulfate 325 milliGRAM(s) Oral daily  fluconAZOLE IVPB 100 milliGRAM(s) IV Intermittent every 24 hours  fluticasone furoate/umeclidinium/vilanterol 100-62.5-25 MICROgram(s) Inhaler 1 Puff(s) Inhalation daily  folic acid 1 milliGRAM(s) Oral daily  glucagon  Injectable 1 milliGRAM(s) IntraMuscular once  hydrALAZINE 10 milliGRAM(s) Oral two times a day  insulin glargine Injectable (LANTUS) 10 Unit(s) SubCutaneous at bedtime  insulin lispro (ADMELOG) corrective regimen sliding scale   SubCutaneous at bedtime  insulin lispro (ADMELOG) corrective regimen sliding scale   SubCutaneous three times a day before meals  insulin lispro Injectable (ADMELOG) 4 Unit(s) SubCutaneous three times a day before meals  levothyroxine 12.5 MICROGram(s) Oral daily  meropenem Injectable 1000 milliGRAM(s) IV Push every 12 hours  predniSONE   Tablet 20 milliGRAM(s) Oral daily    Vital Signs Last 24 Hrs  T(C): 36.7 (13 Nov 2023 21:18), Max: 36.7 (13 Nov 2023 21:18)  T(F): 98 (13 Nov 2023 21:18), Max: 98 (13 Nov 2023 21:18)  HR: 86 (14 Nov 2023 01:36) (78 - 112)  BP: 134/66 (13 Nov 2023 21:18) (116/53 - 137/68)  BP(mean): --  RR: 20 (13 Nov 2023 21:18) (18 - 20)  SpO2: 99% (14 Nov 2023 01:36) (94% - 99%)    Parameters below as of 14 Nov 2023 01:36  Patient On (Oxygen Delivery Method): nasal cannula     Physical exam:    Constitutional:  No acute distress  HEENT: NC/AT, EOMI, PERRLA, conjunctivae clear; ears and nose atraumatic  Neck: supple; thyroid not palpable  Back: no tenderness  Respiratory: respiratory effort normal; few crackles at bases  Cardiovascular: S1S2 regular, no murmurs  Abdomen: soft, not tender, not distended, positive BS  Genitourinary: no suprapubic tenderness  Lymphatic: no LN palpable  Musculoskeletal: no muscle tenderness, no joint swelling or tenderness  Extremities: no pedal edema  Left lower leg laceration dressed  Neurological/ Psychiatric: AxOx3, moving all extremities  Skin: no rashes; no palpable lesions    Labs: reviewed                        7.7    10.37 )-----------( 150      ( 13 Nov 2023 06:30 )             23.7     11-13    137  |  103  |  63<H>  ----------------------------<  186<H>  5.5<H>   |  32<H>  |  1.36<H>    Ca    8.6      13 Nov 2023 06:30  Phos  3.1     11-13  Mg     2.4     11-13    TPro  6.7  /  Alb  3.0<L>  /  TBili  0.7  /  DBili  x   /  AST  40<H>  /  ALT  26  /  AlkPhos  115  11-12    C-Reactive Protein, Serum: 34 mg/L (11-13-23 @ 06:30)  C-Reactive Protein, Serum: 46 mg/L (11-12-23 @ 06:55)  C-Reactive Protein, Serum: 70 mg/L (11-11-23 @ 06:31)  C-Reactive Protein, Serum: 106 mg/L (11-10-23 @ 06:19)  C-Reactive Protein, Serum: 163 mg/L (11-08-23 @ 06:01)  Ferritin: 1082 ng/mL (11-08-23 @ 06:01)  C-Reactive Protein, Serum: 141 mg/L (11-07-23 @ 07:11)                        8.4    13.53 )-----------( 189      ( 12 Nov 2023 06:55 )             25.3     11-12    140  |  106  |  59<H>  ----------------------------<  156<H>  5.4<H>   |  29  |  1.41<H>    Ca    9.2      12 Nov 2023 06:55  Phos  2.7     11-12  Mg     2.6     11-12    TPro  6.7  /  Alb  3.0<L>  /  TBili  0.7  /  DBili  x   /  AST  40<H>  /  ALT  26  /  AlkPhos  115  11-12    C-Reactive Protein, Serum: 70 mg/L (11-11-23 @ 06:31)  C-Reactive Protein, Serum: 106 mg/L (11-10-23 @ 06:19)  C-Reactive Protein, Serum: 163 mg/L (11-08-23 @ 06:01)  Ferritin: 1082 ng/mL (11-08-23 @ 06:01)  C-Reactive Protein, Serum: 141 mg/L (11-07-23 @ 07:11)                        7.8    10.70 )-----------( 161      ( 11 Nov 2023 06:31 )             24.4     11-11    139  |  106  |  73<H>  ----------------------------<  211<H>  5.1   |  29  |  1.48<H>    Ca    9.1      11 Nov 2023 06:31  Phos  2.9     11-11  Mg     2.7     11-11    TPro  5.9<L>  /  Alb  2.0<L>  /  TBili  0.3  /  DBili  x   /  AST  39<H>  /  ALT  30  /  AlkPhos  111  11-10    C-Reactive Protein, Serum: 106 mg/L (11-10-23 @ 06:19)  C-Reactive Protein, Serum: 163 mg/L (11-08-23 @ 06:01)  Ferritin: 1082 ng/mL (11-08-23 @ 06:01)  C-Reactive Protein, Serum: 141 mg/L (11-07-23 @ 07:11)                        8.2    11.74 )-----------( 259      ( 03 Nov 2023 07:15 )             25.2     11-03    133<L>  |  102  |  35<H>  ----------------------------<  205<H>  4.4   |  22  |  1.20    Ca    8.3<L>      03 Nov 2023 07:15    TPro  6.9  /  Alb  2.0<L>  /  TBili  0.5  /  DBili  x   /  AST  38<H>  /  ALT  50  /  AlkPhos  130<H>  11-02     LIVER FUNCTIONS - ( 02 Nov 2023 14:47 )  Alb: 2.0 g/dL / Pro: 6.9 gm/dL / ALK PHOS: 130 U/L / ALT: 50 U/L / AST: 38 U/L / GGT: x           Urinalysis (11-02 @ 16:40)  Urine Appearance: Clear  Protein, Urine: 100 mg/dL  Urine Microscopic-Add On (NC) (11-02 @ 16:40)  White Blood Cell - Urine: 0 /HPF  Red Blood Cell - Urine: 0 /HPF    (11-02 @ 14:47)  NotDete    Culture - Acid Fast - Bronchial w/Smear (collected 09 Nov 2023 12:30)  Source: .Bronchial RIGHT LOWER LOBE BAL  Preliminary Report (11 Nov 2023 15:08):    Culture is being performed.    Culture - Fungal, Bronchial (collected 09 Nov 2023 12:30)  Source: .Bronchial RIGHT LOWER LOBE BAL  Preliminary Report (13 Nov 2023 12:07):    Rare Yeast    Culture - Bronchial (collected 09 Nov 2023 12:30)  Source: .Bronchial RIGHT LOWER LOBE BAL  Gram Stain (09 Nov 2023 23:14):    Rare polymorphonuclear leukocytes per low power field    Few Squamous epithelial cells per low power field    Few Gram positive cocci in pairs per oil power field  Final Report (11 Nov 2023 16:38):    Normal Respiratory Marguerite present    Culture - Blood (collected 04 Nov 2023 04:21)  Source: .Blood None  Final Report (09 Nov 2023 09:00):    No growth at 5 days    Culture - Blood (collected 04 Nov 2023 04:21)  Source: .Blood None  Final Report (09 Nov 2023 09:00):    No growth at 5 days    Radiology: all available radiological tests reviewed    < from: CT Chest No Cont (11.02.23 @ 16:04) >  Right upper lobectomy.  Larger right lower lobe thick-walled cavity which may be infectious   and/or residua of reported treated tumor. 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  Consolidation within the right lower and middle lobe compatible with radiation change.  Multiple indistinct nodules in the right lungwhich may be infectious or radiation pneumonitis.  < end of copied text >    < from: CT Abdomen and Pelvis No Cont (11.08.23 @ 19:01) >  No hydronephrosis.  New patchy opacities in the right lung base. Small right pleural effusion.  < end of copied text >    < from: CT Chest No Cont (11.13.23 @ 10:04) >  IMPRESSION: Right lung large areas of consolidation new since November 2, 2023 likely pneumonia.    Right apical previously described large cyst unchanged in size since   November 2, 2023 containing internal air and increasing internal opacification since November 2, 2023 as described above.     A few left lung ill-defined nonspecific groundglass opacities.   Differential diagnosis include but is not limited to   infectious/inflammatory etiologies and or pulmonary edema.  Small left pleural effusion new since November 8, 2023.  < end of copied text >      Advanced directives addressed: full resuscitation

## 2023-11-14 NOTE — PROGRESS NOTE ADULT - SUBJECTIVE AND OBJECTIVE BOX
HOSPITALIST ATTENDING PROGRESS NOTE    Chart and meds reviewed.  Patient seen and examined.    CC: PNA    Subjective: Feels well, still coughing, on 02 but non compliant. No chest pain. Tolerating some po.     All other systems reviewed and found to be negative with the exception of what has been described above.    MEDICATIONS  (STANDING):  albuterol/ipratropium for Nebulization 3 milliLiter(s) Nebulizer every 6 hours  AQUAPHOR (petrolatum Ointment) 1 Application(s) Topical two times a day  atorvastatin 40 milliGRAM(s) Oral at bedtime  dextrose 5%. 1000 milliLiter(s) (100 mL/Hr) IV Continuous <Continuous>  dextrose 5%. 1000 milliLiter(s) (50 mL/Hr) IV Continuous <Continuous>  dextrose 50% Injectable 12.5 Gram(s) IV Push once  dextrose 50% Injectable 25 Gram(s) IV Push once  dextrose 50% Injectable 25 Gram(s) IV Push once  doxycycline IVPB 100 milliGRAM(s) IV Intermittent every 12 hours  ferrous    sulfate 325 milliGRAM(s) Oral daily  fluconAZOLE IVPB 100 milliGRAM(s) IV Intermittent every 24 hours  fluticasone furoate/umeclidinium/vilanterol 100-62.5-25 MICROgram(s) Inhaler 1 Puff(s) Inhalation daily  folic acid 1 milliGRAM(s) Oral daily  glucagon  Injectable 1 milliGRAM(s) IntraMuscular once  hydrALAZINE 10 milliGRAM(s) Oral two times a day  insulin glargine Injectable (LANTUS) 10 Unit(s) SubCutaneous at bedtime  insulin lispro (ADMELOG) corrective regimen sliding scale   SubCutaneous three times a day before meals  insulin lispro (ADMELOG) corrective regimen sliding scale   SubCutaneous at bedtime  insulin lispro Injectable (ADMELOG) 4 Unit(s) SubCutaneous three times a day before meals  levothyroxine 12.5 MICROGram(s) Oral daily  meropenem Injectable 1000 milliGRAM(s) IV Push every 12 hours  predniSONE   Tablet 20 milliGRAM(s) Oral daily    MEDICATIONS  (PRN):  acetaminophen     Tablet .. 650 milliGRAM(s) Oral every 6 hours PRN Temp greater or equal to 38C (100.4F), Mild Pain (1 - 3)  aluminum hydroxide/magnesium hydroxide/simethicone Suspension 30 milliLiter(s) Oral every 4 hours PRN Dyspepsia  benzonatate 100 milliGRAM(s) Oral three times a day PRN Cough  dextrose Oral Gel 15 Gram(s) Oral once PRN Blood Glucose LESS THAN 70 milliGRAM(s)/deciliter  melatonin 3 milliGRAM(s) Oral at bedtime PRN Insomnia  ondansetron Injectable 4 milliGRAM(s) IV Push every 8 hours PRN Nausea and/or Vomiting    ICU Vital Signs Last 24 Hrs  T(C): 36.2 (14 Nov 2023 09:07), Max: 36.7 (13 Nov 2023 21:18)  T(F): 97.2 (14 Nov 2023 09:07), Max: 98 (13 Nov 2023 21:18)  HR: 101 (14 Nov 2023 09:07) (79 - 112)  BP: 145/77 (14 Nov 2023 09:07) (116/53 - 145/77)  RR: 18 (14 Nov 2023 09:07) (18 - 20)  SpO2: 98% (14 Nov 2023 09:07) (94% - 99%)  GEN: Ill, Frail  HEENT:  pupils equal and reactive, EOMI, no oropharyngeal lesions, erythema, exudates, oral thrush  NECK:   supple, no carotid bruits, no palpable lymph nodes, no thyromegaly  CV:  +S1, +S2, regular, no murmurs or rubs  RESP:   lungs clear to auscultation bilaterally, no wheezing, rales, rhonchi, good air entry bilaterally  BREAST:  not examined  GI:  abdomen soft, non-tender, non-distended, normal BS, no bruits, no abdominal masses, no palpable masses  RECTAL:  not examined  :  not examined  MSK:   normal muscle tone, no atrophy, no rigidity, no contractions  EXT:  no clubbing, no cyanosis, no edema, no calf pain, swelling or erythema  VASCULAR:  pulses equal and symmetric in the upper and lower extremities  NEURO:  AAOX2-3, no focal neurological deficits, follows all commands, able to move extremities spontaneously  SKIN:  no ulcers, lesions or rashes    LABS:                          8.2    10.25 )-----------( 154      ( 14 Nov 2023 07:03 )             25.7     11-14    139  |  102  |  68<H>  ----------------------------<  93  5.4<H>   |  31  |  1.41<H>    Ca    8.5      14 Nov 2023 07:03  Phos  3.0     11-14  Mg     2.4     11-14    TPro  5.9<L>  /  Alb  2.5<L>  /  TBili  0.6  /  DBili  x   /  AST  37  /  ALT  27  /  AlkPhos  122<H>  11-14      LIVER FUNCTIONS - ( 14 Nov 2023 07:03 )  Alb: 2.5 g/dL / Pro: 5.9 gm/dL / ALK PHOS: 122 U/L / ALT: 27 U/L / AST: 37 U/L / GGT: x             Urinalysis Basic - ( 14 Nov 2023 07:03 )  Color: x / Appearance: x / SG: x / pH: x  Gluc: 93 mg/dL / Ketone: x  / Bili: x / Urobili: x   Blood: x / Protein: x / Nitrite: x   Leuk Esterase: x / RBC: x / WBC x   Sq Epi: x / Non Sq Epi: x / Bacteria: x     CT Chest No Cont (11.13.23 @ 10:04) >    IMPRESSION: Right lung large areas of consolidation new since November 2, 2023 likely pneumonia.    Right apical previously described large cyst unchanged in size since   November 2, 2023 containing internal air and increasing internal   opacification since November 2, 2023 as described above. Continued   follow-up is recommended.    A few left lung ill-defined nonspecific groundglass opacities.   Differential diagnosis include but is not limited to   infectious/inflammatory etiologies and or pulmonary edema.    Small left pleural effusion new since November 8, 2023.

## 2023-11-14 NOTE — PROGRESS NOTE ADULT - ASSESSMENT
83y old Male with PMH HTN, HPL, Chronic anemia, COPD, lung CA follows at [facility] [place] (last chemotherapy and RT 05/2023, not currently receiving chemo treatment), s/p right upper lobectomy, AAA, Hypothyroidism, CKD stage IIIa referred by me to ed for continued sob, cough despite abx treatment found to have large lower lobe thick walled cavity with worsening findings on ct despite treatment with levaquin  1. 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 discussed with wife.  -started on voriconazole. surgery is more defitive therapy however he is high surgical risk  -reviewed imaging from [place] supplied by wife - it looks like he has had blebs in the past and these are what are infected - they look half filled with fluid with thicker borders, which would argue against fungal infection/mrsa as there is no necrosis involved in its pathogenesis  -s/p bronch. follow up results. growing few yeast today  -repeat ct wo contrast reveals worsening consolidations and filling in of bleb. this may suggest inadqueate antibiotics, continued aspiration or rapidly progressive cancer  -spoke to id and hospitalist, switch to doxy, meropenem  -change to fluconazole  2. pneumonia: as above  -supplemental o2  -speech/swallow eval noted  -aspiration precautions  3. copd: continue trelegy  4. ? radiation induced pneumonitis: titrate down  steroids as he has worsening infection and progressive nature of findings argues against pneumonitis  5. anemia: received 1 unit prbc  6. left leg laceration: s/p suture, now bandaged. continue wound care 83y old Male with PMH HTN, HPL, Chronic anemia, COPD, lung CA [place] [facility] [place] and RT 05/2023, not currently receiving chemo treatment), s/p right upper lobectomy, AAA, Hypothyroidism, CKD stage IIIa referred by me to ed for continued sob, cough despite abx treatment found to have large lower lobe thick walled cavity with worsening findings on ct despite treatment with levaquin  1. 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 discussed with wife.  -started on voriconazole. surgery is more defitive therapy however he is high surgical risk  -reviewed imaging from [place] supplied by wife - it looks like he has had blebs in the past and these are what are infected - they look half filled with fluid with thicker borders, which would argue against fungal infection/mrsa as there is no necrosis involved in its pathogenesis  -s/p bronch. follow up results. growing few yeast  -repeat ct wo contrast reveals worsening consolidations and filling in of bleb. this may suggest inadqueate antibiotics, continued aspiration or rapidly progressive cancer  -spoke to id and hospitalist, switched to doxy, meropenem  -changed to fluconazole  2. pneumonia: as above  -supplemental o2  -speech/swallow eval noted  -aspiration precautions  3. copd: continue trelegy  4. ? radiation induced pneumonitis: titrate down  steroids as he has worsening infection and progressive nature of findings argues against pneumonitis  5. anemia: received 1 unit prbc  6. left leg laceration: s/p suture, now bandaged. continue wound care

## 2023-11-15 LAB
ALBUMIN SERPL ELPH-MCNC: 2.2 G/DL — LOW (ref 3.3–5)
ALBUMIN SERPL ELPH-MCNC: 2.2 G/DL — LOW (ref 3.3–5)
ALP SERPL-CCNC: 110 U/L — SIGNIFICANT CHANGE UP (ref 40–120)
ALP SERPL-CCNC: 110 U/L — SIGNIFICANT CHANGE UP (ref 40–120)
ALT FLD-CCNC: 23 U/L — SIGNIFICANT CHANGE UP (ref 12–78)
ALT FLD-CCNC: 23 U/L — SIGNIFICANT CHANGE UP (ref 12–78)
ANION GAP SERPL CALC-SCNC: 4 MMOL/L — LOW (ref 5–17)
ANION GAP SERPL CALC-SCNC: 4 MMOL/L — LOW (ref 5–17)
AST SERPL-CCNC: 37 U/L — SIGNIFICANT CHANGE UP (ref 15–37)
AST SERPL-CCNC: 37 U/L — SIGNIFICANT CHANGE UP (ref 15–37)
BILIRUB SERPL-MCNC: 0.6 MG/DL — SIGNIFICANT CHANGE UP (ref 0.2–1.2)
BILIRUB SERPL-MCNC: 0.6 MG/DL — SIGNIFICANT CHANGE UP (ref 0.2–1.2)
BUN SERPL-MCNC: 72 MG/DL — HIGH (ref 7–23)
BUN SERPL-MCNC: 72 MG/DL — HIGH (ref 7–23)
CALCIUM SERPL-MCNC: 8.3 MG/DL — LOW (ref 8.5–10.1)
CALCIUM SERPL-MCNC: 8.3 MG/DL — LOW (ref 8.5–10.1)
CHLORIDE SERPL-SCNC: 104 MMOL/L — SIGNIFICANT CHANGE UP (ref 96–108)
CHLORIDE SERPL-SCNC: 104 MMOL/L — SIGNIFICANT CHANGE UP (ref 96–108)
CO2 SERPL-SCNC: 32 MMOL/L — HIGH (ref 22–31)
CO2 SERPL-SCNC: 32 MMOL/L — HIGH (ref 22–31)
CREAT SERPL-MCNC: 1.26 MG/DL — SIGNIFICANT CHANGE UP (ref 0.5–1.3)
CREAT SERPL-MCNC: 1.26 MG/DL — SIGNIFICANT CHANGE UP (ref 0.5–1.3)
EGFR: 57 ML/MIN/1.73M2 — LOW
EGFR: 57 ML/MIN/1.73M2 — LOW
GLUCOSE BLDC GLUCOMTR-MCNC: 139 MG/DL — HIGH (ref 70–99)
GLUCOSE BLDC GLUCOMTR-MCNC: 139 MG/DL — HIGH (ref 70–99)
GLUCOSE BLDC GLUCOMTR-MCNC: 207 MG/DL — HIGH (ref 70–99)
GLUCOSE BLDC GLUCOMTR-MCNC: 207 MG/DL — HIGH (ref 70–99)
GLUCOSE BLDC GLUCOMTR-MCNC: 319 MG/DL — HIGH (ref 70–99)
GLUCOSE BLDC GLUCOMTR-MCNC: 319 MG/DL — HIGH (ref 70–99)
GLUCOSE BLDC GLUCOMTR-MCNC: 66 MG/DL — LOW (ref 70–99)
GLUCOSE BLDC GLUCOMTR-MCNC: 66 MG/DL — LOW (ref 70–99)
GLUCOSE BLDC GLUCOMTR-MCNC: 90 MG/DL — SIGNIFICANT CHANGE UP (ref 70–99)
GLUCOSE BLDC GLUCOMTR-MCNC: 90 MG/DL — SIGNIFICANT CHANGE UP (ref 70–99)
GLUCOSE SERPL-MCNC: 61 MG/DL — LOW (ref 70–99)
GLUCOSE SERPL-MCNC: 61 MG/DL — LOW (ref 70–99)
HCT VFR BLD CALC: 23.6 % — LOW (ref 39–50)
HCT VFR BLD CALC: 23.6 % — LOW (ref 39–50)
HGB BLD-MCNC: 7.6 G/DL — LOW (ref 13–17)
HGB BLD-MCNC: 7.6 G/DL — LOW (ref 13–17)
MCHC RBC-ENTMCNC: 30.8 PG — SIGNIFICANT CHANGE UP (ref 27–34)
MCHC RBC-ENTMCNC: 30.8 PG — SIGNIFICANT CHANGE UP (ref 27–34)
MCHC RBC-ENTMCNC: 32.2 GM/DL — SIGNIFICANT CHANGE UP (ref 32–36)
MCHC RBC-ENTMCNC: 32.2 GM/DL — SIGNIFICANT CHANGE UP (ref 32–36)
MCV RBC AUTO: 95.5 FL — SIGNIFICANT CHANGE UP (ref 80–100)
MCV RBC AUTO: 95.5 FL — SIGNIFICANT CHANGE UP (ref 80–100)
PLATELET # BLD AUTO: 139 K/UL — LOW (ref 150–400)
PLATELET # BLD AUTO: 139 K/UL — LOW (ref 150–400)
POTASSIUM SERPL-MCNC: 4.9 MMOL/L — SIGNIFICANT CHANGE UP (ref 3.5–5.3)
POTASSIUM SERPL-MCNC: 4.9 MMOL/L — SIGNIFICANT CHANGE UP (ref 3.5–5.3)
POTASSIUM SERPL-SCNC: 4.9 MMOL/L — SIGNIFICANT CHANGE UP (ref 3.5–5.3)
POTASSIUM SERPL-SCNC: 4.9 MMOL/L — SIGNIFICANT CHANGE UP (ref 3.5–5.3)
PROT SERPL-MCNC: 5.3 GM/DL — LOW (ref 6–8.3)
PROT SERPL-MCNC: 5.3 GM/DL — LOW (ref 6–8.3)
RBC # BLD: 2.47 M/UL — LOW (ref 4.2–5.8)
RBC # BLD: 2.47 M/UL — LOW (ref 4.2–5.8)
RBC # FLD: 15.9 % — HIGH (ref 10.3–14.5)
RBC # FLD: 15.9 % — HIGH (ref 10.3–14.5)
SODIUM SERPL-SCNC: 140 MMOL/L — SIGNIFICANT CHANGE UP (ref 135–145)
SODIUM SERPL-SCNC: 140 MMOL/L — SIGNIFICANT CHANGE UP (ref 135–145)
WBC # BLD: 7.3 K/UL — SIGNIFICANT CHANGE UP (ref 3.8–10.5)
WBC # BLD: 7.3 K/UL — SIGNIFICANT CHANGE UP (ref 3.8–10.5)
WBC # FLD AUTO: 7.3 K/UL — SIGNIFICANT CHANGE UP (ref 3.8–10.5)
WBC # FLD AUTO: 7.3 K/UL — SIGNIFICANT CHANGE UP (ref 3.8–10.5)

## 2023-11-15 PROCEDURE — 99232 SBSQ HOSP IP/OBS MODERATE 35: CPT

## 2023-11-15 RX ADMIN — FLUTICASONE FUROATE, UMECLIDINIUM BROMIDE AND VILANTEROL TRIFENATATE 1 PUFF(S): 200; 62.5; 25 POWDER RESPIRATORY (INHALATION) at 09:33

## 2023-11-15 RX ADMIN — Medication 3 MILLILITER(S): at 13:08

## 2023-11-15 RX ADMIN — Medication 110 MILLIGRAM(S): at 09:56

## 2023-11-15 RX ADMIN — ATORVASTATIN CALCIUM 40 MILLIGRAM(S): 80 TABLET, FILM COATED ORAL at 22:43

## 2023-11-15 RX ADMIN — MEROPENEM 1000 MILLIGRAM(S): 1 INJECTION INTRAVENOUS at 11:33

## 2023-11-15 RX ADMIN — Medication 3 MILLILITER(S): at 21:49

## 2023-11-15 RX ADMIN — Medication 4 UNIT(S): at 18:03

## 2023-11-15 RX ADMIN — Medication 4 UNIT(S): at 13:10

## 2023-11-15 RX ADMIN — Medication 8: at 18:03

## 2023-11-15 RX ADMIN — FLUCONAZOLE 50 MILLIGRAM(S): 150 TABLET ORAL at 18:05

## 2023-11-15 RX ADMIN — INSULIN GLARGINE 10 UNIT(S): 100 INJECTION, SOLUTION SUBCUTANEOUS at 22:44

## 2023-11-15 RX ADMIN — Medication 1 APPLICATION(S): at 09:56

## 2023-11-15 RX ADMIN — Medication 3 MILLILITER(S): at 09:29

## 2023-11-15 RX ADMIN — Medication 1 MILLIGRAM(S): at 09:55

## 2023-11-15 RX ADMIN — Medication 110 MILLIGRAM(S): at 22:43

## 2023-11-15 RX ADMIN — Medication 12.5 MICROGRAM(S): at 06:34

## 2023-11-15 RX ADMIN — MEROPENEM 1000 MILLIGRAM(S): 1 INJECTION INTRAVENOUS at 22:44

## 2023-11-15 RX ADMIN — Medication 1 APPLICATION(S): at 22:42

## 2023-11-15 RX ADMIN — Medication 20 MILLIGRAM(S): at 09:55

## 2023-11-15 RX ADMIN — Medication 10 MILLIGRAM(S): at 22:44

## 2023-11-15 RX ADMIN — Medication 325 MILLIGRAM(S): at 09:55

## 2023-11-15 NOTE — PROGRESS NOTE ADULT - SUBJECTIVE AND OBJECTIVE BOX
HOSPITALIST ATTENDING PROGRESS NOTE    Chart and meds reviewed.  Patient seen and examined.    CC: Right Lung pneumonia    Subjective: Still tired as per patient. Coughing. No fever.     All other systems reviewed and found to be negative with the exception of what has been described above.    MEDICATIONS  (STANDING):  albuterol/ipratropium for Nebulization 3 milliLiter(s) Nebulizer every 6 hours  AQUAPHOR (petrolatum Ointment) 1 Application(s) Topical two times a day  atorvastatin 40 milliGRAM(s) Oral at bedtime  dextrose 5%. 1000 milliLiter(s) (100 mL/Hr) IV Continuous <Continuous>  dextrose 5%. 1000 milliLiter(s) (50 mL/Hr) IV Continuous <Continuous>  dextrose 50% Injectable 12.5 Gram(s) IV Push once  dextrose 50% Injectable 25 Gram(s) IV Push once  dextrose 50% Injectable 25 Gram(s) IV Push once  doxycycline IVPB 100 milliGRAM(s) IV Intermittent every 12 hours  ferrous    sulfate 325 milliGRAM(s) Oral daily  fluconAZOLE IVPB 100 milliGRAM(s) IV Intermittent every 24 hours  fluticasone furoate/umeclidinium/vilanterol 100-62.5-25 MICROgram(s) Inhaler 1 Puff(s) Inhalation daily  folic acid 1 milliGRAM(s) Oral daily  glucagon  Injectable 1 milliGRAM(s) IntraMuscular once  hydrALAZINE 10 milliGRAM(s) Oral two times a day  insulin glargine Injectable (LANTUS) 10 Unit(s) SubCutaneous at bedtime  insulin lispro (ADMELOG) corrective regimen sliding scale   SubCutaneous at bedtime  insulin lispro (ADMELOG) corrective regimen sliding scale   SubCutaneous three times a day before meals  insulin lispro Injectable (ADMELOG) 4 Unit(s) SubCutaneous three times a day before meals  levothyroxine 12.5 MICROGram(s) Oral daily  meropenem Injectable 1000 milliGRAM(s) IV Push every 12 hours  predniSONE   Tablet 20 milliGRAM(s) Oral daily    MEDICATIONS  (PRN):  acetaminophen     Tablet .. 650 milliGRAM(s) Oral every 6 hours PRN Temp greater or equal to 38C (100.4F), Mild Pain (1 - 3)  aluminum hydroxide/magnesium hydroxide/simethicone Suspension 30 milliLiter(s) Oral every 4 hours PRN Dyspepsia  benzonatate 100 milliGRAM(s) Oral three times a day PRN Cough  dextrose Oral Gel 15 Gram(s) Oral once PRN Blood Glucose LESS THAN 70 milliGRAM(s)/deciliter  meclizine 12.5 milliGRAM(s) Oral every 8 hours PRN Dizziness  melatonin 3 milliGRAM(s) Oral at bedtime PRN Insomnia  ondansetron Injectable 4 milliGRAM(s) IV Push every 8 hours PRN Nausea and/or Vomiting      ICU Vital Signs Last 24 Hrs  T(C): 36.3 (15 Nov 2023 09:19), Max: 36.8 (14 Nov 2023 21:40)  T(F): 97.4 (15 Nov 2023 09:19), Max: 98.3 (14 Nov 2023 21:40)  HR: 96 (15 Nov 2023 09:19) (74 - 109)  BP: 120/54 (15 Nov 2023 09:19) (112/62 - 151/61)  RR: 19 (15 Nov 2023 09:19) (18 - 19)  SpO2: 97% (15 Nov 2023 09:19) (96% - 100%)    GEN: Lethargic, Frail  HEENT:  pupils equal and reactive, EOMI, no oropharyngeal lesions, erythema, exudates, oral thrush  NECK:   supple, no carotid bruits, no palpable lymph nodes, no thyromegaly  CV:  +S1, +S2, regular, no murmurs or rubs  RESP:   lungs clear to auscultation bilaterally, no wheezing, rales, rhonchi, good air entry bilaterally  BREAST:  not examined  GI:  abdomen soft, non-tender, non-distended, normal BS, no bruits, no abdominal masses, no palpable masses  RECTAL:  not examined  :  not examined  MSK:   normal muscle tone, no atrophy, no rigidity, no contractions  EXT:  no clubbing, no cyanosis, no edema, no calf pain, swelling or erythema  VASCULAR:  pulses equal and symmetric in the upper and lower extremities  NEURO:  AAOX3, no focal neurological deficits, follows all commands, able to move extremities spontaneously  SKIN:  no ulcers, lesions or rashes    LABS:                          7.6    7.30  )-----------( 139      ( 15 Nov 2023 08:00 )             23.6     11-15    140  |  104  |  72<H>  ----------------------------<  61<L>  4.9   |  32<H>  |  1.26    Ca    8.3<L>      15 Nov 2023 08:00  Phos  3.0     11-14  Mg     2.4     11-14    TPro  5.3<L>  /  Alb  2.2<L>  /  TBili  0.6  /  DBili  x   /  AST  37  /  ALT  23  /  AlkPhos  110  11-15    LIVER FUNCTIONS - ( 15 Nov 2023 08:00 )  Alb: 2.2 g/dL / Pro: 5.3 gm/dL / ALK PHOS: 110 U/L / ALT: 23 U/L / AST: 37 U/L / GGT: x           Urinalysis Basic - ( 15 Nov 2023 08:00 )  Color: x / Appearance: x / SG: x / pH: x  Gluc: 61 mg/dL / Ketone: x  / Bili: x / Urobili: x   Blood: x / Protein: x / Nitrite: x   Leuk Esterase: x / RBC: x / WBC x   Sq Epi: x / Non Sq Epi: x / Bacteria: x     CT Chest No Cont (11.13.23 @ 10:04) >  IMPRESSION: Right lung large areas of consolidation new since November 2, 2023 likely pneumonia.    Right apical previously described large cyst unchanged in size since   November 2, 2023 containing internal air and increasing internal   opacification since November 2, 2023 as described above. Continued   follow-up is recommended.    A few left lung ill-defined nonspecific groundglass opacities.   Differential diagnosis include but is not limited to   infectious/inflammatory etiologies and or pulmonary edema.    Small left pleural effusion new since November 8, 2023.

## 2023-11-15 NOTE — PROGRESS NOTE ADULT - ASSESSMENT
83-year-old M with PMHx HTN, HLD, chronic anemia, COPD, lung CA s/p RUL lobectomy (follows at [facility], last chemotherapy/RT 5/2023, not currently on treatment), AAA, hypothyroidism, CKD stage IIIa sent in to ED on 11/2/23  by pulmonologist MD Olivera with c/o SOB, dyspnea on minimal exertion, and cough. 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     # Acute hypoxic respiratory failure, multifactorial   # New RLL PNA on CT 11/13 , RLL cavity s/p bronchoscopy 11/9  possible yeast  infection, less likely radiation pneumonitis , r/o  aspergilloma    - CT chest: Larger right lower lobe thick-walled cavity which may be infectious and/or residua of reported treated tumor. Spongelike material within the cavity can be seen with aspergilloma (prior to fungus ball formation). Consolidation within the right lower and middle lobe compatible with radiation change.  - leukocytosis stable (WBC 17.70 from 17.35) ---> trend down to WBC  8   - repeat CXR 11/8 - worsening of opacities over right lung   - 2 d echo 11/8 - EF 60% , no significant valvular disease   - immunoglobulin panel - all Ig wnl  - 11/9 - s/p bronchoscopy   - speech and swallow evaluation  - no aspirations  - CXR 11/11 - stable consolidations  - repeat CT chest 11/13 - new right lung consolidation  - blood cultures -no growth, sputum culture - normal respiratory  - c/w trelegy ellipta, chest pt, IS, acapella ,BIPAP as needed  - ID/pulm consult noted   - s/p IV steroids --> po prednisone taper 20 x 3 days than stop , trending down ----> 34  - s/p  voriconazole PO ,  s/p 7 days of levaquin to cover atypical/psuedomonas (allergy to PCN)   - hemoptysis persist , stop heparin   - 11/13 - IV meropenem, doxy and fluconasole started as per ID   - oncology consulted [facility]    #  Chronic anemia, likely due to  chemotherapy, worsening and iron deficiency   - decreased on AM labs from admission (Hgb 9.2-->8.2, Hct 27.8--> 25.2)   - no s/s bleeding, asymptomatic, continue to monitor/trend  - 11/8 - 1 unit PRBC due to hemoptysis and worsening of anemia  - Hb improved 8.2 --> 7.8 --> 8.4--> 7.7   - restart daily iron  - hold heparin for now     #CHRISTINA on CKD stage IIIa, improving  - Cr improving, 1.38-->1.26    # Left calf laceration from veno dines while in PACU for bronchoscopy   - surgery consult - s/p suturing  on 11/09/23   - plan to remove sutures in 7-10 days  - wound care as per surgery team    # Acute hyperglycemia, possible steroid-induced , Prediabetes with A1C 6.3   -  on AM labs,  A1c results 6.3   - consistent with Impaired Fasting Glucose, new onset DM II ruled out    # HTN   - stop losartan due to CHRISTINA,   - stop amlodipine 2.5  - hydralazine 10 bid as per nephrology team    #  HLD    - c/w  atorvastatin    # Hypothyroidism   - Levo lowered to 12.5 mcg  - Recheck in 4 weeks    # Hx lung CA s/p RUL lobectomy/chemo/RT   - f/u with [facility]     # DVT Ppx   - SCDs    # Code status   - DNR/DNI    Dispo - [facility] vs home once stable

## 2023-11-15 NOTE — PROGRESS NOTE ADULT - SUBJECTIVE AND OBJECTIVE BOX
Subjective:  breathing the same  fatigued this morning    Review of Systems:  All 10 systems reviewed in detailed and found to be negative with the exception of what has already been described above    Allergies:  penicillin (Unknown)    Meds  MEDICATIONS  (STANDING):  albuterol/ipratropium for Nebulization 3 milliLiter(s) Nebulizer every 6 hours  AQUAPHOR (petrolatum Ointment) 1 Application(s) Topical two times a day  atorvastatin 40 milliGRAM(s) Oral at bedtime  dextrose 5%. 1000 milliLiter(s) (50 mL/Hr) IV Continuous <Continuous>  dextrose 5%. 1000 milliLiter(s) (100 mL/Hr) IV Continuous <Continuous>  dextrose 50% Injectable 25 Gram(s) IV Push once  dextrose 50% Injectable 12.5 Gram(s) IV Push once  dextrose 50% Injectable 25 Gram(s) IV Push once  doxycycline IVPB 100 milliGRAM(s) IV Intermittent every 12 hours  ferrous    sulfate 325 milliGRAM(s) Oral daily  fluconAZOLE IVPB 100 milliGRAM(s) IV Intermittent every 24 hours  fluticasone furoate/umeclidinium/vilanterol 100-62.5-25 MICROgram(s) Inhaler 1 Puff(s) Inhalation daily  folic acid 1 milliGRAM(s) Oral daily  glucagon  Injectable 1 milliGRAM(s) IntraMuscular once  hydrALAZINE 10 milliGRAM(s) Oral two times a day  insulin glargine Injectable (LANTUS) 10 Unit(s) SubCutaneous at bedtime  insulin lispro (ADMELOG) corrective regimen sliding scale   SubCutaneous three times a day before meals  insulin lispro (ADMELOG) corrective regimen sliding scale   SubCutaneous at bedtime  insulin lispro Injectable (ADMELOG) 4 Unit(s) SubCutaneous three times a day before meals  levothyroxine 12.5 MICROGram(s) Oral daily  meropenem Injectable 1000 milliGRAM(s) IV Push every 12 hours  predniSONE   Tablet 20 milliGRAM(s) Oral daily    MEDICATIONS  (PRN):  acetaminophen     Tablet .. 650 milliGRAM(s) Oral every 6 hours PRN Temp greater or equal to 38C (100.4F), Mild Pain (1 - 3)  aluminum hydroxide/magnesium hydroxide/simethicone Suspension 30 milliLiter(s) Oral every 4 hours PRN Dyspepsia  benzonatate 100 milliGRAM(s) Oral three times a day PRN Cough  dextrose Oral Gel 15 Gram(s) Oral once PRN Blood Glucose LESS THAN 70 milliGRAM(s)/deciliter  meclizine 12.5 milliGRAM(s) Oral every 8 hours PRN Dizziness  melatonin 3 milliGRAM(s) Oral at bedtime PRN Insomnia  ondansetron Injectable 4 milliGRAM(s) IV Push every 8 hours PRN Nausea and/or Vomiting    Physical Exam  T(C): 36.4 (11-15-23 @ 18:00), Max: 36.8 (11-14-23 @ 21:40)  HR: 101 (11-15-23 @ 18:00) (74 - 101)  BP: 112/59 (11-15-23 @ 18:00) (112/59 - 140/60)  RR: 18 (11-15-23 @ 18:00) (18 - 19)  SpO2: 98% (11-15-23 @ 18:00) (97% - 100%)  Gen: Alert, oriented, no distress  HEENT: Anicteric sclera, moist mucous membranes  Cardio: Regular rhythm and rate, normal S1S2, no murmurs  Resp: Crackles, congested  GI: Nontender, nondistended, normoactive bowel sounds  Ext: No cyanosis, clubbing or edema  skin: + ecchymosis, left leg laceration sutured and bandaged  Neuro: Nonfocal    Labs:                        7.6    7.30  )-----------( 139      ( 15 Nov 2023 08:00 )             23.6     11-15    140  |  104  |  72<H>  ----------------------------<  61<L>  4.9   |  32<H>  |  1.26    Ca    8.3<L>      15 Nov 2023 08:00  Phos  3.0     11-14  Mg     2.4     11-14    TPro  5.3<L>  /  Alb  2.2<L>  /  TBili  0.6  /  DBili  x   /  AST  37  /  ALT  23  /  AlkPhos  110  11-15      Urinalysis Basic - ( 15 Nov 2023 08:00 )    Color: x / Appearance: x / SG: x / pH: x  Gluc: 61 mg/dL / Ketone: x  / Bili: x / Urobili: x   Blood: x / Protein: x / Nitrite: x   Leuk Esterase: x / RBC: x / WBC x   Sq Epi: x / Non Sq Epi: x / Bacteria: x    < from: CT Chest No Cont (11.02.23 @ 16:04) >  ACC: [id number] EXAM:  CT CHEST   ORDERED BY: SARAH YOO     PROCEDURE DATE:  11/02/2023          INTERPRETATION:  INDICATION: Shortness of breath, cough, lung cancer   history, last chemotherapy and radiation treatment in May 2023    TECHNIQUE: Helical acquisition images of the chest without intravenous   contrast. Maximum intensity projection images were generated.    COMPARISON: 12/29/2020 chest x-ray.    FINDINGS:    LUNGS/AIRWAYS/PLEURA: Right upper lobectomy. Right lower lobe   multiloculated thick-walled 11 cm cavity (best seen in its entirety on   601-59) containing fluid and spongelike material. Consolidation within   the middle lobe and inferior aspect of the right lower lobe with angular   borders suggesting prior radiation. Multiple indistinct nodules of   varying density in the right lower and middle lobes, mostly adjacent to   the cavity. Small branching opacities in the peripheral left upper and   lower lobes, compatible with distal airway impaction. Trace left pleural   effusion. Mild right apical pleural thickening.    LYMPH NODES/MEDIASTINUM: No lymphadenopathy.    HEART/VASCULATURE: Normal heart size. Unremarkable pericardium. Coronary   artery calcifications. Normal caliber aorta.    UPPER ABDOMEN: Unremarkable.    BONES/SOFT TISSUES: Old sternal fracture. Mild loss of height of the L1   vertebral body.      IMPRESSION:    Right upper lobectomy.    Larger right lower lobe thick-walled cavity which may be infectious   and/or residua of reported treated tumor. 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    Consolidation within the right lower and middle lobe compatible with   radiation change.    Multiple indistinct nodules in the right lungwhich may be infectious or   radiation pneumonitis.    ct lucio 10/3 uploaded and reviewed    < from: CT Abdomen and Pelvis No Cont (11.08.23 @ 19:01) >  PROCEDURE DATE:  11/08/2023          INTERPRETATION:  CLINICAL INFORMATION: Acute kidney injury. Evaluate   obstruction.    COMPARISON: CT chest 11/20/2023.    CONTRAST/COMPLICATIONS:  IV Contrast: NONE  Oral Contrast: NONE  Complications: None reported at time of study completion    PROCEDURE:  CT of the Abdomen and Pelvis was performed.  Sagittal and coronal reformats were performed.    FINDINGS:  LOWER CHEST: Small right pleural effusion. New patchy opacities in the   right lung base.    LIVER: Within normal limits.  BILE DUCTS: Normal caliber.  GALLBLADDER: Within normal limits.  SPLEEN: Within normal limits.  PANCREAS: Within normal limits.  ADRENALS: Within normal limits.  KIDNEYS/URETERS: No hydronephrosis. Vascular calcifications.    BLADDER: Within normal limits.  REPRODUCTIVE ORGANS: Prostate within normal limits.    BOWEL: No bowel obstruction. Appendix is normal. Moderate to large stool.  PERITONEUM: No ascites.  VESSELS: Atherosclerotic changes.  RETROPERITONEUM/LYMPH NODES: No lymphadenopathy.  ABDOMINAL WALL: Within normal limits.  BONES: Degenerative changes. Redemonstration of L1 compression deformity.   Right femoral ORIF.    IMPRESSION:  No hydronephrosis.    New patchy opacities in the right lung base. Small right pleural effusion.    Bedside Swallow: Trial 1  · Consistencies administered	thin liquid; pureed; regular solid  · Mode of Presentation	cup; spoon; self fed  · Positioning	upright (90 degrees)  · Oral Preparatory Phase	Within functional limits; orientation eating routines: able to feed himself with focus: as per family, pt eats rapidly: did not do so at bedside evaluation Lip seal on utensil : oral containment..  · Oral Phase	Within functional limits; immediate bolus formation: Ap transfer for liquid WFL. Mastication normally rotary-lateral with lingual sweep for collection and clearance.  l  · Pharyngeal Phase	Within functional limits; Onset of pharyngeal swallow is with age-appropriate time limits. No overt s/s aspiration at bedside.  · Comments	Micro aspiration and silent aspiration cannot be ruled out. As above, pt does have increased risk for aspiration 2/2 lung CA and  lobectomy as well as COPD and present pna (which in itself may be aspiration related). However, pt presents at bedside evaluation with no contraindication for maintaining REGULAR texture and thin liquids.  Strategies for reducing risk were demonstrated and discussed with the Pt and the family who are very supportive of the Pt.  Disc with NSg.  Will follow peripherally.    < from: CT Chest No Cont (11.13.23 @ 10:04) >    PROCEDURE DATE:  11/13/2023          INTERPRETATION:  Clinical indications: Pneumonia.    Axial CT images of the chest are obtained without intravenous   administration of contrast.    Comparison is made with the prior chest CT of November 2, 2023.    No enlarged axillary or mediastinal lymph nodes.    No pericardial effusion. Heart size is normal. Mitral annular   calcifications. Vascular calcifications with involvement of the aorta and   the coronary arteries. Aortic root calcifications.    Evaluation of the upper abdomen demonstrate partially imaged aortic stent   graft. Subcutaneous edema. Minimal perihepatic ascites.    Small left pleural effusion new since the abdominalCT of November 8, 2023. Trace right pleural effusion as on November 8, 2023.    Evaluation of the lungs demonstrate left lung base linear subsegmental   atelectasis. A few ill-defined left lung patchy nonspecific groundglass   opacities largest within the dependent portion of the left upper lobe are   new since November 2, 2023.    Status post right upper lobectomy with postoperative changes. Persistent   right apical large large cyst, part of which measures about 6.6 cm in   transverse dimension without significant interval change in size   containing air and increasing internal opacification as compared with   November 2, 2023, some of which appear slightly dense and may be due to   internal hemorrhagic components.    Extensive right mid to lower lung consolidations and ill-defined   groundglass opacities, with the largest confluent consolidation within   the mid to lower portions of the right lower lobe, majority of which   appear new since November 2, 2023 superimposed on previouslydescribed   linear opacities.    Degenerative changes of the spine. Old healed sternal fracture.    IMPRESSION: Right lung large areas of consolidation new since November 2, 2023 likely pneumonia.    Right apical previously described large cyst unchanged in size since   November 2, 2023 containing internal air and increasing internal   opacification since November 2, 2023 as described above. Continued   follow-up is recommended.    A few left lung ill-defined nonspecific groundglass opacities.   Differential diagnosis include but is not limited to   infectious/inflammatory etiologies and or pulmonary edema.    Small left pleural effusion new since November 8, 2023.      Culture - Acid Fast - Bronchial w/Smear (11.09.23 @ 12:30)   Specimen Source: .Bronchial RIGHT LOWER LOBE BAL  Acid Fast Bacilli Smear:   No acid-fast bacilli seen by fluorochrome stain  Culture Results:   Culture is being performed.  Culture - Fungal, Bronchial (11.09.23 @ 12:30)   Specimen Source: .Bronchial RIGHT LOWER LOBE BAL  Culture Results:   Rare Yeast

## 2023-11-15 NOTE — PROGRESS NOTE ADULT - SUBJECTIVE AND OBJECTIVE BOX
INTERVAL HPI/OVERNIGHT EVENTS:  Patient S&E at bedside. No o/n events,     Pt recently had scan at [facility] on 10/3/23 that when compared with June 2023 increased b/l areas of infiltrates and consolidation likely infectious/inflammatory post therapy change. RML perisutural mass again obscured by consolidation. unchanged subcarinal node, suspected mets.   At that time pt was started on medrol dose pack and Z pack.   Planned to repeat CT in 3 mo during previous visit.     PAST MEDICAL & SURGICAL HISTORY:  COPD (chronic obstructive pulmonary disease)      Lung cancer      Anemia of chronic disease      CKD (chronic kidney disease), stage III      Hypothyroidism      AAA (abdominal aortic aneurysm)      HTN (hypertension)      HLD (hyperlipidemia)      Thrombocytopenia      S/P lobectomy of lung          FAMILY HISTORY:      VITAL SIGNS:  T(F): 97.9 (11-15-23 @ 04:34)  HR: 91 (11-15-23 @ 04:34)  BP: 135/59 (11-15-23 @ 04:34)  RR: 18 (11-14-23 @ 21:40)  SpO2: 100% (11-15-23 @ 04:34)  Wt(kg): --    PHYSICAL EXAM:    Constitutional: NAD  Eyes: EOMI, sclera non-icteric  Neck: supple, no masses, no JVD  Respiratory: CTA b/l, good air entry b/l  Cardiovascular: RRR, no M/R/G  Gastrointestinal: soft, NTND, no masses palpable, + BS, no hepatosplenomegaly  Extremities: no c/c/e  Neurological: AAOx3      MEDICATIONS  (STANDING):  albuterol/ipratropium for Nebulization 3 milliLiter(s) Nebulizer every 6 hours  AQUAPHOR (petrolatum Ointment) 1 Application(s) Topical two times a day  atorvastatin 40 milliGRAM(s) Oral at bedtime  dextrose 5%. 1000 milliLiter(s) (50 mL/Hr) IV Continuous <Continuous>  dextrose 5%. 1000 milliLiter(s) (100 mL/Hr) IV Continuous <Continuous>  dextrose 50% Injectable 25 Gram(s) IV Push once  dextrose 50% Injectable 12.5 Gram(s) IV Push once  dextrose 50% Injectable 25 Gram(s) IV Push once  doxycycline IVPB 100 milliGRAM(s) IV Intermittent every 12 hours  ferrous    sulfate 325 milliGRAM(s) Oral daily  fluconAZOLE IVPB 100 milliGRAM(s) IV Intermittent every 24 hours  fluticasone furoate/umeclidinium/vilanterol 100-62.5-25 MICROgram(s) Inhaler 1 Puff(s) Inhalation daily  folic acid 1 milliGRAM(s) Oral daily  glucagon  Injectable 1 milliGRAM(s) IntraMuscular once  hydrALAZINE 10 milliGRAM(s) Oral two times a day  insulin glargine Injectable (LANTUS) 10 Unit(s) SubCutaneous at bedtime  insulin lispro (ADMELOG) corrective regimen sliding scale   SubCutaneous at bedtime  insulin lispro (ADMELOG) corrective regimen sliding scale   SubCutaneous three times a day before meals  insulin lispro Injectable (ADMELOG) 4 Unit(s) SubCutaneous three times a day before meals  levothyroxine 12.5 MICROGram(s) Oral daily  meropenem Injectable 1000 milliGRAM(s) IV Push every 12 hours  predniSONE   Tablet 20 milliGRAM(s) Oral daily    MEDICATIONS  (PRN):  acetaminophen     Tablet .. 650 milliGRAM(s) Oral every 6 hours PRN Temp greater or equal to 38C (100.4F), Mild Pain (1 - 3)  aluminum hydroxide/magnesium hydroxide/simethicone Suspension 30 milliLiter(s) Oral every 4 hours PRN Dyspepsia  benzonatate 100 milliGRAM(s) Oral three times a day PRN Cough  dextrose Oral Gel 15 Gram(s) Oral once PRN Blood Glucose LESS THAN 70 milliGRAM(s)/deciliter  meclizine 12.5 milliGRAM(s) Oral every 8 hours PRN Dizziness  melatonin 3 milliGRAM(s) Oral at bedtime PRN Insomnia  ondansetron Injectable 4 milliGRAM(s) IV Push every 8 hours PRN Nausea and/or Vomiting      Allergies    penicillin (Unknown)    Intolerances        LABS:                        8.2    10.25 )-----------( 154      ( 14 Nov 2023 07:03 )             25.7     11-14    139  |  102  |  68<H>  ----------------------------<  93  5.4<H>   |  31  |  1.41<H>    Ca    8.5      14 Nov 2023 07:03  Phos  3.0     11-14  Mg     2.4     11-14    TPro  5.9<L>  /  Alb  2.5<L>  /  TBili  0.6  /  DBili  x   /  AST  37  /  ALT  27  /  AlkPhos  122<H>  11-14      Urinalysis Basic - ( 14 Nov 2023 07:03 )    Color: x / Appearance: x / SG: x / pH: x  Gluc: 93 mg/dL / Ketone: x  / Bili: x / Urobili: x   Blood: x / Protein: x / Nitrite: x   Leuk Esterase: x / RBC: x / WBC x   Sq Epi: x / Non Sq Epi: x / Bacteria: x        RADIOLOGY & ADDITIONAL TESTS:  Studies reviewed.   INTERVAL HPI/OVERNIGHT EVENTS:  Patient S&E at bedside. No o/n events,     Pt recently had scan at [facility] on 10/3/23 that when compared with June 2023 increased b/l areas of infiltrates and consolidation likely infectious/inflammatory post therapy change. RML perisutural mass again obscured by consolidation. unchanged subcarinal node, suspected mets.   At that time pt was started on medrol dose pack and Z pack.   Planned to repeat CT in 3 mo during previous visit.     PAST MEDICAL & SURGICAL HISTORY:  COPD (chronic obstructive pulmonary disease)      Lung cancer      Anemia of chronic disease      CKD (chronic kidney disease), stage III      Hypothyroidism      AAA (abdominal aortic aneurysm)      HTN (hypertension)      HLD (hyperlipidemia)      Thrombocytopenia      S/P lobectomy of lung          FAMILY HISTORY:      VITAL SIGNS:  T(F): 97.9 (11-15-23 @ 04:34)  HR: 91 (11-15-23 @ 04:34)  BP: 135/59 (11-15-23 @ 04:34)  RR: 18 (11-14-23 @ 21:40)  SpO2: 100% (11-15-23 @ 04:34)  Wt(kg): --    PHYSICAL EXAM:    Constitutional: NAD, going in and out of sleep, trouble hearing   Eyes: EOMI,   Respiratory: decreased bs on NC  Cardiovascular: RRR,  Gastrointestinal: soft, NTND,   Extremities: no c/c/e      MEDICATIONS  (STANDING):  albuterol/ipratropium for Nebulization 3 milliLiter(s) Nebulizer every 6 hours  AQUAPHOR (petrolatum Ointment) 1 Application(s) Topical two times a day  atorvastatin 40 milliGRAM(s) Oral at bedtime  dextrose 5%. 1000 milliLiter(s) (50 mL/Hr) IV Continuous <Continuous>  dextrose 5%. 1000 milliLiter(s) (100 mL/Hr) IV Continuous <Continuous>  dextrose 50% Injectable 25 Gram(s) IV Push once  dextrose 50% Injectable 12.5 Gram(s) IV Push once  dextrose 50% Injectable 25 Gram(s) IV Push once  doxycycline IVPB 100 milliGRAM(s) IV Intermittent every 12 hours  ferrous    sulfate 325 milliGRAM(s) Oral daily  fluconAZOLE IVPB 100 milliGRAM(s) IV Intermittent every 24 hours  fluticasone furoate/umeclidinium/vilanterol 100-62.5-25 MICROgram(s) Inhaler 1 Puff(s) Inhalation daily  folic acid 1 milliGRAM(s) Oral daily  glucagon  Injectable 1 milliGRAM(s) IntraMuscular once  hydrALAZINE 10 milliGRAM(s) Oral two times a day  insulin glargine Injectable (LANTUS) 10 Unit(s) SubCutaneous at bedtime  insulin lispro (ADMELOG) corrective regimen sliding scale   SubCutaneous at bedtime  insulin lispro (ADMELOG) corrective regimen sliding scale   SubCutaneous three times a day before meals  insulin lispro Injectable (ADMELOG) 4 Unit(s) SubCutaneous three times a day before meals  levothyroxine 12.5 MICROGram(s) Oral daily  meropenem Injectable 1000 milliGRAM(s) IV Push every 12 hours  predniSONE   Tablet 20 milliGRAM(s) Oral daily    MEDICATIONS  (PRN):  acetaminophen     Tablet .. 650 milliGRAM(s) Oral every 6 hours PRN Temp greater or equal to 38C (100.4F), Mild Pain (1 - 3)  aluminum hydroxide/magnesium hydroxide/simethicone Suspension 30 milliLiter(s) Oral every 4 hours PRN Dyspepsia  benzonatate 100 milliGRAM(s) Oral three times a day PRN Cough  dextrose Oral Gel 15 Gram(s) Oral once PRN Blood Glucose LESS THAN 70 milliGRAM(s)/deciliter  meclizine 12.5 milliGRAM(s) Oral every 8 hours PRN Dizziness  melatonin 3 milliGRAM(s) Oral at bedtime PRN Insomnia  ondansetron Injectable 4 milliGRAM(s) IV Push every 8 hours PRN Nausea and/or Vomiting      Allergies    penicillin (Unknown)    Intolerances        LABS:                        8.2    10.25 )-----------( 154      ( 14 Nov 2023 07:03 )             25.7     11-14    139  |  102  |  68<H>  ----------------------------<  93  5.4<H>   |  31  |  1.41<H>    Ca    8.5      14 Nov 2023 07:03  Phos  3.0     11-14  Mg     2.4     11-14    TPro  5.9<L>  /  Alb  2.5<L>  /  TBili  0.6  /  DBili  x   /  AST  37  /  ALT  27  /  AlkPhos  122<H>  11-14      Urinalysis Basic - ( 14 Nov 2023 07:03 )    Color: x / Appearance: x / SG: x / pH: x  Gluc: 93 mg/dL / Ketone: x  / Bili: x / Urobili: x   Blood: x / Protein: x / Nitrite: x   Leuk Esterase: x / RBC: x / WBC x   Sq Epi: x / Non Sq Epi: x / Bacteria: x        RADIOLOGY & ADDITIONAL TESTS:  Studies reviewed.

## 2023-11-15 NOTE — PROGRESS NOTE ADULT - ASSESSMENT
83y old Male with PMH HTN, HPL, Chronic anemia, COPD, lung CA follows at [facility] (last chemotherapy and RT 05/2023, not currently receiving chemo treatment), s/p right upper lobectomy, AAA, Hypothyroidism, CKD stage IIIa referred by me to ed for continued sob, cough despite abx treatment found to have large lower lobe thick walled cavity with worsening findings on ct despite treatment with levaquin  1. 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 discussed with wife.  -started on voriconazole. surgery is more defitive therapy however he is high surgical risk  -reviewed imaging from [place] supplied by wife - it looks like he has had blebs in the past and these are what are infected - they look half filled with fluid with thicker borders, which would argue against fungal infection/mrsa as there is no necrosis involved in its pathogenesis  -s/p bronch. follow up results. growing few yeast  -repeat ct wo contrast reveals worsening consolidations and filling in of bleb. this may suggest inadqueate antibiotics, continued aspiration or rapidly progressive cancer  -spoke to id and hospitalist, switched to doxy, meropenem  -changed to fluconazole  2. pneumonia: as above  -supplemental o2  -speech/swallow eval noted  -aspiration precautions  3. copd: continue trelegy  4. ? radiation induced pneumonitis: titrate down  steroids as he has worsening infection and progressive nature of findings argues against pneumonitis  5. anemia: received 1 unit prbc  6. left leg laceration: s/p suture, now bandaged. continue wound care

## 2023-11-15 NOTE — PROGRESS NOTE ADULT - ASSESSMENT
84 y/o Male with h/o lung CA [place] [facility] [place] and RT 05/2023, s/p right upper lobectomy, HTN, HPL, Chronic anemia, COPD, AAA, Hypothyroidism, CKD stage IIIa was admitted on 11/2 for increased shortness of breath, dyspnea on minimal exertion, and cough.    # h/o lung cancer   - followed at [facility] [place] - last received carbo/taxol with salvage RT 5/5/2023 after progression in subcarinol node after adjuvant atezolizumab (LD 12/20/22)  - pt was last seen 10/2023- when compared with June 2023 increased b/l areas of infiltrates and consolidation likely infectious/inflammatory post therapy change. RML perisutural mass again obscured by consolidation. unchanged subcarinal node, suspected mets.   At that time pt was started on medrol dose pack and Z pack.   Planned to repeat CT in 3 mo during previous visit.   - 11/2 CT chest: Right upper lobectomy. Larger right lower lobe thick-walled cavity which may be infectious and/or residua of reported treated tumor. Spongelike material within the cavity can be seen with aspergilloma (prior to fungus ball formation). Consolidation within the right lower and middle lobe compatible with radiation change. Multiple indistinct nodules in the right lung which may be infectious or radiation pneumonitis.  - 11/8 CT a/p No hydronephrosis. New patchy opacities in the right lung base. Small right pleural effusion.  - CT surgery consulted and bronch performed 11/9- pending pathology results  - 11/13 repeat CT chest- : Right lung large areas of consolidation new since November 2, 2023 likely pneumonia. Right apical previously described large cyst unchanged in size since November 2, 2023 containing internal air and increasing internal opacification since November 2, 2023 as described above. A few left lung ill-defined nonspecific groundglass opacities. Differential diagnosis include but is not limited to infectious/inflammatory etiologies and or pulmonary edema. Small left pleural effusion new since November 8, 2023.    Plan:   - will await results from bronch  - [facility] [place] to compare prior images  - pt being covered for radiation induced pneumonitis with steroids   - ID following and covered for infectious source    # r/o fungal infection   - seen by ID: Pt with large lower lobe thick walled cavity: concern for aspergilloma- Aspergillus galactomanan and fungitel are negative   - BAL with rare yeast   - now on meropenem 1 gm IV q12h, doxycycline 100 mg IV q12h and fluconazole 100 mg IV qd # 2  - surgery is more defitive therapy however he is high surgical risk  - pending fungal cultures from bronch but can take weeks     will continue to follow  82 y/o Male with h/o lung CA follows at [facility] [place] s/p chemotherapy and RT 05/2023, s/p right upper lobectomy, HTN, HPL, Chronic anemia, COPD, AAA, Hypothyroidism, CKD stage IIIa was admitted on 11/2 for increased shortness of breath, dyspnea on minimal exertion, and cough.    # h/o lung cancer   [place] [facility] [place] carbo/taxol with salvage RT 5/5/2023 after progression in subcarinol node after adjuvant atezolizumab (LD 12/20/22)  - pt was last seen 10/2023- when compared with June 2023 increased b/l areas of infiltrates and consolidation likely infectious/inflammatory post therapy change. RML perisutural mass again obscured by consolidation. unchanged subcarinal node, suspected mets.   At that time pt was started on medrol dose pack and Z pack.   Planned to repeat CT in 3 mo during previous visit.   - 11/2 CT chest: Right upper lobectomy. Larger right lower lobe thick-walled cavity which may be infectious and/or residua of reported treated tumor. Spongelike material within the cavity can be seen with aspergilloma (prior to fungus ball formation). Consolidation within the right lower and middle lobe compatible with radiation change. Multiple indistinct nodules in the right lung which may be infectious or radiation pneumonitis.  - 11/8 CT a/p No hydronephrosis. New patchy opacities in the right lung base. Small right pleural effusion.  - CT surgery consulted and bronch performed 11/9- pending pathology results  - 11/13 repeat CT chest- : Right lung large areas of consolidation new since November 2, 2023 likely pneumonia. Right apical previously described large cyst unchanged in size since November 2, 2023 containing internal air and increasing internal opacification since November 2, 2023 as described above. A few left lung ill-defined nonspecific groundglass opacities. Differential diagnosis include but is not limited to infectious/inflammatory etiologies and or pulmonary edema. Small left pleural effusion new since November 8, 2023.    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

## 2023-11-16 ENCOUNTER — TRANSCRIPTION ENCOUNTER (OUTPATIENT)
Age: 83
End: 2023-11-16

## 2023-11-16 LAB
AMMONIA BLD-MCNC: 38 UMOL/L — HIGH (ref 11–32)
AMMONIA BLD-MCNC: 38 UMOL/L — HIGH (ref 11–32)
ANION GAP SERPL CALC-SCNC: 4 MMOL/L — LOW (ref 5–17)
ANION GAP SERPL CALC-SCNC: 4 MMOL/L — LOW (ref 5–17)
BLD GP AB SCN SERPL QL: SIGNIFICANT CHANGE UP
BLD GP AB SCN SERPL QL: SIGNIFICANT CHANGE UP
BUN SERPL-MCNC: 68 MG/DL — HIGH (ref 7–23)
BUN SERPL-MCNC: 68 MG/DL — HIGH (ref 7–23)
CALCIUM SERPL-MCNC: 8.4 MG/DL — LOW (ref 8.5–10.1)
CALCIUM SERPL-MCNC: 8.4 MG/DL — LOW (ref 8.5–10.1)
CHLORIDE SERPL-SCNC: 103 MMOL/L — SIGNIFICANT CHANGE UP (ref 96–108)
CHLORIDE SERPL-SCNC: 103 MMOL/L — SIGNIFICANT CHANGE UP (ref 96–108)
CO2 SERPL-SCNC: 31 MMOL/L — SIGNIFICANT CHANGE UP (ref 22–31)
CO2 SERPL-SCNC: 31 MMOL/L — SIGNIFICANT CHANGE UP (ref 22–31)
CREAT SERPL-MCNC: 1.26 MG/DL — SIGNIFICANT CHANGE UP (ref 0.5–1.3)
CREAT SERPL-MCNC: 1.26 MG/DL — SIGNIFICANT CHANGE UP (ref 0.5–1.3)
EGFR: 57 ML/MIN/1.73M2 — LOW
EGFR: 57 ML/MIN/1.73M2 — LOW
GLUCOSE BLDC GLUCOMTR-MCNC: 242 MG/DL — HIGH (ref 70–99)
GLUCOSE BLDC GLUCOMTR-MCNC: 242 MG/DL — HIGH (ref 70–99)
GLUCOSE BLDC GLUCOMTR-MCNC: 248 MG/DL — HIGH (ref 70–99)
GLUCOSE BLDC GLUCOMTR-MCNC: 248 MG/DL — HIGH (ref 70–99)
GLUCOSE BLDC GLUCOMTR-MCNC: 91 MG/DL — SIGNIFICANT CHANGE UP (ref 70–99)
GLUCOSE BLDC GLUCOMTR-MCNC: 91 MG/DL — SIGNIFICANT CHANGE UP (ref 70–99)
GLUCOSE BLDC GLUCOMTR-MCNC: 98 MG/DL — SIGNIFICANT CHANGE UP (ref 70–99)
GLUCOSE BLDC GLUCOMTR-MCNC: 98 MG/DL — SIGNIFICANT CHANGE UP (ref 70–99)
GLUCOSE SERPL-MCNC: 106 MG/DL — HIGH (ref 70–99)
GLUCOSE SERPL-MCNC: 106 MG/DL — HIGH (ref 70–99)
HCT VFR BLD CALC: 25 % — LOW (ref 39–50)
HCT VFR BLD CALC: 25 % — LOW (ref 39–50)
HCT VFR BLD CALC: 26.9 % — LOW (ref 39–50)
HCT VFR BLD CALC: 26.9 % — LOW (ref 39–50)
HGB BLD-MCNC: 8.4 G/DL — LOW (ref 13–17)
HGB BLD-MCNC: 8.4 G/DL — LOW (ref 13–17)
HGB BLD-MCNC: 8.6 G/DL — LOW (ref 13–17)
HGB BLD-MCNC: 8.6 G/DL — LOW (ref 13–17)
MCHC RBC-ENTMCNC: 30.4 PG — SIGNIFICANT CHANGE UP (ref 27–34)
MCHC RBC-ENTMCNC: 30.4 PG — SIGNIFICANT CHANGE UP (ref 27–34)
MCHC RBC-ENTMCNC: 31.3 PG — SIGNIFICANT CHANGE UP (ref 27–34)
MCHC RBC-ENTMCNC: 31.3 PG — SIGNIFICANT CHANGE UP (ref 27–34)
MCHC RBC-ENTMCNC: 32 GM/DL — SIGNIFICANT CHANGE UP (ref 32–36)
MCHC RBC-ENTMCNC: 32 GM/DL — SIGNIFICANT CHANGE UP (ref 32–36)
MCHC RBC-ENTMCNC: 33.6 GM/DL — SIGNIFICANT CHANGE UP (ref 32–36)
MCHC RBC-ENTMCNC: 33.6 GM/DL — SIGNIFICANT CHANGE UP (ref 32–36)
MCV RBC AUTO: 93.3 FL — SIGNIFICANT CHANGE UP (ref 80–100)
MCV RBC AUTO: 93.3 FL — SIGNIFICANT CHANGE UP (ref 80–100)
MCV RBC AUTO: 95.1 FL — SIGNIFICANT CHANGE UP (ref 80–100)
MCV RBC AUTO: 95.1 FL — SIGNIFICANT CHANGE UP (ref 80–100)
PLATELET # BLD AUTO: 157 K/UL — SIGNIFICANT CHANGE UP (ref 150–400)
PLATELET # BLD AUTO: 157 K/UL — SIGNIFICANT CHANGE UP (ref 150–400)
PLATELET # BLD AUTO: 160 K/UL — SIGNIFICANT CHANGE UP (ref 150–400)
PLATELET # BLD AUTO: 160 K/UL — SIGNIFICANT CHANGE UP (ref 150–400)
POTASSIUM SERPL-MCNC: 4.7 MMOL/L — SIGNIFICANT CHANGE UP (ref 3.5–5.3)
POTASSIUM SERPL-MCNC: 4.7 MMOL/L — SIGNIFICANT CHANGE UP (ref 3.5–5.3)
POTASSIUM SERPL-SCNC: 4.7 MMOL/L — SIGNIFICANT CHANGE UP (ref 3.5–5.3)
POTASSIUM SERPL-SCNC: 4.7 MMOL/L — SIGNIFICANT CHANGE UP (ref 3.5–5.3)
RBC # BLD: 2.68 M/UL — LOW (ref 4.2–5.8)
RBC # BLD: 2.68 M/UL — LOW (ref 4.2–5.8)
RBC # BLD: 2.83 M/UL — LOW (ref 4.2–5.8)
RBC # BLD: 2.83 M/UL — LOW (ref 4.2–5.8)
RBC # FLD: 15.8 % — HIGH (ref 10.3–14.5)
RBC # FLD: 15.8 % — HIGH (ref 10.3–14.5)
RBC # FLD: 15.9 % — HIGH (ref 10.3–14.5)
RBC # FLD: 15.9 % — HIGH (ref 10.3–14.5)
SODIUM SERPL-SCNC: 138 MMOL/L — SIGNIFICANT CHANGE UP (ref 135–145)
SODIUM SERPL-SCNC: 138 MMOL/L — SIGNIFICANT CHANGE UP (ref 135–145)
WBC # BLD: 11.84 K/UL — HIGH (ref 3.8–10.5)
WBC # BLD: 11.84 K/UL — HIGH (ref 3.8–10.5)
WBC # BLD: 14.02 K/UL — HIGH (ref 3.8–10.5)
WBC # BLD: 14.02 K/UL — HIGH (ref 3.8–10.5)
WBC # FLD AUTO: 11.84 K/UL — HIGH (ref 3.8–10.5)
WBC # FLD AUTO: 11.84 K/UL — HIGH (ref 3.8–10.5)
WBC # FLD AUTO: 14.02 K/UL — HIGH (ref 3.8–10.5)
WBC # FLD AUTO: 14.02 K/UL — HIGH (ref 3.8–10.5)

## 2023-11-16 PROCEDURE — 99232 SBSQ HOSP IP/OBS MODERATE 35: CPT

## 2023-11-16 PROCEDURE — 71045 X-RAY EXAM CHEST 1 VIEW: CPT | Mod: 26

## 2023-11-16 RX ORDER — POLYETHYLENE GLYCOL 3350 17 G/17G
17 POWDER, FOR SOLUTION ORAL DAILY
Refills: 0 | Status: DISCONTINUED | OUTPATIENT
Start: 2023-11-16 | End: 2023-11-30

## 2023-11-16 RX ORDER — INSULIN GLARGINE 100 [IU]/ML
5 INJECTION, SOLUTION SUBCUTANEOUS AT BEDTIME
Refills: 0 | Status: DISCONTINUED | OUTPATIENT
Start: 2023-11-16 | End: 2023-11-21

## 2023-11-16 RX ADMIN — FLUCONAZOLE 50 MILLIGRAM(S): 150 TABLET ORAL at 17:49

## 2023-11-16 RX ADMIN — Medication 1 APPLICATION(S): at 22:29

## 2023-11-16 RX ADMIN — Medication 3 MILLILITER(S): at 15:46

## 2023-11-16 RX ADMIN — Medication 4: at 17:48

## 2023-11-16 RX ADMIN — INSULIN GLARGINE 5 UNIT(S): 100 INJECTION, SOLUTION SUBCUTANEOUS at 22:29

## 2023-11-16 RX ADMIN — ATORVASTATIN CALCIUM 40 MILLIGRAM(S): 80 TABLET, FILM COATED ORAL at 22:29

## 2023-11-16 RX ADMIN — FLUTICASONE FUROATE, UMECLIDINIUM BROMIDE AND VILANTEROL TRIFENATATE 1 PUFF(S): 200; 62.5; 25 POWDER RESPIRATORY (INHALATION) at 11:55

## 2023-11-16 RX ADMIN — Medication 110 MILLIGRAM(S): at 22:26

## 2023-11-16 RX ADMIN — Medication 4 UNIT(S): at 17:48

## 2023-11-16 RX ADMIN — Medication 3 MILLILITER(S): at 11:50

## 2023-11-16 RX ADMIN — Medication 3 MILLILITER(S): at 20:08

## 2023-11-16 RX ADMIN — POLYETHYLENE GLYCOL 3350 17 GRAM(S): 17 POWDER, FOR SOLUTION ORAL at 15:09

## 2023-11-16 RX ADMIN — MEROPENEM 1000 MILLIGRAM(S): 1 INJECTION INTRAVENOUS at 22:26

## 2023-11-16 RX ADMIN — Medication 12.5 MICROGRAM(S): at 05:47

## 2023-11-16 NOTE — PROGRESS NOTE ADULT - SUBJECTIVE AND OBJECTIVE BOX
Date of service: 11-16-23 @ 07:57    Lying in bed in NAD  Weak looking  No SOB at rest  Ha dry cough    ROS: no fever or chills; denies dizziness, no HA, no abdominal pain, no diarrhea or constipation; no dysuria, no legs pain, no rashes    MEDICATIONS  (STANDING):  albuterol/ipratropium for Nebulization 3 milliLiter(s) Nebulizer every 6 hours  AQUAPHOR (petrolatum Ointment) 1 Application(s) Topical two times a day  atorvastatin 40 milliGRAM(s) Oral at bedtime  dextrose 5%. 1000 milliLiter(s) (50 mL/Hr) IV Continuous <Continuous>  dextrose 5%. 1000 milliLiter(s) (100 mL/Hr) IV Continuous <Continuous>  dextrose 50% Injectable 25 Gram(s) IV Push once  dextrose 50% Injectable 12.5 Gram(s) IV Push once  dextrose 50% Injectable 25 Gram(s) IV Push once  doxycycline IVPB 100 milliGRAM(s) IV Intermittent every 12 hours  ferrous    sulfate 325 milliGRAM(s) Oral daily  fluconAZOLE IVPB 100 milliGRAM(s) IV Intermittent every 24 hours  fluticasone furoate/umeclidinium/vilanterol 100-62.5-25 MICROgram(s) Inhaler 1 Puff(s) Inhalation daily  folic acid 1 milliGRAM(s) Oral daily  glucagon  Injectable 1 milliGRAM(s) IntraMuscular once  hydrALAZINE 10 milliGRAM(s) Oral two times a day  insulin glargine Injectable (LANTUS) 10 Unit(s) SubCutaneous at bedtime  insulin lispro (ADMELOG) corrective regimen sliding scale   SubCutaneous at bedtime  insulin lispro (ADMELOG) corrective regimen sliding scale   SubCutaneous three times a day before meals  insulin lispro Injectable (ADMELOG) 4 Unit(s) SubCutaneous three times a day before meals  levothyroxine 12.5 MICROGram(s) Oral daily  meropenem Injectable 1000 milliGRAM(s) IV Push every 12 hours  predniSONE   Tablet 20 milliGRAM(s) Oral daily    Vital Signs Last 24 Hrs  T(C): 36.7 (15 Nov 2023 21:00), Max: 36.7 (15 Nov 2023 21:00)  T(F): 98.1 (15 Nov 2023 21:00), Max: 98.1 (15 Nov 2023 21:00)  HR: 84 (15 Nov 2023 21:18) (84 - 101)  BP: 146/59 (15 Nov 2023 21:00) (112/59 - 146/59)  BP(mean): --  RR: 18 (15 Nov 2023 21:00) (18 - 19)  SpO2: 97% (15 Nov 2023 21:00) (97% - 99%)    Parameters below as of 15 Nov 2023 21:00  Patient On (Oxygen Delivery Method): nasal cannula     Physical exam:    Constitutional:  No acute distress  HEENT: NC/AT, EOMI, PERRLA, conjunctivae clear; ears and nose atraumatic  Neck: supple; thyroid not palpable  Back: no tenderness  Respiratory: respiratory effort normal; few crackles at bases  Cardiovascular: S1S2 regular, no murmurs  Abdomen: soft, not tender, not distended, positive BS  Genitourinary: no suprapubic tenderness  Lymphatic: no LN palpable  Musculoskeletal: no muscle tenderness, no joint swelling or tenderness  Extremities: no pedal edema  Left lower leg laceration dressed  Neurological/ Psychiatric: AxOx3, moving all extremities  Skin: no rashes; no palpable lesions    Labs: reviewed                        7.6    7.30  )-----------( 139      ( 15 Nov 2023 08:00 )             23.6     11-15    140  |  104  |  72<H>  ----------------------------<  61<L>  4.9   |  32<H>  |  1.26    Ca    8.3<L>      15 Nov 2023 08:00    TPro  5.3<L>  /  Alb  2.2<L>  /  TBili  0.6  /  DBili  x   /  AST  37  /  ALT  23  /  AlkPhos  110  11-15    C-Reactive Protein, Serum: 24 mg/L (11-14-23 @ 07:03)  C-Reactive Protein, Serum: 34 mg/L (11-13-23 @ 06:30)  C-Reactive Protein, Serum: 46 mg/L (11-12-23 @ 06:55)  C-Reactive Protein, Serum: 70 mg/L (11-11-23 @ 06:31)  C-Reactive Protein, Serum: 106 mg/L (11-10-23 @ 06:19)  C-Reactive Protein, Serum: 163 mg/L (11-08-23 @ 06:01)  Ferritin: 1082 ng/mL (11-08-23 @ 06:01)  C-Reactive Protein, Serum: 141 mg/L (11-07-23 @ 07:11)                        7.7    10.37 )-----------( 150      ( 13 Nov 2023 06:30 )             23.7     11-13    137  |  103  |  63<H>  ----------------------------<  186<H>  5.5<H>   |  32<H>  |  1.36<H>    Ca    8.6      13 Nov 2023 06:30  Phos  3.1     11-13  Mg     2.4     11-13    TPro  6.7  /  Alb  3.0<L>  /  TBili  0.7  /  DBili  x   /  AST  40<H>  /  ALT  26  /  AlkPhos  115  11-12    C-Reactive Protein, Serum: 34 mg/L (11-13-23 @ 06:30)  C-Reactive Protein, Serum: 46 mg/L (11-12-23 @ 06:55)  C-Reactive Protein, Serum: 70 mg/L (11-11-23 @ 06:31)  C-Reactive Protein, Serum: 106 mg/L (11-10-23 @ 06:19)  C-Reactive Protein, Serum: 163 mg/L (11-08-23 @ 06:01)  Ferritin: 1082 ng/mL (11-08-23 @ 06:01)  C-Reactive Protein, Serum: 141 mg/L (11-07-23 @ 07:11)                        8.2    11.74 )-----------( 259      ( 03 Nov 2023 07:15 )             25.2     11-03    133<L>  |  102  |  35<H>  ----------------------------<  205<H>  4.4   |  22  |  1.20    Ca    8.3<L>      03 Nov 2023 07:15    TPro  6.9  /  Alb  2.0<L>  /  TBili  0.5  /  DBili  x   /  AST  38<H>  /  ALT  50  /  AlkPhos  130<H>  11-02     LIVER FUNCTIONS - ( 02 Nov 2023 14:47 )  Alb: 2.0 g/dL / Pro: 6.9 gm/dL / ALK PHOS: 130 U/L / ALT: 50 U/L / AST: 38 U/L / GGT: x           Urinalysis (11-02 @ 16:40)  Urine Appearance: Clear  Protein, Urine: 100 mg/dL  Urine Microscopic-Add On (NC) (11-02 @ 16:40)  White Blood Cell - Urine: 0 /HPF  Red Blood Cell - Urine: 0 /HPF    (11-02 @ 14:47)  NotDetec    Culture - Acid Fast - Bronchial w/Smear (collected 09 Nov 2023 12:30)  Source: .Bronchial RIGHT LOWER LOBE BAL  Preliminary Report (11 Nov 2023 15:08):    Culture is being performed.    Culture - Fungal, Bronchial (collected 09 Nov 2023 12:30)  Source: .Bronchial RIGHT LOWER LOBE BAL  Preliminary Report (13 Nov 2023 12:07):    Rare Yeast    Culture - Bronchial (collected 09 Nov 2023 12:30)  Source: .Bronchial RIGHT LOWER LOBE BAL  Gram Stain (09 Nov 2023 23:14):    Rare polymorphonuclear leukocytes per low power field    Few Squamous epithelial cells per low power field    Few Gram positive cocci in pairs per oil power field  Final Report (11 Nov 2023 16:38):    Normal Respiratory Marguerite present    Culture - Blood (collected 04 Nov 2023 04:21)  Source: .Blood None  Final Report (09 Nov 2023 09:00):    No growth at 5 days    Culture - Blood (collected 04 Nov 2023 04:21)  Source: .Blood None  Final Report (09 Nov 2023 09:00):    No growth at 5 days    Radiology: all available radiological tests reviewed    < from: CT Chest No Cont (11.02.23 @ 16:04) >  Right upper lobectomy.  Larger right lower lobe thick-walled cavity which may be infectious   and/or residua of reported treated tumor. 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  Consolidation within the right lower and middle lobe compatible with radiation change.  Multiple indistinct nodules in the right lungwhich may be infectious or radiation pneumonitis.  < end of copied text >    < from: CT Abdomen and Pelvis No Cont (11.08.23 @ 19:01) >  No hydronephrosis.  New patchy opacities in the right lung base. Small right pleural effusion.  < end of copied text >    < from: CT Chest No Cont (11.13.23 @ 10:04) >  IMPRESSION: Right lung large areas of consolidation new since November 2, 2023 likely pneumonia.    Right apical previously described large cyst unchanged in size since   November 2, 2023 containing internal air and increasing internal opacification since November 2, 2023 as described above.     A few left lung ill-defined nonspecific groundglass opacities.   Differential diagnosis include but is not limited to   infectious/inflammatory etiologies and or pulmonary edema.  Small left pleural effusion new since November 8, 2023.  < end of copied text >      Advanced directives addressed: full resuscitation

## 2023-11-16 NOTE — PROGRESS NOTE ADULT - ASSESSMENT
84 y/o Male with h/o lung CA [place] [facility] [place], s/p right upper lobectomy, HTN, HPL, Chronic anemia, COPD, AAA, Hypothyroidism, CKD stage IIIa was admitted on 11/2 for increased shortness of breath, dyspnea on minimal exertion, and cough.    # h/o lung cancer   - followed at [facility] [place] - last received carbo/taxol with salvage RT 5/5/2023 after progression in subcarinol node after adjuvant atezolizumab (LD 12/20/22)    Plan:   - [facility] to compare prior images- requested that they review   - pt being covered for radiation induced pneumonitis with steroids   - now presetnly on 4 L NC     # PNA/ Pneumonitis   - BAL with rare yeast   - CONTINUE  meropenem 1 gm IV q12h, doxycycline 100 mg IV q12h and fluconazole 100 mg IV qd   - continue with coverage with antifungal   - reviewed recent CXR - suggestive of RIGHT consolidative / infiltrate in the RIGHT lung no evidence of effusion  Discussed with   -    will continue to follow

## 2023-11-16 NOTE — PROGRESS NOTE ADULT - ASSESSMENT
83-year-old M with PMHx HTN, HLD, chronic anemia, COPD, lung CA s/p RUL lobectomy ([place] [facility] [place]/RT 5/2023, not currently on treatment), AAA, hypothyroidism, CKD stage IIIa sent in to ED on 11/2/23  by pulmonologist MD Olivera with c/o SOB, dyspnea on minimal exertion, and cough. 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     # Acute hypoxic respiratory failure, multifactorial   # New RLL PNA on CT 11/13 , RLL cavity s/p bronchoscopy 11/9  possible yeast  infection, less likely radiation pneumonitis , r/o  aspergilloma    - CT chest: Larger right lower lobe thick-walled cavity which may be infectious and/or residua of reported treated tumor. Spongelike material within the cavity can be seen with aspergilloma (prior to fungus ball formation). Consolidation within the right lower and middle lobe compatible with radiation change.  - leukocytosis stable (WBC 17.70 from 17.35) ---> trend down to WBC  8   - repeat CXR 11/8 - worsening of opacities over right lung   - 2 d echo 11/8 - EF 60% , no significant valvular disease   - immunoglobulin panel - all Ig wnl  - 11/9 - s/p bronchoscopy   - speech and swallow evaluation  - no aspirations  - CXR 11/11 - stable consolidations  - repeat CT chest 11/13 - new right lung consolidation  - blood cultures -no growth, sputum culture - normal respiratory  - c/w trelegy ellipta, chest pt, IS, acapella ,BIPAP as needed  - ID/pulm consult noted   - s/p IV steroids --> po prednisone taper 20 x 3 days than stop , trending down ----> 34  - s/p  voriconazole PO ,  s/p 7 days of levaquin to cover atypical/psuedomonas (allergy to PCN)   - hemoptysis persist , stop heparin   - 11/13 - IV meropenem, doxy and fluconasole started as per ID   - oncology [place] [facility] [facility]    #  Chronic anemia, likely due to  chemotherapy, worsening and iron deficiency   - decreased on AM labs from admission (Hgb 9.2-->8.2, Hct 27.8--> 25.2)   - no s/s bleeding, asymptomatic, continue to monitor/trend  - 11/8 - 1 unit PRBC due to hemoptysis and worsening of anemia  - Hb improved 8.2 --> 7.8 --> 8.4--> 7.7   - restart daily iron  - hold heparin for now     #CHRISTINA on CKD stage IIIa, improving  - Cr improving, 1.38-->1.26    # Left calf laceration from veno dines while in PACU for bronchoscopy   - surgery consult - s/p suturing  on 11/09/23   - plan to remove sutures in 7-10 days  - wound care as per surgery team    # Acute hyperglycemia, possible steroid-induced , Prediabetes with A1C 6.3   -  on AM labs,  A1c results 6.3   - consistent with Impaired Fasting Glucose, new onset DM II ruled out    # HTN   - stop losartan due to CHRISTNIA,   - stop amlodipine 2.5  - hydralazine 10 bid as per nephrology team    #  HLD    - c/w  atorvastatin    # Hypothyroidism   - Levo lowered to 12.5 mcg  - Recheck in 4 weeks    # Hx lung CA s/p RUL lobectomy/chemo/RT   - f/u with [facility]     # DVT Ppx   - SCDs    # Code status   - DNR/DNI    Dispo - [facility] vs home once stable

## 2023-11-16 NOTE — PROGRESS NOTE ADULT - ASSESSMENT
84 y/o Male with h/o lung CA [place] [facility] [place] and RT [place]23, s/p right upper lobectomy, HTN, HPL, Chronic anemia, COPD, AAA, Hypothyroidism, CKD stage IIIa was admitted on 11/2 for increased shortness of breath, dyspnea on minimal exertion, and cough. Reportedly, he had CT of the chest performed 10/3 which demonstrated consolidation, was started on augmentin/prednisone/azithro 10/6 x 1 week. Pt completed course of medications however wife noted worsening cough and pt with fever Tmax of 103.9 on 10/26. Wife then took pt to pulmonologist again who started pt on second round of the same medications, instructed wife to stop giving pt tylenol wife notes fevers self-resolved. Since then pt decreased energy with episode of being unsteady on feet. Today is 6th day of taking medications, had f/u scheduled with pulmonologist who sent pt to ED for eval.  He feels tired and weak. In ER he received ceftriaxone.    1. Large RLL pulmonary cavity with worsening opacification, likely pneumonia. Possible recurrent lung Ca. Lung Ca s/p right upper lobectomy. Radiation pneumonitis. CRF stage 3. Allergy to PCN.   -worsening large pulmonary cavity opacification   -respiratory improved s/p bronchoscopy  -bronchoscopy cultures show normal respiratory jaime and rare yeast  -leukocytosis  -bronch c/s noted with normal respiratory jaime  -Aspergillus galactomanan and fungitel are negative   -fungal bronch c/s is pending and will take several weeks to finalize  -s/p levofloxacin # 10  -s/p voriconazole 200 mg PO q12h # 7  -pulmonary evaluation appreciated   -on meropenem 1 gm IV q12h, doxycycline 100 mg IV q12h and fluconazole 100 mg IV qd # 4  -tolerating abx well so far; no side effects noted  -thoracic evaluation appreciated  -f/u cultures  -continue abx coverage   -monitor temps  -f/u CBC  -supportive care  2. Other issues:   -care per medicine    d/w medicine team

## 2023-11-16 NOTE — PROGRESS NOTE ADULT - SUBJECTIVE AND OBJECTIVE BOX
INTERVAL HPI/OVERNIGHT EVENTS:  Patient S&E at bedside. Paitent seen and awake and alert eating soup.   patient continues on supplemental oxygen.   patient seen with spouse and duaghter.     VITAL SIGNS:  T(F): 97.9 (11-16-23 @ 15:33)  HR: 110 (11-16-23 @ 15:33)  BP: 106/55 (11-16-23 @ 15:33)  RR: 18 (11-16-23 @ 15:33)  SpO2: 92% (11-16-23 @ 15:33)  Wt(kg): --    PHYSICAL EXAM:    Constitutional: NAD, frail cachetic mail e  Eyes: EOMI, sclera non-icteric  Neck: supple, no masses, no JVD  Respiratory: Rhocnhi noted in RIGHt lung, Left lung diminished breath sounds   Cardiovascular: RRR, no M/R/G  Gastrointestinal: soft, NTND, no masses palpable, + BS, no hepatosplenomegaly  Extremities: no c/c/e  Neurological: AAOx3      MEDICATIONS  (STANDING):  albuterol/ipratropium for Nebulization 3 milliLiter(s) Nebulizer every 6 hours  AQUAPHOR (petrolatum Ointment) 1 Application(s) Topical two times a day  atorvastatin 40 milliGRAM(s) Oral at bedtime  dextrose 5%. 1000 milliLiter(s) (50 mL/Hr) IV Continuous <Continuous>  dextrose 5%. 1000 milliLiter(s) (100 mL/Hr) IV Continuous <Continuous>  dextrose 50% Injectable 25 Gram(s) IV Push once  dextrose 50% Injectable 12.5 Gram(s) IV Push once  dextrose 50% Injectable 25 Gram(s) IV Push once  doxycycline IVPB 100 milliGRAM(s) IV Intermittent every 12 hours  ferrous    sulfate 325 milliGRAM(s) Oral daily  fluconAZOLE IVPB 100 milliGRAM(s) IV Intermittent every 24 hours  fluticasone furoate/umeclidinium/vilanterol 100-62.5-25 MICROgram(s) Inhaler 1 Puff(s) Inhalation daily  folic acid 1 milliGRAM(s) Oral daily  glucagon  Injectable 1 milliGRAM(s) IntraMuscular once  hydrALAZINE 10 milliGRAM(s) Oral two times a day  insulin glargine Injectable (LANTUS) 5 Unit(s) SubCutaneous at bedtime  insulin lispro (ADMELOG) corrective regimen sliding scale   SubCutaneous at bedtime  insulin lispro (ADMELOG) corrective regimen sliding scale   SubCutaneous three times a day before meals  insulin lispro Injectable (ADMELOG) 4 Unit(s) SubCutaneous three times a day before meals  levothyroxine 12.5 MICROGram(s) Oral daily  meropenem Injectable 1000 milliGRAM(s) IV Push every 12 hours  polyethylene glycol 3350 17 Gram(s) Oral daily    MEDICATIONS  (PRN):  acetaminophen     Tablet .. 650 milliGRAM(s) Oral every 6 hours PRN Temp greater or equal to 38C (100.4F), Mild Pain (1 - 3)  aluminum hydroxide/magnesium hydroxide/simethicone Suspension 30 milliLiter(s) Oral every 4 hours PRN Dyspepsia  benzonatate 100 milliGRAM(s) Oral three times a day PRN Cough  dextrose Oral Gel 15 Gram(s) Oral once PRN Blood Glucose LESS THAN 70 milliGRAM(s)/deciliter  meclizine 12.5 milliGRAM(s) Oral every 8 hours PRN Dizziness  melatonin 3 milliGRAM(s) Oral at bedtime PRN Insomnia  ondansetron Injectable 4 milliGRAM(s) IV Push every 8 hours PRN Nausea and/or Vomiting      Allergies    penicillin (Unknown)    Intolerances        LABS:                        8.4    14.02 )-----------( 157      ( 16 Nov 2023 13:40 )             25.0     11-16    138  |  103  |  68<H>  ----------------------------<  106<H>  4.7   |  31  |  1.26    Ca    8.4<L>      16 Nov 2023 07:05    TPro  5.3<L>  /  Alb  2.2<L>  /  TBili  0.6  /  DBili  x   /  AST  37  /  ALT  23  /  AlkPhos  110  11-15      Urinalysis Basic - ( 16 Nov 2023 07:05 )    Color: x / Appearance: x / SG: x / pH: x  Gluc: 106 mg/dL / Ketone: x  / Bili: x / Urobili: x   Blood: x / Protein: x / Nitrite: x   Leuk Esterase: x / RBC: x / WBC x   Sq Epi: x / Non Sq Epi: x / Bacteria: x        RADIOLOGY & ADDITIONAL TESTS:  Studies reviewed.    ASSESSMENT & PLAN:

## 2023-11-16 NOTE — PROGRESS NOTE ADULT - SUBJECTIVE AND OBJECTIVE BOX
JOHN FRIEDEL  MRN: [id number]    S: November 16, 2023:    Reviewed hx with Dr. Olivera.     Patient appears chronically ill. Offers no complaints this morning. Denies dyspnea. Coughing - nonproductive.     PAST MEDICAL & SURGICAL HISTORY:  COPD (chronic obstructive pulmonary disease)      Lung cancer      Anemia of chronic disease      CKD (chronic kidney disease), stage III      Hypothyroidism      AAA (abdominal aortic aneurysm)      HTN (hypertension)      HLD (hyperlipidemia)      Thrombocytopenia      S/P lobectomy of lung          O: T(C): 36.7 (11-16-23 @ 08:29), Max: 36.7 (11-15-23 @ 21:00)  HR: 102 (11-16-23 @ 08:29) (84 - 102)  BP: 139/69 (11-16-23 @ 08:29) (112/59 - 146/59)  RR: 19 (11-16-23 @ 08:29) (18 - 19)  SpO2: 89% (11-16-23 @ 08:29) (89% - 99%)  Wt(kg): --    PHYSICAL EXAM:      GENERAL: weak    NEURO: awake/alert.     NECK: no JVD     CHEST: rhonchi on right greater than left     CARDIAC: RR    EXT: edema      LABS:                        8.6    11.84 )-----------( 160      ( 16 Nov 2023 07:05 )             26.9       11-16    138  |  103  |  68<H>  ----------------------------<  106<H>  4.7   |  31  |  1.26    Ca    8.4<L>      16 Nov 2023 07:05    TPro  5.3<L>  /  Alb  2.2<L>  /  TBili  0.6  /  DBili  x   /  AST  37  /  ALT  23  /  AlkPhos  110  11-15      RADIOLOGY:    < from: CT Chest No Cont (11.13.23 @ 10:04) >    PROCEDURE DATE:  11/13/2023    INTERPRETATION:  Clinical indications: Pneumonia.    Axial CT images of the chest are obtained without intravenous   administration of contrast.    Comparison is made with the prior chest CT of November 2, 2023.    No enlarged axillary or mediastinal lymph nodes.    No pericardial effusion. Heart size is normal. Mitral annular   calcifications. Vascular calcifications with involvement of the aorta and   the coronary arteries. Aortic root calcifications.    Evaluation of the upper abdomen demonstrate partially imaged aortic stent   graft. Subcutaneous edema. Minimal perihepatic ascites.    Small left pleural effusion new since the abdominalCT of November 8, 2023. Trace right pleural effusion as on November 8, 2023.    Evaluation of the lungs demonstrate left lung base linear subsegmental   atelectasis. A few ill-defined left lung patchy nonspecific groundglass   opacities largest within the dependent portion of the left upper lobe are   new since November 2, 2023.    Status post right upper lobectomy with postoperative changes. Persistent   right apical large large cyst, part of which measures about 6.6 cm in   transverse dimension without significant interval change in size   containing air and increasing internal opacification as compared with   November 2, 2023, some of which appear slightly dense and may be due to   internal hemorrhagic components.    Extensive right mid to lower lung consolidations and ill-defined   groundglass opacities, with the largest confluent consolidation within   the mid to lower portions of the right lower lobe, majority of which   appear new since November 2, 2023 superimposed on previouslydescribed   linear opacities.    Degenerative changes of the spine. Old healed sternal fracture.    IMPRESSION: Right lung large areas of consolidation new since November 2, 2023 likely pneumonia.    Right apical previously described large cyst unchanged in size since   November 2, 2023 containing internal air and increasing internal   opacification since November 2, 2023 as described above. Continued   follow-up is recommended.    A few left lung ill-defined nonspecific groundglass opacities.   Differential diagnosis include but is not limited to   infectious/inflammatory etiologies and or pulmonary edema.    Small left pleural effusion new since November 8, 2023.      Bronchoscopy results:  Culture - Acid Fast - Bronchial w/Smear (11.09.23 @ 12:30)   Specimen Source: .Bronchial RIGHT LOWER LOBE BAL  Acid Fast Bacilli Smear:   No acid-fast bacilli seen by fluorochrome stain  Culture Results:   Culture is being performed.  Culture - Fungal, Bronchial (11.09.23 @ 12:30)   Specimen Source: .Bronchial RIGHT LOWER LOBE BAL  Culture Results:   Rare Yeast      MEDICATIONS  (STANDING):  albuterol/ipratropium for Nebulization 3 milliLiter(s) Nebulizer every 6 hours  AQUAPHOR (petrolatum Ointment) 1 Application(s) Topical two times a day  atorvastatin 40 milliGRAM(s) Oral at bedtime  dextrose 5%. 1000 milliLiter(s) (50 mL/Hr) IV Continuous <Continuous>  dextrose 5%. 1000 milliLiter(s) (100 mL/Hr) IV Continuous <Continuous>  dextrose 50% Injectable 25 Gram(s) IV Push once  dextrose 50% Injectable 12.5 Gram(s) IV Push once  dextrose 50% Injectable 25 Gram(s) IV Push once  doxycycline IVPB 100 milliGRAM(s) IV Intermittent every 12 hours  ferrous    sulfate 325 milliGRAM(s) Oral daily  fluconAZOLE IVPB 100 milliGRAM(s) IV Intermittent every 24 hours  fluticasone furoate/umeclidinium/vilanterol 100-62.5-25 MICROgram(s) Inhaler 1 Puff(s) Inhalation daily  folic acid 1 milliGRAM(s) Oral daily  glucagon  Injectable 1 milliGRAM(s) IntraMuscular once  hydrALAZINE 10 milliGRAM(s) Oral two times a day  insulin glargine Injectable (LANTUS) 10 Unit(s) SubCutaneous at bedtime  insulin lispro (ADMELOG) corrective regimen sliding scale   SubCutaneous at bedtime  insulin lispro (ADMELOG) corrective regimen sliding scale   SubCutaneous three times a day before meals  insulin lispro Injectable (ADMELOG) 4 Unit(s) SubCutaneous three times a day before meals  levothyroxine 12.5 MICROGram(s) Oral daily  meropenem Injectable 1000 milliGRAM(s) IV Push every 12 hours  predniSONE   Tablet 20 milliGRAM(s) Oral daily    MEDICATIONS  (PRN):  acetaminophen     Tablet .. 650 milliGRAM(s) Oral every 6 hours PRN Temp greater or equal to 38C (100.4F), Mild Pain (1 - 3)  aluminum hydroxide/magnesium hydroxide/simethicone Suspension 30 milliLiter(s) Oral every 4 hours PRN Dyspepsia  benzonatate 100 milliGRAM(s) Oral three times a day PRN Cough  dextrose Oral Gel 15 Gram(s) Oral once PRN Blood Glucose LESS THAN 70 milliGRAM(s)/deciliter  meclizine 12.5 milliGRAM(s) Oral every 8 hours PRN Dizziness  melatonin 3 milliGRAM(s) Oral at bedtime PRN Insomnia  ondansetron Injectable 4 milliGRAM(s) IV Push every 8 hours PRN Nausea and/or Vomiting          · Assessment	      83y old Male with PMH HTN, HPL, Chronic anemia, COPD, lung CA follows at [facility] (last chemotherapy and RT 05/2023, not currently receiving chemo treatment), s/p right upper lobectomy, AAA, Hypothyroidism, CKD stage IIIa referred by me to ed for continued sob, cough despite abx treatment found to have large lower lobe thick walled cavity with worsening findings on ct despite treatment with levaquin    1. Large right lower lobe (S/P Right upper lobectomy)  thick walled cavity. Uncertain etiology. Infection considered most likely. D/W Dr. Gaston (ID). Will continue antibiotics per ID.     Patient is very high surgical risk    Imaging from [facility] supplied by wife. He he has had blebs in the past.     S/P  bronch. Growing few yeast. Fungal cultures pending.     CT 11/13 reveals worsening consolidations and filling in of bleb.     2. Pneumonia: as above    -aspiration precautions    3. COPD: continue inhaled bronchodilators.      4. ? Radiation  pneumonitis: Agree with decreasing steroids.  Progressive nature of findings argues against pneumonitis.    5. Aemia/CKD: per primary care team.     6. Left leg laceration: s/p suture. Per primary care team     7. Lung Cancer. Oncology follow up appreciated. Last received carbo/taxol with salvage RT 5/5/2023 after progression in subcarinol node after adjuvant atezolizumab (LD 12/20/22)  - pt was last seen 10/2023- when compared with June 2023 increased b/l areas of infiltrates and consolidation likely infectious/inflammatory post therapy change. RML perisutural mass again obscured by consolidation. unchanged subcarinal node, suspected mets.   At that time pt was started on medrol dose pack and Z pack.     DNR/DNI noted.

## 2023-11-17 LAB
ANION GAP SERPL CALC-SCNC: 3 MMOL/L — LOW (ref 5–17)
ANION GAP SERPL CALC-SCNC: 3 MMOL/L — LOW (ref 5–17)
BUN SERPL-MCNC: 66 MG/DL — HIGH (ref 7–23)
BUN SERPL-MCNC: 66 MG/DL — HIGH (ref 7–23)
CALCIUM SERPL-MCNC: 8.4 MG/DL — LOW (ref 8.5–10.1)
CALCIUM SERPL-MCNC: 8.4 MG/DL — LOW (ref 8.5–10.1)
CHLORIDE SERPL-SCNC: 105 MMOL/L — SIGNIFICANT CHANGE UP (ref 96–108)
CHLORIDE SERPL-SCNC: 105 MMOL/L — SIGNIFICANT CHANGE UP (ref 96–108)
CO2 SERPL-SCNC: 31 MMOL/L — SIGNIFICANT CHANGE UP (ref 22–31)
CO2 SERPL-SCNC: 31 MMOL/L — SIGNIFICANT CHANGE UP (ref 22–31)
CREAT SERPL-MCNC: 1.26 MG/DL — SIGNIFICANT CHANGE UP (ref 0.5–1.3)
CREAT SERPL-MCNC: 1.26 MG/DL — SIGNIFICANT CHANGE UP (ref 0.5–1.3)
EGFR: 57 ML/MIN/1.73M2 — LOW
EGFR: 57 ML/MIN/1.73M2 — LOW
GLUCOSE BLDC GLUCOMTR-MCNC: 126 MG/DL — HIGH (ref 70–99)
GLUCOSE BLDC GLUCOMTR-MCNC: 126 MG/DL — HIGH (ref 70–99)
GLUCOSE BLDC GLUCOMTR-MCNC: 145 MG/DL — HIGH (ref 70–99)
GLUCOSE BLDC GLUCOMTR-MCNC: 145 MG/DL — HIGH (ref 70–99)
GLUCOSE BLDC GLUCOMTR-MCNC: 164 MG/DL — HIGH (ref 70–99)
GLUCOSE BLDC GLUCOMTR-MCNC: 164 MG/DL — HIGH (ref 70–99)
GLUCOSE BLDC GLUCOMTR-MCNC: 176 MG/DL — HIGH (ref 70–99)
GLUCOSE BLDC GLUCOMTR-MCNC: 176 MG/DL — HIGH (ref 70–99)
GLUCOSE SERPL-MCNC: 120 MG/DL — HIGH (ref 70–99)
GLUCOSE SERPL-MCNC: 120 MG/DL — HIGH (ref 70–99)
HCT VFR BLD CALC: 22.3 % — LOW (ref 39–50)
HCT VFR BLD CALC: 22.3 % — LOW (ref 39–50)
HCT VFR BLD CALC: 24.9 % — LOW (ref 39–50)
HCT VFR BLD CALC: 24.9 % — LOW (ref 39–50)
HGB BLD-MCNC: 7.3 G/DL — LOW (ref 13–17)
HGB BLD-MCNC: 7.3 G/DL — LOW (ref 13–17)
HGB BLD-MCNC: 8.2 G/DL — LOW (ref 13–17)
HGB BLD-MCNC: 8.2 G/DL — LOW (ref 13–17)
MCHC RBC-ENTMCNC: 31.1 PG — SIGNIFICANT CHANGE UP (ref 27–34)
MCHC RBC-ENTMCNC: 32.7 GM/DL — SIGNIFICANT CHANGE UP (ref 32–36)
MCHC RBC-ENTMCNC: 32.7 GM/DL — SIGNIFICANT CHANGE UP (ref 32–36)
MCHC RBC-ENTMCNC: 32.9 GM/DL — SIGNIFICANT CHANGE UP (ref 32–36)
MCHC RBC-ENTMCNC: 32.9 GM/DL — SIGNIFICANT CHANGE UP (ref 32–36)
MCV RBC AUTO: 94.3 FL — SIGNIFICANT CHANGE UP (ref 80–100)
MCV RBC AUTO: 94.3 FL — SIGNIFICANT CHANGE UP (ref 80–100)
MCV RBC AUTO: 94.9 FL — SIGNIFICANT CHANGE UP (ref 80–100)
MCV RBC AUTO: 94.9 FL — SIGNIFICANT CHANGE UP (ref 80–100)
PLATELET # BLD AUTO: 127 K/UL — LOW (ref 150–400)
PLATELET # BLD AUTO: 127 K/UL — LOW (ref 150–400)
PLATELET # BLD AUTO: 167 K/UL — SIGNIFICANT CHANGE UP (ref 150–400)
PLATELET # BLD AUTO: 167 K/UL — SIGNIFICANT CHANGE UP (ref 150–400)
POTASSIUM SERPL-MCNC: 4.6 MMOL/L — SIGNIFICANT CHANGE UP (ref 3.5–5.3)
POTASSIUM SERPL-MCNC: 4.6 MMOL/L — SIGNIFICANT CHANGE UP (ref 3.5–5.3)
POTASSIUM SERPL-SCNC: 4.6 MMOL/L — SIGNIFICANT CHANGE UP (ref 3.5–5.3)
POTASSIUM SERPL-SCNC: 4.6 MMOL/L — SIGNIFICANT CHANGE UP (ref 3.5–5.3)
RBC # BLD: 2.35 M/UL — LOW (ref 4.2–5.8)
RBC # BLD: 2.35 M/UL — LOW (ref 4.2–5.8)
RBC # BLD: 2.64 M/UL — LOW (ref 4.2–5.8)
RBC # BLD: 2.64 M/UL — LOW (ref 4.2–5.8)
RBC # FLD: 16.1 % — HIGH (ref 10.3–14.5)
SODIUM SERPL-SCNC: 139 MMOL/L — SIGNIFICANT CHANGE UP (ref 135–145)
SODIUM SERPL-SCNC: 139 MMOL/L — SIGNIFICANT CHANGE UP (ref 135–145)
WBC # BLD: 13.29 K/UL — HIGH (ref 3.8–10.5)
WBC # BLD: 13.29 K/UL — HIGH (ref 3.8–10.5)
WBC # BLD: 8.08 K/UL — SIGNIFICANT CHANGE UP (ref 3.8–10.5)
WBC # BLD: 8.08 K/UL — SIGNIFICANT CHANGE UP (ref 3.8–10.5)
WBC # FLD AUTO: 13.29 K/UL — HIGH (ref 3.8–10.5)
WBC # FLD AUTO: 13.29 K/UL — HIGH (ref 3.8–10.5)
WBC # FLD AUTO: 8.08 K/UL — SIGNIFICANT CHANGE UP (ref 3.8–10.5)
WBC # FLD AUTO: 8.08 K/UL — SIGNIFICANT CHANGE UP (ref 3.8–10.5)

## 2023-11-17 PROCEDURE — 99231 SBSQ HOSP IP/OBS SF/LOW 25: CPT

## 2023-11-17 PROCEDURE — 99232 SBSQ HOSP IP/OBS MODERATE 35: CPT

## 2023-11-17 RX ADMIN — Medication 10 MILLIGRAM(S): at 10:01

## 2023-11-17 RX ADMIN — Medication 10 MILLIGRAM(S): at 21:52

## 2023-11-17 RX ADMIN — Medication 2: at 17:44

## 2023-11-17 RX ADMIN — Medication 4 UNIT(S): at 12:32

## 2023-11-17 RX ADMIN — Medication 110 MILLIGRAM(S): at 21:52

## 2023-11-17 RX ADMIN — MEROPENEM 1000 MILLIGRAM(S): 1 INJECTION INTRAVENOUS at 21:52

## 2023-11-17 RX ADMIN — POLYETHYLENE GLYCOL 3350 17 GRAM(S): 17 POWDER, FOR SOLUTION ORAL at 10:01

## 2023-11-17 RX ADMIN — Medication 110 MILLIGRAM(S): at 10:02

## 2023-11-17 RX ADMIN — Medication 1 APPLICATION(S): at 10:03

## 2023-11-17 RX ADMIN — Medication 1 MILLIGRAM(S): at 10:00

## 2023-11-17 RX ADMIN — Medication 4 UNIT(S): at 17:43

## 2023-11-17 RX ADMIN — Medication 325 MILLIGRAM(S): at 10:01

## 2023-11-17 RX ADMIN — Medication 12.5 MICROGRAM(S): at 05:28

## 2023-11-17 RX ADMIN — FLUTICASONE FUROATE, UMECLIDINIUM BROMIDE AND VILANTEROL TRIFENATATE 1 PUFF(S): 200; 62.5; 25 POWDER RESPIRATORY (INHALATION) at 08:17

## 2023-11-17 RX ADMIN — INSULIN GLARGINE 5 UNIT(S): 100 INJECTION, SOLUTION SUBCUTANEOUS at 21:51

## 2023-11-17 RX ADMIN — Medication 4 UNIT(S): at 09:59

## 2023-11-17 RX ADMIN — Medication 1 APPLICATION(S): at 22:04

## 2023-11-17 RX ADMIN — Medication 3 MILLILITER(S): at 14:32

## 2023-11-17 RX ADMIN — MEROPENEM 1000 MILLIGRAM(S): 1 INJECTION INTRAVENOUS at 10:01

## 2023-11-17 RX ADMIN — Medication 3 MILLILITER(S): at 02:16

## 2023-11-17 RX ADMIN — Medication 3 MILLILITER(S): at 22:09

## 2023-11-17 RX ADMIN — FLUCONAZOLE 50 MILLIGRAM(S): 150 TABLET ORAL at 17:49

## 2023-11-17 RX ADMIN — Medication 3 MILLILITER(S): at 08:16

## 2023-11-17 RX ADMIN — ATORVASTATIN CALCIUM 40 MILLIGRAM(S): 80 TABLET, FILM COATED ORAL at 21:51

## 2023-11-17 NOTE — PROGRESS NOTE ADULT - CONVERSATION DETAILS
The role of Palliative medicine was reviewed as the pt's wife had spoken with our team on the adm. She stated that he had a bad day yesterday however looks better today. She also expressed that she hasn't had a medical update today however if he does not improve she would choose not to spend time in [facility]. I suggested home hospice and she stated that she will consider. She has had experience in the past with her mother in hospice care. Will follow.

## 2023-11-17 NOTE — GOALS OF CARE CONVERSATION - ADVANCED CARE PLANNING - CONVERSATION DETAILS
[facility] discussion w/wife and daughter. Explained how patient told me that he would not want to be intubated or have CPR but given that he cannot tell me the date, he does not have full capacity and it would be up the spouse to make the decision. I explained patient's condition and how we do not have definitive diagnosis currently. How he needs bronchoscopy. And if it is an aspergilloma, she asked how it is treated, and i explained it may require surgery which she said that is something the patient would NOT want. Family wants everything to be done, besides intubation and CPR at this time. Update PRECIOUS
This SW met with pts wife and daughter at bedside to follow up and further discuss goals of care. Pts family reports they spoke with Hospitalist and the plan is to see how pt. does over the weekend and decide from there what the focus of care will be. We discussed hospice services and the option of a comfort focus. Pts family receptive to the information. We plan to follow up on Monday to further determine the plan base don how pt. does over the weekend. Emotional support provided. Our team will continue to follow.
This SW spoke with pts wife via phone to discuss goals of care, assist with planning and provide supportive counseling. Pt. had been home with her prior to admission and was walking independently . Pts wife shared that pt. has had radiation treatments for his cancer as well as 3 chemo treatments however, it was supposed to be 6 but he was not tolerating. Pts wife shared that they have a plan to go back to Oncologist in January to discuss further. Feelings explored and support provided.    Pts wife is waiting on PT today however, they are considering [facility] for pt. to rebuild as much strength as possible. We also discussed the option of a comfort focus and hospice care at any point if no further treatment is available or pt. declines further. Pts wife was receptive to the information and shared that she is familiar with the hospice process as she went through it with her Mother therefore, she has the knowledge. DNR/DNI also confirmed with pts wife.    Plan at this time is likely [facility] and to follow up with Oncology in January. They are not ready for a comfort focus at this time. Emotional support provided. Our team will continue to follow.

## 2023-11-17 NOTE — PROGRESS NOTE ADULT - SUBJECTIVE AND OBJECTIVE BOX
HOSPITALIST ATTENDING PROGRESS NOTE    Chart and meds reviewed.  Patient seen and examined.    CC: Pneumonia    Subjective: Weak, coughing. No fever. Minimal po intake yesterday. More alert this am.     All other systems reviewed and found to be negative with the exception of what has been described above.    MEDICATIONS  (STANDING):  albuterol/ipratropium for Nebulization 3 milliLiter(s) Nebulizer every 6 hours  AQUAPHOR (petrolatum Ointment) 1 Application(s) Topical two times a day  atorvastatin 40 milliGRAM(s) Oral at bedtime  dextrose 5%. 1000 milliLiter(s) (50 mL/Hr) IV Continuous <Continuous>  dextrose 5%. 1000 milliLiter(s) (100 mL/Hr) IV Continuous <Continuous>  dextrose 50% Injectable 25 Gram(s) IV Push once  dextrose 50% Injectable 12.5 Gram(s) IV Push once  dextrose 50% Injectable 25 Gram(s) IV Push once  doxycycline IVPB 100 milliGRAM(s) IV Intermittent every 12 hours  ferrous    sulfate 325 milliGRAM(s) Oral daily  fluconAZOLE IVPB 100 milliGRAM(s) IV Intermittent every 24 hours  fluticasone furoate/umeclidinium/vilanterol 100-62.5-25 MICROgram(s) Inhaler 1 Puff(s) Inhalation daily  folic acid 1 milliGRAM(s) Oral daily  glucagon  Injectable 1 milliGRAM(s) IntraMuscular once  hydrALAZINE 10 milliGRAM(s) Oral two times a day  insulin glargine Injectable (LANTUS) 5 Unit(s) SubCutaneous at bedtime  insulin lispro (ADMELOG) corrective regimen sliding scale   SubCutaneous at bedtime  insulin lispro (ADMELOG) corrective regimen sliding scale   SubCutaneous three times a day before meals  insulin lispro Injectable (ADMELOG) 4 Unit(s) SubCutaneous three times a day before meals  levothyroxine 12.5 MICROGram(s) Oral daily  meropenem Injectable 1000 milliGRAM(s) IV Push every 12 hours  polyethylene glycol 3350 17 Gram(s) Oral daily    MEDICATIONS  (PRN):  acetaminophen     Tablet .. 650 milliGRAM(s) Oral every 6 hours PRN Temp greater or equal to 38C (100.4F), Mild Pain (1 - 3)  aluminum hydroxide/magnesium hydroxide/simethicone Suspension 30 milliLiter(s) Oral every 4 hours PRN Dyspepsia  benzonatate 100 milliGRAM(s) Oral three times a day PRN Cough  dextrose Oral Gel 15 Gram(s) Oral once PRN Blood Glucose LESS THAN 70 milliGRAM(s)/deciliter  meclizine 12.5 milliGRAM(s) Oral every 8 hours PRN Dizziness  melatonin 3 milliGRAM(s) Oral at bedtime PRN Insomnia  ondansetron Injectable 4 milliGRAM(s) IV Push every 8 hours PRN Nausea and/or Vomiting    ICU Vital Signs Last 24 Hrs  T(C): 36.3 (17 Nov 2023 09:07), Max: 36.7 (16 Nov 2023 21:26)  T(F): 97.3 (17 Nov 2023 09:07), Max: 98.1 (16 Nov 2023 21:26)  HR: 103 (17 Nov 2023 09:07) (84 - 110)  BP: 109/81 (17 Nov 2023 09:07) (106/55 - 122/47)  RR: 18 (17 Nov 2023 09:07) (18 - 18)  SpO2: 92% (17 Nov 2023 09:07) (92% - 100%)  GEN: Ill appearing  HEENT:  pupils equal and reactive, EOMI, no oropharyngeal lesions, erythema, exudates, oral thrush  NECK:   supple, no carotid bruits, no palpable lymph nodes, no thyromegaly  CV:  +S1, +S2, regular, no murmurs or rubs  RESP:   Right ronchi, bilateral wheeze  BREAST:  not examined  GI:  abdomen soft, non-tender, non-distended, normal BS, no bruits, no abdominal masses, no palpable masses  RECTAL:  not examined  :  not examined  MSK:   normal muscle tone, no atrophy, no rigidity, no contractions  EXT:  no clubbing, no cyanosis, no edema, no calf pain, swelling or erythema  VASCULAR:  pulses equal and symmetric in the upper and lower extremities  NEURO:  AAOX3, no focal neurological deficits, follows all commands, able to move extremities spontaneously  SKIN:  no ulcers, lesions or rashes    LABS:                          7.3    8.08  )-----------( 127      ( 17 Nov 2023 06:45 )             22.3     11-17    139  |  105  |  66<H>  ----------------------------<  120<H>  4.6   |  31  |  1.26    Ca    8.4<L>      17 Nov 2023 06:45        Urinalysis Basic - ( 17 Nov 2023 06:45 )  Color: x / Appearance: x / SG: x / pH: x  Gluc: 120 mg/dL / Ketone: x  / Bili: x / Urobili: x   Blood: x / Protein: x / Nitrite: x   Leuk Esterase: x / RBC: x / WBC x   Sq Epi: x / Non Sq Epi: x / Bacteria: x

## 2023-11-17 NOTE — PROGRESS NOTE ADULT - SUBJECTIVE AND OBJECTIVE BOX
INTERVAL HPI/OVERNIGHT EVENTS:  Patient S&E at bedside. No o/n events,   pt seated in chair at [facility]  labs reviewed WBC 8, Hb 7.3, plt 127 today   NH4 38.   Pt on Bipap at night and NC during day, remains on doxy/josué and fluconazole     PAST MEDICAL & SURGICAL HISTORY:  COPD (chronic obstructive pulmonary disease)      Lung cancer      Anemia of chronic disease      CKD (chronic kidney disease), stage III      Hypothyroidism      AAA (abdominal aortic aneurysm)      HTN (hypertension)      HLD (hyperlipidemia)      Thrombocytopenia      S/P lobectomy of lung          FAMILY HISTORY:      VITAL SIGNS:  T(F): 97.4 (11-17-23 @ 05:29)  HR: 87 (11-17-23 @ 08:37)  BP: 114/46 (11-17-23 @ 05:29)  RR: 18 (11-16-23 @ 21:26)  SpO2: 98% (11-17-23 @ 05:36)  Wt(kg): --    PHYSICAL EXAM:    Constitutional: NAD, [place]  Eyes: EOMI,   Respiratory: decreased bs on NC 3L  Cardiovascular: RRR,  Gastrointestinal: soft, NTND,   Extremities: no c/c/e    MEDICATIONS  (STANDING):  albuterol/ipratropium for Nebulization 3 milliLiter(s) Nebulizer every 6 hours  AQUAPHOR (petrolatum Ointment) 1 Application(s) Topical two times a day  atorvastatin 40 milliGRAM(s) Oral at bedtime  dextrose 5%. 1000 milliLiter(s) (50 mL/Hr) IV Continuous <Continuous>  dextrose 5%. 1000 milliLiter(s) (100 mL/Hr) IV Continuous <Continuous>  dextrose 50% Injectable 25 Gram(s) IV Push once  dextrose 50% Injectable 12.5 Gram(s) IV Push once  dextrose 50% Injectable 25 Gram(s) IV Push once  doxycycline IVPB 100 milliGRAM(s) IV Intermittent every 12 hours  ferrous    sulfate 325 milliGRAM(s) Oral daily  fluconAZOLE IVPB 100 milliGRAM(s) IV Intermittent every 24 hours  fluticasone furoate/umeclidinium/vilanterol 100-62.5-25 MICROgram(s) Inhaler 1 Puff(s) Inhalation daily  folic acid 1 milliGRAM(s) Oral daily  glucagon  Injectable 1 milliGRAM(s) IntraMuscular once  hydrALAZINE 10 milliGRAM(s) Oral two times a day  insulin glargine Injectable (LANTUS) 5 Unit(s) SubCutaneous at bedtime  insulin lispro (ADMELOG) corrective regimen sliding scale   SubCutaneous at bedtime  insulin lispro (ADMELOG) corrective regimen sliding scale   SubCutaneous three times a day before meals  insulin lispro Injectable (ADMELOG) 4 Unit(s) SubCutaneous three times a day before meals  levothyroxine 12.5 MICROGram(s) Oral daily  meropenem Injectable 1000 milliGRAM(s) IV Push every 12 hours  polyethylene glycol 3350 17 Gram(s) Oral daily    MEDICATIONS  (PRN):  acetaminophen     Tablet .. 650 milliGRAM(s) Oral every 6 hours PRN Temp greater or equal to 38C (100.4F), Mild Pain (1 - 3)  aluminum hydroxide/magnesium hydroxide/simethicone Suspension 30 milliLiter(s) Oral every 4 hours PRN Dyspepsia  benzonatate 100 milliGRAM(s) Oral three times a day PRN Cough  dextrose Oral Gel 15 Gram(s) Oral once PRN Blood Glucose LESS THAN 70 milliGRAM(s)/deciliter  meclizine 12.5 milliGRAM(s) Oral every 8 hours PRN Dizziness  melatonin 3 milliGRAM(s) Oral at bedtime PRN Insomnia  ondansetron Injectable 4 milliGRAM(s) IV Push every 8 hours PRN Nausea and/or Vomiting      Allergies    penicillin (Unknown)    Intolerances        LABS:                        7.3    8.08  )-----------( 127      ( 17 Nov 2023 06:45 )             22.3     11-17    139  |  105  |  66<H>  ----------------------------<  120<H>  4.6   |  31  |  1.26    Ca    8.4<L>      17 Nov 2023 06:45        Urinalysis Basic - ( 17 Nov 2023 06:45 )    Color: x / Appearance: x / SG: x / pH: x  Gluc: 120 mg/dL / Ketone: x  / Bili: x / Urobili: x   Blood: x / Protein: x / Nitrite: x   Leuk Esterase: x / RBC: x / WBC x   Sq Epi: x / Non Sq Epi: x / Bacteria: x        RADIOLOGY & ADDITIONAL TESTS:  Studies reviewed.

## 2023-11-17 NOTE — PROGRESS NOTE ADULT - ASSESSMENT
83-year-old M with PMHx HTN, HLD, chronic anemia, COPD, lung CA s/p RUL lobectomy (follows at [facility] [place], last chemotherapy/RT 5/2023, not currently on treatment), AAA, hypothyroidism, CKD stage IIIa sent in to ED on 11/2/23  by pulmonologist MD Olivera with c/o SOB, dyspnea on minimal exertion, and cough. 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     # Acute hypoxic respiratory failure, multifactorial   # New RLL PNA on CT 11/13 , RLL cavity s/p bronchoscopy 11/9  possible yeast  infection, less likely radiation pneumonitis , r/o  aspergilloma    - CT chest: Larger right lower lobe thick-walled cavity which may be infectious and/or residua of reported treated tumor. Spongelike material within the cavity can be seen with aspergilloma (prior to fungus ball formation). Consolidation within the right lower and middle lobe compatible with radiation change.  - leukocytosis stable (WBC 17.70 from 17.35) ---> trend down to WBC  8   - repeat CXR 11/8 - worsening of opacities over right lung   - 2 d echo 11/8 - EF 60% , no significant valvular disease   - immunoglobulin panel - all Ig wnl  - 11/9 - s/p bronchoscopy   - speech and swallow evaluation  - no aspirations  - CXR 11/11 - stable consolidations  - repeat CT chest 11/13 - new right lung consolidation  - blood cultures -no growth, sputum culture - normal respiratory  - c/w trelegy ellipta, chest pt, IS, acapella ,BIPAP as needed  - ID/pulm consult noted   - s/p IV steroids --> po prednisone taper 20 x 3 days than stop , trending down ----> 34  - s/p  voriconazole PO ,  s/p 7 days of levaquin to cover atypical/psuedomonas (allergy to PCN)   - hemoptysis persist , stop heparin   - 11/13 - IV meropenem, doxy and fluconasole started as per ID   - Worsening respiratory status  - Chest xray prelim worse  - Thoracic consult appreciated, no intervention    #  Chronic anemia, likely due to  chemotherapy, worsening and iron deficiency   - decreased on AM labs from admission (Hgb 9.2-->8.2, Hct 27.8--> 25.2)   - no s/s bleeding, asymptomatic, continue to monitor/trend  - 11/8 - 1 unit PRBC due to hemoptysis and worsening of anemia  - restart daily iron  - Repeat CBC     #CHRISTINA on CKD stage IIIa, improving  - Cr improving, 1.38-->1.26    # Left calf laceration from veno dines while in PACU for bronchoscopy   - surgery consult - s/p suturing  on 11/09/23   - plan to remove sutures in 7-10 days  - wound care as per surgery team    # Acute hyperglycemia, possible steroid-induced , Prediabetes with A1C 6.3   -  on AM labs,  A1c results 6.3   - consistent with Impaired Fasting Glucose, new onset DM II ruled out    # HTN   - stop losartan due to CHRISTINA,   - stop amlodipine 2.5  - hydralazine 10 bid as per nephrology team    #  HLD    - c/w  atorvastatin    # Hypothyroidism   - Levo lowered to 12.5 mcg  - Recheck in 4 weeks    # Hx lung CA s/p RUL lobectomy/chemo/RT   - f/u with [facility]     # DVT Ppx   - SCDs    # Code status   - DNR/DNI

## 2023-11-17 NOTE — PROGRESS NOTE ADULT - ASSESSMENT
82 YO M w/pmhx of mild dementia, HTN, Chronic anemia, COPD, lung CA follows at [facility] [place] (last chemotherapy and RT 05/2023, not currently receiving chemo treatment), s/p right upper lobectomy, AAA, Hypothyroidism, CKD stage IIIa, recently was being treated outpatient for pneumonia w/Augmentin, azithromycin, and Prednisone. Patient had CT chest done at that time 10/3/23, now uploaded. Patient was sent to ED 11/2/23 by pulmonologist, Dr. Olivera. Patient had CT chest done here, which is concerning for possible aspergilloma and radiation pneumonitis, recurrent CA?. Called for bronch 11/9 s/p FB BAL bronchial brushing     Plan  OR path cult NTD , Fungal studies P - takes several weeks as per ID    cont ABX as per ID  cont care as per primary team   Pt deconditioned   Worsening infiltrate on CXR  No thoracic intervention can be offered dt patients deconditioned state   would recommend Palliative care consult   KARL Nicolas   Discussed with Cardiothoracic Team at AM rounds.

## 2023-11-17 NOTE — PROGRESS NOTE ADULT - SUBJECTIVE AND OBJECTIVE BOX
Subjective: Pt in bed NAD     T(C): 36.3 (11-17-23 @ 09:07), Max: 36.7 (11-16-23 @ 21:26)  HR: 103 (11-17-23 @ 09:07) (84 - 110)  BP: 109/81 (11-17-23 @ 09:07) (106/55 - 122/47)  ABP: --  ABP(mean): --  RR: 18 (11-17-23 @ 09:07) (18 - 18)  SpO2: 92% (11-17-23 @ 09:07) (92% - 100%) 3 L NC   Wt(kg): --  CVP(mm Hg): --  CO: --  CI: --  PA: --                                              Tele: SR            11-17    139  |  105  |  66<H>  ----------------------------<  120<H>  4.6   |  31  |  1.26    Ca    8.4<L>      17 Nov 2023 06:45                                 7.3    8.08  )-----------( 127      ( 17 Nov 2023 06:45 )             22.3                 CAPILLARY BLOOD GLUCOSE      POCT Blood Glucose.: 126 mg/dL (17 Nov 2023 08:20)  POCT Blood Glucose.: 242 mg/dL (16 Nov 2023 22:28)  POCT Blood Glucose.: 248 mg/dL (16 Nov 2023 17:31)  POCT Blood Glucose.: 91 mg/dL (16 Nov 2023 12:47)           CXR: < from: CT Chest No Cont (11.13.23 @ 10:04) >  MPRESSION: Right lung large areas of consolidation new since November 2, 2023 likely pneumonia.    Right apical previously described large cyst unchanged in size since   November 2, 2023 containing internal air and increasing internal   opacification since November 2, 2023 as described above. Continued   follow-up is recommended.    A few left lung ill-defined nonspecific groundglass opacities.   Differential diagnosis include but is not limited to   infectious/inflammatory etiologies and or pulmonary edema.    Small left pleural effusion new since November 8, 2023.    < end of copied text >      Exam   Neuro:  Alert Awake mild confusion cachetic   Pulm: decreased at bases   CV: RRR   Abd: soft   Extremities: warm                Assessment:  83yMale    with PAST MEDICAL & SURGICAL HISTORY:  COPD (chronic obstructive pulmonary disease)      Lung cancer      Anemia of chronic disease      CKD (chronic kidney disease), stage III      Hypothyroidism      AAA (abdominal aortic aneurysm)      HTN (hypertension)      HLD (hyperlipidemia)      Thrombocytopenia      S/P lobectomy of lung            Plan:

## 2023-11-17 NOTE — PROGRESS NOTE ADULT - ASSESSMENT
HPI: Pt is a 83y old Male with a PMH of HTN, HPL, Chronic anemia, COPD, lung CA follows at [facility] [place] (last chemotherapy and RT 05/2023, not currently receiving chemo treatment), s/p right upper lobectomy, AAA, Hypothyroidism, CKD stage IIIa and others has been in ED by his pulmonologist with c/o shortness of breath, dyspnea on minimal exertion, and cough. Reportedly, he had CT of the chest performed 10/3 which demonstrated consolidation, was started on augmentin/prednisone/azithro 10/6 x 1 week with worsening symptoms. Hospital course includes RLL PNA with possible radiation pneumonitis and /or aspergilloma 2/2 immunocompromised status. Pt is to undergo a bronchoscopy today. Palliative medicine Consult to further establish GOC  11/9/23 Seen and examined at bedside with no family present. Pt denies any complaints. Is able to provide a limited hx however does state he is scheduled for bronchoscopy today.     Assessment and Plan:    1) Resp failure  -CT chest=  Right upper lobectomy.  Larger right lower lobe thick-walled cavity which may be infectious   and/or residual of reported treated tumor. 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  Consolidation within the right lower and middle lobe compatible with   radiation change.  Multiple indistinct nodules in the right lung which may be infectious or   radiation pneumonitis.  -Bronch today  -Pulm eval noted  -H/O Lung cancer  -S/P lobectomy/chemo  -Cont abx/nebs as per medicine    2) Debility  -Weakness  -Cancer  -S/P recent  chemo  -Poor PO intake  -Severe protein geo malnutrition  -Nutrition eval  -PT eval    3) CKD  -Creat>1.9  -Hydration as tolerated  -Supportive care    4) Advanced Directives  -Pt without capacity  -Wife Anthony named HCP  -MOLST DNR/DNI/Trial non invasive  -Message left for Anthony   -Will follow    Time Spent: 45 minutes including the care, coordination and counseling of this patient, 50% of which was spent coordinating and counseling.

## 2023-11-17 NOTE — PROGRESS NOTE ADULT - SUBJECTIVE AND OBJECTIVE BOX
Date of service: 11-17-23 @ 10:11    Lying in bed in NAD  Hs dry cough  Weak looking    ROS: no fever or chills; denies dizziness, no HA, no abdominal pain, no diarrhea or constipation; no dysuria, no legs pain, no rashes    MEDICATIONS  (STANDING):  albuterol/ipratropium for Nebulization 3 milliLiter(s) Nebulizer every 6 hours  AQUAPHOR (petrolatum Ointment) 1 Application(s) Topical two times a day  atorvastatin 40 milliGRAM(s) Oral at bedtime  dextrose 5%. 1000 milliLiter(s) (50 mL/Hr) IV Continuous <Continuous>  dextrose 5%. 1000 milliLiter(s) (100 mL/Hr) IV Continuous <Continuous>  dextrose 50% Injectable 25 Gram(s) IV Push once  dextrose 50% Injectable 12.5 Gram(s) IV Push once  dextrose 50% Injectable 25 Gram(s) IV Push once  doxycycline IVPB 100 milliGRAM(s) IV Intermittent every 12 hours  ferrous    sulfate 325 milliGRAM(s) Oral daily  fluconAZOLE IVPB 100 milliGRAM(s) IV Intermittent every 24 hours  fluticasone furoate/umeclidinium/vilanterol 100-62.5-25 MICROgram(s) Inhaler 1 Puff(s) Inhalation daily  folic acid 1 milliGRAM(s) Oral daily  glucagon  Injectable 1 milliGRAM(s) IntraMuscular once  hydrALAZINE 10 milliGRAM(s) Oral two times a day  insulin glargine Injectable (LANTUS) 5 Unit(s) SubCutaneous at bedtime  insulin lispro (ADMELOG) corrective regimen sliding scale   SubCutaneous at bedtime  insulin lispro (ADMELOG) corrective regimen sliding scale   SubCutaneous three times a day before meals  insulin lispro Injectable (ADMELOG) 4 Unit(s) SubCutaneous three times a day before meals  levothyroxine 12.5 MICROGram(s) Oral daily  meropenem Injectable 1000 milliGRAM(s) IV Push every 12 hours  polyethylene glycol 3350 17 Gram(s) Oral daily    Vital Signs Last 24 Hrs  T(C): 36.3 (17 Nov 2023 09:07), Max: 36.7 (16 Nov 2023 21:26)  T(F): 97.3 (17 Nov 2023 09:07), Max: 98.1 (16 Nov 2023 21:26)  HR: 103 (17 Nov 2023 09:07) (84 - 110)  BP: 109/81 (17 Nov 2023 09:07) (106/55 - 122/47)  BP(mean): --  RR: 18 (17 Nov 2023 09:07) (18 - 18)  SpO2: 92% (17 Nov 2023 09:07) (92% - 100%)    Parameters below as of 17 Nov 2023 09:07  Patient On (Oxygen Delivery Method): nasal cannula     Physical exam:    Constitutional:  No acute distress  HEENT: NC/AT, EOMI, PERRLA, conjunctivae clear; ears and nose atraumatic  Neck: supple; thyroid not palpable  Back: no tenderness  Respiratory: respiratory effort normal; few crackles at bases  Cardiovascular: S1S2 regular, no murmurs  Abdomen: soft, not tender, not distended, positive BS  Genitourinary: no suprapubic tenderness  Lymphatic: no LN palpable  Musculoskeletal: no muscle tenderness, no joint swelling or tenderness  Extremities: no pedal edema  Left lower leg laceration dressed  Neurological/ Psychiatric: AxOx3, moving all extremities  Skin: no rashes; no palpable lesions    Labs: reviewed                        7.3    8.08  )-----------( 127      ( 17 Nov 2023 06:45 )             22.3     11-17    139  |  105  |  66<H>  ----------------------------<  120<H>  4.6   |  31  |  1.26    Ca    8.4<L>      17 Nov 2023 06:45    C-Reactive Protein, Serum: 24 mg/L (11-14-23 @ 07:03)  C-Reactive Protein, Serum: 34 mg/L (11-13-23 @ 06:30)  C-Reactive Protein, Serum: 46 mg/L (11-12-23 @ 06:55)  C-Reactive Protein, Serum: 70 mg/L (11-11-23 @ 06:31)  C-Reactive Protein, Serum: 106 mg/L (11-10-23 @ 06:19)  C-Reactive Protein, Serum: 163 mg/L (11-08-23 @ 06:01)  Ferritin: 1082 ng/mL (11-08-23 @ 06:01)  C-Reactive Protein, Serum: 141 mg/L (11-07-23 @ 07:11)                        7.7    10.37 )-----------( 150      ( 13 Nov 2023 06:30 )             23.7     11-13    137  |  103  |  63<H>  ----------------------------<  186<H>  5.5<H>   |  32<H>  |  1.36<H>    Ca    8.6      13 Nov 2023 06:30  Phos  3.1     11-13  Mg     2.4     11-13    TPro  6.7  /  Alb  3.0<L>  /  TBili  0.7  /  DBili  x   /  AST  40<H>  /  ALT  26  /  AlkPhos  115  11-12    C-Reactive Protein, Serum: 34 mg/L (11-13-23 @ 06:30)  C-Reactive Protein, Serum: 46 mg/L (11-12-23 @ 06:55)  C-Reactive Protein, Serum: 70 mg/L (11-11-23 @ 06:31)  C-Reactive Protein, Serum: 106 mg/L (11-10-23 @ 06:19)  C-Reactive Protein, Serum: 163 mg/L (11-08-23 @ 06:01)  Ferritin: 1082 ng/mL (11-08-23 @ 06:01)  C-Reactive Protein, Serum: 141 mg/L (11-07-23 @ 07:11)                        8.2    11.74 )-----------( 259      ( 03 Nov 2023 07:15 )             25.2     11-03    133<L>  |  102  |  35<H>  ----------------------------<  205<H>  4.4   |  22  |  1.20    Ca    8.3<L>      03 Nov 2023 07:15    TPro  6.9  /  Alb  2.0<L>  /  TBili  0.5  /  DBili  x   /  AST  38<H>  /  ALT  50  /  AlkPhos  130<H>  11-02     LIVER FUNCTIONS - ( 02 Nov 2023 14:47 )  Alb: 2.0 g/dL / Pro: 6.9 gm/dL / ALK PHOS: 130 U/L / ALT: 50 U/L / AST: 38 U/L / GGT: x           Urinalysis (11-02 @ 16:40)  Urine Appearance: Clear  Protein, Urine: 100 mg/dL  Urine Microscopic-Add On (NC) (11-02 @ 16:40)  White Blood Cell - Urine: 0 /HPF  Red Blood Cell - Urine: 0 /HPF    (11-02 @ 14:47)  NotDetec    Culture - Acid Fast - Bronchial w/Smear (collected 09 Nov 2023 12:30)  Source: .Bronchial RIGHT LOWER LOBE BAL  Preliminary Report (11 Nov 2023 15:08):    Culture is being performed.    Culture - Fungal, Bronchial (collected 09 Nov 2023 12:30)  Source: .Bronchial RIGHT LOWER LOBE BAL  Preliminary Report (13 Nov 2023 12:07):    Rare Yeast    Culture - Bronchial (collected 09 Nov 2023 12:30)  Source: .Bronchial RIGHT LOWER LOBE BAL  Gram Stain (09 Nov 2023 23:14):    Rare polymorphonuclear leukocytes per low power field    Few Squamous epithelial cells per low power field    Few Gram positive cocci in pairs per oil power field  Final Report (11 Nov 2023 16:38):    Normal Respiratory Marguerite present    Culture - Blood (collected 04 Nov 2023 04:21)  Source: .Blood None  Final Report (09 Nov 2023 09:00):    No growth at 5 days    Culture - Blood (collected 04 Nov 2023 04:21)  Source: .Blood None  Final Report (09 Nov 2023 09:00):    No growth at 5 days    Radiology: all available radiological tests reviewed    < from: CT Chest No Cont (11.02.23 @ 16:04) >  Right upper lobectomy.  Larger right lower lobe thick-walled cavity which may be infectious   and/or residua of reported treated tumor. 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  Consolidation within the right lower and middle lobe compatible with radiation change.  Multiple indistinct nodules in the right lungwhich may be infectious or radiation pneumonitis.  < end of copied text >    < from: CT Abdomen and Pelvis No Cont (11.08.23 @ 19:01) >  No hydronephrosis.  New patchy opacities in the right lung base. Small right pleural effusion.  < end of copied text >    < from: CT Chest No Cont (11.13.23 @ 10:04) >  IMPRESSION: Right lung large areas of consolidation new since November 2, 2023 likely pneumonia.    Right apical previously described large cyst unchanged in size since   November 2, 2023 containing internal air and increasing internal opacification since November 2, 2023 as described above.     A few left lung ill-defined nonspecific groundglass opacities.   Differential diagnosis include but is not limited to   infectious/inflammatory etiologies and or pulmonary edema.  Small left pleural effusion new since November 8, 2023.  < end of copied text >      Advanced directives addressed: full resuscitation

## 2023-11-17 NOTE — PROGRESS NOTE ADULT - SUBJECTIVE AND OBJECTIVE BOX
JOHN FRIEDEL  MRN: [id number]    S: November 17, 2023:    Sitting up in chair. Denies shortness of breath. No c/o chest pain. No hemoptysis. Weak; chronically ill appearing.       November 16, 2023:    Reviewed hx with Dr. Olivera.     Patient appears chronically ill. Offers no complaints this morning. Denies dyspnea. Coughing - nonproductive.       PAST MEDICAL & SURGICAL HISTORY:  COPD (chronic obstructive pulmonary disease)      Lung cancer      Anemia of chronic disease      CKD (chronic kidney disease), stage III      Hypothyroidism      AAA (abdominal aortic aneurysm)      HTN (hypertension)      HLD (hyperlipidemia)      Thrombocytopenia      S/P lobectomy of lung          O: T(C): 36.3 (11-17-23 @ 09:07), Max: 36.7 (11-16-23 @ 21:26)  HR: 103 (11-17-23 @ 09:07) (84 - 110)  BP: 109/81 (11-17-23 @ 09:07) (106/55 - 122/47)  RR: 18 (11-17-23 @ 09:07) (18 - 18)  SpO2: 92% (11-17-23 @ 09:07) (92% - 100%)  Wt(kg): --    PHYSICAL EXAM:      GENERAL: weak    NEURO: awake/alert. Oriented x 2.     NECK: no JVD     CHEST: rhonchi on right      CARDIAC: RR    EXT: edema      LABS:                          7.3    8.08  )-----------( 127      ( 17 Nov 2023 06:45 )             22.3       11-17    139  |  105  |  66<H>  ----------------------------<  120<H>  4.6   |  31  |  1.26    Ca    8.4<L>      17 Nov 2023 06:45    RADIOLOGY:    CT Chest No Cont (11.13.23 @ 10:04) >    PROCEDURE DATE:  11/13/2023    INTERPRETATION:  Clinical indications: Pneumonia.    Axial CT images of the chest are obtained without intravenous   administration of contrast.    Comparison is made with the prior chest CT of November 2, 2023.    No enlarged axillary or mediastinal lymph nodes.    No pericardial effusion. Heart size is normal. Mitral annular   calcifications. Vascular calcifications with involvement of the aorta and   the coronary arteries. Aortic root calcifications.    Evaluation of the upper abdomen demonstrate partially imaged aortic stent   graft. Subcutaneous edema. Minimal perihepatic ascites.    Small left pleural effusion new since the abdominalCT of November 8, 2023. Trace right pleural effusion as on November 8, 2023.    Evaluation of the lungs demonstrate left lung base linear subsegmental   atelectasis. A few ill-defined left lung patchy nonspecific groundglass   opacities largest within the dependent portion of the left upper lobe are   new since November 2, 2023.    Status post right upper lobectomy with postoperative changes. Persistent   right apical large large cyst, part of which measures about 6.6 cm in   transverse dimension without significant interval change in size   containing air and increasing internal opacification as compared with   November 2, 2023, some of which appear slightly dense and may be due to   internal hemorrhagic components.    Extensive right mid to lower lung consolidations and ill-defined   groundglass opacities, with the largest confluent consolidation within   the mid to lower portions of the right lower lobe, majority of which   appear new since November 2, 2023 superimposed on previouslydescribed   linear opacities.    Degenerative changes of the spine. Old healed sternal fracture.    IMPRESSION: Right lung large areas of consolidation new since November 2, 2023 likely pneumonia.    Right apical previously described large cyst unchanged in size since   November 2, 2023 containing internal air and increasing internal   opacification since November 2, 2023 as described above. Continued   follow-up is recommended.    A few left lung ill-defined nonspecific groundglass opacities.   Differential diagnosis include but is not limited to   infectious/inflammatory etiologies and or pulmonary edema.    Small left pleural effusion new since November 8, 2023.      Bronchoscopy results:  Culture - Acid Fast - Bronchial w/Smear (11.09.23 @ 12:30)   Specimen Source: .Bronchial RIGHT LOWER LOBE BAL  Acid Fast Bacilli Smear:   No acid-fast bacilli seen by fluorochrome stain  Culture Results:   Culture is being performed.  Culture - Fungal, Bronchial (11.09.23 @ 12:30)   Specimen Source: .Bronchial RIGHT LOWER LOBE BAL  Culture Results:   Rare Yeast    MEDICATIONS  (STANDING):  albuterol/ipratropium for Nebulization 3 milliLiter(s) Nebulizer every 6 hours  AQUAPHOR (petrolatum Ointment) 1 Application(s) Topical two times a day  atorvastatin 40 milliGRAM(s) Oral at bedtime  dextrose 5%. 1000 milliLiter(s) (50 mL/Hr) IV Continuous <Continuous>  dextrose 5%. 1000 milliLiter(s) (100 mL/Hr) IV Continuous <Continuous>  dextrose 50% Injectable 25 Gram(s) IV Push once  dextrose 50% Injectable 12.5 Gram(s) IV Push once  dextrose 50% Injectable 25 Gram(s) IV Push once  doxycycline IVPB 100 milliGRAM(s) IV Intermittent every 12 hours  ferrous    sulfate 325 milliGRAM(s) Oral daily  fluconAZOLE IVPB 100 milliGRAM(s) IV Intermittent every 24 hours  fluticasone furoate/umeclidinium/vilanterol 100-62.5-25 MICROgram(s) Inhaler 1 Puff(s) Inhalation daily  folic acid 1 milliGRAM(s) Oral daily  glucagon  Injectable 1 milliGRAM(s) IntraMuscular once  hydrALAZINE 10 milliGRAM(s) Oral two times a day  insulin glargine Injectable (LANTUS) 5 Unit(s) SubCutaneous at bedtime  insulin lispro (ADMELOG) corrective regimen sliding scale   SubCutaneous at bedtime  insulin lispro (ADMELOG) corrective regimen sliding scale   SubCutaneous three times a day before meals  insulin lispro Injectable (ADMELOG) 4 Unit(s) SubCutaneous three times a day before meals  levothyroxine 12.5 MICROGram(s) Oral daily  meropenem Injectable 1000 milliGRAM(s) IV Push every 12 hours  polyethylene glycol 3350 17 Gram(s) Oral daily    MEDICATIONS  (PRN):  acetaminophen     Tablet .. 650 milliGRAM(s) Oral every 6 hours PRN Temp greater or equal to 38C (100.4F), Mild Pain (1 - 3)  aluminum hydroxide/magnesium hydroxide/simethicone Suspension 30 milliLiter(s) Oral every 4 hours PRN Dyspepsia  benzonatate 100 milliGRAM(s) Oral three times a day PRN Cough  dextrose Oral Gel 15 Gram(s) Oral once PRN Blood Glucose LESS THAN 70 milliGRAM(s)/deciliter  meclizine 12.5 milliGRAM(s) Oral every 8 hours PRN Dizziness  melatonin 3 milliGRAM(s) Oral at bedtime PRN Insomnia  ondansetron Injectable 4 milliGRAM(s) IV Push every 8 hours PRN Nausea and/or Vomiting        A/P: 83y old Male with PMH HTN, HPL, Chronic anemia, COPD, lung CA follows at [facility] [place] (last chemotherapy and RT 05/2023, not currently receiving chemo treatment), s/p right upper lobectomy, AAA, Hypothyroidism, CKD stage IIIa referred by me to ed for continued sob, cough despite abx treatment found to have large lower lobe thick walled cavity with worsening findings on ct despite treatment with levaquin    1. Large right lower lobe (S/P Right upper lobectomy)  thick walled cavity. Uncertain etiology. Infection considered most likely. D/W Dr. Gaston (ID). Will continue antibiotics per ID.     Patient is very high surgical risk    Imaging from [facility] [place] supplied by wife. He has had blebs in the past.     S/P  bronch. Growing few yeast. Fungal cultures pending.     CT 11/13 reveals worsening consolidations and filling in of bleb.       2. Pneumonia: as above    -aspiration precautions      3. COPD: continue inhaled bronchodilators.        4. ? Radiation  pneumonitis: Agree with decreasing steroids.  Progressive nature of findings argues against pneumonitis.      5. Aemia/CKD: per primary care team.       6. Left leg laceration: s/p suture. Per primary care team       7. Lung Cancer. Oncology follow up appreciated. Last received carbo/taxol with salvage RT 5/5/2023 after progression in subcarinol node after adjuvant atezolizumab (LD 12/20/22)  - pt was last seen 10/2023- when compared with June 2023 increased b/l areas of infiltrates and consolidation likely infectious/inflammatory post therapy change. RML perisutural mass again obscured by consolidation. unchanged subcarinal node, suspected mets.   At that time pt was started on medrol dose pack and Z pack.     Prognosis guarded; consider palliative care evaluation.     DNR/DNI noted.

## 2023-11-17 NOTE — PROGRESS NOTE ADULT - ASSESSMENT
84 y/o Male with h/o lung CA follows at [facility] [place] s/p chemotherapy and RT 05/2023, s/p right upper lobectomy, HTN, HPL, Chronic anemia, COPD, AAA, Hypothyroidism, CKD stage IIIa was admitted on 11/2 for increased shortness of breath, dyspnea on minimal exertion, and cough.    # h/o lung cancer   - followed at [facility] [place] - last received carbo/taxol with salvage RT 5/5/2023 after progression in subcarinol node after adjuvant atezolizumab (LD 12/20/22)    Plan:   - [facility] to compare prior images- requested that they review   - pt being covered for radiation induced pneumonitis with steroids   - now presently on 3 L NC - needs home O2 evaluation     # PNA/ Pneumonitis   - BAL with rare yeast   - CONTINUE  meropenem 1 gm IV q12h, doxycycline 100 mg IV q12h and fluconazole 100 mg IV qd   - continue with coverage with antifungal   - reviewed recent CXR - suggestive of RIGHT consolidative / infiltrate in the RIGHT lung no evidence of effusion    Discussed with [facility]  will continue to follow

## 2023-11-17 NOTE — PROGRESS NOTE ADULT - SUBJECTIVE AND OBJECTIVE BOX
HPI: Pt is a 83y old Male with a PMH of HTN, HPL, Chronic anemia, COPD, lung CA [place] [facility] [place] and RT 05/2023, not currently receiving chemo treatment), s/p right upper lobectomy, AAA, Hypothyroidism, CKD stage IIIa and others has been in ED by his pulmonologist with c/o shortness of breath, dyspnea on minimal exertion, and cough. Reportedly, he had CT of the chest performed 10/3 which demonstrated consolidation, was started on augmentin /prednisone /azithro 10/6 x 1 week with worsening symptoms. Hospital course includes RLL PNA with possible radiation pneumonitis and /or aspergilloma 2/2 immunocompromised status. Pt is to undergo a bronchoscopy today. Palliative medicine Consult to further establish GOC  11/9/23 Seen and examined at bedside with no family present. Pt denies any complaints. Is able to provide a limited hx however does state he is scheduled for bronchoscopy today.   11/17/23 Seen and examined at bedside with wife and daughter present. Pt denies any complaints although appears to be working to breath. Nasal O2 in place. Eating small amt however drinking ensure.   PAIN: ( )Yes   (X )No    DYSPNEA: (X ) Yes  ( ) No  Increased on exertion  PAST MEDICAL & SURGICAL HISTORY:  COPD (chronic obstructive pulmonary disease)  Lung cancer  Anemia of chronic disease  CKD (chronic kidney disease), stage III  Hypothyroidism  AAA (abdominal aortic aneurysm)  HTN (hypertension)  HLD (hyperlipidemia)  Thrombocytopenia  S/P lobectomy of lung    SOCIAL HX:  Lives with wife  Hx opiate tolerance ( )YES  ( X)NO    Baseline ADLs  (Prior to Admission)  (X ) Independent   ( )Dependent    FAMILY HISTORY:  Unable to provide    Review of Systems:  Physical Discomfort-denies  Dyspnea-on exertion  Anorexia-mod  Secretions-increased  Fatigue-mod-severe  Weakness-severe    All other systems reviewed and negative    PHYSICAL EXAM:  ICU Vital Signs Last 24 Hrs  T(C): 36.3 (17 Nov 2023 09:07), Max: 36.7 (16 Nov 2023 21:26)  T(F): 97.3 (17 Nov 2023 09:07), Max: 98.1 (16 Nov 2023 21:26)  HR: 103 (17 Nov 2023 09:07) (84 - 110)  BP: 109/81 (17 Nov 2023 09:07) (106/55 - 122/47)  RR: 18 (17 Nov 2023 09:07) (18 - 18)  SpO2: 92% (17 Nov 2023 09:07) (92% - 100%)    O2 Parameters below as of 17 Nov 2023 09:07  Patient On (Oxygen Delivery Method): nasal cannula    PPSV2:  30 %    General: Frail elderly male in bed in NAD  Mental Status: awake and alert X3/lethargic  HEENT: oral mucosa dry  Lungs: clear rere diminished at bases  Cardiac: S1S2+  GI: abd soft NT ND + BS  : voids  Ext: moves on bed  Neuro: no focal def      LABS:                            7.3    8.08  )-----------( 127      ( 17 Nov 2023 06:45 )             22.3                  11-17    139  |  105  |  66<H>  ----------------------------<  120<H>  4.6   |  31  |  1.26    Ca    8.4<L>      17 Nov 2023 06:45    TPro  6.0  /  Alb  1.7<L>  /  TBili  0.3  /  DBili  x   /  AST  46<H>  /  ALT  35  /  AlkPhos  151<H>  11-09     Allergies    penicillin (Unknown)    Intolerances    MEDICATIONS  (STANDING):  albuterol/ipratropium for Nebulization 3 milliLiter(s) Nebulizer every 6 hours  AQUAPHOR (petrolatum Ointment) 1 Application(s) Topical two times a day  atorvastatin 40 milliGRAM(s) Oral at bedtime  dextrose 5%. 1000 milliLiter(s) (50 mL/Hr) IV Continuous <Continuous>  dextrose 5%. 1000 milliLiter(s) (100 mL/Hr) IV Continuous <Continuous>  dextrose 50% Injectable 25 Gram(s) IV Push once  dextrose 50% Injectable 12.5 Gram(s) IV Push once  dextrose 50% Injectable 25 Gram(s) IV Push once  doxycycline IVPB 100 milliGRAM(s) IV Intermittent every 12 hours  ferrous    sulfate 325 milliGRAM(s) Oral daily  fluconAZOLE IVPB 100 milliGRAM(s) IV Intermittent every 24 hours  fluticasone furoate/umeclidinium/vilanterol 100-62.5-25 MICROgram(s) Inhaler 1 Puff(s) Inhalation daily  folic acid 1 milliGRAM(s) Oral daily  glucagon  Injectable 1 milliGRAM(s) IntraMuscular once  hydrALAZINE 10 milliGRAM(s) Oral two times a day  insulin glargine Injectable (LANTUS) 5 Unit(s) SubCutaneous at bedtime  insulin lispro (ADMELOG) corrective regimen sliding scale   SubCutaneous at bedtime  insulin lispro (ADMELOG) corrective regimen sliding scale   SubCutaneous three times a day before meals  insulin lispro Injectable (ADMELOG) 4 Unit(s) SubCutaneous three times a day before meals  levothyroxine 12.5 MICROGram(s) Oral daily  meropenem Injectable 1000 milliGRAM(s) IV Push every 12 hours  polyethylene glycol 3350 17 Gram(s) Oral daily    MEDICATIONS  (PRN):  acetaminophen     Tablet .. 650 milliGRAM(s) Oral every 6 hours PRN Temp greater or equal to 38C (100.4F), Mild Pain (1 - 3)  aluminum hydroxide/magnesium hydroxide/simethicone Suspension 30 milliLiter(s) Oral every 4 hours PRN Dyspepsia  benzonatate 100 milliGRAM(s) Oral three times a day PRN Cough  dextrose Oral Gel 15 Gram(s) Oral once PRN Blood Glucose LESS THAN 70 milliGRAM(s)/deciliter  meclizine 12.5 milliGRAM(s) Oral every 8 hours PRN Dizziness  melatonin 3 milliGRAM(s) Oral at bedtime PRN Insomnia  ondansetron Injectable 4 milliGRAM(s) IV Push every 8 hours PRN Nausea and/or Vomiting    RADIOLOGY/ADDITIONAL STUDIES:  < from: CT Chest No Cont (11.13.23 @ 10:04) >    ACC: [id number] EXAM:  CT CHEST   ORDERED BY: AHSAN CHAKRABORTY     PROCEDURE DATE:  11/13/2023          INTERPRETATION:  Clinical indications: Pneumonia.    Axial CT images of the chest are obtained without intravenous   administration of contrast.    Comparison is made with the prior chest CT of November 2, 2023.    No enlarged axillary or mediastinal lymph nodes.    No pericardial effusion. Heart size is normal. Mitral annular   calcifications. Vascular calcifications with involvement of the aorta and   the coronary arteries. Aortic root calcifications.    Evaluation of the upper abdomen demonstrate partially imaged aortic stent   graft. Subcutaneous edema. Minimal perihepatic ascites.    Small left pleural effusion new since the abdominalCT of November 8, 2023. Trace right pleural effusion as on November 8, 2023.    Evaluation of the lungs demonstrate left lung base linear subsegmental   atelectasis. A few ill-defined left lung patchy nonspecific groundglass   opacities largest within the dependent portion of the left upper lobe are   new since November 2, 2023.    Status post right upper lobectomy with postoperative changes. Persistent   right apical large large cyst, part of which measures about 6.6 cm in   transverse dimension without significant interval change in size   containing air and increasing internal opacification as compared with   November 2, 2023, some of which appear slightly dense and may be due to   internal hemorrhagic components.    Extensive right mid to lower lung consolidations and ill-defined   groundglass opacities, with the largest confluent consolidation within   the mid to lower portions of the right lower lobe, majority of which   appear new since November 2, 2023 superimposed on previouslydescribed   linear opacities.    Degenerative changes of the spine. Old healed sternal fracture.    IMPRESSION: Right lung large areas of consolidation new since November 2, 2023 likely pneumonia.    Right apical previously described large cyst unchanged in size since   November 2, 2023 containing internal air and increasing internal   opacification since November 2, 2023 as described above. Continued   follow-up is recommended.    A few left lung ill-defined nonspecific groundglass opacities.   Differential diagnosis include but is not limited to   infectious/inflammatory etiologies and or pulmonary edema.    Small left pleural effusion new since November 8, 2023.    cavity can be seen with aspergilloma (prior to fungus ball formation).    Consolidation within the right lower and middle lobe compatible with   radiation change.    Multiple indistinct nodules in the right lung which may be infectious or   radiation pneumonitis.

## 2023-11-17 NOTE — PROGRESS NOTE ADULT - STAFF/OTHER
Patient is calling to check the status of her message.    Patient is requesting to have the nurse return her call to discuss.   MOE Hernandez NP

## 2023-11-17 NOTE — PROGRESS NOTE ADULT - ASSESSMENT
82 y/o Male with h/o lung CA follows at [facility] [place] s/p chemotherapy and RT 05/2023, s/p right upper lobectomy, HTN, HPL, Chronic anemia, COPD, AAA, Hypothyroidism, CKD stage IIIa was admitted on 11/2 for increased shortness of breath, dyspnea on minimal exertion, and cough. Reportedly, he had CT of the chest performed 10/3 which demonstrated consolidation, was started on augmentin/prednisone/azithro 10/6 x 1 week. Pt completed course of medications however wife noted worsening cough and pt with fever Tmax of 103.9 on 10/26. Wife then took pt to pulmonologist again who started pt on second round of the same medications, instructed wife to stop giving pt tylenol wife notes fevers self-resolved. Since then pt decreased energy with episode of being unsteady on feet. Today is 6th day of taking medications, had f/u scheduled with pulmonologist who sent pt to ED for eval.  He feels tired and weak. In ER he received ceftriaxone.    1. Large RLL pulmonary cavity with worsening opacification, likely pneumonia. Possible recurrent lung Ca. Lung Ca s/p right upper lobectomy. Radiation pneumonitis. CRF stage 3. Allergy to PCN.   -worsening large pulmonary cavity opacification   -respiratory improved s/p bronchoscopy  -bronchoscopy cultures show normal respiratory jaime and rare yeast  -leukocytosis  -bronch c/s noted with normal respiratory jaime  -Aspergillus galactomanan and fungitel are negative   -fungal bronch c/s is pending and will take several weeks to finalize  -s/p levofloxacin # 10  -s/p voriconazole 200 mg PO q12h # 7  -pulmonary evaluation appreciated   -on meropenem 1 gm IV q12h, doxycycline 100 mg IV q12h and fluconazole 100 mg IV qd # 5  -tolerating abx well so far; no side effects noted  -thoracic evaluation appreciated  -f/u cultures  -continue abx coverage   -monitor temps  -f/u CBC  -supportive care  2. Other issues:   -care per medicine    d/w medicine team     84 y/o Male with h/o lung CA follows at [facility] [place] s/p chemotherapy and RT 05/2023, s/p right upper lobectomy, HTN, HPL, Chronic anemia, COPD, AAA, Hypothyroidism, CKD stage IIIa was admitted on 11/2 for increased shortness of breath, dyspnea on minimal exertion, and cough. Reportedly, he had CT of the chest performed 10/3 which demonstrated consolidation, was started on augmentin/prednisone/azithro 10/6 x 1 week. Pt completed course of medications however wife noted worsening cough and pt with fever Tmax of 103.9 on 10/26. Wife then took pt to pulmonologist again who started pt on second round of the same medications, instructed wife to stop giving pt tylenol wife notes fevers self-resolved. Since then pt decreased energy with episode of being unsteady on feet. Today is 6th day of taking medications, had f/u scheduled with pulmonologist who sent pt to ED for eval.  He feels tired and weak. In ER he received ceftriaxone.    1. Large RLL pulmonary cavity with worsening opacification, likely pneumonia. Possible recurrent lung Ca. Lung Ca s/p right upper lobectomy. Radiation pneumonitis. CRF stage 3. Allergy to PCN.   -worsening large pulmonary cavity opacification   -respiratory improved s/p bronchoscopy  -bronchoscopy cultures show normal respiratory jaime and rare yeast  -leukocytosis  -bronch c/s noted with normal respiratory jaime  -Aspergillus galactomanan and fungitel are negative   -fungal bronch c/s is pending and will take several weeks to finalize  -s/p levofloxacin # 10  -s/p voriconazole 200 mg PO q12h # 7  -pulmonary evaluation appreciated   -on meropenem 1 gm IV q12h, doxycycline 100 mg IV q12h and fluconazole 100 mg IV qd # 5  -tolerating abx well so far; no side effects noted  -thoracic evaluation appreciated - conservative care recommended   -f/u cultures  -continue abx coverage   -monitor temps  -f/u CBC  -supportive care  2. Other issues:   -care per medicine    d/w medicine team

## 2023-11-18 LAB
ANION GAP SERPL CALC-SCNC: 5 MMOL/L — SIGNIFICANT CHANGE UP (ref 5–17)
ANION GAP SERPL CALC-SCNC: 5 MMOL/L — SIGNIFICANT CHANGE UP (ref 5–17)
BUN SERPL-MCNC: 63 MG/DL — HIGH (ref 7–23)
BUN SERPL-MCNC: 63 MG/DL — HIGH (ref 7–23)
CALCIUM SERPL-MCNC: 8.5 MG/DL — SIGNIFICANT CHANGE UP (ref 8.5–10.1)
CALCIUM SERPL-MCNC: 8.5 MG/DL — SIGNIFICANT CHANGE UP (ref 8.5–10.1)
CHLORIDE SERPL-SCNC: 104 MMOL/L — SIGNIFICANT CHANGE UP (ref 96–108)
CHLORIDE SERPL-SCNC: 104 MMOL/L — SIGNIFICANT CHANGE UP (ref 96–108)
CO2 SERPL-SCNC: 27 MMOL/L — SIGNIFICANT CHANGE UP (ref 22–31)
CO2 SERPL-SCNC: 27 MMOL/L — SIGNIFICANT CHANGE UP (ref 22–31)
CREAT SERPL-MCNC: 1.22 MG/DL — SIGNIFICANT CHANGE UP (ref 0.5–1.3)
CREAT SERPL-MCNC: 1.22 MG/DL — SIGNIFICANT CHANGE UP (ref 0.5–1.3)
EGFR: 59 ML/MIN/1.73M2 — LOW
EGFR: 59 ML/MIN/1.73M2 — LOW
GLUCOSE BLDC GLUCOMTR-MCNC: 128 MG/DL — HIGH (ref 70–99)
GLUCOSE BLDC GLUCOMTR-MCNC: 128 MG/DL — HIGH (ref 70–99)
GLUCOSE BLDC GLUCOMTR-MCNC: 186 MG/DL — HIGH (ref 70–99)
GLUCOSE BLDC GLUCOMTR-MCNC: 186 MG/DL — HIGH (ref 70–99)
GLUCOSE BLDC GLUCOMTR-MCNC: 198 MG/DL — HIGH (ref 70–99)
GLUCOSE BLDC GLUCOMTR-MCNC: 198 MG/DL — HIGH (ref 70–99)
GLUCOSE BLDC GLUCOMTR-MCNC: 87 MG/DL — SIGNIFICANT CHANGE UP (ref 70–99)
GLUCOSE BLDC GLUCOMTR-MCNC: 87 MG/DL — SIGNIFICANT CHANGE UP (ref 70–99)
GLUCOSE SERPL-MCNC: 82 MG/DL — SIGNIFICANT CHANGE UP (ref 70–99)
GLUCOSE SERPL-MCNC: 82 MG/DL — SIGNIFICANT CHANGE UP (ref 70–99)
HCT VFR BLD CALC: 23 % — LOW (ref 39–50)
HCT VFR BLD CALC: 23 % — LOW (ref 39–50)
HGB BLD-MCNC: 7.3 G/DL — LOW (ref 13–17)
HGB BLD-MCNC: 7.3 G/DL — LOW (ref 13–17)
MCHC RBC-ENTMCNC: 30.9 PG — SIGNIFICANT CHANGE UP (ref 27–34)
MCHC RBC-ENTMCNC: 30.9 PG — SIGNIFICANT CHANGE UP (ref 27–34)
MCHC RBC-ENTMCNC: 31.7 GM/DL — LOW (ref 32–36)
MCHC RBC-ENTMCNC: 31.7 GM/DL — LOW (ref 32–36)
MCV RBC AUTO: 97.5 FL — SIGNIFICANT CHANGE UP (ref 80–100)
MCV RBC AUTO: 97.5 FL — SIGNIFICANT CHANGE UP (ref 80–100)
PLATELET # BLD AUTO: 139 K/UL — LOW (ref 150–400)
PLATELET # BLD AUTO: 139 K/UL — LOW (ref 150–400)
POTASSIUM SERPL-MCNC: 4.6 MMOL/L — SIGNIFICANT CHANGE UP (ref 3.5–5.3)
POTASSIUM SERPL-MCNC: 4.6 MMOL/L — SIGNIFICANT CHANGE UP (ref 3.5–5.3)
POTASSIUM SERPL-SCNC: 4.6 MMOL/L — SIGNIFICANT CHANGE UP (ref 3.5–5.3)
POTASSIUM SERPL-SCNC: 4.6 MMOL/L — SIGNIFICANT CHANGE UP (ref 3.5–5.3)
RBC # BLD: 2.36 M/UL — LOW (ref 4.2–5.8)
RBC # BLD: 2.36 M/UL — LOW (ref 4.2–5.8)
RBC # FLD: 16.3 % — HIGH (ref 10.3–14.5)
RBC # FLD: 16.3 % — HIGH (ref 10.3–14.5)
SODIUM SERPL-SCNC: 136 MMOL/L — SIGNIFICANT CHANGE UP (ref 135–145)
SODIUM SERPL-SCNC: 136 MMOL/L — SIGNIFICANT CHANGE UP (ref 135–145)
WBC # BLD: 7.36 K/UL — SIGNIFICANT CHANGE UP (ref 3.8–10.5)
WBC # BLD: 7.36 K/UL — SIGNIFICANT CHANGE UP (ref 3.8–10.5)
WBC # FLD AUTO: 7.36 K/UL — SIGNIFICANT CHANGE UP (ref 3.8–10.5)
WBC # FLD AUTO: 7.36 K/UL — SIGNIFICANT CHANGE UP (ref 3.8–10.5)

## 2023-11-18 PROCEDURE — 99232 SBSQ HOSP IP/OBS MODERATE 35: CPT

## 2023-11-18 RX ADMIN — FLUCONAZOLE 50 MILLIGRAM(S): 150 TABLET ORAL at 17:32

## 2023-11-18 RX ADMIN — Medication 10 MILLIGRAM(S): at 21:57

## 2023-11-18 RX ADMIN — INSULIN GLARGINE 5 UNIT(S): 100 INJECTION, SOLUTION SUBCUTANEOUS at 21:52

## 2023-11-18 RX ADMIN — POLYETHYLENE GLYCOL 3350 17 GRAM(S): 17 POWDER, FOR SOLUTION ORAL at 09:35

## 2023-11-18 RX ADMIN — Medication 1 APPLICATION(S): at 10:00

## 2023-11-18 RX ADMIN — Medication 1 MILLIGRAM(S): at 09:34

## 2023-11-18 RX ADMIN — Medication 3 MILLILITER(S): at 14:27

## 2023-11-18 RX ADMIN — MEROPENEM 1000 MILLIGRAM(S): 1 INJECTION INTRAVENOUS at 09:34

## 2023-11-18 RX ADMIN — Medication 1 APPLICATION(S): at 22:00

## 2023-11-18 RX ADMIN — Medication 10 MILLIGRAM(S): at 09:34

## 2023-11-18 RX ADMIN — Medication 12.5 MICROGRAM(S): at 05:44

## 2023-11-18 RX ADMIN — Medication 3 MILLILITER(S): at 02:29

## 2023-11-18 RX ADMIN — Medication 4 UNIT(S): at 17:32

## 2023-11-18 RX ADMIN — FLUTICASONE FUROATE, UMECLIDINIUM BROMIDE AND VILANTEROL TRIFENATATE 1 PUFF(S): 200; 62.5; 25 POWDER RESPIRATORY (INHALATION) at 08:21

## 2023-11-18 RX ADMIN — Medication 2: at 17:32

## 2023-11-18 RX ADMIN — Medication 4 UNIT(S): at 13:08

## 2023-11-18 RX ADMIN — MEROPENEM 1000 MILLIGRAM(S): 1 INJECTION INTRAVENOUS at 23:57

## 2023-11-18 RX ADMIN — Medication 4 UNIT(S): at 09:10

## 2023-11-18 RX ADMIN — Medication 3 MILLILITER(S): at 08:17

## 2023-11-18 RX ADMIN — Medication 110 MILLIGRAM(S): at 09:35

## 2023-11-18 RX ADMIN — ATORVASTATIN CALCIUM 40 MILLIGRAM(S): 80 TABLET, FILM COATED ORAL at 21:57

## 2023-11-18 RX ADMIN — Medication 325 MILLIGRAM(S): at 09:34

## 2023-11-18 RX ADMIN — Medication 110 MILLIGRAM(S): at 23:57

## 2023-11-18 RX ADMIN — Medication 3 MILLILITER(S): at 20:07

## 2023-11-18 NOTE — PROGRESS NOTE ADULT - ASSESSMENT
83-year-old M with PMHx HTN, HLD, chronic anemia, COPD, lung CA s/p RUL lobectomy ([place] at [facility] [place], last chemotherapy/RT 5/2023, not currently on treatment), AAA, hypothyroidism, CKD stage IIIa sent in to ED on 11/2/23  by pulmonologist MD Olivera with c/o SOB, dyspnea on minimal exertion, and cough. 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     # Acute hypoxic respiratory failure, multifactorial   # New RLL PNA on CT 11/13 , RLL cavity s/p bronchoscopy 11/9  possible yeast  infection, less likely radiation pneumonitis , r/o  aspergilloma    - CT chest: Larger right lower lobe thick-walled cavity which may be infectious and/or residua of reported treated tumor. Spongelike material within the cavity can be seen with aspergilloma (prior to fungus ball formation). Consolidation within the right lower and middle lobe compatible with radiation change.  - leukocytosis stable (WBC 17.70 from 17.35) ---> trend down to WBC  8   - repeat CXR 11/8 - worsening of opacities over right lung   - 2 d echo 11/8 - EF 60% , no significant valvular disease   - immunoglobulin panel - all Ig wnl  - 11/9 - s/p bronchoscopy   - speech and swallow evaluation  - no aspirations  - CXR 11/11 - stable consolidations  - repeat CT chest 11/13 - new right lung consolidation  - blood cultures -no growth, sputum culture - normal respiratory  - c/w trelegy ellipta, chest pt, IS, acapella ,BIPAP as needed  - ID/pulm consult noted   - s/p IV steroids --> po prednisone taper 20 x 3 days than stop , trending down ----> 34  - s/p  voriconazole PO ,  s/p 7 days of levaquin to cover atypical/psuedomonas (allergy to PCN)    - 11/13 - IV meropenem, doxy and fluconasole started as per ID   - Chest xray prelim worse  - Thoracic consult appreciated, no intervention  - Continue antibiotics  - If no improvement then would recommend hospice    #  Chronic anemia, likely due to  chemotherapy, worsening and iron deficiency   - decreased on AM labs from admission (Hgb 9.2-->8.2, Hct 27.8--> 25.2)   - no s/s bleeding, asymptomatic, continue to monitor/trend  - 11/8 - 1 unit PRBC due to hemoptysis and worsening of anemia  - restart daily iron    #CHRISTINA on CKD stage IIIa, improving  - Resolved    # Left calf laceration from veno dines while in PACU for bronchoscopy   - surgery consult - s/p suturing  on 11/09/23   - plan to remove sutures in 7-10 days  - wound care as per surgery team    # Acute hyperglycemia, possible steroid-induced , Prediabetes with A1C 6.3   -  on AM labs,  A1c results 6.3   - consistent with Impaired Fasting Glucose, new onset DM II ruled out    # HTN  - stop amlodipine 2.5  - hydralazine 10 bid as per nephrology team    #  HLD    - c/w  atorvastatin    # Hypothyroidism   - Levo lowered to 12.5 mcg  - Recheck in 4 weeks    # Hx lung CA s/p RUL lobectomy/chemo/RT   - f/u with [facility]     # DVT Ppx   - SCDs    # Code status   - DNR/DNI

## 2023-11-18 NOTE — PROGRESS NOTE ADULT - ASSESSMENT
82 y/o Male with h/o lung CA [place] [facility] [place]/p chemotherapy and RT 05/2023, s/p right upper lobectomy, HTN, HPL, Chronic anemia, COPD, AAA, Hypothyroidism, CKD stage IIIa was admitted on 11/2 for increased shortness of breath, dyspnea on minimal exertion, and cough.    # h/o lung cancer   - [place] [facility] [place] carbo/taxol with salvage RT 5/5/2023 after progression in subcarinol node after adjuvant atezolizumab (LD 12/20/22)  - pt being covered for radiation induced pneumonitis with steroids   - now presently on 3 L NC - needs home O2 evaluation when stabilizes  - more likely infectious than malignant cause of current condition  - pall care consult appreciated- family discussion planned for Mon    # PNA/ Pneumonitis   - BAL with rare yeast   - CONTINUE  meropenem 1 gm IV q12h, doxycycline 100 mg IV q12h and fluconazole 100 mg IV qd   - continue with coverage with antifungal   - most recent CXR with worsening right sided infiltrate  - followed by pulm and ct surgery- no intervention      Discussed with [facility]  will continue to follow

## 2023-11-18 NOTE — PROGRESS NOTE ADULT - SUBJECTIVE AND OBJECTIVE BOX
INTERVAL HPI/OVERNIGHT EVENTS:  Patient S&E at bedside- pt sitting up and eating breakfast this morning   States that breathing has been worse with movement - was on bipap overnight   Remains on NC  CXR worsening afebrile  pall care consult appreciated     PAST MEDICAL & SURGICAL HISTORY:  COPD (chronic obstructive pulmonary disease)      Lung cancer      Anemia of chronic disease      CKD (chronic kidney disease), stage III      Hypothyroidism      AAA (abdominal aortic aneurysm)      HTN (hypertension)      HLD (hyperlipidemia)      Thrombocytopenia      S/P lobectomy of lung          FAMILY HISTORY:      VITAL SIGNS:  T(F): 97.7 (11-18-23 @ 09:22)  HR: 101 (11-18-23 @ 09:22)  BP: 137/62 (11-18-23 @ 09:22)  RR: 20 (11-18-23 @ 09:22)  SpO2: 92% (11-18-23 @ 09:22)  Wt(kg): --    PHYSICAL EXAM:    Constitutional: NAD, [place]  Eyes: EOMI,   Respiratory: decreased bs on NC 3L, rhonchi  Cardiovascular: RRR,  Gastrointestinal: soft, NTND,   Extremities: no c/c/e      MEDICATIONS  (STANDING):  albuterol/ipratropium for Nebulization 3 milliLiter(s) Nebulizer every 6 hours  AQUAPHOR (petrolatum Ointment) 1 Application(s) Topical two times a day  atorvastatin 40 milliGRAM(s) Oral at bedtime  dextrose 5%. 1000 milliLiter(s) (100 mL/Hr) IV Continuous <Continuous>  dextrose 5%. 1000 milliLiter(s) (50 mL/Hr) IV Continuous <Continuous>  dextrose 50% Injectable 25 Gram(s) IV Push once  dextrose 50% Injectable 12.5 Gram(s) IV Push once  dextrose 50% Injectable 25 Gram(s) IV Push once  doxycycline IVPB 100 milliGRAM(s) IV Intermittent every 12 hours  ferrous    sulfate 325 milliGRAM(s) Oral daily  fluconAZOLE IVPB 100 milliGRAM(s) IV Intermittent every 24 hours  fluticasone furoate/umeclidinium/vilanterol 100-62.5-25 MICROgram(s) Inhaler 1 Puff(s) Inhalation daily  folic acid 1 milliGRAM(s) Oral daily  glucagon  Injectable 1 milliGRAM(s) IntraMuscular once  hydrALAZINE 10 milliGRAM(s) Oral two times a day  insulin glargine Injectable (LANTUS) 5 Unit(s) SubCutaneous at bedtime  insulin lispro (ADMELOG) corrective regimen sliding scale   SubCutaneous at bedtime  insulin lispro (ADMELOG) corrective regimen sliding scale   SubCutaneous three times a day before meals  insulin lispro Injectable (ADMELOG) 4 Unit(s) SubCutaneous three times a day before meals  levothyroxine 12.5 MICROGram(s) Oral daily  meropenem Injectable 1000 milliGRAM(s) IV Push every 12 hours  polyethylene glycol 3350 17 Gram(s) Oral daily    MEDICATIONS  (PRN):  acetaminophen     Tablet .. 650 milliGRAM(s) Oral every 6 hours PRN Temp greater or equal to 38C (100.4F), Mild Pain (1 - 3)  aluminum hydroxide/magnesium hydroxide/simethicone Suspension 30 milliLiter(s) Oral every 4 hours PRN Dyspepsia  benzonatate 100 milliGRAM(s) Oral three times a day PRN Cough  dextrose Oral Gel 15 Gram(s) Oral once PRN Blood Glucose LESS THAN 70 milliGRAM(s)/deciliter  meclizine 12.5 milliGRAM(s) Oral every 8 hours PRN Dizziness  melatonin 3 milliGRAM(s) Oral at bedtime PRN Insomnia  ondansetron Injectable 4 milliGRAM(s) IV Push every 8 hours PRN Nausea and/or Vomiting      Allergies    penicillin (Unknown)    Intolerances        LABS:                        7.3    7.36  )-----------( 139      ( 18 Nov 2023 06:48 )             23.0     11-18    136  |  104  |  63<H>  ----------------------------<  82  4.6   |  27  |  1.22    Ca    8.5      18 Nov 2023 06:48        Urinalysis Basic - ( 18 Nov 2023 06:48 )    Color: x / Appearance: x / SG: x / pH: x  Gluc: 82 mg/dL / Ketone: x  / Bili: x / Urobili: x   Blood: x / Protein: x / Nitrite: x   Leuk Esterase: x / RBC: x / WBC x   Sq Epi: x / Non Sq Epi: x / Bacteria: x        RADIOLOGY & ADDITIONAL TESTS:  Studies reviewed.

## 2023-11-18 NOTE — PROGRESS NOTE ADULT - SUBJECTIVE AND OBJECTIVE BOX
JOHN FRIEDEL  MRN: [id number]    S: November 18, 2023:    Sitting up in bed. Breathing comfortably. No new complaints. Still has a cough. No dyspnea; no chest pain; no hemoptysis.     Remains afebrile. On continuous O2; BiPAP as tolerated at night.     Palliative care input appreciated.       November 17, 2023:    Sitting up in chair. Denies shortness of breath. No c/o chest pain. No hemoptysis. Weak; chronically ill appearing.       November 16, 2023:    Reviewed hx with Dr. Olivera.     Patient appears chronically ill. Offers no complaints this morning. Denies dyspnea. Coughing - nonproductive.       PAST MEDICAL & SURGICAL HISTORY:  COPD (chronic obstructive pulmonary disease)      Lung cancer      Anemia of chronic disease      CKD (chronic kidney disease), stage III      Hypothyroidism      AAA (abdominal aortic aneurysm)      HTN (hypertension)      HLD (hyperlipidemia)      Thrombocytopenia      S/P lobectomy of lung          O: T(C): 36.5 (11-18-23 @ 09:22), Max: 37.3 (11-17-23 @ 21:14)  HR: 101 (11-18-23 @ 09:22) (51 - 101)  BP: 137/62 (11-18-23 @ 09:22) (112/75 - 141/47)  RR: 20 (11-18-23 @ 09:22) (17 - 20)  SpO2: 92% (11-18-23 @ 09:22) (92% - 100%)  Wt(kg): --    PHYSICAL EXAM:      GENERAL: weak    NEURO: awake/alert. Oriented x 2.     NECK: no JVD     CHEST: rhonchi on right      CARDIAC: RR    EXT: edema      LABS:                          7.3    7.36  )-----------( 139      ( 18 Nov 2023 06:48 )             23.0       11-18    136  |  104  |  63<H>  ----------------------------<  82  4.6   |  27  |  1.22    Ca    8.5      18 Nov 2023 06:48      RADIOLOGY:    CT Chest No Cont (11.13.23 @ 10:04) >    PROCEDURE DATE:  11/13/2023    INTERPRETATION:  Clinical indications: Pneumonia.    Axial CT images of the chest are obtained without intravenous   administration of contrast.    Comparison is made with the prior chest CT of November 2, 2023.    No enlarged axillary or mediastinal lymph nodes.    No pericardial effusion. Heart size is normal. Mitral annular   calcifications. Vascular calcifications with involvement of the aorta and   the coronary arteries. Aortic root calcifications.    Evaluation of the upper abdomen demonstrate partially imaged aortic stent   graft. Subcutaneous edema. Minimal perihepatic ascites.    Small left pleural effusion new since the abdominalCT of November 8, 2023. Trace right pleural effusion as on November 8, 2023.    Evaluation of the lungs demonstrate left lung base linear subsegmental   atelectasis. A few ill-defined left lung patchy nonspecific groundglass   opacities largest within the dependent portion of the left upper lobe are   new since November 2, 2023.    Status post right upper lobectomy with postoperative changes. Persistent   right apical large large cyst, part of which measures about 6.6 cm in   transverse dimension without significant interval change in size   containing air and increasing internal opacification as compared with   November 2, 2023, some of which appear slightly dense and may be due to   internal hemorrhagic components.    Extensive right mid to lower lung consolidations and ill-defined   groundglass opacities, with the largest confluent consolidation within   the mid to lower portions of the right lower lobe, majority of which   appear new since November 2, 2023 superimposed on previouslydescribed   linear opacities.    Degenerative changes of the spine. Old healed sternal fracture.    IMPRESSION: Right lung large areas of consolidation new since November 2, 2023 likely pneumonia.    Right apical previously described large cyst unchanged in size since   November 2, 2023 containing internal air and increasing internal   opacification since November 2, 2023 as described above. Continued   follow-up is recommended.    A few left lung ill-defined nonspecific groundglass opacities.   Differential diagnosis include but is not limited to   infectious/inflammatory etiologies and or pulmonary edema.    Small left pleural effusion new since November 8, 2023.      Bronchoscopy results:  Culture - Acid Fast - Bronchial w/Smear (11.09.23 @ 12:30)   Specimen Source: .Bronchial RIGHT LOWER LOBE BAL  Acid Fast Bacilli Smear:   No acid-fast bacilli seen by fluorochrome stain  Culture Results:   Culture is being performed.  Culture - Fungal, Bronchial (11.09.23 @ 12:30)   Specimen Source: .Bronchial RIGHT LOWER LOBE BAL  Culture Results:   Rare Yeast    MEDICATIONS  (STANDING):  albuterol/ipratropium for Nebulization 3 milliLiter(s) Nebulizer every 6 hours  AQUAPHOR (petrolatum Ointment) 1 Application(s) Topical two times a day  atorvastatin 40 milliGRAM(s) Oral at bedtime  dextrose 5%. 1000 milliLiter(s) (100 mL/Hr) IV Continuous <Continuous>  dextrose 5%. 1000 milliLiter(s) (50 mL/Hr) IV Continuous <Continuous>  dextrose 50% Injectable 25 Gram(s) IV Push once  dextrose 50% Injectable 12.5 Gram(s) IV Push once  dextrose 50% Injectable 25 Gram(s) IV Push once  doxycycline IVPB 100 milliGRAM(s) IV Intermittent every 12 hours  ferrous    sulfate 325 milliGRAM(s) Oral daily  fluconAZOLE IVPB 100 milliGRAM(s) IV Intermittent every 24 hours  fluticasone furoate/umeclidinium/vilanterol 100-62.5-25 MICROgram(s) Inhaler 1 Puff(s) Inhalation daily  folic acid 1 milliGRAM(s) Oral daily  glucagon  Injectable 1 milliGRAM(s) IntraMuscular once  hydrALAZINE 10 milliGRAM(s) Oral two times a day  insulin glargine Injectable (LANTUS) 5 Unit(s) SubCutaneous at bedtime  insulin lispro (ADMELOG) corrective regimen sliding scale   SubCutaneous at bedtime  insulin lispro (ADMELOG) corrective regimen sliding scale   SubCutaneous three times a day before meals  insulin lispro Injectable (ADMELOG) 4 Unit(s) SubCutaneous three times a day before meals  levothyroxine 12.5 MICROGram(s) Oral daily  meropenem Injectable 1000 milliGRAM(s) IV Push every 12 hours  polyethylene glycol 3350 17 Gram(s) Oral daily    MEDICATIONS  (PRN):  acetaminophen     Tablet .. 650 milliGRAM(s) Oral every 6 hours PRN Temp greater or equal to 38C (100.4F), Mild Pain (1 - 3)  aluminum hydroxide/magnesium hydroxide/simethicone Suspension 30 milliLiter(s) Oral every 4 hours PRN Dyspepsia  benzonatate 100 milliGRAM(s) Oral three times a day PRN Cough  dextrose Oral Gel 15 Gram(s) Oral once PRN Blood Glucose LESS THAN 70 milliGRAM(s)/deciliter  meclizine 12.5 milliGRAM(s) Oral every 8 hours PRN Dizziness  melatonin 3 milliGRAM(s) Oral at bedtime PRN Insomnia  ondansetron Injectable 4 milliGRAM(s) IV Push every 8 hours PRN Nausea and/or Vomiting        A/P: 1. Large right lower lobe (S/P Right upper lobectomy)  thick walled cavity. Uncertain etiology. Infection considered most likely.  Will continue antibiotics per ID.     Patient is very high surgical risk    Imaging from [facility] [place] supplied by wife. He has had blebs in the past.     S/P  bronch. Growing few yeast. Fungal cultures pending.     CT 11/13 reveals worsening consolidations and filling in of bleb.       2. Pneumonia: as above    -aspiration precautions      3. COPD - hypoxic respiratory failure. Continue supplemental O2; BiPAP prn. Continue inhaled bronchodilators.        4. ? Radiation  pneumonitis: Off steroids.  Progressive nature of findings argues against pneumonitis.      5. Aemia/CKD: per primary care team.       6. Left leg laceration: s/p suture. Per primary care team       7. Lung Cancer. Oncology follow up appreciated. Last received carbo/taxol with salvage RT 5/5/2023 after progression in subcarinol node after adjuvant atezolizumab (LD 12/20/22)  - pt was last seen 10/2023- when compared with June 2023 increased b/l areas of infiltrates and consolidation likely infectious/inflammatory post therapy change. RML perisutural mass again obscured by consolidation. unchanged subcarinal node, suspected mets.   At that time pt was started on medrol dose pack and Z pack.     Prognosis guarded; palliative care evaluation appreciated. Family considering home hospice.        DNR/DNI noted.

## 2023-11-18 NOTE — PROGRESS NOTE ADULT - SUBJECTIVE AND OBJECTIVE BOX
HOSPITALIST ATTENDING PROGRESS NOTE    Chart and meds reviewed.  Patient seen and examined.    CC: PNA    Subjective: No acute events overnight. Patient reports feeling tired. Sitting up. No fever.     All other systems reviewed and found to be negative with the exception of what has been described above.    MEDICATIONS  (STANDING):  albuterol/ipratropium for Nebulization 3 milliLiter(s) Nebulizer every 6 hours  AQUAPHOR (petrolatum Ointment) 1 Application(s) Topical two times a day  atorvastatin 40 milliGRAM(s) Oral at bedtime  dextrose 5%. 1000 milliLiter(s) (100 mL/Hr) IV Continuous <Continuous>  dextrose 5%. 1000 milliLiter(s) (50 mL/Hr) IV Continuous <Continuous>  dextrose 50% Injectable 25 Gram(s) IV Push once  dextrose 50% Injectable 12.5 Gram(s) IV Push once  dextrose 50% Injectable 25 Gram(s) IV Push once  doxycycline IVPB 100 milliGRAM(s) IV Intermittent every 12 hours  ferrous    sulfate 325 milliGRAM(s) Oral daily  fluconAZOLE IVPB 100 milliGRAM(s) IV Intermittent every 24 hours  fluticasone furoate/umeclidinium/vilanterol 100-62.5-25 MICROgram(s) Inhaler 1 Puff(s) Inhalation daily  folic acid 1 milliGRAM(s) Oral daily  glucagon  Injectable 1 milliGRAM(s) IntraMuscular once  hydrALAZINE 10 milliGRAM(s) Oral two times a day  insulin glargine Injectable (LANTUS) 5 Unit(s) SubCutaneous at bedtime  insulin lispro (ADMELOG) corrective regimen sliding scale   SubCutaneous at bedtime  insulin lispro (ADMELOG) corrective regimen sliding scale   SubCutaneous three times a day before meals  insulin lispro Injectable (ADMELOG) 4 Unit(s) SubCutaneous three times a day before meals  levothyroxine 12.5 MICROGram(s) Oral daily  meropenem Injectable 1000 milliGRAM(s) IV Push every 12 hours  polyethylene glycol 3350 17 Gram(s) Oral daily    MEDICATIONS  (PRN):  acetaminophen     Tablet .. 650 milliGRAM(s) Oral every 6 hours PRN Temp greater or equal to 38C (100.4F), Mild Pain (1 - 3)  aluminum hydroxide/magnesium hydroxide/simethicone Suspension 30 milliLiter(s) Oral every 4 hours PRN Dyspepsia  benzonatate 100 milliGRAM(s) Oral three times a day PRN Cough  dextrose Oral Gel 15 Gram(s) Oral once PRN Blood Glucose LESS THAN 70 milliGRAM(s)/deciliter  meclizine 12.5 milliGRAM(s) Oral every 8 hours PRN Dizziness  melatonin 3 milliGRAM(s) Oral at bedtime PRN Insomnia  ondansetron Injectable 4 milliGRAM(s) IV Push every 8 hours PRN Nausea and/or Vomiting    GEN: Frail, ill appearing  HEENT:  pupils equal and reactive, EOMI, no oropharyngeal lesions, erythema, exudates, oral thrush  NECK:   supple, no carotid bruits, no palpable lymph nodes, no thyromegaly  CV:  +S1, +S2, regular, no murmurs or rubs  RESP:  Right ronchi, Right lung, trace wheeze  BREAST:  not examined  GI:  abdomen soft, non-tender, non-distended, normal BS, no bruits, no abdominal masses, no palpable masses  RECTAL:  not examined  :  not examined  MSK:   normal muscle tone, no atrophy, no rigidity, no contractions  EXT:  no clubbing, no cyanosis, no edema, no calf pain, swelling or erythema. + Sutures left leg  VASCULAR:  pulses equal and symmetric in the upper and lower extremities  NEURO:  AAOX2, no focal neurological deficits, follows all commands, able to move extremities spontaneously  SKIN:  no ulcers, lesions or rashes    LABS:                          7.3    7.36  )-----------( 139      ( 18 Nov 2023 06:48 )             23.0     11-18    136  |  104  |  63<H>  ----------------------------<  82  4.6   |  27  |  1.22    Ca    8.5      18 Nov 2023 06:48      Urinalysis Basic - ( 18 Nov 2023 06:48 )  Color: x / Appearance: x / SG: x / pH: x  Gluc: 82 mg/dL / Ketone: x  / Bili: x / Urobili: x   Blood: x / Protein: x / Nitrite: x   Leuk Esterase: x / RBC: x / WBC x   Sq Epi: x / Non Sq Epi: x / Bacteria: x

## 2023-11-19 LAB
ANION GAP SERPL CALC-SCNC: 6 MMOL/L — SIGNIFICANT CHANGE UP (ref 5–17)
ANION GAP SERPL CALC-SCNC: 6 MMOL/L — SIGNIFICANT CHANGE UP (ref 5–17)
BLD GP AB SCN SERPL QL: SIGNIFICANT CHANGE UP
BLD GP AB SCN SERPL QL: SIGNIFICANT CHANGE UP
BUN SERPL-MCNC: 55 MG/DL — HIGH (ref 7–23)
BUN SERPL-MCNC: 55 MG/DL — HIGH (ref 7–23)
CALCIUM SERPL-MCNC: 8.5 MG/DL — SIGNIFICANT CHANGE UP (ref 8.5–10.1)
CALCIUM SERPL-MCNC: 8.5 MG/DL — SIGNIFICANT CHANGE UP (ref 8.5–10.1)
CHLORIDE SERPL-SCNC: 105 MMOL/L — SIGNIFICANT CHANGE UP (ref 96–108)
CHLORIDE SERPL-SCNC: 105 MMOL/L — SIGNIFICANT CHANGE UP (ref 96–108)
CO2 SERPL-SCNC: 29 MMOL/L — SIGNIFICANT CHANGE UP (ref 22–31)
CO2 SERPL-SCNC: 29 MMOL/L — SIGNIFICANT CHANGE UP (ref 22–31)
CREAT SERPL-MCNC: 1.13 MG/DL — SIGNIFICANT CHANGE UP (ref 0.5–1.3)
CREAT SERPL-MCNC: 1.13 MG/DL — SIGNIFICANT CHANGE UP (ref 0.5–1.3)
EGFR: 64 ML/MIN/1.73M2 — SIGNIFICANT CHANGE UP
EGFR: 64 ML/MIN/1.73M2 — SIGNIFICANT CHANGE UP
GLUCOSE BLDC GLUCOMTR-MCNC: 120 MG/DL — HIGH (ref 70–99)
GLUCOSE BLDC GLUCOMTR-MCNC: 120 MG/DL — HIGH (ref 70–99)
GLUCOSE BLDC GLUCOMTR-MCNC: 125 MG/DL — HIGH (ref 70–99)
GLUCOSE BLDC GLUCOMTR-MCNC: 125 MG/DL — HIGH (ref 70–99)
GLUCOSE BLDC GLUCOMTR-MCNC: 186 MG/DL — HIGH (ref 70–99)
GLUCOSE BLDC GLUCOMTR-MCNC: 186 MG/DL — HIGH (ref 70–99)
GLUCOSE BLDC GLUCOMTR-MCNC: 95 MG/DL — SIGNIFICANT CHANGE UP (ref 70–99)
GLUCOSE BLDC GLUCOMTR-MCNC: 95 MG/DL — SIGNIFICANT CHANGE UP (ref 70–99)
GLUCOSE SERPL-MCNC: 90 MG/DL — SIGNIFICANT CHANGE UP (ref 70–99)
GLUCOSE SERPL-MCNC: 90 MG/DL — SIGNIFICANT CHANGE UP (ref 70–99)
HCT VFR BLD CALC: 21.9 % — LOW (ref 39–50)
HCT VFR BLD CALC: 21.9 % — LOW (ref 39–50)
HCT VFR BLD CALC: 22.3 % — LOW (ref 39–50)
HCT VFR BLD CALC: 22.3 % — LOW (ref 39–50)
HGB BLD-MCNC: 7.1 G/DL — LOW (ref 13–17)
MCHC RBC-ENTMCNC: 30.3 PG — SIGNIFICANT CHANGE UP (ref 27–34)
MCHC RBC-ENTMCNC: 30.3 PG — SIGNIFICANT CHANGE UP (ref 27–34)
MCHC RBC-ENTMCNC: 30.9 PG — SIGNIFICANT CHANGE UP (ref 27–34)
MCHC RBC-ENTMCNC: 30.9 PG — SIGNIFICANT CHANGE UP (ref 27–34)
MCHC RBC-ENTMCNC: 31.8 GM/DL — LOW (ref 32–36)
MCHC RBC-ENTMCNC: 31.8 GM/DL — LOW (ref 32–36)
MCHC RBC-ENTMCNC: 32.4 GM/DL — SIGNIFICANT CHANGE UP (ref 32–36)
MCHC RBC-ENTMCNC: 32.4 GM/DL — SIGNIFICANT CHANGE UP (ref 32–36)
MCV RBC AUTO: 95.2 FL — SIGNIFICANT CHANGE UP (ref 80–100)
MCV RBC AUTO: 95.2 FL — SIGNIFICANT CHANGE UP (ref 80–100)
MCV RBC AUTO: 95.3 FL — SIGNIFICANT CHANGE UP (ref 80–100)
MCV RBC AUTO: 95.3 FL — SIGNIFICANT CHANGE UP (ref 80–100)
PLATELET # BLD AUTO: 140 K/UL — LOW (ref 150–400)
PLATELET # BLD AUTO: 140 K/UL — LOW (ref 150–400)
PLATELET # BLD AUTO: 148 K/UL — LOW (ref 150–400)
PLATELET # BLD AUTO: 148 K/UL — LOW (ref 150–400)
POTASSIUM SERPL-MCNC: 4.4 MMOL/L — SIGNIFICANT CHANGE UP (ref 3.5–5.3)
POTASSIUM SERPL-MCNC: 4.4 MMOL/L — SIGNIFICANT CHANGE UP (ref 3.5–5.3)
POTASSIUM SERPL-SCNC: 4.4 MMOL/L — SIGNIFICANT CHANGE UP (ref 3.5–5.3)
POTASSIUM SERPL-SCNC: 4.4 MMOL/L — SIGNIFICANT CHANGE UP (ref 3.5–5.3)
RBC # BLD: 2.3 M/UL — LOW (ref 4.2–5.8)
RBC # BLD: 2.3 M/UL — LOW (ref 4.2–5.8)
RBC # BLD: 2.34 M/UL — LOW (ref 4.2–5.8)
RBC # BLD: 2.34 M/UL — LOW (ref 4.2–5.8)
RBC # FLD: 16 % — HIGH (ref 10.3–14.5)
RBC # FLD: 16 % — HIGH (ref 10.3–14.5)
RBC # FLD: 16.2 % — HIGH (ref 10.3–14.5)
RBC # FLD: 16.2 % — HIGH (ref 10.3–14.5)
SODIUM SERPL-SCNC: 140 MMOL/L — SIGNIFICANT CHANGE UP (ref 135–145)
SODIUM SERPL-SCNC: 140 MMOL/L — SIGNIFICANT CHANGE UP (ref 135–145)
WBC # BLD: 5.27 K/UL — SIGNIFICANT CHANGE UP (ref 3.8–10.5)
WBC # BLD: 5.27 K/UL — SIGNIFICANT CHANGE UP (ref 3.8–10.5)
WBC # BLD: 5.95 K/UL — SIGNIFICANT CHANGE UP (ref 3.8–10.5)
WBC # BLD: 5.95 K/UL — SIGNIFICANT CHANGE UP (ref 3.8–10.5)
WBC # FLD AUTO: 5.27 K/UL — SIGNIFICANT CHANGE UP (ref 3.8–10.5)
WBC # FLD AUTO: 5.27 K/UL — SIGNIFICANT CHANGE UP (ref 3.8–10.5)
WBC # FLD AUTO: 5.95 K/UL — SIGNIFICANT CHANGE UP (ref 3.8–10.5)
WBC # FLD AUTO: 5.95 K/UL — SIGNIFICANT CHANGE UP (ref 3.8–10.5)

## 2023-11-19 PROCEDURE — 99232 SBSQ HOSP IP/OBS MODERATE 35: CPT

## 2023-11-19 RX ORDER — PANTOPRAZOLE SODIUM 20 MG/1
40 TABLET, DELAYED RELEASE ORAL
Refills: 0 | Status: DISCONTINUED | OUTPATIENT
Start: 2023-11-19 | End: 2023-11-28

## 2023-11-19 RX ADMIN — Medication 3 MILLILITER(S): at 08:55

## 2023-11-19 RX ADMIN — POLYETHYLENE GLYCOL 3350 17 GRAM(S): 17 POWDER, FOR SOLUTION ORAL at 10:07

## 2023-11-19 RX ADMIN — INSULIN GLARGINE 5 UNIT(S): 100 INJECTION, SOLUTION SUBCUTANEOUS at 22:46

## 2023-11-19 RX ADMIN — Medication 110 MILLIGRAM(S): at 22:43

## 2023-11-19 RX ADMIN — MEROPENEM 1000 MILLIGRAM(S): 1 INJECTION INTRAVENOUS at 10:07

## 2023-11-19 RX ADMIN — Medication 4 UNIT(S): at 13:14

## 2023-11-19 RX ADMIN — Medication 12.5 MICROGRAM(S): at 06:17

## 2023-11-19 RX ADMIN — FLUTICASONE FUROATE, UMECLIDINIUM BROMIDE AND VILANTEROL TRIFENATATE 1 PUFF(S): 200; 62.5; 25 POWDER RESPIRATORY (INHALATION) at 08:58

## 2023-11-19 RX ADMIN — Medication 1 MILLIGRAM(S): at 10:07

## 2023-11-19 RX ADMIN — PANTOPRAZOLE SODIUM 40 MILLIGRAM(S): 20 TABLET, DELAYED RELEASE ORAL at 22:43

## 2023-11-19 RX ADMIN — MEROPENEM 1000 MILLIGRAM(S): 1 INJECTION INTRAVENOUS at 22:43

## 2023-11-19 RX ADMIN — FLUCONAZOLE 50 MILLIGRAM(S): 150 TABLET ORAL at 17:43

## 2023-11-19 RX ADMIN — Medication 10 MILLIGRAM(S): at 10:06

## 2023-11-19 RX ADMIN — Medication 325 MILLIGRAM(S): at 10:07

## 2023-11-19 RX ADMIN — Medication 4 UNIT(S): at 17:41

## 2023-11-19 RX ADMIN — Medication 1 APPLICATION(S): at 22:47

## 2023-11-19 RX ADMIN — ATORVASTATIN CALCIUM 40 MILLIGRAM(S): 80 TABLET, FILM COATED ORAL at 22:43

## 2023-11-19 RX ADMIN — Medication 110 MILLIGRAM(S): at 10:07

## 2023-11-19 RX ADMIN — Medication 10 MILLIGRAM(S): at 22:44

## 2023-11-19 RX ADMIN — Medication 3 MILLILITER(S): at 02:14

## 2023-11-19 RX ADMIN — Medication 3 MILLILITER(S): at 14:21

## 2023-11-19 RX ADMIN — Medication 3 MILLILITER(S): at 21:05

## 2023-11-19 RX ADMIN — Medication 2: at 17:41

## 2023-11-19 RX ADMIN — Medication 1 APPLICATION(S): at 10:08

## 2023-11-19 NOTE — PROGRESS NOTE ADULT - SUBJECTIVE AND OBJECTIVE BOX
HOSPITALIST ATTENDING PROGRESS NOTE    Chart and meds reviewed.  Patient seen and examined.    CC: Right Lung PNA    Subjective: Feels a little better today. No fever. Cough still persists.     All other systems reviewed and found to be negative with the exception of what has been described above.    MEDICATIONS  (STANDING):  albuterol/ipratropium for Nebulization 3 milliLiter(s) Nebulizer every 6 hours  AQUAPHOR (petrolatum Ointment) 1 Application(s) Topical two times a day  atorvastatin 40 milliGRAM(s) Oral at bedtime  dextrose 5%. 1000 milliLiter(s) (100 mL/Hr) IV Continuous <Continuous>  dextrose 5%. 1000 milliLiter(s) (50 mL/Hr) IV Continuous <Continuous>  dextrose 50% Injectable 25 Gram(s) IV Push once  dextrose 50% Injectable 12.5 Gram(s) IV Push once  dextrose 50% Injectable 25 Gram(s) IV Push once  doxycycline IVPB 100 milliGRAM(s) IV Intermittent every 12 hours  ferrous    sulfate 325 milliGRAM(s) Oral daily  fluconAZOLE IVPB 100 milliGRAM(s) IV Intermittent every 24 hours  fluticasone furoate/umeclidinium/vilanterol 100-62.5-25 MICROgram(s) Inhaler 1 Puff(s) Inhalation daily  folic acid 1 milliGRAM(s) Oral daily  glucagon  Injectable 1 milliGRAM(s) IntraMuscular once  hydrALAZINE 10 milliGRAM(s) Oral two times a day  insulin glargine Injectable (LANTUS) 5 Unit(s) SubCutaneous at bedtime  insulin lispro (ADMELOG) corrective regimen sliding scale   SubCutaneous at bedtime  insulin lispro (ADMELOG) corrective regimen sliding scale   SubCutaneous three times a day before meals  insulin lispro Injectable (ADMELOG) 4 Unit(s) SubCutaneous three times a day before meals  levothyroxine 12.5 MICROGram(s) Oral daily  meropenem Injectable 1000 milliGRAM(s) IV Push every 12 hours  polyethylene glycol 3350 17 Gram(s) Oral daily    MEDICATIONS  (PRN):  acetaminophen     Tablet .. 650 milliGRAM(s) Oral every 6 hours PRN Temp greater or equal to 38C (100.4F), Mild Pain (1 - 3)  aluminum hydroxide/magnesium hydroxide/simethicone Suspension 30 milliLiter(s) Oral every 4 hours PRN Dyspepsia  benzonatate 100 milliGRAM(s) Oral three times a day PRN Cough  dextrose Oral Gel 15 Gram(s) Oral once PRN Blood Glucose LESS THAN 70 milliGRAM(s)/deciliter  meclizine 12.5 milliGRAM(s) Oral every 8 hours PRN Dizziness  melatonin 3 milliGRAM(s) Oral at bedtime PRN Insomnia  ondansetron Injectable 4 milliGRAM(s) IV Push every 8 hours PRN Nausea and/or Vomiting    GEN: Ill appearing  HEENT:  pupils equal and reactive, EOMI, no oropharyngeal lesions, erythema, exudates, oral thrush  NECK:   supple, no carotid bruits, no palpable lymph nodes, no thyromegaly  CV:  +S1, +S2, regular, no murmurs or rubs  RESP:   RL ronchi  BREAST:  not examined  GI:  abdomen soft, non-tender, non-distended, normal BS, no bruits, no abdominal masses, no palpable masses  RECTAL:  not examined  :  not examined  MSK:   normal muscle tone, no atrophy, no rigidity, no contractions  EXT:  no clubbing, no cyanosis, no edema, no calf pain, swelling or erythema  VASCULAR:  pulses equal and symmetric in the upper and lower extremities  NEURO:  AAOX2, no focal neurological deficits, follows all commands, able to move extremities spontaneously  SKIN:  no ulcers, lesions or rashes    LABS:                          7.1    5.27  )-----------( 148      ( 19 Nov 2023 08:43 )             21.9     11-19    140  |  105  |  55<H>  ----------------------------<  90  4.4   |  29  |  1.13    Ca    8.5      19 Nov 2023 06:52      Urinalysis Basic - ( 19 Nov 2023 06:52 )  Color: x / Appearance: x / SG: x / pH: x  Gluc: 90 mg/dL / Ketone: x  / Bili: x / Urobili: x   Blood: x / Protein: x / Nitrite: x   Leuk Esterase: x / RBC: x / WBC x   Sq Epi: x / Non Sq Epi: x / Bacteria: x

## 2023-11-19 NOTE — PROGRESS NOTE ADULT - ASSESSMENT
84 y/o Male with h/o lung CA follows at [facility] [place] s/p chemotherapy and RT 05/2023, s/p right upper lobectomy, HTN, HPL, Chronic anemia, COPD, AAA, Hypothyroidism, CKD stage IIIa was admitted on 11/2 for increased shortness of breath, dyspnea on minimal exertion, and cough.    # h/o lung cancer   [place] [facility] [place] carbo/taxol with salvage RT 5/5/2023 after progression in subcarinol node after adjuvant atezolizumab (LD 12/20/22)  - pt being covered for radiation induced pneumonitis with steroids   - now presently on 3 L NC - needs home O2 evaluation when stabilizes  - more likely infectious than malignant cause of current condition  - pall care consult appreciated- family discussion planned for Mon    # PNA/ Pneumonitis   - BAL with rare yeast - CONTINUE  meropenem 1 gm IV q12h, doxycycline 100 mg IV q12h and fluconazole 100 mg IV qd   - most recent CXR with worsening right sided infiltrate  - followed by pulm and ct surgery- no intervention  - repeat CT chest scheduled for tomorrow       Discussed with MSK  will continue to follow

## 2023-11-19 NOTE — PROGRESS NOTE ADULT - SUBJECTIVE AND OBJECTIVE BOX
INTERVAL HPI/OVERNIGHT EVENTS:  Patient S&E at bedside.   Today, WBC 5.9, Hb 7.1, plt 140 (Hb 7.3 yest) - 1u pRBC ordered   discussed with wife at bedside today- CT chest planned for tomorrow. Family to make decisions regarding further care after CT and discussion with pall care     PAST MEDICAL & SURGICAL HISTORY:  COPD (chronic obstructive pulmonary disease)      Lung cancer      Anemia of chronic disease      CKD (chronic kidney disease), stage III      Hypothyroidism      AAA (abdominal aortic aneurysm)      HTN (hypertension)      HLD (hyperlipidemia)      Thrombocytopenia      S/P lobectomy of lung          FAMILY HISTORY:      VITAL SIGNS:  T(F): 98.5 (11-19-23 @ 12:15)  HR: 96 (11-19-23 @ 12:15)  BP: 131/60 (11-19-23 @ 12:15)  RR: 18 (11-19-23 @ 12:15)  SpO2: 100% (11-19-23 @ 12:15)  Wt(kg): --    PHYSICAL EXAM:  Constitutional: NAD, [place]  Eyes: EOMI,   Respiratory: decreased bs on NC 3L, rhonchi  Cardiovascular: RRR,  Gastrointestinal: soft, NTND,   Extremities: no c/c/e    MEDICATIONS  (STANDING):  albuterol/ipratropium for Nebulization 3 milliLiter(s) Nebulizer every 6 hours  AQUAPHOR (petrolatum Ointment) 1 Application(s) Topical two times a day  atorvastatin 40 milliGRAM(s) Oral at bedtime  dextrose 5%. 1000 milliLiter(s) (100 mL/Hr) IV Continuous <Continuous>  dextrose 5%. 1000 milliLiter(s) (50 mL/Hr) IV Continuous <Continuous>  dextrose 50% Injectable 25 Gram(s) IV Push once  dextrose 50% Injectable 12.5 Gram(s) IV Push once  dextrose 50% Injectable 25 Gram(s) IV Push once  doxycycline IVPB 100 milliGRAM(s) IV Intermittent every 12 hours  ferrous    sulfate 325 milliGRAM(s) Oral daily  fluconAZOLE IVPB 100 milliGRAM(s) IV Intermittent every 24 hours  fluticasone furoate/umeclidinium/vilanterol 100-62.5-25 MICROgram(s) Inhaler 1 Puff(s) Inhalation daily  folic acid 1 milliGRAM(s) Oral daily  glucagon  Injectable 1 milliGRAM(s) IntraMuscular once  hydrALAZINE 10 milliGRAM(s) Oral two times a day  insulin glargine Injectable (LANTUS) 5 Unit(s) SubCutaneous at bedtime  insulin lispro (ADMELOG) corrective regimen sliding scale   SubCutaneous at bedtime  insulin lispro (ADMELOG) corrective regimen sliding scale   SubCutaneous three times a day before meals  insulin lispro Injectable (ADMELOG) 4 Unit(s) SubCutaneous three times a day before meals  levothyroxine 12.5 MICROGram(s) Oral daily  meropenem Injectable 1000 milliGRAM(s) IV Push every 12 hours  polyethylene glycol 3350 17 Gram(s) Oral daily    MEDICATIONS  (PRN):  acetaminophen     Tablet .. 650 milliGRAM(s) Oral every 6 hours PRN Temp greater or equal to 38C (100.4F), Mild Pain (1 - 3)  aluminum hydroxide/magnesium hydroxide/simethicone Suspension 30 milliLiter(s) Oral every 4 hours PRN Dyspepsia  benzonatate 100 milliGRAM(s) Oral three times a day PRN Cough  dextrose Oral Gel 15 Gram(s) Oral once PRN Blood Glucose LESS THAN 70 milliGRAM(s)/deciliter  meclizine 12.5 milliGRAM(s) Oral every 8 hours PRN Dizziness  melatonin 3 milliGRAM(s) Oral at bedtime PRN Insomnia  ondansetron Injectable 4 milliGRAM(s) IV Push every 8 hours PRN Nausea and/or Vomiting      Allergies    penicillin (Unknown)    Intolerances        LABS:                        7.1    5.27  )-----------( 148      ( 19 Nov 2023 08:43 )             21.9     11-19    140  |  105  |  55<H>  ----------------------------<  90  4.4   |  29  |  1.13    Ca    8.5      19 Nov 2023 06:52        Urinalysis Basic - ( 19 Nov 2023 06:52 )    Color: x / Appearance: x / SG: x / pH: x  Gluc: 90 mg/dL / Ketone: x  / Bili: x / Urobili: x   Blood: x / Protein: x / Nitrite: x   Leuk Esterase: x / RBC: x / WBC x   Sq Epi: x / Non Sq Epi: x / Bacteria: x        RADIOLOGY & ADDITIONAL TESTS:  Studies reviewed.

## 2023-11-19 NOTE — PROGRESS NOTE ADULT - SUBJECTIVE AND OBJECTIVE BOX
JOHN FRIEDEL  MRN: [id number]    S:  November 19, 2023:    Comfortable at rest. Remains weak BUT reports feeling better today. His wife is at the bedside. Wife reports that he remains short of breath with minimal activity (moving to sit up at the side of the bed). According to the wifeHe has a cough productive of small amounts of brown sputum. There is no hemoptysis. He denies chest pain. He wants to go home.       November 18, 2023:    Sitting up in bed. Breathing comfortably. No new complaints. Still has a cough. No dyspnea; no chest pain; no hemoptysis.     Remains afebrile. On continuous O2; BiPAP as tolerated at night.     Palliative care input appreciated.       November 17, 2023:    Sitting up in chair. Denies shortness of breath. No c/o chest pain. No hemoptysis. Weak; chronically ill appearing.       November 16, 2023:    Reviewed hx with Dr. Olivera.     Patient appears chronically ill. Offers no complaints this morning. Denies dyspnea. Coughing - nonproductive.         PAST MEDICAL & SURGICAL HISTORY:  COPD (chronic obstructive pulmonary disease)      Lung cancer      Anemia of chronic disease      CKD (chronic kidney disease), stage III      Hypothyroidism      AAA (abdominal aortic aneurysm)      HTN (hypertension)      HLD (hyperlipidemia)      Thrombocytopenia      S/P lobectomy of lung          O: T(C): 36.5 (11-19-23 @ 11:51), Max: 36.7 (11-18-23 @ 21:50)  HR: 100 (11-19-23 @ 11:51) (80 - 103)  BP: 130/60 (11-19-23 @ 11:51) (128/58 - 140/51)  RR: 18 (11-19-23 @ 11:51) (18 - 19)  SpO2: 93% (11-19-23 @ 11:51) (93% - 100%)  Wt(kg): --    PHYSICAL EXAM:      GENERAL: weak    NEURO: awake/alert.      NECK: no JVD     CHEST: rhonchi on right greater than left      CARDIAC: RR    EXT: no edema      LABS:                          7.1    5.27  )-----------( 148      ( 19 Nov 2023 08:43 )             21.9       11-19    140  |  105  |  55<H>  ----------------------------<  90  4.4   |  29  |  1.13    Ca    8.5      19 Nov 2023 06:52    RADIOLOGY:    CT Chest No Cont (11.13.23 @ 10:04) >    PROCEDURE DATE:  11/13/2023    INTERPRETATION:  Clinical indications: Pneumonia.    Axial CT images of the chest are obtained without intravenous   administration of contrast.    Comparison is made with the prior chest CT of November 2, 2023.    No enlarged axillary or mediastinal lymph nodes.    No pericardial effusion. Heart size is normal. Mitral annular   calcifications. Vascular calcifications with involvement of the aorta and   the coronary arteries. Aortic root calcifications.    Evaluation of the upper abdomen demonstrate partially imaged aortic stent   graft. Subcutaneous edema. Minimal perihepatic ascites.    Small left pleural effusion new since the abdominalCT of November 8, 2023. Trace right pleural effusion as on November 8, 2023.    Evaluation of the lungs demonstrate left lung base linear subsegmental   atelectasis. A few ill-defined left lung patchy nonspecific groundglass   opacities largest within the dependent portion of the left upper lobe are   new since November 2, 2023.    Status post right upper lobectomy with postoperative changes. Persistent   right apical large large cyst, part of which measures about 6.6 cm in   transverse dimension without significant interval change in size   containing air and increasing internal opacification as compared with   November 2, 2023, some of which appear slightly dense and may be due to   internal hemorrhagic components.    Extensive right mid to lower lung consolidations and ill-defined   groundglass opacities, with the largest confluent consolidation within   the mid to lower portions of the right lower lobe, majority of which   appear new since November 2, 2023 superimposed on previouslydescribed   linear opacities.    Degenerative changes of the spine. Old healed sternal fracture.    IMPRESSION: Right lung large areas of consolidation new since November 2, 2023 likely pneumonia.    Right apical previously described large cyst unchanged in size since   November 2, 2023 containing internal air and increasing internal   opacification since November 2, 2023 as described above. Continued   follow-up is recommended.    A few left lung ill-defined nonspecific groundglass opacities.   Differential diagnosis include but is not limited to   infectious/inflammatory etiologies and or pulmonary edema.    Small left pleural effusion new since November 8, 2023.      Bronchoscopy results:  Culture - Acid Fast - Bronchial w/Smear (11.09.23 @ 12:30)   Specimen Source: .Bronchial RIGHT LOWER LOBE BAL  Acid Fast Bacilli Smear:   No acid-fast bacilli seen by fluorochrome stain  Culture Results:   Culture is being performed.  Culture - Fungal, Bronchial (11.09.23 @ 12:30)   Specimen Source: .Bronchial RIGHT LOWER LOBE BAL  Culture Results:   Rare Yeast      MEDICATIONS  (STANDING):  albuterol/ipratropium for Nebulization 3 milliLiter(s) Nebulizer every 6 hours  AQUAPHOR (petrolatum Ointment) 1 Application(s) Topical two times a day  atorvastatin 40 milliGRAM(s) Oral at bedtime  dextrose 5%. 1000 milliLiter(s) (100 mL/Hr) IV Continuous <Continuous>  dextrose 5%. 1000 milliLiter(s) (50 mL/Hr) IV Continuous <Continuous>  dextrose 50% Injectable 25 Gram(s) IV Push once  dextrose 50% Injectable 12.5 Gram(s) IV Push once  dextrose 50% Injectable 25 Gram(s) IV Push once  doxycycline IVPB 100 milliGRAM(s) IV Intermittent every 12 hours  ferrous    sulfate 325 milliGRAM(s) Oral daily  fluconAZOLE IVPB 100 milliGRAM(s) IV Intermittent every 24 hours  fluticasone furoate/umeclidinium/vilanterol 100-62.5-25 MICROgram(s) Inhaler 1 Puff(s) Inhalation daily  folic acid 1 milliGRAM(s) Oral daily  glucagon  Injectable 1 milliGRAM(s) IntraMuscular once  hydrALAZINE 10 milliGRAM(s) Oral two times a day  insulin glargine Injectable (LANTUS) 5 Unit(s) SubCutaneous at bedtime  insulin lispro (ADMELOG) corrective regimen sliding scale   SubCutaneous at bedtime  insulin lispro (ADMELOG) corrective regimen sliding scale   SubCutaneous three times a day before meals  insulin lispro Injectable (ADMELOG) 4 Unit(s) SubCutaneous three times a day before meals  levothyroxine 12.5 MICROGram(s) Oral daily  meropenem Injectable 1000 milliGRAM(s) IV Push every 12 hours  polyethylene glycol 3350 17 Gram(s) Oral daily    MEDICATIONS  (PRN):  acetaminophen     Tablet .. 650 milliGRAM(s) Oral every 6 hours PRN Temp greater or equal to 38C (100.4F), Mild Pain (1 - 3)  aluminum hydroxide/magnesium hydroxide/simethicone Suspension 30 milliLiter(s) Oral every 4 hours PRN Dyspepsia  benzonatate 100 milliGRAM(s) Oral three times a day PRN Cough  dextrose Oral Gel 15 Gram(s) Oral once PRN Blood Glucose LESS THAN 70 milliGRAM(s)/deciliter  meclizine 12.5 milliGRAM(s) Oral every 8 hours PRN Dizziness  melatonin 3 milliGRAM(s) Oral at bedtime PRN Insomnia  ondansetron Injectable 4 milliGRAM(s) IV Push every 8 hours PRN Nausea and/or Vomiting        A/P: 1. Large right lower lobe (S/P Right upper lobectomy)  thick walled cavity. Uncertain etiology. Infection considered most likely.  Will continue antibiotics per ID.     Patient is very high surgical risk    [place] [facility] [place]. He has had blebs in the past.     S/P  bronch. Growing few yeast. Fungal cultures pending.     CT 11/13 reveals worsening consolidations and filling in of bleb.     D/W Dr. Sweeney. Will repeat chest CT tomorrow.       2. Pneumonia: as above. Continue antibiotics. Follow up CT.     -aspiration precautions      3. COPD - hypoxic respiratory failure. Continue supplemental O2; BiPAP prn. Continue inhaled bronchodilators/ICS.        4. ? Radiation  pneumonitis: Off steroids.  Progressive nature of findings argues against pneumonitis.      5. Aemia/CKD: per primary care team. May benefit from PRBC transfusion.       6. Left leg laceration: s/p suture. Per primary care team       7. Lung Cancer. Oncology follow up appreciated. Last received carbo/taxol with salvage RT 5/5/2023 after progression in subcarinol node after adjuvant atezolizumab (LD 12/20/22)  - pt was last seen 10/2023- when compared with June 2023 increased b/l areas of infiltrates and consolidation likely infectious/inflammatory post therapy change. RML perisutural mass again obscured by consolidation. unchanged subcarinal node, suspected mets.   At that time pt was started on medrol dose pack and Z pack.     Prognosis guarded; palliative care evaluation appreciated. Family considering home hospice.      Discussed with Dr. Sweeney. Reviewed plan with patients wife at bedside.     DNR/DNI noted.     Dr. Olivera to resume pulmonary aspects of care 11/20.

## 2023-11-19 NOTE — PROGRESS NOTE ADULT - ASSESSMENT
83-year-old M with PMHx HTN, HLD, chronic anemia, COPD, lung CA s/p RUL lobectomy ([place] [facility] [place]/RT 5/2023, not currently on treatment), AAA, hypothyroidism, CKD stage IIIa sent in to ED on 11/2/23  by pulmonologist MD Olivera with c/o SOB, dyspnea on minimal exertion, and cough. 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     # Acute hypoxic respiratory failure, multifactorial   # New RLL PNA on CT 11/13 , RLL cavity s/p bronchoscopy 11/9  possible yeast  infection, less likely radiation pneumonitis , r/o  aspergilloma    - CT chest: Larger right lower lobe thick-walled cavity which may be infectious and/or residua of reported treated tumor. Spongelike material within the cavity can be seen with aspergilloma (prior to fungus ball formation). Consolidation within the right lower and middle lobe compatible with radiation change.  - leukocytosis stable (WBC 17.70 from 17.35) ---> trend down to WBC  8   - repeat CXR 11/8 - worsening of opacities over right lung   - 2 d echo 11/8 - EF 60% , no significant valvular disease   - immunoglobulin panel - all Ig wnl  - 11/9 - s/p bronchoscopy   - speech and swallow evaluation  - no aspirations  - CXR 11/11 - stable consolidations  - repeat CT chest 11/13 - new right lung consolidation  - blood cultures -no growth, sputum culture - normal respiratory  - c/w trelegy ellipta, chest pt, IS, acapella ,BIPAP as needed  - ID/pulm consult noted   - s/p IV steroids --> po prednisone taper 20 x 3 days than stop , trending down ----> 34  - s/p  voriconazole PO ,  s/p 7 days of levaquin to cover atypical/psuedomonas (allergy to PCN)    - 11/13 - IV meropenem, doxy and fluconasole started as per ID   - Chest xray prelim worse  - Thoracic consult appreciated, no intervention  - Continue antibiotics  - If no improvement then would recommend hospice    #  Chronic anemia, likely due to  chemotherapy, worsening and iron deficiency   - decreased on AM labs from admission (Hgb 9.2-->8.2, Hct 27.8--> 25.2)   - no s/s bleeding, asymptomatic, continue to monitor/trend  - 11/8 - 1 unit PRBC due to hemoptysis and worsening of anemia  -  11/19 transfuse 1 unit PRBC    #CHRISTINA on CKD stage IIIa, improving  - Resolved    # Left calf laceration from veno dines while in PACU for bronchoscopy   - surgery consult - s/p suturing  on 11/09/23   - plan to remove sutures in 7-10 days  - wound care as per surgery team    # Acute hyperglycemia, possible steroid-induced , Prediabetes with A1C 6.3   -  on AM labs,  A1c results 6.3   - consistent with Impaired Fasting Glucose, new onset DM II ruled out    # HTN  - stop amlodipine 2.5  - hydralazine 10 bid as per nephrology team    #  HLD    - c/w  atorvastatin    # Hypothyroidism   - Levo lowered to 12.5 mcg  - Recheck in 4 weeks    # Hx lung CA s/p RUL lobectomy/chemo/RT   - f/u with [facility]     # DVT Ppx   - SCDs    # Code status   - DNR/DNI

## 2023-11-20 LAB
ANION GAP SERPL CALC-SCNC: 5 MMOL/L — SIGNIFICANT CHANGE UP (ref 5–17)
ANION GAP SERPL CALC-SCNC: 5 MMOL/L — SIGNIFICANT CHANGE UP (ref 5–17)
BUN SERPL-MCNC: 46 MG/DL — HIGH (ref 7–23)
BUN SERPL-MCNC: 46 MG/DL — HIGH (ref 7–23)
CALCIUM SERPL-MCNC: 8.2 MG/DL — LOW (ref 8.5–10.1)
CALCIUM SERPL-MCNC: 8.2 MG/DL — LOW (ref 8.5–10.1)
CHLORIDE SERPL-SCNC: 106 MMOL/L — SIGNIFICANT CHANGE UP (ref 96–108)
CHLORIDE SERPL-SCNC: 106 MMOL/L — SIGNIFICANT CHANGE UP (ref 96–108)
CO2 SERPL-SCNC: 29 MMOL/L — SIGNIFICANT CHANGE UP (ref 22–31)
CO2 SERPL-SCNC: 29 MMOL/L — SIGNIFICANT CHANGE UP (ref 22–31)
CREAT SERPL-MCNC: 0.94 MG/DL — SIGNIFICANT CHANGE UP (ref 0.5–1.3)
CREAT SERPL-MCNC: 0.94 MG/DL — SIGNIFICANT CHANGE UP (ref 0.5–1.3)
EGFR: 80 ML/MIN/1.73M2 — SIGNIFICANT CHANGE UP
EGFR: 80 ML/MIN/1.73M2 — SIGNIFICANT CHANGE UP
GLUCOSE BLDC GLUCOMTR-MCNC: 111 MG/DL — HIGH (ref 70–99)
GLUCOSE BLDC GLUCOMTR-MCNC: 111 MG/DL — HIGH (ref 70–99)
GLUCOSE BLDC GLUCOMTR-MCNC: 170 MG/DL — HIGH (ref 70–99)
GLUCOSE BLDC GLUCOMTR-MCNC: 170 MG/DL — HIGH (ref 70–99)
GLUCOSE BLDC GLUCOMTR-MCNC: 174 MG/DL — HIGH (ref 70–99)
GLUCOSE BLDC GLUCOMTR-MCNC: 174 MG/DL — HIGH (ref 70–99)
GLUCOSE BLDC GLUCOMTR-MCNC: 88 MG/DL — SIGNIFICANT CHANGE UP (ref 70–99)
GLUCOSE BLDC GLUCOMTR-MCNC: 88 MG/DL — SIGNIFICANT CHANGE UP (ref 70–99)
GLUCOSE SERPL-MCNC: 90 MG/DL — SIGNIFICANT CHANGE UP (ref 70–99)
GLUCOSE SERPL-MCNC: 90 MG/DL — SIGNIFICANT CHANGE UP (ref 70–99)
HCT VFR BLD CALC: 23.6 % — LOW (ref 39–50)
HCT VFR BLD CALC: 23.6 % — LOW (ref 39–50)
HGB BLD-MCNC: 7.9 G/DL — LOW (ref 13–17)
HGB BLD-MCNC: 7.9 G/DL — LOW (ref 13–17)
IGG4 SER-MCNC: 35.9 MG/DL — SIGNIFICANT CHANGE UP (ref 1–123)
IGG4 SER-MCNC: 35.9 MG/DL — SIGNIFICANT CHANGE UP (ref 1–123)
MCHC RBC-ENTMCNC: 31.3 PG — SIGNIFICANT CHANGE UP (ref 27–34)
MCHC RBC-ENTMCNC: 31.3 PG — SIGNIFICANT CHANGE UP (ref 27–34)
MCHC RBC-ENTMCNC: 33.5 GM/DL — SIGNIFICANT CHANGE UP (ref 32–36)
MCHC RBC-ENTMCNC: 33.5 GM/DL — SIGNIFICANT CHANGE UP (ref 32–36)
MCV RBC AUTO: 93.7 FL — SIGNIFICANT CHANGE UP (ref 80–100)
MCV RBC AUTO: 93.7 FL — SIGNIFICANT CHANGE UP (ref 80–100)
PLATELET # BLD AUTO: 136 K/UL — LOW (ref 150–400)
PLATELET # BLD AUTO: 136 K/UL — LOW (ref 150–400)
POTASSIUM SERPL-MCNC: 4.4 MMOL/L — SIGNIFICANT CHANGE UP (ref 3.5–5.3)
POTASSIUM SERPL-MCNC: 4.4 MMOL/L — SIGNIFICANT CHANGE UP (ref 3.5–5.3)
POTASSIUM SERPL-SCNC: 4.4 MMOL/L — SIGNIFICANT CHANGE UP (ref 3.5–5.3)
POTASSIUM SERPL-SCNC: 4.4 MMOL/L — SIGNIFICANT CHANGE UP (ref 3.5–5.3)
RBC # BLD: 2.52 M/UL — LOW (ref 4.2–5.8)
RBC # BLD: 2.52 M/UL — LOW (ref 4.2–5.8)
RBC # FLD: 15.9 % — HIGH (ref 10.3–14.5)
RBC # FLD: 15.9 % — HIGH (ref 10.3–14.5)
SODIUM SERPL-SCNC: 140 MMOL/L — SIGNIFICANT CHANGE UP (ref 135–145)
SODIUM SERPL-SCNC: 140 MMOL/L — SIGNIFICANT CHANGE UP (ref 135–145)
WBC # BLD: 5.38 K/UL — SIGNIFICANT CHANGE UP (ref 3.8–10.5)
WBC # BLD: 5.38 K/UL — SIGNIFICANT CHANGE UP (ref 3.8–10.5)
WBC # FLD AUTO: 5.38 K/UL — SIGNIFICANT CHANGE UP (ref 3.8–10.5)
WBC # FLD AUTO: 5.38 K/UL — SIGNIFICANT CHANGE UP (ref 3.8–10.5)

## 2023-11-20 PROCEDURE — 71250 CT THORAX DX C-: CPT | Mod: 26

## 2023-11-20 PROCEDURE — 99232 SBSQ HOSP IP/OBS MODERATE 35: CPT

## 2023-11-20 RX ORDER — CASPOFUNGIN ACETATE 7 MG/ML
70 INJECTION, POWDER, LYOPHILIZED, FOR SOLUTION INTRAVENOUS ONCE
Refills: 0 | Status: COMPLETED | OUTPATIENT
Start: 2023-11-20 | End: 2023-11-20

## 2023-11-20 RX ORDER — CASPOFUNGIN ACETATE 7 MG/ML
50 INJECTION, POWDER, LYOPHILIZED, FOR SOLUTION INTRAVENOUS EVERY 24 HOURS
Refills: 0 | Status: DISCONTINUED | OUTPATIENT
Start: 2023-11-21 | End: 2023-11-28

## 2023-11-20 RX ORDER — CASPOFUNGIN ACETATE 7 MG/ML
INJECTION, POWDER, LYOPHILIZED, FOR SOLUTION INTRAVENOUS
Refills: 0 | Status: DISCONTINUED | OUTPATIENT
Start: 2023-11-20 | End: 2023-11-28

## 2023-11-20 RX ORDER — MEROPENEM 1 G/30ML
1000 INJECTION INTRAVENOUS EVERY 8 HOURS
Refills: 0 | Status: COMPLETED | OUTPATIENT
Start: 2023-11-20 | End: 2023-11-28

## 2023-11-20 RX ORDER — MEROPENEM 1 G/30ML
1000 INJECTION INTRAVENOUS EVERY 8 HOURS
Refills: 0 | Status: DISCONTINUED | OUTPATIENT
Start: 2023-11-20 | End: 2023-11-20

## 2023-11-20 RX ADMIN — MEROPENEM 1000 MILLIGRAM(S): 1 INJECTION INTRAVENOUS at 21:08

## 2023-11-20 RX ADMIN — ATORVASTATIN CALCIUM 40 MILLIGRAM(S): 80 TABLET, FILM COATED ORAL at 21:10

## 2023-11-20 RX ADMIN — Medication 1 APPLICATION(S): at 21:11

## 2023-11-20 RX ADMIN — Medication 1 MILLIGRAM(S): at 10:37

## 2023-11-20 RX ADMIN — Medication 3 MILLILITER(S): at 20:08

## 2023-11-20 RX ADMIN — MEROPENEM 1000 MILLIGRAM(S): 1 INJECTION INTRAVENOUS at 17:54

## 2023-11-20 RX ADMIN — PANTOPRAZOLE SODIUM 40 MILLIGRAM(S): 20 TABLET, DELAYED RELEASE ORAL at 10:36

## 2023-11-20 RX ADMIN — Medication 3 MILLILITER(S): at 13:38

## 2023-11-20 RX ADMIN — Medication 110 MILLIGRAM(S): at 21:08

## 2023-11-20 RX ADMIN — POLYETHYLENE GLYCOL 3350 17 GRAM(S): 17 POWDER, FOR SOLUTION ORAL at 10:36

## 2023-11-20 RX ADMIN — Medication 325 MILLIGRAM(S): at 10:36

## 2023-11-20 RX ADMIN — Medication 12.5 MICROGRAM(S): at 05:20

## 2023-11-20 RX ADMIN — CASPOFUNGIN ACETATE 70 MILLIGRAM(S): 7 INJECTION, POWDER, LYOPHILIZED, FOR SOLUTION INTRAVENOUS at 12:29

## 2023-11-20 RX ADMIN — Medication 10 MILLIGRAM(S): at 21:10

## 2023-11-20 RX ADMIN — PANTOPRAZOLE SODIUM 40 MILLIGRAM(S): 20 TABLET, DELAYED RELEASE ORAL at 21:09

## 2023-11-20 RX ADMIN — Medication 1 APPLICATION(S): at 10:38

## 2023-11-20 RX ADMIN — FLUTICASONE FUROATE, UMECLIDINIUM BROMIDE AND VILANTEROL TRIFENATATE 1 PUFF(S): 200; 62.5; 25 POWDER RESPIRATORY (INHALATION) at 09:36

## 2023-11-20 RX ADMIN — Medication 4 UNIT(S): at 13:24

## 2023-11-20 RX ADMIN — MEROPENEM 1000 MILLIGRAM(S): 1 INJECTION INTRAVENOUS at 10:37

## 2023-11-20 RX ADMIN — Medication 3 MILLILITER(S): at 09:34

## 2023-11-20 RX ADMIN — Medication 110 MILLIGRAM(S): at 10:36

## 2023-11-20 RX ADMIN — INSULIN GLARGINE 5 UNIT(S): 100 INJECTION, SOLUTION SUBCUTANEOUS at 21:10

## 2023-11-20 RX ADMIN — Medication 10 MILLIGRAM(S): at 10:37

## 2023-11-20 RX ADMIN — Medication 2: at 17:53

## 2023-11-20 RX ADMIN — Medication 4 UNIT(S): at 17:53

## 2023-11-20 RX ADMIN — Medication 3 MILLILITER(S): at 01:50

## 2023-11-20 NOTE — PROGRESS NOTE ADULT - ASSESSMENT
83y old Male with PMH HTN, HPL, Chronic anemia, COPD, lung CA [place] [facility] [place] chemotherapy and RT 05/2023, not currently receiving chemo treatment), s/p right upper lobectomy, AAA, Hypothyroidism, CKD stage IIIa referred by me to ed for continued sob, cough despite abx treatment found to have large lower lobe thick walled cavity with worsening findings on ct despite treatment with levaquin  1. 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 discussed with wife.  -started on voriconazole, initially switchedd to fluconazole, now on caspofungin. surgery is more defitive therapy however he is high surgical risk  -reviewed imaging from [place] supplied by wife - it looks like he has had blebs in the past and these are what are infected - they look half filled with fluid with thicker borders, which would argue against fungal infection/mrsa as there is no necrosis involved in its pathogenesis  -s/p bronch. follow up results. growing few yeast  -repeat ct wo contrast reveals worsening consolidations and filling in of bleb. this may suggest inadqueate antibiotics, continued aspiration or rapidly progressive cancer  -spoke to id and hospitalist, switched to doxy, meropenem  -? switch to midline  -palliative care eval  2. pneumonia: as above  -supplemental o2  -speech/swallow eval noted  -aspiration precautions  3. copd: continue trelegy  4. ? radiation induced pneumonitis: titrate down  steroids as he has worsening infection and progressive nature of findings argues against pneumonitis  5. anemia: received 1 unit prbc  6. left leg laceration: s/p suture, now bandaged. continue wound care    spoke to dr. gomez, dr bryant, dr elena  over 35 min spent in care of patient  spoke to wife aftwards 83y old Male with PMH HTN, HPL, Chronic anemia, COPD, lung CA follows at [facility] [place] (last chemotherapy and RT 05/2023, not currently receiving chemo treatment), s/p right upper lobectomy, AAA, Hypothyroidism, CKD stage IIIa referred by me to ed for continued sob, cough despite abx treatment found to have large lower lobe thick walled cavity with worsening findings on ct despite treatment with levaquin  1. 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 discussed with wife.  -started on voriconazole, initially switchedd to fluconazole, now on caspofungin. surgery is more defitive therapy however he is high surgical risk  -reviewed imaging from [place] supplied by wife - it looks like he has had blebs in the past and these are what are infected - they look half filled with fluid with thicker borders, which would argue against fungal infection/mrsa as there is no necrosis involved in its pathogenesis  -s/p bronch. follow up results. growing few yeast  -repeat ct wo contrast reveals worsening consolidations and filling in of bleb. this may suggest inadqueate antibiotics, continued aspiration or rapidly progressive cancer  -spoke to id and hospitalist, switched to doxy, meropenem  -? switch to midline  -palliative care eval  2. pneumonia: as above  -supplemental o2  -speech/swallow eval noted  -aspiration precautions  3. copd: continue trelegy  4. ? radiation induced pneumonitis: titrate down  steroids as he has worsening infection and progressive nature of findings argues against pneumonitis  5. anemia: received 1 unit prbc on 11/19  6. left leg laceration: s/p suture, now bandaged. continue wound care    spoke to dr. gomez, dr bryant, dr elena  over 35 min spent in care of patient  spoke to wife aftwards

## 2023-11-20 NOTE — PROGRESS NOTE ADULT - ASSESSMENT
82 y/o Male with h/o lung CA follows at [facility] [place] s/p chemotherapy and RT 05/2023, s/p right upper lobectomy, HTN, HPL, Chronic anemia, COPD, AAA, Hypothyroidism, CKD stage IIIa was admitted on 11/2 for increased shortness of breath, dyspnea on minimal exertion, and cough. Reportedly, he had CT of the chest performed 10/3 which demonstrated consolidation, was started on augmentin/prednisone/azithro 10/6 x 1 week. Pt completed course of medications however wife noted worsening cough and pt with fever Tmax of 103.9 on 10/26. Wife then took pt to pulmonologist again who started pt on second round of the same medications, instructed wife to stop giving pt tylenol wife notes fevers self-resolved. Since then pt decreased energy with episode of being unsteady on feet. Today is 6th day of taking medications, had f/u scheduled with pulmonologist who sent pt to ED for eval.  He feels tired and weak. In ER he received ceftriaxone.    1. Large RLL pulmonary cavity with worsening opacification, likely pneumonia. Possible recurrent lung Ca. Lung Ca s/p right upper lobectomy. Radiation pneumonitis. CRF stage 3. Allergy to PCN.   -extensive right lung consolidations  -worsening large pulmonary cavity opacification on repeat CT chest   -respiratory improved s/p bronchoscopy  -bronchoscopy cultures show normal respiratory jaime and rare yeast  -leukocytosis  -bronch c/s noted with normal respiratory jaime  -Aspergillus galactomanan and fungitel are negative   -fungal bronch c/s is pending and will take several weeks to finalize  -s/p levofloxacin # 10  -s/p voriconazole 200 mg PO q12h # 7  -pulmonary evaluation appreciated   -renal function is improving  -on meropenem 1 gm IV q12h, doxycycline 100 mg IV q12h and fluconazole 100 mg IV qd # 8  -tolerating abx well so far; no side effects noted  -thoracic evaluation appreciated - conservative care recommended   -change fluconazole to caspofungin 50 mg IV qd  -increase meropenem to 1 gm IV q8h  -f/u cultures  -continue abx coverage   -monitor temps  -f/u CBC  -supportive care  2. Other issues:   -care per medicine    d/w medicine team

## 2023-11-20 NOTE — PROGRESS NOTE ADULT - SUBJECTIVE AND OBJECTIVE BOX
Subjective:  family concerned about infiltrating ivs  discussed that ct scan is worsening  spoke to patients wife about plan aftewards  increased to caspofungin  sitting in chair, comfortable    Review of Systems:  All 10 systems reviewed in detailed and found to be negative with the exception of what has already been described above    Allergies:  penicillin (Unknown)    Meds  MEDICATIONS  (STANDING):  albuterol/ipratropium for Nebulization 3 milliLiter(s) Nebulizer every 6 hours  AQUAPHOR (petrolatum Ointment) 1 Application(s) Topical two times a day  atorvastatin 40 milliGRAM(s) Oral at bedtime  caspofungin IVPB      dextrose 5%. 1000 milliLiter(s) (100 mL/Hr) IV Continuous <Continuous>  dextrose 5%. 1000 milliLiter(s) (50 mL/Hr) IV Continuous <Continuous>  dextrose 50% Injectable 12.5 Gram(s) IV Push once  dextrose 50% Injectable 25 Gram(s) IV Push once  dextrose 50% Injectable 25 Gram(s) IV Push once  doxycycline IVPB 100 milliGRAM(s) IV Intermittent every 12 hours  ferrous    sulfate 325 milliGRAM(s) Oral daily  fluticasone furoate/umeclidinium/vilanterol 100-62.5-25 MICROgram(s) Inhaler 1 Puff(s) Inhalation daily  folic acid 1 milliGRAM(s) Oral daily  glucagon  Injectable 1 milliGRAM(s) IntraMuscular once  hydrALAZINE 10 milliGRAM(s) Oral two times a day  insulin glargine Injectable (LANTUS) 5 Unit(s) SubCutaneous at bedtime  insulin lispro (ADMELOG) corrective regimen sliding scale   SubCutaneous at bedtime  insulin lispro (ADMELOG) corrective regimen sliding scale   SubCutaneous three times a day before meals  insulin lispro Injectable (ADMELOG) 4 Unit(s) SubCutaneous three times a day before meals  levothyroxine 12.5 MICROGram(s) Oral daily  meropenem Injectable 1000 milliGRAM(s) IV Push every 8 hours  pantoprazole  Injectable 40 milliGRAM(s) IV Push two times a day  polyethylene glycol 3350 17 Gram(s) Oral daily    MEDICATIONS  (PRN):  acetaminophen     Tablet .. 650 milliGRAM(s) Oral every 6 hours PRN Temp greater or equal to 38C (100.4F), Mild Pain (1 - 3)  aluminum hydroxide/magnesium hydroxide/simethicone Suspension 30 milliLiter(s) Oral every 4 hours PRN Dyspepsia  benzonatate 100 milliGRAM(s) Oral three times a day PRN Cough  dextrose Oral Gel 15 Gram(s) Oral once PRN Blood Glucose LESS THAN 70 milliGRAM(s)/deciliter  meclizine 12.5 milliGRAM(s) Oral every 8 hours PRN Dizziness  melatonin 3 milliGRAM(s) Oral at bedtime PRN Insomnia  ondansetron Injectable 4 milliGRAM(s) IV Push every 8 hours PRN Nausea and/or Vomiting    Physical Exam  T(C): 36.3 (11-20-23 @ 16:01), Max: 37.1 (11-19-23 @ 21:00)  HR: 108 (11-20-23 @ 16:01) (86 - 108)  BP: 138/63 (11-20-23 @ 16:01) (126/65 - 143/51)  RR: 18 (11-20-23 @ 16:01) (18 - 20)  SpO2: 98% (11-20-23 @ 16:01) (94% - 100%)  Gen: Alert, oriented, no distress  HEENT: Anicteric sclera, moist mucous membranes  Cardio: Regular rhythm and rate, normal S1S2, no murmurs  Resp: Crackles, congested  GI: Nontender, nondistended, normoactive bowel sounds  Ext: No cyanosis, clubbing or edema  skin: + ecchymosis, left leg laceration sutured and bandaged  Neuro: Nonfocal    Labs:                        7.9    5.38  )-----------( 136      ( 20 Nov 2023 07:10 )             23.6     11-20    140  |  106  |  46<H>  ----------------------------<  90  4.4   |  29  |  0.94    Ca    8.2<L>      20 Nov 2023 07:10        Urinalysis Basic - ( 20 Nov 2023 07:10 )    Color: x / Appearance: x / SG: x / pH: x  Gluc: 90 mg/dL / Ketone: x  / Bili: x / Urobili: x   Blood: x / Protein: x / Nitrite: x   Leuk Esterase: x / RBC: x / WBC x   Sq Epi: x / Non Sq Epi: x / Bacteria: x    < from: CT Chest No Cont (11.02.23 @ 16:04) >  ACC: [id number] EXAM:  CT CHEST   ORDERED BY: SARAH YOO     PROCEDURE DATE:  11/02/2023          INTERPRETATION:  INDICATION: Shortness of breath, cough, lung cancer   history, last chemotherapy and radiation treatment in May 2023    TECHNIQUE: Helical acquisition images of the chest without intravenous   contrast. Maximum intensity projection images were generated.    COMPARISON: 12/29/2020 chest x-ray.    FINDINGS:    LUNGS/AIRWAYS/PLEURA: Right upper lobectomy. Right lower lobe   multiloculated thick-walled 11 cm cavity (best seen in its entirety on   601-59) containing fluid and spongelike material. Consolidation within   the middle lobe and inferior aspect of the right lower lobe with angular   borders suggesting prior radiation. Multiple indistinct nodules of   varying density in the right lower and middle lobes, mostly adjacent to   the cavity. Small branching opacities in the peripheral left upper and   lower lobes, compatible with distal airway impaction. Trace left pleural   effusion. Mild right apical pleural thickening.    LYMPH NODES/MEDIASTINUM: No lymphadenopathy.    HEART/VASCULATURE: Normal heart size. Unremarkable pericardium. Coronary   artery calcifications. Normal caliber aorta.    UPPER ABDOMEN: Unremarkable.    BONES/SOFT TISSUES: Old sternal fracture. Mild loss of height of the L1   vertebral body.      IMPRESSION:    Right upper lobectomy.    Larger right lower lobe thick-walled cavity which may be infectious   and/or residua of reported treated tumor. 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    Consolidation within the right lower and middle lobe compatible with   radiation change.    Multiple indistinct nodules in the right lungwhich may be infectious or   radiation pneumonitis.    ct lucio 10/3 uploaded and reviewed    < from: CT Abdomen and Pelvis No Cont (11.08.23 @ 19:01) >  PROCEDURE DATE:  11/08/2023          INTERPRETATION:  CLINICAL INFORMATION: Acute kidney injury. Evaluate   obstruction.    COMPARISON: CT chest 11/20/2023.    CONTRAST/COMPLICATIONS:  IV Contrast: NONE  Oral Contrast: NONE  Complications: None reported at time of study completion    PROCEDURE:  CT of the Abdomen and Pelvis was performed.  Sagittal and coronal reformats were performed.    FINDINGS:  LOWER CHEST: Small right pleural effusion. New patchy opacities in the   right lung base.    LIVER: Within normal limits.  BILE DUCTS: Normal caliber.  GALLBLADDER: Within normal limits.  SPLEEN: Within normal limits.  PANCREAS: Within normal limits.  ADRENALS: Within normal limits.  KIDNEYS/URETERS: No hydronephrosis. Vascular calcifications.    BLADDER: Within normal limits.  REPRODUCTIVE ORGANS: Prostate within normal limits.    BOWEL: No bowel obstruction. Appendix is normal. Moderate to large stool.  PERITONEUM: No ascites.  VESSELS: Atherosclerotic changes.  RETROPERITONEUM/LYMPH NODES: No lymphadenopathy.  ABDOMINAL WALL: Within normal limits.  BONES: Degenerative changes. Redemonstration of L1 compression deformity.   Right femoral ORIF.    IMPRESSION:  No hydronephrosis.    New patchy opacities in the right lung base. Small right pleural effusion.    Bedside Swallow: Trial 1  · Consistencies administered	thin liquid; pureed; regular solid  · Mode of Presentation	cup; spoon; self fed  · Positioning	upright (90 degrees)  · Oral Preparatory Phase	Within functional limits; orientation eating routines: able to feed himself with focus: as per family, pt eats rapidly: did not do so at bedside evaluation Lip seal on utensil : oral containment..  · Oral Phase	Within functional limits; immediate bolus formation: Ap transfer for liquid WFL. Mastication normally rotary-lateral with lingual sweep for collection and clearance.  l  · Pharyngeal Phase	Within functional limits; Onset of pharyngeal swallow is with age-appropriate time limits. No overt s/s aspiration at bedside.  · Comments	Micro aspiration and silent aspiration cannot be ruled out. As above, pt does have increased risk for aspiration 2/2 lung CA and  lobectomy as well as COPD and present pna (which in itself may be aspiration related). However, pt presents at bedside evaluation with no contraindication for maintaining REGULAR texture and thin liquids.  Strategies for reducing risk were demonstrated and discussed with the Pt and the family who are very supportive of the Pt.  Disc with NSg.  Will follow peripherally.    < from: CT Chest No Cont (11.13.23 @ 10:04) >    PROCEDURE DATE:  11/13/2023          INTERPRETATION:  Clinical indications: Pneumonia.    Axial CT images of the chest are obtained without intravenous   administration of contrast.    Comparison is made with the prior chest CT of November 2, 2023.    No enlarged axillary or mediastinal lymph nodes.    No pericardial effusion. Heart size is normal. Mitral annular   calcifications. Vascular calcifications with involvement of the aorta and   the coronary arteries. Aortic root calcifications.    Evaluation of the upper abdomen demonstrate partially imaged aortic stent   graft. Subcutaneous edema. Minimal perihepatic ascites.    Small left pleural effusion new since the abdominalCT of November 8, 2023. Trace right pleural effusion as on November 8, 2023.    Evaluation of the lungs demonstrate left lung base linear subsegmental   atelectasis. A few ill-defined left lung patchy nonspecific groundglass   opacities largest within the dependent portion of the left upper lobe are   new since November 2, 2023.    Status post right upper lobectomy with postoperative changes. Persistent   right apical large large cyst, part of which measures about 6.6 cm in   transverse dimension without significant interval change in size   containing air and increasing internal opacification as compared with   November 2, 2023, some of which appear slightly dense and may be due to   internal hemorrhagic components.    Extensive right mid to lower lung consolidations and ill-defined   groundglass opacities, with the largest confluent consolidation within   the mid to lower portions of the right lower lobe, majority of which   appear new since November 2, 2023 superimposed on previouslydescribed   linear opacities.    Degenerative changes of the spine. Old healed sternal fracture.    IMPRESSION: Right lung large areas of consolidation new since November 2, 2023 likely pneumonia.    Right apical previously described large cyst unchanged in size since   November 2, 2023 containing internal air and increasing internal   opacification since November 2, 2023 as described above. Continued   follow-up is recommended.    A few left lung ill-defined nonspecific groundglass opacities.   Differential diagnosis include but is not limited to   infectious/inflammatory etiologies and or pulmonary edema.    Small left pleural effusion new since November 8, 2023.      Culture - Acid Fast - Bronchial w/Smear (11.09.23 @ 12:30)   Specimen Source: .Bronchial RIGHT LOWER LOBE BAL  Acid Fast Bacilli Smear:   No acid-fast bacilli seen by fluorochrome stain  Culture Results:   Culture is being performed.  Culture - Fungal, Bronchial (11.09.23 @ 12:30)   Specimen Source: .Bronchial RIGHT LOWER LOBE BAL  Culture Results:   Rare Yeast    < from: CT Chest No Cont (11.20.23 @ 08:03) >  PROCEDURE DATE:  11/20/2023          INTERPRETATION:  Clinical indication: Right lung infection.    Axial CT images of the chest are obtained without intravenous   administration of contrast.    Comparison is made with the prior chest CT of November 13, 2023.    Enlarged axillary or mediastinal lymph nodes. Heart size is normal.   Vascular calcifications with involvement of the aorta and the coronary   arteries. Minimal pericardial fluid.    Evaluation of the upper abdomen demonstrate trace perihepatic and   perisplenic ascites slightly increased in size since November 13, 2023.   1.2 cm hypodensity arising from the upper pole of the partially imaged   left kidney without significant interval change.    Few subcutaneous edema.    Small left pleural effusion is unchanged. There may be a trace right   pleural effusion versus pleural thickening without significant interval   change.    Evaluation of the lungs demonstrate interval resolution of the previously   seen groundglass opacity within the dependent portion of the left upper   lobe. Interval near complete resolution of a previously seen left upper   lobe peripheral groundglass opacity. A few other ill-defined left lung   patchy groundglass opacities unchanged. 4 mm left lower lobe nodular   opacity on image 73 of series 2 appears new since November 13, 2023 may   be sequela of impacted distal airway.    Status post right upper lobectomy. Previously described right apical   large irregular cyst, part of which measures about 7 cm in transverse   dimension containing internal air, fluid and debris is without   significant interval change in size.  Ill-defined consolidations and groundglass opacities throughoutthe   majority of the right lung demonstrate mild overall interval progression   since November 13, 2023. For reference a few dense patchy and nodular   opacities at the right lung base are either new or demonstrate interval   increase in size.  Complete opacification of the right middle lobe with internal   consolidation appears unchanged since November 13, 2023, demonstrating   interval progression since November 2, 2023. Nonvisualization of the   internal segmental and subsegmental airways of the right middle lobe as   on the prior study.    Degenerative changes of the spine. Old healed left rib and old healed   sternal fractures. Mild superior endplate loss of height of the L1   vertebral body is unchanged.    IMPRESSION: Extensive right lung consolidations as described above with   overall interval progression since November 13, 2023. Complete   opacification of the right middle lobe with internal consolidation and   volume loss appears unchanged since November 13, 2023 demonstrating  interval progression since November 2, 2023.    Persistent apical right apical large irregular cyst unchanged in size   since November 13, 2023.    Small left pleural effusion with subcutaneous edema.    Trace amount of ascites, increased in size since November 13, 2023.    < end of copied text >    Assessment and Plan:

## 2023-11-20 NOTE — PROGRESS NOTE ADULT - ASSESSMENT
HPI: Pt is a 83y old Male with a PMH of HTN, HPL, Chronic anemia, COPD, lung CA follows at [facility] [place] (last chemotherapy and RT 05/2023, not currently receiving chemo treatment), s/p right upper lobectomy, AAA, Hypothyroidism, CKD stage IIIa and others has been in ED by his pulmonologist with c/o shortness of breath, dyspnea on minimal exertion, and cough. Reportedly, he had CT of the chest performed 10/3 which demonstrated consolidation, was started on augmentin/prednisone/azithro 10/6 x 1 week with worsening symptoms. Hospital course includes RLL PNA with possible radiation pneumonitis and /or aspergilloma 2/2 immunocompromised status. Pt is to undergo a bronchoscopy today. Palliative medicine Consult to further establish GOC  11/9/23 Seen and examined at bedside with no family present. Pt denies any complaints. Is able to provide a limited hx however does state he is scheduled for bronchoscopy today.     Assessment and Plan:    1) Resp failure  -CT chest repeated  -Awaiting medical update  -Pulm eval noted  -H/O Lung cancer  -S/P lobectomy/chemo  -Cont abx/nebs as per medicine    2) Debility  -Weakness  -Cancer  -S/P recent  chemo  -Poor PO intake  -Severe protein geo malnutrition  -Nutrition eval  -PT eval    3) CKD  -Creat WNL  -Hydration as tolerated  -Supportive care    4) Advanced Directives  -Pt without capacity  -Wife Anthony named HCP  -MOLST DNR/DNI/Trial non invasive  -Message left for Anthony   -Will follow    Time Spent: 35 minutes including the care, coordination and counseling of this patient, 50% of which was spent coordinating and counseling.

## 2023-11-20 NOTE — PROGRESS NOTE ADULT - ASSESSMENT
82 y/o Male with h/o lung CA follows at [facility] [place] s/p chemotherapy and RT 05/2023, s/p right upper lobectomy, HTN, HPL, Chronic anemia, COPD, AAA, Hypothyroidism, CKD stage IIIa was admitted on 11/2 for increased shortness of breath, dyspnea on minimal exertion, and cough.    # h/o lung cancer   - followed at [facility] [place] - last received carbo/taxol with salvage RT 5/5/2023 after progression in subcarinol node after adjuvant atezolizumab (LD 12/20/22)  - pt being covered for radiation induced pneumonitis with steroids   - now presently on 3 L NC - needs home O2 evaluation when stabilizes  - more likely infectious than malignant cause of current condition  - pall care consult appreciated- family discussion planned for Mon after CT chest today     # PNA/ Pneumonitis   - BAL with rare yeast - CONTINUE  meropenem 1 gm IV q12h, doxycycline 100 mg IV q12h and fluconazole 100 mg IV qd   - most recent CXR with worsening right sided infiltrate  - followed by pulm and ct surgery- no intervention  - repeat CT chest this morning- transportation was taking pt after discussion     # anemia- likely related to myelosuppression from treatment history/pna  - s/p 1u pRBC 11/19 with improvement of Hb from 7.1 to 7.9 today       Discussed with MSK  will continue to follow

## 2023-11-20 NOTE — PROGRESS NOTE ADULT - SUBJECTIVE AND OBJECTIVE BOX
Date of service: 11-20-23 @ 10:41    Lying in bed in NAD  Has dry cough  No SOB at rest  Weak looking  CT chest reviewed     ROS: no fever or chills; denies dizziness, no HA, no abdominal pain, no diarrhea or constipation; no dysuria, no legs pain, no rashes    MEDICATIONS  (STANDING):  albuterol/ipratropium for Nebulization 3 milliLiter(s) Nebulizer every 6 hours  AQUAPHOR (petrolatum Ointment) 1 Application(s) Topical two times a day  atorvastatin 40 milliGRAM(s) Oral at bedtime  caspofungin IVPB      dextrose 5%. 1000 milliLiter(s) (50 mL/Hr) IV Continuous <Continuous>  dextrose 5%. 1000 milliLiter(s) (100 mL/Hr) IV Continuous <Continuous>  dextrose 50% Injectable 25 Gram(s) IV Push once  dextrose 50% Injectable 12.5 Gram(s) IV Push once  dextrose 50% Injectable 25 Gram(s) IV Push once  doxycycline IVPB 100 milliGRAM(s) IV Intermittent every 12 hours  ferrous    sulfate 325 milliGRAM(s) Oral daily  fluticasone furoate/umeclidinium/vilanterol 100-62.5-25 MICROgram(s) Inhaler 1 Puff(s) Inhalation daily  folic acid 1 milliGRAM(s) Oral daily  glucagon  Injectable 1 milliGRAM(s) IntraMuscular once  hydrALAZINE 10 milliGRAM(s) Oral two times a day  insulin glargine Injectable (LANTUS) 5 Unit(s) SubCutaneous at bedtime  insulin lispro (ADMELOG) corrective regimen sliding scale   SubCutaneous three times a day before meals  insulin lispro (ADMELOG) corrective regimen sliding scale   SubCutaneous at bedtime  insulin lispro Injectable (ADMELOG) 4 Unit(s) SubCutaneous three times a day before meals  levothyroxine 12.5 MICROGram(s) Oral daily  meropenem Injectable 1000 milliGRAM(s) IV Push every 12 hours  pantoprazole  Injectable 40 milliGRAM(s) IV Push two times a day  polyethylene glycol 3350 17 Gram(s) Oral daily    Vital Signs Last 24 Hrs  T(C): 36.6 (20 Nov 2023 09:04), Max: 37.1 (19 Nov 2023 21:00)  T(F): 97.9 (20 Nov 2023 09:04), Max: 98.7 (19 Nov 2023 21:00)  HR: 90 (20 Nov 2023 09:39) (86 - 120)  BP: 126/65 (20 Nov 2023 09:04) (126/65 - 143/51)  BP(mean): 74 (19 Nov 2023 21:00) (74 - 74)  RR: 19 (20 Nov 2023 09:04) (18 - 20)  SpO2: 95% (20 Nov 2023 09:39) (70% - 100%)    Parameters below as of 20 Nov 2023 09:39  Patient On (Oxygen Delivery Method): nasal cannula, 3 L     Physical exam:    Constitutional:  No acute distress  HEENT: NC/AT, EOMI, PERRLA, conjunctivae clear; ears and nose atraumatic  Neck: supple; thyroid not palpable  Back: no tenderness  Respiratory: respiratory effort normal; few crackles at bases  Cardiovascular: S1S2 regular, no murmurs  Abdomen: soft, not tender, not distended, positive BS  Genitourinary: no suprapubic tenderness  Lymphatic: no LN palpable  Musculoskeletal: no muscle tenderness, no joint swelling or tenderness  Extremities: no pedal edema  Left lower leg laceration dressed  Neurological/ Psychiatric: AxOx3, moving all extremities  Skin: no rashes; no palpable lesions    Labs: reviewed                        7.9    5.38  )-----------( 136      ( 20 Nov 2023 07:10 )             23.6     11-20    140  |  106  |  46<H>  ----------------------------<  90  4.4   |  29  |  0.94    Ca    8.2<L>      20 Nov 2023 07:10    C-Reactive Protein, Serum: 24 mg/L (11-14-23 @ 07:03)  C-Reactive Protein, Serum: 34 mg/L (11-13-23 @ 06:30)  C-Reactive Protein, Serum: 46 mg/L (11-12-23 @ 06:55)  C-Reactive Protein, Serum: 70 mg/L (11-11-23 @ 06:31)  C-Reactive Protein, Serum: 106 mg/L (11-10-23 @ 06:19)                        7.3    8.08  )-----------( 127      ( 17 Nov 2023 06:45 )             22.3     11-17    139  |  105  |  66<H>  ----------------------------<  120<H>  4.6   |  31  |  1.26    Ca    8.4<L>      17 Nov 2023 06:45    C-Reactive Protein, Serum: 24 mg/L (11-14-23 @ 07:03)  C-Reactive Protein, Serum: 34 mg/L (11-13-23 @ 06:30)  C-Reactive Protein, Serum: 46 mg/L (11-12-23 @ 06:55)  C-Reactive Protein, Serum: 70 mg/L (11-11-23 @ 06:31)  C-Reactive Protein, Serum: 106 mg/L (11-10-23 @ 06:19)  C-Reactive Protein, Serum: 163 mg/L (11-08-23 @ 06:01)  Ferritin: 1082 ng/mL (11-08-23 @ 06:01)  C-Reactive Protein, Serum: 141 mg/L (11-07-23 @ 07:11)               8.2    11.74 )-----------( 259      ( 03 Nov 2023 07:15 )             25.2     11-03    133<L>  |  102  |  35<H>  ----------------------------<  205<H>  4.4   |  22  |  1.20    Ca    8.3<L>      03 Nov 2023 07:15    TPro  6.9  /  Alb  2.0<L>  /  TBili  0.5  /  DBili  x   /  AST  38<H>  /  ALT  50  /  AlkPhos  130<H>  11-02     LIVER FUNCTIONS - ( 02 Nov 2023 14:47 )  Alb: 2.0 g/dL / Pro: 6.9 gm/dL / ALK PHOS: 130 U/L / ALT: 50 U/L / AST: 38 U/L / GGT: x           Urinalysis (11-02 @ 16:40)  Urine Appearance: Clear  Protein, Urine: 100 mg/dL  Urine Microscopic-Add On (NC) (11-02 @ 16:40)  White Blood Cell - Urine: 0 /HPF  Red Blood Cell - Urine: 0 /HPF    (11-02 @ 14:47)  [facility]    Culture - Acid Fast - Bronchial w/Smear (collected 09 Nov 2023 12:30)  Source: .Bronchial RIGHT LOWER LOBE BAL  Preliminary Report (11 Nov 2023 15:08):    Culture is being performed.    Culture - Fungal, Bronchial (collected 09 Nov 2023 12:30)  Source: .Bronchial RIGHT LOWER LOBE BAL  Preliminary Report (13 Nov 2023 12:07):    Rare Yeast    Culture - Bronchial (collected 09 Nov 2023 12:30)  Source: .Bronchial RIGHT LOWER LOBE BAL  Gram Stain (09 Nov 2023 23:14):    Rare polymorphonuclear leukocytes per low power field    Few Squamous epithelial cells per low power field    Few Gram positive cocci in pairs per oil power field  Final Report (11 Nov 2023 16:38):    Normal Respiratory Marguerite present    Culture - Blood (collected 04 Nov 2023 04:21)  Source: .Blood None  Final Report (09 Nov 2023 09:00):    No growth at 5 days    Culture - Blood (collected 04 Nov 2023 04:21)  Source: .Blood None  Final Report (09 Nov 2023 09:00):    No growth at 5 days    Radiology: all available radiological tests reviewed    < from: CT Chest No Cont (11.02.23 @ 16:04) >  Right upper lobectomy.  Larger right lower lobe thick-walled cavity which may be infectious   and/or residua of reported treated tumor. 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  Consolidation within the right lower and middle lobe compatible with radiation change.  Multiple indistinct nodules in the right lungwhich may be infectious or radiation pneumonitis.  < end of copied text >    < from: CT Abdomen and Pelvis No Cont (11.08.23 @ 19:01) >  No hydronephrosis.  New patchy opacities in the right lung base. Small right pleural effusion.  < end of copied text >    < from: CT Chest No Cont (11.13.23 @ 10:04) >  IMPRESSION: Right lung large areas of consolidation new since November 2, 2023 likely pneumonia.    Right apical previously described large cyst unchanged in size since   November 2, 2023 containing internal air and increasing internal opacification since November 2, 2023 as described above.     A few left lung ill-defined nonspecific groundglass opacities.   Differential diagnosis include but is not limited to   infectious/inflammatory etiologies and or pulmonary edema.  Small left pleural effusion new since November 8, 2023.  < end of copied text >    < from: CT Chest No Cont (11.20.23 @ 08:03) >  IMPRESSION: Extensive right lung consolidations as described above with   overall interval progression since November 13, 2023. Complete   opacification of the right middle lobe with internal consolidation and   volume loss appears unchanged since November 13, 2023 demonstrating  interval progression since November 2, 2023.    Persistent apical right apical large irregular cyst unchanged in size   since November 13, 2023.    < end of copied text >      Advanced directives addressed: full resuscitation

## 2023-11-20 NOTE — PROGRESS NOTE ADULT - ASSESSMENT
83-year-old M with PMHx HTN, HLD, chronic anemia, COPD, lung CA s/p RUL lobectomy (follows at [facility] [place], last chemotherapy/RT 5/2023, not currently on treatment), AAA, hypothyroidism, CKD stage IIIa sent in to ED on 11/2/23  by pulmonologist MD Olivera with c/o SOB, dyspnea on minimal exertion, and cough. 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     # Acute hypoxic respiratory failure, multifactorial   # New RLL PNA on CT 11/13 , RLL cavity s/p bronchoscopy 11/9  possible yeast  infection, less likely radiation pneumonitis , r/o  aspergilloma    - CT chest: Larger right lower lobe thick-walled cavity which may be infectious and/or residua of reported treated tumor. Spongelike material within the cavity can be seen with aspergilloma (prior to fungus ball formation). Consolidation within the right lower and middle lobe compatible with radiation change.  - leukocytosis stable (WBC 17.70 from 17.35) ---> trend down to WBC  8   - repeat CXR 11/8 - worsening of opacities over right lung   - 2 d echo 11/8 - EF 60% , no significant valvular disease   - immunoglobulin panel - all Ig wnl  - 11/9 - s/p bronchoscopy   - speech and swallow evaluation  - no aspirations  - CXR 11/11 - stable consolidations  - repeat CT chest 11/13 - new right lung consolidation  - blood cultures -no growth, sputum culture - normal respiratory  - c/w trelegy ellipta, chest pt, IS, acapella ,BIPAP as needed  - ID/pulm consult noted   - s/p IV steroids --> po prednisone taper 20 x 3 days than stop , trending down ----> 34  - s/p  voriconazole PO ,  s/p 7 days of levaquin to cover atypical/psuedomonas (allergy to PCN)    - 11/13 - IV meropenem, doxy and fluconasole started as per ID   - Chest xray prelim worse  - Thoracic consult appreciated, no intervention  - Continue antibiotics  - CT Chest 11/20 worse  -11/13 - IV meropenem and doxycylcine  - Fluconazole DC, started on caspofungin 11/20  - Would consider palliative at this time    #  Chronic anemia, likely due to  chemotherapy, worsening and iron deficiency   -  no s/s bleeding, asymptomatic, continue to monitor/trend  - 11/8 - 1 unit PRBC due to hemoptysis and worsening of anemia  -  11/19 transfuse 1 unit PRBC  - Hemoglobin 7.9 11/20  - Trend    #CHRISTINA on CKD stage IIIa, improving  - Resolved    # Left calf laceration from veno dines while in PACU for bronchoscopy   - surgery consult - s/p suturing  on 11/09/23   - plan to remove sutures in 10 days  - Surgical consult for suture removal 11/19    # Acute hyperglycemia, possible steroid-induced , Prediabetes with A1C 6.3   - Continue lanuts/ISS  - Monitor     # HTN  - hydralazine 10 bid     #  HLD    - c/w  atorvastatin    # Hypothyroidism   - Levo lowered to 12.5 mcg  - Recheck in 4 weeks    # Hx lung CA s/p RUL lobectomy/chemo/RT   - f/u with [facility]     # DVT Ppx   - SCDs    # Code status   - DNR/DNI 83-year-old M with PMHx HTN, HLD, chronic anemia, COPD, lung CA s/p RUL lobectomy (follows at [facility] [place], last chemotherapy/RT 5/2023, not currently on treatment), AAA, hypothyroidism, CKD stage IIIa sent in to ED on 11/2/23  by pulmonologist MD Olivera with c/o SOB, dyspnea on minimal exertion, and cough. 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     # Acute hypoxic respiratory failure, multifactorial   # New RLL PNA on CT 11/13 , RLL cavity s/p bronchoscopy 11/9  possible yeast  infection, less likely radiation pneumonitis , r/o  aspergilloma    - CT chest: Larger right lower lobe thick-walled cavity which may be infectious and/or residua of reported treated tumor. Spongelike material within the cavity can be seen with aspergilloma (prior to fungus ball formation). Consolidation within the right lower and middle lobe compatible with radiation change.  - leukocytosis stable (WBC 17.70 from 17.35) ---> trend down to WBC  8   - repeat CXR 11/8 - worsening of opacities over right lung   - 2 d echo 11/8 - EF 60% , no significant valvular disease   - immunoglobulin panel - all Ig wnl  - 11/9 - s/p bronchoscopy   - speech and swallow evaluation  - no aspirations  - CXR 11/11 - stable consolidations  - repeat CT chest 11/13 - new right lung consolidation  - blood cultures -no growth, sputum culture - normal respiratory  - c/w trelegy ellipta, chest pt, IS, acapella ,BIPAP as needed  - ID/pulm consult noted   - s/p IV steroids --> po prednisone taper 20 x 3 days than stop , trending down ----> 34  - s/p  voriconazole PO ,  s/p 7 days of levaquin to cover atypical/psuedomonas (allergy to PCN)    - 11/13 - IV meropenem, doxy and fluconasole started as per ID   - Chest xray prelim worse  - Thoracic consult appreciated, no intervention  - CT Chest 11/20 worse  -11/13 - IV meropenem and doxycylcine  - Fluconazole DC, started on caspofungin 11/20  - Would consider palliative at this time    #  Chronic anemia, likely due to  chemotherapy, worsening and iron deficiency   -  no s/s bleeding, asymptomatic, continue to monitor/trend  - 11/8 - 1 unit PRBC due to hemoptysis and worsening of anemia  -  11/19 transfuse 1 unit PRBC  - Hemoglobin 7.9 11/20  - Trend    #CHRISTINA on CKD stage IIIa, improving  - Resolved    # Left calf laceration from veno dines while in PACU for bronchoscopy   - surgery consult - s/p suturing  on 11/09/23   - plan to remove sutures in 10 days  - Surgical consult for suture removal 11/19    # Acute hyperglycemia, possible steroid-induced , Prediabetes with A1C 6.3   - Continue lantus/ISS  - Monitor     # HTN  - hydralazine 10 bid     #  HLD    - c/w  atorvastatin    # Hypothyroidism   - Levo lowered to 12.5 mcg  - Recheck in 4 weeks    # Hx lung CA s/p RUL lobectomy/chemo/RT   - f/u with [facility]     # DVT Ppx   - SCDs    # Code status   - DNR/DNI

## 2023-11-20 NOTE — PROGRESS NOTE ADULT - SUBJECTIVE AND OBJECTIVE BOX
HPI: Pt is a 83y old Male with a PMH of HTN, HPL, Chronic anemia, COPD, lung CA follows at [facility] [place] [place] (last chemotherapy and RT 05/2023, not currently receiving chemo treatment), s/p right upper lobectomy, AAA, Hypothyroidism, CKD stage IIIa and others has been in ED by his pulmonologist with c/o shortness of breath, dyspnea on minimal exertion, and cough. Reportedly, he had CT of the chest performed 10/3 which demonstrated consolidation, was started on augmentin /prednisone /azithro 10/6 x 1 week with worsening symptoms. Hospital course includes RLL PNA with possible radiation pneumonitis and /or aspergilloma 2/2 immunocompromised status. Pt is to undergo a bronchoscopy today. Palliative medicine Consult to further establish GOC  11/9/23 Seen and examined at bedside with no family present. Pt denies any complaints. Is able to provide a limited hx however does state he is scheduled for bronchoscopy today.   11/17/23 Seen and examined at bedside with wife and daughter present. Pt denies any complaints although appears to be working to breath. Nasal O2 in place. Eating small amt however drinking ensure.   11/20/23 Seen and examined at bedside. OOB/chair at present. Eating breakfast.     PAIN: ( )Yes   (X )No    DYSPNEA: (X ) Yes  ( ) No  Increased on exertion  PAST MEDICAL & SURGICAL HISTORY:  COPD (chronic obstructive pulmonary disease)  Lung cancer  Anemia of chronic disease  CKD (chronic kidney disease), stage III  Hypothyroidism  AAA (abdominal aortic aneurysm)  HTN (hypertension)  HLD (hyperlipidemia)  Thrombocytopenia  S/P lobectomy of lung    SOCIAL HX:  Lives with wife  Hx opiate tolerance ( )YES  ( X)NO    Baseline ADLs  (Prior to Admission)  (X ) Independent   ( )Dependent    FAMILY HISTORY:  Unable to provide    Review of Systems:  Physical Discomfort-denies  Dyspnea-on exertion  Anorexia-mod  Secretions-increased  Fatigue-mod-severe  Weakness-severe    All other systems reviewed and negative    PHYSICAL EXAM:  ICU Vital Signs Last 24 Hrs  T(C): 36.6 (20 Nov 2023 09:04), Max: 37.1 (19 Nov 2023 21:00)  T(F): 97.9 (20 Nov 2023 09:04), Max: 98.7 (19 Nov 2023 21:00)  HR: 100 (20 Nov 2023 10:36) (86 - 120)  BP: 135/70 (20 Nov 2023 10:36) (126/65 - 143/51)  BP(mean): 74 (19 Nov 2023 21:00) (74 - 74)  RR: 19 (20 Nov 2023 09:04) (18 - 20)  SpO2: 95% (20 Nov 2023 09:39) (70% - 100%)    O2 Parameters below as of 20 Nov 2023 09:39  Patient On (Oxygen Delivery Method): nasal cannula, 3 L    PPSV2:  30 %    General: Frail elderly male in bed in NAD  Mental Status: awake and alert X3/lethargic  HEENT: oral mucosa dry  Lungs: clear rere diminished at bases  Cardiac: S1S2+  GI: abd soft NT ND + BS  : voids  Ext: moves on bed  Neuro: no focal def      LABS:                             7.9    5.38  )-----------( 136      ( 20 Nov 2023 07:10 )             23.6      11-20    140  |  106  |  46<H>  ----------------------------<  90  4.4   |  29  |  0.94    Ca    8.2<L>      20 Nov 2023 07:10    TPro  6.0  /  Alb  1.7<L>  /  TBili  0.3  /  DBili  x   /  AST  46<H>  /  ALT  35  /  AlkPhos  151<H>  11-09     Allergies    penicillin (Unknown)    Intolerances  MEDICATIONS  (STANDING):  albuterol/ipratropium for Nebulization 3 milliLiter(s) Nebulizer every 6 hours  AQUAPHOR (petrolatum Ointment) 1 Application(s) Topical two times a day  atorvastatin 40 milliGRAM(s) Oral at bedtime  caspofungin IVPB      dextrose 5%. 1000 milliLiter(s) (100 mL/Hr) IV Continuous <Continuous>  dextrose 5%. 1000 milliLiter(s) (50 mL/Hr) IV Continuous <Continuous>  dextrose 50% Injectable 12.5 Gram(s) IV Push once  dextrose 50% Injectable 25 Gram(s) IV Push once  dextrose 50% Injectable 25 Gram(s) IV Push once  doxycycline IVPB 100 milliGRAM(s) IV Intermittent every 12 hours  ferrous    sulfate 325 milliGRAM(s) Oral daily  fluticasone furoate/umeclidinium/vilanterol 100-62.5-25 MICROgram(s) Inhaler 1 Puff(s) Inhalation daily  folic acid 1 milliGRAM(s) Oral daily  glucagon  Injectable 1 milliGRAM(s) IntraMuscular once  hydrALAZINE 10 milliGRAM(s) Oral two times a day  insulin glargine Injectable (LANTUS) 5 Unit(s) SubCutaneous at bedtime  insulin lispro (ADMELOG) corrective regimen sliding scale   SubCutaneous three times a day before meals  insulin lispro (ADMELOG) corrective regimen sliding scale   SubCutaneous at bedtime  insulin lispro Injectable (ADMELOG) 4 Unit(s) SubCutaneous three times a day before meals  levothyroxine 12.5 MICROGram(s) Oral daily  meropenem Injectable 1000 milliGRAM(s) IV Push every 8 hours  pantoprazole  Injectable 40 milliGRAM(s) IV Push two times a day  polyethylene glycol 3350 17 Gram(s) Oral daily    MEDICATIONS  (PRN):  acetaminophen     Tablet .. 650 milliGRAM(s) Oral every 6 hours PRN Temp greater or equal to 38C (100.4F), Mild Pain (1 - 3)  aluminum hydroxide/magnesium hydroxide/simethicone Suspension 30 milliLiter(s) Oral every 4 hours PRN Dyspepsia  benzonatate 100 milliGRAM(s) Oral three times a day PRN Cough  dextrose Oral Gel 15 Gram(s) Oral once PRN Blood Glucose LESS THAN 70 milliGRAM(s)/deciliter  meclizine 12.5 milliGRAM(s) Oral every 8 hours PRN Dizziness  melatonin 3 milliGRAM(s) Oral at bedtime PRN Insomnia  ondansetron Injectable 4 milliGRAM(s) IV Push every 8 hours PRN Nausea and/or Vomiting      RADIOLOGY/ADDITIONAL STUDIES:    < from: CT Chest No Cont (11.20.23 @ 08:03) >    ACC: [id number] EXAM:  CT CHEST   ORDERED BY: DEEPA TIPTON     PROCEDURE DATE:  11/20/2023          INTERPRETATION:  Clinical indication: Right lung infection.    Axial CT images of the chest are obtained without intravenous   administration of contrast.    Comparison is made with the prior chest CT of November 13, 2023.    Enlarged axillary or mediastinal lymph nodes. Heart size is normal.   Vascular calcifications with involvement of the aorta and the coronary   arteries. Minimal pericardial fluid.    Evaluation of the upper abdomen demonstrate trace perihepatic and   perisplenic ascites slightly increased in size since November 13, 2023.   1.2 cm hypodensity arising from the upper pole of the partially imaged   left kidney without significant interval change.    Few subcutaneous edema.    Small left pleural effusion is unchanged. There may be a trace right   pleural effusion versus pleural thickening without significant interval   change.    Evaluation of the lungs demonstrate interval resolution of the previously   seen groundglass opacity within the dependent portion of the left upper   lobe. Interval near complete resolution of a previously seen left upper   lobe peripheral groundglass opacity. A few other ill-defined left lung   patchy groundglass opacities unchanged. 4 mm left lower lobe nodular   opacity on image 73 of series 2 appears new since November 13, 2023 may   be sequela of impacted distal airway.    Status post right upper lobectomy. Previously described right apical   large irregular cyst, part of which measures about 7 cm in transverse   dimension containing internal air, fluid and debris is without   significant interval change in size.  Ill-defined consolidations and groundglass opacities throughoutthe   majority of the right lung demonstrate mild overall interval progression   since November 13, 2023. For reference a few dense patchy and nodular   opacities at the right lung base are either new or demonstrate interval   increase in size.  Complete opacification of the right middle lobe with internal   consolidation appears unchanged since November 13, 2023, demonstrating   interval progression since November 2, 2023. Nonvisualization of the   internal segmental and subsegmental airways of the right middle lobe as   on the prior study.    Degenerative changes of the spine. Old healed left rib and old healed   sternal fractures. Mild superior endplate loss of height of the L1   vertebral body is unchanged.    IMPRESSION: Extensive right lung consolidations as described above with   overall interval progression since November 13, 2023. Complete   opacification of the right middle lobe with internal consolidation and   volume loss appears unchanged since November 13, 2023 demonstrating  interval progression since November 2, 2023.    Persistent apical right apical large irregular cyst unchanged in size   since November 13, 2023.    Small left pleural effusion with subcutaneous edema.    Trace amount of ascites, increased in size since November 13, 2023.    < end of copied text >

## 2023-11-20 NOTE — PROGRESS NOTE ADULT - SUBJECTIVE AND OBJECTIVE BOX
INTERVAL HPI/OVERNIGHT EVENTS:  Patient S&E at bedside.  pt s/p 1u pRBC- Hb 7.1 to 7.9 today   remains on 3L overnight  CT chest this morning  [facility] [place] care meeting thereafter      PAST MEDICAL & SURGICAL HISTORY:  COPD (chronic obstructive pulmonary disease)      Lung cancer      Anemia of chronic disease      CKD (chronic kidney disease), stage III      Hypothyroidism      AAA (abdominal aortic aneurysm)      HTN (hypertension)      HLD (hyperlipidemia)      Thrombocytopenia      S/P lobectomy of lung          FAMILY HISTORY:      VITAL SIGNS:  T(F): 98.7 (11-19-23 @ 21:00)  HR: 93 (11-20-23 @ 02:10)  BP: 130/55 (11-19-23 @ 22:40)  RR: 20 (11-19-23 @ 21:00)  SpO2: 100% (11-20-23 @ 02:10)  Wt(kg): --    PHYSICAL EXAM:  Constitutional: NAD, [place]  Eyes: EOMI,   Respiratory: decreased bs on NC 3L, rhonchi  Cardiovascular: RRR,  Gastrointestinal: soft, NTND,   Extremities: no c/c/e    MEDICATIONS  (STANDING):  albuterol/ipratropium for Nebulization 3 milliLiter(s) Nebulizer every 6 hours  AQUAPHOR (petrolatum Ointment) 1 Application(s) Topical two times a day  atorvastatin 40 milliGRAM(s) Oral at bedtime  dextrose 5%. 1000 milliLiter(s) (100 mL/Hr) IV Continuous <Continuous>  dextrose 5%. 1000 milliLiter(s) (50 mL/Hr) IV Continuous <Continuous>  dextrose 50% Injectable 12.5 Gram(s) IV Push once  dextrose 50% Injectable 25 Gram(s) IV Push once  dextrose 50% Injectable 25 Gram(s) IV Push once  doxycycline IVPB 100 milliGRAM(s) IV Intermittent every 12 hours  ferrous    sulfate 325 milliGRAM(s) Oral daily  fluconAZOLE IVPB 100 milliGRAM(s) IV Intermittent every 24 hours  fluticasone furoate/umeclidinium/vilanterol 100-62.5-25 MICROgram(s) Inhaler 1 Puff(s) Inhalation daily  folic acid 1 milliGRAM(s) Oral daily  glucagon  Injectable 1 milliGRAM(s) IntraMuscular once  hydrALAZINE 10 milliGRAM(s) Oral two times a day  insulin glargine Injectable (LANTUS) 5 Unit(s) SubCutaneous at bedtime  insulin lispro (ADMELOG) corrective regimen sliding scale   SubCutaneous at bedtime  insulin lispro (ADMELOG) corrective regimen sliding scale   SubCutaneous three times a day before meals  insulin lispro Injectable (ADMELOG) 4 Unit(s) SubCutaneous three times a day before meals  levothyroxine 12.5 MICROGram(s) Oral daily  meropenem Injectable 1000 milliGRAM(s) IV Push every 12 hours  pantoprazole  Injectable 40 milliGRAM(s) IV Push two times a day  polyethylene glycol 3350 17 Gram(s) Oral daily    MEDICATIONS  (PRN):  acetaminophen     Tablet .. 650 milliGRAM(s) Oral every 6 hours PRN Temp greater or equal to 38C (100.4F), Mild Pain (1 - 3)  aluminum hydroxide/magnesium hydroxide/simethicone Suspension 30 milliLiter(s) Oral every 4 hours PRN Dyspepsia  benzonatate 100 milliGRAM(s) Oral three times a day PRN Cough  dextrose Oral Gel 15 Gram(s) Oral once PRN Blood Glucose LESS THAN 70 milliGRAM(s)/deciliter  meclizine 12.5 milliGRAM(s) Oral every 8 hours PRN Dizziness  melatonin 3 milliGRAM(s) Oral at bedtime PRN Insomnia  ondansetron Injectable 4 milliGRAM(s) IV Push every 8 hours PRN Nausea and/or Vomiting      Allergies    penicillin (Unknown)    Intolerances        LABS:                        7.9    5.38  )-----------( 136      ( 20 Nov 2023 07:10 )             23.6     11-20    140  |  106  |  46<H>  ----------------------------<  90  4.4   |  29  |  0.94    Ca    8.2<L>      20 Nov 2023 07:10        Urinalysis Basic - ( 20 Nov 2023 07:10 )    Color: x / Appearance: x / SG: x / pH: x  Gluc: 90 mg/dL / Ketone: x  / Bili: x / Urobili: x   Blood: x / Protein: x / Nitrite: x   Leuk Esterase: x / RBC: x / WBC x   Sq Epi: x / Non Sq Epi: x / Bacteria: x        RADIOLOGY & ADDITIONAL TESTS:  Studies reviewed.

## 2023-11-20 NOTE — PROGRESS NOTE ADULT - SUBJECTIVE AND OBJECTIVE BOX
HOSPITALIST ATTENDING PROGRESS NOTE    Chart and meds reviewed.  Patient seen and examined.    CC: RL PNA/Abscess    Subjective: Baseline confusion, still coughing. No fever.     All other systems reviewed and found to be negative with the exception of what has been described above.    MEDICATIONS  (STANDING):  albuterol/ipratropium for Nebulization 3 milliLiter(s) Nebulizer every 6 hours  AQUAPHOR (petrolatum Ointment) 1 Application(s) Topical two times a day  atorvastatin 40 milliGRAM(s) Oral at bedtime  caspofungin IVPB      dextrose 5%. 1000 milliLiter(s) (50 mL/Hr) IV Continuous <Continuous>  dextrose 5%. 1000 milliLiter(s) (100 mL/Hr) IV Continuous <Continuous>  dextrose 50% Injectable 25 Gram(s) IV Push once  dextrose 50% Injectable 12.5 Gram(s) IV Push once  dextrose 50% Injectable 25 Gram(s) IV Push once  doxycycline IVPB 100 milliGRAM(s) IV Intermittent every 12 hours  ferrous    sulfate 325 milliGRAM(s) Oral daily  fluticasone furoate/umeclidinium/vilanterol 100-62.5-25 MICROgram(s) Inhaler 1 Puff(s) Inhalation daily  folic acid 1 milliGRAM(s) Oral daily  glucagon  Injectable 1 milliGRAM(s) IntraMuscular once  hydrALAZINE 10 milliGRAM(s) Oral two times a day  insulin glargine Injectable (LANTUS) 5 Unit(s) SubCutaneous at bedtime  insulin lispro (ADMELOG) corrective regimen sliding scale   SubCutaneous at bedtime  insulin lispro (ADMELOG) corrective regimen sliding scale   SubCutaneous three times a day before meals  insulin lispro Injectable (ADMELOG) 4 Unit(s) SubCutaneous three times a day before meals  levothyroxine 12.5 MICROGram(s) Oral daily  meropenem Injectable 1000 milliGRAM(s) IV Push every 8 hours  pantoprazole  Injectable 40 milliGRAM(s) IV Push two times a day  polyethylene glycol 3350 17 Gram(s) Oral daily    MEDICATIONS  (PRN):  acetaminophen     Tablet .. 650 milliGRAM(s) Oral every 6 hours PRN Temp greater or equal to 38C (100.4F), Mild Pain (1 - 3)  aluminum hydroxide/magnesium hydroxide/simethicone Suspension 30 milliLiter(s) Oral every 4 hours PRN Dyspepsia  benzonatate 100 milliGRAM(s) Oral three times a day PRN Cough  dextrose Oral Gel 15 Gram(s) Oral once PRN Blood Glucose LESS THAN 70 milliGRAM(s)/deciliter  meclizine 12.5 milliGRAM(s) Oral every 8 hours PRN Dizziness  melatonin 3 milliGRAM(s) Oral at bedtime PRN Insomnia  ondansetron Injectable 4 milliGRAM(s) IV Push every 8 hours PRN Nausea and/or Vomiting    GEN: Frail, NAD  HEENT:  pupils equal and reactive, EOMI, no oropharyngeal lesions, erythema, exudates, oral thrush  NECK:   supple, no carotid bruits, no palpable lymph nodes, no thyromegaly  CV:  +S1, +S2, regular, no murmurs or rubs  RESP:  Bilateral ronchi  BREAST:  not examined  GI:  abdomen soft, non-tender, non-distended, normal BS, no bruits, no abdominal masses, no palpable masses  RECTAL:  not examined  :  not examined  MSK:   normal muscle tone, no atrophy, no rigidity, no contractions  EXT:  no clubbing, no cyanosis, no edema, no calf pain, BIlateral LE edema.   Left leg sutures  VASCULAR:  pulses equal and symmetric in the upper and lower extremities  NEURO:  AAOX1, no focal neurological deficits, follows all commands, able to move extremities spontaneously  SKIN:  no ulcers, lesions or rashes    LABS:                          7.9    5.38  )-----------( 136      ( 20 Nov 2023 07:10 )             23.6     11-20    140  |  106  |  46<H>  ----------------------------<  90  4.4   |  29  |  0.94    Ca    8.2<L>      20 Nov 2023 07:10    Urinalysis Basic - ( 20 Nov 2023 07:10 )  Color: x / Appearance: x / SG: x / pH: x  Gluc: 90 mg/dL / Ketone: x  / Bili: x / Urobili: x   Blood: x / Protein: x / Nitrite: x   Leuk Esterase: x / RBC: x / WBC x   Sq Epi: x / Non Sq Epi: x / Bacteria: x       CT Chest No Cont (11.20.23 @ 08:03) >  IMPRESSION: Extensive right lung consolidations as described above with   overall interval progression since November 13, 2023. Complete   opacification of the right middle lobe with internal consolidation and   volume loss appears unchanged since November 13, 2023 demonstrating  interval progression since November 2, 2023.    Persistent apical right apical large irregular cyst unchanged in size   since November 13, 2023.    Small left pleural effusion with subcutaneous edema.    Trace amount of ascites, increased in size since November 13, 2023.    --- End of Report ---      < end of copied text >

## 2023-11-21 LAB
ALBUMIN SERPL ELPH-MCNC: 1.6 G/DL — LOW (ref 3.3–5)
ALBUMIN SERPL ELPH-MCNC: 1.6 G/DL — LOW (ref 3.3–5)
ALP SERPL-CCNC: 102 U/L — SIGNIFICANT CHANGE UP (ref 40–120)
ALP SERPL-CCNC: 102 U/L — SIGNIFICANT CHANGE UP (ref 40–120)
ALT FLD-CCNC: 30 U/L — SIGNIFICANT CHANGE UP (ref 12–78)
ALT FLD-CCNC: 30 U/L — SIGNIFICANT CHANGE UP (ref 12–78)
ANION GAP SERPL CALC-SCNC: 6 MMOL/L — SIGNIFICANT CHANGE UP (ref 5–17)
ANION GAP SERPL CALC-SCNC: 6 MMOL/L — SIGNIFICANT CHANGE UP (ref 5–17)
AST SERPL-CCNC: 44 U/L — HIGH (ref 15–37)
AST SERPL-CCNC: 44 U/L — HIGH (ref 15–37)
BILIRUB SERPL-MCNC: 0.9 MG/DL — SIGNIFICANT CHANGE UP (ref 0.2–1.2)
BILIRUB SERPL-MCNC: 0.9 MG/DL — SIGNIFICANT CHANGE UP (ref 0.2–1.2)
BUN SERPL-MCNC: 45 MG/DL — HIGH (ref 7–23)
BUN SERPL-MCNC: 45 MG/DL — HIGH (ref 7–23)
CALCIUM SERPL-MCNC: 8.2 MG/DL — LOW (ref 8.5–10.1)
CALCIUM SERPL-MCNC: 8.2 MG/DL — LOW (ref 8.5–10.1)
CHLORIDE SERPL-SCNC: 107 MMOL/L — SIGNIFICANT CHANGE UP (ref 96–108)
CHLORIDE SERPL-SCNC: 107 MMOL/L — SIGNIFICANT CHANGE UP (ref 96–108)
CO2 SERPL-SCNC: 28 MMOL/L — SIGNIFICANT CHANGE UP (ref 22–31)
CO2 SERPL-SCNC: 28 MMOL/L — SIGNIFICANT CHANGE UP (ref 22–31)
CREAT SERPL-MCNC: 1.05 MG/DL — SIGNIFICANT CHANGE UP (ref 0.5–1.3)
CREAT SERPL-MCNC: 1.05 MG/DL — SIGNIFICANT CHANGE UP (ref 0.5–1.3)
EGFR: 70 ML/MIN/1.73M2 — SIGNIFICANT CHANGE UP
EGFR: 70 ML/MIN/1.73M2 — SIGNIFICANT CHANGE UP
GLUCOSE BLDC GLUCOMTR-MCNC: 201 MG/DL — HIGH (ref 70–99)
GLUCOSE BLDC GLUCOMTR-MCNC: 201 MG/DL — HIGH (ref 70–99)
GLUCOSE BLDC GLUCOMTR-MCNC: 211 MG/DL — HIGH (ref 70–99)
GLUCOSE BLDC GLUCOMTR-MCNC: 211 MG/DL — HIGH (ref 70–99)
GLUCOSE BLDC GLUCOMTR-MCNC: 76 MG/DL — SIGNIFICANT CHANGE UP (ref 70–99)
GLUCOSE BLDC GLUCOMTR-MCNC: 76 MG/DL — SIGNIFICANT CHANGE UP (ref 70–99)
GLUCOSE BLDC GLUCOMTR-MCNC: 96 MG/DL — SIGNIFICANT CHANGE UP (ref 70–99)
GLUCOSE BLDC GLUCOMTR-MCNC: 96 MG/DL — SIGNIFICANT CHANGE UP (ref 70–99)
GLUCOSE SERPL-MCNC: 85 MG/DL — SIGNIFICANT CHANGE UP (ref 70–99)
GLUCOSE SERPL-MCNC: 85 MG/DL — SIGNIFICANT CHANGE UP (ref 70–99)
HCT VFR BLD CALC: 23.5 % — LOW (ref 39–50)
HCT VFR BLD CALC: 23.5 % — LOW (ref 39–50)
HGB BLD-MCNC: 7.8 G/DL — LOW (ref 13–17)
HGB BLD-MCNC: 7.8 G/DL — LOW (ref 13–17)
MCHC RBC-ENTMCNC: 31.2 PG — SIGNIFICANT CHANGE UP (ref 27–34)
MCHC RBC-ENTMCNC: 31.2 PG — SIGNIFICANT CHANGE UP (ref 27–34)
MCHC RBC-ENTMCNC: 33.2 GM/DL — SIGNIFICANT CHANGE UP (ref 32–36)
MCHC RBC-ENTMCNC: 33.2 GM/DL — SIGNIFICANT CHANGE UP (ref 32–36)
MCV RBC AUTO: 94 FL — SIGNIFICANT CHANGE UP (ref 80–100)
MCV RBC AUTO: 94 FL — SIGNIFICANT CHANGE UP (ref 80–100)
PLATELET # BLD AUTO: 135 K/UL — LOW (ref 150–400)
PLATELET # BLD AUTO: 135 K/UL — LOW (ref 150–400)
POTASSIUM SERPL-MCNC: 4.4 MMOL/L — SIGNIFICANT CHANGE UP (ref 3.5–5.3)
POTASSIUM SERPL-MCNC: 4.4 MMOL/L — SIGNIFICANT CHANGE UP (ref 3.5–5.3)
POTASSIUM SERPL-SCNC: 4.4 MMOL/L — SIGNIFICANT CHANGE UP (ref 3.5–5.3)
POTASSIUM SERPL-SCNC: 4.4 MMOL/L — SIGNIFICANT CHANGE UP (ref 3.5–5.3)
PROT SERPL-MCNC: 5.3 GM/DL — LOW (ref 6–8.3)
PROT SERPL-MCNC: 5.3 GM/DL — LOW (ref 6–8.3)
RBC # BLD: 2.5 M/UL — LOW (ref 4.2–5.8)
RBC # BLD: 2.5 M/UL — LOW (ref 4.2–5.8)
RBC # FLD: 15.9 % — HIGH (ref 10.3–14.5)
RBC # FLD: 15.9 % — HIGH (ref 10.3–14.5)
SODIUM SERPL-SCNC: 141 MMOL/L — SIGNIFICANT CHANGE UP (ref 135–145)
SODIUM SERPL-SCNC: 141 MMOL/L — SIGNIFICANT CHANGE UP (ref 135–145)
WBC # BLD: 5.36 K/UL — SIGNIFICANT CHANGE UP (ref 3.8–10.5)
WBC # BLD: 5.36 K/UL — SIGNIFICANT CHANGE UP (ref 3.8–10.5)
WBC # FLD AUTO: 5.36 K/UL — SIGNIFICANT CHANGE UP (ref 3.8–10.5)
WBC # FLD AUTO: 5.36 K/UL — SIGNIFICANT CHANGE UP (ref 3.8–10.5)

## 2023-11-21 PROCEDURE — 99232 SBSQ HOSP IP/OBS MODERATE 35: CPT

## 2023-11-21 RX ADMIN — PANTOPRAZOLE SODIUM 40 MILLIGRAM(S): 20 TABLET, DELAYED RELEASE ORAL at 12:05

## 2023-11-21 RX ADMIN — Medication 1 APPLICATION(S): at 12:05

## 2023-11-21 RX ADMIN — MEROPENEM 1000 MILLIGRAM(S): 1 INJECTION INTRAVENOUS at 22:05

## 2023-11-21 RX ADMIN — Medication 110 MILLIGRAM(S): at 12:01

## 2023-11-21 RX ADMIN — POLYETHYLENE GLYCOL 3350 17 GRAM(S): 17 POWDER, FOR SOLUTION ORAL at 12:01

## 2023-11-21 RX ADMIN — FLUTICASONE FUROATE, UMECLIDINIUM BROMIDE AND VILANTEROL TRIFENATATE 1 PUFF(S): 200; 62.5; 25 POWDER RESPIRATORY (INHALATION) at 09:40

## 2023-11-21 RX ADMIN — Medication 4: at 18:23

## 2023-11-21 RX ADMIN — Medication 650 MILLIGRAM(S): at 20:42

## 2023-11-21 RX ADMIN — Medication 10 MILLIGRAM(S): at 12:01

## 2023-11-21 RX ADMIN — Medication 3 MILLILITER(S): at 09:40

## 2023-11-21 RX ADMIN — ATORVASTATIN CALCIUM 40 MILLIGRAM(S): 80 TABLET, FILM COATED ORAL at 22:05

## 2023-11-21 RX ADMIN — MEROPENEM 1000 MILLIGRAM(S): 1 INJECTION INTRAVENOUS at 05:03

## 2023-11-21 RX ADMIN — Medication 10 MILLIGRAM(S): at 22:03

## 2023-11-21 RX ADMIN — Medication 3 MILLILITER(S): at 21:18

## 2023-11-21 RX ADMIN — Medication 650 MILLIGRAM(S): at 21:12

## 2023-11-21 RX ADMIN — Medication 1 MILLIGRAM(S): at 12:05

## 2023-11-21 RX ADMIN — Medication 3 MILLILITER(S): at 14:57

## 2023-11-21 RX ADMIN — PANTOPRAZOLE SODIUM 40 MILLIGRAM(S): 20 TABLET, DELAYED RELEASE ORAL at 22:05

## 2023-11-21 RX ADMIN — Medication 12.5 MICROGRAM(S): at 05:03

## 2023-11-21 RX ADMIN — Medication 325 MILLIGRAM(S): at 12:05

## 2023-11-21 RX ADMIN — MEROPENEM 1000 MILLIGRAM(S): 1 INJECTION INTRAVENOUS at 14:37

## 2023-11-21 RX ADMIN — CASPOFUNGIN ACETATE 260 MILLIGRAM(S): 7 INJECTION, POWDER, LYOPHILIZED, FOR SOLUTION INTRAVENOUS at 14:36

## 2023-11-21 RX ADMIN — Medication 110 MILLIGRAM(S): at 22:10

## 2023-11-21 RX ADMIN — Medication 1 APPLICATION(S): at 22:25

## 2023-11-21 NOTE — PROGRESS NOTE ADULT - SUBJECTIVE AND OBJECTIVE BOX
INTERVAL HPI/OVERNIGHT EVENTS:  Patient S&E at bedside. No o/n events  remains on 3l N[place]  Reviewed CT scan with patient   states that he feels the same, no coughing, feels ok      PAST MEDICAL & SURGICAL HISTORY:  COPD (chronic obstructive pulmonary disease)      Lung cancer      Anemia of chronic disease      CKD (chronic kidney disease), stage III      Hypothyroidism      AAA (abdominal aortic aneurysm)      HTN (hypertension)      HLD (hyperlipidemia)      Thrombocytopenia      S/P lobectomy of lung          FAMILY HISTORY:      VITAL SIGNS:  T(F): 98.8 (11-20-23 @ 21:07)  HR: 107 (11-20-23 @ 21:07)  BP: 141/52 (11-20-23 @ 21:07)  RR: 18 (11-20-23 @ 21:07)  SpO2: 99% (11-20-23 @ 21:07)  Wt(kg): --    PHYSICAL EXAM:  Constitutional: NAD, [place]  Eyes: EOMI,   Respiratory: decreased bs on NC 3L, rhonchi  Cardiovascular: RRR,  Gastrointestinal: soft, NTND,   Extremities: no c/c/e      MEDICATIONS  (STANDING):  albuterol/ipratropium for Nebulization 3 milliLiter(s) Nebulizer every 6 hours  AQUAPHOR (petrolatum Ointment) 1 Application(s) Topical two times a day  atorvastatin 40 milliGRAM(s) Oral at bedtime  caspofungin IVPB      caspofungin IVPB 50 milliGRAM(s) IV Intermittent every 24 hours  dextrose 5%. 1000 milliLiter(s) (50 mL/Hr) IV Continuous <Continuous>  dextrose 5%. 1000 milliLiter(s) (100 mL/Hr) IV Continuous <Continuous>  dextrose 50% Injectable 12.5 Gram(s) IV Push once  dextrose 50% Injectable 25 Gram(s) IV Push once  dextrose 50% Injectable 25 Gram(s) IV Push once  doxycycline IVPB 100 milliGRAM(s) IV Intermittent every 12 hours  ferrous    sulfate 325 milliGRAM(s) Oral daily  fluticasone furoate/umeclidinium/vilanterol 100-62.5-25 MICROgram(s) Inhaler 1 Puff(s) Inhalation daily  folic acid 1 milliGRAM(s) Oral daily  glucagon  Injectable 1 milliGRAM(s) IntraMuscular once  hydrALAZINE 10 milliGRAM(s) Oral two times a day  insulin glargine Injectable (LANTUS) 5 Unit(s) SubCutaneous at bedtime  insulin lispro (ADMELOG) corrective regimen sliding scale   SubCutaneous at bedtime  insulin lispro (ADMELOG) corrective regimen sliding scale   SubCutaneous three times a day before meals  insulin lispro Injectable (ADMELOG) 4 Unit(s) SubCutaneous three times a day before meals  levothyroxine 12.5 MICROGram(s) Oral daily  meropenem Injectable 1000 milliGRAM(s) IV Push every 8 hours  pantoprazole  Injectable 40 milliGRAM(s) IV Push two times a day  polyethylene glycol 3350 17 Gram(s) Oral daily    MEDICATIONS  (PRN):  acetaminophen     Tablet .. 650 milliGRAM(s) Oral every 6 hours PRN Temp greater or equal to 38C (100.4F), Mild Pain (1 - 3)  aluminum hydroxide/magnesium hydroxide/simethicone Suspension 30 milliLiter(s) Oral every 4 hours PRN Dyspepsia  benzonatate 100 milliGRAM(s) Oral three times a day PRN Cough  dextrose Oral Gel 15 Gram(s) Oral once PRN Blood Glucose LESS THAN 70 milliGRAM(s)/deciliter  meclizine 12.5 milliGRAM(s) Oral every 8 hours PRN Dizziness  melatonin 3 milliGRAM(s) Oral at bedtime PRN Insomnia  ondansetron Injectable 4 milliGRAM(s) IV Push every 8 hours PRN Nausea and/or Vomiting      Allergies    penicillin (Unknown)    Intolerances        LABS:                        7.8    5.36  )-----------( 135      ( 21 Nov 2023 06:57 )             23.5     11-21    141  |  107  |  45<H>  ----------------------------<  85  4.4   |  28  |  1.05    Ca    8.2<L>      21 Nov 2023 06:57    TPro  5.3<L>  /  Alb  1.6<L>  /  TBili  0.9  /  DBili  x   /  AST  44<H>  /  ALT  30  /  AlkPhos  102  11-21      Urinalysis Basic - ( 21 Nov 2023 06:57 )    Color: x / Appearance: x / SG: x / pH: x  Gluc: 85 mg/dL / Ketone: x  / Bili: x / Urobili: x   Blood: x / Protein: x / Nitrite: x   Leuk Esterase: x / RBC: x / WBC x   Sq Epi: x / Non Sq Epi: x / Bacteria: x        RADIOLOGY & ADDITIONAL TESTS:  Studies reviewed.

## 2023-11-21 NOTE — CHART NOTE - NSCHARTNOTEFT_GEN_A_CORE
Evaluated patient at bedside after receiving call from RN. Patient appears to have accessory muscle use. Bipap ordered to decrease work of breathing. MOLST in chart for DNR / DNI
This SW spoke with pts wife to follow up and provide support. Pts wife shared that the plan was for pt. to have a CT scan on Monday. We discussed the difference between Palliative and hospice care. Hospice services and philosophy of care discussed in detail. Pts wife asked questions and answers given. Pts wife would like to see how pt does the next few days and what the CT scan shows and will then make a decision regarding goals moving forward. Emotional support provided. Our team will continue to follow.
HPI: As per medical record,   The patient is a 83y Male with significant PMH of HTN, HPL, Chronic anemia, COPD, lung CA follows at [facility] [place] (last chemotherapy and RT 05/2023, not currently receiving chemo treatment), s/p right upper lobectomy, AAA, Hypothyroidism, CKD stage IIIa and others has been in ED by his pulmonologist with c/o shortness of breath, dyspnea on minimal exertion, and cough. Reportedly, he had CT of the chest performed 10/3 which demonstrated consolidation, was started on augmentin/prednisone/azithro 10/6 x 1 week. Pt completed course of medications however wife noted worsening cough and pt with fever Tmax of 103.9 on 10/26. Wife then took pt to pulmonologist again who started pt on second round of the same medications, instructed wife to stop giving pt tylenol wife notes fevers self-resolved. Since then pt decreased energy with episode of being unsteady on feet yesterday. Today is 6th day of taking medications, had f/u scheduled with pulmonologist today who sent pt to ED for eval.  He feels tired and weak. Denies chest pain, palpitation, N/V, abdominal pain, diarrhea or dysuria. (02 Nov 2023 22:52)      PERTINENT PMH REVIEWED:  [ x ] YES [ ] NO           Primary Contact: spouse Anthony ([phone])    HCP [ x ] Surrogate [   ] Guardian [   ]    Mental Status: Pt appears alert but confused? [place]?  Concerns of Depression [  ] unable to assess  Anxiety [   ] unable to assess  Baseline ADLs (prior to admission):  Independent [ ] moderately [ x ] fully   Dependent   [ ] moderately [ ]fully    Family Meeting attendees: message left for spouse.     Anticipated Grief: Patient[  ] Family [  ]    Caregiver [place] Assessed: Yes [ x ] No [  ]    Nondenominational: not specified.     Spiritual Concerns: None reported.  available PRN.     Goals of Care: To be further discussed    Previous Services:    ADVANCE DIRECTIVES:  [ x ] YES [ ] NO    - MOLST on chart reflecting DNR/DNI with trial NIV    Anticipated D/C Plan: TBD                     Summary: SW met with patient to introduce team and offer support. Palliative roles explained. Pt appeared alert with some confusion and possibly [place]. Pt was slow to respond to questions. He denied any pain, depression and/or anxiety, stating "Im okay." He gave permission for team to reach out to his spouse. Pall NP Jill & Pall RONNY attempted to call spouse Anthony but no answer. Message left and awaiting call back. Emotional support provided. Our team will continue to follow.

## 2023-11-21 NOTE — PROGRESS NOTE ADULT - ASSESSMENT
84 y/o Male with h/o lung CA follows at [facility] [place] s/p chemotherapy and RT 05/2023, s/p right upper lobectomy, HTN, HPL, Chronic anemia, COPD, AAA, Hypothyroidism, CKD stage IIIa was admitted on 11/2 for increased shortness of breath, dyspnea on minimal exertion, and cough. Reportedly, he had CT of the chest performed 10/3 which demonstrated consolidation, was started on augmentin/prednisone/azithro 10/6 x 1 week. Pt completed course of medications however wife noted worsening cough and pt with fever Tmax of 103.9 on 10/26. Wife then took pt to pulmonologist again who started pt on second round of the same medications, instructed wife to stop giving pt tylenol wife notes fevers self-resolved. Since then pt decreased energy with episode of being unsteady on feet. Today is 6th day of taking medications, had f/u scheduled with pulmonologist who sent pt to ED for eval.  He feels tired and weak. In ER he received ceftriaxone.    1. Large RLL pulmonary cavity with worsening opacification, likely pneumonia. Possible recurrent lung Ca. Lung Ca s/p right upper lobectomy. Radiation pneumonitis. CRF stage 3. Allergy to PCN.   -extensive right lung consolidations  -worsening large pulmonary cavity opacification on repeat CT chest   -bronchoscopy cultures show normal respiratory jaime and rare yeast  -fungal bronch c/s is pending and will take several weeks to finalize  -s/p levofloxacin # 10  -s/p voriconazole 200 mg PO q12h # 7  -s/p fluconazole 100 mg IV qd # 8  -pulmonary evaluation appreciated   -renal function is improving  -on meropenem 1 gm IV q8h, doxycycline 100 mg IV q12h # 9 and caspofungin 50 mg IV qd # 2  -tolerating abx well so far; no side effects noted  -thoracic evaluation appreciated - conservative care recommended   -f/u cultures  -continue abx coverage   -monitor temps  -f/u CBC  -supportive care  2. Other issues:   -care per medicine    d/w medicine team

## 2023-11-21 NOTE — PROGRESS NOTE ADULT - ASSESSMENT
83y old Male with PMH HTN, HPL, Chronic anemia, COPD, lung CA follows at [facility] [place] (last chemotherapy and RT 05/2023, not currently receiving chemo treatment), s/p right upper lobectomy, AAA, Hypothyroidism, CKD stage IIIa referred by me to ed for continued sob, cough despite abx treatment found to have large lower lobe thick walled cavity with worsening findings on ct despite treatment with levaquin  1. 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 discussed with wife.  -started on voriconazole, initially switchedd to fluconazole, now on caspofungin. surgery is more defitive therapy however he is high surgical risk  -reviewed imaging from [place] supplied by wife - it looks like he has had blebs in the past and these are what are infected - they look half filled with fluid with thicker borders, which would argue against fungal infection/mrsa as there is no necrosis involved in its pathogenesis  -s/p bronch. follow up results. growing few yeast  -repeat ct wo contrast reveals worsening consolidations and filling in of bleb. this may suggest inadqueate antibiotics, continued aspiration or rapidly progressive cancer  -spoke to id and hospitalist, switched to doxy, meropenem  -? switch to midline  -palliative care eval  2. pneumonia: as above  -supplemental o2  -speech/swallow eval noted  -aspiration precautions  3. copd: continue trelegy  4. ? radiation induced pneumonitis: titrate down  steroids as he has worsening infection and progressive nature of findings argues against pneumonitis  5. anemia: received 1 unit prbc on 11/19  6. left leg laceration: s/p suture, now bandaged. continue wound care 83y old Male with PMH HTN, HPL, Chronic anemia, COPD, lung [place] [facility] [place] and [place] 05/2023, not currently receiving chemo treatment), s/p right upper lobectomy, AAA, Hypothyroidism, CKD stage IIIa referred by me to ed for continued sob, cough despite abx treatment found to have large lower lobe thick walled cavity with worsening findings on ct despite treatment with levaquin  1. large lower lobe thick walled cavity: unclear if this is fungal vs bacterial vs malignancy  -started on voriconazole, initially switchedd to fluconazole, now on caspofungin. surgery is more defitive therapy however he is high surgical risk  -reviewed imaging from [place] supplied by wife - it looks like he has had blebs in the past and these are what are infected - they look half filled with fluid with thicker borders, which would argue against fungal infection/mrsa as there is no necrosis involved in its pathogenesis  -s/p bronch. follow up results. growing few yeast  -repeat ct wo contrast reveals worsening consolidations and filling in of bleb. this may suggest inadequate antibiotics, continued aspiration or rapidly progressive cancer  -continue doxy (for potential mrsa), meropenem  -palliative care eval  2. pneumonia: as above  -supplemental o2  -speech/swallow eval noted  -aspiration precautions  3. copd: continue trelegy  4. ? radiation induced pneumonitis: titrate down  steroids as he has worsening infection and progressive nature of findings argues against pneumonitis  5. anemia: received 1 unit prbc on 11/19  6. left leg laceration: s/p suture, now bandaged. continue wound care

## 2023-11-21 NOTE — PROGRESS NOTE ADULT - SUBJECTIVE AND OBJECTIVE BOX
Date of service: 11-21-23 @ 08:48    Lying in bed in NAD  Weak looking  Has dry cough  No SOB at rest    ROS: no fever or chills; denies dizziness, no HA, no abdominal pain, no diarrhea or constipation; no dysuria, no legs pain, no rashes    MEDICATIONS  (STANDING):  albuterol/ipratropium for Nebulization 3 milliLiter(s) Nebulizer every 6 hours  AQUAPHOR (petrolatum Ointment) 1 Application(s) Topical two times a day  atorvastatin 40 milliGRAM(s) Oral at bedtime  caspofungin IVPB      caspofungin IVPB 50 milliGRAM(s) IV Intermittent every 24 hours  dextrose 5%. 1000 milliLiter(s) (50 mL/Hr) IV Continuous <Continuous>  dextrose 5%. 1000 milliLiter(s) (100 mL/Hr) IV Continuous <Continuous>  dextrose 50% Injectable 25 Gram(s) IV Push once  dextrose 50% Injectable 12.5 Gram(s) IV Push once  dextrose 50% Injectable 25 Gram(s) IV Push once  doxycycline IVPB 100 milliGRAM(s) IV Intermittent every 12 hours  ferrous    sulfate 325 milliGRAM(s) Oral daily  fluticasone furoate/umeclidinium/vilanterol 100-62.5-25 MICROgram(s) Inhaler 1 Puff(s) Inhalation daily  folic acid 1 milliGRAM(s) Oral daily  glucagon  Injectable 1 milliGRAM(s) IntraMuscular once  hydrALAZINE 10 milliGRAM(s) Oral two times a day  insulin glargine Injectable (LANTUS) 5 Unit(s) SubCutaneous at bedtime  insulin lispro (ADMELOG) corrective regimen sliding scale   SubCutaneous three times a day before meals  insulin lispro (ADMELOG) corrective regimen sliding scale   SubCutaneous at bedtime  insulin lispro Injectable (ADMELOG) 4 Unit(s) SubCutaneous three times a day before meals  levothyroxine 12.5 MICROGram(s) Oral daily  meropenem Injectable 1000 milliGRAM(s) IV Push every 8 hours  pantoprazole  Injectable 40 milliGRAM(s) IV Push two times a day  polyethylene glycol 3350 17 Gram(s) Oral daily    Vital Signs Last 24 Hrs  T(C): 37.1 (20 Nov 2023 21:07), Max: 37.1 (20 Nov 2023 21:07)  T(F): 98.8 (20 Nov 2023 21:07), Max: 98.8 (20 Nov 2023 21:07)  HR: 107 (20 Nov 2023 21:07) (90 - 108)  BP: 141/52 (20 Nov 2023 21:07) (126/65 - 141/52)  BP(mean): --  RR: 18 (20 Nov 2023 21:07) (18 - 19)  SpO2: 99% (20 Nov 2023 21:07) (94% - 99%)    Parameters below as of 20 Nov 2023 21:07  Patient On (Oxygen Delivery Method): nasal cannula  O2 Flow (L/min): 3     Physical exam:    Constitutional:  No acute distress  HEENT: NC/AT, EOMI, PERRLA, conjunctivae clear; ears and nose atraumatic  Neck: supple; thyroid not palpable  Back: no tenderness  Respiratory: respiratory effort normal; few crackles at bases  Cardiovascular: S1S2 regular, no murmurs  Abdomen: soft, not tender, not distended, positive BS  Genitourinary: no suprapubic tenderness  Lymphatic: no LN palpable  Musculoskeletal: no muscle tenderness, no joint swelling or tenderness  Extremities: no pedal edema  Left lower leg laceration dressed  Neurological/ Psychiatric: AxOx3, moving all extremities  Skin: no rashes; no palpable lesions    Labs: reviewed                        7.8    5.36  )-----------( 135      ( 21 Nov 2023 06:57 )             23.5     11-21    141  |  107  |  45<H>  ----------------------------<  85  4.4   |  28  |  1.05    Ca    8.2<L>      21 Nov 2023 06:57    TPro  5.3<L>  /  Alb  1.6<L>  /  TBili  0.9  /  DBili  x   /  AST  44<H>  /  ALT  30  /  AlkPhos  102  11-21    C-Reactive Protein, Serum: 24 mg/L (11-14-23 @ 07:03)  C-Reactive Protein, Serum: 34 mg/L (11-13-23 @ 06:30)  C-Reactive Protein, Serum: 46 mg/L (11-12-23 @ 06:55)  C-Reactive Protein, Serum: 70 mg/L (11-11-23 @ 06:31)  C-Reactive Protein, Serum: 106 mg/L (11-10-23 @ 06:19)                        7.9    5.38  )-----------( 136      ( 20 Nov 2023 07:10 )             23.6     11-20    140  |  106  |  46<H>  ----------------------------<  90  4.4   |  29  |  0.94    Ca    8.2<L>      20 Nov 2023 07:10    C-Reactive Protein, Serum: 24 mg/L (11-14-23 @ 07:03)  C-Reactive Protein, Serum: 34 mg/L (11-13-23 @ 06:30)  C-Reactive Protein, Serum: 46 mg/L (11-12-23 @ 06:55)  C-Reactive Protein, Serum: 70 mg/L (11-11-23 @ 06:31)  C-Reactive Protein, Serum: 106 mg/L (11-10-23 @ 06:19)                        7.3    8.08  )-----------( 127      ( 17 Nov 2023 06:45 )             22.3     11-17    139  |  105  |  66<H>  ----------------------------<  120<H>  4.6   |  31  |  1.26    Ca    8.4<L>      17 Nov 2023 06:45    C-Reactive Protein, Serum: 24 mg/L (11-14-23 @ 07:03)  C-Reactive Protein, Serum: 34 mg/L (11-13-23 @ 06:30)  C-Reactive Protein, Serum: 46 mg/L (11-12-23 @ 06:55)  C-Reactive Protein, Serum: 70 mg/L (11-11-23 @ 06:31)  C-Reactive Protein, Serum: 106 mg/L (11-10-23 @ 06:19)  C-Reactive Protein, Serum: 163 mg/L (11-08-23 @ 06:01)  Ferritin: 1082 ng/mL (11-08-23 @ 06:01)  C-Reactive Protein, Serum: 141 mg/L (11-07-23 @ 07:11)               8.2    11.74 )-----------( 259      ( 03 Nov 2023 07:15 )             25.2     11-03    133<L>  |  102  |  35<H>  ----------------------------<  205<H>  4.4   |  22  |  1.20    Ca    8.3<L>      03 Nov 2023 07:15    TPro  6.9  /  Alb  2.0<L>  /  TBili  0.5  /  DBili  x   /  AST  38<H>  /  ALT  50  /  AlkPhos  130<H>  11-02     LIVER FUNCTIONS - ( 02 Nov 2023 14:47 )  Alb: 2.0 g/dL / Pro: 6.9 gm/dL / ALK PHOS: 130 U/L / ALT: 50 U/L / AST: 38 U/L / GGT: x           Urinalysis (11-02 @ 16:40)  Urine Appearance: Clear  Protein, Urine: 100 mg/dL  Urine Microscopic-Add On (NC) (11-02 @ 16:40)  White Blood Cell - Urine: 0 /HPF  Red Blood Cell - Urine: 0 /HPF    (11-02 @ 14:47)  NotDetec    Culture - Acid Fast - Bronchial w/Smear (collected 09 Nov 2023 12:30)  Source: .Bronchial RIGHT LOWER LOBE BAL  Preliminary Report (11 Nov 2023 15:08):    Culture is being performed.    Culture - Fungal, Bronchial (collected 09 Nov 2023 12:30)  Source: .Bronchial RIGHT LOWER LOBE BAL  Preliminary Report (13 Nov 2023 12:07):    Rare Yeast    Culture - Bronchial (collected 09 Nov 2023 12:30)  Source: .Bronchial RIGHT LOWER LOBE BAL  Gram Stain (09 Nov 2023 23:14):    Rare polymorphonuclear leukocytes per low power field    Few Squamous epithelial cells per low power field    Few Gram positive cocci in pairs per oil power field  Final Report (11 Nov 2023 16:38):    Normal Respiratory Marguerite present    Culture - Blood (collected 04 Nov 2023 04:21)  Source: .Blood None  Final Report (09 Nov 2023 09:00):    No growth at 5 days    Culture - Blood (collected 04 Nov 2023 04:21)  Source: .Blood None  Final Report (09 Nov 2023 09:00):    No growth at 5 days    Radiology: all available radiological tests reviewed    < from: CT Chest No Cont (11.02.23 @ 16:04) >  Right upper lobectomy.  Larger right lower lobe thick-walled cavity which may be infectious   and/or residua of reported treated tumor. 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  Consolidation within the right lower and middle lobe compatible with radiation change.  Multiple indistinct nodules in the right lungwhich may be infectious or radiation pneumonitis.  < end of copied text >    < from: CT Abdomen and Pelvis No Cont (11.08.23 @ 19:01) >  No hydronephrosis.  New patchy opacities in the right lung base. Small right pleural effusion.  < end of copied text >    < from: CT Chest No Cont (11.13.23 @ 10:04) >  IMPRESSION: Right lung large areas of consolidation new since November 2, 2023 likely pneumonia.    Right apical previously described large cyst unchanged in size since   November 2, 2023 containing internal air and increasing internal opacification since November 2, 2023 as described above.     A few left lung ill-defined nonspecific groundglass opacities.   Differential diagnosis include but is not limited to   infectious/inflammatory etiologies and or pulmonary edema.  Small left pleural effusion new since November 8, 2023.  < end of copied text >    < from: CT Chest No Cont (11.20.23 @ 08:03) >  IMPRESSION: Extensive right lung consolidations as described above with   overall interval progression since November 13, 2023. Complete   opacification of the right middle lobe with internal consolidation and   volume loss appears unchanged since November 13, 2023 demonstrating  interval progression since November 2, 2023.    Persistent apical right apical large irregular cyst unchanged in size   since November 13, 2023.    < end of copied text >      Advanced directives addressed: full resuscitation

## 2023-11-21 NOTE — PROGRESS NOTE ADULT - SUBJECTIVE AND OBJECTIVE BOX
Subjective:    Review of Systems:  All 10 systems reviewed in detailed and found to be negative with the exception of what has already been described above    Allergies:  penicillin (Unknown)    Meds  MEDICATIONS  (STANDING):  albuterol/ipratropium for Nebulization 3 milliLiter(s) Nebulizer every 6 hours  AQUAPHOR (petrolatum Ointment) 1 Application(s) Topical two times a day  atorvastatin 40 milliGRAM(s) Oral at bedtime  caspofungin IVPB 50 milliGRAM(s) IV Intermittent every 24 hours  caspofungin IVPB      dextrose 5%. 1000 milliLiter(s) (50 mL/Hr) IV Continuous <Continuous>  dextrose 5%. 1000 milliLiter(s) (100 mL/Hr) IV Continuous <Continuous>  dextrose 50% Injectable 25 Gram(s) IV Push once  dextrose 50% Injectable 12.5 Gram(s) IV Push once  dextrose 50% Injectable 25 Gram(s) IV Push once  doxycycline IVPB 100 milliGRAM(s) IV Intermittent every 12 hours  ferrous    sulfate 325 milliGRAM(s) Oral daily  fluticasone furoate/umeclidinium/vilanterol 100-62.5-25 MICROgram(s) Inhaler 1 Puff(s) Inhalation daily  folic acid 1 milliGRAM(s) Oral daily  glucagon  Injectable 1 milliGRAM(s) IntraMuscular once  hydrALAZINE 10 milliGRAM(s) Oral two times a day  insulin glargine Injectable (LANTUS) 5 Unit(s) SubCutaneous at bedtime  insulin lispro (ADMELOG) corrective regimen sliding scale   SubCutaneous at bedtime  insulin lispro (ADMELOG) corrective regimen sliding scale   SubCutaneous three times a day before meals  insulin lispro Injectable (ADMELOG) 4 Unit(s) SubCutaneous three times a day before meals  levothyroxine 12.5 MICROGram(s) Oral daily  meropenem Injectable 1000 milliGRAM(s) IV Push every 8 hours  pantoprazole  Injectable 40 milliGRAM(s) IV Push two times a day  polyethylene glycol 3350 17 Gram(s) Oral daily    MEDICATIONS  (PRN):  acetaminophen     Tablet .. 650 milliGRAM(s) Oral every 6 hours PRN Temp greater or equal to 38C (100.4F), Mild Pain (1 - 3)  aluminum hydroxide/magnesium hydroxide/simethicone Suspension 30 milliLiter(s) Oral every 4 hours PRN Dyspepsia  benzonatate 100 milliGRAM(s) Oral three times a day PRN Cough  dextrose Oral Gel 15 Gram(s) Oral once PRN Blood Glucose LESS THAN 70 milliGRAM(s)/deciliter  meclizine 12.5 milliGRAM(s) Oral every 8 hours PRN Dizziness  melatonin 3 milliGRAM(s) Oral at bedtime PRN Insomnia  ondansetron Injectable 4 milliGRAM(s) IV Push every 8 hours PRN Nausea and/or Vomiting    Physical Exam  T(C): 37.1 (11-20-23 @ 21:07), Max: 37.1 (11-20-23 @ 21:07)  HR: 107 (11-20-23 @ 21:07) (90 - 108)  BP: 141/52 (11-20-23 @ 21:07) (126/65 - 141/52)  RR: 18 (11-20-23 @ 21:07) (18 - 19)  SpO2: 99% (11-20-23 @ 21:07) (94% - 99%)  Gen: Alert, oriented, no distress  HEENT: Anicteric sclera, moist mucous membranes  Cardio: Regular rhythm and rate, normal S1S2, no murmurs  Resp: Crackles, congested  GI: Nontender, nondistended, normoactive bowel sounds  Ext: No cyanosis, clubbing or edema  skin: + ecchymosis, left leg laceration sutured and bandaged  Neuro: Nonfocal    Labs:                        7.8    5.36  )-----------( 135      ( 21 Nov 2023 06:57 )             23.5     11-21    141  |  107  |  45<H>  ----------------------------<  85  4.4   |  28  |  1.05    Ca    8.2<L>      21 Nov 2023 06:57    TPro  5.3<L>  /  Alb  1.6<L>  /  TBili  0.9  /  DBili  x   /  AST  44<H>  /  ALT  30  /  AlkPhos  102  11-21      Urinalysis Basic - ( 21 Nov 2023 06:57 )    Color: x / Appearance: x / SG: x / pH: x  Gluc: 85 mg/dL / Ketone: x  / Bili: x / Urobili: x   Blood: x / Protein: x / Nitrite: x   Leuk Esterase: x / RBC: x / WBC x   Sq Epi: x / Non Sq Epi: x / Bacteria: x    < from: CT Chest No Cont (11.02.23 @ 16:04) >  ACC: [id number] EXAM:  CT CHEST   ORDERED BY: SARAH YOO     PROCEDURE DATE:  11/02/2023          INTERPRETATION:  INDICATION: Shortness of breath, cough, lung cancer   history, last chemotherapy and radiation treatment in May 2023    TECHNIQUE: Helical acquisition images of the chest without intravenous   contrast. Maximum intensity projection images were generated.    COMPARISON: 12/29/2020 chest x-ray.    FINDINGS:    LUNGS/AIRWAYS/PLEURA: Right upper lobectomy. Right lower lobe   multiloculated thick-walled 11 cm cavity (best seen in its entirety on   601-59) containing fluid and spongelike material. Consolidation within   the middle lobe and inferior aspect of the right lower lobe with angular   borders suggesting prior radiation. Multiple indistinct nodules of   varying density in the right lower and middle lobes, mostly adjacent to   the cavity. Small branching opacities in the peripheral left upper and   lower lobes, compatible with distal airway impaction. Trace left pleural   effusion. Mild right apical pleural thickening.    LYMPH NODES/MEDIASTINUM: No lymphadenopathy.    HEART/VASCULATURE: Normal heart size. Unremarkable pericardium. Coronary   artery calcifications. Normal caliber aorta.    UPPER ABDOMEN: Unremarkable.    BONES/SOFT TISSUES: Old sternal fracture. Mild loss of height of the L1   vertebral body.      IMPRESSION:    Right upper lobectomy.    Larger right lower lobe thick-walled cavity which may be infectious   and/or residua of reported treated tumor. 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    Consolidation within the right lower and middle lobe compatible with   radiation change.    Multiple indistinct nodules in the right lungwhich may be infectious or   radiation pneumonitis.    ct lucio 10/3 uploaded and reviewed    < from: CT Abdomen and Pelvis No Cont (11.08.23 @ 19:01) >  PROCEDURE DATE:  11/08/2023          INTERPRETATION:  CLINICAL INFORMATION: Acute kidney injury. Evaluate   obstruction.    COMPARISON: CT chest 11/20/2023.    CONTRAST/COMPLICATIONS:  IV Contrast: NONE  Oral Contrast: NONE  Complications: None reported at time of study completion    PROCEDURE:  CT of the Abdomen and Pelvis was performed.  Sagittal and coronal reformats were performed.    FINDINGS:  LOWER CHEST: Small right pleural effusion. New patchy opacities in the   right lung base.    LIVER: Within normal limits.  BILE DUCTS: Normal caliber.  GALLBLADDER: Within normal limits.  SPLEEN: Within normal limits.  PANCREAS: Within normal limits.  ADRENALS: Within normal limits.  KIDNEYS/URETERS: No hydronephrosis. Vascular calcifications.    BLADDER: Within normal limits.  REPRODUCTIVE ORGANS: Prostate within normal limits.    BOWEL: No bowel obstruction. Appendix is normal. Moderate to large stool.  PERITONEUM: No ascites.  VESSELS: Atherosclerotic changes.  RETROPERITONEUM/LYMPH NODES: No lymphadenopathy.  ABDOMINAL WALL: Within normal limits.  BONES: Degenerative changes. Redemonstration of L1 compression deformity.   Right femoral ORIF.    IMPRESSION:  No hydronephrosis.    New patchy opacities in the right lung base. Small right pleural effusion.    Bedside Swallow: Trial 1  · Consistencies administered	thin liquid; pureed; regular solid  · Mode of Presentation	cup; spoon; self fed  · Positioning	upright (90 degrees)  · Oral Preparatory Phase	Within functional limits; orientation eating routines: able to feed himself with focus: as per family, pt eats rapidly: did not do so at bedside evaluation Lip seal on utensil : oral containment..  · Oral Phase	Within functional limits; immediate bolus formation: Ap transfer for liquid WFL. Mastication normally rotary-lateral with lingual sweep for collection and clearance.  l  · Pharyngeal Phase	Within functional limits; Onset of pharyngeal swallow is with age-appropriate time limits. No overt s/s aspiration at bedside.  · Comments	Micro aspiration and silent aspiration cannot be ruled out. As above, pt does have increased risk for aspiration 2/2 lung CA and  lobectomy as well as COPD and present pna (which in itself may be aspiration related). However, pt presents at bedside evaluation with no contraindication for maintaining REGULAR texture and thin liquids.  Strategies for reducing risk were demonstrated and discussed with the Pt and the family who are very supportive of the Pt.  Disc with NSg.  Will follow peripherally.    < from: CT Chest No Cont (11.13.23 @ 10:04) >    PROCEDURE DATE:  11/13/2023          INTERPRETATION:  Clinical indications: Pneumonia.    Axial CT images of the chest are obtained without intravenous   administration of contrast.    Comparison is made with the prior chest CT of November 2, 2023.    No enlarged axillary or mediastinal lymph nodes.    No pericardial effusion. Heart size is normal. Mitral annular   calcifications. Vascular calcifications with involvement of the aorta and   the coronary arteries. Aortic root calcifications.    Evaluation of the upper abdomen demonstrate partially imaged aortic stent   graft. Subcutaneous edema. Minimal perihepatic ascites.    Small left pleural effusion new since the abdominalCT of November 8, 2023. Trace right pleural effusion as on November 8, 2023.    Evaluation of the lungs demonstrate left lung base linear subsegmental   atelectasis. A few ill-defined left lung patchy nonspecific groundglass   opacities largest within the dependent portion of the left upper lobe are   new since November 2, 2023.    Status post right upper lobectomy with postoperative changes. Persistent   right apical large large cyst, part of which measures about 6.6 cm in   transverse dimension without significant interval change in size   containing air and increasing internal opacification as compared with   November 2, 2023, some of which appear slightly dense and may be due to   internal hemorrhagic components.    Extensive right mid to lower lung consolidations and ill-defined   groundglass opacities, with the largest confluent consolidation within   the mid to lower portions of the right lower lobe, majority of which   appear new since November 2, 2023 superimposed on previouslydescribed   linear opacities.    Degenerative changes of the spine. Old healed sternal fracture.    IMPRESSION: Right lung large areas of consolidation new since November 2, 2023 likely pneumonia.    Right apical previously described large cyst unchanged in size since   November 2, 2023 containing internal air and increasing internal   opacification since November 2, 2023 as described above. Continued   follow-up is recommended.    A few left lung ill-defined nonspecific groundglass opacities.   Differential diagnosis include but is not limited to   infectious/inflammatory etiologies and or pulmonary edema.    Small left pleural effusion new since November 8, 2023.      Culture - Acid Fast - Bronchial w/Smear (11.09.23 @ 12:30)   Specimen Source: .Bronchial RIGHT LOWER LOBE BAL  Acid Fast Bacilli Smear:   No acid-fast bacilli seen by fluorochrome stain  Culture Results:   Culture is being performed.  Culture - Fungal, Bronchial (11.09.23 @ 12:30)   Specimen Source: .Bronchial RIGHT LOWER LOBE BAL  Culture Results:   Rare Yeast    < from: CT Chest No Cont (11.20.23 @ 08:03) >  PROCEDURE DATE:  11/20/2023          INTERPRETATION:  Clinical indication: Right lung infection.    Axial CT images of the chest are obtained without intravenous   administration of contrast.    Comparison is made with the prior chest CT of November 13, 2023.    Enlarged axillary or mediastinal lymph nodes. Heart size is normal.   Vascular calcifications with involvement of the aorta and the coronary   arteries. Minimal pericardial fluid.    Evaluation of the upper abdomen demonstrate trace perihepatic and   perisplenic ascites slightly increased in size since November 13, 2023.   1.2 cm hypodensity arising from the upper pole of the partially imaged   left kidney without significant interval change.    Few subcutaneous edema.    Small left pleural effusion is unchanged. There may be a trace right   pleural effusion versus pleural thickening without significant interval   change.    Evaluation of the lungs demonstrate interval resolution of the previously   seen groundglass opacity within the dependent portion of the left upper   lobe. Interval near complete resolution of a previously seen left upper   lobe peripheral groundglass opacity. A few other ill-defined left lung   patchy groundglass opacities unchanged. 4 mm left lower lobe nodular   opacity on image 73 of series 2 appears new since November 13, 2023 may   be sequela of impacted distal airway.    Status post right upper lobectomy. Previously described right apical   large irregular cyst, part of which measures about 7 cm in transverse   dimension containing internal air, fluid and debris is without   significant interval change in size.  Ill-defined consolidations and groundglass opacities throughoutthe   majority of the right lung demonstrate mild overall interval progression   since November 13, 2023. For reference a few dense patchy and nodular   opacities at the right lung base are either new or demonstrate interval   increase in size.  Complete opacification of the right middle lobe with internal   consolidation appears unchanged since November 13, 2023, demonstrating   interval progression since November 2, 2023. Nonvisualization of the   internal segmental and subsegmental airways of the right middle lobe as   on the prior study.    Degenerative changes of the spine. Old healed left rib and old healed   sternal fractures. Mild superior endplate loss of height of the L1   vertebral body is unchanged.    IMPRESSION: Extensive right lung consolidations as described above with   overall interval progression since November 13, 2023. Complete   opacification of the right middle lobe with internal consolidation and   volume loss appears unchanged since November 13, 2023 demonstrating  interval progression since November 2, 2023.    Persistent apical right apical large irregular cyst unchanged in size   since November 13, 2023.    Small left pleural effusion with subcutaneous edema.    Trace amount of ascites, increased in size since November 13, 2023. Subjective:  no new changes  reviewed imaging with daughter and wife    Review of Systems:  All 10 systems reviewed in detailed and found to be negative with the exception of what has already been described above    Allergies:  penicillin (Unknown)    Meds  MEDICATIONS  (STANDING):  albuterol/ipratropium for Nebulization 3 milliLiter(s) Nebulizer every 6 hours  AQUAPHOR (petrolatum Ointment) 1 Application(s) Topical two times a day  atorvastatin 40 milliGRAM(s) Oral at bedtime  caspofungin IVPB 50 milliGRAM(s) IV Intermittent every 24 hours  caspofungin IVPB      dextrose 5%. 1000 milliLiter(s) (50 mL/Hr) IV Continuous <Continuous>  dextrose 5%. 1000 milliLiter(s) (100 mL/Hr) IV Continuous <Continuous>  dextrose 50% Injectable 25 Gram(s) IV Push once  dextrose 50% Injectable 12.5 Gram(s) IV Push once  dextrose 50% Injectable 25 Gram(s) IV Push once  doxycycline IVPB 100 milliGRAM(s) IV Intermittent every 12 hours  ferrous    sulfate 325 milliGRAM(s) Oral daily  fluticasone furoate/umeclidinium/vilanterol 100-62.5-25 MICROgram(s) Inhaler 1 Puff(s) Inhalation daily  folic acid 1 milliGRAM(s) Oral daily  glucagon  Injectable 1 milliGRAM(s) IntraMuscular once  hydrALAZINE 10 milliGRAM(s) Oral two times a day  insulin glargine Injectable (LANTUS) 5 Unit(s) SubCutaneous at bedtime  insulin lispro (ADMELOG) corrective regimen sliding scale   SubCutaneous at bedtime  insulin lispro (ADMELOG) corrective regimen sliding scale   SubCutaneous three times a day before meals  insulin lispro Injectable (ADMELOG) 4 Unit(s) SubCutaneous three times a day before meals  levothyroxine 12.5 MICROGram(s) Oral daily  meropenem Injectable 1000 milliGRAM(s) IV Push every 8 hours  pantoprazole  Injectable 40 milliGRAM(s) IV Push two times a day  polyethylene glycol 3350 17 Gram(s) Oral daily    MEDICATIONS  (PRN):  acetaminophen     Tablet .. 650 milliGRAM(s) Oral every 6 hours PRN Temp greater or equal to 38C (100.4F), Mild Pain (1 - 3)  aluminum hydroxide/magnesium hydroxide/simethicone Suspension 30 milliLiter(s) Oral every 4 hours PRN Dyspepsia  benzonatate 100 milliGRAM(s) Oral three times a day PRN Cough  dextrose Oral Gel 15 Gram(s) Oral once PRN Blood Glucose LESS THAN 70 milliGRAM(s)/deciliter  meclizine 12.5 milliGRAM(s) Oral every 8 hours PRN Dizziness  melatonin 3 milliGRAM(s) Oral at bedtime PRN Insomnia  ondansetron Injectable 4 milliGRAM(s) IV Push every 8 hours PRN Nausea and/or Vomiting    Physical Exam  T(C): 37.1 (11-20-23 @ 21:07), Max: 37.1 (11-20-23 @ 21:07)  HR: 107 (11-20-23 @ 21:07) (90 - 108)  BP: 141/52 (11-20-23 @ 21:07) (126/65 - 141/52)  RR: 18 (11-20-23 @ 21:07) (18 - 19)  SpO2: 99% (11-20-23 @ 21:07) (94% - 99%)  Gen: Alert, oriented, no distress  HEENT: Anicteric sclera, moist mucous membranes  Cardio: Regular rhythm and rate, normal S1S2, no murmurs  Resp: Crackles, congested  GI: Nontender, nondistended, normoactive bowel sounds  Ext: No cyanosis, clubbing or edema  skin: + ecchymosis, left leg laceration sutured and bandaged  Neuro: Nonfocal    Labs:                        7.8    5.36  )-----------( 135      ( 21 Nov 2023 06:57 )             23.5     11-21    141  |  107  |  45<H>  ----------------------------<  85  4.4   |  28  |  1.05    Ca    8.2<L>      21 Nov 2023 06:57    TPro  5.3<L>  /  Alb  1.6<L>  /  TBili  0.9  /  DBili  x   /  AST  44<H>  /  ALT  30  /  AlkPhos  102  11-21      Urinalysis Basic - ( 21 Nov 2023 06:57 )    Color: x / Appearance: x / SG: x / pH: x  Gluc: 85 mg/dL / Ketone: x  / Bili: x / Urobili: x   Blood: x / Protein: x / Nitrite: x   Leuk Esterase: x / RBC: x / WBC x   Sq Epi: x / Non Sq Epi: x / Bacteria: x    < from: CT Chest No Cont (11.02.23 @ 16:04) >  ACC: [id number] EXAM:  CT CHEST   ORDERED BY: SARAH YOO     PROCEDURE DATE:  11/02/2023          INTERPRETATION:  INDICATION: Shortness of breath, cough, lung cancer   history, last chemotherapy and radiation treatment in May 2023    TECHNIQUE: Helical acquisition images of the chest without intravenous   contrast. Maximum intensity projection images were generated.    COMPARISON: 12/29/2020 chest x-ray.    FINDINGS:    LUNGS/AIRWAYS/PLEURA: Right upper lobectomy. Right lower lobe   multiloculated thick-walled 11 cm cavity (best seen in its entirety on   601-59) containing fluid and spongelike material. Consolidation within   the middle lobe and inferior aspect of the right lower lobe with angular   borders suggesting prior radiation. Multiple indistinct nodules of   varying density in the right lower and middle lobes, mostly adjacent to   the cavity. Small branching opacities in the peripheral left upper and   lower lobes, compatible with distal airway impaction. Trace left pleural   effusion. Mild right apical pleural thickening.    LYMPH NODES/MEDIASTINUM: No lymphadenopathy.    HEART/VASCULATURE: Normal heart size. Unremarkable pericardium. Coronary   artery calcifications. Normal caliber aorta.    UPPER ABDOMEN: Unremarkable.    BONES/SOFT TISSUES: Old sternal fracture. Mild loss of height of the L1   vertebral body.      IMPRESSION:    Right upper lobectomy.    Larger right lower lobe thick-walled cavity which may be infectious   and/or residua of reported treated tumor. 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    Consolidation within the right lower and middle lobe compatible with   radiation change.    Multiple indistinct nodules in the right lungwhich may be infectious or   radiation pneumonitis.    ct lucio 10/3 uploaded and reviewed    < from: CT Abdomen and Pelvis No Cont (11.08.23 @ 19:01) >  PROCEDURE DATE:  11/08/2023          INTERPRETATION:  CLINICAL INFORMATION: Acute kidney injury. Evaluate   obstruction.    COMPARISON: CT chest 11/20/2023.    CONTRAST/COMPLICATIONS:  IV Contrast: NONE  Oral Contrast: NONE  Complications: None reported at time of study completion    PROCEDURE:  CT of the Abdomen and Pelvis was performed.  Sagittal and coronal reformats were performed.    FINDINGS:  LOWER CHEST: Small right pleural effusion. New patchy opacities in the   right lung base.    LIVER: Within normal limits.  BILE DUCTS: Normal caliber.  GALLBLADDER: Within normal limits.  SPLEEN: Within normal limits.  PANCREAS: Within normal limits.  ADRENALS: Within normal limits.  KIDNEYS/URETERS: No hydronephrosis. Vascular calcifications.    BLADDER: Within normal limits.  REPRODUCTIVE ORGANS: Prostate within normal limits.    BOWEL: No bowel obstruction. Appendix is normal. Moderate to large stool.  PERITONEUM: No ascites.  VESSELS: Atherosclerotic changes.  RETROPERITONEUM/LYMPH NODES: No lymphadenopathy.  ABDOMINAL WALL: Within normal limits.  BONES: Degenerative changes. Redemonstration of L1 compression deformity.   Right femoral ORIF.    IMPRESSION:  No hydronephrosis.    New patchy opacities in the right lung base. Small right pleural effusion.    Bedside Swallow: Trial 1  · Consistencies administered	thin liquid; pureed; regular solid  · Mode of Presentation	cup; spoon; self fed  · Positioning	upright (90 degrees)  · Oral Preparatory Phase	Within functional limits; orientation eating routines: able to feed himself with focus: as per family, pt eats rapidly: did not do so at bedside evaluation Lip seal on utensil : oral containment..  · Oral Phase	Within functional limits; immediate bolus formation: Ap transfer for liquid WFL. Mastication normally rotary-lateral with lingual sweep for collection and clearance.  l  · Pharyngeal Phase	Within functional limits; Onset of pharyngeal swallow is with age-appropriate time limits. No overt s/s aspiration at bedside.  · Comments	Micro aspiration and silent aspiration cannot be ruled out. As above, pt does have increased risk for aspiration 2/2 lung CA and  lobectomy as well as COPD and present pna (which in itself may be aspiration related). However, pt presents at bedside evaluation with no contraindication for maintaining REGULAR texture and thin liquids.  Strategies for reducing risk were demonstrated and discussed with the Pt and the family who are very supportive of the Pt.  Disc with NSg.  Will follow peripherally.    < from: CT Chest No Cont (11.13.23 @ 10:04) >    PROCEDURE DATE:  11/13/2023          INTERPRETATION:  Clinical indications: Pneumonia.    Axial CT images of the chest are obtained without intravenous   administration of contrast.    Comparison is made with the prior chest CT of November 2, 2023.    No enlarged axillary or mediastinal lymph nodes.    No pericardial effusion. Heart size is normal. Mitral annular   calcifications. Vascular calcifications with involvement of the aorta and   the coronary arteries. Aortic root calcifications.    Evaluation of the upper abdomen demonstrate partially imaged aortic stent   graft. Subcutaneous edema. Minimal perihepatic ascites.    Small left pleural effusion new since the abdominalCT of November 8, 2023. Trace right pleural effusion as on November 8, 2023.    Evaluation of the lungs demonstrate left lung base linear subsegmental   atelectasis. A few ill-defined left lung patchy nonspecific groundglass   opacities largest within the dependent portion of the left upper lobe are   new since November 2, 2023.    Status post right upper lobectomy with postoperative changes. Persistent   right apical large large cyst, part of which measures about 6.6 cm in   transverse dimension without significant interval change in size   containing air and increasing internal opacification as compared with   November 2, 2023, some of which appear slightly dense and may be due to   internal hemorrhagic components.    Extensive right mid to lower lung consolidations and ill-defined   groundglass opacities, with the largest confluent consolidation within   the mid to lower portions of the right lower lobe, majority of which   appear new since November 2, 2023 superimposed on previouslydescribed   linear opacities.    Degenerative changes of the spine. Old healed sternal fracture.    IMPRESSION: Right lung large areas of consolidation new since November 2, 2023 likely pneumonia.    Right apical previously described large cyst unchanged in size since   November 2, 2023 containing internal air and increasing internal   opacification since November 2, 2023 as described above. Continued   follow-up is recommended.    A few left lung ill-defined nonspecific groundglass opacities.   Differential diagnosis include but is not limited to   infectious/inflammatory etiologies and or pulmonary edema.    Small left pleural effusion new since November 8, 2023.      Culture - Acid Fast - Bronchial w/Smear (11.09.23 @ 12:30)   Specimen Source: .Bronchial RIGHT LOWER LOBE BAL  Acid Fast Bacilli Smear:   No acid-fast bacilli seen by fluorochrome stain  Culture Results:   Culture is being performed.  Culture - Fungal, Bronchial (11.09.23 @ 12:30)   Specimen Source: .Bronchial RIGHT LOWER LOBE BAL  Culture Results:   Rare Yeast    < from: CT Chest No Cont (11.20.23 @ 08:03) >  PROCEDURE DATE:  11/20/2023          INTERPRETATION:  Clinical indication: Right lung infection.    Axial CT images of the chest are obtained without intravenous   administration of contrast.    Comparison is made with the prior chest CT of November 13, 2023.    Enlarged axillary or mediastinal lymph nodes. Heart size is normal.   Vascular calcifications with involvement of the aorta and the coronary   arteries. Minimal pericardial fluid.    Evaluation of the upper abdomen demonstrate trace perihepatic and   perisplenic ascites slightly increased in size since November 13, 2023.   1.2 cm hypodensity arising from the upper pole of the partially imaged   left kidney without significant interval change.    Few subcutaneous edema.    Small left pleural effusion is unchanged. There may be a trace right   pleural effusion versus pleural thickening without significant interval   change.    Evaluation of the lungs demonstrate interval resolution of the previously   seen groundglass opacity within the dependent portion of the left upper   lobe. Interval near complete resolution of a previously seen left upper   lobe peripheral groundglass opacity. A few other ill-defined left lung   patchy groundglass opacities unchanged. 4 mm left lower lobe nodular   opacity on image 73 of series 2 appears new since November 13, 2023 may   be sequela of impacted distal airway.    Status post right upper lobectomy. Previously described right apical   large irregular cyst, part of which measures about 7 cm in transverse   dimension containing internal air, fluid and debris is without   significant interval change in size.  Ill-defined consolidations and groundglass opacities throughoutthe   majority of the right lung demonstrate mild overall interval progression   since November 13, 2023. For reference a few dense patchy and nodular   opacities at the right lung base are either new or demonstrate interval   increase in size.  Complete opacification of the right middle lobe with internal   consolidation appears unchanged since November 13, 2023, demonstrating   interval progression since November 2, 2023. Nonvisualization of the   internal segmental and subsegmental airways of the right middle lobe as   on the prior study.    Degenerative changes of the spine. Old healed left rib and old healed   sternal fractures. Mild superior endplate loss of height of the L1   vertebral body is unchanged.    IMPRESSION: Extensive right lung consolidations as described above with   overall interval progression since November 13, 2023. Complete   opacification of the right middle lobe with internal consolidation and   volume loss appears unchanged since November 13, 2023 demonstrating  interval progression since November 2, 2023.    Persistent apical right apical large irregular cyst unchanged in size   since November 13, 2023.    Small left pleural effusion with subcutaneous edema.    Trace amount of ascites, increased in size since November 13, 2023.

## 2023-11-21 NOTE — PROGRESS NOTE ADULT - ASSESSMENT
83-year-old M with PMHx HTN, HLD, chronic anemia, COPD, lung CA s/p RUL lobectomy ([place] [facility] [place], [place] chemotherapy/RT 5/2023, not currently on treatment), AAA, hypothyroidism, CKD stage IIIa sent in to ED on 11/2/23  by pulmonologist MD Olivera with c/o SOB, dyspnea on minimal exertion, and cough. 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     # Acute hypoxic respiratory failure, multifactorial   # New RLL PNA on CT 11/13 , RLL cavity s/p bronchoscopy 11/9  possible yeast  infection, less likely radiation pneumonitis , r/o  aspergilloma    - CT chest: Larger right lower lobe thick-walled cavity which may be infectious and/or residua of reported treated tumor. Spongelike material within the cavity can be seen with aspergilloma (prior to fungus ball formation). Consolidation within the right lower and middle lobe compatible with radiation change.  - leukocytosis stable (WBC 17.70 from 17.35) ---> trend down to WBC  8   - repeat CXR 11/8 - worsening of opacities over right lung   - 2 d echo 11/8 - EF 60% , no significant valvular disease   - immunoglobulin panel - all Ig wnl  - 11/9 - s/p bronchoscopy   - speech and swallow evaluation  - no aspirations  - CXR 11/11 - stable consolidations  - repeat CT chest 11/13 - new right lung consolidation  - blood cultures -no growth, sputum culture - normal respiratory  - c/w trelegy ellipta, chest pt, IS, acapella ,BIPAP as needed  - ID/pulm consult noted   - s/p IV steroids --> po prednisone taper 20 x 3 days than stop , trending down ----> 34  - s/p  voriconazole PO ,  s/p 7 days of levaquin to cover atypical/psuedomonas (allergy to PCN)    - 11/13 - IV meropenem, doxy and fluconasole started as per ID   - Chest xray prelim worse  - Thoracic consult appreciated, no intervention  - CT Chest 11/20 worse  -11/13 - IV meropenem and doxycylcine  - Fluconazole DC, started on caspofungin 11/20  - Would consider palliative at this time    #  Chronic anemia, likely due to  chemotherapy, worsening and iron deficiency   -  no s/s bleeding, asymptomatic, continue to monitor/trend  - 11/8 - 1 unit PRBC due to hemoptysis and worsening of anemia  -  11/19 transfuse 1 unit PRBC  - Hemoglobin 7.9 11/20  - Trend    #CHRISTINA on CKD stage IIIa, improving  - Resolved    # Left calf laceration from veno dines while in PACU for bronchoscopy   - surgery consult - s/p suturing  on 11/09/23   - plan to remove sutures in 10 days  - Surgical consult for suture removal 11/19    # Acute hyperglycemia, possible steroid-induced , Prediabetes with A1C 6.3   - Continue lantus/ISS  - Monitor     # HTN  - hydralazine 10 bid     #  HLD    - c/w  atorvastatin    # Hypothyroidism   - Levo lowered to 12.5 mcg  - Recheck in 4 weeks    # Hx lung CA s/p RUL lobectomy/chemo/RT   - f/u with [facility]     # DVT Ppx   - SCDs    # Code status   - DNR/DNI 83-year-old M with PMHx HTN, HLD, chronic anemia, COPD, lung CA s/p RUL lobectomy (follows at [facility] [place], last chemotherapy/RT 5/2023, not currently on treatment), AAA, hypothyroidism, CKD stage IIIa sent in to ED on 11/2/23  by pulmonologist MD Olivera with c/o SOB, dyspnea on minimal exertion, and cough. 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     # Acute hypoxic respiratory failure, multifactorial   # New RLL PNA on CT 11/13 , RLL cavity s/p bronchoscopy 11/9  possible yeast  infection, less likely radiation pneumonitis , r/o  aspergilloma    - CT chest: Larger right lower lobe thick-walled cavity which may be infectious and/or residua of reported treated tumor. Spongelike material within the cavity can be seen with aspergilloma (prior to fungus ball formation). Consolidation within the right lower and middle lobe compatible with radiation change.  - leukocytosis stable (WBC 17.70 from 17.35) ---> trend down to WBC  8   - repeat CXR 11/8 - worsening of opacities over right lung   - 2 d echo 11/8 - EF 60% , no significant valvular disease   - immunoglobulin panel - all Ig wnl  - 11/9 - s/p bronchoscopy   - speech and swallow evaluation  - no aspirations  - CXR 11/11 - stable consolidations  - repeat CT chest 11/13 - new right lung consolidation  - blood cultures -no growth, sputum culture - normal respiratory  - c/w trelegy ellipta, chest pt, IS, acapella ,BIPAP as needed  - ID/pulm consult noted   - s/p IV steroids --> po prednisone taper 20 x 3 days than stop , trending down ----> 34  - s/p  voriconazole PO ,  s/p 7 days of levaquin to cover atypical/psuedomonas (allergy to PCN)    - 11/13 - IV meropenem, doxy and fluconasole started as per ID   - Chest xray prelim worse  - Thoracic consult appreciated, no intervention  - CT Chest 11/20 worse  -11/13 - IV meropenem and doxycylcine  - Fluconazole DC, started on caspofungin 11/20  - Would consider palliative at this time    #  Chronic anemia, likely due to  chemotherapy, worsening and iron deficiency   -  no s/s bleeding, asymptomatic, continue to monitor/trend  - 11/8 - 1 unit PRBC due to hemoptysis and worsening of anemia  -  11/19 transfuse 1 unit PRBC  - Hemoglobin 7.9 11/20  - Trend    #CHRISTINA on CKD stage IIIa, improving  - Resolved    # Left calf laceration from veno dines while in PACU for bronchoscopy   - surgery consult - s/p suturing  on 11/09/23   - plan to remove sutures in 10 days  - Surgical consult for suture removal 11/19    # Acute hyperglycemia, possible steroid-induced , Prediabetes with A1C 6.3   - Stop pre-meal and  lantus  - c/w ISS  - liberize diet  - Monitor     # HTN - hydralazine 10 bid     #  HLD   - c/w  atorvastatin    # Hypothyroidism   - Levo lowered to 12.5 mcg  - Recheck in 4 weeks    # Hx lung CA s/p RUL lobectomy/chemo/RT   - f/u with [facility]     # Severe protein-calorie malnutrition.   - nutritional education provided   - supplements ordered   - MVT qd ordered      # DVT Ppx   - SCDs    # Code status   - DNR/DNI

## 2023-11-21 NOTE — ADVANCED PRACTICE NURSE CONSULT - ASSESSMENT
Patient is awake and alert. [place]. Midline insertion requested for difficult IV access. Placement explained to pt and family. Bilateral upper extremity edema noted prior to Midline insertion right >left. Time out along with hand washing By 2 RN's done. Left arm cleansed with CHG. Patient draped in usual fashion with sterile sheet. Lidocaine 1%,1ml given intradermally to left arm marked site. Using sterile technique, under ultrasound guidance, placed Bio Dileep Midline 4F single lumen, (lot#[id number]) cut to 10cm into left basilic vein (out 1cm). Brisk blood return and flushed with 20ml NS. Minimal blood loss and pt tolerated procedure well. All sharps accounted for. Dressing and end cap in place. Report given to district RN.

## 2023-11-21 NOTE — PROGRESS NOTE ADULT - SUBJECTIVE AND OBJECTIVE BOX
HOSPITALIST ATTENDING PROGRESS NOTE    Chart and meds reviewed.  Patient seen and examined.    CC: RL PNA/Abscess    Subjective: Baseline confusion, still coughing. No fever.     All other systems reviewed and found to be negative with the exception of what has been described above.    MEDICATIONS  (STANDING):  albuterol/ipratropium for Nebulization 3 milliLiter(s) Nebulizer every 6 hours  AQUAPHOR (petrolatum Ointment) 1 Application(s) Topical two times a day  atorvastatin 40 milliGRAM(s) Oral at bedtime  caspofungin IVPB      dextrose 5%. 1000 milliLiter(s) (50 mL/Hr) IV Continuous <Continuous>  dextrose 5%. 1000 milliLiter(s) (100 mL/Hr) IV Continuous <Continuous>  dextrose 50% Injectable 25 Gram(s) IV Push once  dextrose 50% Injectable 12.5 Gram(s) IV Push once  dextrose 50% Injectable 25 Gram(s) IV Push once  doxycycline IVPB 100 milliGRAM(s) IV Intermittent every 12 hours  ferrous    sulfate 325 milliGRAM(s) Oral daily  fluticasone furoate/umeclidinium/vilanterol 100-62.5-25 MICROgram(s) Inhaler 1 Puff(s) Inhalation daily  folic acid 1 milliGRAM(s) Oral daily  glucagon  Injectable 1 milliGRAM(s) IntraMuscular once  hydrALAZINE 10 milliGRAM(s) Oral two times a day  insulin glargine Injectable (LANTUS) 5 Unit(s) SubCutaneous at bedtime  insulin lispro (ADMELOG) corrective regimen sliding scale   SubCutaneous at bedtime  insulin lispro (ADMELOG) corrective regimen sliding scale   SubCutaneous three times a day before meals  insulin lispro Injectable (ADMELOG) 4 Unit(s) SubCutaneous three times a day before meals  levothyroxine 12.5 MICROGram(s) Oral daily  meropenem Injectable 1000 milliGRAM(s) IV Push every 8 hours  pantoprazole  Injectable 40 milliGRAM(s) IV Push two times a day  polyethylene glycol 3350 17 Gram(s) Oral daily    MEDICATIONS  (PRN):  acetaminophen     Tablet .. 650 milliGRAM(s) Oral every 6 hours PRN Temp greater or equal to 38C (100.4F), Mild Pain (1 - 3)  aluminum hydroxide/magnesium hydroxide/simethicone Suspension 30 milliLiter(s) Oral every 4 hours PRN Dyspepsia  benzonatate 100 milliGRAM(s) Oral three times a day PRN Cough  dextrose Oral Gel 15 Gram(s) Oral once PRN Blood Glucose LESS THAN 70 milliGRAM(s)/deciliter  meclizine 12.5 milliGRAM(s) Oral every 8 hours PRN Dizziness  melatonin 3 milliGRAM(s) Oral at bedtime PRN Insomnia  ondansetron Injectable 4 milliGRAM(s) IV Push every 8 hours PRN Nausea and/or Vomiting    GEN: Frail, NAD  HEENT:  pupils equal and reactive, EOMI, no oropharyngeal lesions, erythema, exudates, oral thrush  NECK:   supple, no carotid bruits, no palpable lymph nodes, no thyromegaly  CV:  +S1, +S2, regular, no murmurs or rubs  RESP:  Bilateral ronchi  BREAST:  not examined  GI:  abdomen soft, non-tender, non-distended, normal BS, no bruits, no abdominal masses, no palpable masses  RECTAL:  not examined  :  not examined  MSK:   normal muscle tone, no atrophy, no rigidity, no contractions  EXT:  no clubbing, no cyanosis, no edema, no calf pain, BIlateral LE edema.   Left leg sutures  VASCULAR:  pulses equal and symmetric in the upper and lower extremities  NEURO:  AAOX1, no focal neurological deficits, follows all commands, able to move extremities spontaneously  SKIN:  no ulcers, lesions or rashes    LABS:                          7.9    5.38  )-----------( 136      ( 20 Nov 2023 07:10 )             23.6     11-20    140  |  106  |  46<H>  ----------------------------<  90  4.4   |  29  |  0.94    Ca    8.2<L>      20 Nov 2023 07:10    Urinalysis Basic - ( 20 Nov 2023 07:10 )  Color: x / Appearance: x / SG: x / pH: x  Gluc: 90 mg/dL / Ketone: x  / Bili: x / Urobili: x   Blood: x / Protein: x / Nitrite: x   Leuk Esterase: x / RBC: x / WBC x   Sq Epi: x / Non Sq Epi: x / Bacteria: x       CT Chest No Cont (11.20.23 @ 08:03) >  IMPRESSION: Extensive right lung consolidations as described above with   overall interval progression since November 13, 2023. Complete   opacification of the right middle lobe with internal consolidation and   volume loss appears unchanged since November 13, 2023 demonstrating  interval progression since November 2, 2023.    Persistent apical right apical large irregular cyst unchanged in size   since November 13, 2023.    Small left pleural effusion with subcutaneous edema.    Trace amount of ascites, increased in size since November 13, 2023.    --- End of Report ---      < end of copied text >     HOSPITALIST ATTENDING PROGRESS NOTE    Chart and meds reviewed.  Patient seen and examined.    CC: cough, weakness     11/21 - no events, afebrile, denies cp, dyspnea, + productive cough with brown mucus persist, poor po intake, plan discussed with wife at bedside      All other systems reviewed and found to be negative with the exception of what has been described above.    Vital sings reviewed for last 24 h  T(C): 36.6 (11-21-23 @ 21:19), Max: 36.8 (11-21-23 @ 09:07)  T(F): 97.9 (11-21-23 @ 21:19), Max: 98.2 (11-21-23 @ 09:07)  HR: 104 (11-21-23 @ 21:19) (91 - 104)  BP: 147/57 (11-21-23 @ 21:19) (108/75 - 147/57)  RR: 18 (11-21-23 @ 21:19) (18 - 18)  SpO2: 100% (11-21-23 @ 21:19) (97% - 100%)  CAPILLARY BLOOD GLUCOSE  POCT Blood Glucose.: 211 mg/dL (21 Nov 2023 18:02)  POCT Blood Glucose.: 96 mg/dL (21 Nov 2023 13:02)  POCT Blood Glucose.: 76 mg/dL (21 Nov 2023 08:49)    Physical exam :   GEN: Frail, NAD  HEENT:  pupils equal and reactive, EOMI, no oropharyngeal lesions, erythema, exudates, oral thrush  NECK:   supple, no carotid bruits, no palpable lymph nodes, no thyromegaly  CV:  +S1, +S2, regular, no murmurs or rubs  RESP:  Bilateral ronchi  BREAST:  not examined  GI:  abdomen soft, non-tender, non-distended, normal BS, no bruits, no abdominal masses, no palpable masses  RECTAL:  not examined  :  not examined  MSK:   normal muscle tone, no atrophy, no rigidity, no contractions  EXT:  no clubbing, no cyanosis, no edema, no calf pain, BIlateral LE edema.   Left leg sutures  VASCULAR:  pulses equal and symmetric in the upper and lower extremities  NEURO:  AAOX1, no focal neurological deficits, follows all commands, able to move extremities spontaneously  SKIN:  no ulcers, lesions or rashes    All labs radiology and other studies reviewed and interpreted :                         7.8    5.36  )-----------( 135      ( 21 Nov 2023 06:57 )             23.5     11-21    141  |  107  |  45<H>  ----------------------------<  85  4.4   |  28  |  1.05    Ca    8.2<L>      21 Nov 2023 06:57    TPro  5.3<L>  /  Alb  1.6<L>  /  TBili  0.9  /  DBili  x   /  AST  44<H>  /  ALT  30  /  AlkPhos  102  11-21        LIVER FUNCTIONS - ( 21 Nov 2023 06:57 )  Alb: 1.6 g/dL / Pro: 5.3 gm/dL / ALK PHOS: 102 U/L / ALT: 30 U/L / AST: 44 U/L / GGT: x                CT Chest No Cont (11.20.23 @ 08:03) >  IMPRESSION: Extensive right lung consolidations as described above with   overall interval progression since November 13, 2023. Complete   opacification of the right middle lobe with internal consolidation and   volume loss appears unchanged since November 13, 2023 demonstrating  interval progression since November 2, 2023.    Persistent apical right apical large irregular cyst unchanged in size   since November 13, 2023.    Small left pleural effusion with subcutaneous edema.    Trace amount of ascites, increased in size since November 13, 2023.    Xray Chest 1 View- PORTABLE-Routine (Xray Chest 1 View- PORTABLE-Routine .) (11.16.23 @ 12:32) >  IMPRESSION: Slight progression of pneumonia since November 11      MEDICATIONS  (STANDING):  albuterol/ipratropium for Nebulization 3 milliLiter(s) Nebulizer every 6 hours  AQUAPHOR (petrolatum Ointment) 1 Application(s) Topical two times a day  atorvastatin 40 milliGRAM(s) Oral at bedtime  caspofungin IVPB      caspofungin IVPB 50 milliGRAM(s) IV Intermittent every 24 hours  dextrose 5%. 1000 milliLiter(s) (50 mL/Hr) IV Continuous <Continuous>  dextrose 5%. 1000 milliLiter(s) (100 mL/Hr) IV Continuous <Continuous>  dextrose 50% Injectable 25 Gram(s) IV Push once  dextrose 50% Injectable 12.5 Gram(s) IV Push once  dextrose 50% Injectable 25 Gram(s) IV Push once  doxycycline IVPB 100 milliGRAM(s) IV Intermittent every 12 hours  ferrous    sulfate 325 milliGRAM(s) Oral daily  fluticasone furoate/umeclidinium/vilanterol 100-62.5-25 MICROgram(s) Inhaler 1 Puff(s) Inhalation daily  folic acid 1 milliGRAM(s) Oral daily  glucagon  Injectable 1 milliGRAM(s) IntraMuscular once  hydrALAZINE 10 milliGRAM(s) Oral two times a day  insulin lispro (ADMELOG) corrective regimen sliding scale   SubCutaneous three times a day before meals  insulin lispro (ADMELOG) corrective regimen sliding scale   SubCutaneous at bedtime  levothyroxine 12.5 MICROGram(s) Oral daily  meropenem Injectable 1000 milliGRAM(s) IV Push every 8 hours  pantoprazole  Injectable 40 milliGRAM(s) IV Push two times a day  polyethylene glycol 3350 17 Gram(s) Oral daily    MEDICATIONS  (PRN):  acetaminophen     Tablet .. 650 milliGRAM(s) Oral every 6 hours PRN Temp greater or equal to 38C (100.4F), Mild Pain (1 - 3)  aluminum hydroxide/magnesium hydroxide/simethicone Suspension 30 milliLiter(s) Oral every 4 hours PRN Dyspepsia  benzonatate 100 milliGRAM(s) Oral three times a day PRN Cough  dextrose Oral Gel 15 Gram(s) Oral once PRN Blood Glucose LESS THAN 70 milliGRAM(s)/deciliter  meclizine 12.5 milliGRAM(s) Oral every 8 hours PRN Dizziness  melatonin 3 milliGRAM(s) Oral at bedtime PRN Insomnia  ondansetron Injectable 4 milliGRAM(s) IV Push every 8 hours PRN Nausea and/or Vomiting

## 2023-11-21 NOTE — PROGRESS NOTE ADULT - ASSESSMENT
82 y/o Male with h/o lung CA follows at [facility] [place] s/p chemotherapy and RT 05/2023, s/p right upper lobectomy, HTN, HPL, Chronic anemia, COPD, AAA, Hypothyroidism, CKD stage IIIa was admitted on 11/2 for increased shortness of breath, dyspnea on minimal exertion, and cough.    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   - now presently on 3 L NC - needs home O2 evaluation when stabilizes  - more likely infectious than malignant cause of current condition  - pall care consult appreciated- family discussion planned     # PNA/ Pneumonitis   - BAL with rare yeast - CONTINUE  meropenem 1 gm IV q12h, doxycycline 100 mg IV q12h and caspofungin as per ID  - most recent CXR with worsening right sided infiltrate  - followed by pulm and ct surgery- no intervention  - repeat CT chest 11/20- when compared with 11/13 with complete opacification of RML w/ internal consolidation and volume loss appears unchanged and extensive right lung consolidations w/ overall progression.    # anemia- likely related to myelosuppression from treatment history/pna  - s/p 1u pRBC 11/19 with improvement of Hb from 7.1 to 7.9->7.8 today     palll care  Discussed with [facility]  will continue to follow

## 2023-11-22 LAB
ALBUMIN SERPL ELPH-MCNC: 1.8 G/DL — LOW (ref 3.3–5)
ALBUMIN SERPL ELPH-MCNC: 1.8 G/DL — LOW (ref 3.3–5)
ALP SERPL-CCNC: 103 U/L — SIGNIFICANT CHANGE UP (ref 40–120)
ALP SERPL-CCNC: 103 U/L — SIGNIFICANT CHANGE UP (ref 40–120)
ALT FLD-CCNC: 35 U/L — SIGNIFICANT CHANGE UP (ref 12–78)
ALT FLD-CCNC: 35 U/L — SIGNIFICANT CHANGE UP (ref 12–78)
ANION GAP SERPL CALC-SCNC: 4 MMOL/L — LOW (ref 5–17)
ANION GAP SERPL CALC-SCNC: 4 MMOL/L — LOW (ref 5–17)
AST SERPL-CCNC: 50 U/L — HIGH (ref 15–37)
AST SERPL-CCNC: 50 U/L — HIGH (ref 15–37)
BASOPHILS # BLD AUTO: 0.01 K/UL — SIGNIFICANT CHANGE UP (ref 0–0.2)
BASOPHILS # BLD AUTO: 0.01 K/UL — SIGNIFICANT CHANGE UP (ref 0–0.2)
BASOPHILS NFR BLD AUTO: 0.2 % — SIGNIFICANT CHANGE UP (ref 0–2)
BASOPHILS NFR BLD AUTO: 0.2 % — SIGNIFICANT CHANGE UP (ref 0–2)
BILIRUB SERPL-MCNC: 0.8 MG/DL — SIGNIFICANT CHANGE UP (ref 0.2–1.2)
BILIRUB SERPL-MCNC: 0.8 MG/DL — SIGNIFICANT CHANGE UP (ref 0.2–1.2)
BLD GP AB SCN SERPL QL: SIGNIFICANT CHANGE UP
BLD GP AB SCN SERPL QL: SIGNIFICANT CHANGE UP
BUN SERPL-MCNC: 40 MG/DL — HIGH (ref 7–23)
BUN SERPL-MCNC: 40 MG/DL — HIGH (ref 7–23)
CALCIUM SERPL-MCNC: 8.5 MG/DL — SIGNIFICANT CHANGE UP (ref 8.5–10.1)
CALCIUM SERPL-MCNC: 8.5 MG/DL — SIGNIFICANT CHANGE UP (ref 8.5–10.1)
CHLORIDE SERPL-SCNC: 105 MMOL/L — SIGNIFICANT CHANGE UP (ref 96–108)
CHLORIDE SERPL-SCNC: 105 MMOL/L — SIGNIFICANT CHANGE UP (ref 96–108)
CO2 SERPL-SCNC: 30 MMOL/L — SIGNIFICANT CHANGE UP (ref 22–31)
CO2 SERPL-SCNC: 30 MMOL/L — SIGNIFICANT CHANGE UP (ref 22–31)
CREAT SERPL-MCNC: 1.01 MG/DL — SIGNIFICANT CHANGE UP (ref 0.5–1.3)
CREAT SERPL-MCNC: 1.01 MG/DL — SIGNIFICANT CHANGE UP (ref 0.5–1.3)
EGFR: 74 ML/MIN/1.73M2 — SIGNIFICANT CHANGE UP
EGFR: 74 ML/MIN/1.73M2 — SIGNIFICANT CHANGE UP
EOSINOPHIL # BLD AUTO: 0.12 K/UL — SIGNIFICANT CHANGE UP (ref 0–0.5)
EOSINOPHIL # BLD AUTO: 0.12 K/UL — SIGNIFICANT CHANGE UP (ref 0–0.5)
EOSINOPHIL NFR BLD AUTO: 2.4 % — SIGNIFICANT CHANGE UP (ref 0–6)
EOSINOPHIL NFR BLD AUTO: 2.4 % — SIGNIFICANT CHANGE UP (ref 0–6)
GLUCOSE BLDC GLUCOMTR-MCNC: 100 MG/DL — HIGH (ref 70–99)
GLUCOSE BLDC GLUCOMTR-MCNC: 100 MG/DL — HIGH (ref 70–99)
GLUCOSE BLDC GLUCOMTR-MCNC: 145 MG/DL — HIGH (ref 70–99)
GLUCOSE BLDC GLUCOMTR-MCNC: 145 MG/DL — HIGH (ref 70–99)
GLUCOSE BLDC GLUCOMTR-MCNC: 174 MG/DL — HIGH (ref 70–99)
GLUCOSE BLDC GLUCOMTR-MCNC: 174 MG/DL — HIGH (ref 70–99)
GLUCOSE BLDC GLUCOMTR-MCNC: 182 MG/DL — HIGH (ref 70–99)
GLUCOSE BLDC GLUCOMTR-MCNC: 182 MG/DL — HIGH (ref 70–99)
GLUCOSE SERPL-MCNC: 93 MG/DL — SIGNIFICANT CHANGE UP (ref 70–99)
GLUCOSE SERPL-MCNC: 93 MG/DL — SIGNIFICANT CHANGE UP (ref 70–99)
HCT VFR BLD CALC: 24.8 % — LOW (ref 39–50)
HCT VFR BLD CALC: 24.8 % — LOW (ref 39–50)
HGB BLD-MCNC: 8.1 G/DL — LOW (ref 13–17)
HGB BLD-MCNC: 8.1 G/DL — LOW (ref 13–17)
IMM GRANULOCYTES NFR BLD AUTO: 1.8 % — HIGH (ref 0–0.9)
IMM GRANULOCYTES NFR BLD AUTO: 1.8 % — HIGH (ref 0–0.9)
LYMPHOCYTES # BLD AUTO: 0.28 K/UL — LOW (ref 1–3.3)
LYMPHOCYTES # BLD AUTO: 0.28 K/UL — LOW (ref 1–3.3)
LYMPHOCYTES # BLD AUTO: 5.7 % — LOW (ref 13–44)
LYMPHOCYTES # BLD AUTO: 5.7 % — LOW (ref 13–44)
MAGNESIUM SERPL-MCNC: 2.1 MG/DL — SIGNIFICANT CHANGE UP (ref 1.6–2.6)
MAGNESIUM SERPL-MCNC: 2.1 MG/DL — SIGNIFICANT CHANGE UP (ref 1.6–2.6)
MCHC RBC-ENTMCNC: 31.3 PG — SIGNIFICANT CHANGE UP (ref 27–34)
MCHC RBC-ENTMCNC: 31.3 PG — SIGNIFICANT CHANGE UP (ref 27–34)
MCHC RBC-ENTMCNC: 32.7 GM/DL — SIGNIFICANT CHANGE UP (ref 32–36)
MCHC RBC-ENTMCNC: 32.7 GM/DL — SIGNIFICANT CHANGE UP (ref 32–36)
MCV RBC AUTO: 95.8 FL — SIGNIFICANT CHANGE UP (ref 80–100)
MCV RBC AUTO: 95.8 FL — SIGNIFICANT CHANGE UP (ref 80–100)
MONOCYTES # BLD AUTO: 0.41 K/UL — SIGNIFICANT CHANGE UP (ref 0–0.9)
MONOCYTES # BLD AUTO: 0.41 K/UL — SIGNIFICANT CHANGE UP (ref 0–0.9)
MONOCYTES NFR BLD AUTO: 8.3 % — SIGNIFICANT CHANGE UP (ref 2–14)
MONOCYTES NFR BLD AUTO: 8.3 % — SIGNIFICANT CHANGE UP (ref 2–14)
NEUTROPHILS # BLD AUTO: 4.04 K/UL — SIGNIFICANT CHANGE UP (ref 1.8–7.4)
NEUTROPHILS # BLD AUTO: 4.04 K/UL — SIGNIFICANT CHANGE UP (ref 1.8–7.4)
NEUTROPHILS NFR BLD AUTO: 81.6 % — HIGH (ref 43–77)
NEUTROPHILS NFR BLD AUTO: 81.6 % — HIGH (ref 43–77)
PHOSPHATE SERPL-MCNC: 2.6 MG/DL — SIGNIFICANT CHANGE UP (ref 2.5–4.5)
PHOSPHATE SERPL-MCNC: 2.6 MG/DL — SIGNIFICANT CHANGE UP (ref 2.5–4.5)
PLATELET # BLD AUTO: 135 K/UL — LOW (ref 150–400)
PLATELET # BLD AUTO: 135 K/UL — LOW (ref 150–400)
POTASSIUM SERPL-MCNC: 4.4 MMOL/L — SIGNIFICANT CHANGE UP (ref 3.5–5.3)
POTASSIUM SERPL-MCNC: 4.4 MMOL/L — SIGNIFICANT CHANGE UP (ref 3.5–5.3)
POTASSIUM SERPL-SCNC: 4.4 MMOL/L — SIGNIFICANT CHANGE UP (ref 3.5–5.3)
POTASSIUM SERPL-SCNC: 4.4 MMOL/L — SIGNIFICANT CHANGE UP (ref 3.5–5.3)
PROT SERPL-MCNC: 5.5 GM/DL — LOW (ref 6–8.3)
PROT SERPL-MCNC: 5.5 GM/DL — LOW (ref 6–8.3)
RBC # BLD: 2.59 M/UL — LOW (ref 4.2–5.8)
RBC # BLD: 2.59 M/UL — LOW (ref 4.2–5.8)
RBC # FLD: 15.9 % — HIGH (ref 10.3–14.5)
RBC # FLD: 15.9 % — HIGH (ref 10.3–14.5)
SODIUM SERPL-SCNC: 139 MMOL/L — SIGNIFICANT CHANGE UP (ref 135–145)
SODIUM SERPL-SCNC: 139 MMOL/L — SIGNIFICANT CHANGE UP (ref 135–145)
WBC # BLD: 4.95 K/UL — SIGNIFICANT CHANGE UP (ref 3.8–10.5)
WBC # BLD: 4.95 K/UL — SIGNIFICANT CHANGE UP (ref 3.8–10.5)
WBC # FLD AUTO: 4.95 K/UL — SIGNIFICANT CHANGE UP (ref 3.8–10.5)
WBC # FLD AUTO: 4.95 K/UL — SIGNIFICANT CHANGE UP (ref 3.8–10.5)

## 2023-11-22 PROCEDURE — 93970 EXTREMITY STUDY: CPT | Mod: 26

## 2023-11-22 PROCEDURE — 99232 SBSQ HOSP IP/OBS MODERATE 35: CPT

## 2023-11-22 RX ORDER — VANCOMYCIN HCL 1 G
750 VIAL (EA) INTRAVENOUS EVERY 12 HOURS
Refills: 0 | Status: DISCONTINUED | OUTPATIENT
Start: 2023-11-22 | End: 2023-11-28

## 2023-11-22 RX ORDER — HEPARIN SODIUM 5000 [USP'U]/ML
5000 INJECTION INTRAVENOUS; SUBCUTANEOUS EVERY 12 HOURS
Refills: 0 | Status: DISCONTINUED | OUTPATIENT
Start: 2023-11-22 | End: 2023-11-26

## 2023-11-22 RX ADMIN — Medication 325 MILLIGRAM(S): at 10:18

## 2023-11-22 RX ADMIN — PANTOPRAZOLE SODIUM 40 MILLIGRAM(S): 20 TABLET, DELAYED RELEASE ORAL at 22:13

## 2023-11-22 RX ADMIN — Medication 12.5 MICROGRAM(S): at 05:16

## 2023-11-22 RX ADMIN — FLUTICASONE FUROATE, UMECLIDINIUM BROMIDE AND VILANTEROL TRIFENATATE 1 PUFF(S): 200; 62.5; 25 POWDER RESPIRATORY (INHALATION) at 08:12

## 2023-11-22 RX ADMIN — HEPARIN SODIUM 5000 UNIT(S): 5000 INJECTION INTRAVENOUS; SUBCUTANEOUS at 22:13

## 2023-11-22 RX ADMIN — MEROPENEM 1000 MILLIGRAM(S): 1 INJECTION INTRAVENOUS at 05:17

## 2023-11-22 RX ADMIN — Medication 10 MILLIGRAM(S): at 22:14

## 2023-11-22 RX ADMIN — Medication 250 MILLIGRAM(S): at 22:13

## 2023-11-22 RX ADMIN — MEROPENEM 1000 MILLIGRAM(S): 1 INJECTION INTRAVENOUS at 13:31

## 2023-11-22 RX ADMIN — Medication 3 MILLILITER(S): at 08:00

## 2023-11-22 RX ADMIN — MEROPENEM 1000 MILLIGRAM(S): 1 INJECTION INTRAVENOUS at 22:13

## 2023-11-22 RX ADMIN — ATORVASTATIN CALCIUM 40 MILLIGRAM(S): 80 TABLET, FILM COATED ORAL at 22:13

## 2023-11-22 RX ADMIN — Medication 3 MILLILITER(S): at 20:33

## 2023-11-22 RX ADMIN — Medication 10 MILLIGRAM(S): at 10:17

## 2023-11-22 RX ADMIN — CASPOFUNGIN ACETATE 260 MILLIGRAM(S): 7 INJECTION, POWDER, LYOPHILIZED, FOR SOLUTION INTRAVENOUS at 13:31

## 2023-11-22 RX ADMIN — PANTOPRAZOLE SODIUM 40 MILLIGRAM(S): 20 TABLET, DELAYED RELEASE ORAL at 10:16

## 2023-11-22 RX ADMIN — Medication 250 MILLIGRAM(S): at 10:16

## 2023-11-22 RX ADMIN — Medication 2: at 18:37

## 2023-11-22 RX ADMIN — Medication 1 APPLICATION(S): at 10:17

## 2023-11-22 RX ADMIN — Medication 1 MILLIGRAM(S): at 10:17

## 2023-11-22 RX ADMIN — POLYETHYLENE GLYCOL 3350 17 GRAM(S): 17 POWDER, FOR SOLUTION ORAL at 10:16

## 2023-11-22 RX ADMIN — Medication 3 MILLILITER(S): at 14:15

## 2023-11-22 NOTE — PROGRESS NOTE ADULT - ASSESSMENT
83-year-old M with PMHx HTN, HLD, chronic anemia, COPD, lung CA s/p RUL lobectomy (follows at [facility], last chemotherapy/RT 5/2023, not currently on treatment), AAA, hypothyroidism, CKD stage IIIa sent in to ED on 11/2/23  by pulmonologist MD Olivera with c/o SOB, dyspnea on minimal exertion, and cough. 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     # Acute hypoxic respiratory failure, multifactorial   # New RLL PNA on CT 11/13 , RLL cavity s/p bronchoscopy 11/9  possible yeast  infection, less likely radiation pneumonitis , r/o  aspergilloma    - CT chest: Larger right lower lobe thick-walled cavity which may be infectious and/or residua of reported treated tumor. Spongelike material within the cavity can be seen with aspergilloma (prior to fungus ball formation). Consolidation within the right lower and middle lobe compatible with radiation change.  - leukocytosis stable (WBC 17.70 from 17.35) ---> trend down to WBC  8   - repeat CXR 11/8 - worsening of opacities over right lung   - 2 d echo 11/8 - EF 60% , no significant valvular disease    - immunoglobulin panel - all Ig wnl  - 11/9 - s/p bronchoscopy   - speech and swallow evaluation  - no aspirations  - CXR 11/11 - stable consolidations  - repeat CT chest 11/13 - new right lung consolidation  - blood cultures -no growth, sputum culture - normal respiratory  - c/w trelegy ellipta, chest pt, IS, acapella ,BIPAP as needed  - ID/pulm consult noted   - s/p IV steroids --> po prednisone taper 20 x 3 days than stop , trending down ----> 34  - s/p  voriconazole PO ,  s/p 7 days of levaquin to cover atypical/psuedomonas (allergy to PCN)    - 11/13 - IV meropenem, doxy and fluconasole started as per ID   - Chest xray prelim worse  - Thoracic consult appreciated, no intervention  - CT Chest 11/20 worse  -11/13 - IV meropenem and doxycylcine  - Fluconazole DC, started on caspofungin 11/20  - Would consider palliative at this time  - 11/22 - d/c doxy started vancomycin     #  Chronic anemia, likely due to  chemotherapy, worsening and iron deficiency   -  no s/s bleeding, asymptomatic, continue to monitor/trend  - 11/8 - 1 unit PRBC due to hemoptysis and worsening of anemia  -  11/19 transfuse 1 unit PRBC  - Hemoglobin 7.9 11/20  - Trend    #CHRISTINA on CKD stage IIIa, improving  - Resolved    # Left calf laceration from veno dines while in PACU for bronchoscopy   - surgery consult - s/p suturing  on 11/09/23   - plan to remove sutures in 10 days  - Surgical consult for suture removal in 14 days from placement  - pending    # Bilateral UE edema due to superphicial thrombophlebitis  - doppler noted  - start heparin 5000 bid, elevation, warm compresses if pain    # Acute hyperglycemia, possible steroid-induced , Prediabetes with A1C 6.3   - Stop pre-meal and  lantus   - c/w ISS  - liberize diet  - Monitor     # HTN - hydralazine 10 bid     #  HLD   - c/w  atorvastatin    # Hypothyroidism  - Levo lowered to 12.5 mcg,  Recheck in 4 weeks    # Hx lung CA s/p RUL lobectomy/chemo/RT  - f/u with [facility]     # Severe protein-calorie malnutrition. - nutritional education provided ,  supplements ordered ,  MVT qd ordered      # DVT Ppx  - heparin 5000 q12h     # Code status  - DNR/DNI    wife and daughter updated at bedside 11/21, 11/22

## 2023-11-22 NOTE — PROGRESS NOTE ADULT - ASSESSMENT
83y old Male with PMH HTN, HPL, Chronic anemia, COPD, lung CA follows at [facility] (last chemotherapy and RT 05/2023, not currently receiving chemo treatment), s/p right upper lobectomy, AAA, Hypothyroidism, CKD stage IIIa referred by me to ed for continued sob, cough despite abx treatment found to have large lower lobe thick walled cavity with worsening findings on ct despite treatment with levaquin  1. large lower lobe thick walled cavity: unclear if this is fungal vs bacterial vs malignancy  -started on voriconazole, initially switchedd to fluconazole, now on caspofungin. surgery is more defitive therapy however he is high surgical risk  -reviewed imaging from [place] supplied by wife - it looks like he has had blebs in the past and these are what are infected - they look half filled with fluid with thicker borders, which would argue against fungal infection/mrsa as there is no necrosis involved in its pathogenesis  -s/p bronch. follow up results. growing few yeast  -repeat ct wo contrast reveals worsening consolidations and filling in of bleb. this may suggest inadequate antibiotics, continued aspiration or rapidly progressive cancer  -continue doxy (for potential mrsa), meropenem  -palliative care eval  2. pneumonia: as above  -supplemental o2  -speech/swallow eval noted  -aspiration precautions  3. copd: continue trelegy  4. ? radiation induced pneumonitis: titrate down  steroids as he has worsening infection and progressive nature of findings argues against pneumonitis  5. anemia: received 1 unit prbc on 11/19  6. left leg laceration: s/p suture, now bandaged. continue wound care 83y old Male with PMH HTN, HPL, Chronic anemia, COPD, lung CA [place] at [facility] [place] (last chemotherapy and RT 05/2023, not currently receiving chemo treatment), s/p right upper lobectomy, AAA, Hypothyroidism, CKD stage IIIa referred by me to ed for continued sob, cough despite abx treatment found to have large lower lobe thick walled cavity with worsening findings on ct despite treatment with levaquin  1. large lower lobe thick walled cavity: unclear if this is fungal vs bacterial vs malignancy  -started on voriconazole, initially switchedd to fluconazole, now on caspofungin. surgery is more defitive therapy however he is high surgical risk  -reviewed imaging from [place] supplied by wife - it looks like he has had blebs in the past and these are what are infected - they look half filled with fluid with thicker borders, which would argue against fungal infection/mrsa as there is no necrosis involved in its pathogenesis  -s/p bronch. follow up results. growing few yeast  -repeat ct wo contrast reveals worsening consolidations and filling in of bleb. this may suggest inadequate antibiotics, continued aspiration or rapidly progressive cancer  -was on doxy (for potential mrsa) now transitioned to vanco  -continue meropenem  -monitor vanco trough  -palliative care eval  2. pneumonia: as above  -supplemental o2  -speech/swallow eval noted  -aspiration precautions  3. copd: continue trelegy  4. ? radiation induced pneumonitis: titrate down  steroids as he has worsening infection and progressive nature of findings argues against pneumonitis  5. anemia: received 1 unit prbc on 11/19  6. left leg laceration: s/p suture, now bandaged. continue wound care

## 2023-11-22 NOTE — PROGRESS NOTE ADULT - ASSESSMENT
84 y/o Male with h/o lung CA follows at [facility] [place] s/p chemotherapy and RT 05/2023, s/p right upper lobectomy, HTN, HPL, Chronic anemia, COPD, AAA, Hypothyroidism, CKD stage IIIa was admitted on 11/2 for increased shortness of breath, dyspnea on minimal exertion, and cough. Reportedly, he had CT of the chest performed 10/3 which demonstrated consolidation, was started on augmentin/prednisone/azithro 10/6 x 1 week. Pt completed course of medications however wife noted worsening cough and pt with fever Tmax of 103.9 on 10/26. Wife then took pt to pulmonologist again who started pt on second round of the same medications, instructed wife to stop giving pt tylenol wife notes fevers self-resolved. Since then pt decreased energy with episode of being unsteady on feet. Today is 6th day of taking medications, had f/u scheduled with pulmonologist who sent pt to ED for eval.  He feels tired and weak. In ER he received ceftriaxone.    1. Large RLL pulmonary cavity with worsening opacification, likely pneumonia. Possible recurrent lung Ca. Lung Ca s/p right upper lobectomy. Radiation pneumonitis. CRF stage 3. Allergy to PCN.   -extensive right lung consolidations  -worsening large pulmonary cavity opacification on repeat CT chest   -bronchoscopy cultures show normal respiratory jaime and rare yeast  -fungal bronch c/s is pending and will take several weeks to finalize  -s/p levofloxacin # 10  -s/p voriconazole 200 mg PO q12h # 7  -s/p fluconazole 100 mg IV qd # 8  -pulmonary evaluation appreciated   -I am concerned that there is no definite diagnosis for his pulmonary illness and that abx therapy dose not seem to help so far  -on meropenem 1 gm IV q8h, doxycycline 100 mg IV q12h # 10 and caspofungin 50 mg IV qd # 3  -tolerating abx well so far; no side effects noted  -thoracic evaluation appreciated - conservative care recommended   -while doxycycline has MRSA coverage, some MRSA could be resistant  -renal function is improved  -d/c doxycycline  -start vancomycin 750 mg IV q12h  -reason for abx use and side effects reviewed with patient; monitor BMP and vancomycin trough levels  -f/u cultures  -continue abx coverage   -monitor temps  -f/u CBC  -supportive care  2. Other issues:   -care per medicine    d/w medicine team and Dr. Sheldon

## 2023-11-22 NOTE — PROGRESS NOTE ADULT - SUBJECTIVE AND OBJECTIVE BOX
INTERVAL HPI/OVERNIGHT EVENTS:  Patient S&E at bedside. No o/n events,   now on 2L nc  SW discussed with family and want to see how he feels the next few days prior to making a decision regarding placement   no complaints from pt today    PAST MEDICAL & SURGICAL HISTORY:  COPD (chronic obstructive pulmonary disease)      Lung cancer      Anemia of chronic disease      CKD (chronic kidney disease), stage III      Hypothyroidism      AAA (abdominal aortic aneurysm)      HTN (hypertension)      HLD (hyperlipidemia)      Thrombocytopenia      S/P lobectomy of lung          FAMILY HISTORY:      VITAL SIGNS:  T(F): 97.9 (11-21-23 @ 21:19)  HR: 104 (11-21-23 @ 21:19)  BP: 147/57 (11-21-23 @ 21:19)  RR: 18 (11-21-23 @ 21:19)  SpO2: 100% (11-21-23 @ 21:19)  Wt(kg): --    PHYSICAL EXAM:  onstitutional: NAD, [place]  Eyes: EOMI,   Respiratory: decreased bs on NC 2L, rhonchi  Cardiovascular: RRR,  Gastrointestinal: soft, NTND,   Extremities: no c/c/e    MEDICATIONS  (STANDING):  albuterol/ipratropium for Nebulization 3 milliLiter(s) Nebulizer every 6 hours  AQUAPHOR (petrolatum Ointment) 1 Application(s) Topical two times a day  atorvastatin 40 milliGRAM(s) Oral at bedtime  caspofungin IVPB 50 milliGRAM(s) IV Intermittent every 24 hours  caspofungin IVPB      dextrose 5%. 1000 milliLiter(s) (50 mL/Hr) IV Continuous <Continuous>  dextrose 5%. 1000 milliLiter(s) (100 mL/Hr) IV Continuous <Continuous>  dextrose 50% Injectable 25 Gram(s) IV Push once  dextrose 50% Injectable 25 Gram(s) IV Push once  dextrose 50% Injectable 12.5 Gram(s) IV Push once  ferrous    sulfate 325 milliGRAM(s) Oral daily  fluticasone furoate/umeclidinium/vilanterol 100-62.5-25 MICROgram(s) Inhaler 1 Puff(s) Inhalation daily  folic acid 1 milliGRAM(s) Oral daily  glucagon  Injectable 1 milliGRAM(s) IntraMuscular once  hydrALAZINE 10 milliGRAM(s) Oral two times a day  insulin lispro (ADMELOG) corrective regimen sliding scale   SubCutaneous three times a day before meals  insulin lispro (ADMELOG) corrective regimen sliding scale   SubCutaneous at bedtime  levothyroxine 12.5 MICROGram(s) Oral daily  meropenem Injectable 1000 milliGRAM(s) IV Push every 8 hours  pantoprazole  Injectable 40 milliGRAM(s) IV Push two times a day  polyethylene glycol 3350 17 Gram(s) Oral daily  vancomycin  IVPB 750 milliGRAM(s) IV Intermittent every 12 hours    MEDICATIONS  (PRN):  acetaminophen     Tablet .. 650 milliGRAM(s) Oral every 6 hours PRN Temp greater or equal to 38C (100.4F), Mild Pain (1 - 3)  aluminum hydroxide/magnesium hydroxide/simethicone Suspension 30 milliLiter(s) Oral every 4 hours PRN Dyspepsia  benzonatate 100 milliGRAM(s) Oral three times a day PRN Cough  dextrose Oral Gel 15 Gram(s) Oral once PRN Blood Glucose LESS THAN 70 milliGRAM(s)/deciliter  meclizine 12.5 milliGRAM(s) Oral every 8 hours PRN Dizziness  melatonin 3 milliGRAM(s) Oral at bedtime PRN Insomnia  ondansetron Injectable 4 milliGRAM(s) IV Push every 8 hours PRN Nausea and/or Vomiting      Allergies    penicillin (Unknown)    Intolerances        LABS:                        8.1    4.95  )-----------( 135      ( 22 Nov 2023 07:01 )             24.8     11-22    139  |  105  |  40<H>  ----------------------------<  93  4.4   |  30  |  1.01    Ca    8.5      22 Nov 2023 07:01  Phos  2.6     11-22  Mg     2.1     11-22    TPro  5.5<L>  /  Alb  1.8<L>  /  TBili  0.8  /  DBili  x   /  AST  50<H>  /  ALT  35  /  AlkPhos  103  11-22      Urinalysis Basic - ( 22 Nov 2023 07:01 )    Color: x / Appearance: x / SG: x / pH: x  Gluc: 93 mg/dL / Ketone: x  / Bili: x / Urobili: x   Blood: x / Protein: x / Nitrite: x   Leuk Esterase: x / RBC: x / WBC x   Sq Epi: x / Non Sq Epi: x / Bacteria: x        RADIOLOGY & ADDITIONAL TESTS:  Studies reviewed.

## 2023-11-22 NOTE — PROGRESS NOTE ADULT - SUBJECTIVE AND OBJECTIVE BOX
Date of service: 11-22-23 @ 08:34    Lying in bed in NAD  Has SOB with light exercise  Has dry cough  Case d/w Dr. Sheldon who raised the concern about possible MRSA    ROS: no fever or chills; denies dizziness, no HA, no abdominal pain, no diarrhea or constipation; no dysuria, no legs pain, no rashes    MEDICATIONS  (STANDING):  albuterol/ipratropium for Nebulization 3 milliLiter(s) Nebulizer every 6 hours  AQUAPHOR (petrolatum Ointment) 1 Application(s) Topical two times a day  atorvastatin 40 milliGRAM(s) Oral at bedtime  caspofungin IVPB 50 milliGRAM(s) IV Intermittent every 24 hours  caspofungin IVPB      dextrose 5%. 1000 milliLiter(s) (50 mL/Hr) IV Continuous <Continuous>  dextrose 5%. 1000 milliLiter(s) (100 mL/Hr) IV Continuous <Continuous>  dextrose 50% Injectable 25 Gram(s) IV Push once  dextrose 50% Injectable 12.5 Gram(s) IV Push once  dextrose 50% Injectable 25 Gram(s) IV Push once  ferrous    sulfate 325 milliGRAM(s) Oral daily  fluticasone furoate/umeclidinium/vilanterol 100-62.5-25 MICROgram(s) Inhaler 1 Puff(s) Inhalation daily  folic acid 1 milliGRAM(s) Oral daily  glucagon  Injectable 1 milliGRAM(s) IntraMuscular once  hydrALAZINE 10 milliGRAM(s) Oral two times a day  insulin lispro (ADMELOG) corrective regimen sliding scale   SubCutaneous at bedtime  insulin lispro (ADMELOG) corrective regimen sliding scale   SubCutaneous three times a day before meals  levothyroxine 12.5 MICROGram(s) Oral daily  meropenem Injectable 1000 milliGRAM(s) IV Push every 8 hours  pantoprazole  Injectable 40 milliGRAM(s) IV Push two times a day  polyethylene glycol 3350 17 Gram(s) Oral daily  vancomycin  IVPB 750 milliGRAM(s) IV Intermittent every 12 hours    Vital Signs Last 24 Hrs  T(C): 36.6 (21 Nov 2023 21:19), Max: 36.8 (21 Nov 2023 09:07)  T(F): 97.9 (21 Nov 2023 21:19), Max: 98.2 (21 Nov 2023 09:07)  HR: 104 (21 Nov 2023 21:19) (84 - 104)  BP: 147/57 (21 Nov 2023 21:19) (108/75 - 147/57)  BP(mean): --  RR: 18 (21 Nov 2023 21:19) (18 - 18)  SpO2: 100% (21 Nov 2023 21:19) (97% - 100%)    Parameters below as of 21 Nov 2023 21:19  Patient On (Oxygen Delivery Method): nasal cannula     Physical exam:    Constitutional:  No acute distress  HEENT: NC/AT, EOMI, PERRLA, conjunctivae clear; ears and nose atraumatic  Neck: supple; thyroid not palpable  Back: no tenderness  Respiratory: respiratory effort normal; few crackles at bases  Cardiovascular: S1S2 regular, no murmurs  Abdomen: soft, not tender, not distended, positive BS  Genitourinary: no suprapubic tenderness  Lymphatic: no LN palpable  Musculoskeletal: no muscle tenderness, no joint swelling or tenderness  Extremities: no pedal edema  Left lower leg laceration dressed  Neurological/ Psychiatric: AxOx3, moving all extremities  Skin: no rashes; no palpable lesions    Labs: reviewed                        8.1    4.95  )-----------( 135      ( 22 Nov 2023 07:01 )             24.8     11-22    139  |  105  |  40<H>  ----------------------------<  93  4.4   |  30  |  1.01    Ca    8.5      22 Nov 2023 07:01  Phos  2.6     11-22  Mg     2.1     11-22    TPro  5.5<L>  /  Alb  1.8<L>  /  TBili  0.8  /  DBili  x   /  AST  50<H>  /  ALT  35  /  AlkPhos  103  11-22    C-Reactive Protein, Serum: 24 mg/L (11-14-23 @ 07:03)  C-Reactive Protein, Serum: 34 mg/L (11-13-23 @ 06:30)  C-Reactive Protein, Serum: 46 mg/L (11-12-23 @ 06:55)  C-Reactive Protein, Serum: 70 mg/L (11-11-23 @ 06:31)                        7.8    5.36  )-----------( 135      ( 21 Nov 2023 06:57 )             23.5     11-21    141  |  107  |  45<H>  ----------------------------<  85  4.4   |  28  |  1.05    Ca    8.2<L>      21 Nov 2023 06:57    TPro  5.3<L>  /  Alb  1.6<L>  /  TBili  0.9  /  DBili  x   /  AST  44<H>  /  ALT  30  /  AlkPhos  102  11-21    C-Reactive Protein, Serum: 24 mg/L (11-14-23 @ 07:03)  C-Reactive Protein, Serum: 34 mg/L (11-13-23 @ 06:30)  C-Reactive Protein, Serum: 46 mg/L (11-12-23 @ 06:55)  C-Reactive Protein, Serum: 70 mg/L (11-11-23 @ 06:31)  C-Reactive Protein, Serum: 106 mg/L (11-10-23 @ 06:19)                        7.9    5.38  )-----------( 136      ( 20 Nov 2023 07:10 )             23.6     11-20    140  |  106  |  46<H>  ----------------------------<  90  4.4   |  29  |  0.94    Ca    8.2<L>      20 Nov 2023 07:10    C-Reactive Protein, Serum: 24 mg/L (11-14-23 @ 07:03)  C-Reactive Protein, Serum: 34 mg/L (11-13-23 @ 06:30)  C-Reactive Protein, Serum: 46 mg/L (11-12-23 @ 06:55)  C-Reactive Protein, Serum: 70 mg/L (11-11-23 @ 06:31)  C-Reactive Protein, Serum: 106 mg/L (11-10-23 @ 06:19)               8.2    11.74 )-----------( 259      ( 03 Nov 2023 07:15 )             25.2     11-03    133<L>  |  102  |  35<H>  ----------------------------<  205<H>  4.4   |  22  |  1.20    Ca    8.3<L>      03 Nov 2023 07:15    TPro  6.9  /  Alb  2.0<L>  /  TBili  0.5  /  DBili  x   /  AST  38<H>  /  ALT  50  /  AlkPhos  130<H>  11-02     LIVER FUNCTIONS - ( 02 Nov 2023 14:47 )  Alb: 2.0 g/dL / Pro: 6.9 gm/dL / ALK PHOS: 130 U/L / ALT: 50 U/L / AST: 38 U/L / GGT: x           Urinalysis (11-02 @ 16:40)  Urine Appearance: Clear  Protein, Urine: 100 mg/dL  Urine Microscopic-Add On (NC) (11-02 @ 16:40)  White Blood Cell - Urine: 0 /HPF  Red Blood Cell - Urine: 0 /HPF    (11-02 @ 14:47)  NotDetec    Culture - Acid Fast - Bronchial w/Smear (collected 09 Nov 2023 12:30)  Source: .Bronchial RIGHT LOWER LOBE BAL  Preliminary Report (11 Nov 2023 15:08):    Culture is being performed.    Culture - Fungal, Bronchial (collected 09 Nov 2023 12:30)  Source: .Bronchial RIGHT LOWER LOBE BAL  Preliminary Report (13 Nov 2023 12:07):    Rare Yeast    Culture - Bronchial (collected 09 Nov 2023 12:30)  Source: .Bronchial RIGHT LOWER LOBE BAL  Gram Stain (09 Nov 2023 23:14):    Rare polymorphonuclear leukocytes per low power field    Few Squamous epithelial cells per low power field    Few Gram positive cocci in pairs per oil power field  Final Report (11 Nov 2023 16:38):    Normal Respiratory Marguerite present    Culture - Blood (collected 04 Nov 2023 04:21)  Source: .Blood None  Final Report (09 Nov 2023 09:00):    No growth at 5 days    Culture - Blood (collected 04 Nov 2023 04:21)  Source: .Blood None  Final Report (09 Nov 2023 09:00):    No growth at 5 days    Radiology: all available radiological tests reviewed    < from: CT Chest No Cont (11.02.23 @ 16:04) >  Right upper lobectomy.  Larger right lower lobe thick-walled cavity which may be infectious   and/or residua of reported treated tumor. 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  Consolidation within the right lower and middle lobe compatible with radiation change.  Multiple indistinct nodules in the right lungwhich may be infectious or radiation pneumonitis.  < end of copied text >    < from: CT Abdomen and Pelvis No Cont (11.08.23 @ 19:01) >  No hydronephrosis.  New patchy opacities in the right lung base. Small right pleural effusion.  < end of copied text >    < from: CT Chest No Cont (11.13.23 @ 10:04) >  IMPRESSION: Right lung large areas of consolidation new since November 2, 2023 likely pneumonia.    Right apical previously described large cyst unchanged in size since   November 2, 2023 containing internal air and increasing internal opacification since November 2, 2023 as described above.     A few left lung ill-defined nonspecific groundglass opacities.   Differential diagnosis include but is not limited to   infectious/inflammatory etiologies and or pulmonary edema.  Small left pleural effusion new since November 8, 2023.  < end of copied text >    < from: CT Chest No Cont (11.20.23 @ 08:03) >  IMPRESSION: Extensive right lung consolidations as described above with   overall interval progression since November 13, 2023. Complete   opacification of the right middle lobe with internal consolidation and   volume loss appears unchanged since November 13, 2023 demonstrating  interval progression since November 2, 2023.    Persistent apical right apical large irregular cyst unchanged in size   since November 13, 2023.    < end of copied text >      Advanced directives addressed: full resuscitation

## 2023-11-22 NOTE — PROGRESS NOTE ADULT - SUBJECTIVE AND OBJECTIVE BOX
HOSPITALIST ATTENDING PROGRESS NOTE    Chart and meds reviewed.  Patient seen and examined.    CC: cough, weakness     11/21 - no events, afebrile, denies cp, dyspnea, + productive cough with brown mucus persist, poor po intake, plan discussed with wife at bedside  11/22 - pt seen in am, bilateral arm swelling, afebrile, + productive cough some streaks of blood , denies cp, abdominal pain, poor po intake    All other systems reviewed and found to be negative with the exception of what has been described above.    Vital sings reviewed for last 24 h  T(C): 36.6 (11-22-23 @ 15:52), Max: 36.8 (11-22-23 @ 09:28)  T(F): 97.9 (11-22-23 @ 15:52), Max: 98.2 (11-22-23 @ 09:28)  HR: 107 (11-22-23 @ 15:52) (84 - 107)  BP: 141/62 (11-22-23 @ 15:52) (125/76 - 147/57)  RR: 18 (11-22-23 @ 15:52) (18 - 18)  SpO2: 98% (11-22-23 @ 15:52) (92% - 100%)  CAPILLARY BLOOD GLUCOSE      POCT Blood Glucose.: 182 mg/dL (22 Nov 2023 18:12)  POCT Blood Glucose.: 145 mg/dL (22 Nov 2023 13:23)  POCT Blood Glucose.: 100 mg/dL (22 Nov 2023 07:52)  POCT Blood Glucose.: 201 mg/dL (21 Nov 2023 21:59)      Physical exam :   GEN: Frail, NAD  HEENT:  pupils equal and reactive, EOMI, no oropharyngeal lesions, erythema, exudates, oral thrush  NECK:   supple, no carotid bruits, no palpable lymph nodes, no thyromegaly  CV:  +S1, +S2, regular, no murmurs or rubs  RESP:  Bilateral ronchi  BREAST:  not examined  GI:  abdomen soft, non-tender, non-distended, normal BS, no bruits, no abdominal masses, no palpable masses  RECTAL:  not examined  :  not examined  MSK:   normal muscle tone, no atrophy, no rigidity, no contractions  EXT:  no clubbing, no cyanosis, no edema, no calf pain, BIlateral LE edema.   Left leg sutures  VASCULAR:  pulses equal and symmetric in the upper and lower extremities  NEURO:  AAOX1, no focal neurological deficits, follows all commands, able to move extremities spontaneously  SKIN:  no ulcers, lesions or rashes    All labs radiology and other studies reviewed and interpreted :                         8.1    4.95  )-----------( 135      ( 22 Nov 2023 07:01 )             24.8     11-22    139  |  105  |  40<H>  ----------------------------<  93  4.4   |  30  |  1.01    Ca    8.5      22 Nov 2023 07:01  Phos  2.6     11-22  Mg     2.1     11-22    TPro  5.5<L>  /  Alb  1.8<L>  /  TBili  0.8  /  DBili  x   /  AST  50<H>  /  ALT  35  /  AlkPhos  103  11-22        LIVER FUNCTIONS - ( 22 Nov 2023 07:01 )  Alb: 1.8 g/dL / Pro: 5.5 gm/dL / ALK PHOS: 103 U/L / ALT: 35 U/L / AST: 50 U/L / GGT: x                      CT Chest No Cont (11.20.23 @ 08:03) >  IMPRESSION: Extensive right lung consolidations as described above with   overall interval progression since November 13, 2023. Complete   opacification of the right middle lobe with internal consolidation and   volume loss appears unchanged since November 13, 2023 demonstrating  interval progression since November 2, 2023.    Persistent apical right apical large irregular cyst unchanged in size   since November 13, 2023.    Small left pleural effusion with subcutaneous edema.    Trace amount of ascites, increased in size since November 13, 2023.    Xray Chest 1 View- PORTABLE-Routine (Xray Chest 1 View- PORTABLE-Routine .) (11.16.23 @ 12:32) >  IMPRESSION: Slight progression of pneumonia since November 11      MEDICATIONS  (STANDING):  albuterol/ipratropium for Nebulization 3 milliLiter(s) Nebulizer every 6 hours  AQUAPHOR (petrolatum Ointment) 1 Application(s) Topical two times a day  atorvastatin 40 milliGRAM(s) Oral at bedtime  caspofungin IVPB      caspofungin IVPB 50 milliGRAM(s) IV Intermittent every 24 hours  dextrose 5%. 1000 milliLiter(s) (50 mL/Hr) IV Continuous <Continuous>  dextrose 5%. 1000 milliLiter(s) (100 mL/Hr) IV Continuous <Continuous>  dextrose 50% Injectable 25 Gram(s) IV Push once  dextrose 50% Injectable 12.5 Gram(s) IV Push once  dextrose 50% Injectable 25 Gram(s) IV Push once  doxycycline IVPB 100 milliGRAM(s) IV Intermittent every 12 hours  ferrous    sulfate 325 milliGRAM(s) Oral daily  fluticasone furoate/umeclidinium/vilanterol 100-62.5-25 MICROgram(s) Inhaler 1 Puff(s) Inhalation daily  folic acid 1 milliGRAM(s) Oral daily  glucagon  Injectable 1 milliGRAM(s) IntraMuscular once  hydrALAZINE 10 milliGRAM(s) Oral two times a day  insulin lispro (ADMELOG) corrective regimen sliding scale   SubCutaneous three times a day before meals  insulin lispro (ADMELOG) corrective regimen sliding scale   SubCutaneous at bedtime  levothyroxine 12.5 MICROGram(s) Oral daily  meropenem Injectable 1000 milliGRAM(s) IV Push every 8 hours  pantoprazole  Injectable 40 milliGRAM(s) IV Push two times a day  polyethylene glycol 3350 17 Gram(s) Oral daily    MEDICATIONS  (PRN):  acetaminophen     Tablet .. 650 milliGRAM(s) Oral every 6 hours PRN Temp greater or equal to 38C (100.4F), Mild Pain (1 - 3)  aluminum hydroxide/magnesium hydroxide/simethicone Suspension 30 milliLiter(s) Oral every 4 hours PRN Dyspepsia  benzonatate 100 milliGRAM(s) Oral three times a day PRN Cough  dextrose Oral Gel 15 Gram(s) Oral once PRN Blood Glucose LESS THAN 70 milliGRAM(s)/deciliter  meclizine 12.5 milliGRAM(s) Oral every 8 hours PRN Dizziness  melatonin 3 milliGRAM(s) Oral at bedtime PRN Insomnia  ondansetron Injectable 4 milliGRAM(s) IV Push every 8 hours PRN Nausea and/or Vomiting

## 2023-11-22 NOTE — PROGRESS NOTE ADULT - SUBJECTIVE AND OBJECTIVE BOX
Subjective:    Review of Systems:  All 10 systems reviewed in detailed and found to be negative with the exception of what has already been described above    Allergies:  penicillin (Unknown)    Meds  MEDICATIONS  (STANDING):  albuterol/ipratropium for Nebulization 3 milliLiter(s) Nebulizer every 6 hours  AQUAPHOR (petrolatum Ointment) 1 Application(s) Topical two times a day  atorvastatin 40 milliGRAM(s) Oral at bedtime  caspofungin IVPB 50 milliGRAM(s) IV Intermittent every 24 hours  caspofungin IVPB      dextrose 5%. 1000 milliLiter(s) (100 mL/Hr) IV Continuous <Continuous>  dextrose 5%. 1000 milliLiter(s) (50 mL/Hr) IV Continuous <Continuous>  dextrose 50% Injectable 12.5 Gram(s) IV Push once  dextrose 50% Injectable 25 Gram(s) IV Push once  dextrose 50% Injectable 25 Gram(s) IV Push once  ferrous    sulfate 325 milliGRAM(s) Oral daily  fluticasone furoate/umeclidinium/vilanterol 100-62.5-25 MICROgram(s) Inhaler 1 Puff(s) Inhalation daily  folic acid 1 milliGRAM(s) Oral daily  glucagon  Injectable 1 milliGRAM(s) IntraMuscular once  heparin   Injectable 5000 Unit(s) SubCutaneous every 12 hours  hydrALAZINE 10 milliGRAM(s) Oral two times a day  insulin lispro (ADMELOG) corrective regimen sliding scale   SubCutaneous three times a day before meals  insulin lispro (ADMELOG) corrective regimen sliding scale   SubCutaneous at bedtime  levothyroxine 12.5 MICROGram(s) Oral daily  meropenem Injectable 1000 milliGRAM(s) IV Push every 8 hours  pantoprazole  Injectable 40 milliGRAM(s) IV Push two times a day  polyethylene glycol 3350 17 Gram(s) Oral daily  vancomycin  IVPB 750 milliGRAM(s) IV Intermittent every 12 hours    MEDICATIONS  (PRN):  acetaminophen     Tablet .. 650 milliGRAM(s) Oral every 6 hours PRN Temp greater or equal to 38C (100.4F), Mild Pain (1 - 3)  aluminum hydroxide/magnesium hydroxide/simethicone Suspension 30 milliLiter(s) Oral every 4 hours PRN Dyspepsia  benzonatate 100 milliGRAM(s) Oral three times a day PRN Cough  dextrose Oral Gel 15 Gram(s) Oral once PRN Blood Glucose LESS THAN 70 milliGRAM(s)/deciliter  meclizine 12.5 milliGRAM(s) Oral every 8 hours PRN Dizziness  melatonin 3 milliGRAM(s) Oral at bedtime PRN Insomnia  ondansetron Injectable 4 milliGRAM(s) IV Push every 8 hours PRN Nausea and/or Vomiting    Physical Exam  T(C): 36.8 (11-22-23 @ 09:28), Max: 36.8 (11-22-23 @ 09:28)  HR: 103 (11-22-23 @ 09:28) (84 - 107)  BP: 125/76 (11-22-23 @ 09:28) (121/64 - 147/57)  RR: 18 (11-22-23 @ 09:28) (18 - 18)  SpO2: 93% (11-22-23 @ 09:28) (92% - 100%)  Gen: Alert, oriented, no distress  HEENT: Anicteric sclera, moist mucous membranes  Cardio: Regular rhythm and rate, normal S1S2, no murmurs  Resp: Crackles, congested  GI: Nontender, nondistended, normoactive bowel sounds  Ext: No cyanosis, clubbing or edema  skin: + ecchymosis, left leg laceration sutured and bandaged  Neuro: Nonfocal    Labs:                        8.1    4.95  )-----------( 135      ( 22 Nov 2023 07:01 )             24.8     11-22    139  |  105  |  40<H>  ----------------------------<  93  4.4   |  30  |  1.01    Ca    8.5      22 Nov 2023 07:01  Phos  2.6     11-22  Mg     2.1     11-22    TPro  5.5<L>  /  Alb  1.8<L>  /  TBili  0.8  /  DBili  x   /  AST  50<H>  /  ALT  35  /  AlkPhos  103  11-22      Urinalysis Basic - ( 22 Nov 2023 07:01 )    Color: x / Appearance: x / SG: x / pH: x  Gluc: 93 mg/dL / Ketone: x  / Bili: x / Urobili: x   Blood: x / Protein: x / Nitrite: x   Leuk Esterase: x / RBC: x / WBC x   Sq Epi: x / Non Sq Epi: x / Bacteria: x    < from: CT Chest No Cont (11.02.23 @ 16:04) >  ACC: [id number] EXAM:  CT CHEST   ORDERED BY: SARAH YOO     PROCEDURE DATE:  11/02/2023          INTERPRETATION:  INDICATION: Shortness of breath, cough, lung cancer   history, last chemotherapy and radiation treatment in May 2023    TECHNIQUE: Helical acquisition images of the chest without intravenous   contrast. Maximum intensity projection images were generated.    COMPARISON: 12/29/2020 chest x-ray.    FINDINGS:    LUNGS/AIRWAYS/PLEURA: Right upper lobectomy. Right lower lobe   multiloculated thick-walled 11 cm cavity (best seen in its entirety on   601-59) containing fluid and spongelike material. Consolidation within   the middle lobe and inferior aspect of the right lower lobe with angular   borders suggesting prior radiation. Multiple indistinct nodules of   varying density in the right lower and middle lobes, mostly adjacent to   the cavity. Small branching opacities in the peripheral left upper and   lower lobes, compatible with distal airway impaction. Trace left pleural   effusion. Mild right apical pleural thickening.    LYMPH NODES/MEDIASTINUM: No lymphadenopathy.    HEART/VASCULATURE: Normal heart size. Unremarkable pericardium. Coronary   artery calcifications. Normal caliber aorta.    UPPER ABDOMEN: Unremarkable.    BONES/SOFT TISSUES: Old sternal fracture. Mild loss of height of the L1   vertebral body.      IMPRESSION:    Right upper lobectomy.    Larger right lower lobe thick-walled cavity which may be infectious   and/or residua of reported treated tumor. 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    Consolidation within the right lower and middle lobe compatible with   radiation change.    Multiple indistinct nodules in the right lungwhich may be infectious or   radiation pneumonitis.    ct lucio 10/3 uploaded and reviewed    < from: CT Abdomen and Pelvis No Cont (11.08.23 @ 19:01) >  PROCEDURE DATE:  11/08/2023          INTERPRETATION:  CLINICAL INFORMATION: Acute kidney injury. Evaluate   obstruction.    COMPARISON: CT chest 11/20/2023.    CONTRAST/COMPLICATIONS:  IV Contrast: NONE  Oral Contrast: NONE  Complications: None reported at time of study completion    PROCEDURE:  CT of the Abdomen and Pelvis was performed.  Sagittal and coronal reformats were performed.    FINDINGS:  LOWER CHEST: Small right pleural effusion. New patchy opacities in the   right lung base.    LIVER: Within normal limits.  BILE DUCTS: Normal caliber.  GALLBLADDER: Within normal limits.  SPLEEN: Within normal limits.  PANCREAS: Within normal limits.  ADRENALS: Within normal limits.  KIDNEYS/URETERS: No hydronephrosis. Vascular calcifications.    BLADDER: Within normal limits.  REPRODUCTIVE ORGANS: Prostate within normal limits.    BOWEL: No bowel obstruction. Appendix is normal. Moderate to large stool.  PERITONEUM: No ascites.  VESSELS: Atherosclerotic changes.  RETROPERITONEUM/LYMPH NODES: No lymphadenopathy.  ABDOMINAL WALL: Within normal limits.  BONES: Degenerative changes. Redemonstration of L1 compression deformity.   Right femoral ORIF.    IMPRESSION:  No hydronephrosis.    New patchy opacities in the right lung base. Small right pleural effusion.    Bedside Swallow: Trial 1  · Consistencies administered	thin liquid; pureed; regular solid  · Mode of Presentation	cup; spoon; self fed  · Positioning	upright (90 degrees)  · Oral Preparatory Phase	Within functional limits; orientation eating routines: able to feed himself with focus: as per family, pt eats rapidly: did not do so at bedside evaluation Lip seal on utensil : oral containment..  · Oral Phase	Within functional limits; immediate bolus formation: Ap transfer for liquid WFL. Mastication normally rotary-lateral with lingual sweep for collection and clearance.  l  · Pharyngeal Phase	Within functional limits; Onset of pharyngeal swallow is with age-appropriate time limits. No overt s/s aspiration at bedside.  · Comments	Micro aspiration and silent aspiration cannot be ruled out. As above, pt does have increased risk for aspiration 2/2 lung CA and  lobectomy as well as COPD and present pna (which in itself may be aspiration related). However, pt presents at bedside evaluation with no contraindication for maintaining REGULAR texture and thin liquids.  Strategies for reducing risk were demonstrated and discussed with the Pt and the family who are very supportive of the Pt.  Disc with NSg.  Will follow peripherally.    < from: CT Chest No Cont (11.13.23 @ 10:04) >    PROCEDURE DATE:  11/13/2023          INTERPRETATION:  Clinical indications: Pneumonia.    Axial CT images of the chest are obtained without intravenous   administration of contrast.    Comparison is made with the prior chest CT of November 2, 2023.    No enlarged axillary or mediastinal lymph nodes.    No pericardial effusion. Heart size is normal. Mitral annular   calcifications. Vascular calcifications with involvement of the aorta and   the coronary arteries. Aortic root calcifications.    Evaluation of the upper abdomen demonstrate partially imaged aortic stent   graft. Subcutaneous edema. Minimal perihepatic ascites.    Small left pleural effusion new since the abdominalCT of November 8, 2023. Trace right pleural effusion as on November 8, 2023.    Evaluation of the lungs demonstrate left lung base linear subsegmental   atelectasis. A few ill-defined left lung patchy nonspecific groundglass   opacities largest within the dependent portion of the left upper lobe are   new since November 2, 2023.    Status post right upper lobectomy with postoperative changes. Persistent   right apical large large cyst, part of which measures about 6.6 cm in   transverse dimension without significant interval change in size   containing air and increasing internal opacification as compared with   November 2, 2023, some of which appear slightly dense and may be due to   internal hemorrhagic components.    Extensive right mid to lower lung consolidations and ill-defined   groundglass opacities, with the largest confluent consolidation within   the mid to lower portions of the right lower lobe, majority of which   appear new since November 2, 2023 superimposed on previouslydescribed   linear opacities.    Degenerative changes of the spine. Old healed sternal fracture.    IMPRESSION: Right lung large areas of consolidation new since November 2, 2023 likely pneumonia.    Right apical previously described large cyst unchanged in size since   November 2, 2023 containing internal air and increasing internal   opacification since November 2, 2023 as described above. Continued   follow-up is recommended.    A few left lung ill-defined nonspecific groundglass opacities.   Differential diagnosis include but is not limited to   infectious/inflammatory etiologies and or pulmonary edema.    Small left pleural effusion new since November 8, 2023.      Culture - Acid Fast - Bronchial w/Smear (11.09.23 @ 12:30)   Specimen Source: .Bronchial RIGHT LOWER LOBE BAL  Acid Fast Bacilli Smear:   No acid-fast bacilli seen by fluorochrome stain  Culture Results:   Culture is being performed.  Culture - Fungal, Bronchial (11.09.23 @ 12:30)   Specimen Source: .Bronchial RIGHT LOWER LOBE BAL  Culture Results:   Rare Yeast    < from: CT Chest No Cont (11.20.23 @ 08:03) >  PROCEDURE DATE:  11/20/2023          INTERPRETATION:  Clinical indication: Right lung infection.    Axial CT images of the chest are obtained without intravenous   administration of contrast.    Comparison is made with the prior chest CT of November 13, 2023.    Enlarged axillary or mediastinal lymph nodes. Heart size is normal.   Vascular calcifications with involvement of the aorta and the coronary   arteries. Minimal pericardial fluid.    Evaluation of the upper abdomen demonstrate trace perihepatic and   perisplenic ascites slightly increased in size since November 13, 2023.   1.2 cm hypodensity arising from the upper pole of the partially imaged   left kidney without significant interval change.    Few subcutaneous edema.    Small left pleural effusion is unchanged. There may be a trace right   pleural effusion versus pleural thickening without significant interval   change.    Evaluation of the lungs demonstrate interval resolution of the previously   seen groundglass opacity within the dependent portion of the left upper   lobe. Interval near complete resolution of a previously seen left upper   lobe peripheral groundglass opacity. A few other ill-defined left lung   patchy groundglass opacities unchanged. 4 mm left lower lobe nodular   opacity on image 73 of series 2 appears new since November 13, 2023 may   be sequela of impacted distal airway.    Status post right upper lobectomy. Previously described right apical   large irregular cyst, part of which measures about 7 cm in transverse   dimension containing internal air, fluid and debris is without   significant interval change in size.  Ill-defined consolidations and groundglass opacities throughoutthe   majority of the right lung demonstrate mild overall interval progression   since November 13, 2023. For reference a few dense patchy and nodular   opacities at the right lung base are either new or demonstrate interval   increase in size.  Complete opacification of the right middle lobe with internal   consolidation appears unchanged since November 13, 2023, demonstrating   interval progression since November 2, 2023. Nonvisualization of the   internal segmental and subsegmental airways of the right middle lobe as   on the prior study.    Degenerative changes of the spine. Old healed left rib and old healed   sternal fractures. Mild superior endplate loss of height of the L1   vertebral body is unchanged.    IMPRESSION: Extensive right lung consolidations as described above with   overall interval progression since November 13, 2023. Complete   opacification of the right middle lobe with internal consolidation and   volume loss appears unchanged since November 13, 2023 demonstrating  interval progression since November 2, 2023.    Persistent apical right apical large irregular cyst unchanged in size   since November 13, 2023.    Small left pleural effusion with subcutaneous edema.    Trace amount of ascites, increased in size since November 13, 2023. Subjective:   sitting in chair comfortable and interacting with family at bedside  started on vancomycin    Review of Systems:  All 10 systems reviewed in detailed and found to be negative with the exception of what has already been described above    Allergies:  penicillin (Unknown)    Meds  MEDICATIONS  (STANDING):  albuterol/ipratropium for Nebulization 3 milliLiter(s) Nebulizer every 6 hours  AQUAPHOR (petrolatum Ointment) 1 Application(s) Topical two times a day  atorvastatin 40 milliGRAM(s) Oral at bedtime  caspofungin IVPB 50 milliGRAM(s) IV Intermittent every 24 hours  caspofungin IVPB      dextrose 5%. 1000 milliLiter(s) (100 mL/Hr) IV Continuous <Continuous>  dextrose 5%. 1000 milliLiter(s) (50 mL/Hr) IV Continuous <Continuous>  dextrose 50% Injectable 12.5 Gram(s) IV Push once  dextrose 50% Injectable 25 Gram(s) IV Push once  dextrose 50% Injectable 25 Gram(s) IV Push once  ferrous    sulfate 325 milliGRAM(s) Oral daily  fluticasone furoate/umeclidinium/vilanterol 100-62.5-25 MICROgram(s) Inhaler 1 Puff(s) Inhalation daily  folic acid 1 milliGRAM(s) Oral daily  glucagon  Injectable 1 milliGRAM(s) IntraMuscular once  heparin   Injectable 5000 Unit(s) SubCutaneous every 12 hours  hydrALAZINE 10 milliGRAM(s) Oral two times a day  insulin lispro (ADMELOG) corrective regimen sliding scale   SubCutaneous three times a day before meals  insulin lispro (ADMELOG) corrective regimen sliding scale   SubCutaneous at bedtime  levothyroxine 12.5 MICROGram(s) Oral daily  meropenem Injectable 1000 milliGRAM(s) IV Push every 8 hours  pantoprazole  Injectable 40 milliGRAM(s) IV Push two times a day  polyethylene glycol 3350 17 Gram(s) Oral daily  vancomycin  IVPB 750 milliGRAM(s) IV Intermittent every 12 hours    MEDICATIONS  (PRN):  acetaminophen     Tablet .. 650 milliGRAM(s) Oral every 6 hours PRN Temp greater or equal to 38C (100.4F), Mild Pain (1 - 3)  aluminum hydroxide/magnesium hydroxide/simethicone Suspension 30 milliLiter(s) Oral every 4 hours PRN Dyspepsia  benzonatate 100 milliGRAM(s) Oral three times a day PRN Cough  dextrose Oral Gel 15 Gram(s) Oral once PRN Blood Glucose LESS THAN 70 milliGRAM(s)/deciliter  meclizine 12.5 milliGRAM(s) Oral every 8 hours PRN Dizziness  melatonin 3 milliGRAM(s) Oral at bedtime PRN Insomnia  ondansetron Injectable 4 milliGRAM(s) IV Push every 8 hours PRN Nausea and/or Vomiting    Physical Exam  T(C): 36.8 (11-22-23 @ 09:28), Max: 36.8 (11-22-23 @ 09:28)  HR: 103 (11-22-23 @ 09:28) (84 - 107)  BP: 125/76 (11-22-23 @ 09:28) (121/64 - 147/57)  RR: 18 (11-22-23 @ 09:28) (18 - 18)  SpO2: 93% (11-22-23 @ 09:28) (92% - 100%)  Gen: Alert, oriented, no distress  HEENT: Anicteric sclera, moist mucous membranes  Cardio: Regular rhythm and rate, normal S1S2, no murmurs  Resp: Crackles, congested  GI: Nontender, nondistended, normoactive bowel sounds  Ext: No cyanosis, clubbing or edema  skin: + ecchymosis, left leg laceration sutured and bandaged  Neuro: Nonfocal    Labs:                        8.1    4.95  )-----------( 135      ( 22 Nov 2023 07:01 )             24.8     11-22    139  |  105  |  40<H>  ----------------------------<  93  4.4   |  30  |  1.01    Ca    8.5      22 Nov 2023 07:01  Phos  2.6     11-22  Mg     2.1     11-22    TPro  5.5<L>  /  Alb  1.8<L>  /  TBili  0.8  /  DBili  x   /  AST  50<H>  /  ALT  35  /  AlkPhos  103  11-22      Urinalysis Basic - ( 22 Nov 2023 07:01 )    Color: x / Appearance: x / SG: x / pH: x  Gluc: 93 mg/dL / Ketone: x  / Bili: x / Urobili: x   Blood: x / Protein: x / Nitrite: x   Leuk Esterase: x / RBC: x / WBC x   Sq Epi: x / Non Sq Epi: x / Bacteria: x    < from: CT Chest No Cont (11.02.23 @ 16:04) >  ACC: [id number] EXAM:  CT CHEST   ORDERED BY: SARAH YOO     PROCEDURE DATE:  11/02/2023          INTERPRETATION:  INDICATION: Shortness of breath, cough, lung cancer   history, last chemotherapy and radiation treatment in May 2023    TECHNIQUE: Helical acquisition images of the chest without intravenous   contrast. Maximum intensity projection images were generated.    COMPARISON: 12/29/2020 chest x-ray.    FINDINGS:    LUNGS/AIRWAYS/PLEURA: Right upper lobectomy. Right lower lobe   multiloculated thick-walled 11 cm cavity (best seen in its entirety on   601-59) containing fluid and spongelike material. Consolidation within   the middle lobe and inferior aspect of the right lower lobe with angular   borders suggesting prior radiation. Multiple indistinct nodules of   varying density in the right lower and middle lobes, mostly adjacent to   the cavity. Small branching opacities in the peripheral left upper and   lower lobes, compatible with distal airway impaction. Trace left pleural   effusion. Mild right apical pleural thickening.    LYMPH NODES/MEDIASTINUM: No lymphadenopathy.    HEART/VASCULATURE: Normal heart size. Unremarkable pericardium. Coronary   artery calcifications. Normal caliber aorta.    UPPER ABDOMEN: Unremarkable.    BONES/SOFT TISSUES: Old sternal fracture. Mild loss of height of the L1   vertebral body.      IMPRESSION:    Right upper lobectomy.    Larger right lower lobe thick-walled cavity which may be infectious   and/or residua of reported treated tumor. 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    Consolidation within the right lower and middle lobe compatible with   radiation change.    Multiple indistinct nodules in the right lungwhich may be infectious or   radiation pneumonitis.    ct lucio 10/3 uploaded and reviewed    < from: CT Abdomen and Pelvis No Cont (11.08.23 @ 19:01) >  PROCEDURE DATE:  11/08/2023          INTERPRETATION:  CLINICAL INFORMATION: Acute kidney injury. Evaluate   obstruction.    COMPARISON: CT chest 11/20/2023.    CONTRAST/COMPLICATIONS:  IV Contrast: NONE  Oral Contrast: NONE  Complications: None reported at time of study completion    PROCEDURE:  CT of the Abdomen and Pelvis was performed.  Sagittal and coronal reformats were performed.    FINDINGS:  LOWER CHEST: Small right pleural effusion. New patchy opacities in the   right lung base.    LIVER: Within normal limits.  BILE DUCTS: Normal caliber.  GALLBLADDER: Within normal limits.  SPLEEN: Within normal limits.  PANCREAS: Within normal limits.  ADRENALS: Within normal limits.  KIDNEYS/URETERS: No hydronephrosis. Vascular calcifications.    BLADDER: Within normal limits.  REPRODUCTIVE ORGANS: Prostate within normal limits.    BOWEL: No bowel obstruction. Appendix is normal. Moderate to large stool.  PERITONEUM: No ascites.  VESSELS: Atherosclerotic changes.  RETROPERITONEUM/LYMPH NODES: No lymphadenopathy.  ABDOMINAL WALL: Within normal limits.  BONES: Degenerative changes. Redemonstration of L1 compression deformity.   Right femoral ORIF.    IMPRESSION:  No hydronephrosis.    New patchy opacities in the right lung base. Small right pleural effusion.    Bedside Swallow: Trial 1  · Consistencies administered	thin liquid; pureed; regular solid  · Mode of Presentation	cup; spoon; self fed  · Positioning	upright (90 degrees)  · Oral Preparatory Phase	Within functional limits; orientation eating routines: able to feed himself with focus: as per family, pt eats rapidly: did not do so at bedside evaluation Lip seal on utensil : oral containment..  · Oral Phase	Within functional limits; immediate bolus formation: Ap transfer for liquid WFL. Mastication normally rotary-lateral with lingual sweep for collection and clearance.  l  · Pharyngeal Phase	Within functional limits; Onset of pharyngeal swallow is with age-appropriate time limits. No overt s/s aspiration at bedside.  · Comments	Micro aspiration and silent aspiration cannot be ruled out. As above, pt does have increased risk for aspiration 2/2 lung CA and  lobectomy as well as COPD and present pna (which in itself may be aspiration related). However, pt presents at bedside evaluation with no contraindication for maintaining REGULAR texture and thin liquids.  Strategies for reducing risk were demonstrated and discussed with the Pt and the family who are very supportive of the Pt.  Disc with NSg.  Will follow peripherally.    < from: CT Chest No Cont (11.13.23 @ 10:04) >    PROCEDURE DATE:  11/13/2023          INTERPRETATION:  Clinical indications: Pneumonia.    Axial CT images of the chest are obtained without intravenous   administration of contrast.    Comparison is made with the prior chest CT of November 2, 2023.    No enlarged axillary or mediastinal lymph nodes.    No pericardial effusion. Heart size is normal. Mitral annular   calcifications. Vascular calcifications with involvement of the aorta and   the coronary arteries. Aortic root calcifications.    Evaluation of the upper abdomen demonstrate partially imaged aortic stent   graft. Subcutaneous edema. Minimal perihepatic ascites.    Small left pleural effusion new since the abdominalCT of November 8, 2023. Trace right pleural effusion as on November 8, 2023.    Evaluation of the lungs demonstrate left lung base linear subsegmental   atelectasis. A few ill-defined left lung patchy nonspecific groundglass   opacities largest within the dependent portion of the left upper lobe are   new since November 2, 2023.    Status post right upper lobectomy with postoperative changes. Persistent   right apical large large cyst, part of which measures about 6.6 cm in   transverse dimension without significant interval change in size   containing air and increasing internal opacification as compared with   November 2, 2023, some of which appear slightly dense and may be due to   internal hemorrhagic components.    Extensive right mid to lower lung consolidations and ill-defined   groundglass opacities, with the largest confluent consolidation within   the mid to lower portions of the right lower lobe, majority of which   appear new since November 2, 2023 superimposed on previouslydescribed   linear opacities.    Degenerative changes of the spine. Old healed sternal fracture.    IMPRESSION: Right lung large areas of consolidation new since November 2, 2023 likely pneumonia.    Right apical previously described large cyst unchanged in size since   November 2, 2023 containing internal air and increasing internal   opacification since November 2, 2023 as described above. Continued   follow-up is recommended.    A few left lung ill-defined nonspecific groundglass opacities.   Differential diagnosis include but is not limited to   infectious/inflammatory etiologies and or pulmonary edema.    Small left pleural effusion new since November 8, 2023.      Culture - Acid Fast - Bronchial w/Smear (11.09.23 @ 12:30)   Specimen Source: .Bronchial RIGHT LOWER LOBE BAL  Acid Fast Bacilli Smear:   No acid-fast bacilli seen by fluorochrome stain  Culture Results:   Culture is being performed.  Culture - Fungal, Bronchial (11.09.23 @ 12:30)   Specimen Source: .Bronchial RIGHT LOWER LOBE BAL  Culture Results:   Rare Yeast    < from: CT Chest No Cont (11.20.23 @ 08:03) >  PROCEDURE DATE:  11/20/2023          INTERPRETATION:  Clinical indication: Right lung infection.    Axial CT images of the chest are obtained without intravenous   administration of contrast.    Comparison is made with the prior chest CT of November 13, 2023.    Enlarged axillary or mediastinal lymph nodes. Heart size is normal.   Vascular calcifications with involvement of the aorta and the coronary   arteries. Minimal pericardial fluid.    Evaluation of the upper abdomen demonstrate trace perihepatic and   perisplenic ascites slightly increased in size since November 13, 2023.   1.2 cm hypodensity arising from the upper pole of the partially imaged   left kidney without significant interval change.    Few subcutaneous edema.    Small left pleural effusion is unchanged. There may be a trace right   pleural effusion versus pleural thickening without significant interval   change.    Evaluation of the lungs demonstrate interval resolution of the previously   seen groundglass opacity within the dependent portion of the left upper   lobe. Interval near complete resolution of a previously seen left upper   lobe peripheral groundglass opacity. A few other ill-defined left lung   patchy groundglass opacities unchanged. 4 mm left lower lobe nodular   opacity on image 73 of series 2 appears new since November 13, 2023 may   be sequela of impacted distal airway.    Status post right upper lobectomy. Previously described right apical   large irregular cyst, part of which measures about 7 cm in transverse   dimension containing internal air, fluid and debris is without   significant interval change in size.  Ill-defined consolidations and groundglass opacities throughoutthe   majority of the right lung demonstrate mild overall interval progression   since November 13, 2023. For reference a few dense patchy and nodular   opacities at the right lung base are either new or demonstrate interval   increase in size.  Complete opacification of the right middle lobe with internal   consolidation appears unchanged since November 13, 2023, demonstrating   interval progression since November 2, 2023. Nonvisualization of the   internal segmental and subsegmental airways of the right middle lobe as   on the prior study.    Degenerative changes of the spine. Old healed left rib and old healed   sternal fractures. Mild superior endplate loss of height of the L1   vertebral body is unchanged.    IMPRESSION: Extensive right lung consolidations as described above with   overall interval progression since November 13, 2023. Complete   opacification of the right middle lobe with internal consolidation and   volume loss appears unchanged since November 13, 2023 demonstrating  interval progression since November 2, 2023.    Persistent apical right apical large irregular cyst unchanged in size   since November 13, 2023.    Small left pleural effusion with subcutaneous edema.    Trace amount of ascites, increased in size since November 13, 2023.    < from: US Duplex Venous Upper Ext Complete, Bilateral (11.22.23 @ 13:13) >  PROCEDURE DATE:  11/22/2023          INTERPRETATION:  CLINICAL INFORMATION: Arm edema    COMPARISON: None available.    TECHNIQUE: Duplex sonography of the BILATERAL upper extremity veins with   color and spectral Doppler, with and without compression.    FINDINGS:    RIGHT:  The right internal jugular, subclavian, axillary, brachial, radial and   ulnar veins are patent and compressible where applicable.  There is   thrombus within the cephalic vein from the mid upper arm to the   antecubital fossa. There is thrombus within the median cubital vein.  Basilic vein not visualized.    LEFT:  The left internal jugular, subclavian, axillary, brachial, radialand   ulnar veins are patent and compressible where applicable.  The basilic   vein (superficial vein) is patent and without thrombus.  There is   thrombus within the cephalic vein in the mid upper arm extending into the   antecubital fossa and median cubital vein.    Doppler examination shows normal spontaneous and phasic flow.    Atherosclerotic changes of the aorta are noted.    Bilateral subcutaneous edema.    IMPRESSION:  No evidence of deep venous thrombosis in either upper extremity.    Superficial thrombophlebitis involving the bilateral cephalic and median   cubital veins.    < end of copied text >

## 2023-11-22 NOTE — PROGRESS NOTE ADULT - ASSESSMENT
82 y/o Male with h/o lung CA [place] [facility] [place], s/p right upper lobectomy, HTN, HPL, Chronic anemia, COPD, AAA, Hypothyroidism, CKD stage IIIa was admitted on 11/2 for increased shortness of breath, dyspnea on minimal exertion, and cough.    # h/o lung cancer   [place] [facility] [place] carbo/taxol with salvage RT 5/5/2023 after progression in subcarinol node after adjuvant atezolizumab (LD 12/20/22)  - pt being covered for radiation induced pneumonitis with steroids   - now presently on 3 L NC - needs home O2 evaluation when stabilizes  - more likely infectious than malignant cause of current condition  - pall care consult appreciated- family discussion planned     # PNA/ Pneumonitis   - BAL with rare yeast - CONTINUE  meropenem 1 gm IV q12h, and caspofungin as per ID- switched from doxycycline to vancomycin today   - followed by pulm and ct surgery- no intervention  - repeat CT chest 11/20- when compared with 11/13 with complete opacification of RML w/ internal consolidation and volume loss appears unchanged and extensive right lung consolidations w/ overall progression.    # anemia- likely related to myelosuppression from treatment history/pna  - s/p 1u pRBC 11/19 with improvement of Hb from 7.1 to 7.9->8.1 today     palll care  Discussed with [facility]  will continue to follow

## 2023-11-23 LAB
ADD ON TEST-SPECIMEN IN LAB: SIGNIFICANT CHANGE UP
ADD ON TEST-SPECIMEN IN LAB: SIGNIFICANT CHANGE UP
ALBUMIN SERPL ELPH-MCNC: 1.8 G/DL — LOW (ref 3.3–5)
ALBUMIN SERPL ELPH-MCNC: 1.8 G/DL — LOW (ref 3.3–5)
ALP SERPL-CCNC: 118 U/L — SIGNIFICANT CHANGE UP (ref 40–120)
ALP SERPL-CCNC: 118 U/L — SIGNIFICANT CHANGE UP (ref 40–120)
ALT FLD-CCNC: 35 U/L — SIGNIFICANT CHANGE UP (ref 12–78)
ALT FLD-CCNC: 35 U/L — SIGNIFICANT CHANGE UP (ref 12–78)
ANION GAP SERPL CALC-SCNC: 2 MMOL/L — LOW (ref 5–17)
ANION GAP SERPL CALC-SCNC: 2 MMOL/L — LOW (ref 5–17)
AST SERPL-CCNC: 48 U/L — HIGH (ref 15–37)
AST SERPL-CCNC: 48 U/L — HIGH (ref 15–37)
BASOPHILS # BLD AUTO: 0.01 K/UL — SIGNIFICANT CHANGE UP (ref 0–0.2)
BASOPHILS # BLD AUTO: 0.01 K/UL — SIGNIFICANT CHANGE UP (ref 0–0.2)
BASOPHILS NFR BLD AUTO: 0.3 % — SIGNIFICANT CHANGE UP (ref 0–2)
BASOPHILS NFR BLD AUTO: 0.3 % — SIGNIFICANT CHANGE UP (ref 0–2)
BILIRUB SERPL-MCNC: 0.8 MG/DL — SIGNIFICANT CHANGE UP (ref 0.2–1.2)
BILIRUB SERPL-MCNC: 0.8 MG/DL — SIGNIFICANT CHANGE UP (ref 0.2–1.2)
BUN SERPL-MCNC: 42 MG/DL — HIGH (ref 7–23)
BUN SERPL-MCNC: 42 MG/DL — HIGH (ref 7–23)
CALCIUM SERPL-MCNC: 8.5 MG/DL — SIGNIFICANT CHANGE UP (ref 8.5–10.1)
CALCIUM SERPL-MCNC: 8.5 MG/DL — SIGNIFICANT CHANGE UP (ref 8.5–10.1)
CHLORIDE SERPL-SCNC: 104 MMOL/L — SIGNIFICANT CHANGE UP (ref 96–108)
CHLORIDE SERPL-SCNC: 104 MMOL/L — SIGNIFICANT CHANGE UP (ref 96–108)
CK SERPL-CCNC: 91 U/L — SIGNIFICANT CHANGE UP (ref 26–308)
CK SERPL-CCNC: 91 U/L — SIGNIFICANT CHANGE UP (ref 26–308)
CO2 SERPL-SCNC: 30 MMOL/L — SIGNIFICANT CHANGE UP (ref 22–31)
CO2 SERPL-SCNC: 30 MMOL/L — SIGNIFICANT CHANGE UP (ref 22–31)
CORTIS AM PEAK SERPL-MCNC: 16.4 UG/DL — SIGNIFICANT CHANGE UP (ref 6–18.4)
CORTIS AM PEAK SERPL-MCNC: 16.4 UG/DL — SIGNIFICANT CHANGE UP (ref 6–18.4)
CREAT SERPL-MCNC: 1.12 MG/DL — SIGNIFICANT CHANGE UP (ref 0.5–1.3)
CREAT SERPL-MCNC: 1.12 MG/DL — SIGNIFICANT CHANGE UP (ref 0.5–1.3)
CRP SERPL-MCNC: 70 MG/L — HIGH
CRP SERPL-MCNC: 70 MG/L — HIGH
EGFR: 65 ML/MIN/1.73M2 — SIGNIFICANT CHANGE UP
EGFR: 65 ML/MIN/1.73M2 — SIGNIFICANT CHANGE UP
EOSINOPHIL # BLD AUTO: 0.1 K/UL — SIGNIFICANT CHANGE UP (ref 0–0.5)
EOSINOPHIL # BLD AUTO: 0.1 K/UL — SIGNIFICANT CHANGE UP (ref 0–0.5)
EOSINOPHIL NFR BLD AUTO: 2.9 % — SIGNIFICANT CHANGE UP (ref 0–6)
EOSINOPHIL NFR BLD AUTO: 2.9 % — SIGNIFICANT CHANGE UP (ref 0–6)
GLUCOSE BLDC GLUCOMTR-MCNC: 110 MG/DL — HIGH (ref 70–99)
GLUCOSE BLDC GLUCOMTR-MCNC: 110 MG/DL — HIGH (ref 70–99)
GLUCOSE BLDC GLUCOMTR-MCNC: 165 MG/DL — HIGH (ref 70–99)
GLUCOSE BLDC GLUCOMTR-MCNC: 165 MG/DL — HIGH (ref 70–99)
GLUCOSE BLDC GLUCOMTR-MCNC: 179 MG/DL — HIGH (ref 70–99)
GLUCOSE BLDC GLUCOMTR-MCNC: 179 MG/DL — HIGH (ref 70–99)
GLUCOSE BLDC GLUCOMTR-MCNC: 182 MG/DL — HIGH (ref 70–99)
GLUCOSE BLDC GLUCOMTR-MCNC: 182 MG/DL — HIGH (ref 70–99)
GLUCOSE SERPL-MCNC: 107 MG/DL — HIGH (ref 70–99)
GLUCOSE SERPL-MCNC: 107 MG/DL — HIGH (ref 70–99)
HCT VFR BLD CALC: 24.5 % — LOW (ref 39–50)
HCT VFR BLD CALC: 24.5 % — LOW (ref 39–50)
HGB BLD-MCNC: 7.8 G/DL — LOW (ref 13–17)
HGB BLD-MCNC: 7.8 G/DL — LOW (ref 13–17)
IMM GRANULOCYTES NFR BLD AUTO: 2 % — HIGH (ref 0–0.9)
IMM GRANULOCYTES NFR BLD AUTO: 2 % — HIGH (ref 0–0.9)
LYMPHOCYTES # BLD AUTO: 0.37 K/UL — LOW (ref 1–3.3)
LYMPHOCYTES # BLD AUTO: 0.37 K/UL — LOW (ref 1–3.3)
LYMPHOCYTES # BLD AUTO: 10.6 % — LOW (ref 13–44)
LYMPHOCYTES # BLD AUTO: 10.6 % — LOW (ref 13–44)
MAGNESIUM SERPL-MCNC: 2.1 MG/DL — SIGNIFICANT CHANGE UP (ref 1.6–2.6)
MAGNESIUM SERPL-MCNC: 2.1 MG/DL — SIGNIFICANT CHANGE UP (ref 1.6–2.6)
MCHC RBC-ENTMCNC: 30.6 PG — SIGNIFICANT CHANGE UP (ref 27–34)
MCHC RBC-ENTMCNC: 30.6 PG — SIGNIFICANT CHANGE UP (ref 27–34)
MCHC RBC-ENTMCNC: 31.8 GM/DL — LOW (ref 32–36)
MCHC RBC-ENTMCNC: 31.8 GM/DL — LOW (ref 32–36)
MCV RBC AUTO: 96.1 FL — SIGNIFICANT CHANGE UP (ref 80–100)
MCV RBC AUTO: 96.1 FL — SIGNIFICANT CHANGE UP (ref 80–100)
MONOCYTES # BLD AUTO: 0.41 K/UL — SIGNIFICANT CHANGE UP (ref 0–0.9)
MONOCYTES # BLD AUTO: 0.41 K/UL — SIGNIFICANT CHANGE UP (ref 0–0.9)
MONOCYTES NFR BLD AUTO: 11.7 % — SIGNIFICANT CHANGE UP (ref 2–14)
MONOCYTES NFR BLD AUTO: 11.7 % — SIGNIFICANT CHANGE UP (ref 2–14)
NEUTROPHILS # BLD AUTO: 2.54 K/UL — SIGNIFICANT CHANGE UP (ref 1.8–7.4)
NEUTROPHILS # BLD AUTO: 2.54 K/UL — SIGNIFICANT CHANGE UP (ref 1.8–7.4)
NEUTROPHILS NFR BLD AUTO: 72.5 % — SIGNIFICANT CHANGE UP (ref 43–77)
NEUTROPHILS NFR BLD AUTO: 72.5 % — SIGNIFICANT CHANGE UP (ref 43–77)
NT-PROBNP SERPL-SCNC: 1239 PG/ML — HIGH (ref 0–450)
NT-PROBNP SERPL-SCNC: 1239 PG/ML — HIGH (ref 0–450)
PHOSPHATE SERPL-MCNC: 2.6 MG/DL — SIGNIFICANT CHANGE UP (ref 2.5–4.5)
PHOSPHATE SERPL-MCNC: 2.6 MG/DL — SIGNIFICANT CHANGE UP (ref 2.5–4.5)
PLATELET # BLD AUTO: 137 K/UL — LOW (ref 150–400)
PLATELET # BLD AUTO: 137 K/UL — LOW (ref 150–400)
POTASSIUM SERPL-MCNC: 4.4 MMOL/L — SIGNIFICANT CHANGE UP (ref 3.5–5.3)
POTASSIUM SERPL-MCNC: 4.4 MMOL/L — SIGNIFICANT CHANGE UP (ref 3.5–5.3)
POTASSIUM SERPL-SCNC: 4.4 MMOL/L — SIGNIFICANT CHANGE UP (ref 3.5–5.3)
POTASSIUM SERPL-SCNC: 4.4 MMOL/L — SIGNIFICANT CHANGE UP (ref 3.5–5.3)
PROT SERPL-MCNC: 5.6 GM/DL — LOW (ref 6–8.3)
PROT SERPL-MCNC: 5.6 GM/DL — LOW (ref 6–8.3)
RBC # BLD: 2.55 M/UL — LOW (ref 4.2–5.8)
RBC # BLD: 2.55 M/UL — LOW (ref 4.2–5.8)
RBC # FLD: 15.9 % — HIGH (ref 10.3–14.5)
RBC # FLD: 15.9 % — HIGH (ref 10.3–14.5)
SODIUM SERPL-SCNC: 136 MMOL/L — SIGNIFICANT CHANGE UP (ref 135–145)
SODIUM SERPL-SCNC: 136 MMOL/L — SIGNIFICANT CHANGE UP (ref 135–145)
TSH SERPL-MCNC: 3.53 UU/ML — SIGNIFICANT CHANGE UP (ref 0.34–4.82)
TSH SERPL-MCNC: 3.53 UU/ML — SIGNIFICANT CHANGE UP (ref 0.34–4.82)
VANCOMYCIN TROUGH SERPL-MCNC: 16.4 UG/ML — SIGNIFICANT CHANGE UP (ref 10–20)
VANCOMYCIN TROUGH SERPL-MCNC: 16.4 UG/ML — SIGNIFICANT CHANGE UP (ref 10–20)
WBC # BLD: 3.5 K/UL — LOW (ref 3.8–10.5)
WBC # BLD: 3.5 K/UL — LOW (ref 3.8–10.5)
WBC # FLD AUTO: 3.5 K/UL — LOW (ref 3.8–10.5)
WBC # FLD AUTO: 3.5 K/UL — LOW (ref 3.8–10.5)

## 2023-11-23 PROCEDURE — 99232 SBSQ HOSP IP/OBS MODERATE 35: CPT

## 2023-11-23 RX ORDER — FUROSEMIDE 40 MG
10 TABLET ORAL ONCE
Refills: 0 | Status: COMPLETED | OUTPATIENT
Start: 2023-11-23 | End: 2023-11-23

## 2023-11-23 RX ORDER — ALBUMIN HUMAN 25 %
100 VIAL (ML) INTRAVENOUS EVERY 12 HOURS
Refills: 0 | Status: COMPLETED | OUTPATIENT
Start: 2023-11-23 | End: 2023-11-26

## 2023-11-23 RX ADMIN — PANTOPRAZOLE SODIUM 40 MILLIGRAM(S): 20 TABLET, DELAYED RELEASE ORAL at 10:43

## 2023-11-23 RX ADMIN — Medication 3 MILLILITER(S): at 20:24

## 2023-11-23 RX ADMIN — Medication 10 MILLIGRAM(S): at 21:46

## 2023-11-23 RX ADMIN — Medication 1 MILLIGRAM(S): at 10:44

## 2023-11-23 RX ADMIN — Medication 50 MILLILITER(S): at 22:59

## 2023-11-23 RX ADMIN — HEPARIN SODIUM 5000 UNIT(S): 5000 INJECTION INTRAVENOUS; SUBCUTANEOUS at 10:44

## 2023-11-23 RX ADMIN — Medication 1 APPLICATION(S): at 21:45

## 2023-11-23 RX ADMIN — Medication 2: at 17:35

## 2023-11-23 RX ADMIN — Medication 10 MILLIGRAM(S): at 10:48

## 2023-11-23 RX ADMIN — PANTOPRAZOLE SODIUM 40 MILLIGRAM(S): 20 TABLET, DELAYED RELEASE ORAL at 21:46

## 2023-11-23 RX ADMIN — Medication 12.5 MICROGRAM(S): at 05:41

## 2023-11-23 RX ADMIN — Medication 1 APPLICATION(S): at 10:43

## 2023-11-23 RX ADMIN — Medication 325 MILLIGRAM(S): at 10:44

## 2023-11-23 RX ADMIN — Medication 2: at 12:56

## 2023-11-23 RX ADMIN — Medication 250 MILLIGRAM(S): at 21:48

## 2023-11-23 RX ADMIN — ATORVASTATIN CALCIUM 40 MILLIGRAM(S): 80 TABLET, FILM COATED ORAL at 21:47

## 2023-11-23 RX ADMIN — MEROPENEM 1000 MILLIGRAM(S): 1 INJECTION INTRAVENOUS at 13:00

## 2023-11-23 RX ADMIN — MEROPENEM 1000 MILLIGRAM(S): 1 INJECTION INTRAVENOUS at 05:41

## 2023-11-23 RX ADMIN — HEPARIN SODIUM 5000 UNIT(S): 5000 INJECTION INTRAVENOUS; SUBCUTANEOUS at 21:47

## 2023-11-23 RX ADMIN — Medication 250 MILLIGRAM(S): at 10:42

## 2023-11-23 RX ADMIN — Medication 10 MILLIGRAM(S): at 16:25

## 2023-11-23 RX ADMIN — Medication 3 MILLILITER(S): at 14:51

## 2023-11-23 RX ADMIN — Medication 3 MILLILITER(S): at 02:59

## 2023-11-23 RX ADMIN — Medication 3 MILLILITER(S): at 07:53

## 2023-11-23 RX ADMIN — CASPOFUNGIN ACETATE 260 MILLIGRAM(S): 7 INJECTION, POWDER, LYOPHILIZED, FOR SOLUTION INTRAVENOUS at 12:56

## 2023-11-23 RX ADMIN — FLUTICASONE FUROATE, UMECLIDINIUM BROMIDE AND VILANTEROL TRIFENATATE 1 PUFF(S): 200; 62.5; 25 POWDER RESPIRATORY (INHALATION) at 07:53

## 2023-11-23 RX ADMIN — POLYETHYLENE GLYCOL 3350 17 GRAM(S): 17 POWDER, FOR SOLUTION ORAL at 10:43

## 2023-11-23 RX ADMIN — MEROPENEM 1000 MILLIGRAM(S): 1 INJECTION INTRAVENOUS at 21:48

## 2023-11-23 NOTE — PROGRESS NOTE ADULT - ASSESSMENT
84 y/o Male with h/o lung CA [place] [facility] [place], s/p right upper lobectomy, HTN, HPL, Chronic anemia, COPD, AAA, Hypothyroidism, CKD stage IIIa was admitted on 11/2 for increased shortness of breath, dyspnea on minimal exertion, and cough.    # h/o lung cancer   [place] [facility] [place] received carbo/taxol with salvage RT 5/5/2023 after progression in subcarinol node after adjuvant atezolizumab (LD 12/20/22)  - pt being covered for radiation induced pneumonitis with steroids   - now presently on 3 L NC - needs home O2 evaluation when stabilizes  - more likely infectious than malignant cause of current condition  - pall care consult appreciated- family discussion planned     # PNA/ Pneumonitis   - BAL with rare yeast - CONTINUE  meropenem 1 gm IV q12h, and caspofungin as per ID- switched from doxycycline to vancomycin today   - followed by pulm and ct surgery- no intervention  - repeat CT chest 11/20- when compared with 11/13 with complete opacification of RML w/ internal consolidation and volume loss appears unchanged and extensive right lung consolidations w/ overall progression.    # anemia- likely related to myelosuppression from treatment history/pna  - s/p 1u pRBC 11/19 with improvement of Hb from 7.1 to 7.9->7.8 today     # UE pain  US duplex UE negative for DVt- showed superficial thrombophlebitis involving b/l cephalic and median cubital veins    palll care  Discussed with [facility]  will continue to follow

## 2023-11-23 NOTE — PROGRESS NOTE ADULT - ASSESSMENT
83y old Male with PMH HTN, HPL, Chronic anemia, COPD, lung CA [place] [facility] [place] and RT 05/2023, not currently receiving chemo treatment), s/p right upper lobectomy, AAA, Hypothyroidism, CKD stage IIIa referred by me to ed for continued sob, cough despite abx treatment found to have large lower lobe thick walled cavity with worsening findings on ct despite treatment with levaquin  1. large lower lobe thick walled cavity: unclear if this is fungal vs bacterial vs malignancy  -started on voriconazole, initially switchedd to fluconazole, now on caspofungin. surgery is more defitive therapy however he is high surgical risk  -reviewed imaging from [place] supplied by wife - it looks like he has had blebs in the past and these are what are infected - they look half filled with fluid with thicker borders, which would argue against fungal infection/mrsa as there is no necrosis involved in its pathogenesis  -s/p bronch. follow up results. growing few yeast  -repeat ct wo contrast reveals worsening consolidations and filling in of bleb. this may suggest inadequate antibiotics, continued aspiration or rapidly progressive cancer  -was on doxy (for potential mrsa) now transitioned to vanco  -continue meropenem  -monitor vanco trough  -palliative care eval  2. pneumonia: as above  -supplemental o2  -speech/swallow eval noted  -aspiration precautions  3. copd: continue trelegy  4. ? radiation induced pneumonitis: titrate down  steroids as he has worsening infection and progressive nature of findings argues against pneumonitis  5. anemia: received 1 unit prbc on 11/19  6. left leg laceration: s/p suture, now bandaged. continue wound care   83y old Male with PMH HTN, HPL, Chronic anemia, COPD, [place] [facility] [place], not currently receiving chemo treatment), s/p right upper lobectomy, AAA, Hypothyroidism, CKD stage IIIa referred by me to ed for continued sob, cough despite abx treatment found to have large lower lobe thick walled cavity with worsening findings on ct despite treatment with levaquin  1. large lower lobe thick walled cavity: unclear if this is fungal vs bacterial vs malignancy  -started on voriconazole, initially switchedd to fluconazole, now on caspofungin. surgery is more defitive therapy however he is high surgical risk  -reviewed imaging from [place] supplied by wife - it looks like he has had blebs in the past and these are what are infected - they look half filled with fluid with thicker borders, which would argue against fungal infection/mrsa as there is no necrosis involved in its pathogenesis  -s/p bronch. follow up results. growing few yeast  -repeat ct wo contrast reveals worsening consolidations and filling in of bleb. this may suggest inadequate antibiotics, continued aspiration or rapidly progressive cancer  -was on doxy (for potential mrsa) now transitioned to vanco  -continue meropenem  -monitor vanco trough  -palliative care eval  2. pneumonia: as above  -supplemental o2  -speech/swallow eval noted  -aspiration precautions  -repeat ct on monday  3. copd: continue trelegy  4. ? radiation induced pneumonitis: titrate down  steroids as he has worsening infection and progressive nature of findings argues against pneumonitis  5. anemia: received 1 unit prbc on 11/19  6. left leg laceration: s/p suture, now bandaged. continue wound care  7. superficial thomrbophlebitis: on iv heparin

## 2023-11-23 NOTE — PROGRESS NOTE ADULT - ASSESSMENT
83-year-old M with PMHx HTN, HLD, chronic anemia, COPD, lung CA s/p RUL lobectomy (follows at [facility], last chemotherapy/RT 5/2023, not currently on treatment), AAA, hypothyroidism, CKD stage IIIa sent in to ED on 11/2/23  by pulmonologist MD Olivera with c/o SOB, dyspnea on minimal exertion, and cough. 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     # Acute hypoxic respiratory failure, multifactorial   # New RLL PNA on CT 11/13 , RLL cavity s/p bronchoscopy 11/9  possible yeast  infection, less likely radiation pneumonitis , r/o  aspergilloma    - CT chest: Larger right lower lobe thick-walled cavity which may be infectious and/or residua of reported treated tumor. Spongelike material within the cavity can be seen with aspergilloma (prior to fungus ball formation). Consolidation within the right lower and middle lobe compatible with radiation change.  - leukocytosis stable (WBC 17.70 from 17.35) ---> trend down to WBC  8   - repeat CXR 11/8 - worsening of opacities over right lung   - 2 d echo 11/8 - EF 60% , no significant valvular disease    - immunoglobulin panel - all Ig wnl  - 11/9 - s/p bronchoscopy   - speech and swallow evaluation  - no aspirations  - CXR 11/11 - stable consolidations  - repeat CT chest 11/13 - new right lung consolidation  - blood cultures -no growth, sputum culture - normal respiratory  - c/w trelegy ellipta, chest pt, IS, acapella ,BIPAP as needed  - ID/pulm consult noted   - s/p IV steroids --> po prednisone taper 20 x 3 days than stop , trending down ----> 34  - s/p  voriconazole PO ,  s/p 7 days of levaquin to cover atypical/psuedomonas (allergy to PCN)    - 11/13 - IV meropenem, doxy and fluconasole started as per ID   - Chest xray prelim worse  - Thoracic consult appreciated, no intervention  - CT Chest 11/20 worse  -11/13 - IV meropenem and doxycylcine  - Fluconazole DC, started on caspofungin 11/20  - Would consider palliative at this time  - 11/22 - d/c doxy started on  vancomycin   - CRP 24--> 70     #  Chronic anemia, likely due to  chemotherapy, worsening and iron deficiency   -  no s/s bleeding, asymptomatic, continue to monitor/trend  - 11/8 - 1 unit PRBC due to hemoptysis and worsening of anemia  -  11/19 transfuse 1 unit PRBC  - Hemoglobin 7.9 11/20  - Trend    #CHRISTINA on CKD stage IIIa, improving  - Resolved    # Left calf laceration from veno dines while in PACU for bronchoscopy   - surgery consult - s/p suturing  on 11/09/23   - plan to remove sutures in 10 days  - Surgical consult for suture removal in 14 days from placement  - pending    # Bilateral UE edema due to superphicial thrombophlebitis  - doppler noted  - start heparin 5000 bid, elevation, warm compresses if pain  - 11/23 - iv lasix 10 mg     # Acute hyperglycemia, possible steroid-induced , Prediabetes with A1C 6.3   - Stop pre-meal and  lantus   - c/w ISS  - liberize diet  - Monitor     # HTN - hydralazine 10 bid     #  HLD   - c/w  atorvastatin    # Hypothyroidism  - Levo lowered to 12.5 mcg,  Recheck in 4 weeks    # Hx lung CA s/p RUL lobectomy/chemo/RT  - f/u with [facility]     # Severe protein-calorie malnutrition. - nutritional education provided ,  supplements ordered ,  MVT qd ordered      # DVT Ppx  - heparin 5000 q12h     # Code status  - DNR/DNI    wife and daughter updated at bedside 11/21, 11/22, 11/23

## 2023-11-23 NOTE — PROGRESS NOTE ADULT - SUBJECTIVE AND OBJECTIVE BOX
Date of service: 11-23-23 @ 08:41    Sitting in bed in NAD  On O2 therapy  Has dry cough  Has low grade fever    ROS: denies dizziness, no HA, no abdominal pain, no diarrhea or constipation; no dysuria, no legs pain, no rashes    MEDICATIONS  (STANDING):  albuterol/ipratropium for Nebulization 3 milliLiter(s) Nebulizer every 6 hours  AQUAPHOR (petrolatum Ointment) 1 Application(s) Topical two times a day  atorvastatin 40 milliGRAM(s) Oral at bedtime  caspofungin IVPB 50 milliGRAM(s) IV Intermittent every 24 hours  caspofungin IVPB      dextrose 5%. 1000 milliLiter(s) (50 mL/Hr) IV Continuous <Continuous>  dextrose 5%. 1000 milliLiter(s) (100 mL/Hr) IV Continuous <Continuous>  dextrose 50% Injectable 25 Gram(s) IV Push once  dextrose 50% Injectable 12.5 Gram(s) IV Push once  dextrose 50% Injectable 25 Gram(s) IV Push once  ferrous    sulfate 325 milliGRAM(s) Oral daily  fluticasone furoate/umeclidinium/vilanterol 100-62.5-25 MICROgram(s) Inhaler 1 Puff(s) Inhalation daily  folic acid 1 milliGRAM(s) Oral daily  glucagon  Injectable 1 milliGRAM(s) IntraMuscular once  heparin   Injectable 5000 Unit(s) SubCutaneous every 12 hours  hydrALAZINE 10 milliGRAM(s) Oral two times a day  insulin lispro (ADMELOG) corrective regimen sliding scale   SubCutaneous three times a day before meals  insulin lispro (ADMELOG) corrective regimen sliding scale   SubCutaneous at bedtime  levothyroxine 12.5 MICROGram(s) Oral daily  meropenem Injectable 1000 milliGRAM(s) IV Push every 8 hours  pantoprazole  Injectable 40 milliGRAM(s) IV Push two times a day  polyethylene glycol 3350 17 Gram(s) Oral daily  vancomycin  IVPB 750 milliGRAM(s) IV Intermittent every 12 hours    Vital Signs Last 24 Hrs  T(C): 36.6 (23 Nov 2023 05:30), Max: 37.4 (22 Nov 2023 21:40)  T(F): 97.8 (23 Nov 2023 05:30), Max: 99.4 (22 Nov 2023 21:40)  HR: 87 (23 Nov 2023 07:57) (87 - 107)  BP: 131/45 (23 Nov 2023 05:30) (125/76 - 141/62)  BP(mean): --  RR: 18 (23 Nov 2023 05:30) (18 - 18)  SpO2: 98% (23 Nov 2023 05:30) (93% - 100%)    Parameters below as of 23 Nov 2023 05:30  Patient On (Oxygen Delivery Method): nasal cannula  O2 Flow (L/min): 4       Physical exam:    Constitutional:  No acute distress  HEENT: NC/AT, EOMI, PERRLA, conjunctivae clear; ears and nose atraumatic  Neck: supple; thyroid not palpable  Back: no tenderness  Respiratory: respiratory effort normal; few crackles at bases  Cardiovascular: S1S2 regular, no murmurs  Abdomen: soft, not tender, not distended, positive BS  Genitourinary: no suprapubic tenderness  Lymphatic: no LN palpable  Musculoskeletal: no muscle tenderness, no joint swelling or tenderness  Extremities: no pedal edema  Left lower leg laceration dressed  Neurological/ Psychiatric: AxOx3, moving all extremities  Skin: no rashes; no palpable lesions    Labs: reviewed                        8.1    4.95  )-----------( 135      ( 22 Nov 2023 07:01 )             24.8     11-22    139  |  105  |  40<H>  ----------------------------<  93  4.4   |  30  |  1.01    Ca    8.5      22 Nov 2023 07:01  Phos  2.6     11-22  Mg     2.1     11-22    TPro  5.5<L>  /  Alb  1.8<L>  /  TBili  0.8  /  DBili  x   /  AST  50<H>  /  ALT  35  /  AlkPhos  103  11-22    C-Reactive Protein, Serum: 24 mg/L (11-14-23 @ 07:03)  C-Reactive Protein, Serum: 34 mg/L (11-13-23 @ 06:30)  C-Reactive Protein, Serum: 46 mg/L (11-12-23 @ 06:55)  C-Reactive Protein, Serum: 70 mg/L (11-11-23 @ 06:31)                        7.8    5.36  )-----------( 135      ( 21 Nov 2023 06:57 )             23.5     11-21    141  |  107  |  45<H>  ----------------------------<  85  4.4   |  28  |  1.05    Ca    8.2<L>      21 Nov 2023 06:57    TPro  5.3<L>  /  Alb  1.6<L>  /  TBili  0.9  /  DBili  x   /  AST  44<H>  /  ALT  30  /  AlkPhos  102  11-21    C-Reactive Protein, Serum: 24 mg/L (11-14-23 @ 07:03)  C-Reactive Protein, Serum: 34 mg/L (11-13-23 @ 06:30)  C-Reactive Protein, Serum: 46 mg/L (11-12-23 @ 06:55)  C-Reactive Protein, Serum: 70 mg/L (11-11-23 @ 06:31)  C-Reactive Protein, Serum: 106 mg/L (11-10-23 @ 06:19)                        7.9    5.38  )-----------( 136      ( 20 Nov 2023 07:10 )             23.6     11-20    140  |  106  |  46<H>  ----------------------------<  90  4.4   |  29  |  0.94    Ca    8.2<L>      20 Nov 2023 07:10    C-Reactive Protein, Serum: 24 mg/L (11-14-23 @ 07:03)  C-Reactive Protein, Serum: 34 mg/L (11-13-23 @ 06:30)  C-Reactive Protein, Serum: 46 mg/L (11-12-23 @ 06:55)  C-Reactive Protein, Serum: 70 mg/L (11-11-23 @ 06:31)  C-Reactive Protein, Serum: 106 mg/L (11-10-23 @ 06:19)               8.2    11.74 )-----------( 259      ( 03 Nov 2023 07:15 )             25.2     11-03    133<L>  |  102  |  35<H>  ----------------------------<  205<H>  4.4   |  22  |  1.20    Ca    8.3<L>      03 Nov 2023 07:15    TPro  6.9  /  Alb  2.0<L>  /  TBili  0.5  /  DBili  x   /  AST  38<H>  /  ALT  50  /  AlkPhos  130<H>  11-02     LIVER FUNCTIONS - ( 02 Nov 2023 14:47 )  Alb: 2.0 g/dL / Pro: 6.9 gm/dL / ALK PHOS: 130 U/L / ALT: 50 U/L / AST: 38 U/L / GGT: x           Urinalysis (11-02 @ 16:40)  Urine Appearance: Clear  Protein, Urine: 100 mg/dL  Urine Microscopic-Add On (NC) (11-02 @ 16:40)  White Blood Cell - Urine: 0 /HPF  Red Blood Cell - Urine: 0 /HPF    (11-02 @ 14:47)  NotDetec    Culture - Acid Fast - Bronchial w/Smear (collected 09 Nov 2023 12:30)  Source: .Bronchial RIGHT LOWER LOBE BAL  Preliminary Report (11 Nov 2023 15:08):    Culture is being performed.    Culture - Fungal, Bronchial (collected 09 Nov 2023 12:30)  Source: .Bronchial RIGHT LOWER LOBE BAL  Preliminary Report (13 Nov 2023 12:07):    Rare Yeast    Culture - Bronchial (collected 09 Nov 2023 12:30)  Source: .Bronchial RIGHT LOWER LOBE BAL  Gram Stain (09 Nov 2023 23:14):    Rare polymorphonuclear leukocytes per low power field    Few Squamous epithelial cells per low power field    Few Gram positive cocci in pairs per oil power field  Final Report (11 Nov 2023 16:38):    Normal Respiratory Marguerite present    Culture - Blood (collected 04 Nov 2023 04:21)  Source: .Blood None  Final Report (09 Nov 2023 09:00):    No growth at 5 days    Culture - Blood (collected 04 Nov 2023 04:21)  Source: .Blood None  Final Report (09 Nov 2023 09:00):    No growth at 5 days    Radiology: all available radiological tests reviewed    < from: CT Chest No Cont (11.02.23 @ 16:04) >  Right upper lobectomy.  Larger right lower lobe thick-walled cavity which may be infectious   and/or residua of reported treated tumor. 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  Consolidation within the right lower and middle lobe compatible with radiation change.  Multiple indistinct nodules in the right lungwhich may be infectious or radiation pneumonitis.  < end of copied text >    < from: CT Abdomen and Pelvis No Cont (11.08.23 @ 19:01) >  No hydronephrosis.  New patchy opacities in the right lung base. Small right pleural effusion.  < end of copied text >    < from: CT Chest No Cont (11.13.23 @ 10:04) >  IMPRESSION: Right lung large areas of consolidation new since November 2, 2023 likely pneumonia.    Right apical previously described large cyst unchanged in size since   November 2, 2023 containing internal air and increasing internal opacification since November 2, 2023 as described above.     A few left lung ill-defined nonspecific groundglass opacities.   Differential diagnosis include but is not limited to   infectious/inflammatory etiologies and or pulmonary edema.  Small left pleural effusion new since November 8, 2023.  < end of copied text >    < from: CT Chest No Cont (11.20.23 @ 08:03) >  IMPRESSION: Extensive right lung consolidations as described above with   overall interval progression since November 13, 2023. Complete   opacification of the right middle lobe with internal consolidation and   volume loss appears unchanged since November 13, 2023 demonstrating  interval progression since November 2, 2023.    Persistent apical right apical large irregular cyst unchanged in size   since November 13, 2023.    < end of copied text >      Advanced directives addressed: full resuscitation

## 2023-11-23 NOTE — PROGRESS NOTE ADULT - ASSESSMENT
82 y/o [place] with h/o lung CA [place] [facility] [place], s/p right upper lobectomy, HTN, HPL, Chronic anemia, COPD, AAA, Hypothyroidism, CKD stage IIIa was admitted on 11/2 for increased shortness of breath, dyspnea on minimal exertion, and cough. Reportedly, he had CT of the chest performed 10/3 which demonstrated consolidation, was started on augmentin/prednisone/azithro 10/6 x 1 week. Pt completed course of medications however wife noted worsening cough and pt with fever Tmax of 103.9 on 10/26. Wife then took pt to pulmonologist again who started pt on second round of the same medications, instructed wife to stop giving pt tylenol wife notes fevers self-resolved. Since then pt decreased energy with episode of being unsteady on feet. Today is 6th day of taking medications, had f/u scheduled with pulmonologist who sent pt to ED for eval.  He feels tired and weak. In ER he received ceftriaxone.    1. Large RLL pulmonary cavity with worsening opacification. Worsening pulmonary infiltrates, likely pneumonia. Possible recurrent lung Ca. Lung Ca s/p right upper lobectomy. Radiation pneumonitis. CRF stage 3. Allergy to PCN.   -extensive right lung consolidations  -worsening large pulmonary cavity opacification on repeat CT chest   -bronchoscopy cultures show normal respiratory jaime and rare yeast  -fungal bronch c/s is pending and will take several weeks to finalize  -s/p levofloxacin # 10  -s/p voriconazole 200 mg PO q12h # 7  -s/p fluconazole 100 mg IV qd # 8  -s/p doxycycline 100 mg IV q12h # 10  -pulmonary evaluation appreciated   -I am concerned that there is no definite diagnosis for his pulmonary illness and that abx therapy dose not seem to help so far  -on meropenem 1 gm IV q8h # 11 and caspofungin 50 mg IV qd # 4 and vancomycin 750 mg IV q12h # 2  -tolerating abx well so far; no side effects noted  -thoracic evaluation appreciated - conservative care recommended   -while doxycycline has MRSA coverage, some MRSA could be resistant  -renal function is improved  -obtain vancomycin trough level  -f/u cultures  -continue abx coverage   -monitor temps  -f/u CBC  -supportive care  2. Other issues:   -care per medicine    d/w medicine team and Dr. Sheldon

## 2023-11-23 NOTE — PROGRESS NOTE ADULT - SUBJECTIVE AND OBJECTIVE BOX
HOSPITALIST ATTENDING PROGRESS NOTE    Chart and meds reviewed.  Patient seen and examined.    CC: cough, weakness     11/21 - no events, afebrile, denies cp, dyspnea, + productive cough with brown mucus persist, poor po intake, plan discussed with wife at bedside  11/22 - pt seen in am, bilateral arm swelling, afebrile, + productive cough some streaks of blood , denies cp, abdominal pain, poor po intake  11/23 - afebrile, + upper extremities edema, + cough with brown mucus, denies abdominal pain, tolerating some po intake    All other systems reviewed and found to be negative with the exception of what has been described above.    Vital sings reviewed for last 24 h  T(C): 36.8 (11-23-23 @ 15:30), Max: 37.4 (11-22-23 @ 21:40)  T(F): 98.2 (11-23-23 @ 15:30), Max: 99.4 (11-22-23 @ 21:40)  HR: 103 (11-23-23 @ 15:30) (84 - 105)  BP: 127/51 (11-23-23 @ 15:30) (127/51 - 135/53)  RR: 17 (11-23-23 @ 15:30) (17 - 18)  SpO2: 99% (11-23-23 @ 15:30) (98% - 100%)  Wt(kg): --  Daily     Daily   CAPILLARY BLOOD GLUCOSE      POCT Blood Glucose.: 165 mg/dL (23 Nov 2023 12:01)  POCT Blood Glucose.: 110 mg/dL (23 Nov 2023 09:23)  POCT Blood Glucose.: 174 mg/dL (22 Nov 2023 21:13)  POCT Blood Glucose.: 182 mg/dL (22 Nov 2023 18:12)        Physical exam :   GEN: Frail, NAD  HEENT:  pupils equal and reactive, EOMI, no oropharyngeal lesions, erythema, exudates, oral thrush  NECK:   supple, no carotid bruits, no palpable lymph nodes, no thyromegaly  CV:  +S1, +S2, regular, no murmurs or rubs  RESP:  Bilateral ronchi  BREAST:  not examined  GI:  abdomen soft, non-tender, non-distended, normal BS, no bruits, no abdominal masses, no palpable masses  RECTAL:  not examined  :  not examined  MSK:   normal muscle tone, no atrophy, no rigidity, no contractions  EXT:  no clubbing, no cyanosis, no edema, no calf pain, BIlateral LE edema.   Left leg sutures  VASCULAR:  pulses equal and symmetric in the upper and lower extremities  NEURO:  AAOX1, no focal neurological deficits, follows all commands, able to move extremities spontaneously  SKIN:  no ulcers, lesions or rashes    All labs radiology and other studies reviewed and interpreted :                         7.8    3.50  )-----------( 137      ( 23 Nov 2023 08:53 )             24.5     11-23    136  |  104  |  42<H>  ----------------------------<  107<H>  4.4   |  30  |  1.12    Ca    8.5      23 Nov 2023 08:53  Phos  2.6     11-23  Mg     2.1     11-23    TPro  5.6<L>  /  Alb  1.8<L>  /  TBili  0.8  /  DBili  x   /  AST  48<H>  /  ALT  35  /  AlkPhos  118  11-23    CARDIAC MARKERS ( 23 Nov 2023 08:53 )  x     / x     / 91 U/L / x     / x          LIVER FUNCTIONS - ( 23 Nov 2023 08:53 )  Alb: 1.8 g/dL / Pro: 5.6 gm/dL / ALK PHOS: 118 U/L / ALT: 35 U/L / AST: 48 U/L / GGT: x           C-Reactive Protein, Serum: 70 mg/L (11-23-23 @ 08:53)  C-Reactive Protein, Serum: 24 mg/L (11-14-23 @ 07:03)  C-Reactive Protein, Serum: 34 mg/L (11-13-23 @ 06:30)  C-Reactive Protein, Serum: 46 mg/L (11-12-23 @ 06:55)  C-Reactive Protein, Serum: 70 mg/L (11-11-23 @ 06:31)  C-Reactive Protein, Serum: 106 mg/L (11-10-23 @ 06:19)    Procalcitonin, Serum: 3.23: Note: Concentrations <0.5 ng/mL do not exclude an infection, on account  of localized infections (without systemic signs) which can be associated  with such low concentrations, or a systemic infection in its initial  stages (<6 hours). Furthermore, increased procalcitonin may occur without  infection. PCT concentrations between 0.5 and 2.0 ng/mL should be  interpreted taking into account the patient’s history. It is recommended  to pretest PCT within 6-24 hours if any concentrations <2.0 mg/mL are  obtained. ng/mL (11.08.23 @ 06:01)      Cortisol AM, Serum: 16.4 ug/dL (11.23.23 @ 08:53)             CT Chest No Cont (11.20.23 @ 08:03) >  IMPRESSION: Extensive right lung consolidations as described above with   overall interval progression since November 13, 2023. Complete   opacification of the right middle lobe with internal consolidation and   volume loss appears unchanged since November 13, 2023 demonstrating  interval progression since November 2, 2023.    Persistent apical right apical large irregular cyst unchanged in size   since November 13, 2023.    Small left pleural effusion with subcutaneous edema.    Trace amount of ascites, increased in size since November 13, 2023.    Xray Chest 1 View- PORTABLE-Routine (Xray Chest 1 View- PORTABLE-Routine .) (11.16.23 @ 12:32) >  IMPRESSION: Slight progression of pneumonia since November 11    MEDICATIONS  (STANDING):  albuterol/ipratropium for Nebulization 3 milliLiter(s) Nebulizer every 6 hours  AQUAPHOR (petrolatum Ointment) 1 Application(s) Topical two times a day  atorvastatin 40 milliGRAM(s) Oral at bedtime  caspofungin IVPB 50 milliGRAM(s) IV Intermittent every 24 hours  caspofungin IVPB      dextrose 5%. 1000 milliLiter(s) (50 mL/Hr) IV Continuous <Continuous>  dextrose 5%. 1000 milliLiter(s) (100 mL/Hr) IV Continuous <Continuous>  dextrose 50% Injectable 25 Gram(s) IV Push once  dextrose 50% Injectable 12.5 Gram(s) IV Push once  dextrose 50% Injectable 25 Gram(s) IV Push once  ferrous    sulfate 325 milliGRAM(s) Oral daily  fluticasone furoate/umeclidinium/vilanterol 100-62.5-25 MICROgram(s) Inhaler 1 Puff(s) Inhalation daily  folic acid 1 milliGRAM(s) Oral daily  furosemide   Injectable 10 milliGRAM(s) IV Push once  glucagon  Injectable 1 milliGRAM(s) IntraMuscular once  heparin   Injectable 5000 Unit(s) SubCutaneous every 12 hours  hydrALAZINE 10 milliGRAM(s) Oral two times a day  insulin lispro (ADMELOG) corrective regimen sliding scale   SubCutaneous at bedtime  insulin lispro (ADMELOG) corrective regimen sliding scale   SubCutaneous three times a day before meals  levothyroxine 12.5 MICROGram(s) Oral daily  meropenem Injectable 1000 milliGRAM(s) IV Push every 8 hours  pantoprazole  Injectable 40 milliGRAM(s) IV Push two times a day  polyethylene glycol 3350 17 Gram(s) Oral daily  vancomycin  IVPB 750 milliGRAM(s) IV Intermittent every 12 hours    MEDICATIONS  (PRN):  acetaminophen     Tablet .. 650 milliGRAM(s) Oral every 6 hours PRN Temp greater or equal to 38C (100.4F), Mild Pain (1 - 3)  aluminum hydroxide/magnesium hydroxide/simethicone Suspension 30 milliLiter(s) Oral every 4 hours PRN Dyspepsia  benzonatate 100 milliGRAM(s) Oral three times a day PRN Cough  dextrose Oral Gel 15 Gram(s) Oral once PRN Blood Glucose LESS THAN 70 milliGRAM(s)/deciliter  melatonin 3 milliGRAM(s) Oral at bedtime PRN Insomnia  ondansetron Injectable 4 milliGRAM(s) IV Push every 8 hours PRN Nausea and/or Vomiting  ondansetron Injectable 4 milliGRAM(s) IV Push every 8 hours PRN Nausea and/or Vomiting

## 2023-11-23 NOTE — PROGRESS NOTE ADULT - SUBJECTIVE AND OBJECTIVE BOX
INTERVAL HPI/OVERNIGHT EVENTS:  Patient S&E at bedside. No o/n events,   US duplex UE negative for DVt- showed superficial thrombophlebitis involving b/l cephalic and median cubital veins. no clinical change   remains on nc     PAST MEDICAL & SURGICAL HISTORY:  COPD (chronic obstructive pulmonary disease)      Lung cancer      Anemia of chronic disease      CKD (chronic kidney disease), stage III      Hypothyroidism      AAA (abdominal aortic aneurysm)      HTN (hypertension)      HLD (hyperlipidemia)      Thrombocytopenia      S/P lobectomy of lung          FAMILY HISTORY:      VITAL SIGNS:  T(F): 97.6 (11-23-23 @ 10:22)  HR: 84 (11-23-23 @ 14:54)  BP: 128/62 (11-23-23 @ 10:22)  RR: 18 (11-23-23 @ 10:22)  SpO2: 99% (11-23-23 @ 10:22)  Wt(kg): --    PHYSICAL EXAM:    onstitutional: NAD, Ute  Eyes: EOMI,   Respiratory: decreased bs on NC 2L, rhonchi  Cardiovascular: RRR,  Gastrointestinal: soft, NTND,   Extremities: no c/c/e      MEDICATIONS  (STANDING):  albuterol/ipratropium for Nebulization 3 milliLiter(s) Nebulizer every 6 hours  AQUAPHOR (petrolatum Ointment) 1 Application(s) Topical two times a day  atorvastatin 40 milliGRAM(s) Oral at bedtime  caspofungin IVPB 50 milliGRAM(s) IV Intermittent every 24 hours  caspofungin IVPB      dextrose 5%. 1000 milliLiter(s) (50 mL/Hr) IV Continuous <Continuous>  dextrose 5%. 1000 milliLiter(s) (100 mL/Hr) IV Continuous <Continuous>  dextrose 50% Injectable 12.5 Gram(s) IV Push once  dextrose 50% Injectable 25 Gram(s) IV Push once  dextrose 50% Injectable 25 Gram(s) IV Push once  ferrous    sulfate 325 milliGRAM(s) Oral daily  fluticasone furoate/umeclidinium/vilanterol 100-62.5-25 MICROgram(s) Inhaler 1 Puff(s) Inhalation daily  folic acid 1 milliGRAM(s) Oral daily  glucagon  Injectable 1 milliGRAM(s) IntraMuscular once  heparin   Injectable 5000 Unit(s) SubCutaneous every 12 hours  hydrALAZINE 10 milliGRAM(s) Oral two times a day  insulin lispro (ADMELOG) corrective regimen sliding scale   SubCutaneous three times a day before meals  insulin lispro (ADMELOG) corrective regimen sliding scale   SubCutaneous at bedtime  levothyroxine 12.5 MICROGram(s) Oral daily  meropenem Injectable 1000 milliGRAM(s) IV Push every 8 hours  pantoprazole  Injectable 40 milliGRAM(s) IV Push two times a day  polyethylene glycol 3350 17 Gram(s) Oral daily  vancomycin  IVPB 750 milliGRAM(s) IV Intermittent every 12 hours    MEDICATIONS  (PRN):  acetaminophen     Tablet .. 650 milliGRAM(s) Oral every 6 hours PRN Temp greater or equal to 38C (100.4F), Mild Pain (1 - 3)  aluminum hydroxide/magnesium hydroxide/simethicone Suspension 30 milliLiter(s) Oral every 4 hours PRN Dyspepsia  benzonatate 100 milliGRAM(s) Oral three times a day PRN Cough  dextrose Oral Gel 15 Gram(s) Oral once PRN Blood Glucose LESS THAN 70 milliGRAM(s)/deciliter  melatonin 3 milliGRAM(s) Oral at bedtime PRN Insomnia  ondansetron Injectable 4 milliGRAM(s) IV Push every 8 hours PRN Nausea and/or Vomiting      Allergies    penicillin (Unknown)    Intolerances        LABS:                        7.8    3.50  )-----------( 137      ( 23 Nov 2023 08:53 )             24.5     11-23    136  |  104  |  42<H>  ----------------------------<  107<H>  4.4   |  30  |  1.12    Ca    8.5      23 Nov 2023 08:53  Phos  2.6     11-23  Mg     2.1     11-23    TPro  5.6<L>  /  Alb  1.8<L>  /  TBili  0.8  /  DBili  x   /  AST  48<H>  /  ALT  35  /  AlkPhos  118  11-23      Urinalysis Basic - ( 23 Nov 2023 08:53 )    Color: x / Appearance: x / SG: x / pH: x  Gluc: 107 mg/dL / Ketone: x  / Bili: x / Urobili: x   Blood: x / Protein: x / Nitrite: x   Leuk Esterase: x / RBC: x / WBC x   Sq Epi: x / Non Sq Epi: x / Bacteria: x        RADIOLOGY & ADDITIONAL TESTS:  Studies reviewed.

## 2023-11-23 NOTE — PROGRESS NOTE ADULT - SUBJECTIVE AND OBJECTIVE BOX
Subjective:    Review of Systems:  All 10 systems reviewed in detailed and found to be negative with the exception of what has already been described above    Allergies:  penicillin (Unknown)    Meds  MEDICATIONS  (STANDING):  albuterol/ipratropium for Nebulization 3 milliLiter(s) Nebulizer every 6 hours  AQUAPHOR (petrolatum Ointment) 1 Application(s) Topical two times a day  atorvastatin 40 milliGRAM(s) Oral at bedtime  caspofungin IVPB      caspofungin IVPB 50 milliGRAM(s) IV Intermittent every 24 hours  dextrose 5%. 1000 milliLiter(s) (100 mL/Hr) IV Continuous <Continuous>  dextrose 5%. 1000 milliLiter(s) (50 mL/Hr) IV Continuous <Continuous>  dextrose 50% Injectable 12.5 Gram(s) IV Push once  dextrose 50% Injectable 25 Gram(s) IV Push once  dextrose 50% Injectable 25 Gram(s) IV Push once  ferrous    sulfate 325 milliGRAM(s) Oral daily  fluticasone furoate/umeclidinium/vilanterol 100-62.5-25 MICROgram(s) Inhaler 1 Puff(s) Inhalation daily  folic acid 1 milliGRAM(s) Oral daily  glucagon  Injectable 1 milliGRAM(s) IntraMuscular once  heparin   Injectable 5000 Unit(s) SubCutaneous every 12 hours  hydrALAZINE 10 milliGRAM(s) Oral two times a day  insulin lispro (ADMELOG) corrective regimen sliding scale   SubCutaneous at bedtime  insulin lispro (ADMELOG) corrective regimen sliding scale   SubCutaneous three times a day before meals  levothyroxine 12.5 MICROGram(s) Oral daily  meropenem Injectable 1000 milliGRAM(s) IV Push every 8 hours  pantoprazole  Injectable 40 milliGRAM(s) IV Push two times a day  polyethylene glycol 3350 17 Gram(s) Oral daily  vancomycin  IVPB 750 milliGRAM(s) IV Intermittent every 12 hours    MEDICATIONS  (PRN):  acetaminophen     Tablet .. 650 milliGRAM(s) Oral every 6 hours PRN Temp greater or equal to 38C (100.4F), Mild Pain (1 - 3)  aluminum hydroxide/magnesium hydroxide/simethicone Suspension 30 milliLiter(s) Oral every 4 hours PRN Dyspepsia  benzonatate 100 milliGRAM(s) Oral three times a day PRN Cough  dextrose Oral Gel 15 Gram(s) Oral once PRN Blood Glucose LESS THAN 70 milliGRAM(s)/deciliter  melatonin 3 milliGRAM(s) Oral at bedtime PRN Insomnia  ondansetron Injectable 4 milliGRAM(s) IV Push every 8 hours PRN Nausea and/or Vomiting    Physical Exam  T(C): 36.6 (11-23-23 @ 05:30), Max: 37.4 (11-22-23 @ 21:40)  HR: 87 (11-23-23 @ 07:57) (87 - 107)  BP: 131/45 (11-23-23 @ 05:30) (125/76 - 141/62)  RR: 18 (11-23-23 @ 05:30) (18 - 18)  SpO2: 98% (11-23-23 @ 05:30) (93% - 100%)  Gen: Alert, oriented, no distress  HEENT: Anicteric sclera, moist mucous membranes  Cardio: Regular rhythm and rate, normal S1S2, no murmurs  Resp: Crackles, congested  GI: Nontender, nondistended, normoactive bowel sounds  Ext: No cyanosis, clubbing or edema  skin: + ecchymosis, left leg laceration sutured and bandaged  Neuro: Nonfocal    Labs:                        8.1    4.95  )-----------( 135      ( 22 Nov 2023 07:01 )             24.8     11-22    139  |  105  |  40<H>  ----------------------------<  93  4.4   |  30  |  1.01    Ca    8.5      22 Nov 2023 07:01  Phos  2.6     11-22  Mg     2.1     11-22    TPro  5.5<L>  /  Alb  1.8<L>  /  TBili  0.8  /  DBili  x   /  AST  50<H>  /  ALT  35  /  AlkPhos  103  11-22      Urinalysis Basic - ( 22 Nov 2023 07:01 )    Color: x / Appearance: x / SG: x / pH: x  Gluc: 93 mg/dL / Ketone: x  / Bili: x / Urobili: x   Blood: x / Protein: x / Nitrite: x   Leuk Esterase: x / RBC: x / WBC x   Sq Epi: x / Non Sq Epi: x / Bacteria: x    < from: CT Chest No Cont (11.02.23 @ 16:04) >  ACC: [id number] EXAM:  CT CHEST   ORDERED BY: SARAH YOO     PROCEDURE DATE:  11/02/2023          INTERPRETATION:  INDICATION: Shortness of breath, cough, lung cancer   history, last chemotherapy and radiation treatment in May 2023    TECHNIQUE: Helical acquisition images of the chest without intravenous   contrast. Maximum intensity projection images were generated.    COMPARISON: 12/29/2020 chest x-ray.    FINDINGS:    LUNGS/AIRWAYS/PLEURA: Right upper lobectomy. Right lower lobe   multiloculated thick-walled 11 cm cavity (best seen in its entirety on   601-59) containing fluid and spongelike material. Consolidation within   the middle lobe and inferior aspect of the right lower lobe with angular   borders suggesting prior radiation. Multiple indistinct nodules of   varying density in the right lower and middle lobes, mostly adjacent to   the cavity. Small branching opacities in the peripheral left upper and   lower lobes, compatible with distal airway impaction. Trace left pleural   effusion. Mild right apical pleural thickening.    LYMPH NODES/MEDIASTINUM: No lymphadenopathy.    HEART/VASCULATURE: Normal heart size. Unremarkable pericardium. Coronary   artery calcifications. Normal caliber aorta.    UPPER ABDOMEN: Unremarkable.    BONES/SOFT TISSUES: Old sternal fracture. Mild loss of height of the L1   vertebral body.      IMPRESSION:    Right upper lobectomy.    Larger right lower lobe thick-walled cavity which may be infectious   and/or residua of reported treated tumor. 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    Consolidation within the right lower and middle lobe compatible with   radiation change.    Multiple indistinct nodules in the right lungwhich may be infectious or   radiation pneumonitis.    ct lucio 10/3 uploaded and reviewed    < from: CT Abdomen and Pelvis No Cont (11.08.23 @ 19:01) >  PROCEDURE DATE:  11/08/2023          INTERPRETATION:  CLINICAL INFORMATION: Acute kidney injury. Evaluate   obstruction.    COMPARISON: CT chest 11/20/2023.    CONTRAST/COMPLICATIONS:  IV Contrast: NONE  Oral Contrast: NONE  Complications: None reported at time of study completion    PROCEDURE:  CT of the Abdomen and Pelvis was performed.  Sagittal and coronal reformats were performed.    FINDINGS:  LOWER CHEST: Small right pleural effusion. New patchy opacities in the   right lung base.    LIVER: Within normal limits.  BILE DUCTS: Normal caliber.  GALLBLADDER: Within normal limits.  SPLEEN: Within normal limits.  PANCREAS: Within normal limits.  ADRENALS: Within normal limits.  KIDNEYS/URETERS: No hydronephrosis. Vascular calcifications.    BLADDER: Within normal limits.  REPRODUCTIVE ORGANS: Prostate within normal limits.    BOWEL: No bowel obstruction. Appendix is normal. Moderate to large stool.  PERITONEUM: No ascites.  VESSELS: Atherosclerotic changes.  RETROPERITONEUM/LYMPH NODES: No lymphadenopathy.  ABDOMINAL WALL: Within normal limits.  BONES: Degenerative changes. Redemonstration of L1 compression deformity.   Right femoral ORIF.    IMPRESSION:  No hydronephrosis.    New patchy opacities in the right lung base. Small right pleural effusion.    Bedside Swallow: Trial 1  · Consistencies administered	thin liquid; pureed; regular solid  · Mode of Presentation	cup; spoon; self fed  · Positioning	upright (90 degrees)  · Oral Preparatory Phase	Within functional limits; orientation eating routines: able to feed himself with focus: as per family, pt eats rapidly: did not do so at bedside evaluation Lip seal on utensil : oral containment..  · Oral Phase	Within functional limits; immediate bolus formation: Ap transfer for liquid WFL. Mastication normally rotary-lateral with lingual sweep for collection and clearance.  l  · Pharyngeal Phase	Within functional limits; Onset of pharyngeal swallow is with age-appropriate time limits. No overt s/s aspiration at bedside.  · Comments	Micro aspiration and silent aspiration cannot be ruled out. As above, pt does have increased risk for aspiration 2/2 lung CA and  lobectomy as well as COPD and present pna (which in itself may be aspiration related). However, pt presents at bedside evaluation with no contraindication for maintaining REGULAR texture and thin liquids.  Strategies for reducing risk were demonstrated and discussed with the Pt and the family who are very supportive of the Pt.  Disc with NSg.  Will follow peripherally.    < from: CT Chest No Cont (11.13.23 @ 10:04) >    PROCEDURE DATE:  11/13/2023          INTERPRETATION:  Clinical indications: Pneumonia.    Axial CT images of the chest are obtained without intravenous   administration of contrast.    Comparison is made with the prior chest CT of November 2, 2023.    No enlarged axillary or mediastinal lymph nodes.    No pericardial effusion. Heart size is normal. Mitral annular   calcifications. Vascular calcifications with involvement of the aorta and   the coronary arteries. Aortic root calcifications.    Evaluation of the upper abdomen demonstrate partially imaged aortic stent   graft. Subcutaneous edema. Minimal perihepatic ascites.    Small left pleural effusion new since the abdominalCT of November 8, 2023. Trace right pleural effusion as on November 8, 2023.    Evaluation of the lungs demonstrate left lung base linear subsegmental   atelectasis. A few ill-defined left lung patchy nonspecific groundglass   opacities largest within the dependent portion of the left upper lobe are   new since November 2, 2023.    Status post right upper lobectomy with postoperative changes. Persistent   right apical large large cyst, part of which measures about 6.6 cm in   transverse dimension without significant interval change in size   containing air and increasing internal opacification as compared with   November 2, 2023, some of which appear slightly dense and may be due to   internal hemorrhagic components.    Extensive right mid to lower lung consolidations and ill-defined   groundglass opacities, with the largest confluent consolidation within   the mid to lower portions of the right lower lobe, majority of which   appear new since November 2, 2023 superimposed on previouslydescribed   linear opacities.    Degenerative changes of the spine. Old healed sternal fracture.    IMPRESSION: Right lung large areas of consolidation new since November 2, 2023 likely pneumonia.    Right apical previously described large cyst unchanged in size since   November 2, 2023 containing internal air and increasing internal   opacification since November 2, 2023 as described above. Continued   follow-up is recommended.    A few left lung ill-defined nonspecific groundglass opacities.   Differential diagnosis include but is not limited to   infectious/inflammatory etiologies and or pulmonary edema.    Small left pleural effusion new since November 8, 2023.      Culture - Acid Fast - Bronchial w/Smear (11.09.23 @ 12:30)   Specimen Source: .Bronchial RIGHT LOWER LOBE BAL  Acid Fast Bacilli Smear:   No acid-fast bacilli seen by fluorochrome stain  Culture Results:   Culture is being performed.  Culture - Fungal, Bronchial (11.09.23 @ 12:30)   Specimen Source: .Bronchial RIGHT LOWER LOBE BAL  Culture Results:   Rare Yeast    < from: CT Chest No Cont (11.20.23 @ 08:03) >  PROCEDURE DATE:  11/20/2023          INTERPRETATION:  Clinical indication: Right lung infection.    Axial CT images of the chest are obtained without intravenous   administration of contrast.    Comparison is made with the prior chest CT of November 13, 2023.    Enlarged axillary or mediastinal lymph nodes. Heart size is normal.   Vascular calcifications with involvement of the aorta and the coronary   arteries. Minimal pericardial fluid.    Evaluation of the upper abdomen demonstrate trace perihepatic and   perisplenic ascites slightly increased in size since November 13, 2023.   1.2 cm hypodensity arising from the upper pole of the partially imaged   left kidney without significant interval change.    Few subcutaneous edema.    Small left pleural effusion is unchanged. There may be a trace right   pleural effusion versus pleural thickening without significant interval   change.    Evaluation of the lungs demonstrate interval resolution of the previously   seen groundglass opacity within the dependent portion of the left upper   lobe. Interval near complete resolution of a previously seen left upper   lobe peripheral groundglass opacity. A few other ill-defined left lung   patchy groundglass opacities unchanged. 4 mm left lower lobe nodular   opacity on image 73 of series 2 appears new since November 13, 2023 may   be sequela of impacted distal airway.    Status post right upper lobectomy. Previously described right apical   large irregular cyst, part of which measures about 7 cm in transverse   dimension containing internal air, fluid and debris is without   significant interval change in size.  Ill-defined consolidations and groundglass opacities throughoutthe   majority of the right lung demonstrate mild overall interval progression   since November 13, 2023. For reference a few dense patchy and nodular   opacities at the right lung base are either new or demonstrate interval   increase in size.  Complete opacification of the right middle lobe with internal   consolidation appears unchanged since November 13, 2023, demonstrating   interval progression since November 2, 2023. Nonvisualization of the   internal segmental and subsegmental airways of the right middle lobe as   on the prior study.    Degenerative changes of the spine. Old healed left rib and old healed   sternal fractures. Mild superior endplate loss of height of the L1   vertebral body is unchanged.    IMPRESSION: Extensive right lung consolidations as described above with   overall interval progression since November 13, 2023. Complete   opacification of the right middle lobe with internal consolidation and   volume loss appears unchanged since November 13, 2023 demonstrating  interval progression since November 2, 2023.    Persistent apical right apical large irregular cyst unchanged in size   since November 13, 2023.    Small left pleural effusion with subcutaneous edema.    Trace amount of ascites, increased in size since November 13, 2023.    < from: US Duplex Venous Upper Ext Complete, Bilateral (11.22.23 @ 13:13) >  PROCEDURE DATE:  11/22/2023          INTERPRETATION:  CLINICAL INFORMATION: Arm edema    COMPARISON: None available.    TECHNIQUE: Duplex sonography of the BILATERAL upper extremity veins with   color and spectral Doppler, with and without compression.    FINDINGS:    RIGHT:  The right internal jugular, subclavian, axillary, brachial, radial and   ulnar veins are patent and compressible where applicable.  There is   thrombus within the cephalic vein from the mid upper arm to the   antecubital fossa. There is thrombus within the median cubital vein.  Basilic vein not visualized.    LEFT:  The left internal jugular, subclavian, axillary, brachial, radialand   ulnar veins are patent and compressible where applicable.  The basilic   vein (superficial vein) is patent and without thrombus.  There is   thrombus within the cephalic vein in the mid upper arm extending into the   antecubital fossa and median cubital vein.    Doppler examination shows normal spontaneous and phasic flow.    Atherosclerotic changes of the aorta are noted.    Bilateral subcutaneous edema.    IMPRESSION:  No evidence of deep venous thrombosis in either upper extremity.    Superficial thrombophlebitis involving the bilateral cephalic and median   cubital veins.   Subjective:  no events overnight  sitting in chair, family at bedside  breathing the same  discussed repeat ct on monday  redness in ue from midline  started on heparin for superficial thrombophlebitis    Review of Systems:  All 10 systems reviewed in detailed and found to be negative with the exception of what has already been described above    Allergies:  penicillin (Unknown)    Meds  MEDICATIONS  (STANDING):  albuterol/ipratropium for Nebulization 3 milliLiter(s) Nebulizer every 6 hours  AQUAPHOR (petrolatum Ointment) 1 Application(s) Topical two times a day  atorvastatin 40 milliGRAM(s) Oral at bedtime  caspofungin IVPB      caspofungin IVPB 50 milliGRAM(s) IV Intermittent every 24 hours  dextrose 5%. 1000 milliLiter(s) (100 mL/Hr) IV Continuous <Continuous>  dextrose 5%. 1000 milliLiter(s) (50 mL/Hr) IV Continuous <Continuous>  dextrose 50% Injectable 12.5 Gram(s) IV Push once  dextrose 50% Injectable 25 Gram(s) IV Push once  dextrose 50% Injectable 25 Gram(s) IV Push once  ferrous    sulfate 325 milliGRAM(s) Oral daily  fluticasone furoate/umeclidinium/vilanterol 100-62.5-25 MICROgram(s) Inhaler 1 Puff(s) Inhalation daily  folic acid 1 milliGRAM(s) Oral daily  glucagon  Injectable 1 milliGRAM(s) IntraMuscular once  heparin   Injectable 5000 Unit(s) SubCutaneous every 12 hours  hydrALAZINE 10 milliGRAM(s) Oral two times a day  insulin lispro (ADMELOG) corrective regimen sliding scale   SubCutaneous at bedtime  insulin lispro (ADMELOG) corrective regimen sliding scale   SubCutaneous three times a day before meals  levothyroxine 12.5 MICROGram(s) Oral daily  meropenem Injectable 1000 milliGRAM(s) IV Push every 8 hours  pantoprazole  Injectable 40 milliGRAM(s) IV Push two times a day  polyethylene glycol 3350 17 Gram(s) Oral daily  vancomycin  IVPB 750 milliGRAM(s) IV Intermittent every 12 hours    MEDICATIONS  (PRN):  acetaminophen     Tablet .. 650 milliGRAM(s) Oral every 6 hours PRN Temp greater or equal to 38C (100.4F), Mild Pain (1 - 3)  aluminum hydroxide/magnesium hydroxide/simethicone Suspension 30 milliLiter(s) Oral every 4 hours PRN Dyspepsia  benzonatate 100 milliGRAM(s) Oral three times a day PRN Cough  dextrose Oral Gel 15 Gram(s) Oral once PRN Blood Glucose LESS THAN 70 milliGRAM(s)/deciliter  melatonin 3 milliGRAM(s) Oral at bedtime PRN Insomnia  ondansetron Injectable 4 milliGRAM(s) IV Push every 8 hours PRN Nausea and/or Vomiting    Physical Exam  T(C): 36.6 (11-23-23 @ 05:30), Max: 37.4 (11-22-23 @ 21:40)  HR: 87 (11-23-23 @ 07:57) (87 - 107)  BP: 131/45 (11-23-23 @ 05:30) (125/76 - 141/62)  RR: 18 (11-23-23 @ 05:30) (18 - 18)  SpO2: 98% (11-23-23 @ 05:30) (93% - 100%)  Gen: Alert, oriented, no distress  HEENT: Anicteric sclera, moist mucous membranes  Cardio: Regular rhythm and rate, normal S1S2, no murmurs  Resp: Crackles, congested  GI: Nontender, nondistended, normoactive bowel sounds  Ext: No cyanosis, clubbing or edema in le, + redness and swelling in LUE by midline  skin: + ecchymosis, left leg laceration sutured and bandaged  Neuro: Nonfocal    Labs:                        8.1    4.95  )-----------( 135      ( 22 Nov 2023 07:01 )             24.8     11-22    139  |  105  |  40<H>  ----------------------------<  93  4.4   |  30  |  1.01    Ca    8.5      22 Nov 2023 07:01  Phos  2.6     11-22  Mg     2.1     11-22    TPro  5.5<L>  /  Alb  1.8<L>  /  TBili  0.8  /  DBili  x   /  AST  50<H>  /  ALT  35  /  AlkPhos  103  11-22      Urinalysis Basic - ( 22 Nov 2023 07:01 )    Color: x / Appearance: x / SG: x / pH: x  Gluc: 93 mg/dL / Ketone: x  / Bili: x / Urobili: x   Blood: x / Protein: x / Nitrite: x   Leuk Esterase: x / RBC: x / WBC x   Sq Epi: x / Non Sq Epi: x / Bacteria: x    < from: CT Chest No Cont (11.02.23 @ 16:04) >  ACC: [id number] EXAM:  CT CHEST   ORDERED BY: SARAH YOO     PROCEDURE DATE:  11/02/2023          INTERPRETATION:  INDICATION: Shortness of breath, cough, lung cancer   history, last chemotherapy and radiation treatment in May 2023    TECHNIQUE: Helical acquisition images of the chest without intravenous   contrast. Maximum intensity projection images were generated.    COMPARISON: 12/29/2020 chest x-ray.    FINDINGS:    LUNGS/AIRWAYS/PLEURA: Right upper lobectomy. Right lower lobe   multiloculated thick-walled 11 cm cavity (best seen in its entirety on   601-59) containing fluid and spongelike material. Consolidation within   the middle lobe and inferior aspect of the right lower lobe with angular   borders suggesting prior radiation. Multiple indistinct nodules of   varying density in the right lower and middle lobes, mostly adjacent to   the cavity. Small branching opacities in the peripheral left upper and   lower lobes, compatible with distal airway impaction. Trace left pleural   effusion. Mild right apical pleural thickening.    LYMPH NODES/MEDIASTINUM: No lymphadenopathy.    HEART/VASCULATURE: Normal heart size. Unremarkable pericardium. Coronary   artery calcifications. Normal caliber aorta.    UPPER ABDOMEN: Unremarkable.    BONES/SOFT TISSUES: Old sternal fracture. Mild loss of height of the L1   vertebral body.      IMPRESSION:    Right upper lobectomy.    Larger right lower lobe thick-walled cavity which may be infectious   and/or residua of reported treated tumor. 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    Consolidation within the right lower and middle lobe compatible with   radiation change.    Multiple indistinct nodules in the right lungwhich may be infectious or   radiation pneumonitis.    ct lucio 10/3 uploaded and reviewed    < from: CT Abdomen and Pelvis No Cont (11.08.23 @ 19:01) >  PROCEDURE DATE:  11/08/2023          INTERPRETATION:  CLINICAL INFORMATION: Acute kidney injury. Evaluate   obstruction.    COMPARISON: CT chest 11/20/2023.    CONTRAST/COMPLICATIONS:  IV Contrast: NONE  Oral Contrast: NONE  Complications: None reported at time of study completion    PROCEDURE:  CT of the Abdomen and Pelvis was performed.  Sagittal and coronal reformats were performed.    FINDINGS:  LOWER CHEST: Small right pleural effusion. New patchy opacities in the   right lung base.    LIVER: Within normal limits.  BILE DUCTS: Normal caliber.  GALLBLADDER: Within normal limits.  SPLEEN: Within normal limits.  PANCREAS: Within normal limits.  ADRENALS: Within normal limits.  KIDNEYS/URETERS: No hydronephrosis. Vascular calcifications.    BLADDER: Within normal limits.  REPRODUCTIVE ORGANS: Prostate within normal limits.    BOWEL: No bowel obstruction. Appendix is normal. Moderate to large stool.  PERITONEUM: No ascites.  VESSELS: Atherosclerotic changes.  RETROPERITONEUM/LYMPH NODES: No lymphadenopathy.  ABDOMINAL WALL: Within normal limits.  BONES: Degenerative changes. Redemonstration of L1 compression deformity.   Right femoral ORIF.    IMPRESSION:  No hydronephrosis.    New patchy opacities in the right lung base. Small right pleural effusion.    Bedside Swallow: Trial 1  · Consistencies administered	thin liquid; pureed; regular solid  · Mode of Presentation	cup; spoon; self fed  · Positioning	upright (90 degrees)  · Oral Preparatory Phase	Within functional limits; orientation eating routines: able to feed himself with focus: as per family, pt eats rapidly: did not do so at bedside evaluation Lip seal on utensil : oral containment..  · Oral Phase	Within functional limits; immediate bolus formation: Ap transfer for liquid WFL. Mastication normally rotary-lateral with lingual sweep for collection and clearance.  l  · Pharyngeal Phase	Within functional limits; Onset of pharyngeal swallow is with age-appropriate time limits. No overt s/s aspiration at bedside.  · Comments	Micro aspiration and silent aspiration cannot be ruled out. As above, pt does have increased risk for aspiration 2/2 lung CA and  lobectomy as well as COPD and present pna (which in itself may be aspiration related). However, pt presents at bedside evaluation with no contraindication for maintaining REGULAR texture and thin liquids.  Strategies for reducing risk were demonstrated and discussed with the Pt and the family who are very supportive of the Pt.  Disc with NSg.  Will follow peripherally.    < from: CT Chest No Cont (11.13.23 @ 10:04) >    PROCEDURE DATE:  11/13/2023          INTERPRETATION:  Clinical indications: Pneumonia.    Axial CT images of the chest are obtained without intravenous   administration of contrast.    Comparison is made with the prior chest CT of November 2, 2023.    No enlarged axillary or mediastinal lymph nodes.    No pericardial effusion. Heart size is normal. Mitral annular   calcifications. Vascular calcifications with involvement of the aorta and   the coronary arteries. Aortic root calcifications.    Evaluation of the upper abdomen demonstrate partially imaged aortic stent   graft. Subcutaneous edema. Minimal perihepatic ascites.    Small left pleural effusion new since the abdominalCT of November 8, 2023. Trace right pleural effusion as on November 8, 2023.    Evaluation of the lungs demonstrate left lung base linear subsegmental   atelectasis. A few ill-defined left lung patchy nonspecific groundglass   opacities largest within the dependent portion of the left upper lobe are   new since November 2, 2023.    Status post right upper lobectomy with postoperative changes. Persistent   right apical large large cyst, part of which measures about 6.6 cm in   transverse dimension without significant interval change in size   containing air and increasing internal opacification as compared with   November 2, 2023, some of which appear slightly dense and may be due to   internal hemorrhagic components.    Extensive right mid to lower lung consolidations and ill-defined   groundglass opacities, with the largest confluent consolidation within   the mid to lower portions of the right lower lobe, majority of which   appear new since November 2, 2023 superimposed on previouslydescribed   linear opacities.    Degenerative changes of the spine. Old healed sternal fracture.    IMPRESSION: Right lung large areas of consolidation new since November 2, 2023 likely pneumonia.    Right apical previously described large cyst unchanged in size since   November 2, 2023 containing internal air and increasing internal   opacification since November 2, 2023 as described above. Continued   follow-up is recommended.    A few left lung ill-defined nonspecific groundglass opacities.   Differential diagnosis include but is not limited to   infectious/inflammatory etiologies and or pulmonary edema.    Small left pleural effusion new since November 8, 2023.      Culture - Acid Fast - Bronchial w/Smear (11.09.23 @ 12:30)   Specimen Source: .Bronchial RIGHT LOWER LOBE BAL  Acid Fast Bacilli Smear:   No acid-fast bacilli seen by fluorochrome stain  Culture Results:   Culture is being performed.  Culture - Fungal, Bronchial (11.09.23 @ 12:30)   Specimen Source: .Bronchial RIGHT LOWER LOBE BAL  Culture Results:   Rare Yeast    < from: CT Chest No Cont (11.20.23 @ 08:03) >  PROCEDURE DATE:  11/20/2023          INTERPRETATION:  Clinical indication: Right lung infection.    Axial CT images of the chest are obtained without intravenous   administration of contrast.    Comparison is made with the prior chest CT of November 13, 2023.    Enlarged axillary or mediastinal lymph nodes. Heart size is normal.   Vascular calcifications with involvement of the aorta and the coronary   arteries. Minimal pericardial fluid.    Evaluation of the upper abdomen demonstrate trace perihepatic and   perisplenic ascites slightly increased in size since November 13, 2023.   1.2 cm hypodensity arising from the upper pole of the partially imaged   left kidney without significant interval change.    Few subcutaneous edema.    Small left pleural effusion is unchanged. There may be a trace right   pleural effusion versus pleural thickening without significant interval   change.    Evaluation of the lungs demonstrate interval resolution of the previously   seen groundglass opacity within the dependent portion of the left upper   lobe. Interval near complete resolution of a previously seen left upper   lobe peripheral groundglass opacity. A few other ill-defined left lung   patchy groundglass opacities unchanged. 4 mm left lower lobe nodular   opacity on image 73 of series 2 appears new since November 13, 2023 may   be sequela of impacted distal airway.    Status post right upper lobectomy. Previously described right apical   large irregular cyst, part of which measures about 7 cm in transverse   dimension containing internal air, fluid and debris is without   significant interval change in size.  Ill-defined consolidations and groundglass opacities throughoutthe   majority of the right lung demonstrate mild overall interval progression   since November 13, 2023. For reference a few dense patchy and nodular   opacities at the right lung base are either new or demonstrate interval   increase in size.  Complete opacification of the right middle lobe with internal   consolidation appears unchanged since November 13, 2023, demonstrating   interval progression since November 2, 2023. Nonvisualization of the   internal segmental and subsegmental airways of the right middle lobe as   on the prior study.    Degenerative changes of the spine. Old healed left rib and old healed   sternal fractures. Mild superior endplate loss of height of the L1   vertebral body is unchanged.    IMPRESSION: Extensive right lung consolidations as described above with   overall interval progression since November 13, 2023. Complete   opacification of the right middle lobe with internal consolidation and   volume loss appears unchanged since November 13, 2023 demonstrating  interval progression since November 2, 2023.    Persistent apical right apical large irregular cyst unchanged in size   since November 13, 2023.    Small left pleural effusion with subcutaneous edema.    Trace amount of ascites, increased in size since November 13, 2023.    < from: US Duplex Venous Upper Ext Complete, Bilateral (11.22.23 @ 13:13) >  PROCEDURE DATE:  11/22/2023          INTERPRETATION:  CLINICAL INFORMATION: Arm edema    COMPARISON: None available.    TECHNIQUE: Duplex sonography of the BILATERAL upper extremity veins with   color and spectral Doppler, with and without compression.    FINDINGS:    RIGHT:  The right internal jugular, subclavian, axillary, brachial, radial and   ulnar veins are patent and compressible where applicable.  There is   thrombus within the cephalic vein from the mid upper arm to the   antecubital fossa. There is thrombus within the median cubital vein.  Basilic vein not visualized.    LEFT:  The left internal jugular, subclavian, axillary, brachial, radialand   ulnar veins are patent and compressible where applicable.  The basilic   vein (superficial vein) is patent and without thrombus.  There is   thrombus within the cephalic vein in the mid upper arm extending into the   antecubital fossa and median cubital vein.    Doppler examination shows normal spontaneous and phasic flow.    Atherosclerotic changes of the aorta are noted.    Bilateral subcutaneous edema.    IMPRESSION:  No evidence of deep venous thrombosis in either upper extremity.    Superficial thrombophlebitis involving the bilateral cephalic and median   cubital veins.

## 2023-11-24 LAB
ALBUMIN SERPL ELPH-MCNC: 1.7 G/DL — LOW (ref 3.3–5)
ALBUMIN SERPL ELPH-MCNC: 1.7 G/DL — LOW (ref 3.3–5)
ALP SERPL-CCNC: 110 U/L — SIGNIFICANT CHANGE UP (ref 40–120)
ALP SERPL-CCNC: 110 U/L — SIGNIFICANT CHANGE UP (ref 40–120)
ALT FLD-CCNC: 30 U/L — SIGNIFICANT CHANGE UP (ref 12–78)
ALT FLD-CCNC: 30 U/L — SIGNIFICANT CHANGE UP (ref 12–78)
ANION GAP SERPL CALC-SCNC: 5 MMOL/L — SIGNIFICANT CHANGE UP (ref 5–17)
ANION GAP SERPL CALC-SCNC: 5 MMOL/L — SIGNIFICANT CHANGE UP (ref 5–17)
AST SERPL-CCNC: 41 U/L — HIGH (ref 15–37)
AST SERPL-CCNC: 41 U/L — HIGH (ref 15–37)
BILIRUB SERPL-MCNC: 0.6 MG/DL — SIGNIFICANT CHANGE UP (ref 0.2–1.2)
BILIRUB SERPL-MCNC: 0.6 MG/DL — SIGNIFICANT CHANGE UP (ref 0.2–1.2)
BUN SERPL-MCNC: 40 MG/DL — HIGH (ref 7–23)
BUN SERPL-MCNC: 40 MG/DL — HIGH (ref 7–23)
CALCIUM SERPL-MCNC: 8.4 MG/DL — LOW (ref 8.5–10.1)
CALCIUM SERPL-MCNC: 8.4 MG/DL — LOW (ref 8.5–10.1)
CHLORIDE SERPL-SCNC: 102 MMOL/L — SIGNIFICANT CHANGE UP (ref 96–108)
CHLORIDE SERPL-SCNC: 102 MMOL/L — SIGNIFICANT CHANGE UP (ref 96–108)
CO2 SERPL-SCNC: 30 MMOL/L — SIGNIFICANT CHANGE UP (ref 22–31)
CO2 SERPL-SCNC: 30 MMOL/L — SIGNIFICANT CHANGE UP (ref 22–31)
CREAT SERPL-MCNC: 1.07 MG/DL — SIGNIFICANT CHANGE UP (ref 0.5–1.3)
CREAT SERPL-MCNC: 1.07 MG/DL — SIGNIFICANT CHANGE UP (ref 0.5–1.3)
CRP SERPL-MCNC: 59 MG/L — HIGH
CRP SERPL-MCNC: 59 MG/L — HIGH
EGFR: 69 ML/MIN/1.73M2 — SIGNIFICANT CHANGE UP
EGFR: 69 ML/MIN/1.73M2 — SIGNIFICANT CHANGE UP
GLUCOSE BLDC GLUCOMTR-MCNC: 104 MG/DL — HIGH (ref 70–99)
GLUCOSE BLDC GLUCOMTR-MCNC: 104 MG/DL — HIGH (ref 70–99)
GLUCOSE BLDC GLUCOMTR-MCNC: 194 MG/DL — HIGH (ref 70–99)
GLUCOSE BLDC GLUCOMTR-MCNC: 96 MG/DL — SIGNIFICANT CHANGE UP (ref 70–99)
GLUCOSE BLDC GLUCOMTR-MCNC: 96 MG/DL — SIGNIFICANT CHANGE UP (ref 70–99)
GLUCOSE SERPL-MCNC: 108 MG/DL — HIGH (ref 70–99)
GLUCOSE SERPL-MCNC: 108 MG/DL — HIGH (ref 70–99)
HCT VFR BLD CALC: 23 % — LOW (ref 39–50)
HCT VFR BLD CALC: 23 % — LOW (ref 39–50)
HGB BLD-MCNC: 7.4 G/DL — LOW (ref 13–17)
HGB BLD-MCNC: 7.4 G/DL — LOW (ref 13–17)
MCHC RBC-ENTMCNC: 31 PG — SIGNIFICANT CHANGE UP (ref 27–34)
MCHC RBC-ENTMCNC: 31 PG — SIGNIFICANT CHANGE UP (ref 27–34)
MCHC RBC-ENTMCNC: 32.2 GM/DL — SIGNIFICANT CHANGE UP (ref 32–36)
MCHC RBC-ENTMCNC: 32.2 GM/DL — SIGNIFICANT CHANGE UP (ref 32–36)
MCV RBC AUTO: 96.2 FL — SIGNIFICANT CHANGE UP (ref 80–100)
MCV RBC AUTO: 96.2 FL — SIGNIFICANT CHANGE UP (ref 80–100)
NT-PROBNP SERPL-SCNC: 1764 PG/ML — HIGH (ref 0–450)
NT-PROBNP SERPL-SCNC: 1764 PG/ML — HIGH (ref 0–450)
PLATELET # BLD AUTO: 131 K/UL — LOW (ref 150–400)
PLATELET # BLD AUTO: 131 K/UL — LOW (ref 150–400)
POTASSIUM SERPL-MCNC: 4.4 MMOL/L — SIGNIFICANT CHANGE UP (ref 3.5–5.3)
POTASSIUM SERPL-MCNC: 4.4 MMOL/L — SIGNIFICANT CHANGE UP (ref 3.5–5.3)
POTASSIUM SERPL-SCNC: 4.4 MMOL/L — SIGNIFICANT CHANGE UP (ref 3.5–5.3)
POTASSIUM SERPL-SCNC: 4.4 MMOL/L — SIGNIFICANT CHANGE UP (ref 3.5–5.3)
PROT SERPL-MCNC: 5.4 GM/DL — LOW (ref 6–8.3)
PROT SERPL-MCNC: 5.4 GM/DL — LOW (ref 6–8.3)
RBC # BLD: 2.39 M/UL — LOW (ref 4.2–5.8)
RBC # BLD: 2.39 M/UL — LOW (ref 4.2–5.8)
RBC # FLD: 15.9 % — HIGH (ref 10.3–14.5)
RBC # FLD: 15.9 % — HIGH (ref 10.3–14.5)
SODIUM SERPL-SCNC: 137 MMOL/L — SIGNIFICANT CHANGE UP (ref 135–145)
SODIUM SERPL-SCNC: 137 MMOL/L — SIGNIFICANT CHANGE UP (ref 135–145)
SURGICAL PATHOLOGY STUDY: SIGNIFICANT CHANGE UP
SURGICAL PATHOLOGY STUDY: SIGNIFICANT CHANGE UP
WBC # BLD: 3.68 K/UL — LOW (ref 3.8–10.5)
WBC # BLD: 3.68 K/UL — LOW (ref 3.8–10.5)
WBC # FLD AUTO: 3.68 K/UL — LOW (ref 3.8–10.5)
WBC # FLD AUTO: 3.68 K/UL — LOW (ref 3.8–10.5)

## 2023-11-24 PROCEDURE — 99232 SBSQ HOSP IP/OBS MODERATE 35: CPT

## 2023-11-24 RX ORDER — FUROSEMIDE 40 MG
10 TABLET ORAL DAILY
Refills: 0 | Status: DISCONTINUED | OUTPATIENT
Start: 2023-11-24 | End: 2023-11-26

## 2023-11-24 RX ADMIN — Medication 3 MILLILITER(S): at 08:33

## 2023-11-24 RX ADMIN — MEROPENEM 1000 MILLIGRAM(S): 1 INJECTION INTRAVENOUS at 05:48

## 2023-11-24 RX ADMIN — Medication 2: at 17:43

## 2023-11-24 RX ADMIN — HEPARIN SODIUM 5000 UNIT(S): 5000 INJECTION INTRAVENOUS; SUBCUTANEOUS at 09:34

## 2023-11-24 RX ADMIN — MEROPENEM 1000 MILLIGRAM(S): 1 INJECTION INTRAVENOUS at 22:47

## 2023-11-24 RX ADMIN — MEROPENEM 1000 MILLIGRAM(S): 1 INJECTION INTRAVENOUS at 15:21

## 2023-11-24 RX ADMIN — Medication 250 MILLIGRAM(S): at 21:05

## 2023-11-24 RX ADMIN — Medication 325 MILLIGRAM(S): at 09:20

## 2023-11-24 RX ADMIN — Medication 50 MILLILITER(S): at 22:48

## 2023-11-24 RX ADMIN — Medication 1 MILLIGRAM(S): at 09:21

## 2023-11-24 RX ADMIN — Medication 12.5 MICROGRAM(S): at 05:48

## 2023-11-24 RX ADMIN — ATORVASTATIN CALCIUM 40 MILLIGRAM(S): 80 TABLET, FILM COATED ORAL at 22:47

## 2023-11-24 RX ADMIN — Medication 3 MILLILITER(S): at 01:53

## 2023-11-24 RX ADMIN — POLYETHYLENE GLYCOL 3350 17 GRAM(S): 17 POWDER, FOR SOLUTION ORAL at 09:20

## 2023-11-24 RX ADMIN — Medication 10 MILLIGRAM(S): at 09:21

## 2023-11-24 RX ADMIN — Medication 10 MILLIGRAM(S): at 14:53

## 2023-11-24 RX ADMIN — Medication 50 MILLILITER(S): at 11:17

## 2023-11-24 RX ADMIN — Medication 3 MILLILITER(S): at 13:32

## 2023-11-24 RX ADMIN — HEPARIN SODIUM 5000 UNIT(S): 5000 INJECTION INTRAVENOUS; SUBCUTANEOUS at 22:47

## 2023-11-24 RX ADMIN — Medication 1 APPLICATION(S): at 09:32

## 2023-11-24 RX ADMIN — Medication 10 MILLIGRAM(S): at 22:48

## 2023-11-24 RX ADMIN — FLUTICASONE FUROATE, UMECLIDINIUM BROMIDE AND VILANTEROL TRIFENATATE 1 PUFF(S): 200; 62.5; 25 POWDER RESPIRATORY (INHALATION) at 08:35

## 2023-11-24 RX ADMIN — CASPOFUNGIN ACETATE 260 MILLIGRAM(S): 7 INJECTION, POWDER, LYOPHILIZED, FOR SOLUTION INTRAVENOUS at 13:45

## 2023-11-24 RX ADMIN — Medication 3 MILLILITER(S): at 20:24

## 2023-11-24 RX ADMIN — PANTOPRAZOLE SODIUM 40 MILLIGRAM(S): 20 TABLET, DELAYED RELEASE ORAL at 09:20

## 2023-11-24 RX ADMIN — Medication 1 APPLICATION(S): at 23:06

## 2023-11-24 RX ADMIN — PANTOPRAZOLE SODIUM 40 MILLIGRAM(S): 20 TABLET, DELAYED RELEASE ORAL at 22:47

## 2023-11-24 RX ADMIN — Medication 250 MILLIGRAM(S): at 09:20

## 2023-11-24 NOTE — PROGRESS NOTE ADULT - ASSESSMENT
84 y/o Male with h/o lung CA [place] [facility] [place]/p chemotherapy and RT 05/2023, s/p right upper lobectomy, HTN, HPL, Chronic anemia, COPD, AAA, Hypothyroidism, CKD stage IIIa was admitted on 11/2 for increased shortness of breath, dyspnea on minimal exertion, and cough. Reportedly, he had CT of the chest performed 10/3 which demonstrated consolidation, was started on augmentin/prednisone/azithro 10/6 x 1 week. Pt completed course of medications however wife noted worsening cough and pt with fever Tmax of 103.9 on 10/26. Wife then took pt to pulmonologist again who started pt on second round of the same medications, instructed wife to stop giving pt tylenol wife notes fevers self-resolved. Since then pt decreased energy with episode of being unsteady on feet. Today is 6th day of taking medications, had f/u scheduled with pulmonologist who sent pt to ED for eval.  He feels tired and weak. In ER he received ceftriaxone.    1. Large RLL pulmonary cavity with worsening opacification. Worsening pulmonary infiltrates, likely pneumonia. Possible recurrent lung Ca. Lung Ca s/p right upper lobectomy. Radiation pneumonitis. CRF stage 3. Allergy to PCN.   -extensive right lung consolidations  -worsening large pulmonary cavity opacification on repeat CT chest   -bronchoscopy cultures show normal respiratory jaime and rare yeast  -fungal bronch c/s is pending and will take several weeks to finalize  -s/p levofloxacin # 10  -s/p voriconazole 200 mg PO q12h # 7  -s/p fluconazole 100 mg IV qd # 8  -s/p doxycycline 100 mg IV q12h # 10  -pulmonary evaluation appreciated   -I am concerned that there is no definite diagnosis for his pulmonary illness and that abx therapy dose not seem to help so far  -on meropenem 1 gm IV q8h # 12 and caspofungin 50 mg IV qd # 5 and vancomycin 750 mg IV q12h # 3  -tolerating abx well so far; no side effects noted  -thoracic evaluation appreciated - conservative care recommended   -renal function is improved  -vancomycin trough level is therapeutic  -renal function is stable  -f/u cultures  -continue abx coverage   -monitor temps  -f/u CBC  -supportive care  2. Other issues:   -care per medicine    d/w medicine team and Dr. Sheldon

## 2023-11-24 NOTE — PROGRESS NOTE ADULT - SUBJECTIVE AND OBJECTIVE BOX
A full discussion of the nature of anticoagulants has been carried out. A benefit risk analysis has been presented to the patient, so that they understand the justification for choosing anticoagulation at this time. The need for frequent and regular monitoring, precise dosage adjustment and compliance is stressed. Side effects of potential bleeding are discussed. The patient should avoid any OTC items containing aspirin or ibuprofen, and should avoid great swings in general diet. Avoid alcohol consumption. Call if any signs of abnormal bleeding. Next PT/INR test in 1 month. Patient wishes to begin home monitoring. She states the clinic is not close to her residence and this is causing problems for her to have INR monitored regularly. Physician order form for home monitoring given to Dr. Oneyda Cole for review. INTERVAL HPI/OVERNIGHT EVENTS:  Patient S&E at bedside. No o/n events,   Today, mild tachycardia on 3L NC. NO significant clinical change.   Afebrile, +cough. CRP 70.   REmains on vancomycin, meropenem, caspofungin. discussed with ID   Will repeat CXR    PAST MEDICAL & SURGICAL HISTORY:  COPD (chronic obstructive pulmonary disease)      Lung cancer      Anemia of chronic disease      CKD (chronic kidney disease), stage III      Hypothyroidism      AAA (abdominal aortic aneurysm)      HTN (hypertension)      HLD (hyperlipidemia)      Thrombocytopenia      S/P lobectomy of lung          FAMILY HISTORY:      VITAL SIGNS:  T(F): 98.1 (11-24-23 @ 08:54)  HR: 99 (11-24-23 @ 08:54)  BP: 140/52 (11-24-23 @ 08:54)  RR: 17 (11-24-23 @ 08:54)  SpO2: 98% (11-24-23 @ 08:54)  Wt(kg): --    PHYSICAL EXAM:    Constitutional: NAD, Quechan  Eyes: EOMI,   Respiratory: decreased bs on NC 3L, improved aeration  Cardiovascular: RRR,  Gastrointestinal: soft, NTND,   Extremities: left leg wrapped      MEDICATIONS  (STANDING):  albumin human 25% IVPB 100 milliLiter(s) IV Intermittent every 12 hours  albuterol/ipratropium for Nebulization 3 milliLiter(s) Nebulizer every 6 hours  AQUAPHOR (petrolatum Ointment) 1 Application(s) Topical two times a day  atorvastatin 40 milliGRAM(s) Oral at bedtime  caspofungin IVPB      caspofungin IVPB 50 milliGRAM(s) IV Intermittent every 24 hours  dextrose 5%. 1000 milliLiter(s) (50 mL/Hr) IV Continuous <Continuous>  dextrose 5%. 1000 milliLiter(s) (100 mL/Hr) IV Continuous <Continuous>  dextrose 50% Injectable 25 Gram(s) IV Push once  dextrose 50% Injectable 12.5 Gram(s) IV Push once  dextrose 50% Injectable 25 Gram(s) IV Push once  ferrous    sulfate 325 milliGRAM(s) Oral daily  fluticasone furoate/umeclidinium/vilanterol 100-62.5-25 MICROgram(s) Inhaler 1 Puff(s) Inhalation daily  folic acid 1 milliGRAM(s) Oral daily  glucagon  Injectable 1 milliGRAM(s) IntraMuscular once  heparin   Injectable 5000 Unit(s) SubCutaneous every 12 hours  hydrALAZINE 10 milliGRAM(s) Oral two times a day  insulin lispro (ADMELOG) corrective regimen sliding scale   SubCutaneous three times a day before meals  insulin lispro (ADMELOG) corrective regimen sliding scale   SubCutaneous at bedtime  levothyroxine 12.5 MICROGram(s) Oral daily  meropenem Injectable 1000 milliGRAM(s) IV Push every 8 hours  pantoprazole  Injectable 40 milliGRAM(s) IV Push two times a day  polyethylene glycol 3350 17 Gram(s) Oral daily  vancomycin  IVPB 750 milliGRAM(s) IV Intermittent every 12 hours    MEDICATIONS  (PRN):  acetaminophen     Tablet .. 650 milliGRAM(s) Oral every 6 hours PRN Temp greater or equal to 38C (100.4F), Mild Pain (1 - 3)  aluminum hydroxide/magnesium hydroxide/simethicone Suspension 30 milliLiter(s) Oral every 4 hours PRN Dyspepsia  benzonatate 100 milliGRAM(s) Oral three times a day PRN Cough  dextrose Oral Gel 15 Gram(s) Oral once PRN Blood Glucose LESS THAN 70 milliGRAM(s)/deciliter  melatonin 3 milliGRAM(s) Oral at bedtime PRN Insomnia  ondansetron Injectable 4 milliGRAM(s) IV Push every 8 hours PRN Nausea and/or Vomiting      Allergies    penicillin (Unknown)    Intolerances        LABS:                        7.8    3.50  )-----------( 137      ( 23 Nov 2023 08:53 )             24.5     11-23    136  |  104  |  42<H>  ----------------------------<  107<H>  4.4   |  30  |  1.12    Ca    8.5      23 Nov 2023 08:53  Phos  2.6     11-23  Mg     2.1     11-23    TPro  5.6<L>  /  Alb  1.8<L>  /  TBili  0.8  /  DBili  x   /  AST  48<H>  /  ALT  35  /  AlkPhos  118  11-23      Urinalysis Basic - ( 23 Nov 2023 08:53 )    Color: x / Appearance: x / SG: x / pH: x  Gluc: 107 mg/dL / Ketone: x  / Bili: x / Urobili: x   Blood: x / Protein: x / Nitrite: x   Leuk Esterase: x / RBC: x / WBC x   Sq Epi: x / Non Sq Epi: x / Bacteria: x        RADIOLOGY & ADDITIONAL TESTS:  Studies reviewed.

## 2023-11-24 NOTE — PROGRESS NOTE ADULT - SUBJECTIVE AND OBJECTIVE BOX
HOSPITALIST ATTENDING PROGRESS NOTE    Chart and meds reviewed.  Patient seen and examined.    CC: cough, weakness      - no events, afebrile, denies cp, dyspnea, + productive cough with brown mucus persist, poor po intake, plan discussed with wife at bedside   - pt seen in am, bilateral arm swelling, afebrile, + productive cough some streaks of blood , denies cp, abdominal pain, poor po intake   - afebrile, + upper extremities edema, + cough with brown mucus, denies abdominal pain, tolerating some po intake   - afebrile, UE edema improving, + gen weakness, + poor po intake, + cough persist, plan discussed     All other systems reviewed and found to be negative with the exception of what has been described above.    Vital sings reviewed for last 24 h  T(C): 37.1 (23 @ 19:09), Max: 37.1 (23 @ 20:49)  T(F): 98.7 (23 @ 19:09), Max: 98.7 (23 @ 20:49)  HR: 105 (23 @ 15:38) (88 - 106)  BP: 124/47 (23 @ 15:38) (124/47 - 152/50)  RR: 18 (23 @ 15:38) (17 - 18)  SpO2: 100% (23 @ 15:38) (97% - 100%)  Wt(kg): --  Daily     Daily Weight in k (2023 13:00)  CAPILLARY BLOOD GLUCOSE      POCT Blood Glucose.: 194 mg/dL (2023 17:37)  POCT Blood Glucose.: 104 mg/dL (2023 12:49)  POCT Blood Glucose.: 96 mg/dL (2023 08:35)  POCT Blood Glucose.: 182 mg/dL (2023 21:41)      Physical exam :   GEN: Frail, NAD  HEENT:  pupils equal and reactive, EOMI, no oropharyngeal lesions, erythema, exudates, oral thrush  NECK:   supple, no carotid bruits, no palpable lymph nodes, no thyromegaly  CV:  +S1, +S2, regular, no murmurs or rubs  RESP:  Bilateral ronchi  BREAST:  not examined  GI:  abdomen soft, non-tender, non-distended, normal BS, no bruits, no abdominal masses, no palpable masses  RECTAL:  not examined  :  not examined  MSK:   normal muscle tone, no atrophy, no rigidity, no contractions  EXT:  no clubbing, no cyanosis, no edema, no calf pain, BIlateral LE edema.   Left leg sutures  VASCULAR:  pulses equal and symmetric in the upper and lower extremities  NEURO:  AAOX1, no focal neurological deficits, follows all commands, able to move extremities spontaneously  SKIN:  no ulcers, lesions or rashes    All labs radiology and other studies reviewed and interpreted :                         7.4    3.68  )-----------( 131      ( 2023 11:58 )             23.0         137  |  102  |  40<H>  ----------------------------<  108<H>  4.4   |  30  |  1.07    Ca    8.4<L>      2023 11:58  Phos  2.6       Mg     2.1         TPro  5.4<L>  /  Alb  1.7<L>  /  TBili  0.6  /  DBili  x   /  AST  41<H>  /  ALT  30  /  AlkPhos  110      CARDIAC MARKERS ( 2023 08:53 )  x     / x     / 91 U/L / x     / x          LIVER FUNCTIONS - ( 2023 11:58 )  Alb: 1.7 g/dL / Pro: 5.4 gm/dL / ALK PHOS: 110 U/L / ALT: 30 U/L / AST: 41 U/L / GGT: x             C-Reactive Protein, Serum: 59 mg/L (23 @ 11:58)      C-Reactive Protein, Serum: 70 mg/L (23 @ 08:53)  C-Reactive Protein, Serum: 24 mg/L (23 @ 07:03)  C-Reactive Protein, Serum: 34 mg/L (23 @ 06:30)  C-Reactive Protein, Serum: 46 mg/L (23 @ 06:55)  C-Reactive Protein, Serum: 70 mg/L (23 @ 06:31)  C-Reactive Protein, Serum: 106 mg/L (11-10-23 @ 06:19)    Procalcitonin, Serum: 3.23: Note: Concentrations <0.5 ng/mL do not exclude an infection, on account  of localized infections (without systemic signs) which can be associated  with such low concentrations, or a systemic infection in its initial  stages (<6 hours). Furthermore, increased procalcitonin may occur without  infection. PCT concentrations between 0.5 and 2.0 ng/mL should be  interpreted taking into account the patient’s history. It is recommended  to pretest PCT within 6-24 hours if any concentrations <2.0 mg/mL are  obtained. ng/mL (23 @ 06:01)      Cortisol AM, Serum: 16.4 ug/dL (23 @ 08:53)             CT Chest No Cont (23 @ 08:03) >  IMPRESSION: Extensive right lung consolidations as described above with   overall interval progression since 2023. Complete   opacification of the right middle lobe with internal consolidation and   volume loss appears unchanged since 2023 demonstrating  interval progression since 2023.    Persistent apical right apical large irregular cyst unchanged in size   since 2023.    Small left pleural effusion with subcutaneous edema.    Trace amount of ascites, increased in size since 2023.    Xray Chest 1 View- PORTABLE-Routine (Xray Chest 1 View- PORTABLE-Routine .) (23 @ 12:32) >  IMPRESSION: Slight progression of pneumonia since     MEDICATIONS  (STANDING):  albuterol/ipratropium for Nebulization 3 milliLiter(s) Nebulizer every 6 hours  AQUAPHOR (petrolatum Ointment) 1 Application(s) Topical two times a day  atorvastatin 40 milliGRAM(s) Oral at bedtime  caspofungin IVPB 50 milliGRAM(s) IV Intermittent every 24 hours  caspofungin IVPB      dextrose 5%. 1000 milliLiter(s) (50 mL/Hr) IV Continuous <Continuous>  dextrose 5%. 1000 milliLiter(s) (100 mL/Hr) IV Continuous <Continuous>  dextrose 50% Injectable 25 Gram(s) IV Push once  dextrose 50% Injectable 12.5 Gram(s) IV Push once  dextrose 50% Injectable 25 Gram(s) IV Push once  ferrous    sulfate 325 milliGRAM(s) Oral daily  fluticasone furoate/umeclidinium/vilanterol 100-62.5-25 MICROgram(s) Inhaler 1 Puff(s) Inhalation daily  folic acid 1 milliGRAM(s) Oral daily  furosemide   Injectable 10 milliGRAM(s) IV Push once  glucagon  Injectable 1 milliGRAM(s) IntraMuscular once  heparin   Injectable 5000 Unit(s) SubCutaneous every 12 hours  hydrALAZINE 10 milliGRAM(s) Oral two times a day  insulin lispro (ADMELOG) corrective regimen sliding scale   SubCutaneous at bedtime  insulin lispro (ADMELOG) corrective regimen sliding scale   SubCutaneous three times a day before meals  levothyroxine 12.5 MICROGram(s) Oral daily  meropenem Injectable 1000 milliGRAM(s) IV Push every 8 hours  pantoprazole  Injectable 40 milliGRAM(s) IV Push two times a day  polyethylene glycol 3350 17 Gram(s) Oral daily  vancomycin  IVPB 750 milliGRAM(s) IV Intermittent every 12 hours    MEDICATIONS  (PRN):  acetaminophen     Tablet .. 650 milliGRAM(s) Oral every 6 hours PRN Temp greater or equal to 38C (100.4F), Mild Pain (1 - 3)  aluminum hydroxide/magnesium hydroxide/simethicone Suspension 30 milliLiter(s) Oral every 4 hours PRN Dyspepsia  benzonatate 100 milliGRAM(s) Oral three times a day PRN Cough  dextrose Oral Gel 15 Gram(s) Oral once PRN Blood Glucose LESS THAN 70 milliGRAM(s)/deciliter  melatonin 3 milliGRAM(s) Oral at bedtime PRN Insomnia  ondansetron Injectable 4 milliGRAM(s) IV Push every 8 hours PRN Nausea and/or Vomiting  ondansetron Injectable 4 milliGRAM(s) IV Push every 8 hours PRN Nausea and/or Vomiting

## 2023-11-24 NOTE — PROGRESS NOTE ADULT - SUBJECTIVE AND OBJECTIVE BOX
Date of service: 11-24-23 @ 07:52    Lying in bed in NAD  Sleepy this AM  No SOB at rest  Has dry cough    ROS: no fever or chills; denies dizziness, no HA, no abdominal pain, no diarrhea or constipation; no dysuria, no legs pain, no rashes    MEDICATIONS  (STANDING):  albumin human 25% IVPB 100 milliLiter(s) IV Intermittent every 12 hours  albuterol/ipratropium for Nebulization 3 milliLiter(s) Nebulizer every 6 hours  AQUAPHOR (petrolatum Ointment) 1 Application(s) Topical two times a day  atorvastatin 40 milliGRAM(s) Oral at bedtime  caspofungin IVPB 50 milliGRAM(s) IV Intermittent every 24 hours  caspofungin IVPB      dextrose 5%. 1000 milliLiter(s) (50 mL/Hr) IV Continuous <Continuous>  dextrose 5%. 1000 milliLiter(s) (100 mL/Hr) IV Continuous <Continuous>  dextrose 50% Injectable 12.5 Gram(s) IV Push once  dextrose 50% Injectable 25 Gram(s) IV Push once  dextrose 50% Injectable 25 Gram(s) IV Push once  ferrous    sulfate 325 milliGRAM(s) Oral daily  fluticasone furoate/umeclidinium/vilanterol 100-62.5-25 MICROgram(s) Inhaler 1 Puff(s) Inhalation daily  folic acid 1 milliGRAM(s) Oral daily  glucagon  Injectable 1 milliGRAM(s) IntraMuscular once  heparin   Injectable 5000 Unit(s) SubCutaneous every 12 hours  hydrALAZINE 10 milliGRAM(s) Oral two times a day  insulin lispro (ADMELOG) corrective regimen sliding scale   SubCutaneous at bedtime  insulin lispro (ADMELOG) corrective regimen sliding scale   SubCutaneous three times a day before meals  levothyroxine 12.5 MICROGram(s) Oral daily  meropenem Injectable 1000 milliGRAM(s) IV Push every 8 hours  pantoprazole  Injectable 40 milliGRAM(s) IV Push two times a day  polyethylene glycol 3350 17 Gram(s) Oral daily  vancomycin  IVPB 750 milliGRAM(s) IV Intermittent every 12 hours    Vital Signs Last 24 Hrs  T(C): 37.1 (23 Nov 2023 20:49), Max: 37.1 (23 Nov 2023 20:49)  T(F): 98.7 (23 Nov 2023 20:49), Max: 98.7 (23 Nov 2023 20:49)  HR: 88 (24 Nov 2023 05:05) (84 - 106)  BP: 152/50 (23 Nov 2023 21:45) (127/51 - 152/50)  BP(mean): --  RR: 18 (23 Nov 2023 21:45) (17 - 18)  SpO2: 98% (24 Nov 2023 05:05) (97% - 100%)    Parameters below as of 24 Nov 2023 02:12  Patient On (Oxygen Delivery Method): BiPAP/CPAP     Physical exam:    Constitutional:  No acute distress  HEENT: NC/AT, EOMI, PERRLA, conjunctivae clear; ears and nose atraumatic  Neck: supple; thyroid not palpable  Back: no tenderness  Respiratory: respiratory effort normal; few crackles at bases  Cardiovascular: S1S2 regular, no murmurs  Abdomen: soft, not tender, not distended, positive BS  Genitourinary: no suprapubic tenderness  Lymphatic: no LN palpable  Musculoskeletal: no muscle tenderness, no joint swelling or tenderness  Extremities: no pedal edema  Left lower leg laceration dressed  Neurological/ Psychiatric: AxOx3, moving all extremities  Skin: no rashes; no palpable lesions    Labs: reviewed                        7.8    3.50  )-----------( 137      ( 23 Nov 2023 08:53 )             24.5     11-23    136  |  104  |  42<H>  ----------------------------<  107<H>  4.4   |  30  |  1.12    Ca    8.5      23 Nov 2023 08:53  Phos  2.6     11-23  Mg     2.1     11-23    TPro  5.6<L>  /  Alb  1.8<L>  /  TBili  0.8  /  DBili  x   /  AST  48<H>  /  ALT  35  /  AlkPhos  118  11-23    Vancomycin Level, Trough: 16.4 ug/mL (11-23 @ 20:16)    C-Reactive Protein, Serum: 70 mg/L (11-23-23 @ 08:53)  C-Reactive Protein, Serum: 24 mg/L (11-14-23 @ 07:03)  C-Reactive Protein, Serum: 34 mg/L (11-13-23 @ 06:30)               7.8    5.36  )-----------( 135      ( 21 Nov 2023 06:57 )             23.5     11-21    141  |  107  |  45<H>  ----------------------------<  85  4.4   |  28  |  1.05    Ca    8.2<L>      21 Nov 2023 06:57    TPro  5.3<L>  /  Alb  1.6<L>  /  TBili  0.9  /  DBili  x   /  AST  44<H>  /  ALT  30  /  AlkPhos  102  11-21    C-Reactive Protein, Serum: 24 mg/L (11-14-23 @ 07:03)  C-Reactive Protein, Serum: 34 mg/L (11-13-23 @ 06:30)  C-Reactive Protein, Serum: 46 mg/L (11-12-23 @ 06:55)  C-Reactive Protein, Serum: 70 mg/L (11-11-23 @ 06:31)  C-Reactive Protein, Serum: 106 mg/L (11-10-23 @ 06:19)               8.2    11.74 )-----------( 259      ( 03 Nov 2023 07:15 )             25.2     11-03    133<L>  |  102  |  35<H>  ----------------------------<  205<H>  4.4   |  22  |  1.20    Ca    8.3<L>      03 Nov 2023 07:15    TPro  6.9  /  Alb  2.0<L>  /  TBili  0.5  /  DBili  x   /  AST  38<H>  /  ALT  50  /  AlkPhos  130<H>  11-02     LIVER FUNCTIONS - ( 02 Nov 2023 14:47 )  Alb: 2.0 g/dL / Pro: 6.9 gm/dL / ALK PHOS: 130 U/L / ALT: 50 U/L / AST: 38 U/L / GGT: x           Urinalysis (11-02 @ 16:40)  Urine Appearance: Clear  Protein, Urine: 100 mg/dL  Urine Microscopic-Add On (NC) (11-02 @ 16:40)  White Blood Cell - Urine: 0 /HPF  Red Blood Cell - Urine: 0 /HPF    (11-02 @ 14:47)  [facility]    Culture - Acid Fast - Bronchial w/Smear (collected 09 Nov 2023 12:30)  Source: .Bronchial RIGHT LOWER LOBE BAL  Preliminary Report (11 Nov 2023 15:08):    Culture is being performed.    Culture - Fungal, Bronchial (collected 09 Nov 2023 12:30)  Source: .Bronchial RIGHT LOWER LOBE BAL  Preliminary Report (13 Nov 2023 12:07):    Rare Yeast    Culture - Bronchial (collected 09 Nov 2023 12:30)  Source: .Bronchial RIGHT LOWER LOBE BAL  Gram Stain (09 Nov 2023 23:14):    Rare polymorphonuclear leukocytes per low power field    Few Squamous epithelial cells per low power field    Few Gram positive cocci in pairs per oil power field  Final Report (11 Nov 2023 16:38):    Normal Respiratory Marguerite present    Culture - Blood (collected 04 Nov 2023 04:21)  Source: .Blood None  Final Report (09 Nov 2023 09:00):    No growth at 5 days    Culture - Blood (collected 04 Nov 2023 04:21)  Source: .Blood None  Final Report (09 Nov 2023 09:00):    No growth at 5 days    Radiology: all available radiological tests reviewed    < from: CT Chest No Cont (11.02.23 @ 16:04) >  Right upper lobectomy.  Larger right lower lobe thick-walled cavity which may be infectious   and/or residua of reported treated tumor. 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  Consolidation within the right lower and middle lobe compatible with radiation change.  Multiple indistinct nodules in the right lungwhich may be infectious or radiation pneumonitis.  < end of copied text >    < from: CT Abdomen and Pelvis No Cont (11.08.23 @ 19:01) >  No hydronephrosis.  New patchy opacities in the right lung base. Small right pleural effusion.  < end of copied text >    < from: CT Chest No Cont (11.13.23 @ 10:04) >  IMPRESSION: Right lung large areas of consolidation new since November 2, 2023 likely pneumonia.    Right apical previously described large cyst unchanged in size since   November 2, 2023 containing internal air and increasing internal opacification since November 2, 2023 as described above.     A few left lung ill-defined nonspecific groundglass opacities.   Differential diagnosis include but is not limited to   infectious/inflammatory etiologies and or pulmonary edema.  Small left pleural effusion new since November 8, 2023.  < end of copied text >    < from: CT Chest No Cont (11.20.23 @ 08:03) >  IMPRESSION: Extensive right lung consolidations as described above with   overall interval progression since November 13, 2023. Complete   opacification of the right middle lobe with internal consolidation and   volume loss appears unchanged since November 13, 2023 demonstrating  interval progression since November 2, 2023.    Persistent apical right apical large irregular cyst unchanged in size   since November 13, 2023.    < end of copied text >      Advanced directives addressed: full resuscitation

## 2023-11-24 NOTE — PROGRESS NOTE ADULT - ASSESSMENT
82 y/o Male with h/o lung CA follows at [facility] [place]/p chemotherapy and RT 05/2023, s/p right upper lobectomy, HTN, HPL, Chronic anemia, COPD, AAA, Hypothyroidism, CKD stage IIIa was admitted on 11/2 for increased shortness of breath, dyspnea on minimal exertion, and cough.    # h/o lung cancer   - followed at [facility] [place], [place] - last received carbo/taxol with salvage RT 5/5/2023 after progression in subcarinol node after adjuvant atezolizumab (LD 12/20/22)  - pt being covered for radiation induced pneumonitis with steroids   - now presently on 3 L NC - needs home O2 evaluation when stabilizes  - more likely infectious than malignant cause of current condition  - pall care consult appreciated- family discussions ongoing    # PNA/ Pneumonitis   - BAL with rare yeast - CONTINUE  meropenem 1 gm IV q12h, and caspofungin as per ID- switched from doxycycline to vancomycin today   - followed by pulm and ct surgery- no intervention  - repeat CT chest 11/20- when compared with 11/13 with complete opacification of RML w/ internal consolidation and volume loss appears unchanged and extensive right lung consolidations w/ overall progression.  - repeat CXR today     # anemia- likely related to myelosuppression from treatment history/pna  - s/p 1u pRBC 11/19 with improvement of Hb from 7.1 to 7.9->stable    # UE pain  US duplex UE negative for DVt- showed superficial thrombophlebitis involving b/l cephalic and median cubital veins    palll care  Discussed with [facility]  will continue to follow  No

## 2023-11-24 NOTE — PROGRESS NOTE ADULT - ASSESSMENT
83-year-old M with PMHx HTN, HLD, chronic anemia, COPD, lung CA s/p RUL lobectomy (follows at [facility], last chemotherapy/RT 5/2023, not currently on treatment), AAA, hypothyroidism, CKD stage IIIa sent in to ED on 11/2/23  by pulmonologist MD Olivera with c/o SOB, dyspnea on minimal exertion, and cough. 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     # Acute hypoxic respiratory failure, multifactorial   # New RLL PNA on CT 11/13 , RLL cavity s/p bronchoscopy 11/9  possible yeast  infection, less likely radiation pneumonitis , r/o  aspergilloma    - CT chest: Larger right lower lobe thick-walled cavity which may be infectious and/or residua of reported treated tumor. Spongelike material within the cavity can be seen with aspergilloma (prior to fungus ball formation). Consolidation within the right lower and middle lobe compatible with radiation change.  - leukocytosis stable (WBC 17.70 from 17.35) ---> trend down to WBC  8   - repeat CXR 11/8 - worsening of opacities over right lung   - 2 d echo 11/8 - EF 60% , no significant valvular disease    - immunoglobulin panel - all Ig wnl  - 11/9 - s/p bronchoscopy   - speech and swallow evaluation  - no aspirations  - CXR 11/11 - stable consolidations  - repeat CT chest 11/13 - new right lung consolidation  - blood cultures -no growth, sputum culture - normal respiratory  - c/w trelegy ellipta, chest pt, IS, acapella ,BIPAP as needed  - ID/pulm consult noted   - s/p IV steroids --> po prednisone taper 20 x 3 days than stop , trending down ----> 34  - s/p  voriconazole PO ,  s/p 7 days of levaquin to cover atypical/psuedomonas (allergy to PCN)    - 11/13 - IV meropenem, doxy and fluconasole started as per ID   - Chest xray prelim worse  - Thoracic consult appreciated, no intervention  - CT Chest 11/20 worse  -11/13 - IV meropenem and doxycylcine  - Fluconazole DC, started on caspofungin 11/20  - Would consider palliative at this time  - 11/22 - d/c doxy started on  vancomycin   - CRP 24--> 70     #  Chronic anemia, likely due to  chemotherapy, worsening and iron deficiency   -  no s/s bleeding, asymptomatic, continue to monitor/trend  - 11/8 - 1 unit PRBC due to hemoptysis and worsening of anemia  -  11/19 transfuse 1 unit PRBC  - Hemoglobin 7.9 11/20  - Trend    #CHRISTINA on CKD stage IIIa,  Resolved    # Left calf laceration from veno dines while in PACU for bronchoscopy   - surgery consult - s/p suturing  on 11/09/23   - plan to remove sutures in 10 days  - Surgical consult for suture removal in 14 days from placement  - pending    # Bilateral UE edema due to superphicial thrombophlebitis  - doppler noted  - start heparin 5000 bid, elevation, warm compresses if pain  - 11/23 - iv lasix 10 mg   - 11/24 c/w IV lasix for 3 days      # Acute hyperglycemia, possible steroid-induced , Prediabetes with A1C 6.3   - Stop pre-meal and  lantus   - c/w ISS  - liberize diet  - Monitor     # HTN - hydralazine 10 bid     #  HLD   - c/w  atorvastatin    # Hypothyroidism  - Levo lowered to 12.5 mcg,  Recheck in 4 weeks    # Hx lung CA s/p RUL lobectomy/chemo/RT  - f/u with [facility]     # Severe protein-calorie malnutrition. - nutritional education provided ,  supplements ordered ,  MVT qd ordered      # DVT Ppx  - heparin 5000 q12h     # Code status  - DNR/DNI    wife and daughter updated at bedside 11/21, 11/22, 11/23, 11/24

## 2023-11-24 NOTE — PROVIDER CONTACT NOTE (OTHER) - RECOMMENDATIONS
respiratory treatment given and being placed on bipap,   will receive standing po hydralazine for bp.

## 2023-11-24 NOTE — PROGRESS NOTE ADULT - SUBJECTIVE AND OBJECTIVE BOX
Subjective:    Review of Systems:  All 10 systems reviewed in detailed and found to be negative with the exception of what has already been described above    Allergies:  penicillin (Unknown)    Meds  MEDICATIONS  (STANDING):  albumin human 25% IVPB 100 milliLiter(s) IV Intermittent every 12 hours  albuterol/ipratropium for Nebulization 3 milliLiter(s) Nebulizer every 6 hours  AQUAPHOR (petrolatum Ointment) 1 Application(s) Topical two times a day  atorvastatin 40 milliGRAM(s) Oral at bedtime  caspofungin IVPB 50 milliGRAM(s) IV Intermittent every 24 hours  caspofungin IVPB      dextrose 5%. 1000 milliLiter(s) (100 mL/Hr) IV Continuous <Continuous>  dextrose 5%. 1000 milliLiter(s) (50 mL/Hr) IV Continuous <Continuous>  dextrose 50% Injectable 12.5 Gram(s) IV Push once  dextrose 50% Injectable 25 Gram(s) IV Push once  dextrose 50% Injectable 25 Gram(s) IV Push once  ferrous    sulfate 325 milliGRAM(s) Oral daily  fluticasone furoate/umeclidinium/vilanterol 100-62.5-25 MICROgram(s) Inhaler 1 Puff(s) Inhalation daily  folic acid 1 milliGRAM(s) Oral daily  glucagon  Injectable 1 milliGRAM(s) IntraMuscular once  heparin   Injectable 5000 Unit(s) SubCutaneous every 12 hours  hydrALAZINE 10 milliGRAM(s) Oral two times a day  insulin lispro (ADMELOG) corrective regimen sliding scale   SubCutaneous three times a day before meals  insulin lispro (ADMELOG) corrective regimen sliding scale   SubCutaneous at bedtime  levothyroxine 12.5 MICROGram(s) Oral daily  meropenem Injectable 1000 milliGRAM(s) IV Push every 8 hours  pantoprazole  Injectable 40 milliGRAM(s) IV Push two times a day  polyethylene glycol 3350 17 Gram(s) Oral daily  vancomycin  IVPB 750 milliGRAM(s) IV Intermittent every 12 hours    MEDICATIONS  (PRN):  acetaminophen     Tablet .. 650 milliGRAM(s) Oral every 6 hours PRN Temp greater or equal to 38C (100.4F), Mild Pain (1 - 3)  aluminum hydroxide/magnesium hydroxide/simethicone Suspension 30 milliLiter(s) Oral every 4 hours PRN Dyspepsia  benzonatate 100 milliGRAM(s) Oral three times a day PRN Cough  dextrose Oral Gel 15 Gram(s) Oral once PRN Blood Glucose LESS THAN 70 milliGRAM(s)/deciliter  melatonin 3 milliGRAM(s) Oral at bedtime PRN Insomnia  ondansetron Injectable 4 milliGRAM(s) IV Push every 8 hours PRN Nausea and/or Vomiting    Physical Exam  T(C): 36.7 (11-24-23 @ 08:54), Max: 37.1 (11-23-23 @ 20:49)  HR: 99 (11-24-23 @ 08:54) (84 - 106)  BP: 140/52 (11-24-23 @ 08:54) (127/51 - 152/50)  RR: 17 (11-24-23 @ 08:54) (17 - 18)  SpO2: 98% (11-24-23 @ 08:54) (97% - 100%)  Gen: Alert, oriented, no distress  HEENT: Anicteric sclera, moist mucous membranes  Cardio: Regular rhythm and rate, normal S1S2, no murmurs  Resp: Crackles, congested  GI: Nontender, nondistended, normoactive bowel sounds  Ext: No cyanosis, clubbing or edema in le, + redness and swelling in LUE by midline  skin: + ecchymosis, left leg laceration sutured and bandaged  Neuro: Nonfocal    Labs:                        7.8    3.50  )-----------( 137      ( 23 Nov 2023 08:53 )             24.5     11-23    136  |  104  |  42<H>  ----------------------------<  107<H>  4.4   |  30  |  1.12    Ca    8.5      23 Nov 2023 08:53  Phos  2.6     11-23  Mg     2.1     11-23    TPro  5.6<L>  /  Alb  1.8<L>  /  TBili  0.8  /  DBili  x   /  AST  48<H>  /  ALT  35  /  AlkPhos  118  11-23      Urinalysis Basic - ( 23 Nov 2023 08:53 )    Color: x / Appearance: x / SG: x / pH: x  Gluc: 107 mg/dL / Ketone: x  / Bili: x / Urobili: x   Blood: x / Protein: x / Nitrite: x   Leuk Esterase: x / RBC: x / WBC x   Sq Epi: x / Non Sq Epi: x / Bacteria: x    < from: CT Chest No Cont (11.02.23 @ 16:04) >  ACC: [id number] EXAM:  CT CHEST   ORDERED BY: SARAH YOO     PROCEDURE DATE:  11/02/2023          INTERPRETATION:  INDICATION: Shortness of breath, cough, lung cancer   history, last chemotherapy and radiation treatment in May 2023    TECHNIQUE: Helical acquisition images of the chest without intravenous   contrast. Maximum intensity projection images were generated.    COMPARISON: 12/29/2020 chest x-ray.    FINDINGS:    LUNGS/AIRWAYS/PLEURA: Right upper lobectomy. Right lower lobe   multiloculated thick-walled 11 cm cavity (best seen in its entirety on   601-59) containing fluid and spongelike material. Consolidation within   the middle lobe and inferior aspect of the right lower lobe with angular   borders suggesting prior radiation. Multiple indistinct nodules of   varying density in the right lower and middle lobes, mostly adjacent to   the cavity. Small branching opacities in the peripheral left upper and   lower lobes, compatible with distal airway impaction. Trace left pleural   effusion. Mild right apical pleural thickening.    LYMPH NODES/MEDIASTINUM: No lymphadenopathy.    HEART/VASCULATURE: Normal heart size. Unremarkable pericardium. Coronary   artery calcifications. Normal caliber aorta.    UPPER ABDOMEN: Unremarkable.    BONES/SOFT TISSUES: Old sternal fracture. Mild loss of height of the L1   vertebral body.      IMPRESSION:    Right upper lobectomy.    Larger right lower lobe thick-walled cavity which may be infectious   and/or residua of reported treated tumor. 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    Consolidation within the right lower and middle lobe compatible with   radiation change.    Multiple indistinct nodules in the right lungwhich may be infectious or   radiation pneumonitis.    ct lucio 10/3 uploaded and reviewed    < from: CT Abdomen and Pelvis No Cont (11.08.23 @ 19:01) >  PROCEDURE DATE:  11/08/2023          INTERPRETATION:  CLINICAL INFORMATION: Acute kidney injury. Evaluate   obstruction.    COMPARISON: CT chest 11/20/2023.    CONTRAST/COMPLICATIONS:  IV Contrast: NONE  Oral Contrast: NONE  Complications: None reported at time of study completion    PROCEDURE:  CT of the Abdomen and Pelvis was performed.  Sagittal and coronal reformats were performed.    FINDINGS:  LOWER CHEST: Small right pleural effusion. New patchy opacities in the   right lung base.    LIVER: Within normal limits.  BILE DUCTS: Normal caliber.  GALLBLADDER: Within normal limits.  SPLEEN: Within normal limits.  PANCREAS: Within normal limits.  ADRENALS: Within normal limits.  KIDNEYS/URETERS: No hydronephrosis. Vascular calcifications.    BLADDER: Within normal limits.  REPRODUCTIVE ORGANS: Prostate within normal limits.    BOWEL: No bowel obstruction. Appendix is normal. Moderate to large stool.  PERITONEUM: No ascites.  VESSELS: Atherosclerotic changes.  RETROPERITONEUM/LYMPH NODES: No lymphadenopathy.  ABDOMINAL WALL: Within normal limits.  BONES: Degenerative changes. Redemonstration of L1 compression deformity.   Right femoral ORIF.    IMPRESSION:  No hydronephrosis.    New patchy opacities in the right lung base. Small right pleural effusion.    Bedside Swallow: Trial 1  · Consistencies administered	thin liquid; pureed; regular solid  · Mode of Presentation	cup; spoon; self fed  · Positioning	upright (90 degrees)  · Oral Preparatory Phase	Within functional limits; orientation eating routines: able to feed himself with focus: as per family, pt eats rapidly: did not do so at bedside evaluation Lip seal on utensil : oral containment..  · Oral Phase	Within functional limits; immediate bolus formation: Ap transfer for liquid WFL. Mastication normally rotary-lateral with lingual sweep for collection and clearance.  l  · Pharyngeal Phase	Within functional limits; Onset of pharyngeal swallow is with age-appropriate time limits. No overt s/s aspiration at bedside.  · Comments	Micro aspiration and silent aspiration cannot be ruled out. As above, pt does have increased risk for aspiration 2/2 lung CA and  lobectomy as well as COPD and present pna (which in itself may be aspiration related). However, pt presents at bedside evaluation with no contraindication for maintaining REGULAR texture and thin liquids.  Strategies for reducing risk were demonstrated and discussed with the Pt and the family who are very supportive of the Pt.  Disc with NSg.  Will follow peripherally.    < from: CT Chest No Cont (11.13.23 @ 10:04) >    PROCEDURE DATE:  11/13/2023          INTERPRETATION:  Clinical indications: Pneumonia.    Axial CT images of the chest are obtained without intravenous   administration of contrast.    Comparison is made with the prior chest CT of November 2, 2023.    No enlarged axillary or mediastinal lymph nodes.    No pericardial effusion. Heart size is normal. Mitral annular   calcifications. Vascular calcifications with involvement of the aorta and   the coronary arteries. Aortic root calcifications.    Evaluation of the upper abdomen demonstrate partially imaged aortic stent   graft. Subcutaneous edema. Minimal perihepatic ascites.    Small left pleural effusion new since the abdominalCT of November 8, 2023. Trace right pleural effusion as on November 8, 2023.    Evaluation of the lungs demonstrate left lung base linear subsegmental   atelectasis. A few ill-defined left lung patchy nonspecific groundglass   opacities largest within the dependent portion of the left upper lobe are   new since November 2, 2023.    Status post right upper lobectomy with postoperative changes. Persistent   right apical large large cyst, part of which measures about 6.6 cm in   transverse dimension without significant interval change in size   containing air and increasing internal opacification as compared with   November 2, 2023, some of which appear slightly dense and may be due to   internal hemorrhagic components.    Extensive right mid to lower lung consolidations and ill-defined   groundglass opacities, with the largest confluent consolidation within   the mid to lower portions of the right lower lobe, majority of which   appear new since November 2, 2023 superimposed on previouslydescribed   linear opacities.    Degenerative changes of the spine. Old healed sternal fracture.    IMPRESSION: Right lung large areas of consolidation new since November 2, 2023 likely pneumonia.    Right apical previously described large cyst unchanged in size since   November 2, 2023 containing internal air and increasing internal   opacification since November 2, 2023 as described above. Continued   follow-up is recommended.    A few left lung ill-defined nonspecific groundglass opacities.   Differential diagnosis include but is not limited to   infectious/inflammatory etiologies and or pulmonary edema.    Small left pleural effusion new since November 8, 2023.      Culture - Acid Fast - Bronchial w/Smear (11.09.23 @ 12:30)   Specimen Source: .Bronchial RIGHT LOWER LOBE BAL  Acid Fast Bacilli Smear:   No acid-fast bacilli seen by fluorochrome stain  Culture Results:   Culture is being performed.  Culture - Fungal, Bronchial (11.09.23 @ 12:30)   Specimen Source: .Bronchial RIGHT LOWER LOBE BAL  Culture Results:   Rare Yeast    < from: CT Chest No Cont (11.20.23 @ 08:03) >  PROCEDURE DATE:  11/20/2023          INTERPRETATION:  Clinical indication: Right lung infection.    Axial CT images of the chest are obtained without intravenous   administration of contrast.    Comparison is made with the prior chest CT of November 13, 2023.    Enlarged axillary or mediastinal lymph nodes. Heart size is normal.   Vascular calcifications with involvement of the aorta and the coronary   arteries. Minimal pericardial fluid.    Evaluation of the upper abdomen demonstrate trace perihepatic and   perisplenic ascites slightly increased in size since November 13, 2023.   1.2 cm hypodensity arising from the upper pole of the partially imaged   left kidney without significant interval change.    Few subcutaneous edema.    Small left pleural effusion is unchanged. There may be a trace right   pleural effusion versus pleural thickening without significant interval   change.    Evaluation of the lungs demonstrate interval resolution of the previously   seen groundglass opacity within the dependent portion of the left upper   lobe. Interval near complete resolution of a previously seen left upper   lobe peripheral groundglass opacity. A few other ill-defined left lung   patchy groundglass opacities unchanged. 4 mm left lower lobe nodular   opacity on image 73 of series 2 appears new since November 13, 2023 may   be sequela of impacted distal airway.    Status post right upper lobectomy. Previously described right apical   large irregular cyst, part of which measures about 7 cm in transverse   dimension containing internal air, fluid and debris is without   significant interval change in size.  Ill-defined consolidations and groundglass opacities throughoutthe   majority of the right lung demonstrate mild overall interval progression   since November 13, 2023. For reference a few dense patchy and nodular   opacities at the right lung base are either new or demonstrate interval   increase in size.  Complete opacification of the right middle lobe with internal   consolidation appears unchanged since November 13, 2023, demonstrating   interval progression since November 2, 2023. Nonvisualization of the   internal segmental and subsegmental airways of the right middle lobe as   on the prior study.    Degenerative changes of the spine. Old healed left rib and old healed   sternal fractures. Mild superior endplate loss of height of the L1   vertebral body is unchanged.    IMPRESSION: Extensive right lung consolidations as described above with   overall interval progression since November 13, 2023. Complete   opacification of the right middle lobe with internal consolidation and   volume loss appears unchanged since November 13, 2023 demonstrating  interval progression since November 2, 2023.    Persistent apical right apical large irregular cyst unchanged in size   since November 13, 2023.    Small left pleural effusion with subcutaneous edema.    Trace amount of ascites, increased in size since November 13, 2023.    < from: US Duplex Venous Upper Ext Complete, Bilateral (11.22.23 @ 13:13) >  PROCEDURE DATE:  11/22/2023          INTERPRETATION:  CLINICAL INFORMATION: Arm edema    COMPARISON: None available.    TECHNIQUE: Duplex sonography of the BILATERAL upper extremity veins with   color and spectral Doppler, with and without compression.    FINDINGS:    RIGHT:  The right internal jugular, subclavian, axillary, brachial, radial and   ulnar veins are patent and compressible where applicable.  There is   thrombus within the cephalic vein from the mid upper arm to the   antecubital fossa. There is thrombus within the median cubital vein.  Basilic vein not visualized.    LEFT:  The left internal jugular, subclavian, axillary, brachial, radialand   ulnar veins are patent and compressible where applicable.  The basilic   vein (superficial vein) is patent and without thrombus.  There is   thrombus within the cephalic vein in the mid upper arm extending into the   antecubital fossa and median cubital vein.    Doppler examination shows normal spontaneous and phasic flow.    Atherosclerotic changes of the aorta are noted.    Bilateral subcutaneous edema.    IMPRESSION:  No evidence of deep venous thrombosis in either upper extremity.    Superficial thrombophlebitis involving the bilateral cephalic and median   cubital veins. Subjective:  no new events  family at bedside  discussed ct on monday  discussed palliative care decision    Review of Systems:  All 10 systems reviewed in detailed and found to be negative with the exception of what has already been described above    Allergies:  penicillin (Unknown)    Meds  MEDICATIONS  (STANDING):  albumin human 25% IVPB 100 milliLiter(s) IV Intermittent every 12 hours  albuterol/ipratropium for Nebulization 3 milliLiter(s) Nebulizer every 6 hours  AQUAPHOR (petrolatum Ointment) 1 Application(s) Topical two times a day  atorvastatin 40 milliGRAM(s) Oral at bedtime  caspofungin IVPB 50 milliGRAM(s) IV Intermittent every 24 hours  caspofungin IVPB      dextrose 5%. 1000 milliLiter(s) (100 mL/Hr) IV Continuous <Continuous>  dextrose 5%. 1000 milliLiter(s) (50 mL/Hr) IV Continuous <Continuous>  dextrose 50% Injectable 12.5 Gram(s) IV Push once  dextrose 50% Injectable 25 Gram(s) IV Push once  dextrose 50% Injectable 25 Gram(s) IV Push once  ferrous    sulfate 325 milliGRAM(s) Oral daily  fluticasone furoate/umeclidinium/vilanterol 100-62.5-25 MICROgram(s) Inhaler 1 Puff(s) Inhalation daily  folic acid 1 milliGRAM(s) Oral daily  glucagon  Injectable 1 milliGRAM(s) IntraMuscular once  heparin   Injectable 5000 Unit(s) SubCutaneous every 12 hours  hydrALAZINE 10 milliGRAM(s) Oral two times a day  insulin lispro (ADMELOG) corrective regimen sliding scale   SubCutaneous three times a day before meals  insulin lispro (ADMELOG) corrective regimen sliding scale   SubCutaneous at bedtime  levothyroxine 12.5 MICROGram(s) Oral daily  meropenem Injectable 1000 milliGRAM(s) IV Push every 8 hours  pantoprazole  Injectable 40 milliGRAM(s) IV Push two times a day  polyethylene glycol 3350 17 Gram(s) Oral daily  vancomycin  IVPB 750 milliGRAM(s) IV Intermittent every 12 hours    MEDICATIONS  (PRN):  acetaminophen     Tablet .. 650 milliGRAM(s) Oral every 6 hours PRN Temp greater or equal to 38C (100.4F), Mild Pain (1 - 3)  aluminum hydroxide/magnesium hydroxide/simethicone Suspension 30 milliLiter(s) Oral every 4 hours PRN Dyspepsia  benzonatate 100 milliGRAM(s) Oral three times a day PRN Cough  dextrose Oral Gel 15 Gram(s) Oral once PRN Blood Glucose LESS THAN 70 milliGRAM(s)/deciliter  melatonin 3 milliGRAM(s) Oral at bedtime PRN Insomnia  ondansetron Injectable 4 milliGRAM(s) IV Push every 8 hours PRN Nausea and/or Vomiting    Physical Exam  T(C): 36.7 (11-24-23 @ 08:54), Max: 37.1 (11-23-23 @ 20:49)  HR: 99 (11-24-23 @ 08:54) (84 - 106)  BP: 140/52 (11-24-23 @ 08:54) (127/51 - 152/50)  RR: 17 (11-24-23 @ 08:54) (17 - 18)  SpO2: 98% (11-24-23 @ 08:54) (97% - 100%)  Gen: Alert, oriented, no distress  HEENT: Anicteric sclera, moist mucous membranes  Cardio: Regular rhythm and rate, normal S1S2, no murmurs  Resp: Crackles, congested  GI: Nontender, nondistended, normoactive bowel sounds  Ext: No cyanosis, clubbing or edema in le, + redness and swelling in LUE by midline  skin: + ecchymosis, left leg laceration sutured and bandaged  Neuro: Nonfocal    Labs:                        7.8    3.50  )-----------( 137      ( 23 Nov 2023 08:53 )             24.5     11-23    136  |  104  |  42<H>  ----------------------------<  107<H>  4.4   |  30  |  1.12    Ca    8.5      23 Nov 2023 08:53  Phos  2.6     11-23  Mg     2.1     11-23    TPro  5.6<L>  /  Alb  1.8<L>  /  TBili  0.8  /  DBili  x   /  AST  48<H>  /  ALT  35  /  AlkPhos  118  11-23      Urinalysis Basic - ( 23 Nov 2023 08:53 )    Color: x / Appearance: x / SG: x / pH: x  Gluc: 107 mg/dL / Ketone: x  / Bili: x / Urobili: x   Blood: x / Protein: x / Nitrite: x   Leuk Esterase: x / RBC: x / WBC x   Sq Epi: x / Non Sq Epi: x / Bacteria: x    < from: CT Chest No Cont (11.02.23 @ 16:04) >  ACC: [id number] EXAM:  CT CHEST   ORDERED BY: SARAH YOO     PROCEDURE DATE:  11/02/2023          INTERPRETATION:  INDICATION: Shortness of breath, cough, lung cancer   history, last chemotherapy and radiation treatment in May 2023    TECHNIQUE: Helical acquisition images of the chest without intravenous   contrast. Maximum intensity projection images were generated.    COMPARISON: 12/29/2020 chest x-ray.    FINDINGS:    LUNGS/AIRWAYS/PLEURA: Right upper lobectomy. Right lower lobe   multiloculated thick-walled 11 cm cavity (best seen in its entirety on   601-59) containing fluid and spongelike material. Consolidation within   the middle lobe and inferior aspect of the right lower lobe with angular   borders suggesting prior radiation. Multiple indistinct nodules of   varying density in the right lower and middle lobes, mostly adjacent to   the cavity. Small branching opacities in the peripheral left upper and   lower lobes, compatible with distal airway impaction. Trace left pleural   effusion. Mild right apical pleural thickening.    LYMPH NODES/MEDIASTINUM: No lymphadenopathy.    HEART/VASCULATURE: Normal heart size. Unremarkable pericardium. Coronary   artery calcifications. Normal caliber aorta.    UPPER ABDOMEN: Unremarkable.    BONES/SOFT TISSUES: Old sternal fracture. Mild loss of height of the L1   vertebral body.      IMPRESSION:    Right upper lobectomy.    Larger right lower lobe thick-walled cavity which may be infectious   and/or residua of reported treated tumor. 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    Consolidation within the right lower and middle lobe compatible with   radiation change.    Multiple indistinct nodules in the right lungwhich may be infectious or   radiation pneumonitis.    ct lucio 10/3 uploaded and reviewed    < from: CT Abdomen and Pelvis No Cont (11.08.23 @ 19:01) >  PROCEDURE DATE:  11/08/2023          INTERPRETATION:  CLINICAL INFORMATION: Acute kidney injury. Evaluate   obstruction.    COMPARISON: CT chest 11/20/2023.    CONTRAST/COMPLICATIONS:  IV Contrast: NONE  Oral Contrast: NONE  Complications: None reported at time of study completion    PROCEDURE:  CT of the Abdomen and Pelvis was performed.  Sagittal and coronal reformats were performed.    FINDINGS:  LOWER CHEST: Small right pleural effusion. New patchy opacities in the   right lung base.    LIVER: Within normal limits.  BILE DUCTS: Normal caliber.  GALLBLADDER: Within normal limits.  SPLEEN: Within normal limits.  PANCREAS: Within normal limits.  ADRENALS: Within normal limits.  KIDNEYS/URETERS: No hydronephrosis. Vascular calcifications.    BLADDER: Within normal limits.  REPRODUCTIVE ORGANS: Prostate within normal limits.    BOWEL: No bowel obstruction. Appendix is normal. Moderate to large stool.  PERITONEUM: No ascites.  VESSELS: Atherosclerotic changes.  RETROPERITONEUM/LYMPH NODES: No lymphadenopathy.  ABDOMINAL WALL: Within normal limits.  BONES: Degenerative changes. Redemonstration of L1 compression deformity.   Right femoral ORIF.    IMPRESSION:  No hydronephrosis.    New patchy opacities in the right lung base. Small right pleural effusion.    Bedside Swallow: Trial 1  · Consistencies administered	thin liquid; pureed; regular solid  · Mode of Presentation	cup; spoon; self fed  · Positioning	upright (90 degrees)  · Oral Preparatory Phase	Within functional limits; orientation eating routines: able to feed himself with focus: as per family, pt eats rapidly: did not do so at bedside evaluation Lip seal on utensil : oral containment..  · Oral Phase	Within functional limits; immediate bolus formation: Ap transfer for liquid WFL. Mastication normally rotary-lateral with lingual sweep for collection and clearance.  l  · Pharyngeal Phase	Within functional limits; Onset of pharyngeal swallow is with age-appropriate time limits. No overt s/s aspiration at bedside.  · Comments	Micro aspiration and silent aspiration cannot be ruled out. As above, pt does have increased risk for aspiration 2/2 lung CA and  lobectomy as well as COPD and present pna (which in itself may be aspiration related). However, pt presents at bedside evaluation with no contraindication for maintaining REGULAR texture and thin liquids.  Strategies for reducing risk were demonstrated and discussed with the Pt and the family who are very supportive of the Pt.  Disc with NSg.  Will follow peripherally.    < from: CT Chest No Cont (11.13.23 @ 10:04) >    PROCEDURE DATE:  11/13/2023          INTERPRETATION:  Clinical indications: Pneumonia.    Axial CT images of the chest are obtained without intravenous   administration of contrast.    Comparison is made with the prior chest CT of November 2, 2023.    No enlarged axillary or mediastinal lymph nodes.    No pericardial effusion. Heart size is normal. Mitral annular   calcifications. Vascular calcifications with involvement of the aorta and   the coronary arteries. Aortic root calcifications.    Evaluation of the upper abdomen demonstrate partially imaged aortic stent   graft. Subcutaneous edema. Minimal perihepatic ascites.    Small left pleural effusion new since the abdominalCT of November 8, 2023. Trace right pleural effusion as on November 8, 2023.    Evaluation of the lungs demonstrate left lung base linear subsegmental   atelectasis. A few ill-defined left lung patchy nonspecific groundglass   opacities largest within the dependent portion of the left upper lobe are   new since November 2, 2023.    Status post right upper lobectomy with postoperative changes. Persistent   right apical large large cyst, part of which measures about 6.6 cm in   transverse dimension without significant interval change in size   containing air and increasing internal opacification as compared with   November 2, 2023, some of which appear slightly dense and may be due to   internal hemorrhagic components.    Extensive right mid to lower lung consolidations and ill-defined   groundglass opacities, with the largest confluent consolidation within   the mid to lower portions of the right lower lobe, majority of which   appear new since November 2, 2023 superimposed on previouslydescribed   linear opacities.    Degenerative changes of the spine. Old healed sternal fracture.    IMPRESSION: Right lung large areas of consolidation new since November 2, 2023 likely pneumonia.    Right apical previously described large cyst unchanged in size since   November 2, 2023 containing internal air and increasing internal   opacification since November 2, 2023 as described above. Continued   follow-up is recommended.    A few left lung ill-defined nonspecific groundglass opacities.   Differential diagnosis include but is not limited to   infectious/inflammatory etiologies and or pulmonary edema.    Small left pleural effusion new since November 8, 2023.      Culture - Acid Fast - Bronchial w/Smear (11.09.23 @ 12:30)   Specimen Source: .Bronchial RIGHT LOWER LOBE BAL  Acid Fast Bacilli Smear:   No acid-fast bacilli seen by fluorochrome stain  Culture Results:   Culture is being performed.  Culture - Fungal, Bronchial (11.09.23 @ 12:30)   Specimen Source: .Bronchial RIGHT LOWER LOBE BAL  Culture Results:   Rare Yeast    < from: CT Chest No Cont (11.20.23 @ 08:03) >  PROCEDURE DATE:  11/20/2023          INTERPRETATION:  Clinical indication: Right lung infection.    Axial CT images of the chest are obtained without intravenous   administration of contrast.    Comparison is made with the prior chest CT of November 13, 2023.    Enlarged axillary or mediastinal lymph nodes. Heart size is normal.   Vascular calcifications with involvement of the aorta and the coronary   arteries. Minimal pericardial fluid.    Evaluation of the upper abdomen demonstrate trace perihepatic and   perisplenic ascites slightly increased in size since November 13, 2023.   1.2 cm hypodensity arising from the upper pole of the partially imaged   left kidney without significant interval change.    Few subcutaneous edema.    Small left pleural effusion is unchanged. There may be a trace right   pleural effusion versus pleural thickening without significant interval   change.    Evaluation of the lungs demonstrate interval resolution of the previously   seen groundglass opacity within the dependent portion of the left upper   lobe. Interval near complete resolution of a previously seen left upper   lobe peripheral groundglass opacity. A few other ill-defined left lung   patchy groundglass opacities unchanged. 4 mm left lower lobe nodular   opacity on image 73 of series 2 appears new since November 13, 2023 may   be sequela of impacted distal airway.    Status post right upper lobectomy. Previously described right apical   large irregular cyst, part of which measures about 7 cm in transverse   dimension containing internal air, fluid and debris is without   significant interval change in size.  Ill-defined consolidations and groundglass opacities throughoutthe   majority of the right lung demonstrate mild overall interval progression   since November 13, 2023. For reference a few dense patchy and nodular   opacities at the right lung base are either new or demonstrate interval   increase in size.  Complete opacification of the right middle lobe with internal   consolidation appears unchanged since November 13, 2023, demonstrating   interval progression since November 2, 2023. Nonvisualization of the   internal segmental and subsegmental airways of the right middle lobe as   on the prior study.    Degenerative changes of the spine. Old healed left rib and old healed   sternal fractures. Mild superior endplate loss of height of the L1   vertebral body is unchanged.    IMPRESSION: Extensive right lung consolidations as described above with   overall interval progression since November 13, 2023. Complete   opacification of the right middle lobe with internal consolidation and   volume loss appears unchanged since November 13, 2023 demonstrating  interval progression since November 2, 2023.    Persistent apical right apical large irregular cyst unchanged in size   since November 13, 2023.    Small left pleural effusion with subcutaneous edema.    Trace amount of ascites, increased in size since November 13, 2023.    < from: US Duplex Venous Upper Ext Complete, Bilateral (11.22.23 @ 13:13) >  PROCEDURE DATE:  11/22/2023          INTERPRETATION:  CLINICAL INFORMATION: Arm edema    COMPARISON: None available.    TECHNIQUE: Duplex sonography of the BILATERAL upper extremity veins with   color and spectral Doppler, with and without compression.    FINDINGS:    RIGHT:  The right internal jugular, subclavian, axillary, brachial, radial and   ulnar veins are patent and compressible where applicable.  There is   thrombus within the cephalic vein from the mid upper arm to the   antecubital fossa. There is thrombus within the median cubital vein.  Basilic vein not visualized.    LEFT:  The left internal jugular, subclavian, axillary, brachial, radialand   ulnar veins are patent and compressible where applicable.  The basilic   vein (superficial vein) is patent and without thrombus.  There is   thrombus within the cephalic vein in the mid upper arm extending into the   antecubital fossa and median cubital vein.    Doppler examination shows normal spontaneous and phasic flow.    Atherosclerotic changes of the aorta are noted.    Bilateral subcutaneous edema.    IMPRESSION:  No evidence of deep venous thrombosis in either upper extremity.    Superficial thrombophlebitis involving the bilateral cephalic and median   cubital veins.

## 2023-11-24 NOTE — PROGRESS NOTE ADULT - ASSESSMENT
83y old Male with PMH HTN, HPL, Chronic anemia, COPD, lung CA [place] [facility] [place] chemotherapy and RT 05/2023, not currently receiving chemo treatment), s/p right upper lobectomy, AAA, Hypothyroidism, CKD stage IIIa referred by me to ed for continued sob, cough despite abx treatment found to have large lower lobe thick walled cavity with worsening findings on ct despite treatment with levaquin  1. large lower lobe thick walled cavity: unclear if this is fungal vs bacterial vs malignancy  -started on voriconazole, initially switchedd to fluconazole, now on caspofungin. surgery is more defitive therapy however he is high surgical risk  -reviewed imaging from [place] supplied by wife - it looks like he has had blebs in the past and these are what are infected - they look half filled with fluid with thicker borders, which would argue against fungal infection/mrsa as there is no necrosis involved in its pathogenesis  -s/p bronch. follow up results. growing few yeast  -repeat ct wo contrast reveals worsening consolidations and filling in of bleb. this may suggest inadequate antibiotics, continued aspiration or rapidly progressive cancer  -was on doxy (for potential mrsa) now transitioned to vanco  -continue meropenem  -monitor vanco trough  -palliative care eval  2. pneumonia: as above  -supplemental o2  -speech/swallow eval noted  -aspiration precautions  -repeat ct on monday  3. copd: continue trelegy  4. ? radiation induced pneumonitis: titrate down  steroids as he has worsening infection and progressive nature of findings argues against pneumonitis  5. anemia: received 1 unit prbc on 11/19  6. left leg laceration: s/p suture, now bandaged. continue wound care  7. superficial thomrbophlebitis: on iv heparin 83y old Male with PMH HTN, HPL, Chronic anemia, COPD, lung CA follows at [facility] [place] (last chemotherapy and RT 05/2023, not currently receiving chemo treatment), s/p right upper lobectomy, AAA, Hypothyroidism, CKD stage IIIa referred by me to ed for continued sob, cough despite abx treatment found to have large lower lobe thick walled cavity with worsening findings on ct despite treatment with levaquin  1. large lower lobe thick walled cavity: unclear if this is fungal vs bacterial vs malignancy  -started on voriconazole, initially switchedd to fluconazole, now on caspofungin. surgery is more defitive therapy however he is high surgical risk  -reviewed imaging from [place] supplied by wife - it looks like he has had blebs in the past and these are what are infected - they look half filled with fluid with thicker borders, which would argue against fungal infection/mrsa as there is no necrosis involved in its pathogenesis  -s/p bronch. follow up results. growing few yeast  -repeat ct wo contrast reveals worsening consolidations and filling in of bleb. this may suggest inadequate antibiotics, continued aspiration or rapidly progressive cancer  -was on doxy (for potential mrsa) now transitioned to vanco  -continue meropenem  -monitor vanco trough  -palliative care eval  2. pneumonia: as above  -supplemental o2  -speech/swallow eval noted  -aspiration precautions  -repeat ct on monday  3. copd: continue trelegy  4. ? radiation induced pneumonitis: titrate down  steroids as he has worsening infection and progressive nature of findings argues against pneumonitis  5. anemia: received 1 unit prbc on 11/19  6. left leg laceration: s/p suture, now bandaged. continue wound care  7. superficial thrombophlebitis: on iv heparin

## 2023-11-25 LAB
ALBUMIN SERPL ELPH-MCNC: 2.3 G/DL — LOW (ref 3.3–5)
ALBUMIN SERPL ELPH-MCNC: 2.3 G/DL — LOW (ref 3.3–5)
ALP SERPL-CCNC: 100 U/L — SIGNIFICANT CHANGE UP (ref 40–120)
ALP SERPL-CCNC: 100 U/L — SIGNIFICANT CHANGE UP (ref 40–120)
ALT FLD-CCNC: 27 U/L — SIGNIFICANT CHANGE UP (ref 12–78)
ALT FLD-CCNC: 27 U/L — SIGNIFICANT CHANGE UP (ref 12–78)
ANION GAP SERPL CALC-SCNC: 4 MMOL/L — LOW (ref 5–17)
ANION GAP SERPL CALC-SCNC: 4 MMOL/L — LOW (ref 5–17)
AST SERPL-CCNC: 35 U/L — SIGNIFICANT CHANGE UP (ref 15–37)
AST SERPL-CCNC: 35 U/L — SIGNIFICANT CHANGE UP (ref 15–37)
BASE EXCESS BLDV CALC-SCNC: 9.4 MMOL/L — HIGH (ref -2–3)
BASE EXCESS BLDV CALC-SCNC: 9.4 MMOL/L — HIGH (ref -2–3)
BASOPHILS # BLD AUTO: 0 K/UL — SIGNIFICANT CHANGE UP (ref 0–0.2)
BASOPHILS # BLD AUTO: 0 K/UL — SIGNIFICANT CHANGE UP (ref 0–0.2)
BASOPHILS NFR BLD AUTO: 0 % — SIGNIFICANT CHANGE UP (ref 0–2)
BASOPHILS NFR BLD AUTO: 0 % — SIGNIFICANT CHANGE UP (ref 0–2)
BILIRUB SERPL-MCNC: 1 MG/DL — SIGNIFICANT CHANGE UP (ref 0.2–1.2)
BILIRUB SERPL-MCNC: 1 MG/DL — SIGNIFICANT CHANGE UP (ref 0.2–1.2)
BUN SERPL-MCNC: 37 MG/DL — HIGH (ref 7–23)
BUN SERPL-MCNC: 37 MG/DL — HIGH (ref 7–23)
CALCIUM SERPL-MCNC: 8.2 MG/DL — LOW (ref 8.5–10.1)
CALCIUM SERPL-MCNC: 8.2 MG/DL — LOW (ref 8.5–10.1)
CHLORIDE SERPL-SCNC: 101 MMOL/L — SIGNIFICANT CHANGE UP (ref 96–108)
CHLORIDE SERPL-SCNC: 101 MMOL/L — SIGNIFICANT CHANGE UP (ref 96–108)
CO2 SERPL-SCNC: 33 MMOL/L — HIGH (ref 22–31)
CO2 SERPL-SCNC: 33 MMOL/L — HIGH (ref 22–31)
CREAT SERPL-MCNC: 0.99 MG/DL — SIGNIFICANT CHANGE UP (ref 0.5–1.3)
CREAT SERPL-MCNC: 0.99 MG/DL — SIGNIFICANT CHANGE UP (ref 0.5–1.3)
CRP SERPL-MCNC: 58 MG/L — HIGH
CRP SERPL-MCNC: 58 MG/L — HIGH
EGFR: 76 ML/MIN/1.73M2 — SIGNIFICANT CHANGE UP
EGFR: 76 ML/MIN/1.73M2 — SIGNIFICANT CHANGE UP
EOSINOPHIL # BLD AUTO: 0.14 K/UL — SIGNIFICANT CHANGE UP (ref 0–0.5)
EOSINOPHIL # BLD AUTO: 0.14 K/UL — SIGNIFICANT CHANGE UP (ref 0–0.5)
EOSINOPHIL NFR BLD AUTO: 5 % — SIGNIFICANT CHANGE UP (ref 0–6)
EOSINOPHIL NFR BLD AUTO: 5 % — SIGNIFICANT CHANGE UP (ref 0–6)
GAS PNL BLDV: SIGNIFICANT CHANGE UP
GAS PNL BLDV: SIGNIFICANT CHANGE UP
GLUCOSE BLDC GLUCOMTR-MCNC: 113 MG/DL — HIGH (ref 70–99)
GLUCOSE BLDC GLUCOMTR-MCNC: 113 MG/DL — HIGH (ref 70–99)
GLUCOSE BLDC GLUCOMTR-MCNC: 135 MG/DL — HIGH (ref 70–99)
GLUCOSE BLDC GLUCOMTR-MCNC: 135 MG/DL — HIGH (ref 70–99)
GLUCOSE BLDC GLUCOMTR-MCNC: 166 MG/DL — HIGH (ref 70–99)
GLUCOSE BLDC GLUCOMTR-MCNC: 166 MG/DL — HIGH (ref 70–99)
GLUCOSE BLDC GLUCOMTR-MCNC: 177 MG/DL — HIGH (ref 70–99)
GLUCOSE BLDC GLUCOMTR-MCNC: 177 MG/DL — HIGH (ref 70–99)
GLUCOSE SERPL-MCNC: 106 MG/DL — HIGH (ref 70–99)
GLUCOSE SERPL-MCNC: 106 MG/DL — HIGH (ref 70–99)
HCO3 BLDV-SCNC: 35 MMOL/L — HIGH (ref 22–29)
HCO3 BLDV-SCNC: 35 MMOL/L — HIGH (ref 22–29)
HCT VFR BLD CALC: 19.8 % — CRITICAL LOW (ref 39–50)
HCT VFR BLD CALC: 19.8 % — CRITICAL LOW (ref 39–50)
HGB BLD-MCNC: 6.5 G/DL — CRITICAL LOW (ref 13–17)
HGB BLD-MCNC: 6.5 G/DL — CRITICAL LOW (ref 13–17)
HYPOCHROMIA BLD QL: SLIGHT — SIGNIFICANT CHANGE UP
HYPOCHROMIA BLD QL: SLIGHT — SIGNIFICANT CHANGE UP
LYMPHOCYTES # BLD AUTO: 0.37 K/UL — LOW (ref 1–3.3)
LYMPHOCYTES # BLD AUTO: 0.37 K/UL — LOW (ref 1–3.3)
LYMPHOCYTES # BLD AUTO: 13 % — SIGNIFICANT CHANGE UP (ref 13–44)
LYMPHOCYTES # BLD AUTO: 13 % — SIGNIFICANT CHANGE UP (ref 13–44)
MAGNESIUM SERPL-MCNC: 2.1 MG/DL — SIGNIFICANT CHANGE UP (ref 1.6–2.6)
MAGNESIUM SERPL-MCNC: 2.1 MG/DL — SIGNIFICANT CHANGE UP (ref 1.6–2.6)
MANUAL SMEAR VERIFICATION: SIGNIFICANT CHANGE UP
MANUAL SMEAR VERIFICATION: SIGNIFICANT CHANGE UP
MCHC RBC-ENTMCNC: 31.3 PG — SIGNIFICANT CHANGE UP (ref 27–34)
MCHC RBC-ENTMCNC: 31.3 PG — SIGNIFICANT CHANGE UP (ref 27–34)
MCHC RBC-ENTMCNC: 32.8 GM/DL — SIGNIFICANT CHANGE UP (ref 32–36)
MCHC RBC-ENTMCNC: 32.8 GM/DL — SIGNIFICANT CHANGE UP (ref 32–36)
MCV RBC AUTO: 95.2 FL — SIGNIFICANT CHANGE UP (ref 80–100)
MCV RBC AUTO: 95.2 FL — SIGNIFICANT CHANGE UP (ref 80–100)
MONOCYTES # BLD AUTO: 0.4 K/UL — SIGNIFICANT CHANGE UP (ref 0–0.9)
MONOCYTES # BLD AUTO: 0.4 K/UL — SIGNIFICANT CHANGE UP (ref 0–0.9)
MONOCYTES NFR BLD AUTO: 14 % — SIGNIFICANT CHANGE UP (ref 2–14)
MONOCYTES NFR BLD AUTO: 14 % — SIGNIFICANT CHANGE UP (ref 2–14)
NEUTROPHILS # BLD AUTO: 1.94 K/UL — SIGNIFICANT CHANGE UP (ref 1.8–7.4)
NEUTROPHILS # BLD AUTO: 1.94 K/UL — SIGNIFICANT CHANGE UP (ref 1.8–7.4)
NEUTROPHILS NFR BLD AUTO: 65 % — SIGNIFICANT CHANGE UP (ref 43–77)
NEUTROPHILS NFR BLD AUTO: 65 % — SIGNIFICANT CHANGE UP (ref 43–77)
NEUTS BAND # BLD: 3 % — SIGNIFICANT CHANGE UP (ref 0–8)
NEUTS BAND # BLD: 3 % — SIGNIFICANT CHANGE UP (ref 0–8)
NRBC # BLD: 0 /100 — SIGNIFICANT CHANGE UP (ref 0–0)
NRBC # BLD: 0 /100 — SIGNIFICANT CHANGE UP (ref 0–0)
NRBC # BLD: SIGNIFICANT CHANGE UP /100 WBCS (ref 0–0)
NRBC # BLD: SIGNIFICANT CHANGE UP /100 WBCS (ref 0–0)
NT-PROBNP SERPL-SCNC: 2519 PG/ML — HIGH (ref 0–450)
NT-PROBNP SERPL-SCNC: 2519 PG/ML — HIGH (ref 0–450)
PCO2 BLDV: 52 MMHG — SIGNIFICANT CHANGE UP (ref 42–55)
PCO2 BLDV: 52 MMHG — SIGNIFICANT CHANGE UP (ref 42–55)
PH BLDV: 7.44 — HIGH (ref 7.32–7.43)
PH BLDV: 7.44 — HIGH (ref 7.32–7.43)
PHOSPHATE SERPL-MCNC: 2.1 MG/DL — LOW (ref 2.5–4.5)
PHOSPHATE SERPL-MCNC: 2.1 MG/DL — LOW (ref 2.5–4.5)
PLAT MORPH BLD: NORMAL — SIGNIFICANT CHANGE UP
PLAT MORPH BLD: NORMAL — SIGNIFICANT CHANGE UP
PLATELET # BLD AUTO: 118 K/UL — LOW (ref 150–400)
PLATELET # BLD AUTO: 118 K/UL — LOW (ref 150–400)
PO2 BLDV: 334 MMHG — HIGH (ref 25–45)
PO2 BLDV: 334 MMHG — HIGH (ref 25–45)
POLYCHROMASIA BLD QL SMEAR: SLIGHT — SIGNIFICANT CHANGE UP
POLYCHROMASIA BLD QL SMEAR: SLIGHT — SIGNIFICANT CHANGE UP
POTASSIUM SERPL-MCNC: 4.1 MMOL/L — SIGNIFICANT CHANGE UP (ref 3.5–5.3)
POTASSIUM SERPL-MCNC: 4.1 MMOL/L — SIGNIFICANT CHANGE UP (ref 3.5–5.3)
POTASSIUM SERPL-SCNC: 4.1 MMOL/L — SIGNIFICANT CHANGE UP (ref 3.5–5.3)
POTASSIUM SERPL-SCNC: 4.1 MMOL/L — SIGNIFICANT CHANGE UP (ref 3.5–5.3)
PROCALCITONIN SERPL-MCNC: 0.32 NG/ML — HIGH (ref 0.02–0.1)
PROCALCITONIN SERPL-MCNC: 0.32 NG/ML — HIGH (ref 0.02–0.1)
PROT SERPL-MCNC: 5.4 GM/DL — LOW (ref 6–8.3)
PROT SERPL-MCNC: 5.4 GM/DL — LOW (ref 6–8.3)
RBC # BLD: 2.08 M/UL — LOW (ref 4.2–5.8)
RBC # BLD: 2.08 M/UL — LOW (ref 4.2–5.8)
RBC # FLD: 15.9 % — HIGH (ref 10.3–14.5)
RBC # FLD: 15.9 % — HIGH (ref 10.3–14.5)
RBC BLD AUTO: ABNORMAL
RBC BLD AUTO: ABNORMAL
SAO2 % BLDV: 99 % — HIGH (ref 67–88)
SAO2 % BLDV: 99 % — HIGH (ref 67–88)
SODIUM SERPL-SCNC: 138 MMOL/L — SIGNIFICANT CHANGE UP (ref 135–145)
SODIUM SERPL-SCNC: 138 MMOL/L — SIGNIFICANT CHANGE UP (ref 135–145)
WBC # BLD: 2.85 K/UL — LOW (ref 3.8–10.5)
WBC # BLD: 2.85 K/UL — LOW (ref 3.8–10.5)
WBC # FLD AUTO: 2.85 K/UL — LOW (ref 3.8–10.5)
WBC # FLD AUTO: 2.85 K/UL — LOW (ref 3.8–10.5)

## 2023-11-25 PROCEDURE — 71045 X-RAY EXAM CHEST 1 VIEW: CPT | Mod: 26

## 2023-11-25 PROCEDURE — 99232 SBSQ HOSP IP/OBS MODERATE 35: CPT

## 2023-11-25 RX ORDER — FUROSEMIDE 40 MG
10 TABLET ORAL ONCE
Refills: 0 | Status: COMPLETED | OUTPATIENT
Start: 2023-11-25 | End: 2023-11-25

## 2023-11-25 RX ORDER — FUROSEMIDE 40 MG
10 TABLET ORAL DAILY
Refills: 0 | Status: DISCONTINUED | OUTPATIENT
Start: 2023-11-25 | End: 2023-11-25

## 2023-11-25 RX ADMIN — HEPARIN SODIUM 5000 UNIT(S): 5000 INJECTION INTRAVENOUS; SUBCUTANEOUS at 09:29

## 2023-11-25 RX ADMIN — Medication 50 MILLILITER(S): at 15:55

## 2023-11-25 RX ADMIN — Medication 250 MILLIGRAM(S): at 12:42

## 2023-11-25 RX ADMIN — MEROPENEM 1000 MILLIGRAM(S): 1 INJECTION INTRAVENOUS at 21:00

## 2023-11-25 RX ADMIN — Medication 3 MILLILITER(S): at 21:23

## 2023-11-25 RX ADMIN — Medication 1 APPLICATION(S): at 09:29

## 2023-11-25 RX ADMIN — POLYETHYLENE GLYCOL 3350 17 GRAM(S): 17 POWDER, FOR SOLUTION ORAL at 09:30

## 2023-11-25 RX ADMIN — Medication 1 APPLICATION(S): at 21:01

## 2023-11-25 RX ADMIN — Medication 10 MILLIGRAM(S): at 18:40

## 2023-11-25 RX ADMIN — Medication 10 MILLIGRAM(S): at 09:30

## 2023-11-25 RX ADMIN — Medication 650 MILLIGRAM(S): at 20:51

## 2023-11-25 RX ADMIN — Medication 2: at 17:28

## 2023-11-25 RX ADMIN — MEROPENEM 1000 MILLIGRAM(S): 1 INJECTION INTRAVENOUS at 05:07

## 2023-11-25 RX ADMIN — Medication 50 MILLILITER(S): at 21:46

## 2023-11-25 RX ADMIN — ATORVASTATIN CALCIUM 40 MILLIGRAM(S): 80 TABLET, FILM COATED ORAL at 21:00

## 2023-11-25 RX ADMIN — Medication 1 MILLIGRAM(S): at 09:29

## 2023-11-25 RX ADMIN — Medication 3 MILLILITER(S): at 14:34

## 2023-11-25 RX ADMIN — PANTOPRAZOLE SODIUM 40 MILLIGRAM(S): 20 TABLET, DELAYED RELEASE ORAL at 09:29

## 2023-11-25 RX ADMIN — Medication 3 MILLILITER(S): at 08:25

## 2023-11-25 RX ADMIN — Medication 10 MILLIGRAM(S): at 21:01

## 2023-11-25 RX ADMIN — Medication 650 MILLIGRAM(S): at 20:21

## 2023-11-25 RX ADMIN — Medication 12.5 MICROGRAM(S): at 05:08

## 2023-11-25 RX ADMIN — Medication 3 MILLILITER(S): at 02:42

## 2023-11-25 RX ADMIN — HEPARIN SODIUM 5000 UNIT(S): 5000 INJECTION INTRAVENOUS; SUBCUTANEOUS at 21:00

## 2023-11-25 RX ADMIN — CASPOFUNGIN ACETATE 260 MILLIGRAM(S): 7 INJECTION, POWDER, LYOPHILIZED, FOR SOLUTION INTRAVENOUS at 14:32

## 2023-11-25 RX ADMIN — Medication 325 MILLIGRAM(S): at 09:30

## 2023-11-25 RX ADMIN — FLUTICASONE FUROATE, UMECLIDINIUM BROMIDE AND VILANTEROL TRIFENATATE 1 PUFF(S): 200; 62.5; 25 POWDER RESPIRATORY (INHALATION) at 08:25

## 2023-11-25 RX ADMIN — PANTOPRAZOLE SODIUM 40 MILLIGRAM(S): 20 TABLET, DELAYED RELEASE ORAL at 21:00

## 2023-11-25 RX ADMIN — MEROPENEM 1000 MILLIGRAM(S): 1 INJECTION INTRAVENOUS at 14:33

## 2023-11-25 RX ADMIN — Medication 10 MILLIGRAM(S): at 09:29

## 2023-11-25 NOTE — PROGRESS NOTE ADULT - SUBJECTIVE AND OBJECTIVE BOX
JOHN FRIEDEL  MRN: [id number]    S: 11/25/2023: Awake/alert. Weak. Chronically ill appearing. Denies shortness of breath. Occasional cough. Denies chest pain or hemoptysis.     PAST MEDICAL & SURGICAL HISTORY:  COPD (chronic obstructive pulmonary disease)      Lung cancer      Anemia of chronic disease      CKD (chronic kidney disease), stage III      Hypothyroidism      AAA (abdominal aortic aneurysm)      HTN (hypertension)      HLD (hyperlipidemia)      Thrombocytopenia      S/P lobectomy of lung          O: T(C): 36.5 (11-25-23 @ 10:10), Max: 37.3 (11-24-23 @ 20:40)  HR: 96 (11-25-23 @ 10:10) (94 - 116)  BP: 137/51 (11-25-23 @ 10:10) (124/47 - 172/67)  RR: 20 (11-25-23 @ 10:10) (18 - 27)  SpO2: 98% (11-25-23 @ 10:10) (93% - 100%)  Wt(kg): --    PHYSICAL EXAM:      GENERAL: weak; no acute distress    NEURO: awake/alert    NECK: no JVD    CHEST: rhonchi on right. Left clear. No wheezing    CARDIAC: RR    EXT: No edema      LABS:                        6.5    2.85  )-----------( 118      ( 25 Nov 2023 06:50 )             19.8       11-25    138  |  101  |  37<H>  ----------------------------<  106<H>  4.1   |  33<H>  |  0.99    Ca    8.2<L>      25 Nov 2023 06:50  Phos  2.1     11-25  Mg     2.1     11-25    TPro  5.4<L>  /  Alb  2.3<L>  /  TBili  1.0  /  DBili  x   /  AST  35  /  ALT  27  /  AlkPhos  100  11-25    CULTURES:    Culture - Acid Fast - Bronchial w/Smear (collected 09 Nov 2023 12:30)  Source: .Bronchial RIGHT LOWER LOBE BAL  Preliminary Report (11 Nov 2023 15:08):    Culture is being performed.    Culture - Fungal, Bronchial (collected 09 Nov 2023 12:30)  Source: .Bronchial RIGHT LOWER LOBE BAL  Preliminary Report (13 Nov 2023 12:07):    Rare Yeast    Culture - Bronchial (collected 09 Nov 2023 12:30)  Source: .Bronchial RIGHT LOWER LOBE BAL  Gram Stain (09 Nov 2023 23:14):    Rare polymorphonuclear leukocytes per low power field    Few Squamous epithelial cells per low power field    Few Gram positive cocci in pairs per oil power field  Final Report (11 Nov 2023 16:38):    Normal Respiratory Marguerite present    Culture - Blood (collected 04 Nov 2023 04:21)  Source: .Blood None  Final Report (09 Nov 2023 09:00):    No growth at 5 days    Culture - Blood (collected 04 Nov 2023 04:21)  Source: .Blood None  Final Report (09 Nov 2023 09:00):    No growth at 5 daysCulture - Acid Fast - Bronchial w/Smear (collected 09 Nov 2023 12:30)  Source: .Bronchial RIGHT LOWER LOBE BAL  Preliminary Report (11 Nov 2023 15:08):    Culture is being performed.        RADIOLOGY:      EXAM:  CT CHEST   ORDERED BY: DEEPA TIPTON   PROCEDURE DATE:  11/20/2023      INTERPRETATION:  Clinical indication: Right lung infection.    Axial CT images of the chest are obtained without intravenous   administration of contrast.    Comparison is made with the prior chest CT of November 13, 2023.    Enlarged axillary or mediastinal lymph nodes. Heart size is normal.   Vascular calcifications with involvement of the aorta and the coronary   arteries. Minimal pericardial fluid.    Evaluation of the upper abdomen demonstrate trace perihepatic and   perisplenic ascites slightly increased in size since November 13, 2023.   1.2 cm hypodensity arising from the upper pole of the partially imaged   left kidney without significant interval change.    Few subcutaneous edema.    Small left pleural effusion is unchanged. There may be a trace right   pleural effusion versus pleural thickening without significant interval   change.    Evaluation of the lungs demonstrate interval resolution of the previously   seen groundglass opacity within the dependent portion of the left upper   lobe. Interval near complete resolution of a previously seen left upper   lobe peripheral groundglass opacity. A few other ill-defined left lung   patchy groundglass opacities unchanged. 4 mm left lower lobe nodular   opacity on image 73 of series 2 appears new since November 13, 2023 may   be sequela of impacted distal airway.    Status post right upper lobectomy. Previously described right apical   large irregular cyst, part of which measures about 7 cm in transverse   dimension containing internal air, fluid and debris is without   significant interval change in size.  Ill-defined consolidations and groundglass opacities throughoutthe   majority of the right lung demonstrate mild overall interval progression   since November 13, 2023. For reference a few dense patchy and nodular   opacities at the right lung base are either new or demonstrate interval   increase in size.  Complete opacification of the right middle lobe with internal   consolidation appears unchanged since November 13, 2023, demonstrating   interval progression since November 2, 2023. Nonvisualization of the   internal segmental and subsegmental airways of the right middle lobe as   on the prior study.    Degenerative changes of the spine. Old healed left rib and old healed   sternal fractures. Mild superior endplate loss of height of the L1   vertebral body is unchanged.    IMPRESSION: Extensive right lung consolidations as described above with   overall interval progression since November 13, 2023. Complete   opacification of the right middle lobe with internal consolidation and   volume loss appears unchanged since November 13, 2023 demonstrating  interval progression since November 2, 2023.    Persistent apical right apical large irregular cyst unchanged in size   since November 13, 2023.    Small left pleural effusion with subcutaneous edema.    Trace amount of ascites, increased in size since November 13, 2023.      JEZ ALFARO MD; Attending Radiologist  This document has been electronically signed. Nov 20 2023  8:31AM            MEDICATIONS  (STANDING):  albumin human 25% IVPB 100 milliLiter(s) IV Intermittent every 12 hours  albuterol/ipratropium for Nebulization 3 milliLiter(s) Nebulizer every 6 hours  AQUAPHOR (petrolatum Ointment) 1 Application(s) Topical two times a day  atorvastatin 40 milliGRAM(s) Oral at bedtime  caspofungin IVPB      caspofungin IVPB 50 milliGRAM(s) IV Intermittent every 24 hours  dextrose 5%. 1000 milliLiter(s) (100 mL/Hr) IV Continuous <Continuous>  dextrose 5%. 1000 milliLiter(s) (50 mL/Hr) IV Continuous <Continuous>  dextrose 50% Injectable 12.5 Gram(s) IV Push once  dextrose 50% Injectable 25 Gram(s) IV Push once  dextrose 50% Injectable 25 Gram(s) IV Push once  ferrous    sulfate 325 milliGRAM(s) Oral daily  fluticasone furoate/umeclidinium/vilanterol 100-62.5-25 MICROgram(s) Inhaler 1 Puff(s) Inhalation daily  folic acid 1 milliGRAM(s) Oral daily  furosemide   Injectable 10 milliGRAM(s) IV Push daily  glucagon  Injectable 1 milliGRAM(s) IntraMuscular once  heparin   Injectable 5000 Unit(s) SubCutaneous every 12 hours  hydrALAZINE 10 milliGRAM(s) Oral two times a day  insulin lispro (ADMELOG) corrective regimen sliding scale   SubCutaneous at bedtime  insulin lispro (ADMELOG) corrective regimen sliding scale   SubCutaneous three times a day before meals  levothyroxine 12.5 MICROGram(s) Oral daily  meropenem Injectable 1000 milliGRAM(s) IV Push every 8 hours  pantoprazole  Injectable 40 milliGRAM(s) IV Push two times a day  polyethylene glycol 3350 17 Gram(s) Oral daily  vancomycin  IVPB 750 milliGRAM(s) IV Intermittent every 12 hours    MEDICATIONS  (PRN):  acetaminophen     Tablet .. 650 milliGRAM(s) Oral every 6 hours PRN Temp greater or equal to 38C (100.4F), Mild Pain (1 - 3)  aluminum hydroxide/magnesium hydroxide/simethicone Suspension 30 milliLiter(s) Oral every 4 hours PRN Dyspepsia  benzonatate 100 milliGRAM(s) Oral three times a day PRN Cough  dextrose Oral Gel 15 Gram(s) Oral once PRN Blood Glucose LESS THAN 70 milliGRAM(s)/deciliter  melatonin 3 milliGRAM(s) Oral at bedtime PRN Insomnia  ondansetron Injectable 4 milliGRAM(s) IV Push every 8 hours PRN Nausea and/or Vomiting        A/P:  1. Large right lower lobe (S/P Right upper lobectomy)  thick walled cavity. Uncertain etiology. Immunocompromised host.  Infection considered most likely. Cannot exclude a malignant process.     Patient is very high surgical risk; no plans for invasive evaluation    Imaging from [facility] [place] supplied by wife. He has had blebs in the past.     On broad spectrum antibiotics per ID. D/W Dr. Gaston.      CT 11/20 reveals worsening consolidation on right; left sided infiltrates improved. Consider repeat CT 11/27.        2. Pneumonia: as above. Continue antibiotics per ID. Follow up CT.     -aspiration precautions      3. COPD - hypoxic respiratory failure. Continue supplemental O2; BiPAP prn. Continue inhaled bronchodilators/ICS.        4. ? Radiation  pneumonitis: Off steroids.  Progressive nature of findings argues against pneumonitis.      5. Aemia/CKD; decreased WBC.  Per primary care team. May benefit from PRBC transfusion.       6. Left leg laceration: s/p suture. Per primary care team.        7. Lung Cancer. Oncology follow up appreciated. Last received carbo/taxol with salvage RT 5/5/2023 after progression in subcarinal node after adjuvant atezolizumab (LD 12/20/22)  - pt was last seen 10/2023- when compared with June 2023 increased b/l areas of infiltrates and consolidation likely infectious/inflammatory post therapy change. RML fide-sutural mass again obscured by consolidation. unchanged subcarinal node, suspected mets.   At that time pt was started on medrol dose pack and Z pack.     8. Poor nutritional status. Per primary care team.     Prognosis guarded; palliative care evaluation appreciated. Family considering home hospice.

## 2023-11-25 NOTE — PROGRESS NOTE ADULT - ASSESSMENT
84 y/o Male with h/o lung CA follows at [facility] [place] s/p chemotherapy and RT 05/2023, s/p right upper lobectomy, HTN, HPL, Chronic anemia, COPD, AAA, Hypothyroidism, CKD stage IIIa was admitted on 11/2 for increased shortness of breath, dyspnea on minimal exertion, and cough.    # h/o lung cancer   - [place] [facility] [place] carbo/taxol with salvage RT 5/5/2023 after progression in subcarinol node after adjuvant atezolizumab (LD 12/20/22)  - pt being covered for radiation induced pneumonitis with steroids   - now presently on 3 L NC  - discussed with Dr. elena     # PNA/ Pneumonitis   - BAL with rare yeast - CONTINUE  meropenem 1 gm IV q12h, and caspofungin as per ID- switched from doxycycline to vancomycin today   - followed by pulm and ct surgery- no intervention  - repeat CT chest 11/20- when compared with 11/13 with complete opacification of RML w/ internal consolidation and volume loss appears unchanged and extensive right lung consolidations w/ overall progression.  - repeat CXR today -- planned for CT chest on MONDAY    # anemia- likely related to myelosuppression from treatment history/pna  - transfuse 1 unit of prbc    # UE pain  US duplex UE negative for DVt- showed superficial thrombophlebitis involving b/l cephalic and median cubital veins

## 2023-11-25 NOTE — PROGRESS NOTE ADULT - SUBJECTIVE AND OBJECTIVE BOX
INTERVAL HPI/OVERNIGHT EVENTS:  Patient S&E at bedside. No o/n events, patient resting comfortably. No complaints at this time.      PHYSICAL EXAM:    Constitutional: NAD, weak and frail  Eyes: EOMI, sclera non-icteric  Neck: supple, no masses, no JVD  Respiratory:auscultated anteriorly   Cardiovascular: RRR, no M/R/G  Extremities: no c/c/e  Neurological: AAOx3      MEDICATIONS  (STANDING):  albuterol/ipratropium for Nebulization 3 milliLiter(s) Nebulizer every 6 hours  AQUAPHOR (petrolatum Ointment) 1 Application(s) Topical two times a day  atorvastatin 40 milliGRAM(s) Oral at bedtime  caspofungin IVPB 50 milliGRAM(s) IV Intermittent every 24 hours  caspofungin IVPB      dextrose 5%. 1000 milliLiter(s) (50 mL/Hr) IV Continuous <Continuous>  dextrose 5%. 1000 milliLiter(s) (100 mL/Hr) IV Continuous <Continuous>  dextrose 50% Injectable 25 Gram(s) IV Push once  dextrose 50% Injectable 12.5 Gram(s) IV Push once  dextrose 50% Injectable 25 Gram(s) IV Push once  ferrous    sulfate 325 milliGRAM(s) Oral daily  fluticasone furoate/umeclidinium/vilanterol 100-62.5-25 MICROgram(s) Inhaler 1 Puff(s) Inhalation daily  folic acid 1 milliGRAM(s) Oral daily  furosemide   Injectable 20 milliGRAM(s) IV Push daily  glucagon  Injectable 1 milliGRAM(s) IntraMuscular once  hydrALAZINE 10 milliGRAM(s) Oral every 8 hours  insulin lispro (ADMELOG) corrective regimen sliding scale   SubCutaneous three times a day before meals  insulin lispro (ADMELOG) corrective regimen sliding scale   SubCutaneous at bedtime  levothyroxine 12.5 MICROGram(s) Oral daily  meropenem Injectable 1000 milliGRAM(s) IV Push every 8 hours  pantoprazole  Injectable 40 milliGRAM(s) IV Push two times a day  polyethylene glycol 3350 17 Gram(s) Oral daily  vancomycin  IVPB 750 milliGRAM(s) IV Intermittent every 12 hours    MEDICATIONS  (PRN):  acetaminophen     Tablet .. 650 milliGRAM(s) Oral every 6 hours PRN Temp greater or equal to 38C (100.4F), Mild Pain (1 - 3)  aluminum hydroxide/magnesium hydroxide/simethicone Suspension 30 milliLiter(s) Oral every 4 hours PRN Dyspepsia  benzonatate 100 milliGRAM(s) Oral three times a day PRN Cough  dextrose Oral Gel 15 Gram(s) Oral once PRN Blood Glucose LESS THAN 70 milliGRAM(s)/deciliter  furosemide   Injectable 10 milliGRAM(s) IV Push once PRN for after transfusion  melatonin 3 milliGRAM(s) Oral at bedtime PRN Insomnia  ondansetron Injectable 4 milliGRAM(s) IV Push every 8 hours PRN Nausea and/or Vomiting      Allergies    penicillin (Unknown)    Intolerances        LABS:                        8.6    x     )-----------( x        ( 26 Nov 2023 10:00 )             26.4     11-26    137  |  99  |  35<H>  ----------------------------<  115<H>  3.6   |  36<H>  |  0.92    Ca    8.4<L>      26 Nov 2023 07:16  Phos  2.3     11-26  Mg     2.0     11-26    TPro  5.6<L>  /  Alb  2.4<L>  /  TBili  1.3<H>  /  DBili  x   /  AST  31  /  ALT  26  /  AlkPhos  98  11-26      Urinalysis Basic - ( 26 Nov 2023 07:16 )    Color: x / Appearance: x / SG: x / pH: x  Gluc: 115 mg/dL / Ketone: x  / Bili: x / Urobili: x   Blood: x / Protein: x / Nitrite: x   Leuk Esterase: x / RBC: x / WBC x   Sq Epi: x / Non Sq Epi: x / Bacteria: x        RADIOLOGY & ADDITIONAL TESTS:  Studies reviewed.    ASSESSMENT & PLAN:

## 2023-11-25 NOTE — PROGRESS NOTE ADULT - ASSESSMENT
83-year-old M with PMHx HTN, HLD, chronic anemia, COPD, lung CA s/p RUL lobectomy (follows at [facility], last chemotherapy/RT 5/2023, not currently on treatment), AAA, hypothyroidism, CKD stage IIIa sent in to ED on 11/2/23  by pulmonologist MD Olivera with c/o SOB, dyspnea on minimal exertion, and cough. 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     # Acute hypoxic respiratory failure, multifactorial   # New RLL PNA on CT 11/13 , RLL cavity s/p bronchoscopy 11/9  possible yeast  infection, less likely radiation pneumonitis , r/o  aspergilloma    - CT chest: Larger right lower lobe thick-walled cavity which may be infectious and/or residua of reported treated tumor. Spongelike material within the cavity can be seen with aspergilloma (prior to fungus ball formation). Consolidation within the right lower and middle lobe compatible with radiation change.  - leukocytosis stable (WBC 17.70 from 17.35) ---> trend down to WBC  8   - repeat CXR 11/8 - worsening of opacities over right lung   - 2 d echo 11/8 - EF 60% , no significant valvular disease    - immunoglobulin panel - all Ig wnl  - 11/9 - s/p bronchoscopy   - speech and swallow evaluation  - no aspirations  - CXR 11/11 - stable consolidations  - repeat CT chest 11/13 - new right lung consolidation  - blood cultures -no growth, sputum culture - normal respiratory  - c/w trelegy ellipta, chest pt, IS, acapella ,BIPAP as needed  - ID/pulm consult noted   - s/p IV steroids --> po prednisone taper 20 x 3 days than stop , trending down ----> 34  - s/p  voriconazole PO ,  s/p 7 days of levaquin to cover atypical/psuedomonas (allergy to PCN)    - 11/13 - IV meropenem, doxy and fluconasole started as per ID   - Chest xray prelim worse  - Thoracic consult appreciated, no intervention  - CT Chest 11/20 worse  -11/13 - IV meropenem and doxycylcine  - Fluconazole DC, started on caspofungin 11/20  - Would consider palliative at this time  - 11/22 - d/c doxy started on  vancomycin , c/w IV meropenem  - CRP 24--> 70 --> 58  - 11/25 CXR - worse on the right    #  Chronic anemia, likely due to  chemotherapy, worsening and iron deficiency   -  no s/s bleeding, asymptomatic, continue to monitor/trend  - 11/8 - 1 unit PRBC due to hemoptysis and worsening of anemia  -  11/19 transfuse 1 unit PRBC  - Hemoglobin 7.9 11/20  - Trend    #CHRISTINA on CKD stage IIIa,  Resolved    # Left calf laceration from veno dines while in PACU for bronchoscopy   - surgery consult - s/p suturing  on 11/09/23   - plan to remove sutures in 10 days  - Surgical consult for suture removal in 14 days from placement  - pending    # Bilateral UE edema due to superphicial thrombophlebitis  - doppler noted  - start heparin 5000 bid, elevation, warm compresses if pain  - 11/23 - iv lasix 10 mg   - 11/24 c/w IV lasix for 3 days  - BNP 1200--> 1700--> 2500      # Acute hyperglycemia, possible steroid-induced , Prediabetes with A1C 6.3   - Stop pre-meal and  lantus   - c/w ISS  - liberize diet  - Monitor     # HTN - hydralazine 10 bid     #  HLD   - c/w  atorvastatin    # Hypothyroidism  - Levo lowered to 12.5 mcg,  Recheck in 4 weeks    # Hx lung CA s/p RUL lobectomy/chemo/RT  - f/u with [facility]     # Severe protein-calorie malnutrition. - nutritional education provided ,  supplements ordered ,  MVT qd ordered      # DVT Ppx  - heparin 5000 q12h     # Code status  - DNR/DNI    wife and daughter updated at bedside 11/21, 11/22, 11/23, 11/24, 11/25

## 2023-11-25 NOTE — PROGRESS NOTE ADULT - SUBJECTIVE AND OBJECTIVE BOX
HOSPITALIST ATTENDING PROGRESS NOTE    Chart and meds reviewed.  Patient seen and examined.    CC: cough, weakness      - no events, afebrile, denies cp, dyspnea, + productive cough with brown mucus persist, poor po intake, plan discussed with wife at bedside   - pt seen in am, bilateral arm swelling, afebrile, + productive cough some streaks of blood , denies cp, abdominal pain, poor po intake   - afebrile, + upper extremities edema, + cough with brown mucus, denies abdominal pain, tolerating some po intake   - afebrile, UE edema improving, + gen weakness, + poor po intake, + cough persist, plan discussed    - dyspnea overnight, on BIPAP overnight, feels better in am, LLE stiches removed , denies cp, abdominal pain , plan discussed    All other systems reviewed and found to be negative with the exception of what has been described above.    Vital sings reviewed for last 24 h  T(C): 37.1 (23 @ 16:25), Max: 37.3 (23 @ 20:40)  T(F): 98.7 (23 @ 16:25), Max: 99.1 (23 @ 20:40)  HR: 98 (23 @ 16:25) (94 - 116)  BP: 138/52 (23 @ 16:25) (137/51 - 172/67)  RR: 19 (23 @ 16:25) (18 - 27)  SpO2: 98% (23 @ 16:25) (93% - 100%)  Wt(kg): --  Daily     Daily Weight in k.8 (2023 05:26)  CAPILLARY BLOOD GLUCOSE      POCT Blood Glucose.: 135 mg/dL (2023 12:44)  POCT Blood Glucose.: 113 mg/dL (2023 08:26)  POCT Blood Glucose.: 194 mg/dL (2023 22:37)  POCT Blood Glucose.: 194 mg/dL (2023 17:37)        Physical exam :   GEN: Frail, NAD  HEENT:  pupils equal and reactive, EOMI, no oropharyngeal lesions, erythema, exudates, oral thrush  NECK:   supple, no carotid bruits, no palpable lymph nodes, no thyromegaly  CV:  +S1, +S2, regular, no murmurs or rubs  RESP:  Bilateral ronchi  BREAST:  not examined  GI:  abdomen soft, non-tender, non-distended, normal BS, no bruits, no abdominal masses, no palpable masses  RECTAL:  not examined  :  not examined  MSK:   normal muscle tone, no atrophy, no rigidity, no contractions  EXT:  no clubbing, no cyanosis, no edema, no calf pain, BIlateral LE edema.   Left leg sutures  VASCULAR:  pulses equal and symmetric in the upper and lower extremities  NEURO:  AAOX1, no focal neurological deficits, follows all commands, able to move extremities spontaneously  SKIN:  no ulcers, lesions or rashes    All labs radiology and other studies reviewed and interpreted :                         6.5    2.85  )-----------( 118      ( 2023 06:50 )             19.8         138  |  101  |  37<H>  ----------------------------<  106<H>  4.1   |  33<H>  |  0.99    Ca    8.2<L>      2023 06:50  Phos  2.1       Mg     2.1         TPro  5.4<L>  /  Alb  2.3<L>  /  TBili  1.0  /  DBili  x   /  AST  35  /  ALT  27  /  AlkPhos  100          LIVER FUNCTIONS - ( 2023 06:50 )  Alb: 2.3 g/dL / Pro: 5.4 gm/dL / ALK PHOS: 100 U/L / ALT: 27 U/L / AST: 35 U/L / GGT: x             C-Reactive Protein, Serum: 58 mg/L (23 @ 06:50)    C-Reactive Protein, Serum: 59 mg/L (23 @ 11:58)      C-Reactive Protein, Serum: 70 mg/L (23 @ 08:53)  C-Reactive Protein, Serum: 24 mg/L (23 @ 07:03)  C-Reactive Protein, Serum: 34 mg/L (23 @ 06:30)  C-Reactive Protein, Serum: 46 mg/L (23 @ 06:55)  C-Reactive Protein, Serum: 70 mg/L (23 @ 06:31)  C-Reactive Protein, Serum: 106 mg/L (11-10-23 @ 06:19)    Procalcitonin, Serum: 3.23: Note: Concentrations <0.5 ng/mL do not exclude an infection, on account  of localized infections (without systemic signs) which can be associated  with such low concentrations, or a systemic infection in its initial  stages (<6 hours). Furthermore, increased procalcitonin may occur without  infection. PCT concentrations between 0.5 and 2.0 ng/mL should be  interpreted taking into account the patient’s history. It is recommended  to pretest PCT within 6-24 hours if any concentrations <2.0 mg/mL are  obtained. ng/mL (23 @ 06:01)      Cortisol AM, Serum: 16.4 ug/dL (23 @ 08:53)             CT Chest No Cont (23 @ 08:03) >  IMPRESSION: Extensive right lung consolidations as described above with   overall interval progression since 2023. Complete   opacification of the right middle lobe with internal consolidation and   volume loss appears unchanged since 2023 demonstrating  interval progression since 2023.    Persistent apical right apical large irregular cyst unchanged in size   since 2023.    Small left pleural effusion with subcutaneous edema.    Trace amount of ascites, increased in size since 2023.    Xray Chest 1 View- PORTABLE-Routine (Xray Chest 1 View- PORTABLE-Routine .) (23 @ 12:32) >  IMPRESSION: Slight progression of pneumonia since     MEDICATIONS  (STANDING):  albuterol/ipratropium for Nebulization 3 milliLiter(s) Nebulizer every 6 hours  AQUAPHOR (petrolatum Ointment) 1 Application(s) Topical two times a day  atorvastatin 40 milliGRAM(s) Oral at bedtime  caspofungin IVPB 50 milliGRAM(s) IV Intermittent every 24 hours  caspofungin IVPB      dextrose 5%. 1000 milliLiter(s) (50 mL/Hr) IV Continuous <Continuous>  dextrose 5%. 1000 milliLiter(s) (100 mL/Hr) IV Continuous <Continuous>  dextrose 50% Injectable 25 Gram(s) IV Push once  dextrose 50% Injectable 12.5 Gram(s) IV Push once  dextrose 50% Injectable 25 Gram(s) IV Push once  ferrous    sulfate 325 milliGRAM(s) Oral daily  fluticasone furoate/umeclidinium/vilanterol 100-62.5-25 MICROgram(s) Inhaler 1 Puff(s) Inhalation daily  folic acid 1 milliGRAM(s) Oral daily  furosemide   Injectable 10 milliGRAM(s) IV Push once  glucagon  Injectable 1 milliGRAM(s) IntraMuscular once  heparin   Injectable 5000 Unit(s) SubCutaneous every 12 hours  hydrALAZINE 10 milliGRAM(s) Oral two times a day  insulin lispro (ADMELOG) corrective regimen sliding scale   SubCutaneous at bedtime  insulin lispro (ADMELOG) corrective regimen sliding scale   SubCutaneous three times a day before meals  levothyroxine 12.5 MICROGram(s) Oral daily  meropenem Injectable 1000 milliGRAM(s) IV Push every 8 hours  pantoprazole  Injectable 40 milliGRAM(s) IV Push two times a day  polyethylene glycol 3350 17 Gram(s) Oral daily  vancomycin  IVPB 750 milliGRAM(s) IV Intermittent every 12 hours    MEDICATIONS  (PRN):  acetaminophen     Tablet .. 650 milliGRAM(s) Oral every 6 hours PRN Temp greater or equal to 38C (100.4F), Mild Pain (1 - 3)  aluminum hydroxide/magnesium hydroxide/simethicone Suspension 30 milliLiter(s) Oral every 4 hours PRN Dyspepsia  benzonatate 100 milliGRAM(s) Oral three times a day PRN Cough  dextrose Oral Gel 15 Gram(s) Oral once PRN Blood Glucose LESS THAN 70 milliGRAM(s)/deciliter  melatonin 3 milliGRAM(s) Oral at bedtime PRN Insomnia  ondansetron Injectable 4 milliGRAM(s) IV Push every 8 hours PRN Nausea and/or Vomiting  ondansetron Injectable 4 milliGRAM(s) IV Push every 8 hours PRN Nausea and/or Vomiting

## 2023-11-25 NOTE — PROGRESS NOTE ADULT - SUBJECTIVE AND OBJECTIVE BOX
Date of service: 11-25-23 @ 08:40    Lying in bed in NAD  Has dry cough  No SOB at rest  Weak looking    ROS: no fever or chills; denies dizziness, no HA, no abdominal pain, no diarrhea or constipation; no dysuria, no legs pain, no rashes    MEDICATIONS  (STANDING):  albumin human 25% IVPB 100 milliLiter(s) IV Intermittent every 12 hours  albuterol/ipratropium for Nebulization 3 milliLiter(s) Nebulizer every 6 hours  AQUAPHOR (petrolatum Ointment) 1 Application(s) Topical two times a day  atorvastatin 40 milliGRAM(s) Oral at bedtime  caspofungin IVPB      caspofungin IVPB 50 milliGRAM(s) IV Intermittent every 24 hours  dextrose 5%. 1000 milliLiter(s) (100 mL/Hr) IV Continuous <Continuous>  dextrose 5%. 1000 milliLiter(s) (50 mL/Hr) IV Continuous <Continuous>  dextrose 50% Injectable 12.5 Gram(s) IV Push once  dextrose 50% Injectable 25 Gram(s) IV Push once  dextrose 50% Injectable 25 Gram(s) IV Push once  ferrous    sulfate 325 milliGRAM(s) Oral daily  fluticasone furoate/umeclidinium/vilanterol 100-62.5-25 MICROgram(s) Inhaler 1 Puff(s) Inhalation daily  folic acid 1 milliGRAM(s) Oral daily  furosemide   Injectable 10 milliGRAM(s) IV Push daily  glucagon  Injectable 1 milliGRAM(s) IntraMuscular once  heparin   Injectable 5000 Unit(s) SubCutaneous every 12 hours  hydrALAZINE 10 milliGRAM(s) Oral two times a day  insulin lispro (ADMELOG) corrective regimen sliding scale   SubCutaneous at bedtime  insulin lispro (ADMELOG) corrective regimen sliding scale   SubCutaneous three times a day before meals  levothyroxine 12.5 MICROGram(s) Oral daily  meropenem Injectable 1000 milliGRAM(s) IV Push every 8 hours  pantoprazole  Injectable 40 milliGRAM(s) IV Push two times a day  polyethylene glycol 3350 17 Gram(s) Oral daily  vancomycin  IVPB 750 milliGRAM(s) IV Intermittent every 12 hours    Vital Signs Last 24 Hrs  T(C): 36.8 (25 Nov 2023 05:06), Max: 37.3 (24 Nov 2023 20:40)  T(F): 98.2 (25 Nov 2023 05:06), Max: 99.1 (24 Nov 2023 20:40)  HR: 96 (25 Nov 2023 05:06) (94 - 116)  BP: 154/59 (25 Nov 2023 05:06) (124/47 - 172/67)  BP(mean): --  RR: 22 (25 Nov 2023 05:26) (17 - 27)  SpO2: 99% (25 Nov 2023 05:06) (98% - 100%)    Parameters below as of 25 Nov 2023 05:06  Patient On (Oxygen Delivery Method): BiPAP/CPAP     Physical exam:    Constitutional:  No acute distress  HEENT: NC/AT, EOMI, PERRLA, conjunctivae clear; ears and nose atraumatic  Neck: supple; thyroid not palpable  Back: no tenderness  Respiratory: respiratory effort normal; few crackles at bases  Cardiovascular: S1S2 regular, no murmurs  Abdomen: soft, not tender, not distended, positive BS  Genitourinary: no suprapubic tenderness  Lymphatic: no LN palpable  Musculoskeletal: no muscle tenderness, no joint swelling or tenderness  Extremities: no pedal edema  Left lower leg laceration dressed  Neurological/ Psychiatric: AxOx3, moving all extremities  Skin: no rashes; no palpable lesions    Labs: reviewed                        6.5    2.85  )-----------( 118      ( 25 Nov 2023 06:50 )             19.8     11-25    138  |  101  |  37<H>  ----------------------------<  106<H>  4.1   |  33<H>  |  0.99    Ca    8.2<L>      25 Nov 2023 06:50  Phos  2.1     11-25  Mg     2.1     11-25    TPro  5.4<L>  /  Alb  2.3<L>  /  TBili  1.0  /  DBili  x   /  AST  35  /  ALT  27  /  AlkPhos  100  11-25    Vancomycin Level, Trough: 16.4 ug/mL (11-23 @ 20:16)    C-Reactive Protein, Serum: 59 mg/L (11-24-23 @ 11:58)  C-Reactive Protein, Serum: 70 mg/L (11-23-23 @ 08:53)  C-Reactive Protein, Serum: 24 mg/L (11-14-23 @ 07:03)                        7.8    3.50  )-----------( 137      ( 23 Nov 2023 08:53 )             24.5     11-23    136  |  104  |  42<H>  ----------------------------<  107<H>  4.4   |  30  |  1.12    Ca    8.5      23 Nov 2023 08:53  Phos  2.6     11-23  Mg     2.1     11-23    TPro  5.6<L>  /  Alb  1.8<L>  /  TBili  0.8  /  DBili  x   /  AST  48<H>  /  ALT  35  /  AlkPhos  118  11-23    Vancomycin Level, Trough: 16.4 ug/mL (11-23 @ 20:16)    C-Reactive Protein, Serum: 70 mg/L (11-23-23 @ 08:53)  C-Reactive Protein, Serum: 24 mg/L (11-14-23 @ 07:03)  C-Reactive Protein, Serum: 34 mg/L (11-13-23 @ 06:30)               8.2    11.74 )-----------( 259      ( 03 Nov 2023 07:15 )             25.2     11-03    133<L>  |  102  |  35<H>  ----------------------------<  205<H>  4.4   |  22  |  1.20    Ca    8.3<L>      03 Nov 2023 07:15    TPro  6.9  /  Alb  2.0<L>  /  TBili  0.5  /  DBili  x   /  AST  38<H>  /  ALT  50  /  AlkPhos  130<H>  11-02     LIVER FUNCTIONS - ( 02 Nov 2023 14:47 )  Alb: 2.0 g/dL / Pro: 6.9 gm/dL / ALK PHOS: 130 U/L / ALT: 50 U/L / AST: 38 U/L / GGT: x           Urinalysis (11-02 @ 16:40)  Urine Appearance: Clear  Protein, Urine: 100 mg/dL  Urine Microscopic-Add On (NC) (11-02 @ 16:40)  White Blood Cell - Urine: 0 /HPF  Red Blood Cell - Urine: 0 /HPF    (11-02 @ 14:47)  NotDete    Culture - Acid Fast - Bronchial w/Smear (collected 09 Nov 2023 12:30)  Source: .Bronchial RIGHT LOWER LOBE BAL  Preliminary Report (11 Nov 2023 15:08):    Culture is being performed.    Culture - Fungal, Bronchial (collected 09 Nov 2023 12:30)  Source: .Bronchial RIGHT LOWER LOBE BAL  Preliminary Report (13 Nov 2023 12:07):    Rare Yeast    Culture - Bronchial (collected 09 Nov 2023 12:30)  Source: .Bronchial RIGHT LOWER LOBE BAL  Gram Stain (09 Nov 2023 23:14):    Rare polymorphonuclear leukocytes per low power field    Few Squamous epithelial cells per low power field    Few Gram positive cocci in pairs per oil power field  Final Report (11 Nov 2023 16:38):    Normal Respiratory Marguerite present    Culture - Blood (collected 04 Nov 2023 04:21)  Source: .Blood None  Final Report (09 Nov 2023 09:00):    No growth at 5 days    Culture - Blood (collected 04 Nov 2023 04:21)  Source: .Blood None  Final Report (09 Nov 2023 09:00):    No growth at 5 days    Radiology: all available radiological tests reviewed    < from: CT Chest No Cont (11.02.23 @ 16:04) >  Right upper lobectomy.  Larger right lower lobe thick-walled cavity which may be infectious   and/or residua of reported treated tumor. 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  Consolidation within the right lower and middle lobe compatible with radiation change.  Multiple indistinct nodules in the right lungwhich may be infectious or radiation pneumonitis.  < end of copied text >    < from: CT Abdomen and Pelvis No Cont (11.08.23 @ 19:01) >  No hydronephrosis.  New patchy opacities in the right lung base. Small right pleural effusion.  < end of copied text >    < from: CT Chest No Cont (11.13.23 @ 10:04) >  IMPRESSION: Right lung large areas of consolidation new since November 2, 2023 likely pneumonia.    Right apical previously described large cyst unchanged in size since   November 2, 2023 containing internal air and increasing internal opacification since November 2, 2023 as described above.     A few left lung ill-defined nonspecific groundglass opacities.   Differential diagnosis include but is not limited to   infectious/inflammatory etiologies and or pulmonary edema.  Small left pleural effusion new since November 8, 2023.  < end of copied text >    < from: CT Chest No Cont (11.20.23 @ 08:03) >  IMPRESSION: Extensive right lung consolidations as described above with   overall interval progression since November 13, 2023. Complete   opacification of the right middle lobe with internal consolidation and   volume loss appears unchanged since November 13, 2023 demonstrating  interval progression since November 2, 2023.  Persistent apical right apical large irregular cyst unchanged in size since November 13, 2023.  < end of copied text >      Advanced directives addressed: full resuscitation

## 2023-11-25 NOTE — PROGRESS NOTE ADULT - ASSESSMENT
82 y/o Male with h/o lung CA [place] [facility] [place] 05/[place]23, s/p right upper lobectomy, HTN, HPL, Chronic anemia, COPD, AAA, Hypothyroidism, CKD stage IIIa was admitted on 11/2 for increased shortness of breath, dyspnea on minimal exertion, and cough. Reportedly, he had CT of the chest performed 10/3 which demonstrated consolidation, was started on augmentin/prednisone/azithro 10/6 x 1 week. Pt completed course of medications however wife noted worsening cough and pt with fever Tmax of 103.9 on 10/26. Wife then took pt to pulmonologist again who started pt on second round of the same medications, instructed wife to stop giving pt tylenol wife notes fevers self-resolved. Since then pt decreased energy with episode of being unsteady on feet. Today is 6th day of taking medications, had f/u scheduled with pulmonologist who sent pt to ED for eval.  He feels tired and weak. In ER he received ceftriaxone.    1. Large RLL pulmonary cavity with worsening opacification. Worsening pulmonary infiltrates, likely pneumonia. Possible recurrent lung Ca. Lung Ca s/p right upper lobectomy. Radiation pneumonitis. CRF stage 3. Allergy to PCN.   -respiratory frail  -extensive right lung consolidations  -worsening large pulmonary cavity opacification on repeat CT chest   -bronchoscopy cultures show normal respiratory jaime and rare yeast  -fungal bronch c/s is pending and will take several weeks to finalize  -s/p levofloxacin # 10  -s/p voriconazole 200 mg PO q12h # 7  -s/p fluconazole 100 mg IV qd # 8  -s/p doxycycline 100 mg IV q12h # 10  -pulmonary evaluation appreciated   -I am concerned that there is no definite diagnosis for his pulmonary illness and that abx therapy dose not seem to help so far  -on meropenem 1 gm IV q8h # 13 and caspofungin 50 mg IV qd # 5 and vancomycin 750 mg IV q12h # 4  -tolerating abx well so far; no side effects noted  -thoracic evaluation appreciated - conservative care recommended   -renal function is improved  -vancomycin trough level is therapeutic  -renal function is stable  -f/u cultures  -continue abx coverage   -monitor temps  -f/u CBC  -supportive care  2. Other issues:   -care per medicine    d/w medicine team and Dr. Sheldon

## 2023-11-26 LAB
ALBUMIN SERPL ELPH-MCNC: 2.4 G/DL — LOW (ref 3.3–5)
ALBUMIN SERPL ELPH-MCNC: 2.4 G/DL — LOW (ref 3.3–5)
ALP SERPL-CCNC: 98 U/L — SIGNIFICANT CHANGE UP (ref 40–120)
ALP SERPL-CCNC: 98 U/L — SIGNIFICANT CHANGE UP (ref 40–120)
ALT FLD-CCNC: 26 U/L — SIGNIFICANT CHANGE UP (ref 12–78)
ALT FLD-CCNC: 26 U/L — SIGNIFICANT CHANGE UP (ref 12–78)
ANION GAP SERPL CALC-SCNC: 2 MMOL/L — LOW (ref 5–17)
ANION GAP SERPL CALC-SCNC: 2 MMOL/L — LOW (ref 5–17)
ANISOCYTOSIS BLD QL: SIGNIFICANT CHANGE UP
ANISOCYTOSIS BLD QL: SIGNIFICANT CHANGE UP
AST SERPL-CCNC: 31 U/L — SIGNIFICANT CHANGE UP (ref 15–37)
AST SERPL-CCNC: 31 U/L — SIGNIFICANT CHANGE UP (ref 15–37)
BASE EXCESS BLDV CALC-SCNC: 11.2 MMOL/L — HIGH (ref -2–3)
BASE EXCESS BLDV CALC-SCNC: 11.2 MMOL/L — HIGH (ref -2–3)
BASO STIPL BLD QL SMEAR: PRESENT — SIGNIFICANT CHANGE UP
BASO STIPL BLD QL SMEAR: PRESENT — SIGNIFICANT CHANGE UP
BASOPHILS # BLD AUTO: 0 K/UL — SIGNIFICANT CHANGE UP (ref 0–0.2)
BASOPHILS # BLD AUTO: 0 K/UL — SIGNIFICANT CHANGE UP (ref 0–0.2)
BASOPHILS NFR BLD AUTO: 0 % — SIGNIFICANT CHANGE UP (ref 0–2)
BASOPHILS NFR BLD AUTO: 0 % — SIGNIFICANT CHANGE UP (ref 0–2)
BILIRUB SERPL-MCNC: 1.3 MG/DL — HIGH (ref 0.2–1.2)
BILIRUB SERPL-MCNC: 1.3 MG/DL — HIGH (ref 0.2–1.2)
BLD GP AB SCN SERPL QL: SIGNIFICANT CHANGE UP
BLD GP AB SCN SERPL QL: SIGNIFICANT CHANGE UP
BUN SERPL-MCNC: 35 MG/DL — HIGH (ref 7–23)
BUN SERPL-MCNC: 35 MG/DL — HIGH (ref 7–23)
CALCIUM SERPL-MCNC: 8.4 MG/DL — LOW (ref 8.5–10.1)
CALCIUM SERPL-MCNC: 8.4 MG/DL — LOW (ref 8.5–10.1)
CHLORIDE SERPL-SCNC: 99 MMOL/L — SIGNIFICANT CHANGE UP (ref 96–108)
CHLORIDE SERPL-SCNC: 99 MMOL/L — SIGNIFICANT CHANGE UP (ref 96–108)
CO2 SERPL-SCNC: 36 MMOL/L — HIGH (ref 22–31)
CO2 SERPL-SCNC: 36 MMOL/L — HIGH (ref 22–31)
CREAT SERPL-MCNC: 0.92 MG/DL — SIGNIFICANT CHANGE UP (ref 0.5–1.3)
CREAT SERPL-MCNC: 0.92 MG/DL — SIGNIFICANT CHANGE UP (ref 0.5–1.3)
CRP SERPL-MCNC: 75 MG/L — HIGH
CRP SERPL-MCNC: 75 MG/L — HIGH
EGFR: 83 ML/MIN/1.73M2 — SIGNIFICANT CHANGE UP
EGFR: 83 ML/MIN/1.73M2 — SIGNIFICANT CHANGE UP
ELLIPTOCYTES BLD QL SMEAR: SLIGHT — SIGNIFICANT CHANGE UP
ELLIPTOCYTES BLD QL SMEAR: SLIGHT — SIGNIFICANT CHANGE UP
EOSINOPHIL # BLD AUTO: 0.13 K/UL — SIGNIFICANT CHANGE UP (ref 0–0.5)
EOSINOPHIL # BLD AUTO: 0.13 K/UL — SIGNIFICANT CHANGE UP (ref 0–0.5)
EOSINOPHIL NFR BLD AUTO: 4 % — SIGNIFICANT CHANGE UP (ref 0–6)
EOSINOPHIL NFR BLD AUTO: 4 % — SIGNIFICANT CHANGE UP (ref 0–6)
GAS PNL BLDV: SIGNIFICANT CHANGE UP
GAS PNL BLDV: SIGNIFICANT CHANGE UP
GIANT PLATELETS BLD QL SMEAR: PRESENT — SIGNIFICANT CHANGE UP
GIANT PLATELETS BLD QL SMEAR: PRESENT — SIGNIFICANT CHANGE UP
GLUCOSE BLDC GLUCOMTR-MCNC: 116 MG/DL — HIGH (ref 70–99)
GLUCOSE BLDC GLUCOMTR-MCNC: 116 MG/DL — HIGH (ref 70–99)
GLUCOSE BLDC GLUCOMTR-MCNC: 167 MG/DL — HIGH (ref 70–99)
GLUCOSE BLDC GLUCOMTR-MCNC: 167 MG/DL — HIGH (ref 70–99)
GLUCOSE BLDC GLUCOMTR-MCNC: 170 MG/DL — HIGH (ref 70–99)
GLUCOSE BLDC GLUCOMTR-MCNC: 170 MG/DL — HIGH (ref 70–99)
GLUCOSE BLDC GLUCOMTR-MCNC: 189 MG/DL — HIGH (ref 70–99)
GLUCOSE BLDC GLUCOMTR-MCNC: 189 MG/DL — HIGH (ref 70–99)
GLUCOSE SERPL-MCNC: 115 MG/DL — HIGH (ref 70–99)
GLUCOSE SERPL-MCNC: 115 MG/DL — HIGH (ref 70–99)
HCO3 BLDV-SCNC: 37 MMOL/L — HIGH (ref 22–29)
HCO3 BLDV-SCNC: 37 MMOL/L — HIGH (ref 22–29)
HCT VFR BLD CALC: 22.9 % — LOW (ref 39–50)
HCT VFR BLD CALC: 22.9 % — LOW (ref 39–50)
HCT VFR BLD CALC: 26.4 % — LOW (ref 39–50)
HCT VFR BLD CALC: 26.4 % — LOW (ref 39–50)
HGB BLD-MCNC: 7.5 G/DL — LOW (ref 13–17)
HGB BLD-MCNC: 7.5 G/DL — LOW (ref 13–17)
HGB BLD-MCNC: 8.6 G/DL — LOW (ref 13–17)
HGB BLD-MCNC: 8.6 G/DL — LOW (ref 13–17)
HYPOCHROMIA BLD QL: SLIGHT — SIGNIFICANT CHANGE UP
HYPOCHROMIA BLD QL: SLIGHT — SIGNIFICANT CHANGE UP
LYMPHOCYTES # BLD AUTO: 0.2 K/UL — LOW (ref 1–3.3)
LYMPHOCYTES # BLD AUTO: 0.2 K/UL — LOW (ref 1–3.3)
LYMPHOCYTES # BLD AUTO: 6 % — LOW (ref 13–44)
LYMPHOCYTES # BLD AUTO: 6 % — LOW (ref 13–44)
MACROCYTES BLD QL: SLIGHT — SIGNIFICANT CHANGE UP
MACROCYTES BLD QL: SLIGHT — SIGNIFICANT CHANGE UP
MAGNESIUM SERPL-MCNC: 2 MG/DL — SIGNIFICANT CHANGE UP (ref 1.6–2.6)
MAGNESIUM SERPL-MCNC: 2 MG/DL — SIGNIFICANT CHANGE UP (ref 1.6–2.6)
MANUAL SMEAR VERIFICATION: SIGNIFICANT CHANGE UP
MANUAL SMEAR VERIFICATION: SIGNIFICANT CHANGE UP
MCHC RBC-ENTMCNC: 30 PG — SIGNIFICANT CHANGE UP (ref 27–34)
MCHC RBC-ENTMCNC: 30 PG — SIGNIFICANT CHANGE UP (ref 27–34)
MCHC RBC-ENTMCNC: 32.8 GM/DL — SIGNIFICANT CHANGE UP (ref 32–36)
MCHC RBC-ENTMCNC: 32.8 GM/DL — SIGNIFICANT CHANGE UP (ref 32–36)
MCV RBC AUTO: 91.6 FL — SIGNIFICANT CHANGE UP (ref 80–100)
MCV RBC AUTO: 91.6 FL — SIGNIFICANT CHANGE UP (ref 80–100)
METAMYELOCYTES # FLD: 1 % — HIGH (ref 0–0)
METAMYELOCYTES # FLD: 1 % — HIGH (ref 0–0)
MONOCYTES # BLD AUTO: 0.23 K/UL — SIGNIFICANT CHANGE UP (ref 0–0.9)
MONOCYTES # BLD AUTO: 0.23 K/UL — SIGNIFICANT CHANGE UP (ref 0–0.9)
MONOCYTES NFR BLD AUTO: 7 % — SIGNIFICANT CHANGE UP (ref 2–14)
MONOCYTES NFR BLD AUTO: 7 % — SIGNIFICANT CHANGE UP (ref 2–14)
NEUTROPHILS # BLD AUTO: 2.61 K/UL — SIGNIFICANT CHANGE UP (ref 1.8–7.4)
NEUTROPHILS # BLD AUTO: 2.61 K/UL — SIGNIFICANT CHANGE UP (ref 1.8–7.4)
NEUTROPHILS NFR BLD AUTO: 71 % — SIGNIFICANT CHANGE UP (ref 43–77)
NEUTROPHILS NFR BLD AUTO: 71 % — SIGNIFICANT CHANGE UP (ref 43–77)
NEUTS BAND # BLD: 8 % — SIGNIFICANT CHANGE UP (ref 0–8)
NEUTS BAND # BLD: 8 % — SIGNIFICANT CHANGE UP (ref 0–8)
NRBC # BLD: 1 /100 — HIGH (ref 0–0)
NRBC # BLD: 1 /100 — HIGH (ref 0–0)
NRBC # BLD: SIGNIFICANT CHANGE UP /100 WBCS (ref 0–0)
NRBC # BLD: SIGNIFICANT CHANGE UP /100 WBCS (ref 0–0)
NT-PROBNP SERPL-SCNC: 3923 PG/ML — HIGH (ref 0–450)
NT-PROBNP SERPL-SCNC: 3923 PG/ML — HIGH (ref 0–450)
PCO2 BLDV: 52 MMHG — SIGNIFICANT CHANGE UP (ref 42–55)
PCO2 BLDV: 52 MMHG — SIGNIFICANT CHANGE UP (ref 42–55)
PH BLDV: 7.46 — HIGH (ref 7.32–7.43)
PH BLDV: 7.46 — HIGH (ref 7.32–7.43)
PHOSPHATE SERPL-MCNC: 2.3 MG/DL — LOW (ref 2.5–4.5)
PHOSPHATE SERPL-MCNC: 2.3 MG/DL — LOW (ref 2.5–4.5)
PLAT MORPH BLD: NORMAL — SIGNIFICANT CHANGE UP
PLAT MORPH BLD: NORMAL — SIGNIFICANT CHANGE UP
PLATELET # BLD AUTO: 136 K/UL — LOW (ref 150–400)
PLATELET # BLD AUTO: 136 K/UL — LOW (ref 150–400)
PLATELET COUNT - ESTIMATE: ABNORMAL
PLATELET COUNT - ESTIMATE: ABNORMAL
PO2 BLDV: 242 MMHG — HIGH (ref 25–45)
PO2 BLDV: 242 MMHG — HIGH (ref 25–45)
POIKILOCYTOSIS BLD QL AUTO: SLIGHT — SIGNIFICANT CHANGE UP
POIKILOCYTOSIS BLD QL AUTO: SLIGHT — SIGNIFICANT CHANGE UP
POLYCHROMASIA BLD QL SMEAR: SLIGHT — SIGNIFICANT CHANGE UP
POLYCHROMASIA BLD QL SMEAR: SLIGHT — SIGNIFICANT CHANGE UP
POTASSIUM SERPL-MCNC: 3.6 MMOL/L — SIGNIFICANT CHANGE UP (ref 3.5–5.3)
POTASSIUM SERPL-MCNC: 3.6 MMOL/L — SIGNIFICANT CHANGE UP (ref 3.5–5.3)
POTASSIUM SERPL-SCNC: 3.6 MMOL/L — SIGNIFICANT CHANGE UP (ref 3.5–5.3)
POTASSIUM SERPL-SCNC: 3.6 MMOL/L — SIGNIFICANT CHANGE UP (ref 3.5–5.3)
PROCALCITONIN SERPL-MCNC: 0.31 NG/ML — HIGH (ref 0.02–0.1)
PROCALCITONIN SERPL-MCNC: 0.31 NG/ML — HIGH (ref 0.02–0.1)
PROT SERPL-MCNC: 5.6 GM/DL — LOW (ref 6–8.3)
PROT SERPL-MCNC: 5.6 GM/DL — LOW (ref 6–8.3)
RBC # BLD: 2.5 M/UL — LOW (ref 4.2–5.8)
RBC # BLD: 2.5 M/UL — LOW (ref 4.2–5.8)
RBC # FLD: 18.4 % — HIGH (ref 10.3–14.5)
RBC # FLD: 18.4 % — HIGH (ref 10.3–14.5)
RBC BLD AUTO: ABNORMAL
RBC BLD AUTO: ABNORMAL
SAO2 % BLDV: 100 % — HIGH (ref 67–88)
SAO2 % BLDV: 100 % — HIGH (ref 67–88)
SODIUM SERPL-SCNC: 137 MMOL/L — SIGNIFICANT CHANGE UP (ref 135–145)
SODIUM SERPL-SCNC: 137 MMOL/L — SIGNIFICANT CHANGE UP (ref 135–145)
STOMATOCYTES BLD QL SMEAR: SLIGHT — SIGNIFICANT CHANGE UP
STOMATOCYTES BLD QL SMEAR: SLIGHT — SIGNIFICANT CHANGE UP
VARIANT LYMPHS # BLD: 3 % — SIGNIFICANT CHANGE UP (ref 0–6)
VARIANT LYMPHS # BLD: 3 % — SIGNIFICANT CHANGE UP (ref 0–6)
WBC # BLD: 3.31 K/UL — LOW (ref 3.8–10.5)
WBC # BLD: 3.31 K/UL — LOW (ref 3.8–10.5)
WBC # FLD AUTO: 3.31 K/UL — LOW (ref 3.8–10.5)
WBC # FLD AUTO: 3.31 K/UL — LOW (ref 3.8–10.5)

## 2023-11-26 PROCEDURE — 99232 SBSQ HOSP IP/OBS MODERATE 35: CPT

## 2023-11-26 RX ORDER — HYDRALAZINE HCL 50 MG
10 TABLET ORAL EVERY 8 HOURS
Refills: 0 | Status: DISCONTINUED | OUTPATIENT
Start: 2023-11-26 | End: 2023-11-30

## 2023-11-26 RX ORDER — FUROSEMIDE 40 MG
10 TABLET ORAL ONCE
Refills: 0 | Status: DISCONTINUED | OUTPATIENT
Start: 2023-11-26 | End: 2023-11-26

## 2023-11-26 RX ORDER — FUROSEMIDE 40 MG
10 TABLET ORAL ONCE
Refills: 0 | Status: DISCONTINUED | OUTPATIENT
Start: 2023-11-26 | End: 2023-11-28

## 2023-11-26 RX ORDER — FUROSEMIDE 40 MG
20 TABLET ORAL DAILY
Refills: 0 | Status: DISCONTINUED | OUTPATIENT
Start: 2023-11-26 | End: 2023-11-28

## 2023-11-26 RX ADMIN — MEROPENEM 1000 MILLIGRAM(S): 1 INJECTION INTRAVENOUS at 06:13

## 2023-11-26 RX ADMIN — Medication 2: at 13:18

## 2023-11-26 RX ADMIN — MEROPENEM 1000 MILLIGRAM(S): 1 INJECTION INTRAVENOUS at 17:25

## 2023-11-26 RX ADMIN — Medication 1 APPLICATION(S): at 22:33

## 2023-11-26 RX ADMIN — Medication 2: at 17:32

## 2023-11-26 RX ADMIN — Medication 3 MILLILITER(S): at 17:06

## 2023-11-26 RX ADMIN — Medication 20 MILLIGRAM(S): at 12:43

## 2023-11-26 RX ADMIN — CASPOFUNGIN ACETATE 260 MILLIGRAM(S): 7 INJECTION, POWDER, LYOPHILIZED, FOR SOLUTION INTRAVENOUS at 19:59

## 2023-11-26 RX ADMIN — Medication 250 MILLIGRAM(S): at 09:41

## 2023-11-26 RX ADMIN — Medication 250 MILLIGRAM(S): at 22:32

## 2023-11-26 RX ADMIN — Medication 12.5 MICROGRAM(S): at 06:12

## 2023-11-26 RX ADMIN — PANTOPRAZOLE SODIUM 40 MILLIGRAM(S): 20 TABLET, DELAYED RELEASE ORAL at 22:32

## 2023-11-26 RX ADMIN — FLUTICASONE FUROATE, UMECLIDINIUM BROMIDE AND VILANTEROL TRIFENATATE 1 PUFF(S): 200; 62.5; 25 POWDER RESPIRATORY (INHALATION) at 08:23

## 2023-11-26 RX ADMIN — Medication 3 MILLILITER(S): at 08:23

## 2023-11-26 RX ADMIN — Medication 50 MILLILITER(S): at 09:48

## 2023-11-26 RX ADMIN — Medication 10 MILLIGRAM(S): at 17:25

## 2023-11-26 RX ADMIN — Medication 1 MILLIGRAM(S): at 09:39

## 2023-11-26 RX ADMIN — Medication 325 MILLIGRAM(S): at 09:37

## 2023-11-26 RX ADMIN — Medication 250 MILLIGRAM(S): at 00:19

## 2023-11-26 RX ADMIN — Medication 1 APPLICATION(S): at 09:40

## 2023-11-26 RX ADMIN — Medication 3 MILLILITER(S): at 02:00

## 2023-11-26 RX ADMIN — PANTOPRAZOLE SODIUM 40 MILLIGRAM(S): 20 TABLET, DELAYED RELEASE ORAL at 09:43

## 2023-11-26 RX ADMIN — POLYETHYLENE GLYCOL 3350 17 GRAM(S): 17 POWDER, FOR SOLUTION ORAL at 09:39

## 2023-11-26 RX ADMIN — ATORVASTATIN CALCIUM 40 MILLIGRAM(S): 80 TABLET, FILM COATED ORAL at 22:32

## 2023-11-26 RX ADMIN — Medication 3 MILLILITER(S): at 23:34

## 2023-11-26 NOTE — PROGRESS NOTE ADULT - SUBJECTIVE AND OBJECTIVE BOX
HOSPITALIST ATTENDING PROGRESS NOTE    Chart and meds reviewed.  Patient seen and examined.    CC: cough, weakness      - no events, afebrile, denies cp, dyspnea, + productive cough with brown mucus persist, poor po intake, plan discussed with wife at bedside   - pt seen in am, bilateral arm swelling, afebrile, + productive cough some streaks of blood , denies cp, abdominal pain, poor po intake   - afebrile, + upper extremities edema, + cough with brown mucus, denies abdominal pain, tolerating some po intake   - afebrile, UE edema improving, + gen weakness, + poor po intake, + cough persist, plan discussed    - dyspnea overnight, on BIPAP overnight, feels better in am, LLE stiches removed , denies cp, abdominal pain , plan discussed    All other systems reviewed and found to be negative with the exception of what has been described above.    Vital sings reviewed for last 24 h  T(C): 37.1 (23 @ 16:25), Max: 37.3 (23 @ 20:40)  T(F): 98.7 (23 @ 16:25), Max: 99.1 (23 @ 20:40)  HR: 98 (23 @ 16:25) (94 - 116)  BP: 138/52 (23 @ 16:25) (137/51 - 172/67)  RR: 19 (23 @ 16:25) (18 - 27)  SpO2: 98% (23 @ 16:25) (93% - 100%)  Wt(kg): --  Daily     Daily Weight in k.8 (2023 05:26)  CAPILLARY BLOOD GLUCOSE      POCT Blood Glucose.: 135 mg/dL (2023 12:44)  POCT Blood Glucose.: 113 mg/dL (2023 08:26)  POCT Blood Glucose.: 194 mg/dL (2023 22:37)  POCT Blood Glucose.: 194 mg/dL (2023 17:37)        Physical exam :   GEN: Frail, NAD  HEENT:  pupils equal and reactive, EOMI, no oropharyngeal lesions, erythema, exudates, oral thrush  NECK:   supple, no carotid bruits, no palpable lymph nodes, no thyromegaly  CV:  +S1, +S2, regular, no murmurs or rubs  RESP:  Bilateral ronchi  BREAST:  not examined  GI:  abdomen soft, non-tender, non-distended, normal BS, no bruits, no abdominal masses, no palpable masses  RECTAL:  not examined  :  not examined  MSK:   normal muscle tone, no atrophy, no rigidity, no contractions  EXT:  no clubbing, no cyanosis, no edema, no calf pain, BIlateral LE edema.   Left leg sutures  VASCULAR:  pulses equal and symmetric in the upper and lower extremities  NEURO:  AAOX1, no focal neurological deficits, follows all commands, able to move extremities spontaneously  SKIN:  no ulcers, lesions or rashes    All labs radiology and other studies reviewed and interpreted :                         6.5    2.85  )-----------( 118      ( 2023 06:50 )             19.8         138  |  101  |  37<H>  ----------------------------<  106<H>  4.1   |  33<H>  |  0.99    Ca    8.2<L>      2023 06:50  Phos  2.1       Mg     2.1         TPro  5.4<L>  /  Alb  2.3<L>  /  TBili  1.0  /  DBili  x   /  AST  35  /  ALT  27  /  AlkPhos  100          LIVER FUNCTIONS - ( 2023 06:50 )  Alb: 2.3 g/dL / Pro: 5.4 gm/dL / ALK PHOS: 100 U/L / ALT: 27 U/L / AST: 35 U/L / GGT: x             C-Reactive Protein, Serum: 58 mg/L (23 @ 06:50)    C-Reactive Protein, Serum: 59 mg/L (23 @ 11:58)      C-Reactive Protein, Serum: 70 mg/L (23 @ 08:53)  C-Reactive Protein, Serum: 24 mg/L (23 @ 07:03)  C-Reactive Protein, Serum: 34 mg/L (23 @ 06:30)  C-Reactive Protein, Serum: 46 mg/L (23 @ 06:55)  C-Reactive Protein, Serum: 70 mg/L (23 @ 06:31)  C-Reactive Protein, Serum: 106 mg/L (11-10-23 @ 06:19)    Procalcitonin, Serum: 3.23: Note: Concentrations <0.5 ng/mL do not exclude an infection, on account  of localized infections (without systemic signs) which can be associated  with such low concentrations, or a systemic infection in its initial  stages (<6 hours). Furthermore, increased procalcitonin may occur without  infection. PCT concentrations between 0.5 and 2.0 ng/mL should be  interpreted taking into account the patient’s history. It is recommended  to pretest PCT within 6-24 hours if any concentrations <2.0 mg/mL are  obtained. ng/mL (23 @ 06:01)      Cortisol AM, Serum: 16.4 ug/dL (23 @ 08:53)             CT Chest No Cont (23 @ 08:03) >  IMPRESSION: Extensive right lung consolidations as described above with   overall interval progression since 2023. Complete   opacification of the right middle lobe with internal consolidation and   volume loss appears unchanged since 2023 demonstrating  interval progression since 2023.    Persistent apical right apical large irregular cyst unchanged in size   since 2023.    Small left pleural effusion with subcutaneous edema.    Trace amount of ascites, increased in size since 2023.    Xray Chest 1 View- PORTABLE-Routine (Xray Chest 1 View- PORTABLE-Routine .) (23 @ 12:32) >  IMPRESSION: Slight progression of pneumonia since     MEDICATIONS  (STANDING):  albuterol/ipratropium for Nebulization 3 milliLiter(s) Nebulizer every 6 hours  AQUAPHOR (petrolatum Ointment) 1 Application(s) Topical two times a day  atorvastatin 40 milliGRAM(s) Oral at bedtime  caspofungin IVPB 50 milliGRAM(s) IV Intermittent every 24 hours  caspofungin IVPB      dextrose 5%. 1000 milliLiter(s) (50 mL/Hr) IV Continuous <Continuous>  dextrose 5%. 1000 milliLiter(s) (100 mL/Hr) IV Continuous <Continuous>  dextrose 50% Injectable 25 Gram(s) IV Push once  dextrose 50% Injectable 12.5 Gram(s) IV Push once  dextrose 50% Injectable 25 Gram(s) IV Push once  ferrous    sulfate 325 milliGRAM(s) Oral daily  fluticasone furoate/umeclidinium/vilanterol 100-62.5-25 MICROgram(s) Inhaler 1 Puff(s) Inhalation daily  folic acid 1 milliGRAM(s) Oral daily  furosemide   Injectable 10 milliGRAM(s) IV Push once  glucagon  Injectable 1 milliGRAM(s) IntraMuscular once  heparin   Injectable 5000 Unit(s) SubCutaneous every 12 hours  hydrALAZINE 10 milliGRAM(s) Oral two times a day  insulin lispro (ADMELOG) corrective regimen sliding scale   SubCutaneous at bedtime  insulin lispro (ADMELOG) corrective regimen sliding scale   SubCutaneous three times a day before meals  levothyroxine 12.5 MICROGram(s) Oral daily  meropenem Injectable 1000 milliGRAM(s) IV Push every 8 hours  pantoprazole  Injectable 40 milliGRAM(s) IV Push two times a day  polyethylene glycol 3350 17 Gram(s) Oral daily  vancomycin  IVPB 750 milliGRAM(s) IV Intermittent every 12 hours    MEDICATIONS  (PRN):  acetaminophen     Tablet .. 650 milliGRAM(s) Oral every 6 hours PRN Temp greater or equal to 38C (100.4F), Mild Pain (1 - 3)  aluminum hydroxide/magnesium hydroxide/simethicone Suspension 30 milliLiter(s) Oral every 4 hours PRN Dyspepsia  benzonatate 100 milliGRAM(s) Oral three times a day PRN Cough  dextrose Oral Gel 15 Gram(s) Oral once PRN Blood Glucose LESS THAN 70 milliGRAM(s)/deciliter  melatonin 3 milliGRAM(s) Oral at bedtime PRN Insomnia  ondansetron Injectable 4 milliGRAM(s) IV Push every 8 hours PRN Nausea and/or Vomiting  ondansetron Injectable 4 milliGRAM(s) IV Push every 8 hours PRN Nausea and/or Vomiting       HOSPITALIST ATTENDING PROGRESS NOTE    Chart and meds reviewed.  Patient seen and examined.    CC: cough, weakness     11/21 - no events, afebrile, denies cp, dyspnea, + productive cough with brown mucus persist, poor po intake, plan discussed with wife at bedside  11/22 - pt seen in am, bilateral arm swelling, afebrile, + productive cough some streaks of blood , denies cp, abdominal pain, poor po intake  11/23 - afebrile, + upper extremities edema, + cough with brown mucus, denies abdominal pain, tolerating some po intake  11/24 - afebrile, UE edema improving, + gen weakness, + poor po intake, + cough persist, plan discussed   11/25 - dyspnea overnight, on BIPAP overnight, feels better in am, LLE stiches removed , denies cp, abdominal pain , plan discussed  11/26 - dyspnea overnight, better after BIPAP, denies cp, + cough, afebrile, plan discussed     All other systems reviewed and found to be negative with the exception of what has been described above.    Vital sings reviewed for last 24 h  T(C): 37.1 (11-26-23 @ 16:55), Max: 37.2 (11-26-23 @ 15:39)  T(F): 98.7 (11-26-23 @ 16:55), Max: 98.9 (11-26-23 @ 15:39)  HR: 95 (11-26-23 @ 17:13) (91 - 112)  BP: 156/61 (11-26-23 @ 16:55) (140/63 - 163/68)  RR: 19 (11-26-23 @ 16:55) (18 - 19)  SpO2: 93% (11-26-23 @ 17:13) (92% - 100%)  Wt(kg): --  Daily     Daily   CAPILLARY BLOOD GLUCOSE      POCT Blood Glucose.: 189 mg/dL (26 Nov 2023 17:26)  POCT Blood Glucose.: 170 mg/dL (26 Nov 2023 13:15)  POCT Blood Glucose.: 116 mg/dL (26 Nov 2023 08:10)          Physical exam :   GEN: Frail, NAD  HEENT:  pupils equal and reactive, EOMI, no oropharyngeal lesions, erythema, exudates, oral thrush  NECK:   supple, no carotid bruits, no palpable lymph nodes, no thyromegaly  CV:  +S1, +S2, regular, no murmurs or rubs  RESP:  Bilateral ronchi  BREAST:  not examined  GI:  abdomen soft, non-tender, non-distended, normal BS, no bruits, no abdominal masses, no palpable masses  RECTAL:  not examined  :  not examined  MSK:   normal muscle tone, no atrophy, no rigidity, no contractions  EXT:  no clubbing, no cyanosis, no edema, no calf pain, BIlateral LE edema.   Left leg sutures  VASCULAR:  pulses equal and symmetric in the upper and lower extremities  NEURO:  AAOX1, no focal neurological deficits, follows all commands, able to move extremities spontaneously  SKIN:  no ulcers, lesions or rashes    All labs radiology and other studies reviewed and interpreted :                         8.6    x     )-----------( x        ( 26 Nov 2023 10:00 )             26.4     11-26    137  |  99  |  35<H>  ----------------------------<  115<H>  3.6   |  36<H>  |  0.92    Ca    8.4<L>      26 Nov 2023 07:16  Phos  2.3     11-26  Mg     2.0     11-26    TPro  5.6<L>  /  Alb  2.4<L>  /  TBili  1.3<H>  /  DBili  x   /  AST  31  /  ALT  26  /  AlkPhos  98  11-26        LIVER FUNCTIONS - ( 26 Nov 2023 07:16 )  Alb: 2.4 g/dL / Pro: 5.6 gm/dL / ALK PHOS: 98 U/L / ALT: 26 U/L / AST: 31 U/L / GGT: x             Procalcitonin  11-26-23 @ 07:16   -  0.31<H>  11-25-23 @ 06:50   -  0.32<H>  Procalcitonin, Serum: 3.23:               C-Reactive Protein, Serum: 58 mg/L (11.25.23 @ 06:50)    C-Reactive Protein, Serum: 59 mg/L (11.24.23 @ 11:58)    C-Reactive Protein, Serum: 75 mg/L (11.26.23 @ 07:16)      C-Reactive Protein, Serum: 70 mg/L (11-23-23 @ 08:53)  C-Reactive Protein, Serum: 24 mg/L (11-14-23 @ 07:03)  C-Reactive Protein, Serum: 34 mg/L (11-13-23 @ 06:30)  C-Reactive Protein, Serum: 46 mg/L (11-12-23 @ 06:55)  C-Reactive Protein, Serum: 70 mg/L (11-11-23 @ 06:31)  C-Reactive Protein, Serum: 106 mg/L (11-10-23 @ 06:19)    Procalcitonin, Serum: 3.23: Note: Concentrations <0.5 ng/mL do not exclude an infection, on account  of localized infections (without systemic signs) which can be associated  with such low concentrations, or a systemic infection in its initial  stages (<6 hours). Furthermore, increased procalcitonin may occur without  infection. PCT concentrations between 0.5 and 2.0 ng/mL should be  interpreted taking into account the patient’s history. It is recommended  to pretest PCT within 6-24 hours if any concentrations <2.0 mg/mL are  obtained. ng/mL (11.08.23 @ 06:01)      Cortisol AM, Serum: 16.4 ug/dL (11.23.23 @ 08:53)      < from: Xray Chest 1 View- PORTABLE-Routine (Xray Chest 1 View- PORTABLE-Routine .) (11.25.23 @ 15:23) >    IMPRESSION: Extensive right lung findings show new infiltrate in the   right lower lobe.    < end of copied text >         CT Chest No Cont (11.20.23 @ 08:03) >  IMPRESSION: Extensive right lung consolidations as described above with   overall interval progression since November 13, 2023. Complete   opacification of the right middle lobe with internal consolidation and   volume loss appears unchanged since November 13, 2023 demonstrating  interval progression since November 2, 2023.    Persistent apical right apical large irregular cyst unchanged in size   since November 13, 2023.    Small left pleural effusion with subcutaneous edema.    Trace amount of ascites, increased in size since November 13, 2023.    Xray Chest 1 View- PORTABLE-Routine (Xray Chest 1 View- PORTABLE-Routine .) (11.16.23 @ 12:32) >  IMPRESSION: Slight progression of pneumonia since November 11    MEDICATIONS  (STANDING):  albuterol/ipratropium for Nebulization 3 milliLiter(s) Nebulizer every 6 hours  AQUAPHOR (petrolatum Ointment) 1 Application(s) Topical two times a day  atorvastatin 40 milliGRAM(s) Oral at bedtime  caspofungin IVPB 50 milliGRAM(s) IV Intermittent every 24 hours  caspofungin IVPB      dextrose 5%. 1000 milliLiter(s) (50 mL/Hr) IV Continuous <Continuous>  dextrose 5%. 1000 milliLiter(s) (100 mL/Hr) IV Continuous <Continuous>  dextrose 50% Injectable 25 Gram(s) IV Push once  dextrose 50% Injectable 12.5 Gram(s) IV Push once  dextrose 50% Injectable 25 Gram(s) IV Push once  ferrous    sulfate 325 milliGRAM(s) Oral daily  fluticasone furoate/umeclidinium/vilanterol 100-62.5-25 MICROgram(s) Inhaler 1 Puff(s) Inhalation daily  folic acid 1 milliGRAM(s) Oral daily  furosemide   Injectable 10 milliGRAM(s) IV Push once  glucagon  Injectable 1 milliGRAM(s) IntraMuscular once  heparin   Injectable 5000 Unit(s) SubCutaneous every 12 hours  hydrALAZINE 10 milliGRAM(s) Oral two times a day  insulin lispro (ADMELOG) corrective regimen sliding scale   SubCutaneous at bedtime  insulin lispro (ADMELOG) corrective regimen sliding scale   SubCutaneous three times a day before meals  levothyroxine 12.5 MICROGram(s) Oral daily  meropenem Injectable 1000 milliGRAM(s) IV Push every 8 hours  pantoprazole  Injectable 40 milliGRAM(s) IV Push two times a day  polyethylene glycol 3350 17 Gram(s) Oral daily  vancomycin  IVPB 750 milliGRAM(s) IV Intermittent every 12 hours    MEDICATIONS  (PRN):  acetaminophen     Tablet .. 650 milliGRAM(s) Oral every 6 hours PRN Temp greater or equal to 38C (100.4F), Mild Pain (1 - 3)  aluminum hydroxide/magnesium hydroxide/simethicone Suspension 30 milliLiter(s) Oral every 4 hours PRN Dyspepsia  benzonatate 100 milliGRAM(s) Oral three times a day PRN Cough  dextrose Oral Gel 15 Gram(s) Oral once PRN Blood Glucose LESS THAN 70 milliGRAM(s)/deciliter  melatonin 3 milliGRAM(s) Oral at bedtime PRN Insomnia  ondansetron Injectable 4 milliGRAM(s) IV Push every 8 hours PRN Nausea and/or Vomiting  ondansetron Injectable 4 milliGRAM(s) IV Push every 8 hours PRN Nausea and/or Vomiting

## 2023-11-26 NOTE — PROGRESS NOTE ADULT - SUBJECTIVE AND OBJECTIVE BOX
JOHN FRIEDEL  MRN: [id number]    S: November 26, 2023:    Weak. No acute distress. Still coughing. No dyspnea at rest.      November 25, 2023:    Awake/alert. Weak. Chronically ill appearing. Denies shortness of breath. Occasional cough. Denies chest pain or hemoptysis.       PAST MEDICAL & SURGICAL HISTORY:    COPD (chronic obstructive pulmonary disease)      Lung cancer      Anemia of chronic disease      CKD (chronic kidney disease), stage III      Hypothyroidism      AAA (abdominal aortic aneurysm)      HTN (hypertension)      HLD (hyperlipidemia)      Thrombocytopenia      S/P lobectomy of lung          O: T(C): 36.9 (11-26-23 @ 09:08), Max: 37.1 (11-25-23 @ 16:25)  HR: 104 (11-26-23 @ 09:08) (91 - 112)  BP: 163/68 (11-26-23 @ 09:08) (138/52 - 170/74)  RR: 19 (11-26-23 @ 09:08) (18 - 20)  SpO2: 95% (11-26-23 @ 09:08) (92% - 100%)  Wt(kg): --    PHYSICAL EXAM:      GENERAL: weak; no acute distress    NEURO: awake/alert    NECK: no JVD    CHEST: rhonchi on right. Left clear. No wheezing    CARDIAC: RR    EXT: No edema      LABS:                          8.6    x     )-----------( x        ( 26 Nov 2023 10:00 )             26.4     WBC Count: 3.31 K/uL (11.26.23 @ 07:16)     11-26    137  |  99  |  35<H>  ----------------------------<  115<H>  3.6   |  36<H>  |  0.92    Ca    8.4<L>      26 Nov 2023 07:16  Phos  2.3     11-26  Mg     2.0     11-26    TPro  5.6<L>  /  Alb  2.4<L>  /  TBili  1.3<H>  /  DBili  x   /  AST  31  /  ALT  26  /  AlkPhos  98  11-26        RADIOLOGY:    CXR done 11/25 report not yet available - per my review - right lung with no improvement; results c/w  chest CT 11/20. No pleural effusions. Left lung remains grossly clear    EXAM:  CT CHEST   ORDERED BY: DEEPA TIPTON   PROCEDURE DATE:  11/20/2023          INTERPRETATION:  Clinical indication: Right lung infection.    Axial CT images of the chest are obtained without intravenous   administration of contrast.    Comparison is made with the prior chest CT of November 13, 2023.    Enlarged axillary or mediastinal lymph nodes. Heart size is normal.   Vascular calcifications with involvement of the aorta and the coronary   arteries. Minimal pericardial fluid.    Evaluation of the upper abdomen demonstrate trace perihepatic and   perisplenic ascites slightly increased in size since November 13, 2023.   1.2 cm hypodensity arising from the upper pole of the partially imaged   left kidney without significant interval change.    Few subcutaneous edema.    Small left pleural effusion is unchanged. There may be a trace right   pleural effusion versus pleural thickening without significant interval   change.    Evaluation of the lungs demonstrate interval resolution of the previously   seen groundglass opacity within the dependent portion of the left upper   lobe. Interval near complete resolution of a previously seen left upper   lobe peripheral groundglass opacity. A few other ill-defined left lung   patchy groundglass opacities unchanged. 4 mm left lower lobe nodular   opacity on image 73 of series 2 appears new since November 13, 2023 may   be sequela of impacted distal airway.    Status post right upper lobectomy. Previously described right apical   large irregular cyst, part of which measures about 7 cm in transverse   dimension containing internal air, fluid and debris is without   significant interval change in size.  Ill-defined consolidations and groundglass opacities throughoutthe   majority of the right lung demonstrate mild overall interval progression   since November 13, 2023. For reference a few dense patchy and nodular   opacities at the right lung base are either new or demonstrate interval   increase in size.  Complete opacification of the right middle lobe with internal   consolidation appears unchanged since November 13, 2023, demonstrating   interval progression since November 2, 2023. Nonvisualization of the   internal segmental and subsegmental airways of the right middle lobe as   on the prior study.    Degenerative changes of the spine. Old healed left rib and old healed   sternal fractures. Mild superior endplate loss of height of the L1   vertebral body is unchanged.    IMPRESSION: Extensive right lung consolidations as described above with   overall interval progression since November 13, 2023. Complete   opacification of the right middle lobe with internal consolidation and   volume loss appears unchanged since November 13, 2023 demonstrating  interval progression since November 2, 2023.    Persistent apical right apical large irregular cyst unchanged in size   since November 13, 2023.    Small left pleural effusion with subcutaneous edema.    Trace amount of ascites, increased in size since November 13, 2023.      JEZ ALFARO MD; Attending Radiologist          MEDICATIONS  (STANDING):  albuterol/ipratropium for Nebulization 3 milliLiter(s) Nebulizer every 6 hours  AQUAPHOR (petrolatum Ointment) 1 Application(s) Topical two times a day  atorvastatin 40 milliGRAM(s) Oral at bedtime  caspofungin IVPB      caspofungin IVPB 50 milliGRAM(s) IV Intermittent every 24 hours  dextrose 5%. 1000 milliLiter(s) (100 mL/Hr) IV Continuous <Continuous>  dextrose 5%. 1000 milliLiter(s) (50 mL/Hr) IV Continuous <Continuous>  dextrose 50% Injectable 12.5 Gram(s) IV Push once  dextrose 50% Injectable 25 Gram(s) IV Push once  dextrose 50% Injectable 25 Gram(s) IV Push once  ferrous    sulfate 325 milliGRAM(s) Oral daily  fluticasone furoate/umeclidinium/vilanterol 100-62.5-25 MICROgram(s) Inhaler 1 Puff(s) Inhalation daily  folic acid 1 milliGRAM(s) Oral daily  furosemide   Injectable 20 milliGRAM(s) IV Push daily  glucagon  Injectable 1 milliGRAM(s) IntraMuscular once  hydrALAZINE 10 milliGRAM(s) Oral every 8 hours  insulin lispro (ADMELOG) corrective regimen sliding scale   SubCutaneous three times a day before meals  insulin lispro (ADMELOG) corrective regimen sliding scale   SubCutaneous at bedtime  levothyroxine 12.5 MICROGram(s) Oral daily  meropenem Injectable 1000 milliGRAM(s) IV Push every 8 hours  pantoprazole  Injectable 40 milliGRAM(s) IV Push two times a day  polyethylene glycol 3350 17 Gram(s) Oral daily  vancomycin  IVPB 750 milliGRAM(s) IV Intermittent every 12 hours    MEDICATIONS  (PRN):  acetaminophen     Tablet .. 650 milliGRAM(s) Oral every 6 hours PRN Temp greater or equal to 38C (100.4F), Mild Pain (1 - 3)  aluminum hydroxide/magnesium hydroxide/simethicone Suspension 30 milliLiter(s) Oral every 4 hours PRN Dyspepsia  benzonatate 100 milliGRAM(s) Oral three times a day PRN Cough  dextrose Oral Gel 15 Gram(s) Oral once PRN Blood Glucose LESS THAN 70 milliGRAM(s)/deciliter  furosemide   IVPB 10 milliGRAM(s) IV Intermittent once PRN after transfusion  melatonin 3 milliGRAM(s) Oral at bedtime PRN Insomnia  ondansetron Injectable 4 milliGRAM(s) IV Push every 8 hours PRN Nausea and/or Vomiting        A/P: 1. Large right lower lobe (S/P Right upper lobectomy)  thick walled cavity. Uncertain etiology. Immunocompromised host.  Infection considered most likely. Cannot exclude a malignant process.     Patient is very high surgical risk; no plans for invasive evaluation    Imaging from [facility] supplied by wife. He has had blebs in the past.     On broad spectrum antibiotics per ID. D/W Dr. Gaston. There has been no apparent improvement in the right pulmonary process.     CT 11/20 reveals worsening consolidation on right; left sided infiltrates improved. Will repeat CT 11/27.  Additional diagnostic/therapeutic options are limited. Patient is high risk for any invasive evaluation.          2. Pneumonia: as above. Follow up CT. Reevaluate antibiotics per ID.     -aspiration precautions      3. COPD - hypoxic respiratory failure. Continue supplemental O2; BiPAP prn. Continue inhaled bronchodilators/ICS.        4. ? Radiation  pneumonitis: Off steroids.  Progressive nature of findings argues against pneumonitis.       5. Aemia / CKD; decreased WBC.  Per primary care team.       6.  Lung Cancer. Oncology follow up appreciated. Last received carbo/taxol with salvage RT 5/5/2023 after progression in subcarinal node after adjuvant atezolizumab (LD 12/20/22)    - pt was last seen 10/2023- when compared with June 2023 increased b/l areas of infiltrates and consolidation likely infectious/inflammatory post therapy change. RML fide-sutural mass again obscured by consolidation. Unchanged subcarinal node, suspected mets.   At that time pt was started on medrol dose pack and Z pack.     7. Poor nutritional status. Per primary care team.     Prognosis guarded; DNR/DNI noted. Would suggest palliative care follow up to clarify goals of care. Family has considered transfer to home hospice.       Discussed with Dr. Gaston and Dr. Martinez.

## 2023-11-26 NOTE — PROGRESS NOTE ADULT - ASSESSMENT
83-year-old M with PMHx HTN, HLD, chronic anemia, COPD, lung CA s/p RUL lobectomy (follows [facility], last chemotherapy/RT 5/2023, not currently on treatment), AAA, hypothyroidism, CKD stage IIIa sent in to ED on 11/2/23  by pulmonologist MD Olivera with c/o SOB, dyspnea on minimal exertion, and cough. 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     # Acute hypoxic respiratory failure, multifactorial   # New RLL PNA on CT 11/13 , RLL cavity s/p bronchoscopy 11/9  possible yeast  infection, less likely radiation pneumonitis , r/o  aspergilloma    - CT chest: Larger right lower lobe thick-walled cavity which may be infectious and/or residua of reported treated tumor. Spongelike material within the cavity can be seen with aspergilloma (prior to fungus ball formation). Consolidation within the right lower and middle lobe compatible with radiation change.  - leukocytosis stable (WBC 17.70 from 17.35) ---> trend down to WBC  8   - repeat CXR 11/8 - worsening of opacities over right lung   - 2 d echo 11/8 - EF 60% , no significant valvular disease    - immunoglobulin panel - all Ig wnl  - 11/9 - s/p bronchoscopy   - speech and swallow evaluation  - no aspirations  - CXR 11/11 - stable consolidations  - repeat CT chest 11/13 - new right lung consolidation  - blood cultures -no growth, sputum culture - normal respiratory  - c/w trelegy ellipta, chest pt, IS, acapella ,BIPAP as needed  - ID/pulm consult noted   - s/p IV steroids --> po prednisone taper 20 x 3 days than stop , trending down ----> 34  - s/p  voriconazole PO ,  s/p 7 days of levaquin to cover atypical/psuedomonas (allergy to PCN)    - 11/13 - IV meropenem, doxy and fluconasole started as per ID   - Chest xray prelim worse  - Thoracic consult appreciated, no intervention  - CT Chest 11/20 worse  -11/13 - IV meropenem and doxycylcine  - Fluconazole DC, started on caspofungin 11/20  - Would consider palliative at this time  - 11/22 - d/c doxy started on  vancomycin , c/w IV meropenem  - CRP 24--> 70 --> 58  - 11/25 CXR - worse on the right    #  Chronic anemia, likely due to  chemotherapy, worsening and iron deficiency   -  no s/s bleeding, asymptomatic, continue to monitor/trend  - 11/8 - 1 unit PRBC due to hemoptysis and worsening of anemia  -  11/19 transfuse 1 unit PRBC  - Hemoglobin 7.9 11/20  - Trend    #CHRISTINA on CKD stage IIIa,  Resolved    # Left calf laceration from veno dines while in PACU for bronchoscopy   - surgery consult - s/p suturing  on 11/09/23   - plan to remove sutures in 10 days  - Surgical consult for suture removal in 14 days from placement  - pending    # Bilateral UE edema due to superphicial thrombophlebitis  - doppler noted  - start heparin 5000 bid, elevation, warm compresses if pain  - 11/23 - iv lasix 10 mg   - 11/24 c/w IV lasix for 3 days  - BNP 1200--> 1700--> 2500      # Acute hyperglycemia, possible steroid-induced , Prediabetes with A1C 6.3   - Stop pre-meal and  lantus   - c/w ISS  - liberize diet  - Monitor     # HTN - hydralazine 10 bid     #  HLD   - c/w  atorvastatin    # Hypothyroidism  - Levo lowered to 12.5 mcg,  Recheck in 4 weeks    # Hx lung CA s/p RUL lobectomy/chemo/RT  - f/u with [facility]     # Severe protein-calorie malnutrition. - nutritional education provided ,  supplements ordered ,  MVT qd ordered      # DVT Ppx  - heparin 5000 q12h     # Code status  - DNR/DNI    wife and daughter updated at bedside 11/21, 11/22, 11/23, 11/24, 11/25 83-year-old M with PMHx HTN, HLD, chronic anemia, COPD, lung CA s/p RUL lobectomy (follows at [facility], last chemotherapy/RT 5/2023, not currently on treatment), AAA, hypothyroidism, CKD stage IIIa sent in to ED on 11/2/23  by pulmonologist MD Olivera with c/o SOB, dyspnea on minimal exertion, and cough. 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     # Acute hypoxic respiratory failure, multifactorial   # New RLL PNA on CT 11/13 , RLL cavity s/p bronchoscopy 11/9  possible yeast  infection, less likely radiation pneumonitis , r/o  aspergilloma    - CT chest: Larger right lower lobe thick-walled cavity which may be infectious and/or residua of reported treated tumor. Spongelike material within the cavity can be seen with aspergilloma (prior to fungus ball formation). Consolidation within the right lower and middle lobe compatible with radiation change.  - leukocytosis stable (WBC 17.70 from 17.35) ---> trend down to WBC  8   - repeat CXR 11/8 - worsening of opacities over right lung   - 2 d echo 11/8 - EF 60% , no significant valvular disease    - immunoglobulin panel - all Ig wnl  - 11/9 - s/p bronchoscopy   - speech and swallow evaluation  - no aspirations  - CXR 11/11 - stable consolidations  - repeat CT chest 11/13 - new right lung consolidation  - blood cultures -no growth, sputum culture - normal respiratory  - c/w trelegy ellipta, chest pt, IS, acapella ,BIPAP as needed  - ID/pulm consult noted   - s/p IV steroids --> po prednisone taper 20 x 3 days than stop , trending down ----> 34--> 50--> 70  - s/p  voriconazole PO ,  s/p 7 days of levaquin to cover atypical/psuedomonas (allergy to PCN)    - 11/13 - IV meropenem, doxy and fluconasole started as per ID   - Chest xray prelim worse  - Thoracic consult appreciated, no intervention  - CT Chest 11/20 worse  -11/13 - IV meropenem and doxycylcine  - Fluconazole DC, started on caspofungin 11/20  - Would consider palliative at this time  - 11/22 - d/c doxy started on  vancomycin , c/w IV meropenem  - CRP 24--> 70 --> 58  - 11/25 CXR - worse on the right  - plan for Ct chest in am    #  Chronic anemia, likely due to  chemotherapy, worsening and iron deficiency   -  no s/s bleeding, asymptomatic, continue to monitor/trend  - 11/8 - 1 unit PRBC due to hemoptysis and worsening of anemia  -  11/19 transfuse 1 unit PRBC  - Hemoglobin 7.9 11/20  - 11/25 s/p 1 unit PRBC  - 11/26 1 unit PRBC , stop heparin , FOBT - pending  - Trend Hb    #CHRISTINA on CKD stage IIIa,  Resolved    # Left calf laceration from veno dines while in PACU for bronchoscopy   - surgery consult - s/p suturing  on 11/09/23   - plan to remove sutures in 10 days  - Surgical consult for suture removal in 14 days from placement  - pending    # Bilateral UE edema due to superphicial thrombophlebitis  - doppler noted  - start heparin 5000 bid, elevation, warm compresses if pain  - 11/23 - iv lasix 10 mg   - 11/24 c/w IV lasix for 3 days  - 11/26 - lasix 20 in am, 10 IV after blood transfusion  - BNP 1200--> 1700--> 2500      # Acute hyperglycemia, possible steroid-induced , Prediabetes with A1C 6.3   - Stop pre-meal and  lantus   - c/w ISS  - liberize diet  - Monitor     # HTN - hydralazine 10 bid --> 10 tid    #  HLD   - c/w  atorvastatin    # Hypothyroidism  - Levo lowered to 12.5 mcg,  Recheck in 4 weeks    # Hx lung CA s/p RUL lobectomy/chemo/RT  - f/u with [facility]     # Severe protein-calorie malnutrition. - nutritional education provided ,  supplements ordered ,  MVT qd ordered      # DVT Ppx  - heparin 5000 q12h     # Code status  - DNR/DNI    wife and daughter updated at bedside 11/21, 11/22, 11/23, 11/24, 11/25, 11/26

## 2023-11-26 NOTE — PROGRESS NOTE ADULT - ASSESSMENT
84 y/o Male with h/o lung CA follows at [facility] s/p chemotherapy and RT 05/2023, s/p right upper lobectomy, HTN, HPL, Chronic anemia, COPD, AAA, Hypothyroidism, CKD stage IIIa was admitted on 11/2 for increased shortness of breath, dyspnea on minimal exertion, and cough. Reportedly, he had CT of the chest performed 10/3 which demonstrated consolidation, was started on augmentin/prednisone/azithro 10/6 x 1 week. Pt completed course of medications however wife noted worsening cough and pt with fever Tmax of 103.9 on 10/26. Wife then took pt to pulmonologist again who started pt on second round of the same medications, instructed wife to stop giving pt tylenol wife notes fevers self-resolved. Since then pt decreased energy with episode of being unsteady on feet. Today is 6th day of taking medications, had f/u scheduled with pulmonologist who sent pt to ED for eval.  He feels tired and weak. In ER he received ceftriaxone.    1. Large RLL pulmonary cavity with worsening opacification. Worsening pulmonary infiltrates, likely pneumonia. Possible recurrent lung Ca. Lung Ca s/p right upper lobectomy. Radiation pneumonitis. CRF stage 3. Allergy to PCN.   -respiratory frail  -extensive right lung consolidations  -worsening large pulmonary cavity opacification on repeat CT chest   -bronchoscopy cultures show normal respiratory jaime and rare yeast  -fungal bronch c/s is pending and will take several weeks to finalize  -s/p levofloxacin # 10  -s/p voriconazole 200 mg PO q12h # 7  -s/p fluconazole 100 mg IV qd # 8  -s/p doxycycline 100 mg IV q12h # 10  -pulmonary evaluation appreciated   -I am concerned that there is no definite diagnosis for his pulmonary illness and that abx therapy dose not seem to help so far  -on meropenem 1 gm IV q8h # 14 and caspofungin 50 mg IV qd # 6 and vancomycin 750 mg IV q12h # 5  -tolerating abx well so far; no side effects noted  -thoracic evaluation appreciated - conservative care recommended   -renal function is improved  -vancomycin trough level is therapeutic  -renal function is stable  -f/u cultures  -continue abx coverage for now  -will re-evaluate the patient's medical condition in AM with the other medical team members and reconsider abx therapy  -monitor temps  -f/u CBC  -supportive care  2. Other issues:   -care per medicine    d/w medicine team and Dr. Sheldon

## 2023-11-26 NOTE — PROGRESS NOTE ADULT - SUBJECTIVE AND OBJECTIVE BOX
INTERVAL HPI/OVERNIGHT EVENTS:  Patient S&E at bedside. No o/n events, patient resting comfortably.   seen with daughter and spouse at bedside.     VITAL SIGNS:  T(F): 98.7 (11-26-23 @ 16:55)  HR: 95 (11-26-23 @ 17:13)  BP: 156/61 (11-26-23 @ 16:55)  RR: 19 (11-26-23 @ 16:55)  SpO2: 93% (11-26-23 @ 17:13)  Wt(kg): --    PHYSICAL EXAM:     Frail male in bed    paitent with diminished breath sounds in RIGHT lung   No wheezing noted   bilateral lower extremity edema       MEDICATIONS  (STANDING):  albuterol/ipratropium for Nebulization 3 milliLiter(s) Nebulizer every 6 hours  AQUAPHOR (petrolatum Ointment) 1 Application(s) Topical two times a day  atorvastatin 40 milliGRAM(s) Oral at bedtime  caspofungin IVPB 50 milliGRAM(s) IV Intermittent every 24 hours  caspofungin IVPB      dextrose 5%. 1000 milliLiter(s) (50 mL/Hr) IV Continuous <Continuous>  dextrose 5%. 1000 milliLiter(s) (100 mL/Hr) IV Continuous <Continuous>  dextrose 50% Injectable 12.5 Gram(s) IV Push once  dextrose 50% Injectable 25 Gram(s) IV Push once  dextrose 50% Injectable 25 Gram(s) IV Push once  ferrous    sulfate 325 milliGRAM(s) Oral daily  fluticasone furoate/umeclidinium/vilanterol 100-62.5-25 MICROgram(s) Inhaler 1 Puff(s) Inhalation daily  folic acid 1 milliGRAM(s) Oral daily  furosemide   Injectable 20 milliGRAM(s) IV Push daily  glucagon  Injectable 1 milliGRAM(s) IntraMuscular once  hydrALAZINE 10 milliGRAM(s) Oral every 8 hours  insulin lispro (ADMELOG) corrective regimen sliding scale   SubCutaneous three times a day before meals  insulin lispro (ADMELOG) corrective regimen sliding scale   SubCutaneous at bedtime  levothyroxine 12.5 MICROGram(s) Oral daily  meropenem Injectable 1000 milliGRAM(s) IV Push every 8 hours  pantoprazole  Injectable 40 milliGRAM(s) IV Push two times a day  polyethylene glycol 3350 17 Gram(s) Oral daily  vancomycin  IVPB 750 milliGRAM(s) IV Intermittent every 12 hours    MEDICATIONS  (PRN):  acetaminophen     Tablet .. 650 milliGRAM(s) Oral every 6 hours PRN Temp greater or equal to 38C (100.4F), Mild Pain (1 - 3)  aluminum hydroxide/magnesium hydroxide/simethicone Suspension 30 milliLiter(s) Oral every 4 hours PRN Dyspepsia  benzonatate 100 milliGRAM(s) Oral three times a day PRN Cough  dextrose Oral Gel 15 Gram(s) Oral once PRN Blood Glucose LESS THAN 70 milliGRAM(s)/deciliter  furosemide   Injectable 10 milliGRAM(s) IV Push once PRN for after transfusion  melatonin 3 milliGRAM(s) Oral at bedtime PRN Insomnia  ondansetron Injectable 4 milliGRAM(s) IV Push every 8 hours PRN Nausea and/or Vomiting      Allergies    penicillin (Unknown)    Intolerances        LABS:                        8.6    x     )-----------( x        ( 26 Nov 2023 10:00 )             26.4     11-26    137  |  99  |  35<H>  ----------------------------<  115<H>  3.6   |  36<H>  |  0.92    Ca    8.4<L>      26 Nov 2023 07:16  Phos  2.3     11-26  Mg     2.0     11-26    TPro  5.6<L>  /  Alb  2.4<L>  /  TBili  1.3<H>  /  DBili  x   /  AST  31  /  ALT  26  /  AlkPhos  98  11-26      Urinalysis Basic - ( 26 Nov 2023 07:16 )    Color: x / Appearance: x / SG: x / pH: x  Gluc: 115 mg/dL / Ketone: x  / Bili: x / Urobili: x   Blood: x / Protein: x / Nitrite: x   Leuk Esterase: x / RBC: x / WBC x   Sq Epi: x / Non Sq Epi: x / Bacteria: x        RADIOLOGY & ADDITIONAL TESTS:  Studies reviewed.    ASSESSMENT & PLAN:

## 2023-11-26 NOTE — PROGRESS NOTE ADULT - SUBJECTIVE AND OBJECTIVE BOX
Date of service: 11-26-23 @ 08:49    Sitting in bed in NAD  Has dry cough  No SOB at rest    ROS: no fever or chills; denies dizziness, no HA, no abdominal pain, no diarrhea or constipation; no dysuria, no legs pain, no rashes    MEDICATIONS  (STANDING):  albumin human 25% IVPB 100 milliLiter(s) IV Intermittent every 12 hours  albuterol/ipratropium for Nebulization 3 milliLiter(s) Nebulizer every 6 hours  AQUAPHOR (petrolatum Ointment) 1 Application(s) Topical two times a day  atorvastatin 40 milliGRAM(s) Oral at bedtime  caspofungin IVPB      caspofungin IVPB 50 milliGRAM(s) IV Intermittent every 24 hours  dextrose 5%. 1000 milliLiter(s) (100 mL/Hr) IV Continuous <Continuous>  dextrose 5%. 1000 milliLiter(s) (50 mL/Hr) IV Continuous <Continuous>  dextrose 50% Injectable 12.5 Gram(s) IV Push once  dextrose 50% Injectable 25 Gram(s) IV Push once  dextrose 50% Injectable 25 Gram(s) IV Push once  ferrous    sulfate 325 milliGRAM(s) Oral daily  fluticasone furoate/umeclidinium/vilanterol 100-62.5-25 MICROgram(s) Inhaler 1 Puff(s) Inhalation daily  folic acid 1 milliGRAM(s) Oral daily  furosemide   Injectable 10 milliGRAM(s) IV Push daily  glucagon  Injectable 1 milliGRAM(s) IntraMuscular once  heparin   Injectable 5000 Unit(s) SubCutaneous every 12 hours  hydrALAZINE 10 milliGRAM(s) Oral two times a day  insulin lispro (ADMELOG) corrective regimen sliding scale   SubCutaneous at bedtime  insulin lispro (ADMELOG) corrective regimen sliding scale   SubCutaneous three times a day before meals  levothyroxine 12.5 MICROGram(s) Oral daily  meropenem Injectable 1000 milliGRAM(s) IV Push every 8 hours  pantoprazole  Injectable 40 milliGRAM(s) IV Push two times a day  polyethylene glycol 3350 17 Gram(s) Oral daily  vancomycin  IVPB 750 milliGRAM(s) IV Intermittent every 12 hours    Vital Signs Last 24 Hrs  T(C): 36.7 (25 Nov 2023 20:48), Max: 37.1 (25 Nov 2023 16:25)  T(F): 98.1 (25 Nov 2023 20:48), Max: 98.7 (25 Nov 2023 16:25)  HR: 96 (26 Nov 2023 02:40) (91 - 112)  BP: 152/71 (25 Nov 2023 20:48) (137/51 - 170/74)  BP(mean): 99 (25 Nov 2023 18:30) (72 - 99)  RR: 18 (25 Nov 2023 20:48) (18 - 20)  SpO2: 99% (26 Nov 2023 05:21) (93% - 100%)    Parameters below as of 26 Nov 2023 02:40  Patient On (Oxygen Delivery Method): BiPAP/CPAP     Physical exam:    Constitutional:  No acute distress  HEENT: NC/AT, EOMI, PERRLA, conjunctivae clear; ears and nose atraumatic  Neck: supple; thyroid not palpable  Back: no tenderness  Respiratory: respiratory effort normal; few crackles at bases  Cardiovascular: S1S2 regular, no murmurs  Abdomen: soft, not tender, not distended, positive BS  Genitourinary: no suprapubic tenderness  Lymphatic: no LN palpable  Musculoskeletal: no muscle tenderness, no joint swelling or tenderness  Extremities: no pedal edema  Left lower leg laceration dressed  Neurological/ Psychiatric: AxOx3, moving all extremities  Skin: no rashes; no palpable lesions    Labs: reviewed                        6.5    2.85  )-----------( 118      ( 25 Nov 2023 06:50 )             19.8     11-25    138  |  101  |  37<H>  ----------------------------<  106<H>  4.1   |  33<H>  |  0.99    Ca    8.2<L>      25 Nov 2023 06:50  Phos  2.1     11-25  Mg     2.1     11-25    TPro  5.4<L>  /  Alb  2.3<L>  /  TBili  1.0  /  DBili  x   /  AST  35  /  ALT  27  /  AlkPhos  100  11-25    Vancomycin Level, Trough: 16.4 ug/mL (11-23 @ 20:16)    C-Reactive Protein, Serum: 59 mg/L (11-24-23 @ 11:58)  C-Reactive Protein, Serum: 70 mg/L (11-23-23 @ 08:53)  C-Reactive Protein, Serum: 24 mg/L (11-14-23 @ 07:03)                        7.8    3.50  )-----------( 137      ( 23 Nov 2023 08:53 )             24.5     11-23    136  |  104  |  42<H>  ----------------------------<  107<H>  4.4   |  30  |  1.12    Ca    8.5      23 Nov 2023 08:53  Phos  2.6     11-23  Mg     2.1     11-23    TPro  5.6<L>  /  Alb  1.8<L>  /  TBili  0.8  /  DBili  x   /  AST  48<H>  /  ALT  35  /  AlkPhos  118  11-23    Vancomycin Level, Trough: 16.4 ug/mL (11-23 @ 20:16)    C-Reactive Protein, Serum: 70 mg/L (11-23-23 @ 08:53)  C-Reactive Protein, Serum: 24 mg/L (11-14-23 @ 07:03)  C-Reactive Protein, Serum: 34 mg/L (11-13-23 @ 06:30)               8.2    11.74 )-----------( 259      ( 03 Nov 2023 07:15 )             25.2     11-03    133<L>  |  102  |  35<H>  ----------------------------<  205<H>  4.4   |  22  |  1.20    Ca    8.3<L>      03 Nov 2023 07:15    TPro  6.9  /  Alb  2.0<L>  /  TBili  0.5  /  DBili  x   /  AST  38<H>  /  ALT  50  /  AlkPhos  130<H>  11-02     LIVER FUNCTIONS - ( 02 Nov 2023 14:47 )  Alb: 2.0 g/dL / Pro: 6.9 gm/dL / ALK PHOS: 130 U/L / ALT: 50 U/L / AST: 38 U/L / GGT: x           Urinalysis (11-02 @ 16:40)  Urine Appearance: Clear  Protein, Urine: 100 mg/dL  Urine Microscopic-Add On (NC) (11-02 @ 16:40)  White Blood Cell - Urine: 0 /HPF  Red Blood Cell - Urine: 0 /HPF    (11-02 @ 14:47)  NotDetec    Culture - Acid Fast - Bronchial w/Smear (collected 09 Nov 2023 12:30)  Source: .Bronchial RIGHT LOWER LOBE BAL  Preliminary Report (11 Nov 2023 15:08):    Culture is being performed.    Culture - Fungal, Bronchial (collected 09 Nov 2023 12:30)  Source: .Bronchial RIGHT LOWER LOBE BAL  Preliminary Report (13 Nov 2023 12:07):    Rare Yeast    Culture - Bronchial (collected 09 Nov 2023 12:30)  Source: .Bronchial RIGHT LOWER LOBE BAL  Gram Stain (09 Nov 2023 23:14):    Rare polymorphonuclear leukocytes per low power field    Few Squamous epithelial cells per low power field    Few Gram positive cocci in pairs per oil power field  Final Report (11 Nov 2023 16:38):    Normal Respiratory Marguerite present    Culture - Blood (collected 04 Nov 2023 04:21)  Source: .Blood None  Final Report (09 Nov 2023 09:00):    No growth at 5 days    Culture - Blood (collected 04 Nov 2023 04:21)  Source: .Blood None  Final Report (09 Nov 2023 09:00):    No growth at 5 days    Radiology: all available radiological tests reviewed    < from: CT Chest No Cont (11.02.23 @ 16:04) >  Right upper lobectomy.  Larger right lower lobe thick-walled cavity which may be infectious   and/or residua of reported treated tumor. 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  Consolidation within the right lower and middle lobe compatible with radiation change.  Multiple indistinct nodules in the right lungwhich may be infectious or radiation pneumonitis.  < end of copied text >    < from: CT Abdomen and Pelvis No Cont (11.08.23 @ 19:01) >  No hydronephrosis.  New patchy opacities in the right lung base. Small right pleural effusion.  < end of copied text >    < from: CT Chest No Cont (11.13.23 @ 10:04) >  IMPRESSION: Right lung large areas of consolidation new since November 2, 2023 likely pneumonia.    Right apical previously described large cyst unchanged in size since   November 2, 2023 containing internal air and increasing internal opacification since November 2, 2023 as described above.     A few left lung ill-defined nonspecific groundglass opacities.   Differential diagnosis include but is not limited to   infectious/inflammatory etiologies and or pulmonary edema.  Small left pleural effusion new since November 8, 2023.  < end of copied text >    < from: CT Chest No Cont (11.20.23 @ 08:03) >  IMPRESSION: Extensive right lung consolidations as described above with   overall interval progression since November 13, 2023. Complete   opacification of the right middle lobe with internal consolidation and   volume loss appears unchanged since November 13, 2023 demonstrating  interval progression since November 2, 2023.  Persistent apical right apical large irregular cyst unchanged in size since November 13, 2023.  < end of copied text >      Advanced directives addressed: full resuscitation

## 2023-11-27 LAB
ALBUMIN SERPL ELPH-MCNC: 2.1 G/DL — LOW (ref 3.3–5)
ALBUMIN SERPL ELPH-MCNC: 2.1 G/DL — LOW (ref 3.3–5)
ALP SERPL-CCNC: 92 U/L — SIGNIFICANT CHANGE UP (ref 40–120)
ALP SERPL-CCNC: 92 U/L — SIGNIFICANT CHANGE UP (ref 40–120)
ALT FLD-CCNC: 23 U/L — SIGNIFICANT CHANGE UP (ref 12–78)
ALT FLD-CCNC: 23 U/L — SIGNIFICANT CHANGE UP (ref 12–78)
ANION GAP SERPL CALC-SCNC: 5 MMOL/L — SIGNIFICANT CHANGE UP (ref 5–17)
ANION GAP SERPL CALC-SCNC: 5 MMOL/L — SIGNIFICANT CHANGE UP (ref 5–17)
AST SERPL-CCNC: 29 U/L — SIGNIFICANT CHANGE UP (ref 15–37)
AST SERPL-CCNC: 29 U/L — SIGNIFICANT CHANGE UP (ref 15–37)
BASE EXCESS BLDV CALC-SCNC: 13.4 MMOL/L — HIGH (ref -2–3)
BASE EXCESS BLDV CALC-SCNC: 13.4 MMOL/L — HIGH (ref -2–3)
BILIRUB DIRECT SERPL-MCNC: 0.4 MG/DL — HIGH (ref 0–0.3)
BILIRUB DIRECT SERPL-MCNC: 0.4 MG/DL — HIGH (ref 0–0.3)
BILIRUB INDIRECT FLD-MCNC: 0.7 MG/DL — SIGNIFICANT CHANGE UP (ref 0.2–1)
BILIRUB INDIRECT FLD-MCNC: 0.7 MG/DL — SIGNIFICANT CHANGE UP (ref 0.2–1)
BILIRUB SERPL-MCNC: 1.1 MG/DL — SIGNIFICANT CHANGE UP (ref 0.2–1.2)
BILIRUB SERPL-MCNC: 1.1 MG/DL — SIGNIFICANT CHANGE UP (ref 0.2–1.2)
BUN SERPL-MCNC: 37 MG/DL — HIGH (ref 7–23)
BUN SERPL-MCNC: 37 MG/DL — HIGH (ref 7–23)
CALCIUM SERPL-MCNC: 8.5 MG/DL — SIGNIFICANT CHANGE UP (ref 8.5–10.1)
CALCIUM SERPL-MCNC: 8.5 MG/DL — SIGNIFICANT CHANGE UP (ref 8.5–10.1)
CHLORIDE SERPL-SCNC: 96 MMOL/L — SIGNIFICANT CHANGE UP (ref 96–108)
CHLORIDE SERPL-SCNC: 96 MMOL/L — SIGNIFICANT CHANGE UP (ref 96–108)
CO2 SERPL-SCNC: 34 MMOL/L — HIGH (ref 22–31)
CO2 SERPL-SCNC: 34 MMOL/L — HIGH (ref 22–31)
CREAT SERPL-MCNC: 0.9 MG/DL — SIGNIFICANT CHANGE UP (ref 0.5–1.3)
CREAT SERPL-MCNC: 0.9 MG/DL — SIGNIFICANT CHANGE UP (ref 0.5–1.3)
CRP SERPL-MCNC: 85 MG/L — HIGH
CRP SERPL-MCNC: 85 MG/L — HIGH
EGFR: 85 ML/MIN/1.73M2 — SIGNIFICANT CHANGE UP
EGFR: 85 ML/MIN/1.73M2 — SIGNIFICANT CHANGE UP
GLUCOSE BLDC GLUCOMTR-MCNC: 116 MG/DL — HIGH (ref 70–99)
GLUCOSE BLDC GLUCOMTR-MCNC: 116 MG/DL — HIGH (ref 70–99)
GLUCOSE BLDC GLUCOMTR-MCNC: 147 MG/DL — HIGH (ref 70–99)
GLUCOSE BLDC GLUCOMTR-MCNC: 147 MG/DL — HIGH (ref 70–99)
GLUCOSE BLDC GLUCOMTR-MCNC: 182 MG/DL — HIGH (ref 70–99)
GLUCOSE BLDC GLUCOMTR-MCNC: 182 MG/DL — HIGH (ref 70–99)
GLUCOSE BLDC GLUCOMTR-MCNC: 196 MG/DL — HIGH (ref 70–99)
GLUCOSE BLDC GLUCOMTR-MCNC: 196 MG/DL — HIGH (ref 70–99)
GLUCOSE SERPL-MCNC: 122 MG/DL — HIGH (ref 70–99)
GLUCOSE SERPL-MCNC: 122 MG/DL — HIGH (ref 70–99)
HCO3 BLDV-SCNC: 39 MMOL/L — HIGH (ref 22–29)
HCO3 BLDV-SCNC: 39 MMOL/L — HIGH (ref 22–29)
HCT VFR BLD CALC: 26.7 % — LOW (ref 39–50)
HCT VFR BLD CALC: 26.7 % — LOW (ref 39–50)
HGB BLD-MCNC: 8.9 G/DL — LOW (ref 13–17)
HGB BLD-MCNC: 8.9 G/DL — LOW (ref 13–17)
MAGNESIUM SERPL-MCNC: 1.9 MG/DL — SIGNIFICANT CHANGE UP (ref 1.6–2.6)
MAGNESIUM SERPL-MCNC: 1.9 MG/DL — SIGNIFICANT CHANGE UP (ref 1.6–2.6)
MCHC RBC-ENTMCNC: 30.8 PG — SIGNIFICANT CHANGE UP (ref 27–34)
MCHC RBC-ENTMCNC: 30.8 PG — SIGNIFICANT CHANGE UP (ref 27–34)
MCHC RBC-ENTMCNC: 33.3 GM/DL — SIGNIFICANT CHANGE UP (ref 32–36)
MCHC RBC-ENTMCNC: 33.3 GM/DL — SIGNIFICANT CHANGE UP (ref 32–36)
MCV RBC AUTO: 92.4 FL — SIGNIFICANT CHANGE UP (ref 80–100)
MCV RBC AUTO: 92.4 FL — SIGNIFICANT CHANGE UP (ref 80–100)
NT-PROBNP SERPL-SCNC: 4335 PG/ML — HIGH (ref 0–450)
NT-PROBNP SERPL-SCNC: 4335 PG/ML — HIGH (ref 0–450)
PCO2 BLDV: 51 MMHG — SIGNIFICANT CHANGE UP (ref 42–55)
PCO2 BLDV: 51 MMHG — SIGNIFICANT CHANGE UP (ref 42–55)
PH BLDV: 7.49 — HIGH (ref 7.32–7.43)
PH BLDV: 7.49 — HIGH (ref 7.32–7.43)
PHOSPHATE SERPL-MCNC: 2.2 MG/DL — LOW (ref 2.5–4.5)
PHOSPHATE SERPL-MCNC: 2.2 MG/DL — LOW (ref 2.5–4.5)
PLATELET # BLD AUTO: 145 K/UL — LOW (ref 150–400)
PLATELET # BLD AUTO: 145 K/UL — LOW (ref 150–400)
PO2 BLDV: 231 MMHG — HIGH (ref 25–45)
PO2 BLDV: 231 MMHG — HIGH (ref 25–45)
POTASSIUM SERPL-MCNC: 3.5 MMOL/L — SIGNIFICANT CHANGE UP (ref 3.5–5.3)
POTASSIUM SERPL-MCNC: 3.5 MMOL/L — SIGNIFICANT CHANGE UP (ref 3.5–5.3)
POTASSIUM SERPL-SCNC: 3.5 MMOL/L — SIGNIFICANT CHANGE UP (ref 3.5–5.3)
POTASSIUM SERPL-SCNC: 3.5 MMOL/L — SIGNIFICANT CHANGE UP (ref 3.5–5.3)
PROCALCITONIN SERPL-MCNC: 0.4 NG/ML — HIGH (ref 0.02–0.1)
PROCALCITONIN SERPL-MCNC: 0.4 NG/ML — HIGH (ref 0.02–0.1)
PROT SERPL-MCNC: 5.3 GM/DL — LOW (ref 6–8.3)
PROT SERPL-MCNC: 5.3 GM/DL — LOW (ref 6–8.3)
RBC # BLD: 2.89 M/UL — LOW (ref 4.2–5.8)
RBC # BLD: 2.89 M/UL — LOW (ref 4.2–5.8)
RBC # FLD: 17.2 % — HIGH (ref 10.3–14.5)
RBC # FLD: 17.2 % — HIGH (ref 10.3–14.5)
SAO2 % BLDV: 99 % — HIGH (ref 67–88)
SAO2 % BLDV: 99 % — HIGH (ref 67–88)
SODIUM SERPL-SCNC: 135 MMOL/L — SIGNIFICANT CHANGE UP (ref 135–145)
SODIUM SERPL-SCNC: 135 MMOL/L — SIGNIFICANT CHANGE UP (ref 135–145)
WBC # BLD: 3.65 K/UL — LOW (ref 3.8–10.5)
WBC # BLD: 3.65 K/UL — LOW (ref 3.8–10.5)
WBC # FLD AUTO: 3.65 K/UL — LOW (ref 3.8–10.5)
WBC # FLD AUTO: 3.65 K/UL — LOW (ref 3.8–10.5)

## 2023-11-27 PROCEDURE — 71250 CT THORAX DX C-: CPT | Mod: 26

## 2023-11-27 PROCEDURE — 99232 SBSQ HOSP IP/OBS MODERATE 35: CPT

## 2023-11-27 RX ADMIN — Medication 1 MILLIGRAM(S): at 11:00

## 2023-11-27 RX ADMIN — Medication 250 MILLIGRAM(S): at 10:59

## 2023-11-27 RX ADMIN — FLUTICASONE FUROATE, UMECLIDINIUM BROMIDE AND VILANTEROL TRIFENATATE 1 PUFF(S): 200; 62.5; 25 POWDER RESPIRATORY (INHALATION) at 11:00

## 2023-11-27 RX ADMIN — MEROPENEM 1000 MILLIGRAM(S): 1 INJECTION INTRAVENOUS at 12:20

## 2023-11-27 RX ADMIN — Medication 250 MILLIGRAM(S): at 22:07

## 2023-11-27 RX ADMIN — ATORVASTATIN CALCIUM 40 MILLIGRAM(S): 80 TABLET, FILM COATED ORAL at 22:08

## 2023-11-27 RX ADMIN — Medication 3 MILLILITER(S): at 08:12

## 2023-11-27 RX ADMIN — Medication 10 MILLIGRAM(S): at 18:14

## 2023-11-27 RX ADMIN — Medication 3 MILLILITER(S): at 20:24

## 2023-11-27 RX ADMIN — Medication 10 MILLIGRAM(S): at 01:22

## 2023-11-27 RX ADMIN — Medication 10 MILLIGRAM(S): at 11:00

## 2023-11-27 RX ADMIN — MEROPENEM 1000 MILLIGRAM(S): 1 INJECTION INTRAVENOUS at 18:13

## 2023-11-27 RX ADMIN — Medication 2: at 18:14

## 2023-11-27 RX ADMIN — Medication 20 MILLIGRAM(S): at 10:59

## 2023-11-27 RX ADMIN — Medication 650 MILLIGRAM(S): at 22:08

## 2023-11-27 RX ADMIN — CASPOFUNGIN ACETATE 260 MILLIGRAM(S): 7 INJECTION, POWDER, LYOPHILIZED, FOR SOLUTION INTRAVENOUS at 12:19

## 2023-11-27 RX ADMIN — Medication 1 APPLICATION(S): at 22:11

## 2023-11-27 RX ADMIN — PANTOPRAZOLE SODIUM 40 MILLIGRAM(S): 20 TABLET, DELAYED RELEASE ORAL at 22:08

## 2023-11-27 RX ADMIN — Medication 3 MILLILITER(S): at 13:29

## 2023-11-27 RX ADMIN — MEROPENEM 1000 MILLIGRAM(S): 1 INJECTION INTRAVENOUS at 01:22

## 2023-11-27 RX ADMIN — POLYETHYLENE GLYCOL 3350 17 GRAM(S): 17 POWDER, FOR SOLUTION ORAL at 10:59

## 2023-11-27 RX ADMIN — Medication 325 MILLIGRAM(S): at 11:00

## 2023-11-27 RX ADMIN — Medication 1 APPLICATION(S): at 11:00

## 2023-11-27 RX ADMIN — Medication 12.5 MICROGRAM(S): at 05:45

## 2023-11-27 RX ADMIN — PANTOPRAZOLE SODIUM 40 MILLIGRAM(S): 20 TABLET, DELAYED RELEASE ORAL at 10:59

## 2023-11-27 NOTE — PROGRESS NOTE ADULT - SUBJECTIVE AND OBJECTIVE BOX
Date of service: 11-27-23 @ 09:07    Lying in bed in NAD  No SOB at rest  Right lung is almost completely opacified on repeat CT chest  No fever    ROS: denies dizziness, no HA, no SOB or cough, no abdominal pain, no diarrhea or constipation; no dysuria, no legs pain, no rashes    MEDICATIONS  (STANDING):  albuterol/ipratropium for Nebulization 3 milliLiter(s) Nebulizer every 6 hours  AQUAPHOR (petrolatum Ointment) 1 Application(s) Topical two times a day  atorvastatin 40 milliGRAM(s) Oral at bedtime  caspofungin IVPB      caspofungin IVPB 50 milliGRAM(s) IV Intermittent every 24 hours  dextrose 5%. 1000 milliLiter(s) (50 mL/Hr) IV Continuous <Continuous>  dextrose 5%. 1000 milliLiter(s) (100 mL/Hr) IV Continuous <Continuous>  dextrose 50% Injectable 25 Gram(s) IV Push once  dextrose 50% Injectable 12.5 Gram(s) IV Push once  dextrose 50% Injectable 25 Gram(s) IV Push once  ferrous    sulfate 325 milliGRAM(s) Oral daily  fluticasone furoate/umeclidinium/vilanterol 100-62.5-25 MICROgram(s) Inhaler 1 Puff(s) Inhalation daily  folic acid 1 milliGRAM(s) Oral daily  furosemide   Injectable 20 milliGRAM(s) IV Push daily  glucagon  Injectable 1 milliGRAM(s) IntraMuscular once  hydrALAZINE 10 milliGRAM(s) Oral every 8 hours  insulin lispro (ADMELOG) corrective regimen sliding scale   SubCutaneous three times a day before meals  insulin lispro (ADMELOG) corrective regimen sliding scale   SubCutaneous at bedtime  levothyroxine 12.5 MICROGram(s) Oral daily  meropenem Injectable 1000 milliGRAM(s) IV Push every 8 hours  pantoprazole  Injectable 40 milliGRAM(s) IV Push two times a day  polyethylene glycol 3350 17 Gram(s) Oral daily  vancomycin  IVPB 750 milliGRAM(s) IV Intermittent every 12 hours    Vital Signs Last 24 Hrs  T(C): 37.3 (27 Nov 2023 01:15), Max: 37.3 (27 Nov 2023 01:15)  T(F): 99.1 (27 Nov 2023 01:15), Max: 99.1 (27 Nov 2023 01:15)  HR: 101 (27 Nov 2023 01:15) (95 - 106)  BP: 149/60 (27 Nov 2023 01:15) (140/63 - 163/68)  BP(mean): --  RR: 18 (27 Nov 2023 01:15) (18 - 19)  SpO2: 99% (27 Nov 2023 03:44) (93% - 99%)    Parameters below as of 27 Nov 2023 01:15  Patient On (Oxygen Delivery Method): BiPAP/CPAP     Physical exam:    Constitutional:  No acute distress  HEENT: NC/AT, EOMI, PERRLA, conjunctivae clear; ears and nose atraumatic  Neck: supple; thyroid not palpable  Back: no tenderness  Respiratory: respiratory effort normal; few crackles at bases  Cardiovascular: S1S2 regular, no murmurs  Abdomen: soft, not tender, not distended, positive BS  Genitourinary: no suprapubic tenderness  Lymphatic: no LN palpable  Musculoskeletal: no muscle tenderness, no joint swelling or tenderness  Extremities: no pedal edema  Left lower leg laceration dressed  Neurological/ Psychiatric: AxOx3, moving all extremities  Skin: no rashes; no palpable lesions    Labs: reviewed                        8.9    3.65  )-----------( 145      ( 27 Nov 2023 06:38 )             26.7     11-27    135  |  96  |  37<H>  ----------------------------<  122<H>  3.5   |  34<H>  |  0.90    Ca    8.5      27 Nov 2023 06:38  Phos  2.2     11-27  Mg     1.9     11-27    TPro  5.3<L>  /  Alb  2.1<L>  /  TBili  1.1  /  DBili  0.4<H>  /  AST  29  /  ALT  23  /  AlkPhos  92  11-27    C-Reactive Protein, Serum: 75 mg/L (11-26-23 @ 07:16)  C-Reactive Protein, Serum: 58 mg/L (11-25-23 @ 06:50)  C-Reactive Protein, Serum: 59 mg/L (11-24-23 @ 11:58)  C-Reactive Protein, Serum: 70 mg/L (11-23-23 @ 08:53)                        7.8    3.50  )-----------( 137      ( 23 Nov 2023 08:53 )             24.5     11-23    136  |  104  |  42<H>  ----------------------------<  107<H>  4.4   |  30  |  1.12    Ca    8.5      23 Nov 2023 08:53  Phos  2.6     11-23  Mg     2.1     11-23    TPro  5.6<L>  /  Alb  1.8<L>  /  TBili  0.8  /  DBili  x   /  AST  48<H>  /  ALT  35  /  AlkPhos  118  11-23    Vancomycin Level, Trough: 16.4 ug/mL (11-23 @ 20:16)    C-Reactive Protein, Serum: 70 mg/L (11-23-23 @ 08:53)  C-Reactive Protein, Serum: 24 mg/L (11-14-23 @ 07:03)  C-Reactive Protein, Serum: 34 mg/L (11-13-23 @ 06:30)               8.2    11.74 )-----------( 259      ( 03 Nov 2023 07:15 )             25.2     11-03    133<L>  |  102  |  35<H>  ----------------------------<  205<H>  4.4   |  22  |  1.20    Ca    8.3<L>      03 Nov 2023 07:15    TPro  6.9  /  Alb  2.0<L>  /  TBili  0.5  /  DBili  x   /  AST  38<H>  /  ALT  50  /  AlkPhos  130<H>  11-02     LIVER FUNCTIONS - ( 02 Nov 2023 14:47 )  Alb: 2.0 g/dL / Pro: 6.9 gm/dL / ALK PHOS: 130 U/L / ALT: 50 U/L / AST: 38 U/L / GGT: x           Urinalysis (11-02 @ 16:40)  Urine Appearance: Clear  Protein, Urine: 100 mg/dL  Urine Microscopic-Add On (NC) (11-02 @ 16:40)  White Blood Cell - Urine: 0 /HPF  Red Blood Cell - Urine: 0 /HPF    (11-02 @ 14:47)  [facility]    Culture - Acid Fast - Bronchial w/Smear (collected 09 Nov 2023 12:30)  Source: .Bronchial RIGHT LOWER LOBE BAL  Preliminary Report (11 Nov 2023 15:08):    Culture is being performed.    Culture - Fungal, Bronchial (collected 09 Nov 2023 12:30)  Source: .Bronchial RIGHT LOWER LOBE BAL  Preliminary Report (13 Nov 2023 12:07):    Rare Yeast    Culture - Bronchial (collected 09 Nov 2023 12:30)  Source: .Bronchial RIGHT LOWER LOBE BAL  Gram Stain (09 Nov 2023 23:14):    Rare polymorphonuclear leukocytes per low power field    Few Squamous epithelial cells per low power field    Few Gram positive cocci in pairs per oil power field  Final Report (11 Nov 2023 16:38):    Normal Respiratory Marguerite present    Culture - Blood (collected 04 Nov 2023 04:21)  Source: .Blood None  Final Report (09 Nov 2023 09:00):    No growth at 5 days    Culture - Blood (collected 04 Nov 2023 04:21)  Source: .Blood None  Final Report (09 Nov 2023 09:00):    No growth at 5 days    Radiology: all available radiological tests reviewed    < from: CT Chest No Cont (11.02.23 @ 16:04) >  Right upper lobectomy.  Larger right lower lobe thick-walled cavity which may be infectious   and/or residua of reported treated tumor. 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  Consolidation within the right lower and middle lobe compatible with radiation change.  Multiple indistinct nodules in the right lungwhich may be infectious or radiation pneumonitis.  < end of copied text >    < from: CT Abdomen and Pelvis No Cont (11.08.23 @ 19:01) >  No hydronephrosis.  New patchy opacities in the right lung base. Small right pleural effusion.  < end of copied text >    < from: CT Chest No Cont (11.13.23 @ 10:04) >  IMPRESSION: Right lung large areas of consolidation new since November 2, 2023 likely pneumonia.    Right apical previously described large cyst unchanged in size since   November 2, 2023 containing internal air and increasing internal opacification since November 2, 2023 as described above.     A few left lung ill-defined nonspecific groundglass opacities.   Differential diagnosis include but is not limited to   infectious/inflammatory etiologies and or pulmonary edema.  Small left pleural effusion new since November 8, 2023.  < end of copied text >    < from: CT Chest No Cont (11.20.23 @ 08:03) >  IMPRESSION: Extensive right lung consolidations as described above with   overall interval progression since November 13, 2023. Complete   opacification of the right middle lobe with internal consolidation and   volume loss appears unchanged since November 13, 2023 demonstrating  interval progression since November 2, 2023.  Persistent apical right apical large irregular cyst unchanged in size since November 13, 2023.  < end of copied text >    < from: Xray Chest 1 View- PORTABLE-Routine (Xray Chest 1 View- PORTABLE-Routine .) (11.25.23 @ 15:23) >  IMPRESSION: Extensive right lung findings show new infiltrate in the   right lower lobe.  < end of copied text >      Advanced directives addressed: full resuscitation

## 2023-11-27 NOTE — PROGRESS NOTE ADULT - ASSESSMENT
83y old Male with PMH HTN, HPL, Chronic anemia, COPD, lung CA follows at [facility] [place] (last chemotherapy and RT 2023, not currently receiving chemo treatment), s/p right upper lobectomy, AAA, Hypothyroidism, CKD stage IIIa referred by me to ed for continued sob, cough despite abx treatment found to have large lower lobe thick walled cavity with worsening findings on ct despite treatment with levaquin  1. large lower lobe thick walled cavity: unclear if this is fungal vs bacterial vs malignancy  -started on voriconazole, initially switched to fluconazole, now on caspofungin.   -reviewed imaging from [place] supplied by wife - it looks like he has had blebs in the past and these are what are infected - they look half filled with fluid with thicker borders, which would argue against fungal infection/mrsa as there is no necrosis involved in its pathogenesis  -s/p bronch. follow up results. growing few yeast  -repeat ct wo contrast reveals worsening consolidations and filling in of bleb. this may suggest inadequate antibiotics, continued aspiration or rapidly progressive cancer  -was on doxy (for potential mrsa) now transitioned to vanco  -continue meropenem  -monitor vanco trough  -palliative care eval  -ct today reveals worsening findings. it is unlikely that after all the medications he received that this would represent infection with continued worsening. may be progressive malignancy. will need to discuss with wife about next steps. i do not think that he would benefit from additional antibiotics  2. pneumonia vs lung cancer: as above  -supplemental o2  -speech/swallow eval noted  -aspiration precautions  3. copd: continue trelegy  4. ? radiation induced pneumonitis: s/p steroids  5. anemia: received 1 unit prbc on  and then again   6. left leg laceration: s/p suture, now bandaged. continue wound care  7. superficial thrombophlebitis: on iv heparin    spoke to dr bryant and dr higgins  total time greater than 30 minutes in the  of this patient 83y old Male with PMH HTN, HPL, Chronic anemia, COPD, lung CA follows at [facility] [place] (last chemotherapy and RT 05/2023, not currently receiving chemo treatment), s/p right upper lobectomy, AAA, Hypothyroidism, CKD stage IIIa referred by me to ed for continued sob, cough despite abx treatment found to have large lower lobe thick walled cavity with worsening findings on ct despite treatment with levaquin  1. large lower lobe thick walled cavity: unclear if this is fungal vs bacterial vs malignancy  -started on voriconazole, initially switched to fluconazole, now on caspofungin.   -reviewed imaging from [place] supplied by wife - it looks like he has had blebs in the past and these are what are infected - they look half filled with fluid with thicker borders, which would argue against fungal infection/mrsa as there is no necrosis involved in its pathogenesis  -s/p bronch. follow up results. growing few yeast  -repeat ct wo contrast reveals worsening consolidations and filling in of bleb. this may suggest inadequate antibiotics, continued aspiration or rapidly progressive cancer  -was on doxy (for potential mrsa) now transitioned to vanco  -continue meropenem  -monitor vanco trough  -palliative care eval  -ct today reveals worsening findings. it is unlikely that after all the medications he received that this would represent infection with continued worsening. may be progressive malignancy. will need to discuss with wife about next steps. i do not think that he would benefit from additional antibiotics  2. pneumonia vs lung cancer: as above  -supplemental o2  -speech/swallow eval noted  -aspiration precautions  3. copd: continue trelegy  4. ? radiation induced pneumonitis: s/p steroids  5. anemia: received 1 unit prbc on 11/19 and then again 11/26  6. left leg laceration: s/p suture, now bandaged. continue wound care  7. superficial thrombophlebitis: on iv heparin    spoke to dr bryant and dr higgins, dr george, [facility]  total time greater than 30 minutes in the care of this patient 83y old Male with PMH HTN, HPL, Chronic anemia, COPD, lung CA follows at [facility] [place] (last chemotherapy and RT 05/2023, not currently receiving chemo treatment), s/p right upper lobectomy, AAA, Hypothyroidism, CKD stage IIIa referred by me to ed for continued sob, cough despite abx treatment found to have large lower lobe thick walled cavity with worsening findings on ct despite treatment with levaquin  1. large lower lobe thick walled cavity: unclear if this is fungal vs bacterial vs malignancy  -started on voriconazole, initially switched to fluconazole, now on caspofungin.   -reviewed imaging from [place] supplied by wife - it looks like he has had blebs in the past and these are what are infected - they look half filled with fluid with thicker borders, which would argue against fungal infection/mrsa as there is no necrosis involved in its pathogenesis  -s/p bronch. follow up results. growing few yeast  -repeat ct wo contrast reveals worsening consolidations and filling in of bleb. this may suggest inadequate antibiotics, continued aspiration or rapidly progressive cancer  -was on doxy (for potential mrsa) now transitioned to vanco  -continue meropenem  -monitor vanco trough  -palliative care eval  -ct today reveals worsening findings. it is unlikely that after all the medications he received that this would represent infection with continued worsening. may be progressive malignancy. will need to discuss with wife about next steps. i do not think that he would benefit from additional antibiotics  2. pneumonia vs lung cancer: as above  -supplemental o2  -speech/swallow eval noted  -aspiration precautions  3. copd: continue trelegy  4. ? radiation induced pneumonitis: s/p steroids  5. anemia: received 1 unit prbc on 11/19 and then again 11/26  6. left leg laceration: s/p suture, now bandaged. continue wound care  7. superficial thrombophlebitis: on iv heparin    spoke to dr bryant and dr higgins, dr george, [facility] surgical; called wife , no answer, left vm  total time greater than 30 minutes in the care of this patient 83y old Male with PMH HTN, HPL, Chronic anemia, COPD, lung CA follows at [facility] [place] (last chemotherapy and RT 05/2023, not currently receiving chemo treatment), s/p right upper lobectomy, AAA, Hypothyroidism, CKD stage IIIa referred by me to ed for continued sob, cough despite abx treatment found to have large lower lobe thick walled cavity with worsening findings on ct despite treatment with levaquin  1. large lower lobe thick walled cavity: unclear if this is fungal vs bacterial vs malignancy  -started on voriconazole, initially switched to fluconazole, now on caspofungin.   -reviewed imaging from [place] supplied by wife - it looks like he has had blebs in the past and these are what are infected - they look half filled with fluid with thicker borders, which would argue against fungal infection/mrsa as there is no necrosis involved in its pathogenesis  -s/p bronch. follow up results. growing few yeast  -repeat ct wo contrast reveals worsening consolidations and filling in of bleb. this may suggest inadequate antibiotics, continued aspiration or rapidly progressive cancer  -was on doxy (for potential mrsa) now transitioned to vanco  -continue meropenem  -monitor vanco trough  -palliative care eval  -ct today reveals worsening findings. it is unlikely that after all the medications he received that this would represent infection with continued worsening. may be progressive malignancy. will need to discuss with wife about next steps. i do not think that he would benefit from additional antibiotics  2. pneumonia vs lung cancer: as above  -supplemental o2  -speech/swallow eval noted  -aspiration precautions  3. copd: continue trelegy  4. ? radiation induced pneumonitis: s/p steroids  5. anemia: received 1 unit prbc on 11/19 and then again 11/26  6. left leg laceration: s/p suture, now bandaged. continue wound care  7. superficial thrombophlebitis: on iv heparin    spoke to dr bryant and dr higgins, dr george, [facility]; called wife , no answer, left vm  total time greater than 30 minutes in the care of this patient    addendum 3:40 pm  family elected for bipap with hospice  will order

## 2023-11-27 NOTE — PROGRESS NOTE ADULT - ASSESSMENT
84 y/o Male with h/o lung CA follows at [facility] [place] s/p chemotherapy and RT 05/2023, s/p right upper lobectomy, HTN, HPL, Chronic anemia, COPD, AAA, Hypothyroidism, CKD stage IIIa was admitted on 11/2 for increased shortness of breath, dyspnea on minimal exertion, and cough.    # h/o lung cancer   - [place] [facility] [place] carbo/taxol with salvage RT 5/5/2023 after progression in subcarinol node after adjuvant atezolizumab (LD 12/20/22)  - pt being covered for radiation induced pneumonitis with steroids     # PNA/ Pneumonitis   - BAL with rare yeast - CONTINUE  meropenem 1 gm IV q12h, and caspofungin as per ID and vancomycin  - followed by pulm and ct surgery  - repeat CT chest 11/20- when compared with 11/13 with complete opacification of RML w/ internal consolidation and volume loss appears unchanged and extensive right lung consolidations w/ overall progression.  - repeat CXR suggestive of new RIGHT lung infiltrate - my suspicion is that there may be a component of aspiration pneumonia.   - 11/27/23 CT chest- Large areas of consolidation within the right lung progressed since November 20, 2023. Cluster of a few nodular opacities within the left lower lobe new since November 20, 2023 likely of infectious etiology with endobronchial spread. Small left pleural effusion with interval increase since November 20, 2023.  - pt slightly more lethargic this am after getting back from CT  remains on 3-4L NC    # anemia- likely related to myelosuppression from treatment history/pna  - s/p 1u pRBC 11/26  - Patient actively received transfusion- Hb 8.9 today     # UE pain  US duplex UE negative for DVt- showed superficial thrombophlebitis involving b/l cephalic and median cubital veins    palll care  [place] [facility]  [place] to follow

## 2023-11-27 NOTE — PROGRESS NOTE ADULT - SUBJECTIVE AND OBJECTIVE BOX
Subjective:  received 1 unit prbc yesterday  hgb 8.9 today  had chest ct this morning, reviewed  fatigued after ct  remains on nasal cannula  reviewed case with dr. bryant and dr higgins    Review of Systems:  All 10 systems reviewed in detailed and found to be negative with the exception of what has already been described above    Allergies:  penicillin (Unknown)    Meds  MEDICATIONS  (STANDING):  albuterol/ipratropium for Nebulization 3 milliLiter(s) Nebulizer every 6 hours  AQUAPHOR (petrolatum Ointment) 1 Application(s) Topical two times a day  atorvastatin 40 milliGRAM(s) Oral at bedtime  caspofungin IVPB 50 milliGRAM(s) IV Intermittent every 24 hours  caspofungin IVPB      dextrose 5%. 1000 milliLiter(s) (50 mL/Hr) IV Continuous <Continuous>  dextrose 5%. 1000 milliLiter(s) (100 mL/Hr) IV Continuous <Continuous>  dextrose 50% Injectable 25 Gram(s) IV Push once  dextrose 50% Injectable 12.5 Gram(s) IV Push once  dextrose 50% Injectable 25 Gram(s) IV Push once  ferrous    sulfate 325 milliGRAM(s) Oral daily  fluticasone furoate/umeclidinium/vilanterol 100-62.5-25 MICROgram(s) Inhaler 1 Puff(s) Inhalation daily  folic acid 1 milliGRAM(s) Oral daily  furosemide   Injectable 20 milliGRAM(s) IV Push daily  glucagon  Injectable 1 milliGRAM(s) IntraMuscular once  hydrALAZINE 10 milliGRAM(s) Oral every 8 hours  insulin lispro (ADMELOG) corrective regimen sliding scale   SubCutaneous three times a day before meals  insulin lispro (ADMELOG) corrective regimen sliding scale   SubCutaneous at bedtime  levothyroxine 12.5 MICROGram(s) Oral daily  meropenem Injectable 1000 milliGRAM(s) IV Push every 8 hours  pantoprazole  Injectable 40 milliGRAM(s) IV Push two times a day  polyethylene glycol 3350 17 Gram(s) Oral daily  vancomycin  IVPB 750 milliGRAM(s) IV Intermittent every 12 hours    MEDICATIONS  (PRN):  acetaminophen     Tablet .. 650 milliGRAM(s) Oral every 6 hours PRN Temp greater or equal to 38C (100.4F), Mild Pain (1 - 3)  aluminum hydroxide/magnesium hydroxide/simethicone Suspension 30 milliLiter(s) Oral every 4 hours PRN Dyspepsia  benzonatate 100 milliGRAM(s) Oral three times a day PRN Cough  dextrose Oral Gel 15 Gram(s) Oral once PRN Blood Glucose LESS THAN 70 milliGRAM(s)/deciliter  furosemide   Injectable 10 milliGRAM(s) IV Push once PRN for after transfusion  melatonin 3 milliGRAM(s) Oral at bedtime PRN Insomnia  ondansetron Injectable 4 milliGRAM(s) IV Push every 8 hours PRN Nausea and/or Vomiting    Physical Exam  T(C): 36.8 (11-27-23 @ 09:50), Max: 37.3 (11-27-23 @ 01:15)  HR: 100 (11-27-23 @ 11:02) (95 - 106)  BP: 152/64 (11-27-23 @ 09:50) (140/63 - 156/61)  RR: 18 (11-27-23 @ 09:50) (18 - 19)  SpO2: 98% (11-27-23 @ 09:50) (93% - 99%)  Gen: Alert, oriented, no distress on nasal cannula  HEENT: Anicteric sclera, moist mucous membranes  Cardio: Regular rhythm and rate, normal S1S2, no murmurs  Resp: Crackles, congested  GI: Nontender, nondistended, normoactive bowel sounds  Ext: No cyanosis, clubbing or edema in le, + redness and swelling in LUE by midline  skin: + ecchymosis, left leg laceration sutured and bandaged  Neuro: Nonfocal    Labs:                        8.9    3.65  )-----------( 145      ( 27 Nov 2023 06:38 )             26.7     11-27    135  |  96  |  37<H>  ----------------------------<  122<H>  3.5   |  34<H>  |  0.90    Ca    8.5      27 Nov 2023 06:38  Phos  2.2     11-27  Mg     1.9     11-27    TPro  5.3<L>  /  Alb  2.1<L>  /  TBili  1.1  /  DBili  0.4<H>  /  AST  29  /  ALT  23  /  AlkPhos  92  11-27      Urinalysis Basic - ( 27 Nov 2023 06:38 )    Color: x / Appearance: x / SG: x / pH: x  Gluc: 122 mg/dL / Ketone: x  / Bili: x / Urobili: x   Blood: x / Protein: x / Nitrite: x   Leuk Esterase: x / RBC: x / WBC x   Sq Epi: x / Non Sq Epi: x / Bacteria: x    < from: CT Chest No Cont (11.02.23 @ 16:04) >  ACC: [id number] EXAM:  CT CHEST   ORDERED BY: SARAH YOO     PROCEDURE DATE:  11/02/2023          INTERPRETATION:  INDICATION: Shortness of breath, cough, lung cancer   history, last chemotherapy and radiation treatment in May 2023    TECHNIQUE: Helical acquisition images of the chest without intravenous   contrast. Maximum intensity projection images were generated.    COMPARISON: 12/29/2020 chest x-ray.    FINDINGS:    LUNGS/AIRWAYS/PLEURA: Right upper lobectomy. Right lower lobe   multiloculated thick-walled 11 cm cavity (best seen in its entirety on   601-59) containing fluid and spongelike material. Consolidation within   the middle lobe and inferior aspect of the right lower lobe with angular   borders suggesting prior radiation. Multiple indistinct nodules of   varying density in the right lower and middle lobes, mostly adjacent to   the cavity. Small branching opacities in the peripheral left upper and   lower lobes, compatible with distal airway impaction. Trace left pleural   effusion. Mild right apical pleural thickening.    LYMPH NODES/MEDIASTINUM: No lymphadenopathy.    HEART/VASCULATURE: Normal heart size. Unremarkable pericardium. Coronary   artery calcifications. Normal caliber aorta.    UPPER ABDOMEN: Unremarkable.    BONES/SOFT TISSUES: Old sternal fracture. Mild loss of height of the L1   vertebral body.      IMPRESSION:    Right upper lobectomy.    Larger right lower lobe thick-walled cavity which may be infectious   and/or residua of reported treated tumor. 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    Consolidation within the right lower and middle lobe compatible with   radiation change.    Multiple indistinct nodules in the right lungwhich may be infectious or   radiation pneumonitis.    ct lucio 10/3 uploaded and reviewed    < from: CT Abdomen and Pelvis No Cont (11.08.23 @ 19:01) >  PROCEDURE DATE:  11/08/2023          INTERPRETATION:  CLINICAL INFORMATION: Acute kidney injury. Evaluate   obstruction.    COMPARISON: CT chest 11/20/2023.    CONTRAST/COMPLICATIONS:  IV Contrast: NONE  Oral Contrast: NONE  Complications: None reported at time of study completion    PROCEDURE:  CT of the Abdomen and Pelvis was performed.  Sagittal and coronal reformats were performed.    FINDINGS:  LOWER CHEST: Small right pleural effusion. New patchy opacities in the   right lung base.    LIVER: Within normal limits.  BILE DUCTS: Normal caliber.  GALLBLADDER: Within normal limits.  SPLEEN: Within normal limits.  PANCREAS: Within normal limits.  ADRENALS: Within normal limits.  KIDNEYS/URETERS: No hydronephrosis. Vascular calcifications.    BLADDER: Within normal limits.  REPRODUCTIVE ORGANS: Prostate within normal limits.    BOWEL: No bowel obstruction. Appendix is normal. Moderate to large stool.  PERITONEUM: No ascites.  VESSELS: Atherosclerotic changes.  RETROPERITONEUM/LYMPH NODES: No lymphadenopathy.  ABDOMINAL WALL: Within normal limits.  BONES: Degenerative changes. Redemonstration of L1 compression deformity.   Right femoral ORIF.    IMPRESSION:  No hydronephrosis.    New patchy opacities in the right lung base. Small right pleural effusion.    Bedside Swallow: Trial 1  · Consistencies administered	thin liquid; pureed; regular solid  · Mode of Presentation	cup; spoon; self fed  · Positioning	upright (90 degrees)  · Oral Preparatory Phase	Within functional limits; orientation eating routines: able to feed himself with focus: as per family, pt eats rapidly: did not do so at bedside evaluation Lip seal on utensil : oral containment..  · Oral Phase	Within functional limits; immediate bolus formation: Ap transfer for liquid WFL. Mastication normally rotary-lateral with lingual sweep for collection and clearance.  l  · Pharyngeal Phase	Within functional limits; Onset of pharyngeal swallow is with age-appropriate time limits. No overt s/s aspiration at bedside.  · Comments	Micro aspiration and silent aspiration cannot be ruled out. As above, pt does have increased risk for aspiration 2/2 lung CA and  lobectomy as well as COPD and present pna (which in itself may be aspiration related). However, pt presents at bedside evaluation with no contraindication for maintaining REGULAR texture and thin liquids.  Strategies for reducing risk were demonstrated and discussed with the Pt and the family who are very supportive of the Pt.  Disc with NSg.  Will follow peripherally.    < from: CT Chest No Cont (11.13.23 @ 10:04) >    PROCEDURE DATE:  11/13/2023          INTERPRETATION:  Clinical indications: Pneumonia.    Axial CT images of the chest are obtained without intravenous   administration of contrast.    Comparison is made with the prior chest CT of November 2, 2023.    No enlarged axillary or mediastinal lymph nodes.    No pericardial effusion. Heart size is normal. Mitral annular   calcifications. Vascular calcifications with involvement of the aorta and   the coronary arteries. Aortic root calcifications.    Evaluation of the upper abdomen demonstrate partially imaged aortic stent   graft. Subcutaneous edema. Minimal perihepatic ascites.    Small left pleural effusion new since the abdominalCT of November 8, 2023. Trace right pleural effusion as on November 8, 2023.    Evaluation of the lungs demonstrate left lung base linear subsegmental   atelectasis. A few ill-defined left lung patchy nonspecific groundglass   opacities largest within the dependent portion of the left upper lobe are   new since November 2, 2023.    Status post right upper lobectomy with postoperative changes. Persistent   right apical large large cyst, part of which measures about 6.6 cm in   transverse dimension without significant interval change in size   containing air and increasing internal opacification as compared with   November 2, 2023, some of which appear slightly dense and may be due to   internal hemorrhagic components.    Extensive right mid to lower lung consolidations and ill-defined   groundglass opacities, with the largest confluent consolidation within   the mid to lower portions of the right lower lobe, majority of which   appear new since November 2, 2023 superimposed on previouslydescribed   linear opacities.    Degenerative changes of the spine. Old healed sternal fracture.    IMPRESSION: Right lung large areas of consolidation new since November 2, 2023 likely pneumonia.    Right apical previously described large cyst unchanged in size since   November 2, 2023 containing internal air and increasing internal   opacification since November 2, 2023 as described above. Continued   follow-up is recommended.    A few left lung ill-defined nonspecific groundglass opacities.   Differential diagnosis include but is not limited to   infectious/inflammatory etiologies and or pulmonary edema.    Small left pleural effusion new since November 8, 2023.      Culture - Acid Fast - Bronchial w/Smear (11.09.23 @ 12:30)   Specimen Source: .Bronchial RIGHT LOWER LOBE BAL  Acid Fast Bacilli Smear:   No acid-fast bacilli seen by fluorochrome stain  Culture Results:   Culture is being performed.  Culture - Fungal, Bronchial (11.09.23 @ 12:30)   Specimen Source: .Bronchial RIGHT LOWER LOBE BAL  Culture Results:   Rare Yeast    < from: CT Chest No Cont (11.20.23 @ 08:03) >  PROCEDURE DATE:  11/20/2023          INTERPRETATION:  Clinical indication: Right lung infection.    Axial CT images of the chest are obtained without intravenous   administration of contrast.    Comparison is made with the prior chest CT of November 13, 2023.    Enlarged axillary or mediastinal lymph nodes. Heart size is normal.   Vascular calcifications with involvement of the aorta and the coronary   arteries. Minimal pericardial fluid.    Evaluation of the upper abdomen demonstrate trace perihepatic and   perisplenic ascites slightly increased in size since November 13, 2023.   1.2 cm hypodensity arising from the upper pole of the partially imaged   left kidney without significant interval change.    Few subcutaneous edema.    Small left pleural effusion is unchanged. There may be a trace right   pleural effusion versus pleural thickening without significant interval   change.    Evaluation of the lungs demonstrate interval resolution of the previously   seen groundglass opacity within the dependent portion of the left upper   lobe. Interval near complete resolution of a previously seen left upper   lobe peripheral groundglass opacity. A few other ill-defined left lung   patchy groundglass opacities unchanged. 4 mm left lower lobe nodular   opacity on image 73 of series 2 appears new since November 13, 2023 may   be sequela of impacted distal airway.    Status post right upper lobectomy. Previously described right apical   large irregular cyst, part of which measures about 7 cm in transverse   dimension containing internal air, fluid and debris is without   significant interval change in size.  Ill-defined consolidations and groundglass opacities throughoutthe   majority of the right lung demonstrate mild overall interval progression   since November 13, 2023. For reference a few dense patchy and nodular   opacities at the right lung base are either new or demonstrate interval   increase in size.  Complete opacification of the right middle lobe with internal   consolidation appears unchanged since November 13, 2023, demonstrating   interval progression since November 2, 2023. Nonvisualization of the   internal segmental and subsegmental airways of the right middle lobe as   on the prior study.    Degenerative changes of the spine. Old healed left rib and old healed   sternal fractures. Mild superior endplate loss of height of the L1   vertebral body is unchanged.    IMPRESSION: Extensive right lung consolidations as described above with   overall interval progression since November 13, 2023. Complete   opacification of the right middle lobe with internal consolidation and   volume loss appears unchanged since November 13, 2023 demonstrating  interval progression since November 2, 2023.    Persistent apical right apical large irregular cyst unchanged in size   since November 13, 2023.    Small left pleural effusion with subcutaneous edema.    Trace amount of ascites, increased in size since November 13, 2023.    < from: US Duplex Venous Upper Ext Complete, Bilateral (11.22.23 @ 13:13) >  PROCEDURE DATE:  11/22/2023          INTERPRETATION:  CLINICAL INFORMATION: Arm edema    COMPARISON: None available.    TECHNIQUE: Duplex sonography of the BILATERAL upper extremity veins with   color and spectral Doppler, with and without compression.    FINDINGS:    RIGHT:  The right internal jugular, subclavian, axillary, brachial, radial and   ulnar veins are patent and compressible where applicable.  There is   thrombus within the cephalic vein from the mid upper arm to the   antecubital fossa. There is thrombus within the median cubital vein.  Basilic vein not visualized.    LEFT:  The left internal jugular, subclavian, axillary, brachial, radialand   ulnar veins are patent and compressible where applicable.  The basilic   vein (superficial vein) is patent and without thrombus.  There is   thrombus within the cephalic vein in the mid upper arm extending into the   antecubital fossa and median cubital vein.    Doppler examination shows normal spontaneous and phasic flow.    Atherosclerotic changes of the aorta are noted.    Bilateral subcutaneous edema.    IMPRESSION:  No evidence of deep venous thrombosis in either upper extremity.    Superficial thrombophlebitis involving the bilateral cephalic and median   cubital veins.    < from: CT Chest No Cont (11.27.23 @ 08:04) >  PROCEDURE DATE:  11/27/2023          INTERPRETATION:  Clinical indication: Pneumonia, follow-up.    Axial CT images of the chest are obtained without intravenous   administration of contrast.    Comparison is made with the prior chest CT of November 20, 2023.    No enlarged axillary or mediastinal lymph nodes.    No pericardial effusion. Heart size is normal. Vascular calcifications   with involvement of the aorta and the coronary arteries.    Diffuse subcutaneous edema most notably involving the abdominal wall.    Evaluation of the upper abdomen demonstrate no other significant   abnormality.    Small left pleural effusion mildly increased since November 20, 2023 with   mildadjacent subsegmental dependent or compressive atelectasis. Trace   right pleural effusion is without significant interval change.    Evaluation of the lungs demonstrate small cluster of multiple nodular   opacities within the left lower lobe largestmeasuring about 1.1 cm new   since November 20, 2023 likely sequela of impacted airways. Patchy left   upper lobe small opacity may have slightly decreased in size.    Status post right upper lobectomy. Previously described right apical   large irregular cyst with internal fluid and debris is without   significant interval change.    Opacification of large parts of the right lung with large areas of   consolidation within the right lower lobe demonstrating interval mild   progression. Complete opacification of the right middle lobe appears   unchanged. Nonvisualization of internal segmental and subsegmental   airways as noted on the prior study.    Secretions within the mainstem bronchi extending into the bronchus   intermedius and the right lower lobe central airways.    Degenerative changes of the spine. Old healed rib and old healed sternal   fractures.    IMPRESSION: Large areas of consolidation within the right lung progressed   since November 20, 2023.    Cluster of a few nodular opacities within the left lower lobe new since   November 20, 2023 likely of infectious etiology with endobronchial spread.    Small left pleural effusion with interval increase since November 20, 2023.    < end of copied text >

## 2023-11-27 NOTE — PROGRESS NOTE ADULT - SUBJECTIVE AND OBJECTIVE BOX
HOSPITALIST ATTENDING PROGRESS NOTE    Chart and meds reviewed.  Patient seen and examined.    CC: cough, weakness     11/21 - no events, afebrile, denies cp, dyspnea, + productive cough with brown mucus persist, poor po intake, plan discussed with wife at bedside  11/22 - pt seen in am, bilateral arm swelling, afebrile, + productive cough some streaks of blood , denies cp, abdominal pain, poor po intake  11/23 - afebrile, + upper extremities edema, + cough with brown mucus, denies abdominal pain, tolerating some po intake  11/24 - afebrile, UE edema improving, + gen weakness, + poor po intake, + cough persist, plan discussed   11/25 - dyspnea overnight, on BIPAP overnight, feels better in am, LLE stiches removed , denies cp, abdominal pain , plan discussed  11/26 - dyspnea overnight, better after BIPAP, denies cp, + cough, afebrile, plan discussed     All other systems reviewed and found to be negative with the exception of what has been described above.    Vital sings reviewed for last 24 h  T(C): 37.1 (11-26-23 @ 16:55), Max: 37.2 (11-26-23 @ 15:39)  T(F): 98.7 (11-26-23 @ 16:55), Max: 98.9 (11-26-23 @ 15:39)  HR: 95 (11-26-23 @ 17:13) (91 - 112)  BP: 156/61 (11-26-23 @ 16:55) (140/63 - 163/68)  RR: 19 (11-26-23 @ 16:55) (18 - 19)  SpO2: 93% (11-26-23 @ 17:13) (92% - 100%)  Wt(kg): --  Daily     Daily   CAPILLARY BLOOD GLUCOSE      POCT Blood Glucose.: 189 mg/dL (26 Nov 2023 17:26)  POCT Blood Glucose.: 170 mg/dL (26 Nov 2023 13:15)  POCT Blood Glucose.: 116 mg/dL (26 Nov 2023 08:10)          Physical exam :   GEN: Frail, NAD  HEENT:  pupils equal and reactive, EOMI, no oropharyngeal lesions, erythema, exudates, oral thrush  NECK:   supple, no carotid bruits, no palpable lymph nodes, no thyromegaly  CV:  +S1, +S2, regular, no murmurs or rubs  RESP:  Bilateral ronchi  BREAST:  not examined  GI:  abdomen soft, non-tender, non-distended, normal BS, no bruits, no abdominal masses, no palpable masses  RECTAL:  not examined  :  not examined  MSK:   normal muscle tone, no atrophy, no rigidity, no contractions  EXT:  no clubbing, no cyanosis, no edema, no calf pain, BIlateral LE edema.   Left leg sutures  VASCULAR:  pulses equal and symmetric in the upper and lower extremities  NEURO:  AAOX1, no focal neurological deficits, follows all commands, able to move extremities spontaneously  SKIN:  no ulcers, lesions or rashes    All labs radiology and other studies reviewed and interpreted :                         8.6    x     )-----------( x        ( 26 Nov 2023 10:00 )             26.4     11-26    137  |  99  |  35<H>  ----------------------------<  115<H>  3.6   |  36<H>  |  0.92    Ca    8.4<L>      26 Nov 2023 07:16  Phos  2.3     11-26  Mg     2.0     11-26    TPro  5.6<L>  /  Alb  2.4<L>  /  TBili  1.3<H>  /  DBili  x   /  AST  31  /  ALT  26  /  AlkPhos  98  11-26        LIVER FUNCTIONS - ( 26 Nov 2023 07:16 )  Alb: 2.4 g/dL / Pro: 5.6 gm/dL / ALK PHOS: 98 U/L / ALT: 26 U/L / AST: 31 U/L / GGT: x             Procalcitonin  11-26-23 @ 07:16   -  0.31<H>  11-25-23 @ 06:50   -  0.32<H>  Procalcitonin, Serum: 3.23:               C-Reactive Protein, Serum: 58 mg/L (11.25.23 @ 06:50)    C-Reactive Protein, Serum: 59 mg/L (11.24.23 @ 11:58)    C-Reactive Protein, Serum: 75 mg/L (11.26.23 @ 07:16)      C-Reactive Protein, Serum: 70 mg/L (11-23-23 @ 08:53)  C-Reactive Protein, Serum: 24 mg/L (11-14-23 @ 07:03)  C-Reactive Protein, Serum: 34 mg/L (11-13-23 @ 06:30)  C-Reactive Protein, Serum: 46 mg/L (11-12-23 @ 06:55)  C-Reactive Protein, Serum: 70 mg/L (11-11-23 @ 06:31)  C-Reactive Protein, Serum: 106 mg/L (11-10-23 @ 06:19)    Procalcitonin, Serum: 3.23: Note: Concentrations <0.5 ng/mL do not exclude an infection, on account  of localized infections (without systemic signs) which can be associated  with such low concentrations, or a systemic infection in its initial  stages (<6 hours). Furthermore, increased procalcitonin may occur without  infection. PCT concentrations between 0.5 and 2.0 ng/mL should be  interpreted taking into account the patient’s history. It is recommended  to pretest PCT within 6-24 hours if any concentrations <2.0 mg/mL are  obtained. ng/mL (11.08.23 @ 06:01)      Cortisol AM, Serum: 16.4 ug/dL (11.23.23 @ 08:53)      < from: Xray Chest 1 View- PORTABLE-Routine (Xray Chest 1 View- PORTABLE-Routine .) (11.25.23 @ 15:23) >    IMPRESSION: Extensive right lung findings show new infiltrate in the   right lower lobe.    < end of copied text >         CT Chest No Cont (11.20.23 @ 08:03) >  IMPRESSION: Extensive right lung consolidations as described above with   overall interval progression since November 13, 2023. Complete   opacification of the right middle lobe with internal consolidation and   volume loss appears unchanged since November 13, 2023 demonstrating  interval progression since November 2, 2023.    Persistent apical right apical large irregular cyst unchanged in size   since November 13, 2023.    Small left pleural effusion with subcutaneous edema.    Trace amount of ascites, increased in size since November 13, 2023.    Xray Chest 1 View- PORTABLE-Routine (Xray Chest 1 View- PORTABLE-Routine .) (11.16.23 @ 12:32) >  IMPRESSION: Slight progression of pneumonia since November 11    MEDICATIONS  (STANDING):  albuterol/ipratropium for Nebulization 3 milliLiter(s) Nebulizer every 6 hours  AQUAPHOR (petrolatum Ointment) 1 Application(s) Topical two times a day  atorvastatin 40 milliGRAM(s) Oral at bedtime  caspofungin IVPB 50 milliGRAM(s) IV Intermittent every 24 hours  caspofungin IVPB      dextrose 5%. 1000 milliLiter(s) (50 mL/Hr) IV Continuous <Continuous>  dextrose 5%. 1000 milliLiter(s) (100 mL/Hr) IV Continuous <Continuous>  dextrose 50% Injectable 25 Gram(s) IV Push once  dextrose 50% Injectable 12.5 Gram(s) IV Push once  dextrose 50% Injectable 25 Gram(s) IV Push once  ferrous    sulfate 325 milliGRAM(s) Oral daily  fluticasone furoate/umeclidinium/vilanterol 100-62.5-25 MICROgram(s) Inhaler 1 Puff(s) Inhalation daily  folic acid 1 milliGRAM(s) Oral daily  furosemide   Injectable 10 milliGRAM(s) IV Push once  glucagon  Injectable 1 milliGRAM(s) IntraMuscular once  heparin   Injectable 5000 Unit(s) SubCutaneous every 12 hours  hydrALAZINE 10 milliGRAM(s) Oral two times a day  insulin lispro (ADMELOG) corrective regimen sliding scale   SubCutaneous at bedtime  insulin lispro (ADMELOG) corrective regimen sliding scale   SubCutaneous three times a day before meals  levothyroxine 12.5 MICROGram(s) Oral daily  meropenem Injectable 1000 milliGRAM(s) IV Push every 8 hours  pantoprazole  Injectable 40 milliGRAM(s) IV Push two times a day  polyethylene glycol 3350 17 Gram(s) Oral daily  vancomycin  IVPB 750 milliGRAM(s) IV Intermittent every 12 hours    MEDICATIONS  (PRN):  acetaminophen     Tablet .. 650 milliGRAM(s) Oral every 6 hours PRN Temp greater or equal to 38C (100.4F), Mild Pain (1 - 3)  aluminum hydroxide/magnesium hydroxide/simethicone Suspension 30 milliLiter(s) Oral every 4 hours PRN Dyspepsia  benzonatate 100 milliGRAM(s) Oral three times a day PRN Cough  dextrose Oral Gel 15 Gram(s) Oral once PRN Blood Glucose LESS THAN 70 milliGRAM(s)/deciliter  melatonin 3 milliGRAM(s) Oral at bedtime PRN Insomnia  ondansetron Injectable 4 milliGRAM(s) IV Push every 8 hours PRN Nausea and/or Vomiting  ondansetron Injectable 4 milliGRAM(s) IV Push every 8 hours PRN Nausea and/or Vomiting       HOSPITALIST ATTENDING PROGRESS NOTE    Chart and meds reviewed.  Patient seen and examined.    CC: cough, weakness     11/21 - no events, afebrile, denies cp, dyspnea, + productive cough with brown mucus persist, poor po intake, plan discussed with wife at bedside  11/22 - pt seen in am, bilateral arm swelling, afebrile, + productive cough some streaks of blood , denies cp, abdominal pain, poor po intake  11/23 - afebrile, + upper extremities edema, + cough with brown mucus, denies abdominal pain, tolerating some po intake  11/24 - afebrile, UE edema improving, + gen weakness, + poor po intake, + cough persist, plan discussed   11/25 - dyspnea overnight, on BIPAP overnight, feels better in am, LLE stiches removed , denies cp, abdominal pain , plan discussed  11/26 - dyspnea overnight, better after BIPAP, denies cp, + cough, afebrile, plan discussed   11/27 - more lethargic, + weakness, tolerating po intake, using bipap overnight, cough and dyspnea the same, hospice care at home discussed with wife  at length    All other systems reviewed and found to be negative with the exception of what has been described above.    Vital sings reviewed for last 24 h  T(C): 37.1 (11-27-23 @ 15:46), Max: 37.3 (11-27-23 @ 01:15)  T(F): 98.7 (11-27-23 @ 15:46), Max: 99.1 (11-27-23 @ 01:15)  HR: 103 (11-27-23 @ 15:46) (100 - 106)  BP: 138/58 (11-27-23 @ 15:46) (138/58 - 152/64)  RR: 18 (11-27-23 @ 15:46) (18 - 18)  SpO2: 95% (11-27-23 @ 15:46) (95% - 99%)  Wt(kg): --  Daily     Daily   CAPILLARY BLOOD GLUCOSE      POCT Blood Glucose.: 196 mg/dL (27 Nov 2023 17:25)  POCT Blood Glucose.: 147 mg/dL (27 Nov 2023 12:36)  POCT Blood Glucose.: 116 mg/dL (27 Nov 2023 07:45)  POCT Blood Glucose.: 167 mg/dL (26 Nov 2023 22:27)        Physical exam :   GEN: Frail, NAD  HEENT:  pupils equal and reactive, EOMI, no oropharyngeal lesions, erythema, exudates, oral thrush  NECK:   supple, no carotid bruits, no palpable lymph nodes, no thyromegaly  CV:  +S1, +S2, regular, no murmurs or rubs  RESP:  Bilateral ronchi, occasional wheezing   BREAST:  not examined  GI:  abdomen soft, non-tender, non-distended, normal BS, no bruits, no abdominal masses, no palpable masses  RECTAL:  not examined  :  not examined  MSK:   normal muscle tone, no atrophy, no rigidity, no contractions  EXT:  no clubbing, no cyanosis, no edema, no calf pain, BIlateral LE edema.   Left leg sutures  VASCULAR:  pulses equal and symmetric in the upper and lower extremities  NEURO:  AAOX1, no focal neurological deficits, follows all commands, able to move extremities spontaneously  SKIN:  no ulcers, lesions or rashes    All labs radiology and other studies reviewed and interpreted :                         8.9    3.65  )-----------( 145      ( 27 Nov 2023 06:38 )             26.7     11-27    135  |  96  |  37<H>  ----------------------------<  122<H>  3.5   |  34<H>  |  0.90    Ca    8.5      27 Nov 2023 06:38  Phos  2.2     11-27  Mg     1.9     11-27    TPro  5.3<L>  /  Alb  2.1<L>  /  TBili  1.1  /  DBili  0.4<H>  /  AST  29  /  ALT  23  /  AlkPhos  92  11-27        LIVER FUNCTIONS - ( 27 Nov 2023 06:38 )  Alb: 2.1 g/dL / Pro: 5.3 gm/dL / ALK PHOS: 92 U/L / ALT: 23 U/L / AST: 29 U/L / GGT: x                     Procalcitonin  Procalcitonin, Serum: 0.40:   11-26-23 @ 07:16   -  0.31<H>  11-25-23 @ 06:50   -  0.32<H>  Procalcitonin, Serum: 3.23:         C-Reactive Protein, Serum: 85 mg/L (11.27.23 @ 06:38)          C-Reactive Protein, Serum: 58 mg/L (11.25.23 @ 06:50)    C-Reactive Protein, Serum: 59 mg/L (11.24.23 @ 11:58)    C-Reactive Protein, Serum: 75 mg/L (11.26.23 @ 07:16)      C-Reactive Protein, Serum: 70 mg/L (11-23-23 @ 08:53)  C-Reactive Protein, Serum: 24 mg/L (11-14-23 @ 07:03)  C-Reactive Protein, Serum: 34 mg/L (11-13-23 @ 06:30)  C-Reactive Protein, Serum: 46 mg/L (11-12-23 @ 06:55)  C-Reactive Protein, Serum: 70 mg/L (11-11-23 @ 06:31)  C-Reactive Protein, Serum: 106 mg/L (11-10-23 @ 06:19)    Procalcitonin, Serum: 3.23: Note: Concentrations <0.5 ng/mL do not exclude an infection, on account  of localized infections (without systemic signs) which can be associated  with such low concentrations, or a systemic infection in its initial  stages (<6 hours). Furthermore, increased procalcitonin may occur without  infection. PCT concentrations between 0.5 and 2.0 ng/mL should be  interpreted taking into account the patient’s history. It is recommended  to pretest PCT within 6-24 hours if any concentrations <2.0 mg/mL are  obtained. ng/mL (11.08.23 @ 06:01)      Cortisol AM, Serum: 16.4 ug/dL (11.23.23 @ 08:53)    < from: CT Chest No Cont (11.27.23 @ 08:04) >    IMPRESSION: Large areas of consolidation within the right lung progressed   since November 20, 2023.    Cluster of a few nodular opacities within the left lower lobe new since   November 20, 2023 likely of infectious etiology with endobronchial spread.    Small left pleural effusion with interval increase since November 20, 2023.    < end of copied text >      < from: Xray Chest 1 View- PORTABLE-Routine (Xray Chest 1 View- PORTABLE-Routine .) (11.25.23 @ 15:23) >    IMPRESSION: Extensive right lung findings show new infiltrate in the   right lower lobe.    < end of copied text >         CT Chest No Cont (11.20.23 @ 08:03) >  IMPRESSION: Extensive right lung consolidations as described above with   overall interval progression since November 13, 2023. Complete   opacification of the right middle lobe with internal consolidation and   volume loss appears unchanged since November 13, 2023 demonstrating  interval progression since November 2, 2023.    Persistent apical right apical large irregular cyst unchanged in size   since November 13, 2023.    Small left pleural effusion with subcutaneous edema.    Trace amount of ascites, increased in size since November 13, 2023.    Xray Chest 1 View- PORTABLE-Routine (Xray Chest 1 View- PORTABLE-Routine .) (11.16.23 @ 12:32) >  IMPRESSION: Slight progression of pneumonia since November 11    MEDICATIONS  (STANDING):  albuterol/ipratropium for Nebulization 3 milliLiter(s) Nebulizer every 6 hours  AQUAPHOR (petrolatum Ointment) 1 Application(s) Topical two times a day  atorvastatin 40 milliGRAM(s) Oral at bedtime  caspofungin IVPB 50 milliGRAM(s) IV Intermittent every 24 hours  caspofungin IVPB      dextrose 5%. 1000 milliLiter(s) (50 mL/Hr) IV Continuous <Continuous>  dextrose 5%. 1000 milliLiter(s) (100 mL/Hr) IV Continuous <Continuous>  dextrose 50% Injectable 25 Gram(s) IV Push once  dextrose 50% Injectable 12.5 Gram(s) IV Push once  dextrose 50% Injectable 25 Gram(s) IV Push once  ferrous    sulfate 325 milliGRAM(s) Oral daily  fluticasone furoate/umeclidinium/vilanterol 100-62.5-25 MICROgram(s) Inhaler 1 Puff(s) Inhalation daily  folic acid 1 milliGRAM(s) Oral daily  furosemide   Injectable 10 milliGRAM(s) IV Push once  glucagon  Injectable 1 milliGRAM(s) IntraMuscular once  heparin   Injectable 5000 Unit(s) SubCutaneous every 12 hours  hydrALAZINE 10 milliGRAM(s) Oral two times a day  insulin lispro (ADMELOG) corrective regimen sliding scale   SubCutaneous at bedtime  insulin lispro (ADMELOG) corrective regimen sliding scale   SubCutaneous three times a day before meals  levothyroxine 12.5 MICROGram(s) Oral daily  meropenem Injectable 1000 milliGRAM(s) IV Push every 8 hours  pantoprazole  Injectable 40 milliGRAM(s) IV Push two times a day  polyethylene glycol 3350 17 Gram(s) Oral daily  vancomycin  IVPB 750 milliGRAM(s) IV Intermittent every 12 hours    MEDICATIONS  (PRN):  acetaminophen     Tablet .. 650 milliGRAM(s) Oral every 6 hours PRN Temp greater or equal to 38C (100.4F), Mild Pain (1 - 3)  aluminum hydroxide/magnesium hydroxide/simethicone Suspension 30 milliLiter(s) Oral every 4 hours PRN Dyspepsia  benzonatate 100 milliGRAM(s) Oral three times a day PRN Cough  dextrose Oral Gel 15 Gram(s) Oral once PRN Blood Glucose LESS THAN 70 milliGRAM(s)/deciliter  melatonin 3 milliGRAM(s) Oral at bedtime PRN Insomnia  ondansetron Injectable 4 milliGRAM(s) IV Push every 8 hours PRN Nausea and/or Vomiting  ondansetron Injectable 4 milliGRAM(s) IV Push every 8 hours PRN Nausea and/or Vomiting

## 2023-11-27 NOTE — PROGRESS NOTE ADULT - ASSESSMENT
83-year-old M with PMHx HTN, HLD, chronic anemia, COPD, lung CA s/p RUL lobectomy (follows at [facility], last chemotherapy/RT 5/2023, not currently on treatment), AAA, hypothyroidism, CKD stage IIIa sent in to ED on 11/2/23  by pulmonologist MD Olivera with c/o SOB, dyspnea on minimal exertion, and cough. 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     # Acute hypoxic respiratory failure, multifactorial   # New RLL PNA on CT 11/13 , RLL cavity s/p bronchoscopy 11/9  possible yeast  infection, less likely radiation pneumonitis , r/o  aspergilloma    - CT chest: Larger right lower lobe thick-walled cavity which may be infectious and/or residua of reported treated tumor. Spongelike material within the cavity can be seen with aspergilloma (prior to fungus ball formation). Consolidation within the right lower and middle lobe compatible with radiation change.  - leukocytosis stable (WBC 17.70 from 17.35) ---> trend down to WBC  8   - repeat CXR 11/8 - worsening of opacities over right lung   - 2 d echo 11/8 - EF 60% , no significant valvular disease    - immunoglobulin panel - all Ig wnl  - 11/9 - s/p bronchoscopy   - speech and swallow evaluation  - no aspirations  - CXR 11/11 - stable consolidations  - repeat CT chest 11/13 - new right lung consolidation  - blood cultures -no growth, sputum culture - normal respiratory  - c/w trelegy ellipta, chest pt, IS, acapella ,BIPAP as needed  - ID/pulm consult noted   - s/p IV steroids --> po prednisone taper 20 x 3 days than stop , trending down ----> 34--> 50--> 70  - s/p  voriconazole PO ,  s/p 7 days of levaquin to cover atypical/psuedomonas (allergy to PCN)    - 11/13 - IV meropenem, doxy and fluconasole started as per ID   - Chest xray prelim worse  - Thoracic consult appreciated, no intervention  - CT Chest 11/20 worse  -11/13 - IV meropenem and doxycylcine  - Fluconazole DC, started on caspofungin 11/20  - Would consider palliative at this time  - 11/22 - d/c doxy started on  vancomycin , c/w IV meropenem  - CRP 24--> 70 --> 58  - 11/25 CXR - worse on the right  - plan for Ct chest in am    #  Chronic anemia, likely due to  chemotherapy, worsening and iron deficiency   -  no s/s bleeding, asymptomatic, continue to monitor/trend  - 11/8 - 1 unit PRBC due to hemoptysis and worsening of anemia  -  11/19 transfuse 1 unit PRBC  - Hemoglobin 7.9 11/20  - 11/25 s/p 1 unit PRBC  - 11/26 1 unit PRBC , stop heparin , FOBT - pending  - Trend Hb    #CHRISTINA on CKD stage IIIa,  Resolved    # Left calf laceration from veno dines while in PACU for bronchoscopy   - surgery consult - s/p suturing  on 11/09/23   - plan to remove sutures in 10 days  - Surgical consult for suture removal in 14 days from placement  - pending    # Bilateral UE edema due to superphicial thrombophlebitis  - doppler noted  - start heparin 5000 bid, elevation, warm compresses if pain  - 11/23 - iv lasix 10 mg   - 11/24 c/w IV lasix for 3 days  - 11/26 - lasix 20 in am, 10 IV after blood transfusion  - BNP 1200--> 1700--> 2500      # Acute hyperglycemia, possible steroid-induced , Prediabetes with A1C 6.3   - Stop pre-meal and  lantus   - c/w ISS  - liberize diet  - Monitor     # HTN - hydralazine 10 bid --> 10 tid    #  HLD   - c/w  atorvastatin    # Hypothyroidism  - Levo lowered to 12.5 mcg,  Recheck in 4 weeks    # Hx lung CA s/p RUL lobectomy/chemo/RT  - f/u with [facility]     # Severe protein-calorie malnutrition. - nutritional education provided ,  supplements ordered ,  MVT qd ordered      # DVT Ppx  - heparin 5000 q12h     # Code status  - DNR/DNI    wife and daughter updated at bedside 11/21, 11/22, 11/23, 11/24, 11/25, 11/26 83-year-old M with PMHx HTN, HLD, chronic anemia, COPD, lung CA s/p RUL lobectomy (follows at [facility], last chemotherapy/RT 5/2023, not currently on treatment), AAA, hypothyroidism, CKD stage IIIa sent in to ED on 11/2/23  by pulmonologist MD Olivera with c/o SOB, dyspnea on minimal exertion, and cough. 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     # Acute hypoxic respiratory failure, multifactorial   # New RLL PNA on CT 11/13 , RLL cavity s/p bronchoscopy 11/9  possible yeast  infection, less likely radiation pneumonitis , r/o  aspergilloma    - CT chest: Larger right lower lobe thick-walled cavity which may be infectious and/or residua of reported treated tumor. Spongelike material within the cavity can be seen with aspergilloma (prior to fungus ball formation). Consolidation within the right lower and middle lobe compatible with radiation change.  - leukocytosis stable (WBC 17.70 from 17.35) ---> trend down to WBC  8   - repeat CXR 11/8 - worsening of opacities over right lung   - 2 d echo 11/8 - EF 60% , no significant valvular disease    - immunoglobulin panel - all Ig wnl  - 11/9 - s/p bronchoscopy   - speech and swallow evaluation  - no aspirations  - CXR 11/11 - stable consolidations  - repeat CT chest 11/13 - new right lung consolidation  - blood cultures -no growth, sputum culture - normal respiratory  - c/w trelegy ellipta, chest pt, IS, acapella ,BIPAP as needed  - ID/pulm consult noted   - s/p IV steroids --> po prednisone taper 20 x 3 days than stop , trending down ----> 34--> 50--> 70  - s/p  voriconazole PO ,  s/p 7 days of levaquin to cover atypical/psuedomonas (allergy to PCN)    - 11/13 - IV meropenem, doxy and fluconasole started as per ID   - Chest xray prelim worse  - Thoracic consult appreciated, no intervention  - CT Chest 11/20 worse  -11/13 - IV meropenem and doxycylcine  - Fluconazole DC, started on caspofungin 11/20  - Would consider palliative at this time  - 11/22 - d/c doxy started on  vancomycin , c/w IV meropenem  - CRP 24--> 70 --> 58  - 11/25 CXR - worse on the right  - 11/27 CT chest - worsening of right consolidation   - poor prognosis, focus on hospice care d/w wife , requesting bipap    #  Chronic anemia, likely due to  chemotherapy, worsening and iron deficiency   -  no s/s bleeding, asymptomatic, continue to monitor/trend  - 11/8 - 1 unit PRBC due to hemoptysis and worsening of anemia  -  11/19 transfuse 1 unit PRBC  - Hemoglobin 7.9 11/20  - 11/25 s/p 1 unit PRBC  - 11/26 1 unit PRBC , stop heparin , FOBT - pending   - Trend Hb    #CHRISTINA on CKD stage IIIa,  Resolved    # Left calf laceration from veno dines while in PACU for bronchoscopy   - surgery consult - s/p suturing  on 11/09/23   - plan to remove sutures in 10 days  - Surgical consult for suture removal  - sutures removed    # Bilateral UE edema due to superphicial thrombophlebitis  - doppler noted  - start heparin 5000 bid, elevation, warm compresses if pain  - 11/23 - iv lasix 10 mg   - 11/24 c/w IV lasix for 3 days  - 11/26 - lasix 20 in am, 10 IV after blood transfusion  - BNP 1200--> 1700--> 2500  - c/w lasix 20 qd      # Acute hyperglycemia, possible steroid-induced , Prediabetes with A1C 6.3   - Stop pre-meal and  lantus   - c/w ISS  - liberize diet  - Monitor     # HTN - hydralazine 10 bid --> 10 tid    #  HLD   - c/w  atorvastatin    # Hypothyroidism  - Levo lowered to 12.5 mcg,  Recheck in 4 weeks    # Hx lung CA s/p RUL lobectomy/chemo/RT  - f/u with [facility]     # Severe protein-calorie malnutrition. - nutritional education provided ,  supplements ordered ,  MVT qd ordered      # DVT Ppx  - heparin 5000 q12h     # Code status  - DNR/DNI    wife and daughter updated at bedside 11/21, 11/22, 11/23, 11/24, 11/25, 11/26, 11/27

## 2023-11-27 NOTE — PROGRESS NOTE ADULT - SUBJECTIVE AND OBJECTIVE BOX
INTERVAL HPI/OVERNIGHT EVENTS:  Patient S&E at bedside.   No o/n events,   CT chest- Large areas of consolidation within the right lung progressed since November 20, 2023. Cluster of a few nodular opacities within the left lower lobe new since November 20, 2023 likely of infectious etiology with endobronchial spread. Small left pleural effusion with interval increase since November 20, 2023.  pt slightly more lethargic this am after getting back from CT   remains on 3-4L NC      PAST MEDICAL & SURGICAL HISTORY:  COPD (chronic obstructive pulmonary disease)      Lung cancer      Anemia of chronic disease      CKD (chronic kidney disease), stage III      Hypothyroidism      AAA (abdominal aortic aneurysm)      HTN (hypertension)      HLD (hyperlipidemia)      Thrombocytopenia      S/P lobectomy of lung          FAMILY HISTORY:      VITAL SIGNS:  T(F): 98.2 (11-27-23 @ 09:50)  HR: 100 (11-27-23 @ 11:02)  BP: 152/64 (11-27-23 @ 09:50)  RR: 18 (11-27-23 @ 09:50)  SpO2: 98% (11-27-23 @ 09:50)  Wt(kg): --    PHYSICAL EXAM:    Constitutional: NAD  Eyes: EOMI, sclera non-icteric  Neck: supple, no masses, no JVD  Respiratory: CTA b/l, good air entry b/l  Cardiovascular: RRR, no M/R/G  Gastrointestinal: soft, NTND, no masses palpable, + BS, no hepatosplenomegaly  Extremities: no c/c/e  Neurological: AAOx3      MEDICATIONS  (STANDING):  albuterol/ipratropium for Nebulization 3 milliLiter(s) Nebulizer every 6 hours  AQUAPHOR (petrolatum Ointment) 1 Application(s) Topical two times a day  atorvastatin 40 milliGRAM(s) Oral at bedtime  caspofungin IVPB 50 milliGRAM(s) IV Intermittent every 24 hours  caspofungin IVPB      dextrose 5%. 1000 milliLiter(s) (100 mL/Hr) IV Continuous <Continuous>  dextrose 5%. 1000 milliLiter(s) (50 mL/Hr) IV Continuous <Continuous>  dextrose 50% Injectable 12.5 Gram(s) IV Push once  dextrose 50% Injectable 25 Gram(s) IV Push once  dextrose 50% Injectable 25 Gram(s) IV Push once  ferrous    sulfate 325 milliGRAM(s) Oral daily  fluticasone furoate/umeclidinium/vilanterol 100-62.5-25 MICROgram(s) Inhaler 1 Puff(s) Inhalation daily  folic acid 1 milliGRAM(s) Oral daily  furosemide   Injectable 20 milliGRAM(s) IV Push daily  glucagon  Injectable 1 milliGRAM(s) IntraMuscular once  hydrALAZINE 10 milliGRAM(s) Oral every 8 hours  insulin lispro (ADMELOG) corrective regimen sliding scale   SubCutaneous at bedtime  insulin lispro (ADMELOG) corrective regimen sliding scale   SubCutaneous three times a day before meals  levothyroxine 12.5 MICROGram(s) Oral daily  meropenem Injectable 1000 milliGRAM(s) IV Push every 8 hours  pantoprazole  Injectable 40 milliGRAM(s) IV Push two times a day  polyethylene glycol 3350 17 Gram(s) Oral daily  vancomycin  IVPB 750 milliGRAM(s) IV Intermittent every 12 hours    MEDICATIONS  (PRN):  acetaminophen     Tablet .. 650 milliGRAM(s) Oral every 6 hours PRN Temp greater or equal to 38C (100.4F), Mild Pain (1 - 3)  aluminum hydroxide/magnesium hydroxide/simethicone Suspension 30 milliLiter(s) Oral every 4 hours PRN Dyspepsia  benzonatate 100 milliGRAM(s) Oral three times a day PRN Cough  dextrose Oral Gel 15 Gram(s) Oral once PRN Blood Glucose LESS THAN 70 milliGRAM(s)/deciliter  furosemide   Injectable 10 milliGRAM(s) IV Push once PRN for after transfusion  melatonin 3 milliGRAM(s) Oral at bedtime PRN Insomnia  ondansetron Injectable 4 milliGRAM(s) IV Push every 8 hours PRN Nausea and/or Vomiting      Allergies    penicillin (Unknown)    Intolerances        LABS:                        8.9    3.65  )-----------( 145      ( 27 Nov 2023 06:38 )             26.7     11-27    135  |  96  |  37<H>  ----------------------------<  122<H>  3.5   |  34<H>  |  0.90    Ca    8.5      27 Nov 2023 06:38  Phos  2.2     11-27  Mg     1.9     11-27    TPro  5.3<L>  /  Alb  2.1<L>  /  TBili  1.1  /  DBili  0.4<H>  /  AST  29  /  ALT  23  /  AlkPhos  92  11-27      Urinalysis Basic - ( 27 Nov 2023 06:38 )    Color: x / Appearance: x / SG: x / pH: x  Gluc: 122 mg/dL / Ketone: x  / Bili: x / Urobili: x   Blood: x / Protein: x / Nitrite: x   Leuk Esterase: x / RBC: x / WBC x   Sq Epi: x / Non Sq Epi: x / Bacteria: x        RADIOLOGY & ADDITIONAL TESTS:  Studies reviewed.   INTERVAL HPI/OVERNIGHT EVENTS:  Patient S&E at bedside.   No o/n events,   CT chest- Large areas of consolidation within the right lung progressed since November 20, 2023. Cluster of a few nodular opacities within the left lower lobe new since November 20, 2023 likely of infectious etiology with endobronchial spread. Small left pleural effusion with interval increase since November 20, 2023.  pt slightly more lethargic this am after getting back from CT   remains on 3-4L NC      PAST MEDICAL & SURGICAL HISTORY:  COPD (chronic obstructive pulmonary disease)      Lung cancer      Anemia of chronic disease      CKD (chronic kidney disease), stage III      Hypothyroidism      AAA (abdominal aortic aneurysm)      HTN (hypertension)      HLD (hyperlipidemia)      Thrombocytopenia      S/P lobectomy of lung          FAMILY HISTORY:      VITAL SIGNS:  T(F): 98.2 (11-27-23 @ 09:50)  HR: 100 (11-27-23 @ 11:02)  BP: 152/64 (11-27-23 @ 09:50)  RR: 18 (11-27-23 @ 09:50)  SpO2: 98% (11-27-23 @ 09:50)  Wt(kg): --    PHYSICAL EXAM:    Constitutional: NAD, lethargic  Respiratory: rhonchi on 4L NC  Cardiovascular: tachycardic   Gastrointestinal: soft, NTND,  Extremities: leg wrapped    MEDICATIONS  (STANDING):  albuterol/ipratropium for Nebulization 3 milliLiter(s) Nebulizer every 6 hours  AQUAPHOR (petrolatum Ointment) 1 Application(s) Topical two times a day  atorvastatin 40 milliGRAM(s) Oral at bedtime  caspofungin IVPB 50 milliGRAM(s) IV Intermittent every 24 hours  caspofungin IVPB      dextrose 5%. 1000 milliLiter(s) (100 mL/Hr) IV Continuous <Continuous>  dextrose 5%. 1000 milliLiter(s) (50 mL/Hr) IV Continuous <Continuous>  dextrose 50% Injectable 12.5 Gram(s) IV Push once  dextrose 50% Injectable 25 Gram(s) IV Push once  dextrose 50% Injectable 25 Gram(s) IV Push once  ferrous    sulfate 325 milliGRAM(s) Oral daily  fluticasone furoate/umeclidinium/vilanterol 100-62.5-25 MICROgram(s) Inhaler 1 Puff(s) Inhalation daily  folic acid 1 milliGRAM(s) Oral daily  furosemide   Injectable 20 milliGRAM(s) IV Push daily  glucagon  Injectable 1 milliGRAM(s) IntraMuscular once  hydrALAZINE 10 milliGRAM(s) Oral every 8 hours  insulin lispro (ADMELOG) corrective regimen sliding scale   SubCutaneous at bedtime  insulin lispro (ADMELOG) corrective regimen sliding scale   SubCutaneous three times a day before meals  levothyroxine 12.5 MICROGram(s) Oral daily  meropenem Injectable 1000 milliGRAM(s) IV Push every 8 hours  pantoprazole  Injectable 40 milliGRAM(s) IV Push two times a day  polyethylene glycol 3350 17 Gram(s) Oral daily  vancomycin  IVPB 750 milliGRAM(s) IV Intermittent every 12 hours    MEDICATIONS  (PRN):  acetaminophen     Tablet .. 650 milliGRAM(s) Oral every 6 hours PRN Temp greater or equal to 38C (100.4F), Mild Pain (1 - 3)  aluminum hydroxide/magnesium hydroxide/simethicone Suspension 30 milliLiter(s) Oral every 4 hours PRN Dyspepsia  benzonatate 100 milliGRAM(s) Oral three times a day PRN Cough  dextrose Oral Gel 15 Gram(s) Oral once PRN Blood Glucose LESS THAN 70 milliGRAM(s)/deciliter  furosemide   Injectable 10 milliGRAM(s) IV Push once PRN for after transfusion  melatonin 3 milliGRAM(s) Oral at bedtime PRN Insomnia  ondansetron Injectable 4 milliGRAM(s) IV Push every 8 hours PRN Nausea and/or Vomiting      Allergies    penicillin (Unknown)    Intolerances        LABS:                        8.9    3.65  )-----------( 145      ( 27 Nov 2023 06:38 )             26.7     11-27    135  |  96  |  37<H>  ----------------------------<  122<H>  3.5   |  34<H>  |  0.90    Ca    8.5      27 Nov 2023 06:38  Phos  2.2     11-27  Mg     1.9     11-27    TPro  5.3<L>  /  Alb  2.1<L>  /  TBili  1.1  /  DBili  0.4<H>  /  AST  29  /  ALT  23  /  AlkPhos  92  11-27      Urinalysis Basic - ( 27 Nov 2023 06:38 )    Color: x / Appearance: x / SG: x / pH: x  Gluc: 122 mg/dL / Ketone: x  / Bili: x / Urobili: x   Blood: x / Protein: x / Nitrite: x   Leuk Esterase: x / RBC: x / WBC x   Sq Epi: x / Non Sq Epi: x / Bacteria: x        RADIOLOGY & ADDITIONAL TESTS:  Studies reviewed.

## 2023-11-27 NOTE — PROGRESS NOTE ADULT - ASSESSMENT
84 y/o Male with h/o lung CA follows at [facility] [place]/p chemotherapy and RT 05/2023, s/p right upper lobectomy, HTN, HPL, Chronic anemia, COPD, AAA, Hypothyroidism, CKD stage IIIa was admitted on 11/2 for increased shortness of breath, dyspnea on minimal exertion, and cough. Reportedly, he had CT of the chest performed 10/3 which demonstrated consolidation, was started on augmentin/prednisone/azithro 10/6 x 1 week. Pt completed course of medications however wife noted worsening cough and pt with fever Tmax of 103.9 on 10/26. Wife then took pt to pulmonologist again who started pt on second round of the same medications, instructed wife to stop giving pt tylenol wife notes fevers self-resolved. Since then pt decreased energy with episode of being unsteady on feet. Today is 6th day of taking medications, had f/u scheduled with pulmonologist who sent pt to ED for eval.  He feels tired and weak. In ER he received ceftriaxone.    1. Large RLL pulmonary cavity with worsening opacification. Worsening pulmonary infiltrates, likely pneumonia. Possible recurrent lung Ca. Lung Ca s/p right upper lobectomy. Radiation pneumonitis. CRF stage 3. Allergy to PCN.   -respiratory frail  -extensive right lung consolidations  -worsening large pulmonary cavity opacification on repeat CT chest   -bronchoscopy cultures show normal respiratory jaime and rare yeast  -fungal bronch c/s is pending and will take several weeks to finalize  -s/p levofloxacin # 10  -s/p voriconazole 200 mg PO q12h # 7  -s/p fluconazole 100 mg IV qd # 8  -s/p doxycycline 100 mg IV q12h # 10  -pulmonary evaluation appreciated   -I am concerned that there is no definite diagnosis for his pulmonary illness and that abx therapy dose not seem to help so far  -on meropenem 1 gm IV q8h # 15 and caspofungin 50 mg IV qd # 7 and vancomycin 750 mg IV q12h # 6  -tolerating abx well so far; no side effects noted  -thoracic evaluation appreciated - conservative care recommended   -renal function is improved  -vancomycin trough level is therapeutic  -renal function is stable  -f/u cultures  -continue abx coverage for now  -case reviewed with Dr. Sheldon  -will re-evaluate the patient's medical condition in AM with the other medical team members and reconsider abx therapy  -monitor temps  -f/u CBC  -supportive care  2. Other issues:   -care per medicine    d/w medicine team and Dr. Sheldon

## 2023-11-28 LAB
ANION GAP SERPL CALC-SCNC: 5 MMOL/L — SIGNIFICANT CHANGE UP (ref 5–17)
ANION GAP SERPL CALC-SCNC: 5 MMOL/L — SIGNIFICANT CHANGE UP (ref 5–17)
BUN SERPL-MCNC: 44 MG/DL — HIGH (ref 7–23)
BUN SERPL-MCNC: 44 MG/DL — HIGH (ref 7–23)
CALCIUM SERPL-MCNC: 8.6 MG/DL — SIGNIFICANT CHANGE UP (ref 8.5–10.1)
CALCIUM SERPL-MCNC: 8.6 MG/DL — SIGNIFICANT CHANGE UP (ref 8.5–10.1)
CHLORIDE SERPL-SCNC: 96 MMOL/L — SIGNIFICANT CHANGE UP (ref 96–108)
CHLORIDE SERPL-SCNC: 96 MMOL/L — SIGNIFICANT CHANGE UP (ref 96–108)
CO2 SERPL-SCNC: 36 MMOL/L — HIGH (ref 22–31)
CO2 SERPL-SCNC: 36 MMOL/L — HIGH (ref 22–31)
CREAT SERPL-MCNC: 1.01 MG/DL — SIGNIFICANT CHANGE UP (ref 0.5–1.3)
CREAT SERPL-MCNC: 1.01 MG/DL — SIGNIFICANT CHANGE UP (ref 0.5–1.3)
CRP SERPL-MCNC: 110 MG/L — HIGH
CRP SERPL-MCNC: 110 MG/L — HIGH
EGFR: 74 ML/MIN/1.73M2 — SIGNIFICANT CHANGE UP
EGFR: 74 ML/MIN/1.73M2 — SIGNIFICANT CHANGE UP
GLUCOSE BLDC GLUCOMTR-MCNC: 126 MG/DL — HIGH (ref 70–99)
GLUCOSE BLDC GLUCOMTR-MCNC: 126 MG/DL — HIGH (ref 70–99)
GLUCOSE BLDC GLUCOMTR-MCNC: 129 MG/DL — HIGH (ref 70–99)
GLUCOSE BLDC GLUCOMTR-MCNC: 129 MG/DL — HIGH (ref 70–99)
GLUCOSE BLDC GLUCOMTR-MCNC: 165 MG/DL — HIGH (ref 70–99)
GLUCOSE BLDC GLUCOMTR-MCNC: 165 MG/DL — HIGH (ref 70–99)
GLUCOSE BLDC GLUCOMTR-MCNC: 175 MG/DL — HIGH (ref 70–99)
GLUCOSE BLDC GLUCOMTR-MCNC: 175 MG/DL — HIGH (ref 70–99)
GLUCOSE SERPL-MCNC: 119 MG/DL — HIGH (ref 70–99)
GLUCOSE SERPL-MCNC: 119 MG/DL — HIGH (ref 70–99)
HCT VFR BLD CALC: 27.1 % — LOW (ref 39–50)
HCT VFR BLD CALC: 27.1 % — LOW (ref 39–50)
HGB BLD-MCNC: 8.9 G/DL — LOW (ref 13–17)
HGB BLD-MCNC: 8.9 G/DL — LOW (ref 13–17)
MAGNESIUM SERPL-MCNC: 2.1 MG/DL — SIGNIFICANT CHANGE UP (ref 1.6–2.6)
MAGNESIUM SERPL-MCNC: 2.1 MG/DL — SIGNIFICANT CHANGE UP (ref 1.6–2.6)
MCHC RBC-ENTMCNC: 30.7 PG — SIGNIFICANT CHANGE UP (ref 27–34)
MCHC RBC-ENTMCNC: 30.7 PG — SIGNIFICANT CHANGE UP (ref 27–34)
MCHC RBC-ENTMCNC: 32.8 GM/DL — SIGNIFICANT CHANGE UP (ref 32–36)
MCHC RBC-ENTMCNC: 32.8 GM/DL — SIGNIFICANT CHANGE UP (ref 32–36)
MCV RBC AUTO: 93.4 FL — SIGNIFICANT CHANGE UP (ref 80–100)
MCV RBC AUTO: 93.4 FL — SIGNIFICANT CHANGE UP (ref 80–100)
NT-PROBNP SERPL-SCNC: 2515 PG/ML — HIGH (ref 0–450)
NT-PROBNP SERPL-SCNC: 2515 PG/ML — HIGH (ref 0–450)
OB PNL STL: NEGATIVE — SIGNIFICANT CHANGE UP
OB PNL STL: NEGATIVE — SIGNIFICANT CHANGE UP
PHOSPHATE SERPL-MCNC: 2.7 MG/DL — SIGNIFICANT CHANGE UP (ref 2.5–4.5)
PHOSPHATE SERPL-MCNC: 2.7 MG/DL — SIGNIFICANT CHANGE UP (ref 2.5–4.5)
PLATELET # BLD AUTO: 153 K/UL — SIGNIFICANT CHANGE UP (ref 150–400)
PLATELET # BLD AUTO: 153 K/UL — SIGNIFICANT CHANGE UP (ref 150–400)
POTASSIUM SERPL-MCNC: 3.3 MMOL/L — LOW (ref 3.5–5.3)
POTASSIUM SERPL-MCNC: 3.3 MMOL/L — LOW (ref 3.5–5.3)
POTASSIUM SERPL-SCNC: 3.3 MMOL/L — LOW (ref 3.5–5.3)
POTASSIUM SERPL-SCNC: 3.3 MMOL/L — LOW (ref 3.5–5.3)
PROCALCITONIN SERPL-MCNC: 0.36 NG/ML — HIGH (ref 0.02–0.1)
PROCALCITONIN SERPL-MCNC: 0.36 NG/ML — HIGH (ref 0.02–0.1)
RBC # BLD: 2.9 M/UL — LOW (ref 4.2–5.8)
RBC # BLD: 2.9 M/UL — LOW (ref 4.2–5.8)
RBC # FLD: 17.5 % — HIGH (ref 10.3–14.5)
RBC # FLD: 17.5 % — HIGH (ref 10.3–14.5)
SODIUM SERPL-SCNC: 137 MMOL/L — SIGNIFICANT CHANGE UP (ref 135–145)
SODIUM SERPL-SCNC: 137 MMOL/L — SIGNIFICANT CHANGE UP (ref 135–145)
WBC # BLD: 4.32 K/UL — SIGNIFICANT CHANGE UP (ref 3.8–10.5)
WBC # BLD: 4.32 K/UL — SIGNIFICANT CHANGE UP (ref 3.8–10.5)
WBC # FLD AUTO: 4.32 K/UL — SIGNIFICANT CHANGE UP (ref 3.8–10.5)
WBC # FLD AUTO: 4.32 K/UL — SIGNIFICANT CHANGE UP (ref 3.8–10.5)

## 2023-11-28 PROCEDURE — 99232 SBSQ HOSP IP/OBS MODERATE 35: CPT

## 2023-11-28 RX ORDER — FLUCONAZOLE 150 MG/1
100 TABLET ORAL DAILY
Refills: 0 | Status: DISCONTINUED | OUTPATIENT
Start: 2023-11-29 | End: 2023-11-30

## 2023-11-28 RX ORDER — PANTOPRAZOLE SODIUM 20 MG/1
1 TABLET, DELAYED RELEASE ORAL
Qty: 30 | Refills: 0
Start: 2023-11-28 | End: 2023-12-27

## 2023-11-28 RX ORDER — FOLIC ACID 0.8 MG
1 TABLET ORAL
Qty: 0 | Refills: 0 | DISCHARGE

## 2023-11-28 RX ORDER — HYDRALAZINE HCL 50 MG
1 TABLET ORAL
Qty: 90 | Refills: 0
Start: 2023-11-28 | End: 2023-12-27

## 2023-11-28 RX ORDER — IPRATROPIUM/ALBUTEROL SULFATE 18-103MCG
3 AEROSOL WITH ADAPTER (GRAM) INHALATION
Qty: 360 | Refills: 0
Start: 2023-11-28 | End: 2023-12-27

## 2023-11-28 RX ORDER — PANTOPRAZOLE SODIUM 20 MG/1
40 TABLET, DELAYED RELEASE ORAL
Refills: 0 | Status: DISCONTINUED | OUTPATIENT
Start: 2023-11-29 | End: 2023-11-30

## 2023-11-28 RX ORDER — LOSARTAN POTASSIUM 100 MG/1
1 TABLET, FILM COATED ORAL
Refills: 0 | DISCHARGE

## 2023-11-28 RX ORDER — CEFUROXIME AXETIL 250 MG
500 TABLET ORAL EVERY 12 HOURS
Refills: 0 | Status: DISCONTINUED | OUTPATIENT
Start: 2023-11-29 | End: 2023-11-30

## 2023-11-28 RX ORDER — FERROUS SULFATE 325(65) MG
1 TABLET ORAL
Qty: 30 | Refills: 0
Start: 2023-11-28 | End: 2023-12-27

## 2023-11-28 RX ORDER — POLYETHYLENE GLYCOL 3350 17 G/17G
17 POWDER, FOR SOLUTION ORAL
Qty: 510 | Refills: 0
Start: 2023-11-28 | End: 2023-12-27

## 2023-11-28 RX ORDER — PETROLATUM,WHITE
1 JELLY (GRAM) TOPICAL
Qty: 1 | Refills: 0
Start: 2023-11-28 | End: 2023-12-27

## 2023-11-28 RX ORDER — FLUCONAZOLE 150 MG/1
1 TABLET ORAL
Qty: 14 | Refills: 0
Start: 2023-11-28 | End: 2023-12-11

## 2023-11-28 RX ORDER — LEVOTHYROXINE SODIUM 125 MCG
0.5 TABLET ORAL
Qty: 0 | Refills: 0 | DISCHARGE

## 2023-11-28 RX ORDER — CEFUROXIME AXETIL 250 MG
1 TABLET ORAL
Qty: 28 | Refills: 0
Start: 2023-11-28 | End: 2023-12-11

## 2023-11-28 RX ORDER — ACETAMINOPHEN 500 MG
2 TABLET ORAL
Qty: 0 | Refills: 0 | DISCHARGE
Start: 2023-11-28

## 2023-11-28 RX ORDER — FUROSEMIDE 40 MG
20 TABLET ORAL DAILY
Refills: 0 | Status: DISCONTINUED | OUTPATIENT
Start: 2023-11-29 | End: 2023-11-30

## 2023-11-28 RX ORDER — FUROSEMIDE 40 MG
1 TABLET ORAL
Qty: 30 | Refills: 0
Start: 2023-11-28 | End: 2023-12-27

## 2023-11-28 RX ORDER — POTASSIUM CHLORIDE 20 MEQ
10 PACKET (EA) ORAL
Refills: 0 | Status: COMPLETED | OUTPATIENT
Start: 2023-11-28 | End: 2023-11-28

## 2023-11-28 RX ADMIN — Medication 20 MILLIGRAM(S): at 12:00

## 2023-11-28 RX ADMIN — Medication 10 MILLIEQUIVALENT(S): at 15:46

## 2023-11-28 RX ADMIN — Medication 250 MILLIGRAM(S): at 11:56

## 2023-11-28 RX ADMIN — Medication 1 APPLICATION(S): at 20:27

## 2023-11-28 RX ADMIN — Medication 1 APPLICATION(S): at 12:00

## 2023-11-28 RX ADMIN — POLYETHYLENE GLYCOL 3350 17 GRAM(S): 17 POWDER, FOR SOLUTION ORAL at 11:57

## 2023-11-28 RX ADMIN — FLUTICASONE FUROATE, UMECLIDINIUM BROMIDE AND VILANTEROL TRIFENATATE 1 PUFF(S): 200; 62.5; 25 POWDER RESPIRATORY (INHALATION) at 07:49

## 2023-11-28 RX ADMIN — Medication 3 MILLILITER(S): at 20:44

## 2023-11-28 RX ADMIN — Medication 10 MILLIEQUIVALENT(S): at 18:35

## 2023-11-28 RX ADMIN — MEROPENEM 1000 MILLIGRAM(S): 1 INJECTION INTRAVENOUS at 11:58

## 2023-11-28 RX ADMIN — PANTOPRAZOLE SODIUM 40 MILLIGRAM(S): 20 TABLET, DELAYED RELEASE ORAL at 20:27

## 2023-11-28 RX ADMIN — Medication 250 MILLIGRAM(S): at 23:10

## 2023-11-28 RX ADMIN — Medication 10 MILLIGRAM(S): at 00:26

## 2023-11-28 RX ADMIN — ATORVASTATIN CALCIUM 40 MILLIGRAM(S): 80 TABLET, FILM COATED ORAL at 20:27

## 2023-11-28 RX ADMIN — Medication 1 MILLIGRAM(S): at 11:59

## 2023-11-28 RX ADMIN — Medication 10 MILLIGRAM(S): at 18:27

## 2023-11-28 RX ADMIN — Medication 10 MILLIGRAM(S): at 11:58

## 2023-11-28 RX ADMIN — Medication 2: at 18:25

## 2023-11-28 RX ADMIN — MEROPENEM 1000 MILLIGRAM(S): 1 INJECTION INTRAVENOUS at 18:27

## 2023-11-28 RX ADMIN — Medication 325 MILLIGRAM(S): at 11:58

## 2023-11-28 RX ADMIN — PANTOPRAZOLE SODIUM 40 MILLIGRAM(S): 20 TABLET, DELAYED RELEASE ORAL at 11:59

## 2023-11-28 RX ADMIN — Medication 10 MILLIEQUIVALENT(S): at 13:22

## 2023-11-28 RX ADMIN — MEROPENEM 1000 MILLIGRAM(S): 1 INJECTION INTRAVENOUS at 00:31

## 2023-11-28 RX ADMIN — Medication 3 MILLILITER(S): at 07:48

## 2023-11-28 RX ADMIN — Medication 3 MILLILITER(S): at 01:51

## 2023-11-28 RX ADMIN — Medication 12.5 MICROGRAM(S): at 05:21

## 2023-11-28 RX ADMIN — Medication 3 MILLILITER(S): at 13:33

## 2023-11-28 RX ADMIN — CASPOFUNGIN ACETATE 260 MILLIGRAM(S): 7 INJECTION, POWDER, LYOPHILIZED, FOR SOLUTION INTRAVENOUS at 11:56

## 2023-11-28 RX ADMIN — Medication 10 MILLIGRAM(S): at 23:10

## 2023-11-28 NOTE — PROGRESS NOTE ADULT - SUBJECTIVE AND OBJECTIVE BOX
HOSPITALIST ATTENDING PROGRESS NOTE    Chart and meds reviewed.  Patient seen and examined.    CC: cough, weakness     11/21 - no events, afebrile, denies cp, dyspnea, + productive cough with brown mucus persist, poor po intake, plan discussed with wife at bedside  11/22 - pt seen in am, bilateral arm swelling, afebrile, + productive cough some streaks of blood , denies cp, abdominal pain, poor po intake  11/23 - afebrile, + upper extremities edema, + cough with brown mucus, denies abdominal pain, tolerating some po intake  11/24 - afebrile, UE edema improving, + gen weakness, + poor po intake, + cough persist, plan discussed   11/25 - dyspnea overnight, on BIPAP overnight, feels better in am, LLE stiches removed , denies cp, abdominal pain , plan discussed  11/26 - dyspnea overnight, better after BIPAP, denies cp, + cough, afebrile, plan discussed   11/27 - more lethargic, + weakness, tolerating po intake, using bipap overnight, cough and dyspnea the same, hospice care at home discussed with wife  at length    All other systems reviewed and found to be negative with the exception of what has been described above.    Vital sings reviewed for last 24 h  T(C): 37.1 (11-27-23 @ 15:46), Max: 37.3 (11-27-23 @ 01:15)  T(F): 98.7 (11-27-23 @ 15:46), Max: 99.1 (11-27-23 @ 01:15)  HR: 103 (11-27-23 @ 15:46) (100 - 106)  BP: 138/58 (11-27-23 @ 15:46) (138/58 - 152/64)  RR: 18 (11-27-23 @ 15:46) (18 - 18)  SpO2: 95% (11-27-23 @ 15:46) (95% - 99%)  Wt(kg): --  Daily     Daily   CAPILLARY BLOOD GLUCOSE      POCT Blood Glucose.: 196 mg/dL (27 Nov 2023 17:25)  POCT Blood Glucose.: 147 mg/dL (27 Nov 2023 12:36)  POCT Blood Glucose.: 116 mg/dL (27 Nov 2023 07:45)  POCT Blood Glucose.: 167 mg/dL (26 Nov 2023 22:27)        Physical exam :   GEN: Frail, NAD  HEENT:  pupils equal and reactive, EOMI, no oropharyngeal lesions, erythema, exudates, oral thrush  NECK:   supple, no carotid bruits, no palpable lymph nodes, no thyromegaly  CV:  +S1, +S2, regular, no murmurs or rubs  RESP:  Bilateral ronchi, occasional wheezing   BREAST:  not examined  GI:  abdomen soft, non-tender, non-distended, normal BS, no bruits, no abdominal masses, no palpable masses  RECTAL:  not examined  :  not examined  MSK:   normal muscle tone, no atrophy, no rigidity, no contractions  EXT:  no clubbing, no cyanosis, no edema, no calf pain, BIlateral LE edema.   Left leg sutures  VASCULAR:  pulses equal and symmetric in the upper and lower extremities  NEURO:  AAOX1, no focal neurological deficits, follows all commands, able to move extremities spontaneously  SKIN:  no ulcers, lesions or rashes    All labs radiology and other studies reviewed and interpreted :                         8.9    3.65  )-----------( 145      ( 27 Nov 2023 06:38 )             26.7     11-27    135  |  96  |  37<H>  ----------------------------<  122<H>  3.5   |  34<H>  |  0.90    Ca    8.5      27 Nov 2023 06:38  Phos  2.2     11-27  Mg     1.9     11-27    TPro  5.3<L>  /  Alb  2.1<L>  /  TBili  1.1  /  DBili  0.4<H>  /  AST  29  /  ALT  23  /  AlkPhos  92  11-27        LIVER FUNCTIONS - ( 27 Nov 2023 06:38 )  Alb: 2.1 g/dL / Pro: 5.3 gm/dL / ALK PHOS: 92 U/L / ALT: 23 U/L / AST: 29 U/L / GGT: x                     Procalcitonin  Procalcitonin, Serum: 0.40:   11-26-23 @ 07:16   -  0.31<H>  11-25-23 @ 06:50   -  0.32<H>  Procalcitonin, Serum: 3.23:         C-Reactive Protein, Serum: 85 mg/L (11.27.23 @ 06:38)          C-Reactive Protein, Serum: 58 mg/L (11.25.23 @ 06:50)    C-Reactive Protein, Serum: 59 mg/L (11.24.23 @ 11:58)    C-Reactive Protein, Serum: 75 mg/L (11.26.23 @ 07:16)      C-Reactive Protein, Serum: 70 mg/L (11-23-23 @ 08:53)  C-Reactive Protein, Serum: 24 mg/L (11-14-23 @ 07:03)  C-Reactive Protein, Serum: 34 mg/L (11-13-23 @ 06:30)  C-Reactive Protein, Serum: 46 mg/L (11-12-23 @ 06:55)  C-Reactive Protein, Serum: 70 mg/L (11-11-23 @ 06:31)  C-Reactive Protein, Serum: 106 mg/L (11-10-23 @ 06:19)    Procalcitonin, Serum: 3.23: Note: Concentrations <0.5 ng/mL do not exclude an infection, on account  of localized infections (without systemic signs) which can be associated  with such low concentrations, or a systemic infection in its initial  stages (<6 hours). Furthermore, increased procalcitonin may occur without  infection. PCT concentrations between 0.5 and 2.0 ng/mL should be  interpreted taking into account the patient’s history. It is recommended  to pretest PCT within 6-24 hours if any concentrations <2.0 mg/mL are  obtained. ng/mL (11.08.23 @ 06:01)      Cortisol AM, Serum: 16.4 ug/dL (11.23.23 @ 08:53)    < from: CT Chest No Cont (11.27.23 @ 08:04) >    IMPRESSION: Large areas of consolidation within the right lung progressed   since November 20, 2023.    Cluster of a few nodular opacities within the left lower lobe new since   November 20, 2023 likely of infectious etiology with endobronchial spread.    Small left pleural effusion with interval increase since November 20, 2023.    < end of copied text >      < from: Xray Chest 1 View- PORTABLE-Routine (Xray Chest 1 View- PORTABLE-Routine .) (11.25.23 @ 15:23) >    IMPRESSION: Extensive right lung findings show new infiltrate in the   right lower lobe.    < end of copied text >         CT Chest No Cont (11.20.23 @ 08:03) >  IMPRESSION: Extensive right lung consolidations as described above with   overall interval progression since November 13, 2023. Complete   opacification of the right middle lobe with internal consolidation and   volume loss appears unchanged since November 13, 2023 demonstrating  interval progression since November 2, 2023.    Persistent apical right apical large irregular cyst unchanged in size   since November 13, 2023.    Small left pleural effusion with subcutaneous edema.    Trace amount of ascites, increased in size since November 13, 2023.    Xray Chest 1 View- PORTABLE-Routine (Xray Chest 1 View- PORTABLE-Routine .) (11.16.23 @ 12:32) >  IMPRESSION: Slight progression of pneumonia since November 11    MEDICATIONS  (STANDING):  albuterol/ipratropium for Nebulization 3 milliLiter(s) Nebulizer every 6 hours  AQUAPHOR (petrolatum Ointment) 1 Application(s) Topical two times a day  atorvastatin 40 milliGRAM(s) Oral at bedtime  caspofungin IVPB 50 milliGRAM(s) IV Intermittent every 24 hours  caspofungin IVPB      dextrose 5%. 1000 milliLiter(s) (50 mL/Hr) IV Continuous <Continuous>  dextrose 5%. 1000 milliLiter(s) (100 mL/Hr) IV Continuous <Continuous>  dextrose 50% Injectable 25 Gram(s) IV Push once  dextrose 50% Injectable 12.5 Gram(s) IV Push once  dextrose 50% Injectable 25 Gram(s) IV Push once  ferrous    sulfate 325 milliGRAM(s) Oral daily  fluticasone furoate/umeclidinium/vilanterol 100-62.5-25 MICROgram(s) Inhaler 1 Puff(s) Inhalation daily  folic acid 1 milliGRAM(s) Oral daily  furosemide   Injectable 10 milliGRAM(s) IV Push once  glucagon  Injectable 1 milliGRAM(s) IntraMuscular once  heparin   Injectable 5000 Unit(s) SubCutaneous every 12 hours  hydrALAZINE 10 milliGRAM(s) Oral two times a day  insulin lispro (ADMELOG) corrective regimen sliding scale   SubCutaneous at bedtime  insulin lispro (ADMELOG) corrective regimen sliding scale   SubCutaneous three times a day before meals  levothyroxine 12.5 MICROGram(s) Oral daily  meropenem Injectable 1000 milliGRAM(s) IV Push every 8 hours  pantoprazole  Injectable 40 milliGRAM(s) IV Push two times a day  polyethylene glycol 3350 17 Gram(s) Oral daily  vancomycin  IVPB 750 milliGRAM(s) IV Intermittent every 12 hours    MEDICATIONS  (PRN):  acetaminophen     Tablet .. 650 milliGRAM(s) Oral every 6 hours PRN Temp greater or equal to 38C (100.4F), Mild Pain (1 - 3)  aluminum hydroxide/magnesium hydroxide/simethicone Suspension 30 milliLiter(s) Oral every 4 hours PRN Dyspepsia  benzonatate 100 milliGRAM(s) Oral three times a day PRN Cough  dextrose Oral Gel 15 Gram(s) Oral once PRN Blood Glucose LESS THAN 70 milliGRAM(s)/deciliter  melatonin 3 milliGRAM(s) Oral at bedtime PRN Insomnia  ondansetron Injectable 4 milliGRAM(s) IV Push every 8 hours PRN Nausea and/or Vomiting  ondansetron Injectable 4 milliGRAM(s) IV Push every 8 hours PRN Nausea and/or Vomiting       HOSPITALIST ATTENDING PROGRESS NOTE    Chart and meds reviewed.  Patient seen and examined.    CC: cough, weakness      - no events, afebrile, denies cp, dyspnea, + productive cough with brown mucus persist, poor po intake, plan discussed with wife at bedside   - pt seen in am, bilateral arm swelling, afebrile, + productive cough some streaks of blood , denies cp, abdominal pain, poor po intake   - afebrile, + upper extremities edema, + cough with brown mucus, denies abdominal pain, tolerating some po intake   - afebrile, UE edema improving, + gen weakness, + poor po intake, + cough persist, plan discussed    - dyspnea overnight, on BIPAP overnight, feels better in am, LLE stiches removed , denies cp, abdominal pain , plan discussed   - dyspnea overnight, better after BIPAP, denies cp, + cough, afebrile, plan discussed    - more lethargic, + weakness, tolerating po intake, using bipap overnight, cough and dyspnea the same, hospice care at home discussed with wife  at length   - fever 101 overnight, dyspnea worse at night, tolerating some po intake , eager to go home, family ok to switching to po abx and antifugal and transition pt to home hospice with BIPAP and home hospice     All other systems reviewed and found to be negative with the exception of what has been described above.    Vital sings reviewed for last 24 h  T(C): 36.8 (23 @ 20:17), Max: 37.2 (23 @ 23:34)  T(F): 98.3 (23 @ 20:17), Max: 98.9 (23 @ 23:34)  HR: 108 (23 @ 21:10) (90 - 108)  BP: 141/56 (23 @ 20:17) (131/75 - 148/65)  RR: 23 (23 @ 20:17) (18 - 23)  SpO2: 99% (23 @ 21:10) (95% - 99%)  Wt(kg): --  Daily     Daily Weight in k.1 (2023 05:21)  CAPILLARY BLOOD GLUCOSE      POCT Blood Glucose.: 165 mg/dL (2023 20:14)  POCT Blood Glucose.: 175 mg/dL (2023 18:12)  POCT Blood Glucose.: 129 mg/dL (2023 13:21)  POCT Blood Glucose.: 126 mg/dL (2023 09:01)          Physical exam :   GEN: Frail, NAD  HEENT:  pupils equal and reactive, EOMI, no oropharyngeal lesions, erythema, exudates, oral thrush  NECK:   supple, no carotid bruits, no palpable lymph nodes, no thyromegaly  CV:  +S1, +S2, regular, no murmurs or rubs  RESP:  Bilateral ronchi, occasional wheezing   BREAST:  not examined  GI:  abdomen soft, non-tender, non-distended, normal BS, no bruits, no abdominal masses, no palpable masses  RECTAL:  not examined  :  not examined  MSK:   normal muscle tone, no atrophy, no rigidity, no contractions  EXT:  no clubbing, no cyanosis, no edema, no calf pain, BIlateral LE edema.   Left leg sutures  VASCULAR:  pulses equal and symmetric in the upper and lower extremities  NEURO:  AAOX1, no focal neurological deficits, follows all commands, able to move extremities spontaneously  SKIN:  no ulcers, lesions or rashes    All labs radiology and other studies reviewed and interpreted :                         8.9    4.32  )-----------( 153      ( 2023 07:38 )             27.1     -    137  |  96  |  44<H>  ----------------------------<  119<H>  3.3<L>   |  36<H>  |  1.01    Ca    8.6      2023 07:38  Phos  2.7     -  Mg     2.1     -    TPro  5.3<L>  /  Alb  2.1<L>  /  TBili  1.1  /  DBili  0.4<H>  /  AST  29  /  ALT  23  /  AlkPhos  92  -        LIVER FUNCTIONS - ( 2023 06:38 )  Alb: 2.1 g/dL / Pro: 5.3 gm/dL / ALK PHOS: 92 U/L / ALT: 23 U/L / AST: 29 U/L / GGT: x             Pro-Brain Natriuretic Peptide: 2515 pg/mL (23 @ 07:38)    Pro-Brain Natriuretic Peptide: 4335 pg/mL (23 @ 06:38)                    Procalcitonin, Serum: 0.36:   Procalcitonin  Procalcitonin, Serum: 0.40:   23 @ 07:16   -  0.31<H>  23 @ 06:50   -  0.32<H>  Procalcitonin, Serum: 3.23:       C-Reactive Protein, Serum: 110 mg/L (23 @ 07:38)      C-Reactive Protein, Serum: 85 mg/L (23 @ 06:38)          C-Reactive Protein, Serum: 58 mg/L (23 @ 06:50)    C-Reactive Protein, Serum: 59 mg/L (23 @ 11:58)    C-Reactive Protein, Serum: 75 mg/L (23 @ 07:16)      C-Reactive Protein, Serum: 70 mg/L (23 @ 08:53)  C-Reactive Protein, Serum: 24 mg/L (23 @ 07:03)  C-Reactive Protein, Serum: 34 mg/L (23 @ 06:30)  C-Reactive Protein, Serum: 46 mg/L (23 @ 06:55)  C-Reactive Protein, Serum: 70 mg/L (23 @ 06:31)  C-Reactive Protein, Serum: 106 mg/L (11-10-23 @ 06:19)    Procalcitonin, Serum: 3.23: Note: Concentrations <0.5 ng/mL do not exclude an infection, on account  of localized infections (without systemic signs) which can be associated  with such low concentrations, or a systemic infection in its initial  stages (<6 hours). Furthermore, increased procalcitonin may occur without  infection. PCT concentrations between 0.5 and 2.0 ng/mL should be  interpreted taking into account the patient’s history. It is recommended  to pretest PCT within 6-24 hours if any concentrations <2.0 mg/mL are  obtained. ng/mL (23 @ 06:01)      Cortisol AM, Serum: 16.4 ug/dL (11.23.23 @ 08:53)    < from: CT Chest No Cont (23 @ 08:04) >    IMPRESSION: Large areas of consolidation within the right lung progressed   since 2023.    Cluster of a few nodular opacities within the left lower lobe new since   2023 likely of infectious etiology with endobronchial spread.    Small left pleural effusion with interval increase since 2023.    < end of copied text >      < from: Xray Chest 1 View- PORTABLE-Routine (Xray Chest 1 View- PORTABLE-Routine .) (23 @ 15:23) >    IMPRESSION: Extensive right lung findings show new infiltrate in the   right lower lobe.    < end of copied text >         CT Chest No Cont (23 @ 08:03) >  IMPRESSION: Extensive right lung consolidations as described above with   overall interval progression since 2023. Complete   opacification of the right middle lobe with internal consolidation and   volume loss appears unchanged since 2023 demonstrating  interval progression since 2023.    Persistent apical right apical large irregular cyst unchanged in size   since 2023.    Small left pleural effusion with subcutaneous edema.    Trace amount of ascites, increased in size since 2023.    Xray Chest 1 View- PORTABLE-Routine (Xray Chest 1 View- PORTABLE-Routine .) (23 @ 12:32) >  IMPRESSION: Slight progression of pneumonia since     MEDICATIONS  (STANDING):  albuterol/ipratropium for Nebulization 3 milliLiter(s) Nebulizer every 6 hours  AQUAPHOR (petrolatum Ointment) 1 Application(s) Topical two times a day  atorvastatin 40 milliGRAM(s) Oral at bedtime  caspofungin IVPB 50 milliGRAM(s) IV Intermittent every 24 hours  caspofungin IVPB      dextrose 5%. 1000 milliLiter(s) (50 mL/Hr) IV Continuous <Continuous>  dextrose 5%. 1000 milliLiter(s) (100 mL/Hr) IV Continuous <Continuous>  dextrose 50% Injectable 25 Gram(s) IV Push once  dextrose 50% Injectable 12.5 Gram(s) IV Push once  dextrose 50% Injectable 25 Gram(s) IV Push once  ferrous    sulfate 325 milliGRAM(s) Oral daily  fluticasone furoate/umeclidinium/vilanterol 100-62.5-25 MICROgram(s) Inhaler 1 Puff(s) Inhalation daily  folic acid 1 milliGRAM(s) Oral daily  furosemide   Injectable 10 milliGRAM(s) IV Push once  glucagon  Injectable 1 milliGRAM(s) IntraMuscular once  heparin   Injectable 5000 Unit(s) SubCutaneous every 12 hours  hydrALAZINE 10 milliGRAM(s) Oral two times a day  insulin lispro (ADMELOG) corrective regimen sliding scale   SubCutaneous at bedtime  insulin lispro (ADMELOG) corrective regimen sliding scale   SubCutaneous three times a day before meals  levothyroxine 12.5 MICROGram(s) Oral daily  meropenem Injectable 1000 milliGRAM(s) IV Push every 8 hours  pantoprazole  Injectable 40 milliGRAM(s) IV Push two times a day  polyethylene glycol 3350 17 Gram(s) Oral daily  vancomycin  IVPB 750 milliGRAM(s) IV Intermittent every 12 hours    MEDICATIONS  (PRN):  acetaminophen     Tablet .. 650 milliGRAM(s) Oral every 6 hours PRN Temp greater or equal to 38C (100.4F), Mild Pain (1 - 3)  aluminum hydroxide/magnesium hydroxide/simethicone Suspension 30 milliLiter(s) Oral every 4 hours PRN Dyspepsia  benzonatate 100 milliGRAM(s) Oral three times a day PRN Cough  dextrose Oral Gel 15 Gram(s) Oral once PRN Blood Glucose LESS THAN 70 milliGRAM(s)/deciliter  melatonin 3 milliGRAM(s) Oral at bedtime PRN Insomnia  ondansetron Injectable 4 milliGRAM(s) IV Push every 8 hours PRN Nausea and/or Vomiting  ondansetron Injectable 4 milliGRAM(s) IV Push every 8 hours PRN Nausea and/or Vomiting

## 2023-11-28 NOTE — PROGRESS NOTE ADULT - SUBJECTIVE AND OBJECTIVE BOX
Subjective:    Review of Systems:  All 10 systems reviewed in detailed and found to be negative with the exception of what has already been described above    Allergies:  penicillin (Unknown)    Meds  MEDICATIONS  (STANDING):  albuterol/ipratropium for Nebulization 3 milliLiter(s) Nebulizer every 6 hours  AQUAPHOR (petrolatum Ointment) 1 Application(s) Topical two times a day  atorvastatin 40 milliGRAM(s) Oral at bedtime  caspofungin IVPB 50 milliGRAM(s) IV Intermittent every 24 hours  caspofungin IVPB      dextrose 5%. 1000 milliLiter(s) (50 mL/Hr) IV Continuous <Continuous>  dextrose 5%. 1000 milliLiter(s) (100 mL/Hr) IV Continuous <Continuous>  dextrose 50% Injectable 25 Gram(s) IV Push once  dextrose 50% Injectable 12.5 Gram(s) IV Push once  dextrose 50% Injectable 25 Gram(s) IV Push once  ferrous    sulfate 325 milliGRAM(s) Oral daily  fluticasone furoate/umeclidinium/vilanterol 100-62.5-25 MICROgram(s) Inhaler 1 Puff(s) Inhalation daily  folic acid 1 milliGRAM(s) Oral daily  furosemide   Injectable 20 milliGRAM(s) IV Push daily  glucagon  Injectable 1 milliGRAM(s) IntraMuscular once  hydrALAZINE 10 milliGRAM(s) Oral every 8 hours  insulin lispro (ADMELOG) corrective regimen sliding scale   SubCutaneous three times a day before meals  insulin lispro (ADMELOG) corrective regimen sliding scale   SubCutaneous at bedtime  levothyroxine 12.5 MICROGram(s) Oral daily  meropenem Injectable 1000 milliGRAM(s) IV Push every 8 hours  pantoprazole  Injectable 40 milliGRAM(s) IV Push two times a day  polyethylene glycol 3350 17 Gram(s) Oral daily  potassium chloride    Tablet ER 10 milliEquivalent(s) Oral every 2 hours  vancomycin  IVPB 750 milliGRAM(s) IV Intermittent every 12 hours    MEDICATIONS  (PRN):  acetaminophen     Tablet .. 650 milliGRAM(s) Oral every 6 hours PRN Temp greater or equal to 38C (100.4F), Mild Pain (1 - 3)  aluminum hydroxide/magnesium hydroxide/simethicone Suspension 30 milliLiter(s) Oral every 4 hours PRN Dyspepsia  benzonatate 100 milliGRAM(s) Oral three times a day PRN Cough  dextrose Oral Gel 15 Gram(s) Oral once PRN Blood Glucose LESS THAN 70 milliGRAM(s)/deciliter  furosemide   Injectable 10 milliGRAM(s) IV Push once PRN for after transfusion  melatonin 3 milliGRAM(s) Oral at bedtime PRN Insomnia  ondansetron Injectable 4 milliGRAM(s) IV Push every 8 hours PRN Nausea and/or Vomiting    Physical Exam  T(C): 36.8 (11-28-23 @ 08:48), Max: 38.3 (11-27-23 @ 21:04)  HR: 93 (11-28-23 @ 11:00) (90 - 112)  BP: 133/56 (11-28-23 @ 11:00) (133/56 - 148/65)  RR: 20 (11-28-23 @ 08:48) (18 - 24)  SpO2: 95% (11-28-23 @ 08:48) (95% - 99%)  Gen: Alert, oriented, no distress on nasal cannula  HEENT: Anicteric sclera, moist mucous membranes  Cardio: Regular rhythm and rate, normal S1S2, no murmurs  Resp: Crackles, congested  GI: Nontender, nondistended, normoactive bowel sounds  Ext: No cyanosis, clubbing or edema in le, + redness and swelling in LUE by midline  skin: + ecchymosis, left leg laceration sutured and bandaged  Neuro: Nonfocal    Labs:                        8.9    4.32  )-----------( 153      ( 28 Nov 2023 07:38 )             27.1     11-28    137  |  96  |  44<H>  ----------------------------<  119<H>  3.3<L>   |  36<H>  |  1.01    Ca    8.6      28 Nov 2023 07:38  Phos  2.7     11-28  Mg     2.1     11-28    TPro  5.3<L>  /  Alb  2.1<L>  /  TBili  1.1  /  DBili  0.4<H>  /  AST  29  /  ALT  23  /  AlkPhos  92  11-27      Urinalysis Basic - ( 28 Nov 2023 07:38 )    Color: x / Appearance: x / SG: x / pH: x  Gluc: 119 mg/dL / Ketone: x  / Bili: x / Urobili: x   Blood: x / Protein: x / Nitrite: x   Leuk Esterase: x / RBC: x / WBC x   Sq Epi: x / Non Sq Epi: x / Bacteria: x    < from: CT Chest No Cont (11.02.23 @ 16:04) >  ACC: [id number] EXAM:  CT CHEST   ORDERED BY: SARAH YOO     PROCEDURE DATE:  11/02/2023          INTERPRETATION:  INDICATION: Shortness of breath, cough, lung cancer   history, last chemotherapy and radiation treatment in May 2023    TECHNIQUE: Helical acquisition images of the chest without intravenous   contrast. Maximum intensity projection images were generated.    COMPARISON: 12/29/2020 chest x-ray.    FINDINGS:    LUNGS/AIRWAYS/PLEURA: Right upper lobectomy. Right lower lobe   multiloculated thick-walled 11 cm cavity (best seen in its entirety on   601-59) containing fluid and spongelike material. Consolidation within   the middle lobe and inferior aspect of the right lower lobe with angular   borders suggesting prior radiation. Multiple indistinct nodules of   varying density in the right lower and middle lobes, mostly adjacent to   the cavity. Small branching opacities in the peripheral left upper and   lower lobes, compatible with distal airway impaction. Trace left pleural   effusion. Mild right apical pleural thickening.    LYMPH NODES/MEDIASTINUM: No lymphadenopathy.    HEART/VASCULATURE: Normal heart size. Unremarkable pericardium. Coronary   artery calcifications. Normal caliber aorta.    UPPER ABDOMEN: Unremarkable.    BONES/SOFT TISSUES: Old sternal fracture. Mild loss of height of the L1   vertebral body.      IMPRESSION:    Right upper lobectomy.    Larger right lower lobe thick-walled cavity which may be infectious   and/or residua of reported treated tumor. 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    Consolidation within the right lower and middle lobe compatible with   radiation change.    Multiple indistinct nodules in the right lungwhich may be infectious or   radiation pneumonitis.    ct lucio 10/3 uploaded and reviewed    < from: CT Abdomen and Pelvis No Cont (11.08.23 @ 19:01) >  PROCEDURE DATE:  11/08/2023          INTERPRETATION:  CLINICAL INFORMATION: Acute kidney injury. Evaluate   obstruction.    COMPARISON: CT chest 11/20/2023.    CONTRAST/COMPLICATIONS:  IV Contrast: NONE  Oral Contrast: NONE  Complications: None reported at time of study completion    PROCEDURE:  CT of the Abdomen and Pelvis was performed.  Sagittal and coronal reformats were performed.    FINDINGS:  LOWER CHEST: Small right pleural effusion. New patchy opacities in the   right lung base.    LIVER: Within normal limits.  BILE DUCTS: Normal caliber.  GALLBLADDER: Within normal limits.  SPLEEN: Within normal limits.  PANCREAS: Within normal limits.  ADRENALS: Within normal limits.  KIDNEYS/URETERS: No hydronephrosis. Vascular calcifications.    BLADDER: Within normal limits.  REPRODUCTIVE ORGANS: Prostate within normal limits.    BOWEL: No bowel obstruction. Appendix is normal. Moderate to large stool.  PERITONEUM: No ascites.  VESSELS: Atherosclerotic changes.  RETROPERITONEUM/LYMPH NODES: No lymphadenopathy.  ABDOMINAL WALL: Within normal limits.  BONES: Degenerative changes. Redemonstration of L1 compression deformity.   Right femoral ORIF.    IMPRESSION:  No hydronephrosis.    New patchy opacities in the right lung base. Small right pleural effusion.    Bedside Swallow: Trial 1  · Consistencies administered	thin liquid; pureed; regular solid  · Mode of Presentation	cup; spoon; self fed  · Positioning	upright (90 degrees)  · Oral Preparatory Phase	Within functional limits; orientation eating routines: able to feed himself with focus: as per family, pt eats rapidly: did not do so at bedside evaluation Lip seal on utensil : oral containment..  · Oral Phase	Within functional limits; immediate bolus formation: Ap transfer for liquid WFL. Mastication normally rotary-lateral with lingual sweep for collection and clearance.  l  · Pharyngeal Phase	Within functional limits; Onset of pharyngeal swallow is with age-appropriate time limits. No overt s/s aspiration at bedside.  · Comments	Micro aspiration and silent aspiration cannot be ruled out. As above, pt does have increased risk for aspiration 2/2 lung CA and  lobectomy as well as COPD and present pna (which in itself may be aspiration related). However, pt presents at bedside evaluation with no contraindication for maintaining REGULAR texture and thin liquids.  Strategies for reducing risk were demonstrated and discussed with the Pt and the family who are very supportive of the Pt.  Disc with NSg.  Will follow peripherally.    < from: CT Chest No Cont (11.13.23 @ 10:04) >    PROCEDURE DATE:  11/13/2023          INTERPRETATION:  Clinical indications: Pneumonia.    Axial CT images of the chest are obtained without intravenous   administration of contrast.    Comparison is made with the prior chest CT of November 2, 2023.    No enlarged axillary or mediastinal lymph nodes.    No pericardial effusion. Heart size is normal. Mitral annular   calcifications. Vascular calcifications with involvement of the aorta and   the coronary arteries. Aortic root calcifications.    Evaluation of the upper abdomen demonstrate partially imaged aortic stent   graft. Subcutaneous edema. Minimal perihepatic ascites.    Small left pleural effusion new since the abdominalCT of November 8, 2023. Trace right pleural effusion as on November 8, 2023.    Evaluation of the lungs demonstrate left lung base linear subsegmental   atelectasis. A few ill-defined left lung patchy nonspecific groundglass   opacities largest within the dependent portion of the left upper lobe are   new since November 2, 2023.    Status post right upper lobectomy with postoperative changes. Persistent   right apical large large cyst, part of which measures about 6.6 cm in   transverse dimension without significant interval change in size   containing air and increasing internal opacification as compared with   November 2, 2023, some of which appear slightly dense and may be due to   internal hemorrhagic components.    Extensive right mid to lower lung consolidations and ill-defined   groundglass opacities, with the largest confluent consolidation within   the mid to lower portions of the right lower lobe, majority of which   appear new since November 2, 2023 superimposed on previouslydescribed   linear opacities.    Degenerative changes of the spine. Old healed sternal fracture.    IMPRESSION: Right lung large areas of consolidation new since November 2, 2023 likely pneumonia.    Right apical previously described large cyst unchanged in size since   November 2, 2023 containing internal air and increasing internal   opacification since November 2, 2023 as described above. Continued   follow-up is recommended.    A few left lung ill-defined nonspecific groundglass opacities.   Differential diagnosis include but is not limited to   infectious/inflammatory etiologies and or pulmonary edema.    Small left pleural effusion new since November 8, 2023.      Culture - Acid Fast - Bronchial w/Smear (11.09.23 @ 12:30)   Specimen Source: .Bronchial RIGHT LOWER LOBE BAL  Acid Fast Bacilli Smear:   No acid-fast bacilli seen by fluorochrome stain  Culture Results:   Culture is being performed.  Culture - Fungal, Bronchial (11.09.23 @ 12:30)   Specimen Source: .Bronchial RIGHT LOWER LOBE BAL  Culture Results:   Rare Yeast    < from: CT Chest No Cont (11.20.23 @ 08:03) >  PROCEDURE DATE:  11/20/2023          INTERPRETATION:  Clinical indication: Right lung infection.    Axial CT images of the chest are obtained without intravenous   administration of contrast.    Comparison is made with the prior chest CT of November 13, 2023.    Enlarged axillary or mediastinal lymph nodes. Heart size is normal.   Vascular calcifications with involvement of the aorta and the coronary   arteries. Minimal pericardial fluid.    Evaluation of the upper abdomen demonstrate trace perihepatic and   perisplenic ascites slightly increased in size since November 13, 2023.   1.2 cm hypodensity arising from the upper pole of the partially imaged   left kidney without significant interval change.    Few subcutaneous edema.    Small left pleural effusion is unchanged. There may be a trace right   pleural effusion versus pleural thickening without significant interval   change.    Evaluation of the lungs demonstrate interval resolution of the previously   seen groundglass opacity within the dependent portion of the left upper   lobe. Interval near complete resolution of a previously seen left upper   lobe peripheral groundglass opacity. A few other ill-defined left lung   patchy groundglass opacities unchanged. 4 mm left lower lobe nodular   opacity on image 73 of series 2 appears new since November 13, 2023 may   be sequela of impacted distal airway.    Status post right upper lobectomy. Previously described right apical   large irregular cyst, part of which measures about 7 cm in transverse   dimension containing internal air, fluid and debris is without   significant interval change in size.  Ill-defined consolidations and groundglass opacities throughoutthe   majority of the right lung demonstrate mild overall interval progression   since November 13, 2023. For reference a few dense patchy and nodular   opacities at the right lung base are either new or demonstrate interval   increase in size.  Complete opacification of the right middle lobe with internal   consolidation appears unchanged since November 13, 2023, demonstrating   interval progression since November 2, 2023. Nonvisualization of the   internal segmental and subsegmental airways of the right middle lobe as   on the prior study.    Degenerative changes of the spine. Old healed left rib and old healed   sternal fractures. Mild superior endplate loss of height of the L1   vertebral body is unchanged.    IMPRESSION: Extensive right lung consolidations as described above with   overall interval progression since November 13, 2023. Complete   opacification of the right middle lobe with internal consolidation and   volume loss appears unchanged since November 13, 2023 demonstrating  interval progression since November 2, 2023.    Persistent apical right apical large irregular cyst unchanged in size   since November 13, 2023.    Small left pleural effusion with subcutaneous edema.    Trace amount of ascites, increased in size since November 13, 2023.    < from: US Duplex Venous Upper Ext Complete, Bilateral (11.22.23 @ 13:13) >  PROCEDURE DATE:  11/22/2023          INTERPRETATION:  CLINICAL INFORMATION: Arm edema    COMPARISON: None available.    TECHNIQUE: Duplex sonography of the BILATERAL upper extremity veins with   color and spectral Doppler, with and without compression.    FINDINGS:    RIGHT:  The right internal jugular, subclavian, axillary, brachial, radial and   ulnar veins are patent and compressible where applicable.  There is   thrombus within the cephalic vein from the mid upper arm to the   antecubital fossa. There is thrombus within the median cubital vein.  Basilic vein not visualized.    LEFT:  The left internal jugular, subclavian, axillary, brachial, radialand   ulnar veins are patent and compressible where applicable.  The basilic   vein (superficial vein) is patent and without thrombus.  There is   thrombus within the cephalic vein in the mid upper arm extending into the   antecubital fossa and median cubital vein.    Doppler examination shows normal spontaneous and phasic flow.    Atherosclerotic changes of the aorta are noted.    Bilateral subcutaneous edema.    IMPRESSION:  No evidence of deep venous thrombosis in either upper extremity.    Superficial thrombophlebitis involving the bilateral cephalic and median   cubital veins.    < from: CT Chest No Cont (11.27.23 @ 08:04) >  PROCEDURE DATE:  11/27/2023          INTERPRETATION:  Clinical indication: Pneumonia, follow-up.    Axial CT images of the chest are obtained without intravenous   administration of contrast.    Comparison is made with the prior chest CT of November 20, 2023.    No enlarged axillary or mediastinal lymph nodes.    No pericardial effusion. Heart size is normal. Vascular calcifications   with involvement of the aorta and the coronary arteries.    Diffuse subcutaneous edema most notably involving the abdominal wall.    Evaluation of the upper abdomen demonstrate no other significant   abnormality.    Small left pleural effusion mildly increased since November 20, 2023 with   mildadjacent subsegmental dependent or compressive atelectasis. Trace   right pleural effusion is without significant interval change.    Evaluation of the lungs demonstrate small cluster of multiple nodular   opacities within the left lower lobe largestmeasuring about 1.1 cm new   since November 20, 2023 likely sequela of impacted airways. Patchy left   upper lobe small opacity may have slightly decreased in size.    Status post right upper lobectomy. Previously described right apical   large irregular cyst with internal fluid and debris is without   significant interval change.    Opacification of large parts of the right lung with large areas of   consolidation within the right lower lobe demonstrating interval mild   progression. Complete opacification of the right middle lobe appears   unchanged. Nonvisualization of internal segmental and subsegmental   airways as noted on the prior study.    Secretions within the mainstem bronchi extending into the bronchus   intermedius and the right lower lobe central airways.    Degenerative changes of the spine. Old healed rib and old healed sternal   fractures.    IMPRESSION: Large areas of consolidation within the right lung progressed   since November 20, 2023.    Cluster of a few nodular opacities within the left lower lobe new since   November 20, 2023 likely of infectious etiology with endobronchial spread.    Small left pleural effusion with interval increase since November 20, 2023.   Subjective:  temperature to 101  more somnolent today  family at bedside  awaiting bipap delivery    Review of Systems:  All 10 systems reviewed in detailed and found to be negative with the exception of what has already been described above    Allergies:  penicillin (Unknown)    Meds  MEDICATIONS  (STANDING):  albuterol/ipratropium for Nebulization 3 milliLiter(s) Nebulizer every 6 hours  AQUAPHOR (petrolatum Ointment) 1 Application(s) Topical two times a day  atorvastatin 40 milliGRAM(s) Oral at bedtime  caspofungin IVPB 50 milliGRAM(s) IV Intermittent every 24 hours  caspofungin IVPB      dextrose 5%. 1000 milliLiter(s) (50 mL/Hr) IV Continuous <Continuous>  dextrose 5%. 1000 milliLiter(s) (100 mL/Hr) IV Continuous <Continuous>  dextrose 50% Injectable 25 Gram(s) IV Push once  dextrose 50% Injectable 12.5 Gram(s) IV Push once  dextrose 50% Injectable 25 Gram(s) IV Push once  ferrous    sulfate 325 milliGRAM(s) Oral daily  fluticasone furoate/umeclidinium/vilanterol 100-62.5-25 MICROgram(s) Inhaler 1 Puff(s) Inhalation daily  folic acid 1 milliGRAM(s) Oral daily  furosemide   Injectable 20 milliGRAM(s) IV Push daily  glucagon  Injectable 1 milliGRAM(s) IntraMuscular once  hydrALAZINE 10 milliGRAM(s) Oral every 8 hours  insulin lispro (ADMELOG) corrective regimen sliding scale   SubCutaneous three times a day before meals  insulin lispro (ADMELOG) corrective regimen sliding scale   SubCutaneous at bedtime  levothyroxine 12.5 MICROGram(s) Oral daily  meropenem Injectable 1000 milliGRAM(s) IV Push every 8 hours  pantoprazole  Injectable 40 milliGRAM(s) IV Push two times a day  polyethylene glycol 3350 17 Gram(s) Oral daily  potassium chloride    Tablet ER 10 milliEquivalent(s) Oral every 2 hours  vancomycin  IVPB 750 milliGRAM(s) IV Intermittent every 12 hours    MEDICATIONS  (PRN):  acetaminophen     Tablet .. 650 milliGRAM(s) Oral every 6 hours PRN Temp greater or equal to 38C (100.4F), Mild Pain (1 - 3)  aluminum hydroxide/magnesium hydroxide/simethicone Suspension 30 milliLiter(s) Oral every 4 hours PRN Dyspepsia  benzonatate 100 milliGRAM(s) Oral three times a day PRN Cough  dextrose Oral Gel 15 Gram(s) Oral once PRN Blood Glucose LESS THAN 70 milliGRAM(s)/deciliter  furosemide   Injectable 10 milliGRAM(s) IV Push once PRN for after transfusion  melatonin 3 milliGRAM(s) Oral at bedtime PRN Insomnia  ondansetron Injectable 4 milliGRAM(s) IV Push every 8 hours PRN Nausea and/or Vomiting    Physical Exam  T(C): 36.8 (11-28-23 @ 08:48), Max: 38.3 (11-27-23 @ 21:04)  HR: 93 (11-28-23 @ 11:00) (90 - 112)  BP: 133/56 (11-28-23 @ 11:00) (133/56 - 148/65)  RR: 20 (11-28-23 @ 08:48) (18 - 24)  SpO2: 95% (11-28-23 @ 08:48) (95% - 99%)  Gen: Alert, oriented, no distress on nasal cannula  HEENT: Anicteric sclera, moist mucous membranes  Cardio: Regular rhythm and rate, normal S1S2, no murmurs  Resp: Crackles, congested  GI: Nontender, nondistended, normoactive bowel sounds  Ext: No cyanosis, clubbing or edema in le, + redness and swelling in LUE by midline  skin: + ecchymosis, left leg laceration sutured and bandaged  Neuro: Nonfocal    Labs:                        8.9    4.32  )-----------( 153      ( 28 Nov 2023 07:38 )             27.1     11-28    137  |  96  |  44<H>  ----------------------------<  119<H>  3.3<L>   |  36<H>  |  1.01    Ca    8.6      28 Nov 2023 07:38  Phos  2.7     11-28  Mg     2.1     11-28    TPro  5.3<L>  /  Alb  2.1<L>  /  TBili  1.1  /  DBili  0.4<H>  /  AST  29  /  ALT  23  /  AlkPhos  92  11-27      Urinalysis Basic - ( 28 Nov 2023 07:38 )    Color: x / Appearance: x / SG: x / pH: x  Gluc: 119 mg/dL / Ketone: x  / Bili: x / Urobili: x   Blood: x / Protein: x / Nitrite: x   Leuk Esterase: x / RBC: x / WBC x   Sq Epi: x / Non Sq Epi: x / Bacteria: x    < from: CT Chest No Cont (11.02.23 @ 16:04) >  ACC: [id number] EXAM:  CT CHEST   ORDERED BY: SARAH YOO     PROCEDURE DATE:  11/02/2023          INTERPRETATION:  INDICATION: Shortness of breath, cough, lung cancer   history, last chemotherapy and radiation treatment in May 2023    TECHNIQUE: Helical acquisition images of the chest without intravenous   contrast. Maximum intensity projection images were generated.    COMPARISON: 12/29/2020 chest x-ray.    FINDINGS:    LUNGS/AIRWAYS/PLEURA: Right upper lobectomy. Right lower lobe   multiloculated thick-walled 11 cm cavity (best seen in its entirety on   601-59) containing fluid and spongelike material. Consolidation within   the middle lobe and inferior aspect of the right lower lobe with angular   borders suggesting prior radiation. Multiple indistinct nodules of   varying density in the right lower and middle lobes, mostly adjacent to   the cavity. Small branching opacities in the peripheral left upper and   lower lobes, compatible with distal airway impaction. Trace left pleural   effusion. Mild right apical pleural thickening.    LYMPH NODES/MEDIASTINUM: No lymphadenopathy.    HEART/VASCULATURE: Normal heart size. Unremarkable pericardium. Coronary   artery calcifications. Normal caliber aorta.    UPPER ABDOMEN: Unremarkable.    BONES/SOFT TISSUES: Old sternal fracture. Mild loss of height of the L1   vertebral body.      IMPRESSION:    Right upper lobectomy.    Larger right lower lobe thick-walled cavity which may be infectious   and/or residua of reported treated tumor. 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    Consolidation within the right lower and middle lobe compatible with   radiation change.    Multiple indistinct nodules in the right lungwhich may be infectious or   radiation pneumonitis.    ct lucio 10/3 uploaded and reviewed    < from: CT Abdomen and Pelvis No Cont (11.08.23 @ 19:01) >  PROCEDURE DATE:  11/08/2023          INTERPRETATION:  CLINICAL INFORMATION: Acute kidney injury. Evaluate   obstruction.    COMPARISON: CT chest 11/20/2023.    CONTRAST/COMPLICATIONS:  IV Contrast: NONE  Oral Contrast: NONE  Complications: None reported at time of study completion    PROCEDURE:  CT of the Abdomen and Pelvis was performed.  Sagittal and coronal reformats were performed.    FINDINGS:  LOWER CHEST: Small right pleural effusion. New patchy opacities in the   right lung base.    LIVER: Within normal limits.  BILE DUCTS: Normal caliber.  GALLBLADDER: Within normal limits.  SPLEEN: Within normal limits.  PANCREAS: Within normal limits.  ADRENALS: Within normal limits.  KIDNEYS/URETERS: No hydronephrosis. Vascular calcifications.    BLADDER: Within normal limits.  REPRODUCTIVE ORGANS: Prostate within normal limits.    BOWEL: No bowel obstruction. Appendix is normal. Moderate to large stool.  PERITONEUM: No ascites.  VESSELS: Atherosclerotic changes.  RETROPERITONEUM/LYMPH NODES: No lymphadenopathy.  ABDOMINAL WALL: Within normal limits.  BONES: Degenerative changes. Redemonstration of L1 compression deformity.   Right femoral ORIF.    IMPRESSION:  No hydronephrosis.    New patchy opacities in the right lung base. Small right pleural effusion.    Bedside Swallow: Trial 1  · Consistencies administered	thin liquid; pureed; regular solid  · Mode of Presentation	cup; spoon; self fed  · Positioning	upright (90 degrees)  · Oral Preparatory Phase	Within functional limits; orientation eating routines: able to feed himself with focus: as per family, pt eats rapidly: did not do so at bedside evaluation Lip seal on utensil : oral containment..  · Oral Phase	Within functional limits; immediate bolus formation: Ap transfer for liquid WFL. Mastication normally rotary-lateral with lingual sweep for collection and clearance.  l  · Pharyngeal Phase	Within functional limits; Onset of pharyngeal swallow is with age-appropriate time limits. No overt s/s aspiration at bedside.  · Comments	Micro aspiration and silent aspiration cannot be ruled out. As above, pt does have increased risk for aspiration 2/2 lung CA and  lobectomy as well as COPD and present pna (which in itself may be aspiration related). However, pt presents at bedside evaluation with no contraindication for maintaining REGULAR texture and thin liquids.  Strategies for reducing risk were demonstrated and discussed with the Pt and the family who are very supportive of the Pt.  Disc with NSg.  Will follow peripherally.    < from: CT Chest No Cont (11.13.23 @ 10:04) >    PROCEDURE DATE:  11/13/2023          INTERPRETATION:  Clinical indications: Pneumonia.    Axial CT images of the chest are obtained without intravenous   administration of contrast.    Comparison is made with the prior chest CT of November 2, 2023.    No enlarged axillary or mediastinal lymph nodes.    No pericardial effusion. Heart size is normal. Mitral annular   calcifications. Vascular calcifications with involvement of the aorta and   the coronary arteries. Aortic root calcifications.    Evaluation of the upper abdomen demonstrate partially imaged aortic stent   graft. Subcutaneous edema. Minimal perihepatic ascites.    Small left pleural effusion new since the abdominalCT of November 8, 2023. Trace right pleural effusion as on November 8, 2023.    Evaluation of the lungs demonstrate left lung base linear subsegmental   atelectasis. A few ill-defined left lung patchy nonspecific groundglass   opacities largest within the dependent portion of the left upper lobe are   new since November 2, 2023.    Status post right upper lobectomy with postoperative changes. Persistent   right apical large large cyst, part of which measures about 6.6 cm in   transverse dimension without significant interval change in size   containing air and increasing internal opacification as compared with   November 2, 2023, some of which appear slightly dense and may be due to   internal hemorrhagic components.    Extensive right mid to lower lung consolidations and ill-defined   groundglass opacities, with the largest confluent consolidation within   the mid to lower portions of the right lower lobe, majority of which   appear new since November 2, 2023 superimposed on previouslydescribed   linear opacities.    Degenerative changes of the spine. Old healed sternal fracture.    IMPRESSION: Right lung large areas of consolidation new since November 2, 2023 likely pneumonia.    Right apical previously described large cyst unchanged in size since   November 2, 2023 containing internal air and increasing internal   opacification since November 2, 2023 as described above. Continued   follow-up is recommended.    A few left lung ill-defined nonspecific groundglass opacities.   Differential diagnosis include but is not limited to   infectious/inflammatory etiologies and or pulmonary edema.    Small left pleural effusion new since November 8, 2023.      Culture - Acid Fast - Bronchial w/Smear (11.09.23 @ 12:30)   Specimen Source: .Bronchial RIGHT LOWER LOBE BAL  Acid Fast Bacilli Smear:   No acid-fast bacilli seen by fluorochrome stain  Culture Results:   Culture is being performed.  Culture - Fungal, Bronchial (11.09.23 @ 12:30)   Specimen Source: .Bronchial RIGHT LOWER LOBE BAL  Culture Results:   Rare Yeast    < from: CT Chest No Cont (11.20.23 @ 08:03) >  PROCEDURE DATE:  11/20/2023          INTERPRETATION:  Clinical indication: Right lung infection.    Axial CT images of the chest are obtained without intravenous   administration of contrast.    Comparison is made with the prior chest CT of November 13, 2023.    Enlarged axillary or mediastinal lymph nodes. Heart size is normal.   Vascular calcifications with involvement of the aorta and the coronary   arteries. Minimal pericardial fluid.    Evaluation of the upper abdomen demonstrate trace perihepatic and   perisplenic ascites slightly increased in size since November 13, 2023.   1.2 cm hypodensity arising from the upper pole of the partially imaged   left kidney without significant interval change.    Few subcutaneous edema.    Small left pleural effusion is unchanged. There may be a trace right   pleural effusion versus pleural thickening without significant interval   change.    Evaluation of the lungs demonstrate interval resolution of the previously   seen groundglass opacity within the dependent portion of the left upper   lobe. Interval near complete resolution of a previously seen left upper   lobe peripheral groundglass opacity. A few other ill-defined left lung   patchy groundglass opacities unchanged. 4 mm left lower lobe nodular   opacity on image 73 of series 2 appears new since November 13, 2023 may   be sequela of impacted distal airway.    Status post right upper lobectomy. Previously described right apical   large irregular cyst, part of which measures about 7 cm in transverse   dimension containing internal air, fluid and debris is without   significant interval change in size.  Ill-defined consolidations and groundglass opacities throughoutthe   majority of the right lung demonstrate mild overall interval progression   since November 13, 2023. For reference a few dense patchy and nodular   opacities at the right lung base are either new or demonstrate interval   increase in size.  Complete opacification of the right middle lobe with internal   consolidation appears unchanged since November 13, 2023, demonstrating   interval progression since November 2, 2023. Nonvisualization of the   internal segmental and subsegmental airways of the right middle lobe as   on the prior study.    Degenerative changes of the spine. Old healed left rib and old healed   sternal fractures. Mild superior endplate loss of height of the L1   vertebral body is unchanged.    IMPRESSION: Extensive right lung consolidations as described above with   overall interval progression since November 13, 2023. Complete   opacification of the right middle lobe with internal consolidation and   volume loss appears unchanged since November 13, 2023 demonstrating  interval progression since November 2, 2023.    Persistent apical right apical large irregular cyst unchanged in size   since November 13, 2023.    Small left pleural effusion with subcutaneous edema.    Trace amount of ascites, increased in size since November 13, 2023.    < from: US Duplex Venous Upper Ext Complete, Bilateral (11.22.23 @ 13:13) >  PROCEDURE DATE:  11/22/2023          INTERPRETATION:  CLINICAL INFORMATION: Arm edema    COMPARISON: None available.    TECHNIQUE: Duplex sonography of the BILATERAL upper extremity veins with   color and spectral Doppler, with and without compression.    FINDINGS:    RIGHT:  The right internal jugular, subclavian, axillary, brachial, radial and   ulnar veins are patent and compressible where applicable.  There is   thrombus within the cephalic vein from the mid upper arm to the   antecubital fossa. There is thrombus within the median cubital vein.  Basilic vein not visualized.    LEFT:  The left internal jugular, subclavian, axillary, brachial, radialand   ulnar veins are patent and compressible where applicable.  The basilic   vein (superficial vein) is patent and without thrombus.  There is   thrombus within the cephalic vein in the mid upper arm extending into the   antecubital fossa and median cubital vein.    Doppler examination shows normal spontaneous and phasic flow.    Atherosclerotic changes of the aorta are noted.    Bilateral subcutaneous edema.    IMPRESSION:  No evidence of deep venous thrombosis in either upper extremity.    Superficial thrombophlebitis involving the bilateral cephalic and median   cubital veins.    < from: CT Chest No Cont (11.27.23 @ 08:04) >  PROCEDURE DATE:  11/27/2023          INTERPRETATION:  Clinical indication: Pneumonia, follow-up.    Axial CT images of the chest are obtained without intravenous   administration of contrast.    Comparison is made with the prior chest CT of November 20, 2023.    No enlarged axillary or mediastinal lymph nodes.    No pericardial effusion. Heart size is normal. Vascular calcifications   with involvement of the aorta and the coronary arteries.    Diffuse subcutaneous edema most notably involving the abdominal wall.    Evaluation of the upper abdomen demonstrate no other significant   abnormality.    Small left pleural effusion mildly increased since November 20, 2023 with   mildadjacent subsegmental dependent or compressive atelectasis. Trace   right pleural effusion is without significant interval change.    Evaluation of the lungs demonstrate small cluster of multiple nodular   opacities within the left lower lobe largestmeasuring about 1.1 cm new   since November 20, 2023 likely sequela of impacted airways. Patchy left   upper lobe small opacity may have slightly decreased in size.    Status post right upper lobectomy. Previously described right apical   large irregular cyst with internal fluid and debris is without   significant interval change.    Opacification of large parts of the right lung with large areas of   consolidation within the right lower lobe demonstrating interval mild   progression. Complete opacification of the right middle lobe appears   unchanged. Nonvisualization of internal segmental and subsegmental   airways as noted on the prior study.    Secretions within the mainstem bronchi extending into the bronchus   intermedius and the right lower lobe central airways.    Degenerative changes of the spine. Old healed rib and old healed sternal   fractures.    IMPRESSION: Large areas of consolidation within the right lung progressed   since November 20, 2023.    Cluster of a few nodular opacities within the left lower lobe new since   November 20, 2023 likely of infectious etiology with endobronchial spread.    Small left pleural effusion with interval increase since November 20, 2023.

## 2023-11-28 NOTE — PROGRESS NOTE ADULT - ASSESSMENT
83y old Male with PMH HTN, HPL, Chronic anemia, COPD, lung CA [place] [facility] [place] chemotherapy and RT 05/2023, not currently receiving chemo treatment), s/p right upper lobectomy, AAA, Hypothyroidism, CKD stage IIIa referred by me to ed for continued sob, cough despite abx treatment found to have large lower lobe thick walled cavity with worsening findings on ct despite treatment with levaquin  1. large lower lobe thick walled cavity: unclear if this is fungal vs bacterial vs malignancy  -started on voriconazole, initially switched to fluconazole, now on caspofungin.   -reviewed imaging from [place] supplied by wife - it looks like he has had blebs in the past and these are what are infected - they look half filled with fluid with thicker borders, which would argue against fungal infection/mrsa as there is no necrosis involved in its pathogenesis  -s/p bronch. follow up results. growing few yeast  -repeat ct wo contrast reveals worsening consolidations and filling in of bleb. this may suggest inadequate antibiotics, continued aspiration or rapidly progressive cancer  -was on doxy (for potential mrsa) now transitioned to vanco  -continue meropenem  -monitor vanco trough  -palliative care eval  -ct today reveals worsening findings. it is unlikely that after all the medications he received that this would represent infection with continued worsening. may be progressive malignancy. will need to discuss with wife about next steps. i do not think that he would benefit from additional antibiotics  2. pneumonia vs lung cancer: as above  -supplemental o2  -speech/swallow eval noted  -aspiration precautions  3. copd: continue trelegy  4. ? radiation induced pneumonitis: s/p steroids  5. anemia: received 1 unit prbc on 11/19 and then again 11/26  6. left leg laceration: s/p suture, now bandaged. continue wound care  7. superficial thrombophlebitis: on iv heparin   83y old Male with PMH HTN, HPL, Chronic anemia, COPD, [place] [facility] [place]23, not currently receiving chemo treatment), s/p right upper lobectomy, AAA, Hypothyroidism, CKD stage IIIa referred by me to ed for continued sob, cough despite abx treatment found to have large lower lobe thick walled cavity with worsening findings on ct despite treatment with levaquin  1. large lower lobe thick walled cavity: unclear if this is fungal vs bacterial vs malignancy  -started on voriconazole, initially switched to fluconazole, now on caspofungin.   -reviewed imaging from [place] supplied by wife - it looks like he has had blebs in the past and these are what are infected - they look half filled with fluid with thicker borders, which would argue against fungal infection/mrsa as there is no necrosis involved in its pathogenesis  -s/p bronch. follow up results. growing few yeast  -repeat ct wo contrast reveals worsening consolidations and filling in of bleb. this may suggest inadequate antibiotics, continued aspiration or rapidly progressive cancer  -was on doxy (for potential mrsa) now transitioned to vanco  -continue meropenem  -monitor vanco trough  -plan for home with hospice  -ct today reveals worsening findings. it is unlikely that after all the medications he received that this would represent infection with continued worsening. may be progressive malignancy. will need to discuss with wife about next steps. i do not think that he would benefit from additional antibiotics  2. pneumonia vs lung cancer: as above  -supplemental o2  -speech/swallow eval noted  -aspiration precautions  3. copd: continue trelegy  4. ? radiation induced pneumonitis: s/p steroids  5. anemia: received 1 unit prbc on 11/19 and then again 11/26  6. left leg laceration: s/p suture, now bandaged. continue wound care  7. superficial thrombophlebitis: on iv heparin

## 2023-11-28 NOTE — PROGRESS NOTE ADULT - ASSESSMENT
82 y/o Male with h/o lung CA follows at [facility] [place] s/p chemotherapy and RT 05/2023, s/p right upper lobectomy, HTN, HPL, Chronic anemia, COPD, AAA, Hypothyroidism, CKD stage IIIa was admitted on 11/2 for increased shortness of breath, dyspnea on minimal exertion, and cough. Reportedly, he had CT of the chest performed 10/3 which demonstrated consolidation, was started on augmentin/prednisone/azithro 10/6 x 1 week. Pt completed course of medications however wife noted worsening cough and pt with fever Tmax of 103.9 on 10/26. Wife then took pt to pulmonologist again who started pt on second round of the same medications, instructed wife to stop giving pt tylenol wife notes fevers self-resolved. Since then pt decreased energy with episode of being unsteady on feet. Today is 6th day of taking medications, had f/u scheduled with pulmonologist who sent pt to ED for eval.  He feels tired and weak. In ER he received ceftriaxone.    1. Large RLL pulmonary cavity with worsening opacification. Worsening pulmonary infiltrates, likely pneumonia vs possible recurrent lung Ca. Lung Ca s/p right upper lobectomy. Radiation pneumonitis. CRF stage 3. Allergy to PCN.   -respiratory frail  -extensive right lung consolidations  -worsening large pulmonary cavity opacification on repeat CT chest   -bronchoscopy cultures show normal respiratory jaime and rare yeast  -fungal bronch c/s is pending and will take several weeks to finalize  -s/p levofloxacin # 10  -s/p voriconazole 200 mg PO q12h # 7  -s/p fluconazole 100 mg IV qd # 8  -s/p doxycycline 100 mg IV q12h # 10  -pulmonary evaluation appreciated   -I am concerned that there is no definite diagnosis for his pulmonary illness and that abx therapy dose not seem to help so far  -on meropenem 1 gm IV q8h # 16 and caspofungin 50 mg IV qd # 8 and vancomycin 750 mg IV q12h # 7  -tolerating abx well so far; no side effects noted  -thoracic evaluation appreciated - conservative care recommended   -renal function is improved  -vancomycin trough level is therapeutic  -renal function is stable  -f/u cultures  -case reviewed with Dr. Sheldon: he did note respond well to bread spectrum IV antimicrobial therapy  -may continue on IV abx therapy for now  -plan to change abx to ceftin 500 mg PO q12h and fluconazole 100 mg PO qd when ready to home  -monitor temps  -f/u CBC  -supportive care  2. Other issues:   -care per medicine    d/w medicine team and Dr. Sheldon

## 2023-11-28 NOTE — PROGRESS NOTE ADULT - ASSESSMENT
82 y/o Male with h/o lung CA follows at [facility] [place] s/p chemotherapy and RT 05/2023, s/p right upper lobectomy, HTN, HPL, Chronic anemia, COPD, AAA, Hypothyroidism, CKD stage IIIa was admitted on 11/2 for increased shortness of breath, dyspnea on minimal exertion, and cough.    # h/o lung cancer   - followed at [facility] [place] - last received carbo/taxol with salvage RT 5/5/2023 after progression in subcarinol node after adjuvant atezolizumab (LD 12/20/22)    # PNA/ Pneumonitis   - BAL with rare yeast - CONTINUE  meropenem 1 gm IV q12h, and caspofungin as per ID and vancomycin  - followed by pulm and ct surgery  - repeat CT chest 11/20- when compared with 11/13 with complete opacification of RML w/ internal consolidation and volume loss appears unchanged and extensive right lung consolidations w/ overall progression.  - repeat CXR suggestive of new RIGHT lung infiltrate - my suspicion is that there may be a component of aspiration pneumonia.   - 11/27/23 CT chest- Large areas of consolidation within the right lung progressed since November 20, 2023. Cluster of a few nodular opacities within the left lower lobe new since November 20, 2023 likely of infectious etiology with endobronchial spread. Small left pleural effusion with interval increase since November 20, 2023.  - pt slightly more lethargic, now febrile 101 overnight     # anemia- likely related to myelosuppression from treatment history/pna  - s/p 1u pRBC 11/26  - Hb 8.9 and stable    # UE pain  US duplex UE negative for DVt- showed superficial thrombophlebitis involving b/l cephalic and median cubital veins    palll care- planned for home hospice when all arranged for patient including bipap at home     Discussed with [facility] [place]  will continue to follow

## 2023-11-28 NOTE — PROVIDER CONTACT NOTE (OTHER) - ACTION/TREATMENT ORDERED:
MD aware no further orders
stat 1 time dose duoneb
Rapid response team at bedside, Labetalol administered, Lasix administered, Non rebreather placed. Chest x ray ordered. Patient transferred to SICU Report given to Nisha SHARP.
NP notified no further actions or interventions at this time

## 2023-11-28 NOTE — PROVIDER CONTACT NOTE (OTHER) - SITUATION
patient had temp of 101 tylenol given
Pt having complaints of SOB, Vital signs as charted. Rapid response called.
pt satting 84% on room air
MD aware of vitals temp99.1, , bp 172/67, RR 23, oxygen 100% on 3L NC

## 2023-11-28 NOTE — PROGRESS NOTE ADULT - SUBJECTIVE AND OBJECTIVE BOX
Date of service: 11-28-23 @ 09:15    Lying in bed in NAD  No SOB at rest  Weak looking  Had a febrile episode last PM to 101F    ROS: denies dizziness, no HA, no abdominal pain, no diarrhea or constipation; no dysuria, no legs pain, no rashes    MEDICATIONS  (STANDING):  albuterol/ipratropium for Nebulization 3 milliLiter(s) Nebulizer every 6 hours  AQUAPHOR (petrolatum Ointment) 1 Application(s) Topical two times a day  atorvastatin 40 milliGRAM(s) Oral at bedtime  caspofungin IVPB      caspofungin IVPB 50 milliGRAM(s) IV Intermittent every 24 hours  dextrose 5%. 1000 milliLiter(s) (50 mL/Hr) IV Continuous <Continuous>  dextrose 5%. 1000 milliLiter(s) (100 mL/Hr) IV Continuous <Continuous>  dextrose 50% Injectable 25 Gram(s) IV Push once  dextrose 50% Injectable 12.5 Gram(s) IV Push once  dextrose 50% Injectable 25 Gram(s) IV Push once  ferrous    sulfate 325 milliGRAM(s) Oral daily  fluticasone furoate/umeclidinium/vilanterol 100-62.5-25 MICROgram(s) Inhaler 1 Puff(s) Inhalation daily  folic acid 1 milliGRAM(s) Oral daily  furosemide   Injectable 20 milliGRAM(s) IV Push daily  glucagon  Injectable 1 milliGRAM(s) IntraMuscular once  hydrALAZINE 10 milliGRAM(s) Oral every 8 hours  insulin lispro (ADMELOG) corrective regimen sliding scale   SubCutaneous three times a day before meals  insulin lispro (ADMELOG) corrective regimen sliding scale   SubCutaneous at bedtime  levothyroxine 12.5 MICROGram(s) Oral daily  meropenem Injectable 1000 milliGRAM(s) IV Push every 8 hours  pantoprazole  Injectable 40 milliGRAM(s) IV Push two times a day  polyethylene glycol 3350 17 Gram(s) Oral daily  vancomycin  IVPB 750 milliGRAM(s) IV Intermittent every 12 hours    Vital Signs Last 24 Hrs  T(C): 36.8 (28 Nov 2023 08:48), Max: 38.3 (27 Nov 2023 21:04)  T(F): 98.3 (28 Nov 2023 08:48), Max: 101 (27 Nov 2023 21:04)  HR: 94 (28 Nov 2023 08:48) (91 - 112)  BP: 144/56 (28 Nov 2023 08:48) (134/52 - 152/64)  BP(mean): --  RR: 20 (28 Nov 2023 08:48) (18 - 24)  SpO2: 95% (28 Nov 2023 08:48) (95% - 99%)    Parameters below as of 28 Nov 2023 08:48  Patient On (Oxygen Delivery Method): nasal cannula  O2 Flow (L/min): 4     Physical exam:    Constitutional:  No acute distress  HEENT: NC/AT, EOMI, PERRLA, conjunctivae clear; ears and nose atraumatic  Neck: supple; thyroid not palpable  Back: no tenderness  Respiratory: respiratory effort normal; few crackles at bases  Cardiovascular: S1S2 regular, no murmurs  Abdomen: soft, not tender, not distended, positive BS  Genitourinary: no suprapubic tenderness  Lymphatic: no LN palpable  Musculoskeletal: no muscle tenderness, no joint swelling or tenderness  Extremities: no pedal edema  Left lower leg laceration dressed  Neurological/ Psychiatric: AxOx3, moving all extremities  Skin: no rashes; no palpable lesions    Labs: reviewed                        8.9    3.65  )-----------( 145      ( 27 Nov 2023 06:38 )             26.7     11-27    135  |  96  |  37<H>  ----------------------------<  122<H>  3.5   |  34<H>  |  0.90    Ca    8.5      27 Nov 2023 06:38  Phos  2.2     11-27  Mg     1.9     11-27    TPro  5.3<L>  /  Alb  2.1<L>  /  TBili  1.1  /  DBili  0.4<H>  /  AST  29  /  ALT  23  /  AlkPhos  92  11-27    C-Reactive Protein, Serum: 75 mg/L (11-26-23 @ 07:16)  C-Reactive Protein, Serum: 58 mg/L (11-25-23 @ 06:50)  C-Reactive Protein, Serum: 59 mg/L (11-24-23 @ 11:58)  C-Reactive Protein, Serum: 70 mg/L (11-23-23 @ 08:53)                        7.8    3.50  )-----------( 137      ( 23 Nov 2023 08:53 )             24.5     11-23    136  |  104  |  42<H>  ----------------------------<  107<H>  4.4   |  30  |  1.12    Ca    8.5      23 Nov 2023 08:53  Phos  2.6     11-23  Mg     2.1     11-23    TPro  5.6<L>  /  Alb  1.8<L>  /  TBili  0.8  /  DBili  x   /  AST  48<H>  /  ALT  35  /  AlkPhos  118  11-23    Vancomycin Level, Trough: 16.4 ug/mL (11-23 @ 20:16)    C-Reactive Protein, Serum: 70 mg/L (11-23-23 @ 08:53)  C-Reactive Protein, Serum: 24 mg/L (11-14-23 @ 07:03)  C-Reactive Protein, Serum: 34 mg/L (11-13-23 @ 06:30)               8.2    11.74 )-----------( 259      ( 03 Nov 2023 07:15 )             25.2     11-03    133<L>  |  102  |  35<H>  ----------------------------<  205<H>  4.4   |  22  |  1.20    Ca    8.3<L>      03 Nov 2023 07:15    TPro  6.9  /  Alb  2.0<L>  /  TBili  0.5  /  DBili  x   /  AST  38<H>  /  ALT  50  /  AlkPhos  130<H>  11-02     LIVER FUNCTIONS - ( 02 Nov 2023 14:47 )  Alb: 2.0 g/dL / Pro: 6.9 gm/dL / ALK PHOS: 130 U/L / ALT: 50 U/L / AST: 38 U/L / GGT: x           Urinalysis (11-02 @ 16:40)  Urine Appearance: Clear  Protein, Urine: 100 mg/dL  Urine Microscopic-Add On (NC) (11-02 @ 16:40)  White Blood Cell - Urine: 0 /HPF  Red Blood Cell - Urine: 0 /HPF    (11-02 @ 14:47)  NotDetec    Culture - Acid Fast - Bronchial w/Smear (collected 09 Nov 2023 12:30)  Source: .Bronchial RIGHT LOWER LOBE BAL  Preliminary Report (11 Nov 2023 15:08):    Culture is being performed.    Culture - Fungal, Bronchial (collected 09 Nov 2023 12:30)  Source: .Bronchial RIGHT LOWER LOBE BAL  Preliminary Report (13 Nov 2023 12:07):    Rare Yeast    Culture - Bronchial (collected 09 Nov 2023 12:30)  Source: .Bronchial RIGHT LOWER LOBE BAL  Gram Stain (09 Nov 2023 23:14):    Rare polymorphonuclear leukocytes per low power field    Few Squamous epithelial cells per low power field    Few Gram positive cocci in pairs per oil power field  Final Report (11 Nov 2023 16:38):    Normal Respiratory Marguerite present    Culture - Blood (collected 04 Nov 2023 04:21)  Source: .Blood None  Final Report (09 Nov 2023 09:00):    No growth at 5 days    Culture - Blood (collected 04 Nov 2023 04:21)  Source: .Blood None  Final Report (09 Nov 2023 09:00):    No growth at 5 days    Radiology: all available radiological tests reviewed    < from: CT Chest No Cont (11.02.23 @ 16:04) >  Right upper lobectomy.  Larger right lower lobe thick-walled cavity which may be infectious   and/or residua of reported treated tumor. 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  Consolidation within the right lower and middle lobe compatible with radiation change.  Multiple indistinct nodules in the right lungwhich may be infectious or radiation pneumonitis.  < end of copied text >    < from: CT Abdomen and Pelvis No Cont (11.08.23 @ 19:01) >  No hydronephrosis.  New patchy opacities in the right lung base. Small right pleural effusion.  < end of copied text >    < from: CT Chest No Cont (11.13.23 @ 10:04) >  IMPRESSION: Right lung large areas of consolidation new since November 2, 2023 likely pneumonia.    Right apical previously described large cyst unchanged in size since   November 2, 2023 containing internal air and increasing internal opacification since November 2, 2023 as described above.     A few left lung ill-defined nonspecific groundglass opacities.   Differential diagnosis include but is not limited to   infectious/inflammatory etiologies and or pulmonary edema.  Small left pleural effusion new since November 8, 2023.  < end of copied text >    < from: CT Chest No Cont (11.20.23 @ 08:03) >  IMPRESSION: Extensive right lung consolidations as described above with   overall interval progression since November 13, 2023. Complete   opacification of the right middle lobe with internal consolidation and   volume loss appears unchanged since November 13, 2023 demonstrating  interval progression since November 2, 2023.  Persistent apical right apical large irregular cyst unchanged in size since November 13, 2023.  < end of copied text >    < from: Xray Chest 1 View- PORTABLE-Routine (Xray Chest 1 View- PORTABLE-Routine .) (11.25.23 @ 15:23) >  IMPRESSION: Extensive right lung findings show new infiltrate in the   right lower lobe.  < end of copied text >      Advanced directives addressed: full resuscitation

## 2023-11-28 NOTE — PROGRESS NOTE ADULT - SUBJECTIVE AND OBJECTIVE BOX
INTERVAL HPI/OVERNIGHT EVENTS:  Patient S&E at bedside.  Pt more lethargic this am, febrile 101 overnight at 9 pm   pt pending transfer for home hospice         PAST MEDICAL & SURGICAL HISTORY:  COPD (chronic obstructive pulmonary disease)      Lung cancer      Anemia of chronic disease      CKD (chronic kidney disease), stage III      Hypothyroidism      AAA (abdominal aortic aneurysm)      HTN (hypertension)      HLD (hyperlipidemia)      Thrombocytopenia      S/P lobectomy of lung          FAMILY HISTORY:      VITAL SIGNS:  T(F): 98.3 (11-28-23 @ 08:48)  HR: 93 (11-28-23 @ 11:00)  BP: 133/56 (11-28-23 @ 11:00)  RR: 20 (11-28-23 @ 08:48)  SpO2: 95% (11-28-23 @ 08:48)  Wt(kg): --    PHYSICAL EXAM:    Constitutional: NAD, lethargic  Respiratory: rhonchi on aerosol mask   Cardiovascular: tachycardic   Gastrointestinal: soft, NTND,  Extremities: leg wrapped      MEDICATIONS  (STANDING):  albuterol/ipratropium for Nebulization 3 milliLiter(s) Nebulizer every 6 hours  AQUAPHOR (petrolatum Ointment) 1 Application(s) Topical two times a day  atorvastatin 40 milliGRAM(s) Oral at bedtime  caspofungin IVPB      caspofungin IVPB 50 milliGRAM(s) IV Intermittent every 24 hours  dextrose 5%. 1000 milliLiter(s) (100 mL/Hr) IV Continuous <Continuous>  dextrose 5%. 1000 milliLiter(s) (50 mL/Hr) IV Continuous <Continuous>  dextrose 50% Injectable 12.5 Gram(s) IV Push once  dextrose 50% Injectable 25 Gram(s) IV Push once  dextrose 50% Injectable 25 Gram(s) IV Push once  ferrous    sulfate 325 milliGRAM(s) Oral daily  fluticasone furoate/umeclidinium/vilanterol 100-62.5-25 MICROgram(s) Inhaler 1 Puff(s) Inhalation daily  folic acid 1 milliGRAM(s) Oral daily  furosemide   Injectable 20 milliGRAM(s) IV Push daily  glucagon  Injectable 1 milliGRAM(s) IntraMuscular once  hydrALAZINE 10 milliGRAM(s) Oral every 8 hours  insulin lispro (ADMELOG) corrective regimen sliding scale   SubCutaneous at bedtime  insulin lispro (ADMELOG) corrective regimen sliding scale   SubCutaneous three times a day before meals  levothyroxine 12.5 MICROGram(s) Oral daily  meropenem Injectable 1000 milliGRAM(s) IV Push every 8 hours  pantoprazole  Injectable 40 milliGRAM(s) IV Push two times a day  polyethylene glycol 3350 17 Gram(s) Oral daily  potassium chloride    Tablet ER 10 milliEquivalent(s) Oral every 2 hours  vancomycin  IVPB 750 milliGRAM(s) IV Intermittent every 12 hours    MEDICATIONS  (PRN):  acetaminophen     Tablet .. 650 milliGRAM(s) Oral every 6 hours PRN Temp greater or equal to 38C (100.4F), Mild Pain (1 - 3)  aluminum hydroxide/magnesium hydroxide/simethicone Suspension 30 milliLiter(s) Oral every 4 hours PRN Dyspepsia  benzonatate 100 milliGRAM(s) Oral three times a day PRN Cough  dextrose Oral Gel 15 Gram(s) Oral once PRN Blood Glucose LESS THAN 70 milliGRAM(s)/deciliter  furosemide   Injectable 10 milliGRAM(s) IV Push once PRN for after transfusion  melatonin 3 milliGRAM(s) Oral at bedtime PRN Insomnia  ondansetron Injectable 4 milliGRAM(s) IV Push every 8 hours PRN Nausea and/or Vomiting      Allergies    penicillin (Unknown)    Intolerances        LABS:                        8.9    4.32  )-----------( 153      ( 28 Nov 2023 07:38 )             27.1     11-28    137  |  96  |  44<H>  ----------------------------<  119<H>  3.3<L>   |  36<H>  |  1.01    Ca    8.6      28 Nov 2023 07:38  Phos  2.7     11-28  Mg     2.1     11-28    TPro  5.3<L>  /  Alb  2.1<L>  /  TBili  1.1  /  DBili  0.4<H>  /  AST  29  /  ALT  23  /  AlkPhos  92  11-27      Urinalysis Basic - ( 28 Nov 2023 07:38 )    Color: x / Appearance: x / SG: x / pH: x  Gluc: 119 mg/dL / Ketone: x  / Bili: x / Urobili: x   Blood: x / Protein: x / Nitrite: x   Leuk Esterase: x / RBC: x / WBC x   Sq Epi: x / Non Sq Epi: x / Bacteria: x        RADIOLOGY & ADDITIONAL TESTS:  Studies reviewed.

## 2023-11-28 NOTE — PROGRESS NOTE ADULT - ASSESSMENT
83-year-old M with PMHx HTN, HLD, chronic anemia, COPD, lung CA s/p RUL lobectomy (follows at [facility], last chemotherapy/RT 5/2023, not currently on treatment), AAA, hypothyroidism, CKD stage IIIa sent in to ED on 11/2/23  by pulmonologist MD Olivera with c/o SOB, dyspnea on minimal exertion, and cough. 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     # Acute hypoxic respiratory failure, multifactorial   # New RLL PNA on CT 11/13 , RLL cavity s/p bronchoscopy 11/9  possible yeast  infection, less likely radiation pneumonitis , r/o  aspergilloma    - CT chest: Larger right lower lobe thick-walled cavity which may be infectious and/or residua of reported treated tumor. Spongelike material within the cavity can be seen with aspergilloma (prior to fungus ball formation). Consolidation within the right lower and middle lobe compatible with radiation change.  - leukocytosis stable (WBC 17.70 from 17.35) ---> trend down to WBC  8   - repeat CXR 11/8 - worsening of opacities over right lung   - 2 d echo 11/8 - EF 60% , no significant valvular disease    - immunoglobulin panel - all Ig wnl  - 11/9 - s/p bronchoscopy   - speech and swallow evaluation  - no aspirations  - CXR 11/11 - stable consolidations  - repeat CT chest 11/13 - new right lung consolidation  - blood cultures -no growth, sputum culture - normal respiratory  - c/w trelegy ellipta, chest pt, IS, acapella ,BIPAP as needed  - ID/pulm consult noted   - s/p IV steroids --> po prednisone taper 20 x 3 days than stop , trending down ----> 34--> 50--> 70  - s/p  voriconazole PO ,  s/p 7 days of levaquin to cover atypical/psuedomonas (allergy to PCN)    - 11/13 - IV meropenem, doxy and fluconasole started as per ID   - Chest xray prelim worse  - Thoracic consult appreciated, no intervention  - CT Chest 11/20 worse  -11/13 - IV meropenem and doxycylcine  - Fluconazole DC, started on caspofungin 11/20  - Would consider palliative at this time  - 11/22 - d/c doxy started on  vancomycin , c/w IV meropenem  - CRP 24--> 70 --> 58  - 11/25 CXR - worse on the right  - 11/27 CT chest - worsening of right consolidation   - poor prognosis, focus on hospice care d/w wife , requesting bipap    #  Chronic anemia, likely due to  chemotherapy, worsening and iron deficiency   -  no s/s bleeding, asymptomatic, continue to monitor/trend  - 11/8 - 1 unit PRBC due to hemoptysis and worsening of anemia  -  11/19 transfuse 1 unit PRBC  - Hemoglobin 7.9 11/20  - 11/25 s/p 1 unit PRBC  - 11/26 1 unit PRBC , stop heparin , FOBT - pending   - Trend Hb    #CHRISTINA on CKD stage IIIa,  Resolved    # Left calf laceration from veno dines while in PACU for bronchoscopy   - surgery consult - s/p suturing  on 11/09/23   - plan to remove sutures in 10 days  - Surgical consult for suture removal  - sutures removed    # Bilateral UE edema due to superphicial thrombophlebitis  - doppler noted  - start heparin 5000 bid, elevation, warm compresses if pain  - 11/23 - iv lasix 10 mg   - 11/24 c/w IV lasix for 3 days  - 11/26 - lasix 20 in am, 10 IV after blood transfusion  - BNP 1200--> 1700--> 2500  - c/w lasix 20 qd      # Acute hyperglycemia, possible steroid-induced , Prediabetes with A1C 6.3   - Stop pre-meal and  lantus   - c/w ISS  - liberize diet  - Monitor     # HTN - hydralazine 10 bid --> 10 tid    #  HLD   - c/w  atorvastatin    # Hypothyroidism  - Levo lowered to 12.5 mcg,  Recheck in 4 weeks    # Hx lung CA s/p RUL lobectomy/chemo/RT  - f/u with [facility]     # Severe protein-calorie malnutrition. - nutritional education provided ,  supplements ordered ,  MVT qd ordered      # DVT Ppx  - heparin 5000 q12h     # Code status  - DNR/DNI    wife and daughter updated at bedside 11/21, 11/22, 11/23, 11/24, 11/25, 11/26, 11/27 83-year-old M with PMHx HTN, HLD, chronic anemia, COPD, lung CA s/p RUL lobectomy (follows at [facility], last chemotherapy/RT 5/2023, not currently on treatment), AAA, hypothyroidism, CKD stage IIIa sent in to ED on 11/2/23  by pulmonologist MD Olivera with c/o SOB, dyspnea on minimal exertion, and cough. 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     # Acute hypoxic respiratory failure, multifactorial   # New RLL PNA on CT 11/13 , RLL cavity s/p bronchoscopy 11/9  possible yeast  infection, less likely radiation pneumonitis , r/o  aspergilloma    - CT chest: Larger right lower lobe thick-walled cavity which may be infectious and/or residua of reported treated tumor. Spongelike material within the cavity can be seen with aspergilloma (prior to fungus ball formation). Consolidation within the right lower and middle lobe compatible with radiation change.  - leukocytosis stable (WBC 17.70 from 17.35) ---> trend down to WBC  8   - repeat CXR 11/8 - worsening of opacities over right lung   - 2 d echo 11/8 - EF 60% , no significant valvular disease    - immunoglobulin panel - all Ig wnl  - 11/9 - s/p bronchoscopy   - speech and swallow evaluation  - no aspirations  - CXR 11/11 - stable consolidations  - repeat CT chest 11/13 - new right lung consolidation  - blood cultures -no growth, sputum culture - normal respiratory  - c/w trelegy ellipta, chest pt, IS, acapella ,BIPAP as needed  - ID/pulm consult noted   - s/p IV steroids --> po prednisone taper 20 x 3 days than stop , trending down ----> 34--> 50--> 70--> 110  - s/p  voriconazole PO ,  s/p 7 days of levaquin to cover atypical/psuedomonas (allergy to PCN)    - 11/13 - IV meropenem, doxy and fluconasole started as per ID   - Chest xray prelim worse  - Thoracic consult appreciated, no intervention  - CT Chest 11/20 worse  -11/13 - IV meropenem and doxycylcine  - Fluconazole DC, started on caspofungin 11/20  - Would consider palliative at this time  - 11/22 - d/c doxy started on  vancomycin , c/w IV meropenem  - CRP 24--> 70 --> 58  - 11/25 CXR - worse on the right  - 11/27 CT chest - worsening of right consolidation   - poor prognosis, focus on hospice care d/w wife , requesting bipap  - 11/28 - awaiting hospice set up at home with bipap    #  Chronic anemia, likely due to  chemotherapy, worsening and iron deficiency   -  no s/s bleeding, asymptomatic, continue to monitor/trend  - 11/8 - 1 unit PRBC due to hemoptysis and worsening of anemia  -  11/19 transfuse 1 unit PRBC  - Hemoglobin 7.9 11/20  - 11/25 s/p 1 unit PRBC  - 11/26 1 unit PRBC , stop heparin , FOBT - neg   - Trend Hb    #CHRISTINA on CKD stage IIIa,  Resolved    # Left calf laceration from veno dines while in PACU for bronchoscopy   - surgery consult - s/p suturing  on 11/09/23   - plan to remove sutures in 10 days  - Surgical consult for suture removal  - sutures removed    # Bilateral UE edema due to superphicial thrombophlebitis  - doppler noted  - start heparin 5000 bid, elevation, warm compresses if pain  - 11/23 - iv lasix 10 mg   - 11/24 c/w IV lasix for 3 days  - 11/26 - lasix 20 in am, 10 IV after blood transfusion  - BNP 1200--> 1700--> 2500--> 4500--> 2500  - c/w lasix 20 qd --> PO in am      # Acute hyperglycemia, possible steroid-induced , Prediabetes with A1C 6.3   - Stop pre-meal and  lantus   - c/w ISS  - liberize diet  - Monitor     # HTN - hydralazine 10 bid --> 10 tid    #  HLD   - c/w  atorvastatin    # Hypothyroidism  - Levo lowered to 12.5 mcg,  Recheck in 4 weeks    # Hx lung CA s/p RUL lobectomy/chemo/RT  - f/u with [facility]     # Severe protein-calorie malnutrition. - nutritional education provided ,  supplements ordered ,  MVT qd ordered      # DVT Ppx  - heparin 5000 q12h     # Code status  - DNR/DNI    wife and daughter updated at bedside 11/21, 11/22, 11/23, 11/24, 11/25, 11/26, 11/27, 11/28     dispo - home with hospice, awaiting set up, need BIPAP ( helps pt with dyspnea at bedtime) , med rec done 83-year-old M with PMHx HTN, HLD, chronic anemia, COPD, lung CA s/p RUL lobectomy (follows at [facility], last chemotherapy/RT 5/2023, not currently on treatment), AAA, hypothyroidism, CKD stage IIIa sent in to ED on 11/2/23  by pulmonologist MD Olivera with c/o SOB, dyspnea on minimal exertion, and cough. 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     # Acute hypoxic respiratory failure, multifactorial   # New RLL PNA on CT 11/13 , RLL cavity s/p bronchoscopy 11/9  possible yeast  infection, less likely radiation pneumonitis , r/o  aspergilloma    - CT chest: Larger right lower lobe thick-walled cavity which may be infectious and/or residua of reported treated tumor. Spongelike material within the cavity can be seen with aspergilloma (prior to fungus ball formation). Consolidation within the right lower and middle lobe compatible with radiation change.  - leukocytosis stable (WBC 17.70 from 17.35) ---> trend down to WBC  8   - repeat CXR 11/8 - worsening of opacities over right lung   - 2 d echo 11/8 - EF 60% , no significant valvular disease    - immunoglobulin panel - all Ig wnl  - 11/9 - s/p bronchoscopy   - speech and swallow evaluation  - no aspirations  - CXR 11/11 - stable consolidations  - repeat CT chest 11/13 - new right lung consolidation  - blood cultures -no growth, sputum culture - normal respiratory  - c/w trelegy ellipta, chest pt, IS, acapella ,BIPAP as needed  - ID/pulm consult noted   - s/p IV steroids --> po prednisone taper 20 x 3 days than stop , trending down ----> 34--> 50--> 70--> 110  - s/p  voriconazole PO ,  s/p 7 days of levaquin to cover atypical/psuedomonas (allergy to PCN)    - 11/13 - IV meropenem, doxy and fluconasole started as per ID   - Chest xray prelim worse  - Thoracic consult appreciated, no intervention  - CT Chest 11/20 worse  -11/13 - IV meropenem and doxycylcine  - Fluconazole DC, started on caspofungin 11/20  - Would consider palliative at this time  - 11/22 - d/c doxy started on  vancomycin , c/w IV meropenem  - CRP 24--> 70 --> 58  - 11/25 CXR - worse on the right  - 11/27 CT chest - worsening of right consolidation   - poor prognosis, focus on hospice care d/w wife , requesting bipap  - 11/28 - awaiting hospice set up at home with bipap  -  d/c caspofungin, meropenem, vanco --> po fluconazole and ceftin for 14 days    #  Chronic anemia, likely due to  chemotherapy, worsening and iron deficiency   -  no s/s bleeding, asymptomatic, continue to monitor/trend  - 11/8 - 1 unit PRBC due to hemoptysis and worsening of anemia  -  11/19 transfuse 1 unit PRBC  - Hemoglobin 7.9 11/20  - 11/25 s/p 1 unit PRBC  - 11/26 1 unit PRBC , stop heparin , FOBT - neg   - Trend Hb    #CHRISTINA on CKD stage IIIa,  Resolved    # Left calf laceration from veno dines while in PACU for bronchoscopy   - surgery consult - s/p suturing  on 11/09/23   - plan to remove sutures in 10 days  - Surgical consult for suture removal  - sutures removed    # Bilateral UE edema due to superphicial thrombophlebitis  - doppler noted  - start heparin 5000 bid, elevation, warm compresses if pain  - 11/23 - iv lasix 10 mg   - 11/24 c/w IV lasix for 3 days  - 11/26 - lasix 20 in am, 10 IV after blood transfusion  - BNP 1200--> 1700--> 2500--> 4500--> 2500  - c/w lasix 20 qd --> PO in am      # Acute hyperglycemia, possible steroid-induced , Prediabetes with A1C 6.3   - Stop pre-meal and  lantus   - c/w ISS  - liberize diet  - Monitor     # HTN - hydralazine 10 bid --> 10 tid    #  HLD   - c/w  atorvastatin    # Hypothyroidism  - Levo lowered to 12.5 mcg,  Recheck in 4 weeks    # Hx lung CA s/p RUL lobectomy/chemo/RT  - f/u with [facility]     # Severe protein-calorie malnutrition. - nutritional education provided ,  supplements ordered ,  MVT qd ordered      # DVT Ppx  - heparin 5000 q12h     # Code status  - DNR/DNI    wife and daughter updated at bedside 11/21, 11/22, 11/23, 11/24, 11/25, 11/26, 11/27, 11/28     dispo - home with hospice, awaiting set up, need BIPAP ( helps pt with dyspnea at bedtime) , med rec done 83-year-old M with PMHx HTN, HLD, chronic anemia, COPD, lung CA s/p RUL lobectomy (follows at [facility], last chemotherapy/RT 5/2023, not currently on treatment), AAA, hypothyroidism, CKD stage IIIa sent in to ED on 11/2/23  by pulmonologist MD Olivera with c/o SOB, dyspnea on minimal exertion, and cough. 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     # Acute hypoxic respiratory failure, multifactorial   # New RLL PNA on CT 11/13 , RLL cavity s/p bronchoscopy 11/9  possible yeast  infection, less likely radiation pneumonitis , r/o  aspergilloma    - CT chest: Larger right lower lobe thick-walled cavity which may be infectious and/or residua of reported treated tumor. Spongelike material within the cavity can be seen with aspergilloma (prior to fungus ball formation). Consolidation within the right lower and middle lobe compatible with radiation change.  - leukocytosis stable (WBC 17.70 from 17.35) ---> trend down to WBC  8   - repeat CXR 11/8 - worsening of opacities over right lung   - 2 d echo 11/8 - EF 60% , no significant valvular disease    - immunoglobulin panel - all Ig wnl  - 11/9 - s/p bronchoscopy   - speech and swallow evaluation  - no aspirations  - CXR 11/11 - stable consolidations  - repeat CT chest 11/13 - new right lung consolidation  - blood cultures -no growth, sputum culture - normal respiratory  - c/w trelegy ellipta, chest pt, IS, acapella ,BIPAP as needed  - ID/pulm consult noted   - s/p IV steroids --> po prednisone taper 20 x 3 days than stop , trending down ----> 34--> 50--> 70--> 110  - s/p  voriconazole PO ,  s/p 7 days of levaquin to cover atypical/psuedomonas (allergy to PCN)    - 11/13 - IV meropenem, doxy and fluconasole started as per ID   - Chest xray prelim worse  - Thoracic consult appreciated, no intervention  - CT Chest 11/20 worse  -11/13 - IV meropenem and doxycylcine  - Fluconazole DC, started on caspofungin 11/20  - Would consider palliative at this time  - 11/22 - d/c doxy started on  vancomycin , c/w IV meropenem  - CRP 24--> 70 --> 58  - 11/25 CXR - worse on the right  - 11/27 CT chest - worsening of right consolidation   - poor prognosis, focus on hospice care d/w wife , requesting bipap  - 11/28 - awaiting hospice set up at home with bipap  -  d/c caspofungin, meropenem, vanco --> po fluconazole and ceftin for 14 days    #  Chronic anemia, likely due to  chemotherapy, worsening and iron deficiency   -  no s/s bleeding, asymptomatic, continue to monitor/trend  - 11/8 - 1 unit PRBC due to hemoptysis and worsening of anemia  -  11/19 transfuse 1 unit PRBC  - Hemoglobin 7.9 11/20  - 11/25 s/p 1 unit PRBC  - 11/26 1 unit PRBC , stop heparin , FOBT - neg   - Trend Hb    #CHRISTINA on CKD stage IIIa,  Resolved    # Left calf laceration from veno dines while in PACU for bronchoscopy   - surgery consult - s/p suturing  on 11/09/23   - plan to remove sutures in 10 days  - Surgical consult for suture removal  - sutures removed    # Bilateral UE edema due to superphicial thrombophlebitis  - doppler noted  - start heparin 5000 bid, elevation, warm compresses if pain  - 11/23 - iv lasix 10 mg   - 11/24 c/w IV lasix for 3 days  - 11/26 - lasix 20 in am, 10 IV after blood transfusion  - BNP 1200--> 1700--> 2500--> 4500--> 2500  - c/w lasix 20 qd --> PO in am      # Acute hyperglycemia, possible steroid-induced , Prediabetes with A1C 6.3   - Stop pre-meal and  lantus ,  ISS  - liberize diet, no more monitoring focus on comfort    # HTN - hydralazine 10 bid --> 10 tid    #  HLD   - c/w  atorvastatin    # Hypothyroidism  - Levo lowered to 12.5 mcg,  Recheck in 4 weeks    # Hx lung CA s/p RUL lobectomy/chemo/RT  - f/u with [facility]     # Severe protein-calorie malnutrition. - nutritional education provided ,  supplements ordered ,  MVT qd ordered      # DVT Ppx  - heparin 5000 q12h     # Code status  - DNR/DNI    wife and daughter updated at bedside 11/21, 11/22, 11/23, 11/24, 11/25, 11/26, 11/27, 11/28     dispo - home with hospice, awaiting set up, need BIPAP ( helps pt with dyspnea at bedtime) , med rec done

## 2023-11-29 ENCOUNTER — TRANSCRIPTION ENCOUNTER (OUTPATIENT)
Age: 83
End: 2023-11-29

## 2023-11-29 LAB
ANION GAP SERPL CALC-SCNC: 5 MMOL/L — SIGNIFICANT CHANGE UP (ref 5–17)
ANION GAP SERPL CALC-SCNC: 5 MMOL/L — SIGNIFICANT CHANGE UP (ref 5–17)
BUN SERPL-MCNC: 50 MG/DL — HIGH (ref 7–23)
BUN SERPL-MCNC: 50 MG/DL — HIGH (ref 7–23)
CALCIUM SERPL-MCNC: 8.6 MG/DL — SIGNIFICANT CHANGE UP (ref 8.5–10.1)
CALCIUM SERPL-MCNC: 8.6 MG/DL — SIGNIFICANT CHANGE UP (ref 8.5–10.1)
CHLORIDE SERPL-SCNC: 99 MMOL/L — SIGNIFICANT CHANGE UP (ref 96–108)
CHLORIDE SERPL-SCNC: 99 MMOL/L — SIGNIFICANT CHANGE UP (ref 96–108)
CO2 SERPL-SCNC: 36 MMOL/L — HIGH (ref 22–31)
CO2 SERPL-SCNC: 36 MMOL/L — HIGH (ref 22–31)
CREAT SERPL-MCNC: 1 MG/DL — SIGNIFICANT CHANGE UP (ref 0.5–1.3)
CREAT SERPL-MCNC: 1 MG/DL — SIGNIFICANT CHANGE UP (ref 0.5–1.3)
CRP SERPL-MCNC: 87 MG/L — HIGH
CRP SERPL-MCNC: 87 MG/L — HIGH
EGFR: 75 ML/MIN/1.73M2 — SIGNIFICANT CHANGE UP
EGFR: 75 ML/MIN/1.73M2 — SIGNIFICANT CHANGE UP
GLUCOSE SERPL-MCNC: 126 MG/DL — HIGH (ref 70–99)
GLUCOSE SERPL-MCNC: 126 MG/DL — HIGH (ref 70–99)
HCT VFR BLD CALC: 26.3 % — LOW (ref 39–50)
HCT VFR BLD CALC: 26.3 % — LOW (ref 39–50)
HGB BLD-MCNC: 8.5 G/DL — LOW (ref 13–17)
HGB BLD-MCNC: 8.5 G/DL — LOW (ref 13–17)
MAGNESIUM SERPL-MCNC: 2.2 MG/DL — SIGNIFICANT CHANGE UP (ref 1.6–2.6)
MAGNESIUM SERPL-MCNC: 2.2 MG/DL — SIGNIFICANT CHANGE UP (ref 1.6–2.6)
MCHC RBC-ENTMCNC: 30.5 PG — SIGNIFICANT CHANGE UP (ref 27–34)
MCHC RBC-ENTMCNC: 30.5 PG — SIGNIFICANT CHANGE UP (ref 27–34)
MCHC RBC-ENTMCNC: 32.3 GM/DL — SIGNIFICANT CHANGE UP (ref 32–36)
MCHC RBC-ENTMCNC: 32.3 GM/DL — SIGNIFICANT CHANGE UP (ref 32–36)
MCV RBC AUTO: 94.3 FL — SIGNIFICANT CHANGE UP (ref 80–100)
MCV RBC AUTO: 94.3 FL — SIGNIFICANT CHANGE UP (ref 80–100)
NT-PROBNP SERPL-SCNC: 1499 PG/ML — HIGH (ref 0–450)
NT-PROBNP SERPL-SCNC: 1499 PG/ML — HIGH (ref 0–450)
PHOSPHATE SERPL-MCNC: 2.6 MG/DL — SIGNIFICANT CHANGE UP (ref 2.5–4.5)
PHOSPHATE SERPL-MCNC: 2.6 MG/DL — SIGNIFICANT CHANGE UP (ref 2.5–4.5)
PLATELET # BLD AUTO: 156 K/UL — SIGNIFICANT CHANGE UP (ref 150–400)
PLATELET # BLD AUTO: 156 K/UL — SIGNIFICANT CHANGE UP (ref 150–400)
POTASSIUM SERPL-MCNC: 3.7 MMOL/L — SIGNIFICANT CHANGE UP (ref 3.5–5.3)
POTASSIUM SERPL-MCNC: 3.7 MMOL/L — SIGNIFICANT CHANGE UP (ref 3.5–5.3)
POTASSIUM SERPL-SCNC: 3.7 MMOL/L — SIGNIFICANT CHANGE UP (ref 3.5–5.3)
POTASSIUM SERPL-SCNC: 3.7 MMOL/L — SIGNIFICANT CHANGE UP (ref 3.5–5.3)
PROCALCITONIN SERPL-MCNC: 0.33 NG/ML — HIGH (ref 0.02–0.1)
PROCALCITONIN SERPL-MCNC: 0.33 NG/ML — HIGH (ref 0.02–0.1)
RBC # BLD: 2.79 M/UL — LOW (ref 4.2–5.8)
RBC # BLD: 2.79 M/UL — LOW (ref 4.2–5.8)
RBC # FLD: 17.3 % — HIGH (ref 10.3–14.5)
RBC # FLD: 17.3 % — HIGH (ref 10.3–14.5)
SODIUM SERPL-SCNC: 140 MMOL/L — SIGNIFICANT CHANGE UP (ref 135–145)
SODIUM SERPL-SCNC: 140 MMOL/L — SIGNIFICANT CHANGE UP (ref 135–145)
WBC # BLD: 4.14 K/UL — SIGNIFICANT CHANGE UP (ref 3.8–10.5)
WBC # BLD: 4.14 K/UL — SIGNIFICANT CHANGE UP (ref 3.8–10.5)
WBC # FLD AUTO: 4.14 K/UL — SIGNIFICANT CHANGE UP (ref 3.8–10.5)
WBC # FLD AUTO: 4.14 K/UL — SIGNIFICANT CHANGE UP (ref 3.8–10.5)

## 2023-11-29 PROCEDURE — 99232 SBSQ HOSP IP/OBS MODERATE 35: CPT

## 2023-11-29 RX ORDER — IPRATROPIUM/ALBUTEROL SULFATE 18-103MCG
3 AEROSOL WITH ADAPTER (GRAM) INHALATION
Qty: 1 | Refills: 0
Start: 2023-11-29 | End: 2023-12-28

## 2023-11-29 RX ORDER — IPRATROPIUM/ALBUTEROL SULFATE 18-103MCG
3 AEROSOL WITH ADAPTER (GRAM) INHALATION
Qty: 360 | Refills: 0
Start: 2023-11-29 | End: 2023-12-28

## 2023-11-29 RX ADMIN — ATORVASTATIN CALCIUM 40 MILLIGRAM(S): 80 TABLET, FILM COATED ORAL at 21:59

## 2023-11-29 RX ADMIN — Medication 3 MILLILITER(S): at 20:15

## 2023-11-29 RX ADMIN — Medication 500 MILLIGRAM(S): at 22:00

## 2023-11-29 RX ADMIN — Medication 10 MILLIGRAM(S): at 09:33

## 2023-11-29 RX ADMIN — Medication 1 APPLICATION(S): at 22:03

## 2023-11-29 RX ADMIN — Medication 500 MILLIGRAM(S): at 13:31

## 2023-11-29 RX ADMIN — PANTOPRAZOLE SODIUM 40 MILLIGRAM(S): 20 TABLET, DELAYED RELEASE ORAL at 05:48

## 2023-11-29 RX ADMIN — Medication 3 MILLILITER(S): at 02:43

## 2023-11-29 RX ADMIN — Medication 20 MILLIGRAM(S): at 09:32

## 2023-11-29 RX ADMIN — Medication 12.5 MICROGRAM(S): at 05:48

## 2023-11-29 RX ADMIN — Medication 325 MILLIGRAM(S): at 09:32

## 2023-11-29 RX ADMIN — FLUTICASONE FUROATE, UMECLIDINIUM BROMIDE AND VILANTEROL TRIFENATATE 1 PUFF(S): 200; 62.5; 25 POWDER RESPIRATORY (INHALATION) at 08:26

## 2023-11-29 RX ADMIN — Medication 3 MILLILITER(S): at 08:25

## 2023-11-29 RX ADMIN — POLYETHYLENE GLYCOL 3350 17 GRAM(S): 17 POWDER, FOR SOLUTION ORAL at 09:33

## 2023-11-29 RX ADMIN — Medication 1 APPLICATION(S): at 11:38

## 2023-11-29 RX ADMIN — Medication 3 MILLILITER(S): at 13:51

## 2023-11-29 RX ADMIN — FLUCONAZOLE 100 MILLIGRAM(S): 150 TABLET ORAL at 13:31

## 2023-11-29 RX ADMIN — Medication 1 MILLIGRAM(S): at 09:32

## 2023-11-29 RX ADMIN — Medication 10 MILLIGRAM(S): at 17:38

## 2023-11-29 NOTE — DISCHARGE NOTE PROVIDER - CARE PROVIDER_API CALL
Tanner Olivera  Pulmonary Disease  [address] [place] [id number]  Phone: [phone]  Fax: [phone]  Follow Up Time:

## 2023-11-29 NOTE — PROGRESS NOTE ADULT - SUBJECTIVE AND OBJECTIVE BOX
INTERVAL HPI/OVERNIGHT EVENTS:  Patient S&E at bedside. No o/n events, on bipap overnight, 2L NC this am with O2 sat 96%  pt awaiting transfer for home hospice  awake and alert, no complaints this am     PAST MEDICAL & SURGICAL HISTORY:  COPD (chronic obstructive pulmonary disease)      Lung cancer      Anemia of chronic disease      CKD (chronic kidney disease), stage III      Hypothyroidism      AAA (abdominal aortic aneurysm)      HTN (hypertension)      HLD (hyperlipidemia)      Thrombocytopenia      S/P lobectomy of lung          FAMILY HISTORY:      VITAL SIGNS:  T(F): 98.3 (11-29-23 @ 08:48)  HR: 94 (11-29-23 @ 08:48)  BP: 154/55 (11-29-23 @ 08:48)  RR: 23 (11-29-23 @ 08:48)  SpO2: 94% (11-29-23 @ 08:48)  Wt(kg): --    PHYSICAL EXAM:      Constitutional: NAD,   Respiratory: rhonchi on NC  Cardiovascular: rrr  Gastrointestinal: soft, NTND,  Extremities: leg wrapped    MEDICATIONS  (STANDING):  albuterol/ipratropium for Nebulization 3 milliLiter(s) Nebulizer every 6 hours  AQUAPHOR (petrolatum Ointment) 1 Application(s) Topical two times a day  atorvastatin 40 milliGRAM(s) Oral at bedtime  cefuroxime   Tablet 500 milliGRAM(s) Oral every 12 hours  ferrous    sulfate 325 milliGRAM(s) Oral daily  fluconAZOLE   Tablet 100 milliGRAM(s) Oral daily  fluticasone furoate/umeclidinium/vilanterol 100-62.5-25 MICROgram(s) Inhaler 1 Puff(s) Inhalation daily  folic acid 1 milliGRAM(s) Oral daily  furosemide    Tablet 20 milliGRAM(s) Oral daily  hydrALAZINE 10 milliGRAM(s) Oral every 8 hours  levothyroxine 12.5 MICROGram(s) Oral daily  pantoprazole    Tablet 40 milliGRAM(s) Oral before breakfast  polyethylene glycol 3350 17 Gram(s) Oral daily    MEDICATIONS  (PRN):  acetaminophen     Tablet .. 650 milliGRAM(s) Oral every 6 hours PRN Temp greater or equal to 38C (100.4F), Mild Pain (1 - 3)  aluminum hydroxide/magnesium hydroxide/simethicone Suspension 30 milliLiter(s) Oral every 4 hours PRN Dyspepsia  benzonatate 100 milliGRAM(s) Oral three times a day PRN Cough  melatonin 3 milliGRAM(s) Oral at bedtime PRN Insomnia  ondansetron Injectable 4 milliGRAM(s) IV Push every 8 hours PRN Nausea and/or Vomiting      Allergies    penicillin (Unknown)    Intolerances        LABS:                        8.5    4.14  )-----------( 156      ( 29 Nov 2023 07:08 )             26.3     11-29    140  |  99  |  50<H>  ----------------------------<  126<H>  3.7   |  36<H>  |  1.00    Ca    8.6      29 Nov 2023 07:08  Phos  2.6     11-29  Mg     2.2     11-29        Urinalysis Basic - ( 29 Nov 2023 07:08 )    Color: x / Appearance: x / SG: x / pH: x  Gluc: 126 mg/dL / Ketone: x  / Bili: x / Urobili: x   Blood: x / Protein: x / Nitrite: x   Leuk Esterase: x / RBC: x / WBC x   Sq Epi: x / Non Sq Epi: x / Bacteria: x        RADIOLOGY & ADDITIONAL TESTS:  Studies reviewed.

## 2023-11-29 NOTE — PROGRESS NOTE ADULT - ASSESSMENT
83y old Male with PMH HTN, HPL, Chronic anemia, COPD, lung CA [place] [facility] [place]last chemotherapy and RT 05/2023, not currently receiving chemo treatment), s/p right upper lobectomy, AAA, Hypothyroidism, CKD stage IIIa referred by me to ed for continued sob, cough despite abx treatment found to have large lower lobe thick walled cavity with worsening findings on ct despite treatment with levaquin  1. large lower lobe thick walled cavity: unclear if this is fungal vs bacterial vs malignancy  -started on voriconazole, initially switched to fluconazole, then on caspofungin, now back on fluconazole  -reviewed imaging from [place] supplied by wife - it looks like he has had blebs in the past and these are what are infected - they look half filled with fluid with thicker borders, which would argue against fungal infection/mrsa as there is no necrosis involved in its pathogenesis  -s/p bronch. follow up results. growing few yeast  -repeat ct wo contrast reveals worsening consolidations and filling in of bleb. this may suggest inadequate antibiotics, continued aspiration or rapidly progressive cancer  -was on doxy (for potential mrsa) now transitioned to vanco  -had been on meropenem, now on cefuroxime  -plan for home with hospice  -most recent ct reveals worsening findings. it is unlikely that after all the medications he received that this would represent infection with continued worsening. may be progressive malignancy. will need to discuss with wife about next steps. i do not think that he would benefit from additional antibiotics but given fevers to 101, i agree with po abx at this time  2. pneumonia vs lung cancer: as above  -supplemental o2  -speech/swallow eval noted  -aspiration precautions  3. copd: continue trelegy  4. ? radiation induced pneumonitis: s/p steroids  5. anemia: received 1 unit prbc on 11/19 and then again 11/26  6. left leg laceration: s/p suture, now bandaged. continue wound care  7. superficial thrombophlebitis: s/p heparin

## 2023-11-29 NOTE — PROGRESS NOTE ADULT - ASSESSMENT
82 y/o [place] with h/o lung CA [place] [facility] [place], s/p right upper lobectomy, HTN, HPL, Chronic anemia, COPD, AAA, Hypothyroidism, CKD stage IIIa was admitted on 11/2 for increased shortness of breath, dyspnea on minimal exertion, and cough.    # h/o lung cancer   - followed at [facility] [place] - last received carbo/taxol with salvage RT 5/5/2023 after progression in subcarinol node after adjuvant atezolizumab (LD 12/20/22)  - planning for home hospice when bipap delivered    # PNA/ Pneumonitis   - BAL with rare yeast - CONTINUE  meropenem 1 gm IV q12h, and caspofungin as per ID and vancomycin  - followed by pulm and ct surgery  - repeat CT chest 11/20- when compared with 11/13 with complete opacification of RML w/ internal consolidation and volume loss appears unchanged and extensive right lung consolidations w/ overall progression.  - repeat CXR suggestive of new RIGHT lung infiltrate - my suspicion is that there may be a component of aspiration pneumonia.   - 11/27/23 CT chest- Large areas of consolidation within the right lung progressed since November 20, 2023. Cluster of a few nodular opacities within the left lower lobe new since November 20, 2023 likely of infectious etiology with endobronchial spread. Small left pleural effusion with interval increase since November 20, 2023.  - pt slightly more lethargic, now febrile 101 overnight     # anemia- likely related to myelosuppression from treatment history/pna  - s/p 1u pRBC 11/26  - Hb 8.5 and stable    # UE pain  US duplex UE negative for DVt- showed superficial thrombophlebitis involving b/l cephalic and median cubital veins    palll care- planned for home hospice when all arranged for patient including bipap at home     Discussed with [facility]  will continue to follow

## 2023-11-29 NOTE — PROGRESS NOTE ADULT - ASSESSMENT
83-year-old M with PMHx HTN, HLD, chronic anemia, COPD, lung CA s/p RUL lobectomy (follows at [facility], last chemotherapy/RT 5/2023, not currently on treatment), AAA, hypothyroidism, CKD stage IIIa sent in to ED on 11/2/23  by pulmonologist MD Olivera with c/o SOB, dyspnea on minimal exertion, and cough. 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     # Acute hypoxic respiratory failure, multifactorial   # New RLL PNA on CT 11/13 , RLL cavity s/p bronchoscopy 11/9  possible yeast  infection, less likely radiation pneumonitis , r/o  aspergilloma    - CT chest: Larger right lower lobe thick-walled cavity which may be infectious and/or residua of reported treated tumor. Spongelike material within the cavity can be seen with aspergilloma (prior to fungus ball formation). Consolidation within the right lower and middle lobe compatible with radiation change.  - leukocytosis stable (WBC 17.70 from 17.35) ---> trend down to WBC  8   - repeat CXR 11/8 - worsening of opacities over right lung   - 2 d echo 11/8 - EF 60% , no significant valvular disease    - immunoglobulin panel - all Ig wnl  - 11/9 - s/p bronchoscopy   - speech and swallow evaluation  - no aspirations  - CXR 11/11 - stable consolidations  - repeat CT chest 11/13 - new right lung consolidation  - blood cultures -no growth, sputum culture - normal respiratory  - c/w trelegy ellipta, chest pt, IS, acapella ,BIPAP as needed  - ID/pulm consult noted   - s/p IV steroids --> po prednisone taper 20 x 3 days than stop , trending down ----> 34--> 50--> 70--> 110  - s/p  voriconazole PO ,  s/p 7 days of levaquin to cover atypical/psuedomonas (allergy to PCN)    - 11/13 - IV meropenem, doxy and fluconasole started as per ID   - Chest xray prelim worse  - Thoracic consult appreciated, no intervention  - CT Chest 11/20 worse  -11/13 - IV meropenem and doxycylcine  - Fluconazole DC, started on caspofungin 11/20  - Would consider palliative at this time  - 11/22 - d/c doxy started on  vancomycin , c/w IV meropenem  - CRP 24--> 70 --> 58  - 11/25 CXR - worse on the right  - 11/27 CT chest - worsening of right consolidation   - poor prognosis, focus on hospice care d/w wife , requesting bipap  - 11/28 - awaiting hospice set up at home with bipap  -  d/c caspofungin, meropenem, vanco --> po fluconazole and ceftin for 14 days    #  Chronic anemia, likely due to  chemotherapy, worsening and iron deficiency   -  no s/s bleeding, asymptomatic, continue to monitor/trend  - 11/8 - 1 unit PRBC due to hemoptysis and worsening of anemia  -  11/19 transfuse 1 unit PRBC  - Hemoglobin 7.9 11/20  - 11/25 s/p 1 unit PRBC  - 11/26 1 unit PRBC , stop heparin , FOBT - neg   - Trend Hb    #CHRISTINA on CKD stage IIIa,  Resolved    # Left calf laceration from veno dines while in PACU for bronchoscopy   - surgery consult - s/p suturing  on 11/09/23   - plan to remove sutures in 10 days  - Surgical consult for suture removal  - sutures removed    # Bilateral UE edema due to superphicial thrombophlebitis  - doppler noted  - start heparin 5000 bid, elevation, warm compresses if pain  - 11/23 - iv lasix 10 mg   - 11/24 c/w IV lasix for 3 days  - 11/26 - lasix 20 in am, 10 IV after blood transfusion  - BNP 1200--> 1700--> 2500--> 4500--> 2500  - c/w lasix 20 qd --> PO in am      # Acute hyperglycemia, possible steroid-induced , Prediabetes with A1C 6.3   - Stop pre-meal and  lantus ,  ISS  - liberize diet, no more monitoring focus on comfort    # HTN - hydralazine 10 bid --> 10 tid    #  HLD   - c/w  atorvastatin    # Hypothyroidism  - Levo lowered to 12.5 mcg,  Recheck in 4 weeks    # Hx lung CA s/p RUL lobectomy/chemo/RT  - f/u with [facility]     # Severe protein-calorie malnutrition. - nutritional education provided ,  supplements ordered ,  MVT qd ordered      # DVT Ppx  - heparin 5000 q12h     # Code status  - DNR/DNI    wife and daughter updated at bedside 11/21, 11/22, 11/23, 11/24, 11/25, 11/26, 11/27, 11/28     dispo - home with hospice, awaiting set up, need BIPAP ( helps pt with dyspnea at bedtime) , med rec done

## 2023-11-29 NOTE — PROGRESS NOTE ADULT - SUBJECTIVE AND OBJECTIVE BOX
HOSPITALIST ATTENDING PROGRESS NOTE    Chart and meds reviewed.  Patient seen and examined.    CC: MARTHA PNA    Subjective: No acute issues overnight. Awaiting home hospice    All other systems reviewed and found to be negative with the exception of what has been described above.    MEDICATIONS  (STANDING):  albuterol/ipratropium for Nebulization 3 milliLiter(s) Nebulizer every 6 hours  AQUAPHOR (petrolatum Ointment) 1 Application(s) Topical two times a day  atorvastatin 40 milliGRAM(s) Oral at bedtime  cefuroxime   Tablet 500 milliGRAM(s) Oral every 12 hours  ferrous    sulfate 325 milliGRAM(s) Oral daily  fluconAZOLE   Tablet 100 milliGRAM(s) Oral daily  fluticasone furoate/umeclidinium/vilanterol 100-62.5-25 MICROgram(s) Inhaler 1 Puff(s) Inhalation daily  folic acid 1 milliGRAM(s) Oral daily  furosemide    Tablet 20 milliGRAM(s) Oral daily  hydrALAZINE 10 milliGRAM(s) Oral every 8 hours  levothyroxine 12.5 MICROGram(s) Oral daily  pantoprazole    Tablet 40 milliGRAM(s) Oral before breakfast  polyethylene glycol 3350 17 Gram(s) Oral daily    MEDICATIONS  (PRN):  acetaminophen     Tablet .. 650 milliGRAM(s) Oral every 6 hours PRN Temp greater or equal to 38C (100.4F), Mild Pain (1 - 3)  aluminum hydroxide/magnesium hydroxide/simethicone Suspension 30 milliLiter(s) Oral every 4 hours PRN Dyspepsia  benzonatate 100 milliGRAM(s) Oral three times a day PRN Cough  melatonin 3 milliGRAM(s) Oral at bedtime PRN Insomnia  ondansetron Injectable 4 milliGRAM(s) IV Push every 8 hours PRN Nausea and/or Vomiting    ICU Vital Signs Last 24 Hrs  T(C): 36.8 (29 Nov 2023 08:48), Max: 37.1 (28 Nov 2023 15:19)  T(F): 98.3 (29 Nov 2023 08:48), Max: 98.8 (28 Nov 2023 15:19)  HR: 94 (29 Nov 2023 08:48) (90 - 108)  BP: 154/55 (29 Nov 2023 08:48) (127/49 - 154/55)  RR: 23 (29 Nov 2023 08:48) (18 - 23)  SpO2: 94% (29 Nov 2023 08:48) (94% - 100%)    GEN: Frail  HEENT:  pupils equal and reactive, EOMI, no oropharyngeal lesions, erythema, exudates, oral thrush  NECK:   supple, no carotid bruits, no palpable lymph nodes, no thyromegaly  CV:  +S1, +S2, regular, no murmurs or rubs  RESP:   Bilateral ronchi  BREAST:  not examined  GI:  abdomen soft, non-tender, non-distended, normal BS, no bruits, no abdominal masses, no palpable masses  RECTAL:  not examined  :  not examined  MSK:   normal muscle tone, no atrophy, no rigidity, no contractions  EXT:  no clubbing, no cyanosis, no edema, no calf pain, swelling or erythema  VASCULAR:  pulses equal and symmetric in the upper and lower extremities  NEURO:  AAOX3, no focal neurological deficits, follows all commands, able to move extremities spontaneously  SKIN:  no ulcers, lesions or rashes    LABS:                          8.5    4.14  )-----------( 156      ( 29 Nov 2023 07:08 )             26.3     11-29    140  |  99  |  50<H>  ----------------------------<  126<H>  3.7   |  36<H>  |  1.00    Ca    8.6      29 Nov 2023 07:08  Phos  2.6     11-29  Mg     2.2     11-29    Urinalysis Basic - ( 29 Nov 2023 07:08 )  Color: x / Appearance: x / SG: x / pH: x  Gluc: 126 mg/dL / Ketone: x  / Bili: x / Urobili: x   Blood: x / Protein: x / Nitrite: x   Leuk Esterase: x / RBC: x / WBC x   Sq Epi: x / Non Sq Epi: x / Bacteria: x

## 2023-11-29 NOTE — DISCHARGE NOTE PROVIDER - DETAILS OF MALNUTRITION DIAGNOSIS/DIAGNOSES
This patient has been assessed with a concern for Malnutrition and was treated during this hospitalization for the following Nutrition diagnosis/diagnoses:     -  11/04/2023: Severe protein-calorie malnutrition   -  11/04/2023: Underweight (BMI < 19)

## 2023-11-29 NOTE — DISCHARGE NOTE PROVIDER - HOSPITAL COURSE
83-year-old M with PMHx HTN, HLD, chronic anemia, COPD, lung CA s/p RUL lobectomy (follows at [facility], last chemotherapy/RT 5/2023, not currently on treatment), AAA, hypothyroidism, CKD stage IIIa sent in to ED on 11/2/23  by pulmonologist MD Olivera with c/o SOB, dyspnea on minimal exertion, and cough. 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     # Acute hypoxic respiratory failure, multifactorial   # New RLL PNA on CT 11/13 , RLL cavity s/p bronchoscopy 11/9  possible yeast  infection, less likely radiation pneumonitis , r/o  aspergilloma    - CT chest: Larger right lower lobe thick-walled cavity which may be infectious and/or residua of reported treated tumor. Spongelike material within the cavity can be seen with aspergilloma (prior to fungus ball formation). Consolidation within the right lower and middle lobe compatible with radiation change.  - leukocytosis stable (WBC 17.70 from 17.35) ---> trend down to WBC  8   - repeat CXR 11/8 - worsening of opacities over right lung   - 2 d echo 11/8 - EF 60% , no significant valvular disease    - immunoglobulin panel - all Ig wnl  - 11/9 - s/p bronchoscopy   - speech and swallow evaluation  - no aspirations  - CXR 11/11 - stable consolidations  - repeat CT chest 11/13 - new right lung consolidation  - blood cultures -no growth, sputum culture - normal respiratory  - c/w trelegy ellipta, chest pt, IS, acapella ,BIPAP as needed  - ID/pulm consult noted   - s/p IV steroids --> po prednisone taper 20 x 3 days than stop , trending down ----> 34--> 50--> 70--> 110  - s/p  voriconazole PO ,  s/p 7 days of levaquin to cover atypical/psuedomonas (allergy to PCN)    - 11/13 - IV meropenem, doxy and fluconasole started as per ID   - Chest xray prelim worse  - Thoracic consult appreciated, no intervention  - CT Chest 11/20 worse  -11/13 - IV meropenem and doxycylcine  - Fluconazole DC, started on caspofungin 11/20  - Would consider palliative at this time  - 11/22 - d/c doxy started on  vancomycin , c/w IV meropenem  - CRP 24--> 70 --> 58  - 11/25 CXR - worse on the right  - 11/27 CT chest - worsening of right consolidation   - poor prognosis, focus on hospice care d/w wife , requesting bipap  - 11/28 - awaiting hospice set up at home with bipap  -  d/c caspofungin, meropenem, vanco --> po fluconazole and ceftin for 14 days    #  Chronic anemia, likely due to  chemotherapy, worsening and iron deficiency   -  no s/s bleeding, asymptomatic, continue to monitor/trend  - 11/8 - 1 unit PRBC due to hemoptysis and worsening of anemia  -  11/19 transfuse 1 unit PRBC  - Hemoglobin 7.9 11/20  - 11/25 s/p 1 unit PRBC  - 11/26 1 unit PRBC , stop heparin , FOBT - neg   - Trend Hb    #CHRISTINA on CKD stage IIIa,  Resolved    # Left calf laceration from veno dines while in PACU for bronchoscopy   - surgery consult - s/p suturing  on 11/09/23   - plan to remove sutures in 10 days  - Surgical consult for suture removal  - sutures removed    # Bilateral UE edema due to superphicial thrombophlebitis  - doppler noted  - start heparin 5000 bid, elevation, warm compresses if pain  - 11/23 - iv lasix 10 mg   - 11/24 c/w IV lasix for 3 days  - 11/26 - lasix 20 in am, 10 IV after blood transfusion  - BNP 1200--> 1700--> 2500--> 4500--> 2500  - c/w lasix 20 qd --> PO in am      # Acute hyperglycemia, possible steroid-induced , Prediabetes with A1C 6.3   - Stop pre-meal and  lantus ,  ISS  - liberize diet, no more monitoring focus on comfort    # HTN - hydralazine 10 bid --> 10 tid    #  HLD   - c/w  atorvastatin    # Hypothyroidism  - Levo lowered to 12.5 mcg,  Recheck in 4 weeks    # Hx lung CA s/p RUL lobectomy/chemo/RT  - f/u with [facility]     # Severe protein-calorie malnutrition. - nutritional education provided ,  supplements ordered ,  MVT qd ordered      # DVT Ppx  - heparin 5000 q12h     # Code status  - DNR/DNI    wife and daughter updated at bedside 11/21, 11/22, 11/23, 11/24, 11/25, 11/26, 11/27, 11/28     dispo - home with hospice, awaiting set up, need BIPAP ( helps pt with dyspnea at bedtime) , med rec done      Nutritional Assessment:

## 2023-11-29 NOTE — PROGRESS NOTE ADULT - SUBJECTIVE AND OBJECTIVE BOX
Subjective:  in no distress  drinking coffee  no new complaints    Review of Systems:  All 10 systems reviewed in detailed and found to be negative with the exception of what has already been described above    Allergies:  penicillin (Unknown)    Meds  MEDICATIONS  (STANDING):  albuterol/ipratropium for Nebulization 3 milliLiter(s) Nebulizer every 6 hours  AQUAPHOR (petrolatum Ointment) 1 Application(s) Topical two times a day  atorvastatin 40 milliGRAM(s) Oral at bedtime  cefuroxime   Tablet 500 milliGRAM(s) Oral every 12 hours  ferrous    sulfate 325 milliGRAM(s) Oral daily  fluconAZOLE   Tablet 100 milliGRAM(s) Oral daily  fluticasone furoate/umeclidinium/vilanterol 100-62.5-25 MICROgram(s) Inhaler 1 Puff(s) Inhalation daily  folic acid 1 milliGRAM(s) Oral daily  furosemide    Tablet 20 milliGRAM(s) Oral daily  hydrALAZINE 10 milliGRAM(s) Oral every 8 hours  levothyroxine 12.5 MICROGram(s) Oral daily  pantoprazole    Tablet 40 milliGRAM(s) Oral before breakfast  polyethylene glycol 3350 17 Gram(s) Oral daily    MEDICATIONS  (PRN):  acetaminophen     Tablet .. 650 milliGRAM(s) Oral every 6 hours PRN Temp greater or equal to 38C (100.4F), Mild Pain (1 - 3)  aluminum hydroxide/magnesium hydroxide/simethicone Suspension 30 milliLiter(s) Oral every 4 hours PRN Dyspepsia  benzonatate 100 milliGRAM(s) Oral three times a day PRN Cough  melatonin 3 milliGRAM(s) Oral at bedtime PRN Insomnia  ondansetron Injectable 4 milliGRAM(s) IV Push every 8 hours PRN Nausea and/or Vomiting    Physical Exam  T(C): 36.8 (11-29-23 @ 08:48), Max: 37.1 (11-28-23 @ 15:19)  HR: 94 (11-29-23 @ 08:48) (90 - 108)  BP: 154/55 (11-29-23 @ 08:48) (127/49 - 154/55)  RR: 23 (11-29-23 @ 08:48) (18 - 23)  SpO2: 94% (11-29-23 @ 08:48) (94% - 100%)  Gen: Alert, oriented, no distress on nasal cannula  HEENT: Anicteric sclera, moist mucous membranes  Cardio: Regular rhythm and rate, normal S1S2, no murmurs  Resp: Crackles, congested  GI: Nontender, nondistended, normoactive bowel sounds  Ext: No cyanosis, clubbing or edema in le, + redness and swelling in LUE by midline  skin: + ecchymosis, left leg laceration sutured and bandaged  Neuro: Nonfocal    Labs:                        8.5    4.14  )-----------( 156      ( 29 Nov 2023 07:08 )             26.3     11-29    140  |  99  |  50<H>  ----------------------------<  126<H>  3.7   |  36<H>  |  1.00    Ca    8.6      29 Nov 2023 07:08  Phos  2.6     11-29  Mg     2.2     11-29        Urinalysis Basic - ( 29 Nov 2023 07:08 )    Color: x / Appearance: x / SG: x / pH: x  Gluc: 126 mg/dL / Ketone: x  / Bili: x / Urobili: x   Blood: x / Protein: x / Nitrite: x   Leuk Esterase: x / RBC: x / WBC x   Sq Epi: x / Non Sq Epi: x / Bacteria: x    < from: CT Chest No Cont (11.02.23 @ 16:04) >  ACC: [id number] EXAM:  CT CHEST   ORDERED BY: SARAH YOO     PROCEDURE DATE:  11/02/2023          INTERPRETATION:  INDICATION: Shortness of breath, cough, lung cancer   history, last chemotherapy and radiation treatment in May 2023    TECHNIQUE: Helical acquisition images of the chest without intravenous   contrast. Maximum intensity projection images were generated.    COMPARISON: 12/29/2020 chest x-ray.    FINDINGS:    LUNGS/AIRWAYS/PLEURA: Right upper lobectomy. Right lower lobe   multiloculated thick-walled 11 cm cavity (best seen in its entirety on   601-59) containing fluid and spongelike material. Consolidation within   the middle lobe and inferior aspect of the right lower lobe with angular   borders suggesting prior radiation. Multiple indistinct nodules of   varying density in the right lower and middle lobes, mostly adjacent to   the cavity. Small branching opacities in the peripheral left upper and   lower lobes, compatible with distal airway impaction. Trace left pleural   effusion. Mild right apical pleural thickening.    LYMPH NODES/MEDIASTINUM: No lymphadenopathy.    HEART/VASCULATURE: Normal heart size. Unremarkable pericardium. Coronary   artery calcifications. Normal caliber aorta.    UPPER ABDOMEN: Unremarkable.    BONES/SOFT TISSUES: Old sternal fracture. Mild loss of height of the L1   vertebral body.      IMPRESSION:    Right upper lobectomy.    Larger right lower lobe thick-walled cavity which may be infectious   and/or residua of reported treated tumor. 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    Consolidation within the right lower and middle lobe compatible with   radiation change.    Multiple indistinct nodules in the right lungwhich may be infectious or   radiation pneumonitis.    ct lucio 10/3 uploaded and reviewed    < from: CT Abdomen and Pelvis No Cont (11.08.23 @ 19:01) >  PROCEDURE DATE:  11/08/2023          INTERPRETATION:  CLINICAL INFORMATION: Acute kidney injury. Evaluate   obstruction.    COMPARISON: CT chest 11/20/2023.    CONTRAST/COMPLICATIONS:  IV Contrast: NONE  Oral Contrast: NONE  Complications: None reported at time of study completion    PROCEDURE:  CT of the Abdomen and Pelvis was performed.  Sagittal and coronal reformats were performed.    FINDINGS:  LOWER CHEST: Small right pleural effusion. New patchy opacities in the   right lung base.    LIVER: Within normal limits.  BILE DUCTS: Normal caliber.  GALLBLADDER: Within normal limits.  SPLEEN: Within normal limits.  PANCREAS: Within normal limits.  ADRENALS: Within normal limits.  KIDNEYS/URETERS: No hydronephrosis. Vascular calcifications.    BLADDER: Within normal limits.  REPRODUCTIVE ORGANS: Prostate within normal limits.    BOWEL: No bowel obstruction. Appendix is normal. Moderate to large stool.  PERITONEUM: No ascites.  VESSELS: Atherosclerotic changes.  RETROPERITONEUM/LYMPH NODES: No lymphadenopathy.  ABDOMINAL WALL: Within normal limits.  BONES: Degenerative changes. Redemonstration of L1 compression deformity.   Right femoral ORIF.    IMPRESSION:  No hydronephrosis.    New patchy opacities in the right lung base. Small right pleural effusion.    Bedside Swallow: Trial 1  · Consistencies administered	thin liquid; pureed; regular solid  · Mode of Presentation	cup; spoon; self fed  · Positioning	upright (90 degrees)  · Oral Preparatory Phase	Within functional limits; orientation eating routines: able to feed himself with focus: as per family, pt eats rapidly: did not do so at bedside evaluation Lip seal on utensil : oral containment..  · Oral Phase	Within functional limits; immediate bolus formation: Ap transfer for liquid WFL. Mastication normally rotary-lateral with lingual sweep for collection and clearance.  l  · Pharyngeal Phase	Within functional limits; Onset of pharyngeal swallow is with age-appropriate time limits. No overt s/s aspiration at bedside.  · Comments	Micro aspiration and silent aspiration cannot be ruled out. As above, pt does have increased risk for aspiration 2/2 lung CA and  lobectomy as well as COPD and present pna (which in itself may be aspiration related). However, pt presents at bedside evaluation with no contraindication for maintaining REGULAR texture and thin liquids.  Strategies for reducing risk were demonstrated and discussed with the Pt and the family who are very supportive of the Pt.  Disc with NSg.  Will follow peripherally.    < from: CT Chest No Cont (11.13.23 @ 10:04) >    PROCEDURE DATE:  11/13/2023          INTERPRETATION:  Clinical indications: Pneumonia.    Axial CT images of the chest are obtained without intravenous   administration of contrast.    Comparison is made with the prior chest CT of November 2, 2023.    No enlarged axillary or mediastinal lymph nodes.    No pericardial effusion. Heart size is normal. Mitral annular   calcifications. Vascular calcifications with involvement of the aorta and   the coronary arteries. Aortic root calcifications.    Evaluation of the upper abdomen demonstrate partially imaged aortic stent   graft. Subcutaneous edema. Minimal perihepatic ascites.    Small left pleural effusion new since the abdominalCT of November 8, 2023. Trace right pleural effusion as on November 8, 2023.    Evaluation of the lungs demonstrate left lung base linear subsegmental   atelectasis. A few ill-defined left lung patchy nonspecific groundglass   opacities largest within the dependent portion of the left upper lobe are   new since November 2, 2023.    Status post right upper lobectomy with postoperative changes. Persistent   right apical large large cyst, part of which measures about 6.6 cm in   transverse dimension without significant interval change in size   containing air and increasing internal opacification as compared with   November 2, 2023, some of which appear slightly dense and may be due to   internal hemorrhagic components.    Extensive right mid to lower lung consolidations and ill-defined   groundglass opacities, with the largest confluent consolidation within   the mid to lower portions of the right lower lobe, majority of which   appear new since November 2, 2023 superimposed on previouslydescribed   linear opacities.    Degenerative changes of the spine. Old healed sternal fracture.    IMPRESSION: Right lung large areas of consolidation new since November 2, 2023 likely pneumonia.    Right apical previously described large cyst unchanged in size since   November 2, 2023 containing internal air and increasing internal   opacification since November 2, 2023 as described above. Continued   follow-up is recommended.    A few left lung ill-defined nonspecific groundglass opacities.   Differential diagnosis include but is not limited to   infectious/inflammatory etiologies and or pulmonary edema.    Small left pleural effusion new since November 8, 2023.      Culture - Acid Fast - Bronchial w/Smear (11.09.23 @ 12:30)   Specimen Source: .Bronchial RIGHT LOWER LOBE BAL  Acid Fast Bacilli Smear:   No acid-fast bacilli seen by fluorochrome stain  Culture Results:   Culture is being performed.  Culture - Fungal, Bronchial (11.09.23 @ 12:30)   Specimen Source: .Bronchial RIGHT LOWER LOBE BAL  Culture Results:   Rare Yeast    < from: CT Chest No Cont (11.20.23 @ 08:03) >  PROCEDURE DATE:  11/20/2023          INTERPRETATION:  Clinical indication: Right lung infection.    Axial CT images of the chest are obtained without intravenous   administration of contrast.    Comparison is made with the prior chest CT of November 13, 2023.    Enlarged axillary or mediastinal lymph nodes. Heart size is normal.   Vascular calcifications with involvement of the aorta and the coronary   arteries. Minimal pericardial fluid.    Evaluation of the upper abdomen demonstrate trace perihepatic and   perisplenic ascites slightly increased in size since November 13, 2023.   1.2 cm hypodensity arising from the upper pole of the partially imaged   left kidney without significant interval change.    Few subcutaneous edema.    Small left pleural effusion is unchanged. There may be a trace right   pleural effusion versus pleural thickening without significant interval   change.    Evaluation of the lungs demonstrate interval resolution of the previously   seen groundglass opacity within the dependent portion of the left upper   lobe. Interval near complete resolution of a previously seen left upper   lobe peripheral groundglass opacity. A few other ill-defined left lung   patchy groundglass opacities unchanged. 4 mm left lower lobe nodular   opacity on image 73 of series 2 appears new since November 13, 2023 may   be sequela of impacted distal airway.    Status post right upper lobectomy. Previously described right apical   large irregular cyst, part of which measures about 7 cm in transverse   dimension containing internal air, fluid and debris is without   significant interval change in size.  Ill-defined consolidations and groundglass opacities throughoutthe   majority of the right lung demonstrate mild overall interval progression   since November 13, 2023. For reference a few dense patchy and nodular   opacities at the right lung base are either new or demonstrate interval   increase in size.  Complete opacification of the right middle lobe with internal   consolidation appears unchanged since November 13, 2023, demonstrating   interval progression since November 2, 2023. Nonvisualization of the   internal segmental and subsegmental airways of the right middle lobe as   on the prior study.    Degenerative changes of the spine. Old healed left rib and old healed   sternal fractures. Mild superior endplate loss of height of the L1   vertebral body is unchanged.    IMPRESSION: Extensive right lung consolidations as described above with   overall interval progression since November 13, 2023. Complete   opacification of the right middle lobe with internal consolidation and   volume loss appears unchanged since November 13, 2023 demonstrating  interval progression since November 2, 2023.    Persistent apical right apical large irregular cyst unchanged in size   since November 13, 2023.    Small left pleural effusion with subcutaneous edema.    Trace amount of ascites, increased in size since November 13, 2023.    < from: US Duplex Venous Upper Ext Complete, Bilateral (11.22.23 @ 13:13) >  PROCEDURE DATE:  11/22/2023          INTERPRETATION:  CLINICAL INFORMATION: Arm edema    COMPARISON: None available.    TECHNIQUE: Duplex sonography of the BILATERAL upper extremity veins with   color and spectral Doppler, with and without compression.    FINDINGS:    RIGHT:  The right internal jugular, subclavian, axillary, brachial, radial and   ulnar veins are patent and compressible where applicable.  There is   thrombus within the cephalic vein from the mid upper arm to the   antecubital fossa. There is thrombus within the median cubital vein.  Basilic vein not visualized.    LEFT:  The left internal jugular, subclavian, axillary, brachial, radialand   ulnar veins are patent and compressible where applicable.  The basilic   vein (superficial vein) is patent and without thrombus.  There is   thrombus within the cephalic vein in the mid upper arm extending into the   antecubital fossa and median cubital vein.    Doppler examination shows normal spontaneous and phasic flow.    Atherosclerotic changes of the aorta are noted.    Bilateral subcutaneous edema.    IMPRESSION:  No evidence of deep venous thrombosis in either upper extremity.    Superficial thrombophlebitis involving the bilateral cephalic and median   cubital veins.    < from: CT Chest No Cont (11.27.23 @ 08:04) >  PROCEDURE DATE:  11/27/2023          INTERPRETATION:  Clinical indication: Pneumonia, follow-up.    Axial CT images of the chest are obtained without intravenous   administration of contrast.    Comparison is made with the prior chest CT of November 20, 2023.    No enlarged axillary or mediastinal lymph nodes.    No pericardial effusion. Heart size is normal. Vascular calcifications   with involvement of the aorta and the coronary arteries.    Diffuse subcutaneous edema most notably involving the abdominal wall.    Evaluation of the upper abdomen demonstrate no other significant   abnormality.    Small left pleural effusion mildly increased since November 20, 2023 with   mildadjacent subsegmental dependent or compressive atelectasis. Trace   right pleural effusion is without significant interval change.    Evaluation of the lungs demonstrate small cluster of multiple nodular   opacities within the left lower lobe largestmeasuring about 1.1 cm new   since November 20, 2023 likely sequela of impacted airways. Patchy left   upper lobe small opacity may have slightly decreased in size.    Status post right upper lobectomy. Previously described right apical   large irregular cyst with internal fluid and debris is without   significant interval change.    Opacification of large parts of the right lung with large areas of   consolidation within the right lower lobe demonstrating interval mild   progression. Complete opacification of the right middle lobe appears   unchanged. Nonvisualization of internal segmental and subsegmental   airways as noted on the prior study.    Secretions within the mainstem bronchi extending into the bronchus   intermedius and the right lower lobe central airways.    Degenerative changes of the spine. Old healed rib and old healed sternal   fractures.    IMPRESSION: Large areas of consolidation within the right lung progressed   since November 20, 2023.    Cluster of a few nodular opacities within the left lower lobe new since   November 20, 2023 likely of infectious etiology with endobronchial spread.    Small left pleural effusion with interval increase since November 20, 2023.

## 2023-11-29 NOTE — DISCHARGE NOTE PROVIDER - NSDCCPCAREPLAN_GEN_ALL_CORE_FT
PRINCIPAL DISCHARGE DIAGNOSIS  Diagnosis: COPD with acute exacerbation  Assessment and Plan of Treatment: You were diagnosed with pneumonia which is worsening. You are scheduled to go home on hospice. Please continue medications as prescribed.      SECONDARY DISCHARGE DIAGNOSES  Diagnosis: Pneumonia involving right lung  Assessment and Plan of Treatment:     Diagnosis: Lung cancer  Assessment and Plan of Treatment:

## 2023-11-29 NOTE — DISCHARGE NOTE PROVIDER - NSDCMRMEDTOKEN_GEN_ALL_CORE_FT
acetaminophen 325 mg oral tablet: 2 tab(s) orally every 6 hours As needed Temp greater or equal to 38C (100.4F), Mild Pain (1 - 3)  atorvastatin 40 mg oral tablet: 1 tab(s) orally once a day  benzonatate 100 mg oral capsule: 1 cap(s) orally 3 times a day as needed for Cough  cefuroxime 500 mg oral tablet: 1 tab(s) orally every 12 hours  ferrous sulfate 325 mg (65 mg elemental iron) oral tablet: 1 tab(s) orally once a day  fluconazole 100 mg oral tablet: 1 tab(s) orally once a day  folic acid 1 mg oral tablet: 1 tab(s) orally once a day  furosemide 20 mg oral tablet: 1 tab(s) orally once a day  hydrALAZINE 10 mg oral tablet: 1 tab(s) orally every 8 hours  ipratropium-albuterol 0.5 mg-2.5 mg/3 mL inhalation solution: 3 milliliter(s) inhaled every 6 hours  levothyroxine 25 mcg (0.025 mg) oral tablet: 0.5 tab(s) orally once a day  Multiple Vitamins oral tablet: 1 tab(s) orally once a day  petrolatum topical ointment: Apply topically to affected area once a day 1 Apply topically to affected area 2 times a day  polyethylene glycol 3350 oral powder for reconstitution: 17 gram(s) orally once a day as needed for  constipation  Protonix 40 mg oral delayed release tablet: 1 tab(s) orally once a day  Trelegy Ellipta 100 mcg-62.5 mcg-25 mcg/inh inhalation powder: 1 puff(s) inhaled once a day

## 2023-11-30 ENCOUNTER — TRANSCRIPTION ENCOUNTER (OUTPATIENT)
Age: 83
End: 2023-11-30

## 2023-11-30 VITALS — OXYGEN SATURATION: 92 %

## 2023-11-30 PROCEDURE — 99239 HOSP IP/OBS DSCHRG MGMT >30: CPT

## 2023-11-30 RX ADMIN — Medication 325 MILLIGRAM(S): at 10:07

## 2023-11-30 RX ADMIN — POLYETHYLENE GLYCOL 3350 17 GRAM(S): 17 POWDER, FOR SOLUTION ORAL at 10:07

## 2023-11-30 RX ADMIN — Medication 12.5 MICROGRAM(S): at 05:26

## 2023-11-30 RX ADMIN — Medication 10 MILLIGRAM(S): at 10:07

## 2023-11-30 RX ADMIN — Medication 10 MILLIGRAM(S): at 02:21

## 2023-11-30 RX ADMIN — FLUCONAZOLE 100 MILLIGRAM(S): 150 TABLET ORAL at 10:07

## 2023-11-30 RX ADMIN — Medication 1 APPLICATION(S): at 10:08

## 2023-11-30 RX ADMIN — Medication 3 MILLILITER(S): at 01:41

## 2023-11-30 RX ADMIN — Medication 500 MILLIGRAM(S): at 10:07

## 2023-11-30 RX ADMIN — Medication 3 MILLILITER(S): at 09:01

## 2023-11-30 RX ADMIN — Medication 1 MILLIGRAM(S): at 10:07

## 2023-11-30 RX ADMIN — FLUTICASONE FUROATE, UMECLIDINIUM BROMIDE AND VILANTEROL TRIFENATATE 1 PUFF(S): 200; 62.5; 25 POWDER RESPIRATORY (INHALATION) at 09:03

## 2023-11-30 RX ADMIN — PANTOPRAZOLE SODIUM 40 MILLIGRAM(S): 20 TABLET, DELAYED RELEASE ORAL at 06:15

## 2023-11-30 RX ADMIN — Medication 20 MILLIGRAM(S): at 10:07

## 2023-11-30 NOTE — PROGRESS NOTE ADULT - SUBJECTIVE AND OBJECTIVE BOX
Date of service: 11-30-23 @ 07:16    Sitting in bed in NAD  Comfortable  No SOB at rest  No fever    ROS: no fever or chills; denies dizziness, no HA, no abdominal pain, no diarrhea or constipation; no dysuria, no legs pain, no rashes    MEDICATIONS  (STANDING):  albuterol/ipratropium for Nebulization 3 milliLiter(s) Nebulizer every 6 hours  AQUAPHOR (petrolatum Ointment) 1 Application(s) Topical two times a day  atorvastatin 40 milliGRAM(s) Oral at bedtime  cefuroxime   Tablet 500 milliGRAM(s) Oral every 12 hours  ferrous    sulfate 325 milliGRAM(s) Oral daily  fluconAZOLE   Tablet 100 milliGRAM(s) Oral daily  fluticasone furoate/umeclidinium/vilanterol 100-62.5-25 MICROgram(s) Inhaler 1 Puff(s) Inhalation daily  folic acid 1 milliGRAM(s) Oral daily  furosemide    Tablet 20 milliGRAM(s) Oral daily  hydrALAZINE 10 milliGRAM(s) Oral every 8 hours  levothyroxine 12.5 MICROGram(s) Oral daily  pantoprazole    Tablet 40 milliGRAM(s) Oral before breakfast  polyethylene glycol 3350 17 Gram(s) Oral daily    Vital Signs Last 24 Hrs  T(C): 36.8 (30 Nov 2023 00:40), Max: 36.9 (29 Nov 2023 15:34)  T(F): 98.2 (30 Nov 2023 00:40), Max: 98.5 (29 Nov 2023 15:34)  HR: 92 (30 Nov 2023 02:00) (90 - 117)  BP: 149/60 (30 Nov 2023 00:40) (132/64 - 175/90)  BP(mean): --  RR: 20 (29 Nov 2023 21:20) (20 - 23)  SpO2: 94% (30 Nov 2023 02:00) (92% - 95%)    Parameters below as of 30 Nov 2023 02:00  Patient On (Oxygen Delivery Method): nasal cannula     Physical exam:    Constitutional:  No acute distress  HEENT: NC/AT, EOMI, PERRLA, conjunctivae clear; ears and nose atraumatic  Neck: supple; thyroid not palpable  Back: no tenderness  Respiratory: respiratory effort normal; few crackles at bases  Cardiovascular: S1S2 regular, no murmurs  Abdomen: soft, not tender, not distended, positive BS  Genitourinary: no suprapubic tenderness  Lymphatic: no LN palpable  Musculoskeletal: no muscle tenderness, no joint swelling or tenderness  Extremities: no pedal edema  Left lower leg laceration dressed  Neurological/ Psychiatric: AxOx3, moving all extremities  Skin: no rashes; no palpable lesions    Labs: reviewed                        8.5    4.14  )-----------( 156      ( 29 Nov 2023 07:08 )             26.3     11-29    140  |  99  |  50<H>  ----------------------------<  126<H>  3.7   |  36<H>  |  1.00    Ca    8.6      29 Nov 2023 07:08  Phos  2.6     11-29  Mg     2.2     11-29    C-Reactive Protein, Serum: 87 mg/L (11-29-23 @ 07:08)  C-Reactive Protein, Serum: 110 mg/L (11-28-23 @ 07:38)  C-Reactive Protein, Serum: 85 mg/L (11-27-23 @ 06:38)  C-Reactive Protein, Serum: 75 mg/L (11-26-23 @ 07:16)  C-Reactive Protein, Serum: 58 mg/L (11-25-23 @ 06:50)  C-Reactive Protein, Serum: 59 mg/L (11-24-23 @ 11:58)  C-Reactive Protein, Serum: 70 mg/L (11-23-23 @ 08:53)                                  7.8    3.50  )-----------( 137      ( 23 Nov 2023 08:53 )             24.5     11-23    136  |  104  |  42<H>  ----------------------------<  107<H>  4.4   |  30  |  1.12    Ca    8.5      23 Nov 2023 08:53  Phos  2.6     11-23  Mg     2.1     11-23    TPro  5.6<L>  /  Alb  1.8<L>  /  TBili  0.8  /  DBili  x   /  AST  48<H>  /  ALT  35  /  AlkPhos  118  11-23    Vancomycin Level, Trough: 16.4 ug/mL (11-23 @ 20:16)    C-Reactive Protein, Serum: 70 mg/L (11-23-23 @ 08:53)  C-Reactive Protein, Serum: 24 mg/L (11-14-23 @ 07:03)  C-Reactive Protein, Serum: 34 mg/L (11-13-23 @ 06:30)               8.2    11.74 )-----------( 259      ( 03 Nov 2023 07:15 )             25.2     11-03    133<L>  |  102  |  35<H>  ----------------------------<  205<H>  4.4   |  22  |  1.20    Ca    8.3<L>      03 Nov 2023 07:15    TPro  6.9  /  Alb  2.0<L>  /  TBili  0.5  /  DBili  x   /  AST  38<H>  /  ALT  50  /  AlkPhos  130<H>  11-02     LIVER FUNCTIONS - ( 02 Nov 2023 14:47 )  Alb: 2.0 g/dL / Pro: 6.9 gm/dL / ALK PHOS: 130 U/L / ALT: 50 U/L / AST: 38 U/L / GGT: x           Urinalysis (11-02 @ 16:40)  Urine Appearance: Clear  Protein, Urine: 100 mg/dL  Urine Microscopic-Add On (NC) (11-02 @ 16:40)  White Blood Cell - Urine: 0 /HPF  Red Blood Cell - Urine: 0 /HPF    (11-02 @ 14:47)  NotDetec    Culture - Acid Fast - Bronchial w/Smear (collected 09 Nov 2023 12:30)  Source: .Bronchial RIGHT LOWER LOBE BAL  Preliminary Report (11 Nov 2023 15:08):    Culture is being performed.    Culture - Fungal, Bronchial (collected 09 Nov 2023 12:30)  Source: .Bronchial RIGHT LOWER LOBE BAL  Preliminary Report (13 Nov 2023 12:07):    Rare Yeast    Culture - Bronchial (collected 09 Nov 2023 12:30)  Source: .Bronchial RIGHT LOWER LOBE BAL  Gram Stain (09 Nov 2023 23:14):    Rare polymorphonuclear leukocytes per low power field    Few Squamous epithelial cells per low power field    Few Gram positive cocci in pairs per oil power field  Final Report (11 Nov 2023 16:38):    Normal Respiratory Marguerite present    Culture - Blood (collected 04 Nov 2023 04:21)  Source: .Blood None  Final Report (09 Nov 2023 09:00):    No growth at 5 days    Culture - Blood (collected 04 Nov 2023 04:21)  Source: .Blood None  Final Report (09 Nov 2023 09:00):    No growth at 5 days    Radiology: all available radiological tests reviewed    < from: CT Chest No Cont (11.02.23 @ 16:04) >  Right upper lobectomy.  Larger right lower lobe thick-walled cavity which may be infectious   and/or residua of reported treated tumor. 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  Consolidation within the right lower and middle lobe compatible with radiation change.  Multiple indistinct nodules in the right lungwhich may be infectious or radiation pneumonitis.  < end of copied text >    < from: CT Abdomen and Pelvis No Cont (11.08.23 @ 19:01) >  No hydronephrosis.  New patchy opacities in the right lung base. Small right pleural effusion.  < end of copied text >    < from: CT Chest No Cont (11.13.23 @ 10:04) >  IMPRESSION: Right lung large areas of consolidation new since November 2, 2023 likely pneumonia.    Right apical previously described large cyst unchanged in size since   November 2, 2023 containing internal air and increasing internal opacification since November 2, 2023 as described above.     A few left lung ill-defined nonspecific groundglass opacities.   Differential diagnosis include but is not limited to   infectious/inflammatory etiologies and or pulmonary edema.  Small left pleural effusion new since November 8, 2023.  < end of copied text >    < from: CT Chest No Cont (11.20.23 @ 08:03) >  IMPRESSION: Extensive right lung consolidations as described above with   overall interval progression since November 13, 2023. Complete   opacification of the right middle lobe with internal consolidation and   volume loss appears unchanged since November 13, 2023 demonstrating  interval progression since November 2, 2023.  Persistent apical right apical large irregular cyst unchanged in size since November 13, 2023.  < end of copied text >    < from: Xray Chest 1 View- PORTABLE-Routine (Xray Chest 1 View- PORTABLE-Routine .) (11.25.23 @ 15:23) >  IMPRESSION: Extensive right lung findings show new infiltrate in the   right lower lobe.  < end of copied text >      Advanced directives addressed: full resuscitation

## 2023-11-30 NOTE — PROGRESS NOTE ADULT - REASON FOR ADMISSION
Right lung pneumonia with thick-walled cavity.

## 2023-11-30 NOTE — PROGRESS NOTE ADULT - SUBJECTIVE AND OBJECTIVE BOX
HOSPITALIST ATTENDING PROGRESS NOTE    Chart and meds reviewed.  Patient seen and examined.    CC: Lung abscess    Subjective: No acute issues overnight. Going home on hospice today.    All other systems reviewed and found to be negative with the exception of what has been described above.    MEDICATIONS  (STANDING):  albuterol/ipratropium for Nebulization 3 milliLiter(s) Nebulizer every 6 hours  AQUAPHOR (petrolatum Ointment) 1 Application(s) Topical two times a day  atorvastatin 40 milliGRAM(s) Oral at bedtime  cefuroxime   Tablet 500 milliGRAM(s) Oral every 12 hours  ferrous    sulfate 325 milliGRAM(s) Oral daily  fluconAZOLE   Tablet 100 milliGRAM(s) Oral daily  fluticasone furoate/umeclidinium/vilanterol 100-62.5-25 MICROgram(s) Inhaler 1 Puff(s) Inhalation daily  folic acid 1 milliGRAM(s) Oral daily  furosemide    Tablet 20 milliGRAM(s) Oral daily  hydrALAZINE 10 milliGRAM(s) Oral every 8 hours  levothyroxine 12.5 MICROGram(s) Oral daily  pantoprazole    Tablet 40 milliGRAM(s) Oral before breakfast  polyethylene glycol 3350 17 Gram(s) Oral daily    MEDICATIONS  (PRN):  acetaminophen     Tablet .. 650 milliGRAM(s) Oral every 6 hours PRN Temp greater or equal to 38C (100.4F), Mild Pain (1 - 3)  aluminum hydroxide/magnesium hydroxide/simethicone Suspension 30 milliLiter(s) Oral every 4 hours PRN Dyspepsia  benzonatate 100 milliGRAM(s) Oral three times a day PRN Cough  melatonin 3 milliGRAM(s) Oral at bedtime PRN Insomnia  ondansetron Injectable 4 milliGRAM(s) IV Push every 8 hours PRN Nausea and/or Vomiting    ICU Vital Signs Last 24 Hrs  T(C): 36.7 (30 Nov 2023 08:42), Max: 36.9 (29 Nov 2023 15:34)  T(F): 98.1 (30 Nov 2023 08:42), Max: 98.5 (29 Nov 2023 15:34)  HR: 100 (30 Nov 2023 09:03) (92 - 117)  BP: 169/72 (30 Nov 2023 08:42) (132/64 - 175/90)  RR: 18 (30 Nov 2023 08:42) (18 - 20)  SpO2: 92% (30 Nov 2023 09:03) (91% - 95%)    GEN: Frail  HEENT:  pupils equal and reactive, EOMI, no oropharyngeal lesions, erythema, exudates, oral thrush  NECK:   supple, no carotid bruits, no palpable lymph nodes, no thyromegaly  CV:  +S1, +S2, regular, no murmurs or rubs  RESP:   lungs clear to auscultation bilaterally, no wheezing, rales, rhonchi, good air entry bilaterally  BREAST:  not examined  GI:  abdomen soft, non-tender, non-distended, normal BS, no bruits, no abdominal masses, no palpable masses  RECTAL:  not examined  :  not examined  MSK:   normal muscle tone, no atrophy, no rigidity, no contractions  EXT:  no clubbing, no cyanosis, no edema, no calf pain, swelling or erythema  VASCULAR:  pulses equal and symmetric in the upper and lower extremities  NEURO:  AAOX3, no focal neurological deficits, follows all commands, able to move extremities spontaneously  SKIN:  no ulcers, lesions or rashes    LABS:                          8.5    4.14  )-----------( 156      ( 29 Nov 2023 07:08 )             26.3     11-29    140  |  99  |  50<H>  ----------------------------<  126<H>  3.7   |  36<H>  |  1.00    Ca    8.6      29 Nov 2023 07:08  Phos  2.6     11-29  Mg     2.2     11-29      Urinalysis Basic - ( 29 Nov 2023 07:08 )  Color: x / Appearance: x / SG: x / pH: x  Gluc: 126 mg/dL / Ketone: x  / Bili: x / Urobili: x   Blood: x / Protein: x / Nitrite: x   Leuk Esterase: x / RBC: x / WBC x   Sq Epi: x / Non Sq Epi: x / Bacteria: x

## 2023-11-30 NOTE — PROGRESS NOTE ADULT - PROVIDER SPECIALTY LIST ADULT
Heme/Onc
Hospitalist
Infectious Disease
Pulmonology
Heme/Onc
Hospitalist
Infectious Disease
Infectious Disease
Nephrology
Pulmonology
Thoracic Surgery
Heme/Onc
Hospitalist
Infectious Disease
Nephrology
Palliative Care
Palliative Care
Pulmonology
Heme/Onc
Hospitalist
Infectious Disease
Nephrology
Nephrology
Thoracic Surgery
Hospitalist
Thoracic Surgery
Palliative Care
Critical Care
Critical Care

## 2023-11-30 NOTE — DISCHARGE NOTE NURSING/CASE MANAGEMENT/SOCIAL WORK - PATIENT PORTAL LINK FT
You can access the FollowMyHealth Patient Portal offered by [facility] by registering at the following website: [email or web address]. By joining [facility]’s FollowMyHealth portal, you will also be able to view your health information using other applications (apps) compatible with our system.

## 2023-11-30 NOTE — PROGRESS NOTE ADULT - NUTRITIONAL ASSESSMENT
This patient has been assessed with a concern for Malnutrition and has been determined to have a diagnosis/diagnoses of Severe protein-calorie malnutrition and Underweight (BMI < 19).    This patient is being managed with:   Diet Regular-  Consistent Carbohydrate {Evening Snack} (CSTCHOSN)  No Concentrated Potassium  Supplement Feeding Modality:  Oral  Nepro Cans or Servings Per Day:  3       Frequency:  Three Times a day  Entered: Nov 12 2023  3:34PM  
This patient has been assessed with a concern for Malnutrition and has been determined to have a diagnosis/diagnoses of Severe protein-calorie malnutrition and Underweight (BMI < 19).    This patient is being managed with:   Diet Regular-  Consistent Carbohydrate {Evening Snack} (CSTCHOSN)  Supplement Feeding Modality:  Oral  Ensure Plus High Protein Cans or Servings Per Day:  1       Frequency:  Two Times a day  Entered: Nov 7 2023  8:54AM  
This patient has been assessed with a concern for Malnutrition and has been determined to have a diagnosis/diagnoses of Severe protein-calorie malnutrition and Underweight (BMI < 19).    This patient is being managed with:   Diet Regular-  Liquid Protein Supplement     Qty per Day:  three times a day  Supplement Feeding Modality:  Oral  Ensure Plus High Protein Cans or Servings Per Day:  1       Frequency:  Three Times a day  Entered: Nov 24 2023  5:54PM  
This patient has been assessed with a concern for Malnutrition and has been determined to have a diagnosis/diagnoses of Severe protein-calorie malnutrition and Underweight (BMI < 19).    This patient is being managed with:   Diet Regular-  Supplement Feeding Modality:  Oral  Ensure Plus High Protein Cans or Servings Per Day:  1       Frequency:  Two Times a day  Entered: Nov 4 2023 12:32PM  
This patient has been assessed with a concern for Malnutrition and has been determined to have a diagnosis/diagnoses of Severe protein-calorie malnutrition and Underweight (BMI < 19).    This patient is being managed with:   Diet Regular-  Consistent Carbohydrate {Evening Snack} (CSTCHOSN)  No Concentrated Potassium  Supplement Feeding Modality:  Oral  Nepro Cans or Servings Per Day:  3       Frequency:  Three Times a day  Entered: Nov 12 2023  3:34PM  
This patient has been assessed with a concern for Malnutrition and has been determined to have a diagnosis/diagnoses of Severe protein-calorie malnutrition and Underweight (BMI < 19).    This patient is being managed with:   Diet Regular-  Consistent Carbohydrate {Evening Snack} (CSTCHOSN)  Supplement Feeding Modality:  Oral  Ensure Plus High Protein Cans or Servings Per Day:  1       Frequency:  Two Times a day  Entered: Nov 7 2023  8:54AM  
This patient has been assessed with a concern for Malnutrition and has been determined to have a diagnosis/diagnoses of Severe protein-calorie malnutrition and Underweight (BMI < 19).    This patient is being managed with:   Diet Regular-  Consistent Carbohydrate {Evening Snack} (CSTCHOSN)  Supplement Feeding Modality:  Oral  Ensure Plus High Protein Cans or Servings Per Day:  1       Frequency:  Two Times a day  Entered: Nov 7 2023  8:54AM  
This patient has been assessed with a concern for Malnutrition and has been determined to have a diagnosis/diagnoses of Severe protein-calorie malnutrition and Underweight (BMI < 19).    This patient is being managed with:   Diet Regular-  Liquid Protein Supplement     Qty per Day:  three times a day  Supplement Feeding Modality:  Oral  Ensure Plus High Protein Cans or Servings Per Day:  1       Frequency:  Three Times a day  Entered: Nov 24 2023  5:54PM  
This patient has been assessed with a concern for Malnutrition and has been determined to have a diagnosis/diagnoses of Underweight (BMI < 19) and Severe protein-calorie malnutrition.    This patient is being managed with:   Diet Regular-  Liquid Protein Supplement     Qty per Day:  3  Supplement Feeding Modality:  Oral  Ensure Plus High Protein Cans or Servings Per Day:  3       Frequency:  Daily  Entered: Nov 21 2023 12:13PM  
This patient has been assessed with a concern for Malnutrition and has been determined to have a diagnosis/diagnoses of Severe protein-calorie malnutrition and Underweight (BMI < 19).    This patient is being managed with:   Diet Regular-  Consistent Carbohydrate {Evening Snack} (CSTCHOSN)  No Concentrated Potassium  Supplement Feeding Modality:  Oral  Nepro Cans or Servings Per Day:  3       Frequency:  Three Times a day  Entered: Nov 12 2023  3:34PM  
This patient has been assessed with a concern for Malnutrition and has been determined to have a diagnosis/diagnoses of Severe protein-calorie malnutrition and Underweight (BMI < 19).    This patient is being managed with:   Diet Regular-  Consistent Carbohydrate {Evening Snack} (CSTCHOSN)  No Concentrated Potassium  Supplement Feeding Modality:  Oral  Nepro Cans or Servings Per Day:  3       Frequency:  Three Times a day  Entered: Nov 12 2023  3:34PM  
This patient has been assessed with a concern for Malnutrition and has been determined to have a diagnosis/diagnoses of Severe protein-calorie malnutrition and Underweight (BMI < 19).    This patient is being managed with:   Diet Regular-  Consistent Carbohydrate {Evening Snack} (CSTCHOSN)  Supplement Feeding Modality:  Oral  Ensure Plus High Protein Cans or Servings Per Day:  1       Frequency:  Two Times a day  Entered: Nov 7 2023  8:54AM  
This patient has been assessed with a concern for Malnutrition and has been determined to have a diagnosis/diagnoses of Severe protein-calorie malnutrition and Underweight (BMI < 19).    This patient is being managed with:   Diet Regular-  Liquid Protein Supplement     Qty per Day:  three times a day  Supplement Feeding Modality:  Oral  Ensure Plus High Protein Cans or Servings Per Day:  1       Frequency:  Three Times a day  Entered: Nov 24 2023  5:54PM  
This patient has been assessed with a concern for Malnutrition and has been determined to have a diagnosis/diagnoses of Severe protein-calorie malnutrition and Underweight (BMI < 19).    This patient is being managed with:   Diet Regular-  Liquid Protein Supplement     Qty per Day:  three times a day  Supplement Feeding Modality:  Oral  Ensure Plus High Protein Cans or Servings Per Day:  1       Frequency:  Three Times a day  Entered: Nov 24 2023  5:54PM  
This patient has been assessed with a concern for Malnutrition and has been determined to have a diagnosis/diagnoses of Severe protein-calorie malnutrition and Underweight (BMI < 19).    This patient is being managed with:   Diet Regular-  Consistent Carbohydrate {Evening Snack} (CSTCHOSN)  No Concentrated Potassium  Supplement Feeding Modality:  Oral  Nepro Cans or Servings Per Day:  3       Frequency:  Three Times a day  Entered: Nov 12 2023  3:34PM  
This patient has been assessed with a concern for Malnutrition and has been determined to have a diagnosis/diagnoses of Severe protein-calorie malnutrition and Underweight (BMI < 19).    This patient is being managed with:   Diet Regular-  Consistent Carbohydrate {Evening Snack} (CSTCHOSN)  Supplement Feeding Modality:  Oral  Ensure Plus High Protein Cans or Servings Per Day:  1       Frequency:  Two Times a day  Entered: Nov 7 2023  8:54AM  
This patient has been assessed with a concern for Malnutrition and has been determined to have a diagnosis/diagnoses of Severe protein-calorie malnutrition and Underweight (BMI < 19).    This patient is being managed with:   Diet Regular-  Liquid Protein Supplement     Qty per Day:  3  Supplement Feeding Modality:  Oral  Ensure Plus High Protein Cans or Servings Per Day:  3       Frequency:  Daily  Entered: Nov 21 2023 12:13PM  
This patient has been assessed with a concern for Malnutrition and has been determined to have a diagnosis/diagnoses of Severe protein-calorie malnutrition and Underweight (BMI < 19).    This patient is being managed with:   Diet Regular-  Liquid Protein Supplement     Qty per Day:  three times a day  Supplement Feeding Modality:  Oral  Ensure Plus High Protein Cans or Servings Per Day:  1       Frequency:  Three Times a day  Entered: Nov 24 2023  5:54PM  
This patient has been assessed with a concern for Malnutrition and has been determined to have a diagnosis/diagnoses of Severe protein-calorie malnutrition and Underweight (BMI < 19).    This patient is being managed with:   Diet NPO after Midnight-     NPO Start Date: 08-Nov-2023   NPO Start Time: 23:59  Entered: Nov 8 2023  3:58PM    Diet Regular-  Consistent Carbohydrate {Evening Snack} (CSTCHOSN)  Supplement Feeding Modality:  Oral  Ensure Plus High Protein Cans or Servings Per Day:  1       Frequency:  Two Times a day  Entered: Nov 7 2023  8:54AM  
This patient has been assessed with a concern for Malnutrition and has been determined to have a diagnosis/diagnoses of Severe protein-calorie malnutrition and Underweight (BMI < 19).    This patient is being managed with:   Diet Regular-  Consistent Carbohydrate {Evening Snack} (CSTCHOSN)  No Concentrated Potassium  Supplement Feeding Modality:  Oral  Nepro Cans or Servings Per Day:  3       Frequency:  Three Times a day  Entered: Nov 12 2023  3:34PM  
This patient has been assessed with a concern for Malnutrition and has been determined to have a diagnosis/diagnoses of Severe protein-calorie malnutrition and Underweight (BMI < 19).    This patient is being managed with:   Diet Regular-  Liquid Protein Supplement     Qty per Day:  three times a day  Supplement Feeding Modality:  Oral  Ensure Plus High Protein Cans or Servings Per Day:  1       Frequency:  Three Times a day  Entered: Nov 24 2023  5:54PM  
This patient has been assessed with a concern for Malnutrition and has been determined to have a diagnosis/diagnoses of Severe protein-calorie malnutrition and Underweight (BMI < 19).    This patient is being managed with:   Diet Regular-  Liquid Protein Supplement     Qty per Day:  three times a day  Supplement Feeding Modality:  Oral  Ensure Plus High Protein Cans or Servings Per Day:  1       Frequency:  Three Times a day  Entered: Nov 24 2023  5:54PM  
This patient has been assessed with a concern for Malnutrition and has been determined to have a diagnosis/diagnoses of Severe protein-calorie malnutrition and Underweight (BMI < 19).    This patient is being managed with:   Diet Regular-  Supplement Feeding Modality:  Oral  Ensure Plus High Protein Cans or Servings Per Day:  1       Frequency:  Two Times a day  Entered: Nov 4 2023 12:32PM  
This patient has been assessed with a concern for Malnutrition and has been determined to have a diagnosis/diagnoses of Underweight (BMI < 19) and Severe protein-calorie malnutrition.    This patient is being managed with:   Diet Regular-  Liquid Protein Supplement     Qty per Day:  3  Supplement Feeding Modality:  Oral  Ensure Plus High Protein Cans or Servings Per Day:  3       Frequency:  Daily  Entered: Nov 21 2023 12:13PM  
This patient has been assessed with a concern for Malnutrition and has been determined to have a diagnosis/diagnoses of Severe protein-calorie malnutrition and Underweight (BMI < 19).    This patient is being managed with:   Diet Regular-  Supplement Feeding Modality:  Oral  Ensure Plus High Protein Cans or Servings Per Day:  1       Frequency:  Two Times a day  Entered: Nov 4 2023 12:32PM  
This patient has been assessed with a concern for Malnutrition and has been determined to have a diagnosis/diagnoses of Severe protein-calorie malnutrition and Underweight (BMI < 19).    This patient is being managed with:   Diet Regular-  Supplement Feeding Modality:  Oral  Ensure Plus High Protein Cans or Servings Per Day:  1       Frequency:  Two Times a day  Entered: Nov 4 2023 12:32PM

## 2023-11-30 NOTE — PROGRESS NOTE ADULT - ASSESSMENT
84 y/o Male with h/o lung CA [place] [facility] [place] and [place], s/p right upper lobectomy, HTN, HPL, Chronic anemia, COPD, AAA, Hypothyroidism, CKD stage IIIa was admitted on 11/2 for increased shortness of breath, dyspnea on minimal exertion, and cough. Reportedly, he had CT of the chest performed 10/3 which demonstrated consolidation, was started on augmentin/prednisone/azithro 10/6 x 1 week. Pt completed course of medications however wife noted worsening cough and pt with fever Tmax of 103.9 on 10/26. Wife then took pt to pulmonologist again who started pt on second round of the same medications, instructed wife to stop giving pt tylenol wife notes fevers self-resolved. Since then pt decreased energy with episode of being unsteady on feet. Today is 6th day of taking medications, had f/u scheduled with pulmonologist who sent pt to ED for eval.  He feels tired and weak. In ER he received ceftriaxone.    1. Large RLL pulmonary cavity with worsening opacification. Worsening pulmonary infiltrates, likely pneumonia vs possible recurrent lung Ca. Lung Ca s/p right upper lobectomy. Radiation pneumonitis. CRF stage 3. Allergy to PCN.   -respiratory frail  -extensive right lung consolidations  -worsening large pulmonary cavity opacification on repeat CT chest   -bronchoscopy cultures show normal respiratory jaime and rare yeast  -fungal bronch c/s is pending and will take several weeks to finalize  -s/p levofloxacin # 10  -s/p voriconazole 200 mg PO q12h # 7  -s/p fluconazole 100 mg IV qd # 8  -s/p doxycycline 100 mg IV q12h # 10  -pulmonary evaluation appreciated   -I am concerned that there is no definite diagnosis for his pulmonary illness and that abx therapy dose not seem to help so far  -s/p meropenem 1 gm IV q8h # 17 and caspofungin 50 mg IV qd # 9 and vancomycin 750 mg IV q12h # 8  -abx changed to ceftin 500 mg PO q12h and fluconazole 100 mg PO qd  -tolerating abx well so far; no side effects noted  -thoracic evaluation appreciated - conservative care recommended   -renal function is improved  -renal function is stable  -f/u cultures  -case reviewed with Dr. Sheldon: he did note respond well to bread spectrum IV antimicrobial therapy  -may continue on IV abx therapy for now  -continue ceftin 500 mg PO q12h and fluconazole 100 mg PO qd   -monitor temps  -f/u CBC  -supportive care  2. Other issues:   -care per medicine    d/w medicine team and Dr. Sheldon

## 2023-11-30 NOTE — PROGRESS NOTE ADULT - ASSESSMENT
83-year-old M with PMHx HTN, HLD, chronic anemia, COPD, lung CA s/p RUL lobectomy (follows at [facility], last chemotherapy/RT 5/2023, not currently on treatment), AAA, hypothyroidism, CKD stage IIIa sent in to ED on 11/2/23  by pulmonologist MD Olivera with c/o SOB, dyspnea on minimal exertion, and cough. 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     # Acute hypoxic respiratory failure, multifactorial   # New RLL PNA on CT 11/13 , RLL cavity s/p bronchoscopy 11/9  possible yeast  infection, less likely radiation pneumonitis , r/o  aspergilloma    - CT chest: Larger right lower lobe thick-walled cavity which may be infectious and/or residua of reported treated tumor. Spongelike material within the cavity can be seen with aspergilloma (prior to fungus ball formation). Consolidation within the right lower and middle lobe compatible with radiation change.  - leukocytosis stable (WBC 17.70 from 17.35) ---> trend down to WBC  8   - repeat CXR 11/8 - worsening of opacities over right lung   - 2 d echo 11/8 - EF 60% , no significant valvular disease    - immunoglobulin panel - all Ig wnl  - 11/9 - s/p bronchoscopy   - speech and swallow evaluation  - no aspirations  - CXR 11/11 - stable consolidations  - repeat CT chest 11/13 - new right lung consolidation  - blood cultures -no growth, sputum culture - normal respiratory  - c/w trelegy ellipta, chest pt, IS, acapella ,BIPAP as needed  - ID/pulm consult noted   - s/p IV steroids --> po prednisone taper 20 x 3 days than stop , trending down ----> 34--> 50--> 70--> 110  - s/p  voriconazole PO ,  s/p 7 days of levaquin to cover atypical/psuedomonas (allergy to PCN)    - 11/13 - IV meropenem, doxy and fluconasole started as per ID   - Chest xray prelim worse  - Thoracic consult appreciated, no intervention  - CT Chest 11/20 worse  -11/13 - IV meropenem and doxycylcine  - Fluconazole DC, started on caspofungin 11/20  - Would consider palliative at this time  - 11/22 - d/c doxy started on  vancomycin , c/w IV meropenem  - CRP 24--> 70 --> 58  - 11/25 CXR - worse on the right  - 11/27 CT chest - worsening of right consolidation   - poor prognosis, focus on hospice care d/w wife , requesting bipap  - 11/28 - awaiting hospice set up at home with bipap  -  d/c caspofungin, meropenem, vanco --> po fluconazole and ceftin for 14 days    #  Chronic anemia, likely due to  chemotherapy, worsening and iron deficiency   -  no s/s bleeding, asymptomatic, continue to monitor/trend  - 11/8 - 1 unit PRBC due to hemoptysis and worsening of anemia  -  11/19 transfuse 1 unit PRBC  - Hemoglobin 7.9 11/20  - 11/25 s/p 1 unit PRBC  - 11/26 1 unit PRBC , stop heparin , FOBT - neg   - Trend Hb    #CHRISTINA on CKD stage IIIa,  Resolved    # Left calf laceration from veno dines while in PACU for bronchoscopy   - surgery consult - s/p suturing  on 11/09/23   - plan to remove sutures in 10 days  - Surgical consult for suture removal  - sutures removed    # Bilateral UE edema due to superphicial thrombophlebitis  - doppler noted  - start heparin 5000 bid, elevation, warm compresses if pain  - 11/23 - iv lasix 10 mg   - 11/24 c/w IV lasix for 3 days  - 11/26 - lasix 20 in am, 10 IV after blood transfusion  - BNP 1200--> 1700--> 2500--> 4500--> 2500  - c/w lasix 20 qd --> PO in am      # Acute hyperglycemia, possible steroid-induced , Prediabetes with A1C 6.3   - Stop pre-meal and  lantus ,  ISS  - liberize diet, no more monitoring focus on comfort    # HTN - hydralazine 10 bid --> 10 tid    #  HLD   - c/w  atorvastatin    # Hypothyroidism  - Levo lowered to 12.5 mcg,  Recheck in 4 weeks    # Hx lung CA s/p RUL lobectomy/chemo/RT  - f/u with [facility]     # Severe protein-calorie malnutrition. - nutritional education provided ,  supplements ordered ,  MVT qd ordered      # DVT Ppx  - heparin 5000 q12h     # Code status  - DNR/DNI    wife and daughter updated at bedside 11/21, 11/22, 11/23, 11/24, 11/25, 11/26, 11/27, 11/28     dispo - home with hospice

## 2023-11-30 NOTE — DISCHARGE NOTE NURSING/CASE MANAGEMENT/SOCIAL WORK - NSDCPEFALRISK_GEN_ALL_CORE
For information on Fall & Injury Prevention, visit: [email or web address] OR  [email or web address] OR  https://www.cdc.gov/steadi/patient.html

## 2023-12-01 ENCOUNTER — TRANSCRIPTION ENCOUNTER (OUTPATIENT)
Age: 83
End: 2023-12-01

## 2023-12-07 LAB
CULTURE RESULTS: ABNORMAL
CULTURE RESULTS: ABNORMAL
SPECIMEN SOURCE: SIGNIFICANT CHANGE UP
SPECIMEN SOURCE: SIGNIFICANT CHANGE UP

## 2023-12-11 DIAGNOSIS — E78.5 HYPERLIPIDEMIA, UNSPECIFIED: ICD-10-CM

## 2023-12-11 DIAGNOSIS — D64.81 ANEMIA DUE TO ANTINEOPLASTIC CHEMOTHERAPY: ICD-10-CM

## 2023-12-11 DIAGNOSIS — Z92.21 PERSONAL HISTORY OF ANTINEOPLASTIC CHEMOTHERAPY: ICD-10-CM

## 2023-12-11 DIAGNOSIS — Z11.52 ENCOUNTER FOR SCREENING FOR COVID-19: ICD-10-CM

## 2023-12-11 DIAGNOSIS — E43 UNSPECIFIED SEVERE PROTEIN-CALORIE MALNUTRITION: ICD-10-CM

## 2023-12-11 DIAGNOSIS — Z79.51 LONG TERM (CURRENT) USE OF INHALED STEROIDS: ICD-10-CM

## 2023-12-11 DIAGNOSIS — Z85.118 PERSONAL HISTORY OF OTHER MALIGNANT NEOPLASM OF BRONCHUS AND LUNG: ICD-10-CM
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
## 2023-12-11 DIAGNOSIS — J44.1 CHRONIC OBSTRUCTIVE PULMONARY DISEASE WITH (ACUTE) EXACERBATION: ICD-10-CM

## 2023-12-11 DIAGNOSIS — J96.01 ACUTE RESPIRATORY FAILURE WITH HYPOXIA: ICD-10-CM

## 2023-12-11 DIAGNOSIS — Z66 DO NOT RESUSCITATE: ICD-10-CM

## 2023-12-11 DIAGNOSIS — N18.31 CHRONIC KIDNEY DISEASE, STAGE 3A: ICD-10-CM

## 2023-12-11 DIAGNOSIS — E03.9 HYPOTHYROIDISM, UNSPECIFIED: ICD-10-CM

## 2023-12-11 DIAGNOSIS — J44.0 CHRONIC OBSTRUCTIVE PULMONARY DISEASE WITH (ACUTE) LOWER RESPIRATORY INFECTION: ICD-10-CM

## 2023-12-11 DIAGNOSIS — T38.0X5A ADVERSE EFFECT OF GLUCOCORTICOIDS AND SYNTHETIC ANALOGUES, INITIAL ENCOUNTER: ICD-10-CM

## 2023-12-11 DIAGNOSIS — R04.2 HEMOPTYSIS: ICD-10-CM

## 2023-12-11 DIAGNOSIS — B44.9 ASPERGILLOSIS, UNSPECIFIED: ICD-10-CM

## 2023-12-11 DIAGNOSIS — Z79.890 HORMONE REPLACEMENT THERAPY: ICD-10-CM

## 2023-12-11 DIAGNOSIS — B37.1 PULMONARY CANDIDIASIS: ICD-10-CM

## 2023-12-11 DIAGNOSIS — E87.5 HYPERKALEMIA: ICD-10-CM

## 2023-12-11 DIAGNOSIS — Z51.5 ENCOUNTER FOR PALLIATIVE CARE: ICD-10-CM

## 2023-12-11 DIAGNOSIS — R73.03 PREDIABETES: ICD-10-CM

## 2023-12-11 DIAGNOSIS — S81.812A LACERATION WITHOUT FOREIGN BODY, LEFT LOWER LEG, INITIAL ENCOUNTER: ICD-10-CM

## 2023-12-11 DIAGNOSIS — I80.8 PHLEBITIS AND THROMBOPHLEBITIS OF OTHER SITES: ICD-10-CM

## 2023-12-11 DIAGNOSIS — A41.9 SEPSIS, UNSPECIFIED ORGANISM: ICD-10-CM

## 2023-12-11 DIAGNOSIS — F03.A0 UNSPECIFIED DEMENTIA, MILD, WITHOUT BEHAVIORAL DISTURBANCE, PSYCHOTIC DISTURBANCE, MOOD DISTURBANCE, AND ANXIETY: ICD-10-CM

## 2023-12-11 DIAGNOSIS — Z90.2 ACQUIRED ABSENCE OF LUNG [PART OF]: ICD-10-CM

## 2023-12-11 DIAGNOSIS — J18.9 PNEUMONIA, UNSPECIFIED ORGANISM: ICD-10-CM

## 2023-12-11 DIAGNOSIS — D50.9 IRON DEFICIENCY ANEMIA, UNSPECIFIED: ICD-10-CM

## 2023-12-11 DIAGNOSIS — N17.0 ACUTE KIDNEY FAILURE WITH TUBULAR NECROSIS: ICD-10-CM

## 2023-12-11 DIAGNOSIS — I12.9 HYPERTENSIVE CHRONIC KIDNEY DISEASE WITH STAGE 1 THROUGH STAGE 4 CHRONIC KIDNEY DISEASE, OR UNSPECIFIED CHRONIC KIDNEY DISEASE: ICD-10-CM

## 2023-12-11 DIAGNOSIS — D84.821 IMMUNODEFICIENCY DUE TO DRUGS: ICD-10-CM

## 2023-12-11 DIAGNOSIS — Z79.899 OTHER LONG TERM (CURRENT) DRUG THERAPY: ICD-10-CM

## 2023-12-11 DIAGNOSIS — Z92.3 PERSONAL HISTORY OF IRRADIATION: ICD-10-CM

## 2023-12-11 DIAGNOSIS — T46.5X5A ADVERSE EFFECT OF OTHER ANTIHYPERTENSIVE DRUGS, INITIAL ENCOUNTER: ICD-10-CM

## 2023-12-11 DIAGNOSIS — I95.9 HYPOTENSION, UNSPECIFIED: ICD-10-CM

## 2023-12-27 LAB
CULTURE RESULTS: SIGNIFICANT CHANGE UP
CULTURE RESULTS: SIGNIFICANT CHANGE UP
SPECIMEN SOURCE: SIGNIFICANT CHANGE UP
SPECIMEN SOURCE: SIGNIFICANT CHANGE UP

## 2024-08-12 NOTE — PROCEDURE NOTE - NSASSISTBY_GEN_A_CORE
RN
ddx: fx, dislocation, muscle strain   likely strain.  no red flags.   Reviewed all results and necessity for follow up. Counseled on red flags and to return for them.  Patient appears well on discharge.

## 2024-08-23 NOTE — PROCEDURE NOTE - NSINFORMCONSENT_GEN_A_CORE
Benefits, risks, and possible complications of procedure explained to patient/caregiver who verbalized understanding and gave written consent.
Warm

## 2024-10-10 NOTE — ED ADULT NURSE NOTE - DRUG PRE-SCREENING (DAST -1)
This patient called this morning and I seen PA wanted pt to make a f/up appt with PBP.    Transferred to  to schedule appt    Statement Selected
